# Patient Record
Sex: FEMALE | Race: WHITE | Employment: OTHER | URBAN - METROPOLITAN AREA
[De-identification: names, ages, dates, MRNs, and addresses within clinical notes are randomized per-mention and may not be internally consistent; named-entity substitution may affect disease eponyms.]

---

## 2017-01-05 RX ORDER — LORAZEPAM 0.5 MG/1
0.5 TABLET ORAL EVERY 8 HOURS PRN
Qty: 90 TABLET | Refills: 1 | Status: SHIPPED | OUTPATIENT
Start: 2017-01-05 | End: 2017-03-16 | Stop reason: SDUPTHER

## 2017-01-12 ENCOUNTER — OFFICE VISIT (OUTPATIENT)
Dept: FAMILY MEDICINE CLINIC | Age: 62
End: 2017-01-12

## 2017-01-12 VITALS
DIASTOLIC BLOOD PRESSURE: 100 MMHG | WEIGHT: 194.8 LBS | TEMPERATURE: 98.4 F | HEART RATE: 68 BPM | RESPIRATION RATE: 20 BRPM | BODY MASS INDEX: 32.46 KG/M2 | SYSTOLIC BLOOD PRESSURE: 140 MMHG

## 2017-01-12 DIAGNOSIS — G89.29 OTHER CHRONIC PAIN: ICD-10-CM

## 2017-01-12 DIAGNOSIS — R10.11 RUQ ABDOMINAL PAIN: Primary | ICD-10-CM

## 2017-01-12 PROCEDURE — 99214 OFFICE O/P EST MOD 30 MIN: CPT | Performed by: FAMILY MEDICINE

## 2017-01-12 RX ORDER — MORPHINE SULFATE 15 MG/1
15 TABLET ORAL EVERY 6 HOURS PRN
Qty: 30 TABLET | Refills: 0 | Status: SHIPPED | OUTPATIENT
Start: 2017-01-12 | End: 2017-02-16

## 2017-01-23 ENCOUNTER — CARE COORDINATION (OUTPATIENT)
Dept: CARE COORDINATION | Age: 62
End: 2017-01-23

## 2017-01-25 ENCOUNTER — TELEPHONE (OUTPATIENT)
Dept: FAMILY MEDICINE CLINIC | Age: 62
End: 2017-01-25

## 2017-02-16 ENCOUNTER — OFFICE VISIT (OUTPATIENT)
Dept: FAMILY MEDICINE CLINIC | Age: 62
End: 2017-02-16

## 2017-02-16 VITALS
DIASTOLIC BLOOD PRESSURE: 80 MMHG | SYSTOLIC BLOOD PRESSURE: 130 MMHG | WEIGHT: 188.8 LBS | RESPIRATION RATE: 16 BRPM | BODY MASS INDEX: 32.41 KG/M2 | TEMPERATURE: 98.1 F | HEART RATE: 84 BPM

## 2017-02-16 DIAGNOSIS — G89.29 OTHER CHRONIC PAIN: ICD-10-CM

## 2017-02-16 DIAGNOSIS — R07.2 PRECORDIAL PAIN: ICD-10-CM

## 2017-02-16 DIAGNOSIS — I25.10 CORONARY ARTERY DISEASE INVOLVING NATIVE CORONARY ARTERY OF NATIVE HEART WITHOUT ANGINA PECTORIS: ICD-10-CM

## 2017-02-16 DIAGNOSIS — K83.4 SPHINCTER OF ODDI DYSFUNCTION: ICD-10-CM

## 2017-02-16 DIAGNOSIS — K58.0 IRRITABLE BOWEL SYNDROME WITH DIARRHEA: ICD-10-CM

## 2017-02-16 DIAGNOSIS — F33.0 MILD EPISODE OF RECURRENT MAJOR DEPRESSIVE DISORDER (HCC): ICD-10-CM

## 2017-02-16 DIAGNOSIS — R10.84 GENERALIZED ABDOMINAL PAIN: Primary | ICD-10-CM

## 2017-02-16 PROCEDURE — 99214 OFFICE O/P EST MOD 30 MIN: CPT | Performed by: FAMILY MEDICINE

## 2017-02-16 RX ORDER — CYCLOBENZAPRINE HCL 10 MG
10 TABLET ORAL 3 TIMES DAILY PRN
Qty: 60 TABLET | Refills: 0 | Status: SHIPPED | OUTPATIENT
Start: 2017-02-16 | End: 2017-03-08 | Stop reason: ALTCHOICE

## 2017-02-16 RX ORDER — MULTIVITAMIN WITH IRON
250 TABLET ORAL DAILY
COMMUNITY
End: 2017-12-14

## 2017-03-08 ENCOUNTER — OFFICE VISIT (OUTPATIENT)
Dept: ENDOCRINOLOGY | Age: 62
End: 2017-03-08

## 2017-03-08 VITALS
HEIGHT: 64 IN | OXYGEN SATURATION: 97 % | WEIGHT: 191.8 LBS | SYSTOLIC BLOOD PRESSURE: 138 MMHG | BODY MASS INDEX: 32.74 KG/M2 | HEART RATE: 103 BPM | DIASTOLIC BLOOD PRESSURE: 90 MMHG

## 2017-03-08 DIAGNOSIS — R63.5 WEIGHT GAIN, ABNORMAL: ICD-10-CM

## 2017-03-08 DIAGNOSIS — N95.1 MENOPAUSAL STATE: Primary | ICD-10-CM

## 2017-03-08 DIAGNOSIS — E78.2 MIXED HYPERLIPIDEMIA: ICD-10-CM

## 2017-03-08 DIAGNOSIS — R53.83 OTHER FATIGUE: ICD-10-CM

## 2017-03-08 DIAGNOSIS — Z79.890 POSTMENOPAUSAL HRT (HORMONE REPLACEMENT THERAPY): ICD-10-CM

## 2017-03-08 DIAGNOSIS — E55.9 VITAMIN D DEFICIENCY: ICD-10-CM

## 2017-03-08 PROCEDURE — 99204 OFFICE O/P NEW MOD 45 MIN: CPT | Performed by: NURSE PRACTITIONER

## 2017-03-08 RX ORDER — NITROGLYCERIN 0.3 MG/1
0.3 TABLET SUBLINGUAL EVERY 5 MIN PRN
Status: ON HOLD | COMMUNITY
End: 2017-11-22 | Stop reason: HOSPADM

## 2017-03-08 RX ORDER — ESTRADIOL 0.1 MG/G
CREAM VAGINAL
Qty: 1 TUBE | Refills: 3 | Status: SHIPPED | OUTPATIENT
Start: 2017-03-08 | End: 2017-06-26 | Stop reason: SDUPTHER

## 2017-03-08 RX ORDER — ESTRADIOL 0.1 MG/G
1 CREAM VAGINAL DAILY
Qty: 1 TUBE | Refills: 3 | Status: SHIPPED | OUTPATIENT
Start: 2017-03-08 | End: 2017-03-08 | Stop reason: SDUPTHER

## 2017-03-08 RX ORDER — CYCLOBENZAPRINE HCL 10 MG
10 TABLET ORAL 3 TIMES DAILY PRN
COMMUNITY
End: 2017-05-08 | Stop reason: ALTCHOICE

## 2017-03-16 ENCOUNTER — OFFICE VISIT (OUTPATIENT)
Dept: FAMILY MEDICINE CLINIC | Age: 62
End: 2017-03-16

## 2017-03-16 VITALS
SYSTOLIC BLOOD PRESSURE: 148 MMHG | WEIGHT: 190.6 LBS | OXYGEN SATURATION: 98 % | HEART RATE: 100 BPM | BODY MASS INDEX: 32.72 KG/M2 | TEMPERATURE: 96.5 F | DIASTOLIC BLOOD PRESSURE: 92 MMHG

## 2017-03-16 DIAGNOSIS — F06.30 MOOD DISORDER DUE TO A GENERAL MEDICAL CONDITION: ICD-10-CM

## 2017-03-16 DIAGNOSIS — I10 ESSENTIAL HYPERTENSION: ICD-10-CM

## 2017-03-16 DIAGNOSIS — F33.0 MILD EPISODE OF RECURRENT MAJOR DEPRESSIVE DISORDER (HCC): ICD-10-CM

## 2017-03-16 DIAGNOSIS — I25.10 CORONARY ARTERY DISEASE INVOLVING NATIVE CORONARY ARTERY OF NATIVE HEART WITHOUT ANGINA PECTORIS: ICD-10-CM

## 2017-03-16 DIAGNOSIS — G89.29 OTHER CHRONIC PAIN: Primary | ICD-10-CM

## 2017-03-16 PROCEDURE — 99214 OFFICE O/P EST MOD 30 MIN: CPT | Performed by: FAMILY MEDICINE

## 2017-03-16 RX ORDER — HYDROCODONE BITARTRATE AND ACETAMINOPHEN 5; 325 MG/1; MG/1
1 TABLET ORAL 2 TIMES DAILY
Qty: 60 TABLET | Refills: 0 | Status: SHIPPED | OUTPATIENT
Start: 2017-03-16 | End: 2017-04-11 | Stop reason: SDUPTHER

## 2017-03-16 RX ORDER — LORAZEPAM 0.5 MG/1
0.5 TABLET ORAL 2 TIMES DAILY
Qty: 60 TABLET | Refills: 0 | Status: SHIPPED | OUTPATIENT
Start: 2017-03-16 | End: 2017-04-11 | Stop reason: SDUPTHER

## 2017-03-16 RX ORDER — OXYCODONE HYDROCHLORIDE AND ACETAMINOPHEN 5; 325 MG/1; MG/1
1 TABLET ORAL 2 TIMES DAILY
Status: CANCELLED | OUTPATIENT
Start: 2017-03-16

## 2017-03-16 RX ORDER — LISINOPRIL 10 MG/1
10 TABLET ORAL DAILY
Qty: 30 TABLET | Refills: 3 | Status: SHIPPED | OUTPATIENT
Start: 2017-03-16 | End: 2017-04-11

## 2017-03-28 RX ORDER — ICOSAPENT ETHYL 1000 MG/1
2 CAPSULE ORAL 2 TIMES DAILY
Qty: 120 CAPSULE | Refills: 3 | Status: SHIPPED | OUTPATIENT
Start: 2017-03-28 | End: 2018-06-13

## 2017-03-30 ENCOUNTER — TELEPHONE (OUTPATIENT)
Dept: FAMILY MEDICINE CLINIC | Age: 62
End: 2017-03-30

## 2017-04-11 ENCOUNTER — OFFICE VISIT (OUTPATIENT)
Dept: FAMILY MEDICINE CLINIC | Age: 62
End: 2017-04-11

## 2017-04-11 VITALS
SYSTOLIC BLOOD PRESSURE: 146 MMHG | HEART RATE: 74 BPM | OXYGEN SATURATION: 97 % | DIASTOLIC BLOOD PRESSURE: 90 MMHG | RESPIRATION RATE: 12 BRPM

## 2017-04-11 DIAGNOSIS — H92.01 OTALGIA, RIGHT: ICD-10-CM

## 2017-04-11 DIAGNOSIS — F33.0 MILD EPISODE OF RECURRENT MAJOR DEPRESSIVE DISORDER (HCC): ICD-10-CM

## 2017-04-11 DIAGNOSIS — I25.10 CORONARY ARTERY DISEASE INVOLVING NATIVE CORONARY ARTERY OF NATIVE HEART WITHOUT ANGINA PECTORIS: ICD-10-CM

## 2017-04-11 DIAGNOSIS — E78.00 PURE HYPERCHOLESTEROLEMIA: ICD-10-CM

## 2017-04-11 DIAGNOSIS — F41.9 ANXIETY: ICD-10-CM

## 2017-04-11 DIAGNOSIS — I10 ESSENTIAL HYPERTENSION: ICD-10-CM

## 2017-04-11 DIAGNOSIS — G89.29 OTHER CHRONIC PAIN: Primary | ICD-10-CM

## 2017-04-11 PROCEDURE — 99214 OFFICE O/P EST MOD 30 MIN: CPT | Performed by: FAMILY MEDICINE

## 2017-04-11 RX ORDER — HYDROCODONE BITARTRATE AND ACETAMINOPHEN 5; 325 MG/1; MG/1
1 TABLET ORAL 2 TIMES DAILY
Qty: 60 TABLET | Refills: 0 | Status: SHIPPED | OUTPATIENT
Start: 2017-04-11 | End: 2017-05-08

## 2017-04-11 RX ORDER — LORAZEPAM 0.5 MG/1
0.5 TABLET ORAL 2 TIMES DAILY
Qty: 60 TABLET | Refills: 0 | Status: SHIPPED | OUTPATIENT
Start: 2017-04-11 | End: 2017-05-09 | Stop reason: SDUPTHER

## 2017-04-11 RX ORDER — HYDROCHLOROTHIAZIDE 25 MG/1
25 TABLET ORAL DAILY
Qty: 30 TABLET | Refills: 5 | Status: SHIPPED | OUTPATIENT
Start: 2017-04-11 | End: 2017-06-26

## 2017-05-08 ENCOUNTER — OFFICE VISIT (OUTPATIENT)
Dept: ENT CLINIC | Age: 62
End: 2017-05-08

## 2017-05-08 VITALS
DIASTOLIC BLOOD PRESSURE: 104 MMHG | SYSTOLIC BLOOD PRESSURE: 153 MMHG | BODY MASS INDEX: 28.34 KG/M2 | HEIGHT: 64 IN | WEIGHT: 166 LBS

## 2017-05-08 DIAGNOSIS — H61.031 PERICHONDRITIS AND CHONDRITIS OF PINNA, RIGHT: Primary | ICD-10-CM

## 2017-05-08 DIAGNOSIS — H61.001 PERICHONDRITIS AND CHONDRITIS OF PINNA, RIGHT: Primary | ICD-10-CM

## 2017-05-08 PROCEDURE — 99243 OFF/OP CNSLTJ NEW/EST LOW 30: CPT | Performed by: OTOLARYNGOLOGY

## 2017-05-08 RX ORDER — HYDROCODONE BITARTRATE AND ACETAMINOPHEN 5; 325 MG/1; MG/1
1 TABLET ORAL NIGHTLY
COMMUNITY
End: 2017-06-26

## 2017-05-09 ENCOUNTER — OFFICE VISIT (OUTPATIENT)
Dept: FAMILY MEDICINE CLINIC | Age: 62
End: 2017-05-09

## 2017-05-09 VITALS
WEIGHT: 189.4 LBS | SYSTOLIC BLOOD PRESSURE: 155 MMHG | DIASTOLIC BLOOD PRESSURE: 97 MMHG | OXYGEN SATURATION: 96 % | BODY MASS INDEX: 32.51 KG/M2 | HEART RATE: 86 BPM | RESPIRATION RATE: 12 BRPM | TEMPERATURE: 96.5 F

## 2017-05-09 DIAGNOSIS — K58.0 IRRITABLE BOWEL SYNDROME WITH DIARRHEA: ICD-10-CM

## 2017-05-09 DIAGNOSIS — I10 ESSENTIAL HYPERTENSION: Primary | ICD-10-CM

## 2017-05-09 DIAGNOSIS — R10.11 RUQ ABDOMINAL PAIN: ICD-10-CM

## 2017-05-09 DIAGNOSIS — G89.29 OTHER CHRONIC PAIN: ICD-10-CM

## 2017-05-09 DIAGNOSIS — I25.10 CORONARY ARTERY DISEASE INVOLVING NATIVE CORONARY ARTERY OF NATIVE HEART WITHOUT ANGINA PECTORIS: ICD-10-CM

## 2017-05-09 DIAGNOSIS — F33.0 MILD EPISODE OF RECURRENT MAJOR DEPRESSIVE DISORDER (HCC): ICD-10-CM

## 2017-05-09 PROCEDURE — 99214 OFFICE O/P EST MOD 30 MIN: CPT | Performed by: FAMILY MEDICINE

## 2017-05-09 RX ORDER — LORAZEPAM 0.5 MG/1
0.25 TABLET ORAL 2 TIMES DAILY
Qty: 30 TABLET | Refills: 0 | Status: SHIPPED | OUTPATIENT
Start: 2017-05-09 | End: 2017-06-26

## 2017-05-09 ASSESSMENT — PATIENT HEALTH QUESTIONNAIRE - PHQ9
2. FEELING DOWN, DEPRESSED OR HOPELESS: 0
1. LITTLE INTEREST OR PLEASURE IN DOING THINGS: 0
SUM OF ALL RESPONSES TO PHQ9 QUESTIONS 1 & 2: 0
SUM OF ALL RESPONSES TO PHQ QUESTIONS 1-9: 0

## 2017-06-26 ENCOUNTER — OFFICE VISIT (OUTPATIENT)
Dept: FAMILY MEDICINE CLINIC | Age: 62
End: 2017-06-26

## 2017-06-26 VITALS
BODY MASS INDEX: 33.23 KG/M2 | OXYGEN SATURATION: 97 % | RESPIRATION RATE: 14 BRPM | TEMPERATURE: 97.3 F | WEIGHT: 193.6 LBS | SYSTOLIC BLOOD PRESSURE: 160 MMHG | DIASTOLIC BLOOD PRESSURE: 92 MMHG | HEART RATE: 86 BPM

## 2017-06-26 DIAGNOSIS — I10 ESSENTIAL HYPERTENSION: Primary | ICD-10-CM

## 2017-06-26 DIAGNOSIS — I25.10 CORONARY ARTERY DISEASE INVOLVING NATIVE CORONARY ARTERY OF NATIVE HEART WITHOUT ANGINA PECTORIS: ICD-10-CM

## 2017-06-26 DIAGNOSIS — R10.9 CHRONIC ABDOMINAL PAIN: ICD-10-CM

## 2017-06-26 DIAGNOSIS — G89.29 CHRONIC ABDOMINAL PAIN: ICD-10-CM

## 2017-06-26 DIAGNOSIS — K58.0 IRRITABLE BOWEL SYNDROME WITH DIARRHEA: ICD-10-CM

## 2017-06-26 PROCEDURE — 99214 OFFICE O/P EST MOD 30 MIN: CPT | Performed by: FAMILY MEDICINE

## 2017-06-26 RX ORDER — ESTRADIOL 0.1 MG/G
CREAM VAGINAL
Qty: 1 TUBE | Refills: 5 | Status: SHIPPED | OUTPATIENT
Start: 2017-06-26 | End: 2017-09-28 | Stop reason: SDUPTHER

## 2017-09-28 ENCOUNTER — OFFICE VISIT (OUTPATIENT)
Dept: FAMILY MEDICINE CLINIC | Age: 62
End: 2017-09-28

## 2017-09-28 VITALS
HEART RATE: 72 BPM | DIASTOLIC BLOOD PRESSURE: 92 MMHG | HEIGHT: 64 IN | WEIGHT: 195.8 LBS | SYSTOLIC BLOOD PRESSURE: 162 MMHG | BODY MASS INDEX: 33.43 KG/M2

## 2017-09-28 DIAGNOSIS — K58.0 IRRITABLE BOWEL SYNDROME WITH DIARRHEA: ICD-10-CM

## 2017-09-28 DIAGNOSIS — G89.29 OTHER CHRONIC PAIN: ICD-10-CM

## 2017-09-28 DIAGNOSIS — M54.9 UPPER BACK PAIN: ICD-10-CM

## 2017-09-28 DIAGNOSIS — I10 ESSENTIAL HYPERTENSION: Primary | ICD-10-CM

## 2017-09-28 DIAGNOSIS — M25.561 ACUTE PAIN OF RIGHT KNEE: ICD-10-CM

## 2017-09-28 DIAGNOSIS — I25.10 CORONARY ARTERY DISEASE INVOLVING NATIVE CORONARY ARTERY OF NATIVE HEART WITHOUT ANGINA PECTORIS: ICD-10-CM

## 2017-09-28 PROCEDURE — 99214 OFFICE O/P EST MOD 30 MIN: CPT | Performed by: FAMILY MEDICINE

## 2017-09-28 RX ORDER — LOSARTAN POTASSIUM 50 MG/1
50 TABLET ORAL DAILY
Qty: 30 TABLET | Refills: 5 | Status: SHIPPED | OUTPATIENT
Start: 2017-09-28 | End: 2017-12-14

## 2017-09-28 RX ORDER — OMEPRAZOLE 10 MG/1
10 CAPSULE, DELAYED RELEASE ORAL PRN
COMMUNITY
End: 2020-02-21

## 2017-09-28 RX ORDER — LIDOCAINE HYDROCHLORIDE 30 MG/G
CREAM TOPICAL
Qty: 85 G | Refills: 0 | Status: SHIPPED | OUTPATIENT
Start: 2017-09-28 | End: 2017-10-13 | Stop reason: SDUPTHER

## 2017-09-28 RX ORDER — DICYCLOMINE HCL 20 MG
20 TABLET ORAL 3 TIMES DAILY PRN
Qty: 120 TABLET | Refills: 5 | Status: SHIPPED | OUTPATIENT
Start: 2017-09-28 | End: 2017-12-14

## 2017-09-28 RX ORDER — ESTRADIOL 0.1 MG/G
CREAM VAGINAL
Qty: 1 TUBE | Refills: 5 | Status: SHIPPED | OUTPATIENT
Start: 2017-09-28 | End: 2017-12-14

## 2017-09-28 RX ORDER — LOPERAMIDE HYDROCHLORIDE 2 MG/1
2 CAPSULE ORAL PRN
COMMUNITY
End: 2017-12-14

## 2017-09-28 NOTE — MR AVS SNAPSHOT
After Visit Summary             Amadeo Barahona   2017 2:50 PM   Office Visit    Description:  Female : 1955   Provider:  Benedicto Bean MD   Department:  [address] [place] [address] Se and Future Appointments         Below is a list of your follow-up and future appointments. This may not be a complete list as you may have made appointments directly with providers that we are not aware of or your providers may have made some for you. Please call your providers to confirm appointments. It is important to keep your appointments. Please bring your current insurance card, photo ID, co-pay, and all medication bottles to your appointment. If self-pay, payment is expected at the time of service. Your To-Do List     Future Appointments Provider Department Dept Phone    2017 2:30 PM Benedicto Bean MD [facility] [address] [phone]    Please arrive 15 minutes prior to appointment, bring photo ID and insurance card. Information from Your Visit        Department     Name Address Phone Fax    [address] [phone]      You Were Seen for:         Comments    Essential hypertension   [030677]         Vital Signs     Blood Pressure Pulse Height Weight Body Mass Index Smoking Status    162/92 (Site: Right Arm, Position: Sitting, Cuff Size: Large Adult) 72 5' 4\" (1.626 m) 195 lb 12.8 oz (88.8 kg) 33.61 kg/m2 Former Smoker      Additional Information about your Body Mass Index (BMI)           Your BMI as listed above is considered obese (30 or more). BMI is an estimate of body fat, calculated from your height and weight. The higher your BMI, the greater your risk of heart disease, high blood pressure, type 2 diabetes, stroke, gallstones, arthritis, sleep apnea, and certain cancers. BMI is not perfect.   It may overestimate body fat in athletes and people who are more muscular. Even a small weight loss (between 5 and 10 percent of your current weight) by decreasing your calorie intake and becoming more physically active will help lower your risk of developing or worsening diseases associated with obesity. Learn more at: [employer]             Today's Medication Changes          These changes are accurate as of: 9/28/17  3:29 PM.  If you have any questions, ask your nurse or doctor. START taking these medications           diclofenac sodium 1 % Gel   Commonly known as:  VOLTAREN   Instructions:  Apply 4 g topically 4 times daily   Quantity:  5 Tube   Refills:  5   Started by:  Kesha Bryson MD       losartan 50 MG tablet   Commonly known as:  COZAAR   Instructions:   Take 1 tablet by mouth daily   Quantity:  30 tablet   Refills:  5   Started by:  Kesha Bryson MD         CHANGE how you take these medications           lidocaine 3 % Crea   Commonly known as:  LIDAMANTLE   Instructions:  Apply to affected area BID prn   Quantity:  85 g   Refills:  0   What changed:    - how to take this  - additional instructions   Changed by:  Kesha Bryson MD            Where to Get Your Medications      These medications were sent to [address] [place], [place] [address] [facility] [phone] - F [phone]  [address] [place] [address] , [address] [id number]     Phone:  [phone]     diclofenac sodium 1 % Gel    dicyclomine 20 MG tablet    estradiol 0.1 MG/GM vaginal cream    lidocaine 3 % Crea    losartan 50 MG tablet               Your Current Medications Are              loperamide (IMODIUM) 2 MG capsule Take 2 mg by mouth as needed for Diarrhea    omeprazole (PRILOSEC) 10 MG delayed release capsule Take 10 mg by mouth as needed    Digestive Enzymes (DIGESTIVE ENZYME PO) Take by mouth OTC 3-4 times weekly    UNABLE TO FIND Raw Iron: 2-3 times weekly
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
## 2017-10-03 RX ORDER — ACYCLOVIR 50 MG/G
OINTMENT TOPICAL
Qty: 30 G | Refills: 0 | Status: SHIPPED | OUTPATIENT
Start: 2017-10-03 | End: 2017-11-07 | Stop reason: SDUPTHER

## 2017-10-06 ENCOUNTER — TELEPHONE (OUTPATIENT)
Dept: FAMILY MEDICINE CLINIC | Age: 62
End: 2017-10-06

## 2017-10-06 DIAGNOSIS — L29.0 PERIANAL IRRITATION: ICD-10-CM

## 2017-10-06 NOTE — TELEPHONE ENCOUNTER
Ulisses Richardson called stating a prescription for lidocaine hydrocortisone cream was supposed to be sent to her pharmacy at her last visit. She states the pharmacy told her that the last script that was sent was just for lidocaine cream.  She is requesting that the correct prescription be sent to [facility]. Please advise. Thanks.       [facility] [address] [place] [address] [phone]

## 2017-10-06 NOTE — TELEPHONE ENCOUNTER
PA submitted in completion via CoverMyMeds. Awaiting Payor response via office fax. Thank you!     Odalis ADAME Key: [id number]

## 2017-10-06 NOTE — TELEPHONE ENCOUNTER
A  scanned  request for a  Prior Authorization for medication Lidocaine 3% Cream 85 GM    is attached to this encounter. Please initiate  this request with the patients insurance company.  Thank  You

## 2017-10-13 RX ORDER — LIDOCAINE HYDROCHLORIDE 30 MG/G
CREAM TOPICAL
Qty: 85 G | Refills: 2 | Status: SHIPPED | OUTPATIENT
Start: 2017-10-13 | End: 2017-12-14

## 2017-10-20 RX ORDER — LIDOCAINE 50 MG/G
OINTMENT TOPICAL
Qty: 85 G | Refills: 2 | Status: CANCELLED | OUTPATIENT
Start: 2017-10-20

## 2017-10-20 NOTE — TELEPHONE ENCOUNTER
Pharmacy called to get the lidocaine 5%, patient agreed to the change. Please e-scribe to Connor in chart.

## 2017-11-09 RX ORDER — ACYCLOVIR 50 MG/G
OINTMENT TOPICAL
Qty: 30 G | Refills: 1 | Status: SHIPPED | OUTPATIENT
Start: 2017-11-09 | End: 2020-08-31

## 2017-11-19 PROBLEM — I21.3 STEMI (ST ELEVATION MYOCARDIAL INFARCTION) (HCC): Status: ACTIVE | Noted: 2017-11-19

## 2017-11-19 PROBLEM — I21.09 ACUTE MI ANTERIOR LATERAL SUBSEQUENT EPISODE CARE (HCC): Status: ACTIVE | Noted: 2017-11-19

## 2017-11-29 ENCOUNTER — OFFICE VISIT (OUTPATIENT)
Dept: CARDIOLOGY CLINIC | Age: 62
End: 2017-11-29

## 2017-11-29 VITALS
HEART RATE: 73 BPM | BODY MASS INDEX: 34.09 KG/M2 | DIASTOLIC BLOOD PRESSURE: 80 MMHG | WEIGHT: 198.6 LBS | SYSTOLIC BLOOD PRESSURE: 126 MMHG

## 2017-11-29 DIAGNOSIS — I20.8 STABLE ANGINA (HCC): Primary | ICD-10-CM

## 2017-11-29 DIAGNOSIS — I10 ESSENTIAL HYPERTENSION: ICD-10-CM

## 2017-11-29 DIAGNOSIS — I25.118 CORONARY ARTERY DISEASE OF NATIVE ARTERY OF NATIVE HEART WITH STABLE ANGINA PECTORIS (HCC): ICD-10-CM

## 2017-11-29 DIAGNOSIS — E78.00 HYPERCHOLESTEROLEMIA: ICD-10-CM

## 2017-11-29 PROCEDURE — 99214 OFFICE O/P EST MOD 30 MIN: CPT | Performed by: NURSE PRACTITIONER

## 2017-11-29 PROCEDURE — 93000 ELECTROCARDIOGRAM COMPLETE: CPT | Performed by: NURSE PRACTITIONER

## 2017-11-29 RX ORDER — ISOSORBIDE MONONITRATE 60 MG/1
60 TABLET, EXTENDED RELEASE ORAL DAILY
Qty: 90 TABLET | Refills: 3 | Status: SHIPPED | OUTPATIENT
Start: 2017-11-29 | End: 2020-02-21

## 2017-12-02 NOTE — PROGRESS NOTES
rashes or non-healing ulcers. Physical Exam:  /80   Pulse 73   Wt 198 lb 9.6 oz (90.1 kg)   BMI 34.09 kg/m²    General:  Alert and oriented. No acute distress. Appears comfortable. HEENT:  Normocephalic. No trauma. EOMI. Neck:  Supple, no JVD  Cardiovascular:  RRR  Pulses: 2+ radial   Lungs:  Clear to auscultation. No rales, wheezes, or rhonchi. Abd:  Soft, non-tender, non-distended. No peritoneal signs. Ext:  No clubbing, cyanosis, or edema. Bilateral groins soft, no hematoma bilatrally   Neuro:  CN's 2-12 grossly in tact. Gait normal.  Motor and sensory exams grossly normal.  Skin:  No rashes or skin breakdown.     CBC:   Lab Results   Component Value Date    WBC 8.8 11/21/2017    HGB 13.1 11/21/2017    HCT 38.0 11/21/2017    MCV 83.6 11/21/2017     11/21/2017     BMP:  Lab Results   Component Value Date    CREATININE 0.6 11/21/2017    BUN 10 11/21/2017     (L) 11/21/2017    K 3.5 11/21/2017    CL 99 11/21/2017    CO2 25 11/21/2017     Mag:   Lab Results   Component Value Date    MG 2.10 03/28/2017     LIVER PROFILE:   Lab Results   Component Value Date    ALT 12 11/20/2017     (H) 11/20/2017     (H) 01/17/2017    ALKPHOS 95 11/20/2017    BILITOT 0.4 11/20/2017     PT/INR: No results found for: INR, PROTIME  BNP:    Lab Results   Component Value Date    BNP 94.0 11/19/2017     LIPIDS:  No components found for: CHLPL  Lab Results   Component Value Date    TRIG 162 (H) 11/20/2017    TRIG 203 (H) 11/19/2017    TRIG 292 (H) 03/28/2017     Lab Results   Component Value Date    HDL 53 11/20/2017    HDL 52 11/19/2017    HDL 49 03/28/2017     Lab Results   Component Value Date    LDLCALC 139 (H) 11/20/2017    LDLCALC 138 (H) 11/19/2017    LDLCALC 89 03/28/2017     Lab Results   Component Value Date    LABVLDL 32 11/20/2017    LABVLDL 41 11/19/2017    LABVLDL 58 03/28/2017     TSH:  Lab Results   Component Value Date    TSH 3.89 03/28/2017       IMAGING:     Coronary angiogram  Patent stent diagonal branch 40% mid LAD stenosis, 10% distal  left main coronary artery stenosis, 10 to 20% mid right posterior  descending coronary stenosis, right coronary artery dominance. No  significant changes from the conclusion of the procedure yesterday. There  is an element of coronary vasospasm possibly with also myocardial bridging  in the LAD. We are going to try to add verapamil 40 mg p.o. t.i.d. and  assess response. The patient needs a nitroglycerin patch as well. We will  have to follow her blood pressure and heart rate on these medications. Angio-Seal was successful in the left femoral arteriotomy. Hemostasis was  achieved in the left femoral arteriotomy.     Medications:   Current Outpatient Prescriptions   Medication Sig Dispense Refill    isosorbide mononitrate (IMDUR) 60 MG extended release tablet Take 1 tablet by mouth daily 90 tablet 3    verapamil (CALAN SR) 120 MG extended release tablet Take 1 tablet by mouth nightly 30 tablet 3    prasugrel (EFFIENT) 10 MG TABS Take 1 tablet by mouth daily 30 tablet 2    atorvastatin (LIPITOR) 40 MG tablet Take 1 tablet by mouth nightly 30 tablet 3    metoprolol tartrate (LOPRESSOR) 25 MG tablet Take 1 tablet by mouth 2 times daily 60 tablet 3    aspirin EC 81 MG EC tablet Take 1 tablet by mouth daily 30 tablet 3    acyclovir (ZOVIRAX) 5 % ointment APPLY TO THE AFFECTED AREA FIVE TIMES DAILY 30 g 1    omeprazole (PRILOSEC) 10 MG delayed release capsule Take 10 mg by mouth as needed      estradiol (ESTRACE) 0.1 MG/GM vaginal cream Place 1 gram in Vagina Mon-Wed-Fri 1 Tube 5    dicyclomine (BENTYL) 20 MG tablet Take 1 tablet by mouth 3 times daily as needed (abd spasm) 120 tablet 5    Icosapent Ethyl (VASCEPA) 1 G CAPS capsule Take 2 capsules by mouth 2 times daily 120 capsule 3    Colostrum 500 MG CAPS Take by mouth daily      lidocaine (LIDAMANTLE) 3 % CREA Apply to affected area BID prn 85 g 2    Lidocaine-Hydrocortisone Ace 3-0.5 % CREA Apply to affected area QD as directed 1 Tube 5    loperamide (IMODIUM) 2 MG capsule Take 2 mg by mouth as needed for Diarrhea      Digestive Enzymes (DIGESTIVE ENZYME PO) Take by mouth OTC 3-4 times weekly      diclofenac sodium (VOLTAREN) 1 % GEL Apply 4 g topically 4 times daily 5 Tube 5    losartan (COZAAR) 50 MG tablet Take 1 tablet by mouth daily 30 tablet 5    magnesium (MAGNESIUM-OXIDE) 250 MG TABS tablet Take 250 mg by mouth daily       No current facility-administered medications for this visit. Assessment:  1. Stable angina ([facility] [address]    2. Coronary artery disease of native artery of native heart with stable angina pectoris ([facility] [address]    3. Essential hypertension    4. Hypercholesterolemia          Plan:  -Increase Imdur to 60 mg po daily  -Reviewed ECGs with Dr. Radha Padron and plan of care  -Follow up with Dr. Jairon Snider in 2 weeks.

## 2017-12-12 ENCOUNTER — OFFICE VISIT (OUTPATIENT)
Dept: CARDIOLOGY CLINIC | Age: 62
End: 2017-12-12

## 2017-12-12 VITALS
SYSTOLIC BLOOD PRESSURE: 110 MMHG | WEIGHT: 198 LBS | BODY MASS INDEX: 33.99 KG/M2 | DIASTOLIC BLOOD PRESSURE: 70 MMHG | HEART RATE: 60 BPM

## 2017-12-12 DIAGNOSIS — I25.10 CORONARY ARTERY DISEASE INVOLVING NATIVE CORONARY ARTERY, ANGINA PRESENCE UNSPECIFIED, UNSPECIFIED WHETHER NATIVE OR TRANSPLANTED HEART: ICD-10-CM

## 2017-12-12 DIAGNOSIS — R07.9 CHEST PAIN, UNSPECIFIED TYPE: Primary | ICD-10-CM

## 2017-12-12 PROCEDURE — 93000 ELECTROCARDIOGRAM COMPLETE: CPT | Performed by: INTERNAL MEDICINE

## 2017-12-12 PROCEDURE — 99214 OFFICE O/P EST MOD 30 MIN: CPT | Performed by: INTERNAL MEDICINE

## 2017-12-12 RX ORDER — RANOLAZINE 500 MG/1
500 TABLET, EXTENDED RELEASE ORAL 2 TIMES DAILY
Qty: 60 TABLET | Refills: 3 | Status: SHIPPED | OUTPATIENT
Start: 2017-12-12 | End: 2018-01-17 | Stop reason: SDUPTHER

## 2017-12-12 ASSESSMENT — ENCOUNTER SYMPTOMS
GASTROINTESTINAL NEGATIVE: 1
CHEST TIGHTNESS: 1
ALLERGIC/IMMUNOLOGIC NEGATIVE: 1
SHORTNESS OF BREATH: 1
EYES NEGATIVE: 1

## 2017-12-12 NOTE — PROGRESS NOTES
Subjective:      Patient ID: Haley Ivey is a 63 y.o. female. CC:  Follow up stenting and chest pain    HPI:  Patient c/o constant chest pain with rest and changes in position with pleuritic component. She had stenting of her diagonal branch and then had a cath the following day for severe chest pain and appeared to have spasm and LAD bridging. Review of Systems   Constitutional: Negative. HENT: Negative. Eyes: Negative. Respiratory: Positive for chest tightness and shortness of breath. Cardiovascular: Positive for chest pain. Gastrointestinal: Negative. Endocrine: Negative. Genitourinary: Negative. Musculoskeletal: Negative. Skin: Negative. Allergic/Immunologic: Negative. Neurological: Negative. Hematological: Negative. Psychiatric/Behavioral: Negative. Objective:   Physical Exam   Constitutional: She is oriented to person, place, and time. She appears well-developed and well-nourished. HENT:   Head: Normocephalic and atraumatic. Eyes: EOM are normal. Pupils are equal, round, and reactive to light. Neck: Normal range of motion. Neck supple. Cardiovascular: Normal rate and regular rhythm. Exam reveals no friction rub. No murmur heard. Pulmonary/Chest: Effort normal and breath sounds normal.   Abdominal: Soft. Bowel sounds are normal.   Musculoskeletal: Normal range of motion. She exhibits no edema or deformity. Neurological: She is alert and oriented to person, place, and time. Skin: Skin is warm and dry. Psychiatric: She has a normal mood and affect.  Her behavior is normal. Thought content normal.       Assessment:      Patient Active Problem List   Diagnosis    Pure hypercholesterolemia    Allergic rhinitis    Routine general medical examination at a health care facility    HSV infection    CAD (coronary artery disease)    Chest pain    Chronic pain    Depression    OAB (overactive bladder)    Hypercholesterolemia    Migraine

## 2017-12-13 ENCOUNTER — TELEPHONE (OUTPATIENT)
Dept: CARDIOLOGY CLINIC | Age: 62
End: 2017-12-13

## 2017-12-13 DIAGNOSIS — I20.8 STABLE ANGINA PECTORIS (HCC): Primary | ICD-10-CM

## 2017-12-14 ENCOUNTER — HOSPITAL ENCOUNTER (OUTPATIENT)
Dept: NON INVASIVE DIAGNOSTICS | Age: 62
Discharge: HOME | End: 2017-12-14
Attending: INTERNAL MEDICINE | Admitting: INTERNAL MEDICINE

## 2017-12-14 ENCOUNTER — OFFICE VISIT (OUTPATIENT)
Dept: FAMILY MEDICINE CLINIC | Age: 62
End: 2017-12-14

## 2017-12-14 VITALS
SYSTOLIC BLOOD PRESSURE: 132 MMHG | OXYGEN SATURATION: 97 % | HEART RATE: 77 BPM | BODY MASS INDEX: 34.16 KG/M2 | DIASTOLIC BLOOD PRESSURE: 84 MMHG | WEIGHT: 199 LBS

## 2017-12-14 DIAGNOSIS — I21.09 ACUTE MI ANTERIOR LATERAL SUBSEQUENT EPISODE CARE (HCC): Primary | ICD-10-CM

## 2017-12-14 DIAGNOSIS — I20.8 OTHER FORMS OF ANGINA PECTORIS (HCC): ICD-10-CM

## 2017-12-14 DIAGNOSIS — E78.00 PURE HYPERCHOLESTEROLEMIA: ICD-10-CM

## 2017-12-14 DIAGNOSIS — I10 ESSENTIAL HYPERTENSION: ICD-10-CM

## 2017-12-14 DIAGNOSIS — F33.0 MILD EPISODE OF RECURRENT MAJOR DEPRESSIVE DISORDER (HCC): ICD-10-CM

## 2017-12-14 DIAGNOSIS — I25.10 CORONARY ARTERY DISEASE INVOLVING NATIVE CORONARY ARTERY, ANGINA PRESENCE UNSPECIFIED, UNSPECIFIED WHETHER NATIVE OR TRANSPLANTED HEART: ICD-10-CM

## 2017-12-14 DIAGNOSIS — F43.9 STRESS: ICD-10-CM

## 2017-12-14 LAB
LV EF: 58 %
LVEF MODALITY: NORMAL

## 2017-12-14 PROCEDURE — 99214 OFFICE O/P EST MOD 30 MIN: CPT | Performed by: FAMILY MEDICINE

## 2017-12-14 RX ORDER — LORAZEPAM 0.5 MG/1
0.5 TABLET ORAL EVERY 8 HOURS PRN
Qty: 30 TABLET | Refills: 2 | Status: SHIPPED | OUTPATIENT
Start: 2017-12-14 | End: 2018-06-13

## 2017-12-14 RX ORDER — LORAZEPAM 0.5 MG/1
0.5 TABLET ORAL EVERY 6 HOURS PRN
COMMUNITY
End: 2017-12-14 | Stop reason: SDUPTHER

## 2017-12-14 NOTE — PROGRESS NOTES
by mouth every 8 hours as needed for Anxiety . 30 tablet 2    ranolazine (RANEXA) 500 MG extended release tablet Take 1 tablet by mouth 2 times daily 60 tablet 3    isosorbide mononitrate (IMDUR) 60 MG extended release tablet Take 1 tablet by mouth daily 90 tablet 3    prasugrel (EFFIENT) 10 MG TABS Take 1 tablet by mouth daily 30 tablet 2    atorvastatin (LIPITOR) 40 MG tablet Take 1 tablet by mouth nightly 30 tablet 3    metoprolol tartrate (LOPRESSOR) 25 MG tablet Take 1 tablet by mouth 2 times daily 60 tablet 3    aspirin EC 81 MG EC tablet Take 1 tablet by mouth daily 30 tablet 3    acyclovir (ZOVIRAX) 5 % ointment APPLY TO THE AFFECTED AREA FIVE TIMES DAILY 30 g 1    omeprazole (PRILOSEC) 10 MG delayed release capsule Take 10 mg by mouth as needed      Icosapent Ethyl (VASCEPA) 1 G CAPS capsule Take 2 capsules by mouth 2 times daily 120 capsule 3     No current facility-administered medications for this visit. Lab Results   Component Value Date    CREATININE 0.6 11/21/2017    BUN 10 11/21/2017     (L) 11/21/2017    K 3.5 11/21/2017    CL 99 11/21/2017    CO2 25 11/21/2017         No components found for: CHLPL  Lab Results   Component Value Date    TRIG 162 (H) 11/20/2017     Lab Results   Component Value Date    HDL 53 11/20/2017     Lab Results   Component Value Date    LDLCALC 139 (H) 11/20/2017     Lab Results   Component Value Date    LABVLDL 32 11/20/2017       Lab Results   Component Value Date    ALT 12 11/20/2017     (H) 11/20/2017     (H) 01/17/2017    ALKPHOS 95 11/20/2017    BILITOT 0.4 11/20/2017             ASSESSMENT / PLAN:    1. Acute MI anterior lateral subsequent episode care [facility])  Reviewed hospitalization, intervention, and f/u angiogram  Cont max medical therapy, does feel better with addition ranexa  Continue present management. F/u with cardiology  Echo today, await results    2.  Coronary artery disease involving native coronary artery, angina presence unspecified, unspecified whether native or transplanted heart  As above, cont medical therapy and will f/u with dr. Charo Chaves    3. Mild episode of recurrent major depressive disorder (HCC)  Overall doingn well despite stressors  Cont ativan prn, use intermittent  refills given as below  Controlled Substances Monitoring:     Attestation: The Prescription Monitoring Report for this patient was reviewed today. Abebe Buchanan MD)  Documentation: Possible medication side effects, risk of tolerance and/or dependence, and alternative treatments discussed., No signs of potential drug abuse or diversion identified. Abebe Buchanan MD)     4. Pure hypercholesterolemia  Had been off statin therapy d/t intol, back on d/t recent acute MI  Agreeable to try and continue lipitor 40mg  Aware benefit of risk reduction in CAD    5. Stress  As above  Controlled Substances Monitoring:     Attestation: The Prescription Monitoring Report for this patient was reviewed today. Abebe Buchanan MD)  Documentation: Possible medication side effects, risk of tolerance and/or dependence, and alternative treatments discussed., No signs of potential drug abuse or diversion identified. Abebe Buchanan MD)   - LORazepam (ATIVAN) 0.5 MG tablet; Take 1 tablet by mouth every 8 hours as needed for Anxiety . Dispense: 30 tablet; Refill: 2    6. Essential hypertension  blood pressure stable @ goal           Follow-up appointment:   4-6wks/prn    Discussed use, benefit, and side effects of all prescribed medications. Barriers to medication compliance addressed. All patient questions answered. Pt voiced understanding. When applicable, patient's outside records were reviewed through [facility]. The patient has signed appropriate paperworks/consents. Dragon dictation software was used for parts of this progress note. All attempts were made to correct any errors and ensure accuracy.

## 2018-01-17 ENCOUNTER — OFFICE VISIT (OUTPATIENT)
Dept: CARDIOLOGY CLINIC | Age: 63
End: 2018-01-17

## 2018-01-17 VITALS
HEART RATE: 66 BPM | DIASTOLIC BLOOD PRESSURE: 64 MMHG | BODY MASS INDEX: 34.84 KG/M2 | SYSTOLIC BLOOD PRESSURE: 100 MMHG | WEIGHT: 203 LBS

## 2018-01-17 DIAGNOSIS — I10 ESSENTIAL HYPERTENSION: ICD-10-CM

## 2018-01-17 DIAGNOSIS — I21.09 ACUTE MI ANTERIOR LATERAL SUBSEQUENT EPISODE CARE (HCC): Primary | ICD-10-CM

## 2018-01-17 DIAGNOSIS — I25.10 CORONARY ARTERY DISEASE INVOLVING NATIVE CORONARY ARTERY OF NATIVE HEART WITHOUT ANGINA PECTORIS: Primary | ICD-10-CM

## 2018-01-17 DIAGNOSIS — I20.8 STABLE ANGINA (HCC): ICD-10-CM

## 2018-01-17 PROCEDURE — 99213 OFFICE O/P EST LOW 20 MIN: CPT | Performed by: INTERNAL MEDICINE

## 2018-01-17 RX ORDER — ATORVASTATIN CALCIUM 40 MG/1
40 TABLET, FILM COATED ORAL NIGHTLY
Qty: 30 TABLET | Refills: 5 | Status: SHIPPED | OUTPATIENT
Start: 2018-01-17 | End: 2018-06-13

## 2018-01-17 RX ORDER — PRASUGREL 10 MG/1
10 TABLET, FILM COATED ORAL DAILY
Qty: 30 TABLET | Refills: 5 | Status: SHIPPED | OUTPATIENT
Start: 2018-01-17 | End: 2018-07-02 | Stop reason: SDUPTHER

## 2018-01-17 RX ORDER — RANOLAZINE 1000 MG/1
1000 TABLET, EXTENDED RELEASE ORAL 2 TIMES DAILY
Qty: 60 TABLET | Refills: 5 | Status: SHIPPED | OUTPATIENT
Start: 2018-01-17 | End: 2018-05-11

## 2018-01-17 ASSESSMENT — ENCOUNTER SYMPTOMS
EYES NEGATIVE: 1
CHEST TIGHTNESS: 1
GASTROINTESTINAL NEGATIVE: 1
SHORTNESS OF BREATH: 1
ALLERGIC/IMMUNOLOGIC NEGATIVE: 1

## 2018-02-23 ENCOUNTER — OFFICE VISIT (OUTPATIENT)
Dept: FAMILY MEDICINE CLINIC | Age: 63
End: 2018-02-23

## 2018-02-23 VITALS
OXYGEN SATURATION: 97 % | HEART RATE: 73 BPM | WEIGHT: 209.8 LBS | BODY MASS INDEX: 36.01 KG/M2 | TEMPERATURE: 97.7 F | DIASTOLIC BLOOD PRESSURE: 83 MMHG | RESPIRATION RATE: 14 BRPM | SYSTOLIC BLOOD PRESSURE: 117 MMHG

## 2018-02-23 DIAGNOSIS — Z12.39 SCREENING FOR BREAST CANCER: ICD-10-CM

## 2018-02-23 DIAGNOSIS — G89.29 OTHER CHRONIC PAIN: ICD-10-CM

## 2018-02-23 DIAGNOSIS — E78.00 HYPERCHOLESTEROLEMIA: ICD-10-CM

## 2018-02-23 DIAGNOSIS — E78.00 PURE HYPERCHOLESTEROLEMIA: ICD-10-CM

## 2018-02-23 DIAGNOSIS — I10 ESSENTIAL HYPERTENSION: ICD-10-CM

## 2018-02-23 DIAGNOSIS — I25.10 CORONARY ARTERY DISEASE INVOLVING NATIVE CORONARY ARTERY, ANGINA PRESENCE UNSPECIFIED, UNSPECIFIED WHETHER NATIVE OR TRANSPLANTED HEART: Primary | ICD-10-CM

## 2018-02-23 PROCEDURE — 99214 OFFICE O/P EST MOD 30 MIN: CPT | Performed by: FAMILY MEDICINE

## 2018-02-23 NOTE — PROGRESS NOTES
mouth daily 30 tablet 5    atorvastatin (LIPITOR) 40 MG tablet Take 1 tablet by mouth nightly 30 tablet 5    metoprolol tartrate (LOPRESSOR) 25 MG tablet Take 1 tablet by mouth 2 times daily 60 tablet 5    ranolazine (RANEXA) 1000 MG extended release tablet Take 1 tablet by mouth 2 times daily (Patient taking differently: Take 500 mg by mouth 2 times daily ) 60 tablet 5    LORazepam (ATIVAN) 0.5 MG tablet Take 1 tablet by mouth every 8 hours as needed for Anxiety . 30 tablet 2    isosorbide mononitrate (IMDUR) 60 MG extended release tablet Take 1 tablet by mouth daily 90 tablet 3    aspirin EC 81 MG EC tablet Take 1 tablet by mouth daily 30 tablet 3    acyclovir (ZOVIRAX) 5 % ointment APPLY TO THE AFFECTED AREA FIVE TIMES DAILY 30 g 1    omeprazole (PRILOSEC) 10 MG delayed release capsule Take 10 mg by mouth as needed      Icosapent Ethyl (VASCEPA) 1 G CAPS capsule Take 2 capsules by mouth 2 times daily 120 capsule 3     No current facility-administered medications for this visit. ASSESSMENT / PLAN:    1. Coronary artery disease involving native coronary artery, angina presence unspecified, unspecified whether native or transplanted heart  Stable w/o sx  Struggling mildly with tolerability of statin, but ok to monitor  Cont max medical therapy and f/u with cardiology    2. Pure hypercholesterolemia  Stable with statin therapy  Check bloodwork as below  Management pending results. - Lipid Panel; Future  - Comprehensive Metabolic Panel; Future  - TSH without Reflex; Future    3. Other chronic pain  Off pain meds, doing well    4. Hypercholesterolemia  As above    5. Screening for breast cancer  Due mammo, pt deferring at this time  Aware risk of not having done    6. Essential hypertension  blood pressure stable @ goal  - Lipid Panel; Future  - Comprehensive Metabolic Panel; Future  - CBC; Future    7.  Anxiety  Doing well to taper off meds  Will decrease dose of ativan to 1/2 tab qhs x 7d, then

## 2018-02-23 NOTE — PATIENT INSTRUCTIONS
Cut lorazepam to 1/2 tablet at night for 7-10d, then go to 1/2 tablet every other day for 3-4 pills then STOP!!!

## 2018-05-07 ENCOUNTER — TELEPHONE (OUTPATIENT)
Dept: CARDIOLOGY CLINIC | Age: 63
End: 2018-05-07

## 2018-05-08 DIAGNOSIS — E78.2 MIXED HYPERLIPIDEMIA: Primary | ICD-10-CM

## 2018-05-11 ENCOUNTER — TELEPHONE (OUTPATIENT)
Dept: CARDIOLOGY CLINIC | Age: 63
End: 2018-05-11

## 2018-05-11 RX ORDER — RANOLAZINE 500 MG/1
TABLET, FILM COATED, EXTENDED RELEASE ORAL
Qty: 180 TABLET | Refills: 3 | OUTPATIENT
Start: 2018-05-11

## 2018-05-11 RX ORDER — RANOLAZINE 500 MG/1
500 TABLET, EXTENDED RELEASE ORAL 2 TIMES DAILY
Qty: 60 TABLET | Refills: 3 | Status: SHIPPED | OUTPATIENT
Start: 2018-05-11 | End: 2020-02-21

## 2018-05-15 DIAGNOSIS — E78.00 PURE HYPERCHOLESTEROLEMIA: ICD-10-CM

## 2018-05-15 DIAGNOSIS — I10 ESSENTIAL HYPERTENSION: ICD-10-CM

## 2018-05-15 LAB
A/G RATIO: 2.2 (ref 1.1–2.2)
ALBUMIN SERPL-MCNC: 4.6 G/DL (ref 3.4–5)
ALP BLD-CCNC: 90 U/L (ref 40–129)
ALT SERPL-CCNC: 9 U/L (ref 10–40)
ANION GAP SERPL CALCULATED.3IONS-SCNC: 15 MMOL/L (ref 3–16)
AST SERPL-CCNC: 15 U/L (ref 15–37)
BILIRUB SERPL-MCNC: 0.5 MG/DL (ref 0–1)
BUN BLDV-MCNC: 7 MG/DL (ref 7–20)
CALCIUM SERPL-MCNC: 9.3 MG/DL (ref 8.3–10.6)
CHLORIDE BLD-SCNC: 103 MMOL/L (ref 99–110)
CHOLESTEROL, TOTAL: 226 MG/DL (ref 0–199)
CO2: 25 MMOL/L (ref 21–32)
CREAT SERPL-MCNC: 0.9 MG/DL (ref 0.6–1.2)
GFR AFRICAN AMERICAN: >60
GFR NON-AFRICAN AMERICAN: >60
GLOBULIN: 2.1 G/DL
GLUCOSE BLD-MCNC: 106 MG/DL (ref 70–99)
HCT VFR BLD CALC: 40.7 % (ref 36–48)
HDLC SERPL-MCNC: 48 MG/DL (ref 40–60)
HEMOGLOBIN: 13.8 G/DL (ref 12–16)
LDL CHOLESTEROL CALCULATED: 130 MG/DL
MCH RBC QN AUTO: 30 PG (ref 26–34)
MCHC RBC AUTO-ENTMCNC: 33.9 G/DL (ref 31–36)
MCV RBC AUTO: 88.5 FL (ref 80–100)
PDW BLD-RTO: 13.4 % (ref 12.4–15.4)
PLATELET # BLD: 250 K/UL (ref 135–450)
PMV BLD AUTO: 8.6 FL (ref 5–10.5)
POTASSIUM SERPL-SCNC: 4.3 MMOL/L (ref 3.5–5.1)
RBC # BLD: 4.6 M/UL (ref 4–5.2)
SODIUM BLD-SCNC: 143 MMOL/L (ref 136–145)
TOTAL PROTEIN: 6.7 G/DL (ref 6.4–8.2)
TRIGL SERPL-MCNC: 238 MG/DL (ref 0–150)
TSH SERPL DL<=0.05 MIU/L-ACNC: 3.17 UIU/ML (ref 0.27–4.2)
VLDLC SERPL CALC-MCNC: 48 MG/DL
WBC # BLD: 5.7 K/UL (ref 4–11)

## 2018-05-16 DIAGNOSIS — R73.9 HYPERGLYCEMIA: ICD-10-CM

## 2018-05-16 DIAGNOSIS — R73.9 HYPERGLYCEMIA: Primary | ICD-10-CM

## 2018-05-16 LAB
ESTIMATED AVERAGE GLUCOSE: 116.9 MG/DL
HBA1C MFR BLD: 5.7 %

## 2018-06-11 ENCOUNTER — OFFICE VISIT (OUTPATIENT)
Dept: CARDIOLOGY CLINIC | Age: 63
End: 2018-06-11

## 2018-06-11 VITALS
BODY MASS INDEX: 35.87 KG/M2 | SYSTOLIC BLOOD PRESSURE: 132 MMHG | HEART RATE: 80 BPM | DIASTOLIC BLOOD PRESSURE: 82 MMHG | WEIGHT: 209 LBS

## 2018-06-11 DIAGNOSIS — I25.10 CORONARY ARTERY DISEASE INVOLVING NATIVE CORONARY ARTERY OF NATIVE HEART WITHOUT ANGINA PECTORIS: Primary | ICD-10-CM

## 2018-06-11 DIAGNOSIS — R06.02 SHORTNESS OF BREATH: ICD-10-CM

## 2018-06-11 PROCEDURE — 99214 OFFICE O/P EST MOD 30 MIN: CPT | Performed by: INTERNAL MEDICINE

## 2018-06-11 ASSESSMENT — ENCOUNTER SYMPTOMS
SHORTNESS OF BREATH: 1
GASTROINTESTINAL NEGATIVE: 1
CHEST TIGHTNESS: 1
EYES NEGATIVE: 1
ALLERGIC/IMMUNOLOGIC NEGATIVE: 1

## 2018-06-13 ENCOUNTER — OFFICE VISIT (OUTPATIENT)
Dept: FAMILY MEDICINE CLINIC | Age: 63
End: 2018-06-13

## 2018-06-13 DIAGNOSIS — M25.50 ARTHRALGIA OF MULTIPLE JOINTS: ICD-10-CM

## 2018-06-13 DIAGNOSIS — Z12.39 SCREENING FOR BREAST CANCER: ICD-10-CM

## 2018-06-13 DIAGNOSIS — K58.0 IRRITABLE BOWEL SYNDROME WITH DIARRHEA: ICD-10-CM

## 2018-06-13 DIAGNOSIS — E78.00 HYPERCHOLESTEROLEMIA: ICD-10-CM

## 2018-06-13 DIAGNOSIS — R53.81 MALAISE: ICD-10-CM

## 2018-06-13 DIAGNOSIS — R63.5 WEIGHT GAIN: ICD-10-CM

## 2018-06-13 DIAGNOSIS — F33.0 MILD EPISODE OF RECURRENT MAJOR DEPRESSIVE DISORDER (HCC): ICD-10-CM

## 2018-06-13 DIAGNOSIS — R10.11 RUQ ABDOMINAL PAIN: ICD-10-CM

## 2018-06-13 DIAGNOSIS — G89.29 OTHER CHRONIC PAIN: ICD-10-CM

## 2018-06-13 DIAGNOSIS — I25.10 CORONARY ARTERY DISEASE INVOLVING NATIVE CORONARY ARTERY, ANGINA PRESENCE UNSPECIFIED, UNSPECIFIED WHETHER NATIVE OR TRANSPLANTED HEART: Primary | ICD-10-CM

## 2018-06-13 LAB
A/G RATIO: 1.9 (ref 1.1–2.2)
ALBUMIN SERPL-MCNC: 4.3 G/DL (ref 3.4–5)
ALP BLD-CCNC: 94 U/L (ref 40–129)
ALT SERPL-CCNC: 9 U/L (ref 10–40)
ANION GAP SERPL CALCULATED.3IONS-SCNC: 17 MMOL/L (ref 3–16)
AST SERPL-CCNC: 15 U/L (ref 15–37)
BILIRUB SERPL-MCNC: 0.4 MG/DL (ref 0–1)
BUN BLDV-MCNC: 9 MG/DL (ref 7–20)
C-REACTIVE PROTEIN: 2.5 MG/L (ref 0–5.1)
CALCIUM SERPL-MCNC: 9.4 MG/DL (ref 8.3–10.6)
CHLORIDE BLD-SCNC: 98 MMOL/L (ref 99–110)
CO2: 23 MMOL/L (ref 21–32)
CREAT SERPL-MCNC: 0.8 MG/DL (ref 0.6–1.2)
GFR AFRICAN AMERICAN: >60
GFR NON-AFRICAN AMERICAN: >60
GLOBULIN: 2.3 G/DL
GLUCOSE BLD-MCNC: 102 MG/DL (ref 70–99)
POTASSIUM SERPL-SCNC: 4.8 MMOL/L (ref 3.5–5.1)
PRO-BNP: 78 PG/ML (ref 0–124)
SEDIMENTATION RATE, ERYTHROCYTE: 11 MM/HR (ref 0–30)
SODIUM BLD-SCNC: 138 MMOL/L (ref 136–145)
T4 FREE: 1.1 NG/DL (ref 0.9–1.8)
TOTAL CK: 46 U/L (ref 26–192)
TOTAL PROTEIN: 6.6 G/DL (ref 6.4–8.2)
TSH SERPL DL<=0.05 MIU/L-ACNC: 2.09 UIU/ML (ref 0.27–4.2)

## 2018-06-13 PROCEDURE — 99214 OFFICE O/P EST MOD 30 MIN: CPT | Performed by: FAMILY MEDICINE

## 2018-06-13 RX ORDER — ESTRADIOL 0.1 MG/G
0.5 CREAM VAGINAL
COMMUNITY
End: 2018-06-13 | Stop reason: SDUPTHER

## 2018-06-13 RX ORDER — FUROSEMIDE 20 MG/1
20-40 TABLET ORAL DAILY
Qty: 60 TABLET | Refills: 3 | Status: SHIPPED | OUTPATIENT
Start: 2018-06-13 | End: 2020-02-21

## 2018-06-13 RX ORDER — ESTRADIOL 0.1 MG/G
0.5 CREAM VAGINAL
Qty: 1 TUBE | Refills: 3 | Status: SHIPPED | OUTPATIENT
Start: 2018-06-13 | End: 2019-01-10 | Stop reason: SDUPTHER

## 2018-06-13 RX ORDER — DICYCLOMINE HCL 20 MG
20 TABLET ORAL 3 TIMES DAILY PRN
Qty: 120 TABLET | Refills: 5 | Status: SHIPPED | OUTPATIENT
Start: 2018-06-13 | End: 2020-02-21

## 2018-06-14 LAB
ANA INTERPRETATION: NORMAL
ANTI-NUCLEAR ANTIBODY (ANA): NEGATIVE

## 2018-06-17 VITALS
DIASTOLIC BLOOD PRESSURE: 80 MMHG | OXYGEN SATURATION: 98 % | HEIGHT: 64 IN | TEMPERATURE: 96.4 F | HEART RATE: 66 BPM | SYSTOLIC BLOOD PRESSURE: 132 MMHG | RESPIRATION RATE: 14 BRPM | WEIGHT: 217 LBS | BODY MASS INDEX: 37.05 KG/M2

## 2018-07-02 DIAGNOSIS — I21.09 ACUTE MI ANTERIOR LATERAL SUBSEQUENT EPISODE CARE (HCC): ICD-10-CM

## 2018-07-02 DIAGNOSIS — I10 ESSENTIAL HYPERTENSION: ICD-10-CM

## 2018-07-02 NOTE — TELEPHONE ENCOUNTER
Requested Prescriptions     Pending Prescriptions Disp Refills    metoprolol tartrate (LOPRESSOR) 25 MG tablet [Pharmacy Med Name: METOPROLOL TARTRATE 25MG TABLETS] 180 tablet 3     Sig: TAKE 1 TABLET BY MOUTH TWICE DAILY    prasugrel (EFFIENT) 10 MG TABS [Pharmacy Med Name: PRASUGREL 10MG TABLETS] 90 tablet 3     Sig: TAKE 1 TABLET BY MOUTH DAILY         Last ov:6/11/18    Next ov:1/14/19    Last fill:1/17/18

## 2018-07-05 RX ORDER — PRASUGREL 10 MG/1
10 TABLET, FILM COATED ORAL DAILY
Qty: 90 TABLET | Refills: 3 | Status: SHIPPED | OUTPATIENT
Start: 2018-07-05 | End: 2020-02-21

## 2018-07-26 ENCOUNTER — OFFICE VISIT (OUTPATIENT)
Dept: FAMILY MEDICINE CLINIC | Age: 63
End: 2018-07-26

## 2018-07-26 VITALS
WEIGHT: 216.4 LBS | BODY MASS INDEX: 37.14 KG/M2 | TEMPERATURE: 98.2 F | DIASTOLIC BLOOD PRESSURE: 90 MMHG | HEART RATE: 81 BPM | SYSTOLIC BLOOD PRESSURE: 138 MMHG | RESPIRATION RATE: 12 BRPM | OXYGEN SATURATION: 99 %

## 2018-07-26 DIAGNOSIS — I21.09 ST ELEVATION MYOCARDIAL INFARCTION (STEMI) INVOLVING OTHER CORONARY ARTERY OF ANTERIOR WALL (HCC): ICD-10-CM

## 2018-07-26 DIAGNOSIS — I10 ESSENTIAL HYPERTENSION: ICD-10-CM

## 2018-07-26 DIAGNOSIS — E78.00 PURE HYPERCHOLESTEROLEMIA: ICD-10-CM

## 2018-07-26 DIAGNOSIS — I25.10 CORONARY ARTERY DISEASE INVOLVING NATIVE CORONARY ARTERY, ANGINA PRESENCE UNSPECIFIED, UNSPECIFIED WHETHER NATIVE OR TRANSPLANTED HEART: Primary | ICD-10-CM

## 2018-07-26 DIAGNOSIS — R60.9 EDEMA, UNSPECIFIED TYPE: ICD-10-CM

## 2018-07-26 PROCEDURE — 99214 OFFICE O/P EST MOD 30 MIN: CPT | Performed by: FAMILY MEDICINE

## 2018-07-26 RX ORDER — ISOSORBIDE MONONITRATE 30 MG/1
30 TABLET, EXTENDED RELEASE ORAL DAILY
Qty: 30 TABLET | Refills: 1 | Status: SHIPPED | OUTPATIENT
Start: 2018-07-26 | End: 2020-02-21

## 2018-07-26 NOTE — PROGRESS NOTES
Patient is here for swelling to hands and feet and rash to legs bilateral.  Swelling to hands and feet bilateral .  She had MI in 11/17 and had a stent placed in 11/17. She had stents placed in fall of 2017 ,but no heart attack then. Family h/o early CAD. She iwas on Metoprolol 25 mg bid, Effient 10 mg qd, Ranexa 500 mg bid, Isosorbide ER 30 mg qd. which was started after MI in 11/17. She stopped all medications 7/10/18. The only medication she restarted was Renaxa bid since 7/17/18. Her swelling had improved off the Renaxa ,but recurred with taking it bid. She has decreased to qd the past couple of days. She is taking Lasix 20 mg bid and Aspirin 81 mg qd. She started with chest pain after stopping the meds. Her swelling got a bit better off the medications she stopped, but restarted with the restart of the Ranexa. She has tried Lipitor and Crestor and got myalgias. She tried Coenzyme CoQ10 likely 100 mg ,but had loose stools. She has bowel movements 4-5 /day . No black stools or rectal bleeding. Last colonoscopy 10/16 with Dr. Avani Bradford.  She was told she had UColitis as a teenager,but no flares and no indication on the most recent colonoscopy. Review of Systems    ROS: All other systems were reviewed and are negative . Patient's allergies and medications were reviewed. Patient's past medical, surgical, social , and family history were reviewed. Wt Readings from Last 3 Encounters:   07/26/18 216 lb 6.4 oz (98.2 kg)   06/13/18 217 lb (98.4 kg)   06/11/18 209 lb (94.8 kg)     BP Readings from Last 3 Encounters:   07/26/18 (!) 149/94   06/17/18 132/80   06/11/18 132/82       OBJECTIVE:  BP (!) 138/90   Pulse 81   Temp 98.2 °F (36.8 °C)   Resp 12   Wt 216 lb 6.4 oz (98.2 kg)   SpO2 99%   Breastfeeding? No   BMI 37.14 kg/m²     Physical Exam    General: NAD, cooperative, alert and oriented X 3. Mood / affect is good. good insight. well hydrated.   Neck : no lymphadenopathy, supple, FROM  CV: Regular rate and rhythm , no murmurs/ rub/ gallop. No edema. Lungs : CTA bilaterally, breathing comfortably  Abdomen: positive bowel sounds, soft , non tender, non distended. No hepatosplenomegaly. No CVA tenderness. Skin: no rashes. Non tender. ASSESSMENT/  PLAN:  1. Edema, unspecified type  - appears related to Renaxa given improved off the medication for 1 week and recurred with restarting.   - continue Renaxa 500 mg qd,but advised the patient to contact Cardiologist's office to make aware of side effects . 2. Coronary artery disease involving native coronary artery, angina presence unspecified, unspecified whether native or transplanted heart  - restart Isosorbide at lower dose ( was on 60 mg ,but decrease to 30 mg ). - Isosorbide mononitrate (IMDUR) 30 MG extended release tablet; Take 1 tablet by mouth daily  Dispense: 30 tablet; Refill: 1  - restart Metoprolol 1/2 of 25 mg bid. Advised to taper beta blocker in the future and not to stop abruptly. 3. Pure hypercholesterolemia  - prior side effects with statins,but has not tried Livalo. Consider trial when other medications are stable. Prior diarrhea with Coenzyme CoQ10 ,but may reconsider in the future at lower dose if needed. Also discussed Zollie Langton as an option given known CAD. 4. ST elevation myocardial infarction (STEMI) involving other coronary artery of anterior wall (HCC)  - restart Metoprolol 1/2 of 25 mg bid. Advised to taper beta blocker in the future and not to stop abruptly. - isosorbide mononitrate (IMDUR) 30 MG extended release tablet; Take 1 tablet by mouth daily  Dispense: 30 tablet; Refill: 1  - continue Aspirin 81 mg qd and restart Effient qd. - encouraged to follow up with Cardiologist as well. 5. Essential hypertension  - elevated off Metoprolol and Isosorbide, but monitor with restarting both at lower doses. Follow up 4  weeks/ prn.

## 2019-01-12 RX ORDER — ESTRADIOL 0.1 MG/G
CREAM VAGINAL
Qty: 42.5 G | Refills: 0 | Status: SHIPPED | OUTPATIENT
Start: 2019-01-12 | End: 2019-02-13 | Stop reason: SDUPTHER

## 2019-02-13 RX ORDER — ESTRADIOL 0.1 MG/G
CREAM VAGINAL
Qty: 42.5 G | Refills: 0 | Status: SHIPPED | OUTPATIENT
Start: 2019-02-13 | End: 2019-04-16 | Stop reason: SDUPTHER

## 2019-04-16 RX ORDER — ESTRADIOL 0.1 MG/G
CREAM VAGINAL
Qty: 42.5 G | Refills: 1 | Status: SHIPPED | OUTPATIENT
Start: 2019-04-16 | End: 2019-07-05 | Stop reason: SDUPTHER

## 2019-07-08 RX ORDER — ESTRADIOL 0.1 MG/G
CREAM VAGINAL
Qty: 42.5 G | Refills: 1 | Status: SHIPPED | OUTPATIENT
Start: 2019-07-08 | End: 2019-11-11 | Stop reason: SDUPTHER

## 2019-11-11 RX ORDER — ESTRADIOL 0.1 MG/G
CREAM VAGINAL
Qty: 42.5 G | Refills: 0 | Status: SHIPPED | OUTPATIENT
Start: 2019-11-11 | End: 2020-01-29 | Stop reason: SDUPTHER

## 2020-01-29 RX ORDER — ESTRADIOL 0.1 MG/G
CREAM VAGINAL
Qty: 42.5 G | Refills: 0 | Status: SHIPPED | OUTPATIENT
Start: 2020-01-29 | End: 2020-06-05

## 2020-02-21 ENCOUNTER — OFFICE VISIT (OUTPATIENT)
Dept: ENT CLINIC | Age: 65
End: 2020-02-21
Payer: COMMERCIAL

## 2020-02-21 VITALS
BODY MASS INDEX: 33.39 KG/M2 | TEMPERATURE: 97.7 F | DIASTOLIC BLOOD PRESSURE: 103 MMHG | HEIGHT: 64 IN | WEIGHT: 195.6 LBS | SYSTOLIC BLOOD PRESSURE: 160 MMHG | HEART RATE: 78 BPM

## 2020-02-21 PROCEDURE — 31575 DIAGNOSTIC LARYNGOSCOPY: CPT | Performed by: OTOLARYNGOLOGY

## 2020-02-21 PROCEDURE — 99214 OFFICE O/P EST MOD 30 MIN: CPT | Performed by: OTOLARYNGOLOGY

## 2020-02-21 NOTE — PROGRESS NOTES
FOLLOW UP VISIT:      INTERIM HISTORY: Feeling of pain and fullness in her throat of 2 days duration. Relates onset to eating a piece of fish and feeling a bone in it. She feels pain at the level of the uvula in the midline. PAST MEDICAL HISTORY:   Social History     Tobacco Use   Smoking Status Former Smoker    Packs/day: 0.50    Years: 20.00    Pack years: 10.00    Types: Cigarettes    Last attempt to quit: 2007    Years since quittin.0   Smokeless Tobacco Never Used                                                    Social History     Substance and Sexual Activity   Alcohol Use No                                                    Current Outpatient Medications:     estradiol (ESTRACE) 0.1 MG/GM vaginal cream, PLACE 0.5 GRAM VAGINALLY 3 TIMES A WEEK, Disp: 42.5 g, Rfl: 0    acyclovir (ZOVIRAX) 5 % ointment, APPLY TO THE AFFECTED AREA FIVE TIMES DAILY, Disp: 30 g, Rfl: 1                                                 Past Medical History:   Diagnosis Date    Allergic rhinitis, cause unspecified 2010    Anxiety     Arthritis     CAD (coronary artery disease)     angioplasty with [facility] Dr. Timo Paulson, here Dr. Ceasar Miller at [facility] Chronic kidney disease     frequency, urgency    Chronic pain     precordial, opiate dependent    Depression 2013    Erosive esophagitis 2016    Dr. Noemi Puente; PPI started; LA Class A, Sphincter of Oddi manometry and sphincterotomy if symptoms don't improve.      Essential hypertension 2017    HSV infection     Hypercholesterolemia 2014    Irritable bowel syndrome with diarrhea 2016    Migraine headache 2014    Nonerosive nonspecific gastritis 2016    Dr. Noemi Puente; body and antrum    OAB (overactive bladder) 3/14/2014                                                    Past Surgical History:   Procedure Laterality Date    ABDOMINAL ADHESION SURGERY      APPENDECTOMY      masses. LYMPH NODES: No cervical lymphadenopathy. SALIVARY GLANDS: Parotid and submandibular glands normal.  THYROID: No goiter or thyroid nodules palpable. In order to assess the tongue base, fiberoptic laryngoscopy is performed. FIBEROPTIC LARYNGOSCOPY:  Nares topically anaesthetized with lidocaine spray. Fiberoptic scope passed per naris into nasopharynx and hypopharyrnx and larynx visualized. Normal tongue base                                                          Normal epiglottis                                                          Normal vocal cords                                                          Normal pyriform sinuses  IMPRESSION: No evidence of foreign body or inflammation in the upper aerodigestive tract either by inspection or palpation    PLAN: Use nonsteroidal anti-inflammatory medications. Call the office in 3 days to let me know whether the symptoms have resolved or are still present. FOLLOW-UP: By phone, 3 days.

## 2020-02-26 ENCOUNTER — TELEPHONE (OUTPATIENT)
Dept: ENT CLINIC | Age: 65
End: 2020-02-26

## 2020-02-26 NOTE — TELEPHONE ENCOUNTER
Pt calling to say no improvement with globus sensation. (pt knows she swallowed something last Weds manohar.)  Getting more difficult to speak and there's a dull ache.     Wants to know next step

## 2020-02-27 ENCOUNTER — APPOINTMENT (OUTPATIENT)
Dept: CT IMAGING | Age: 65
End: 2020-02-27
Payer: COMMERCIAL

## 2020-02-27 ENCOUNTER — HOSPITAL ENCOUNTER (EMERGENCY)
Age: 65
Discharge: HOME | End: 2020-02-27
Attending: EMERGENCY MEDICINE
Payer: COMMERCIAL

## 2020-02-27 VITALS
HEART RATE: 98 BPM | WEIGHT: 195 LBS | TEMPERATURE: 98 F | SYSTOLIC BLOOD PRESSURE: 187 MMHG | OXYGEN SATURATION: 98 % | DIASTOLIC BLOOD PRESSURE: 76 MMHG | RESPIRATION RATE: 18 BRPM | HEIGHT: 64 IN | BODY MASS INDEX: 33.29 KG/M2

## 2020-02-27 PROCEDURE — 6370000000 HC RX 637 (ALT 250 FOR IP): Performed by: EMERGENCY MEDICINE

## 2020-02-27 PROCEDURE — 99283 EMERGENCY DEPT VISIT LOW MDM: CPT

## 2020-02-27 PROCEDURE — 70490 CT SOFT TISSUE NECK W/O DYE: CPT

## 2020-02-27 RX ADMIN — LIDOCAINE HYDROCHLORIDE: 20 SOLUTION ORAL; TOPICAL at 17:15

## 2020-02-27 ASSESSMENT — PAIN DESCRIPTION - DESCRIPTORS: DESCRIPTORS: TIGHTNESS;CONSTANT

## 2020-02-27 ASSESSMENT — PAIN SCALES - GENERAL: PAINLEVEL_OUTOF10: 6

## 2020-02-27 ASSESSMENT — PAIN DESCRIPTION - FREQUENCY: FREQUENCY: CONTINUOUS

## 2020-02-27 ASSESSMENT — PAIN DESCRIPTION - PAIN TYPE: TYPE: ACUTE PAIN

## 2020-02-27 ASSESSMENT — PAIN DESCRIPTION - LOCATION: LOCATION: THROAT

## 2020-02-27 NOTE — ED PROVIDER NOTES
edema. No gross deformities. Neurological: Alert and appropriately interactive. Face symmetric, speech without dysarthria or obvious aphasia. Moving all extremities spontaneously. Skin: Warm, dry. No rash. No diaphoresis or erythema. Psychiatric: Calm and cooperative with appropriate mood and affect. Diagnostic Results       RADIOLOGY:  CT SOFT TISSUE NECK WO CONTRAST   Final Result      No acute abnormalities of the soft tissues of the neck. Bilateral thyroid nodules indeterminate. Thyroid ultrasound recommended. 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 RECENT VITALS:  BP: (!) 187/76,Temp: 98 °F (36.7 °C), Pulse: 98, Resp: 18, SpO2: 98 %     Procedures   Procedures    MEDICAL DECISION MAKING     MDM: Yvan Hutchinson is a 60 y.o. female with history of CAD, IBS, multiple other comorbidities presenting for foreign body sensation in the throat. On arrival, patient is in no distress, vital signs are reassuring, she is managing her airway and secretions without difficulty. Oropharynx without any acute findings. She points to the level of the sternal notch. She has already had nasopharyngoscopy, which will not be repeated today. She has a history of IV contrast allergy and noncontrast CT of the neck into the upper chest was obtained. There is no evidence of foreign body, free mediastinal air, or other concerning acute findings. Findings were relayed to the patient. She was unhappy with these results, as she is adamant there is a still a foreign body in her esophagus. However, there are no emergent indications for endoscopy at this time. She was given a GI cocktail and a referral to gastroenterology was provided. She was encouraged to return if any changes worsening. Patient discharged in stable condition with written and verbal instructions for which to return to the ED.       Clinical Impression 1. Foreign body sensation in throat        Disposition     DISPOSITION Decision To Discharge 02/27/2020 05:22:13 PM        Eligha Kanner, MD  5:29 PM                     Past Medical, Surgical, Family, and Social History     She has a past medical history of Allergic rhinitis, cause unspecified, Anxiety, Arthritis, CAD (coronary artery disease), Chronic kidney disease, Chronic pain, Depression, Erosive esophagitis, Essential hypertension, HSV infection, Hypercholesterolemia, Irritable bowel syndrome with diarrhea, Migraine headache, Nonerosive nonspecific gastritis, and OAB (overactive bladder). She has a past surgical history that includes Appendectomy; shannan and bso (cervix removed); Cholecystectomy; Tubal ligation; Tonsillectomy; other surgical history; Coronary angioplasty with stent (10/2012); Percutaneous Transluminal Coronary Angio (10/2012); Cardiac catheterization (12/7/2013); Hysterectomy; polypectomy; Cystoscopy (4/2014); Upper gastrointestinal endoscopy; Wrist surgery (Left, 5/21/2015); Abdominal adhesion surgery; Colonoscopy; Endoscopy, colon, diagnostic; Cosmetic surgery; fracture surgery; ERCP (01/25/2017); sinus surgery; and Hysterectomy, total abdominal.  Her family history includes Heart Disease in her brother, father, maternal uncle, mother, and another family member; High Blood Pressure in her sister and sister; Hypertension in her father and mother; Other in an other family member; Stroke in her sister and another family member. She reports that she quit smoking about 13 years ago. Her smoking use included cigarettes. She has a 10.00 pack-year smoking history. She has never used smokeless tobacco. She reports that she does not drink alcohol or use drugs.     Medications     Discharge Medication List as of 2/27/2020  5:21 PM      CONTINUE these medications which have NOT CHANGED    Details   estradiol (ESTRACE) 0.1 MG/GM vaginal cream PLACE 0.5 GRAM VAGINALLY 3 TIMES A WEEK, Disp-42.5 g,

## 2020-02-27 NOTE — TELEPHONE ENCOUNTER
Patient called this AM and she feels like something has moved in her neck. She was having some extreme pain and didn't think she could wait until  came in the office. Pt instructed to go to the ER if she was having that much pain.   eder

## 2020-02-28 NOTE — TELEPHONE ENCOUNTER
Called pt to see how she was doing. She did go to the ER and they didn't find anything in her throat. The ER recommeneded that she see a GI doctor and she is going to wait and see how she does and she will call back to schedule if something changes in her symptoms.   eder

## 2020-03-09 ENCOUNTER — OFFICE VISIT (OUTPATIENT)
Dept: ENT CLINIC | Age: 65
End: 2020-03-09
Payer: COMMERCIAL

## 2020-03-09 VITALS
WEIGHT: 198 LBS | DIASTOLIC BLOOD PRESSURE: 115 MMHG | SYSTOLIC BLOOD PRESSURE: 186 MMHG | BODY MASS INDEX: 33.8 KG/M2 | TEMPERATURE: 97.1 F | HEART RATE: 84 BPM | HEIGHT: 64 IN

## 2020-03-09 PROCEDURE — 31575 DIAGNOSTIC LARYNGOSCOPY: CPT | Performed by: OTOLARYNGOLOGY

## 2020-03-09 PROCEDURE — 99212 OFFICE O/P EST SF 10 MIN: CPT | Performed by: OTOLARYNGOLOGY

## 2020-03-09 RX ORDER — OMEPRAZOLE 40 MG/1
40 CAPSULE, DELAYED RELEASE ORAL DAILY
Qty: 30 CAPSULE | Refills: 3 | Status: SHIPPED | OUTPATIENT
Start: 2020-03-09 | End: 2020-08-31

## 2020-03-09 NOTE — PROGRESS NOTES
FOLLOW UP VISIT:      INTERIM HISTORY: Seen 2-1/2 weeks ago with a feeling of pain and fullness in her throat in the midline at the level of the uvula. Said to eating fish and had concerned about a foreign body fishbone being lodged in her throat. Exam at that time was negative and I recommended the use of anti-inflammatories for symptoms. She now has a sensation of globus and pain at the level of the larynx. Has some voice change. Worse recumbent. PAST MEDICAL HISTORY:   Social History     Tobacco Use   Smoking Status Former Smoker    Packs/day: 0.50    Years: 20.00    Pack years: 10.00    Types: Cigarettes    Last attempt to quit: 2007    Years since quittin.1   Smokeless Tobacco Never Used                                                    Social History     Substance and Sexual Activity   Alcohol Use No                                                    Current Outpatient Medications:     estradiol (ESTRACE) 0.1 MG/GM vaginal cream, PLACE 0.5 GRAM VAGINALLY 3 TIMES A WEEK, Disp: 42.5 g, Rfl: 0    acyclovir (ZOVIRAX) 5 % ointment, APPLY TO THE AFFECTED AREA FIVE TIMES DAILY, Disp: 30 g, Rfl: 1                                                 Past Medical History:   Diagnosis Date    Allergic rhinitis, cause unspecified 2010    Anxiety     Arthritis     CAD (coronary artery disease)     angioplasty with stent at [facility] Dr. Brian Klein, here Dr. Luma Martinez at [facility] Chronic kidney disease     frequency, urgency    Chronic pain     precordial, opiate dependent    Depression 2013    Erosive esophagitis 2016    Dr. Florecita Seaman; PPI started; LA Class A, Sphincter of Oddi manometry and sphincterotomy if symptoms don't improve.      Essential hypertension 2017    HSV infection     Hypercholesterolemia 2014    Irritable bowel syndrome with diarrhea 2016    Migraine headache 2014    Nonerosive nonspecific gastritis 12/28/2016    Dr. Voss Ou; body and antrum    OAB (overactive bladder) 3/14/2014                                                    Past Surgical History:   Procedure Laterality Date    ABDOMINAL ADHESION SURGERY      APPENDECTOMY      CARDIAC CATHETERIZATION  12/7/2013    recheck arteries unchanged    CHOLECYSTECTOMY      COLONOSCOPY      CORONARY ANGIOPLASTY WITH STENT PLACEMENT  10/2012    right- TOTAL OF 3 STENTS    COSMETIC SURGERY      rhinoplasty    CYSTOSCOPY  4/2014    ENDOSCOPY, COLON, DIAGNOSTIC      ERCP  01/25/2017    FRACTURE SURGERY      LUE, plate and screws    HYSTERECTOMY      HYSTERECTOMY, TOTAL ABDOMINAL      OTHER SURGICAL HISTORY      Incision and drainage of Lingual Introral Lesion    POLYPECTOMY      Dr. Florencia Woodall    PTCA  10/2012    right    SINUS SURGERY      JOSE AND BSO      TONSILLECTOMY      TUBAL LIGATION      UPPER GASTROINTESTINAL ENDOSCOPY      WRIST SURGERY Left 5/21/2015    OPEN REDUCTION INTERNAL FIXATION LEFT WRIST       FAMILY HISTORY: Family history reviewed and except as pertinent to the interim history is not contributory. REVIEW OF SYSTEMS:  All pertinent positive and negative findings included in HPI. Otherwise, all other systems are reviewed and are negative    PHYSICAL EXAMINATION:   GENERAL: wdwn- no acute distress  RESPIRATORY: No stridor. COMMUNICATION :  Normal voice  MENTAL STATUS: Alert and oriented x3  HEAD AND FACE: No skin lesions of the face. EXTERNAL EARS AND NOSE: The external ears and nasal pyramid are normal.  FACIAL MUSCLES: All branches of the facial nerve intact. FACE PALPATION: Zygomatic arches and orbital rims are intact. No tenderness over the sinuses. EXTRAOCULAR MUSCLES: Intact with full range of motion. LIPS, TEETH, GINGIVA:  Normal mucosa  PHARYNX:  Normal  NECK:  No masses. LYMPH NODES: No cervical lymphadenopathy.   SALIVARY GLANDS: Parotid and submandibular glands normal.  THYROID: No goiter or thyroid nodules palpable. As the patient has symptoms suggestive of disease in the larynx or hypopharynx, fiberoptic laryngoscopy is performed. FIBEROPTIC LARYNGOSCOPY:  Nares topically anaesthetized with lidocaine spray. Fiberoptic scope passed per naris into nasopharynx and hypopharyrnx and larynx visualized. Normal tongue base                                                          Normal epiglottis                                                          Normal vocal cords                                                          Normal pyriform sinuses  IMPRESSION: No evidence of foreign body in the upper aerodigestive tract. Suspect globus and pain at level of larynx may represent extra esophageal reflux disease. PLAN: Omeprazole 40 mg prescribed to be taken prior to evening meal.    FOLLOW-UP: Phone in 1 week.

## 2020-06-05 RX ORDER — ESTRADIOL 0.1 MG/G
CREAM VAGINAL
Qty: 42.5 G | Refills: 0 | Status: SHIPPED | OUTPATIENT
Start: 2020-06-05

## 2020-06-26 ENCOUNTER — TELEPHONE (OUTPATIENT)
Dept: FAMILY MEDICINE CLINIC | Age: 65
End: 2020-06-26

## 2020-08-26 ENCOUNTER — TELEPHONE (OUTPATIENT)
Dept: FAMILY MEDICINE CLINIC | Age: 65
End: 2020-08-26

## 2020-08-26 NOTE — TELEPHONE ENCOUNTER
Please advise    Pt hasnt been seen since 7/26/18 with Dr. Luis Victoria. Is it ok to schedule VV for 8/27 or would you like an  In office visit? QUESTIONS  Type of Appointment? Established Patient  Reason for appointment request? Available appointments did not meet patient needAdditional Information for Provider?  Patient has insurance that is changing over at the end of the monthshe would like to see she can see the doctor for a check up as soon as possible due to her deductible   please call on cell [phone]

## 2020-08-31 ENCOUNTER — OFFICE VISIT (OUTPATIENT)
Dept: FAMILY MEDICINE CLINIC | Age: 65
End: 2020-08-31
Payer: COMMERCIAL

## 2020-08-31 VITALS
OXYGEN SATURATION: 99 % | DIASTOLIC BLOOD PRESSURE: 82 MMHG | WEIGHT: 203.4 LBS | RESPIRATION RATE: 18 BRPM | HEIGHT: 64 IN | SYSTOLIC BLOOD PRESSURE: 136 MMHG | TEMPERATURE: 97.2 F | BODY MASS INDEX: 34.72 KG/M2 | HEART RATE: 81 BPM

## 2020-08-31 PROCEDURE — 99214 OFFICE O/P EST MOD 30 MIN: CPT | Performed by: FAMILY MEDICINE

## 2020-08-31 RX ORDER — LIDOCAINE HYDROCHLORIDE AND HYDROCORTISONE ACETATE 30; 5 MG/G; MG/G
CREAM RECTAL 2 TIMES DAILY PRN
Qty: 30 G | Refills: 5 | Status: SHIPPED | OUTPATIENT
Start: 2020-08-31 | End: 2021-02-17 | Stop reason: ALTCHOICE

## 2020-08-31 RX ORDER — NITROGLYCERIN 0.4 MG/1
0.4 TABLET SUBLINGUAL EVERY 5 MIN PRN
Qty: 25 TABLET | Refills: 3 | Status: SHIPPED | OUTPATIENT
Start: 2020-08-31 | End: 2022-10-14 | Stop reason: SDUPTHER

## 2020-08-31 NOTE — PROGRESS NOTES
Here for f/u. Pt states that they are doing well to stay healthy, getting exercise regularly with her  and also getting out when she can. They are being careful, especially with her husbands recent CABG. Pt does feel well, does not have any new issues. Pt is managing stress    Pt does get intermittent angina. Pt uses very rarely, does occur with exertion when it was very hot. Got a lot of palpitations. Pt controls with prn NTG. Pt not taking anything on a regular basis, not on any BP medications, and nothing for cholesterol  Pt has not seen cardiology recently. Pt has not had any recent stress testing. Pt states her  was let go from work d/t recent surgery and is on SSI. Income is down significantly and has to transition to new insurance and now has high deductible. Pt does get sx on a daily basis, and does not necessarily occur with angina. When she walks seems to do fine w/o sx. Here for f/u of cholesterol. Pt as been working on diet/exercise and is consistent with therapy. No side effects from current therapy. No chest pain, shortness of breath. No numbness/tingling in extremities  Pt off lipitor and vascepa due to side effects. Except as noted above in the history of present illness, the review of systems is  negative for headache, vision changes, chest pain, shortness of breath, abdominal pain, urinary sx, bowel changes. Past medical, surgical, and social history reviewed and updated  Medications and allergies reviewed and updated       O: /82 (Site: Right Upper Arm, Position: Sitting, Cuff Size: Medium Adult)   Pulse 81   Temp 97.2 °F (36.2 °C) (Temporal)   Resp 18   Ht 5' 4\" (1.626 m)   Wt 203 lb 6.4 oz (92.3 kg)   SpO2 99%   BMI 34.91 kg/m²   GEN: No acute distress, cooperative, well nourished, alert. HEENT: PEERLA, EOMI , normocephalic/atraumatic, nares and oropharynx clear. Mucous membranes normal, Tympanic membranes clear bilaterally.     Neck: soft, supple, no thyromegaly, mass, no Lymphadenopathy  CV: Regular rate and rhythm, no murmur, rubs, gallops. No edema. Resp: Clear to auscultation bilaterally good air entry bilaterally  No crackles, wheeze. Breathing comfortably. Psych: mood seems stable, No suicidal thoughts or ideation        Current Outpatient Medications   Medication Sig Dispense Refill    nitroGLYCERIN (NITROSTAT) 0.4 MG SL tablet Place 1 tablet under the tongue every 5 minutes as needed for Chest pain up to max of 3 total doses. If no relief after 1 dose, call 911. 25 tablet 3    lidocaine-Hydrocort 3-0.5 % cream Place around the anus 2 times daily as needed (irritation/inflammation) 30 g 5    estradiol (ESTRACE) 0.1 MG/GM vaginal cream PLACE 0.5 GRAM VAGINALLY 3 TIMES A WEEK 42.5 g 0     No current facility-administered medications for this visit. ASSESSMENT / PLAN:    1. Coronary artery disease involving native coronary artery of native heart without angina pectoris  Continues with chronic intemrittent angina. States she gets on daily basis. Has not seen cardiology or had any recent imaging/testing, stress testing. Encouraged pt to have f/u set up  Deferring at this time, aware risk of not setting up  Off BBlocker, statin d/t tolerability issues    2. Essential hypertension  blood pressure stable off meds  Encouraged restart BBlocker d/t CAD, pt deferring at this time  Deferring bloodwork     3. Irritable bowel syndrome with diarrhea  stable    4. Hypercholesterolemia  States had intol to lipitor and vascepa  Not interested in new meds or bloodwork at this time  Aware benefit of statin therapy in pts with hx of CAD  Deferring bloodwork     5. Screening for breast cancer  Deferring  Aware risk of not having done    6. Angina pectoris ([address]  As above  Cont prn NTG  encoruaged stress testing/f/u     7. ST elevation myocardial infarction (STEMI) involving other coronary artery of anterior wall (HCC)  As above    8.  Needs flu shot  Deferring at this time    9. Need shingles vaccine  Deferring at this time           Follow-up appointment:   Call or return to clinic prn if these symptoms worsen or fail to improve as anticipated. Discussed use, benefit, and side effects of all prescribed medications. Barriers to medication compliance addressed. All patient questions answered. Pt voiced understanding. When applicable, [facility] The patient has signed appropriate paperworks/consents.

## 2020-11-13 ENCOUNTER — TELEPHONE (OUTPATIENT)
Dept: CARDIOLOGY CLINIC | Age: 65
End: 2020-11-13

## 2020-11-13 NOTE — TELEPHONE ENCOUNTER
Spoke with the patient and she states that her palpitations have increased lately . She has had to take Digoxin for them and states that it has helped. She has had some recurrent flutters. She states that this has been going on since mid-summer and she wants to get it resolved. Patient [facility] and she kept getting pushed back and she doesn't want that to be the case .

## 2020-11-13 NOTE — TELEPHONE ENCOUNTER
She has an appt with DAWSON at [facility] 12/14/20. She was supposed to be wearing a heart monitor ordered by her other heart doctor but she didn't get it then decided to come see DAWSON. Would DAWSON want to order a monitor for her to be wearing before she sees him ?

## 2020-11-17 ENCOUNTER — NURSE ONLY (OUTPATIENT)
Dept: CARDIOLOGY CLINIC | Age: 65
End: 2020-11-17
Payer: MEDICARE

## 2020-11-17 PROCEDURE — 0296T PR EXT ECG > 48HR TO 21 DAY RCRD W/CONECT INTL RCRD: CPT | Performed by: INTERNAL MEDICINE

## 2020-11-23 ENCOUNTER — TELEPHONE (OUTPATIENT)
Dept: CARDIOLOGY CLINIC | Age: 65
End: 2020-11-23

## 2020-11-23 NOTE — TELEPHONE ENCOUNTER
Attempted to call patient no answer. Please have her return the monitor to the office so we can get the readings. Ok to return now.

## 2020-11-24 ENCOUNTER — TELEPHONE (OUTPATIENT)
Dept: CARDIOLOGY CLINIC | Age: 65
End: 2020-11-24

## 2020-11-24 NOTE — TELEPHONE ENCOUNTER
Pt states she dropped off her monitor this morning and said they may have the results back today. Asking for a return call to advise. Please see 11/23/20 previous message.

## 2020-11-24 NOTE — TELEPHONE ENCOUNTER
Spoke with patient informed her that her monitor has been downloaded and that we are waiting for a report to be generated. Once we receive the results we will give her a call.

## 2020-12-01 ENCOUNTER — TELEPHONE (OUTPATIENT)
Dept: CARDIOLOGY CLINIC | Age: 65
End: 2020-12-01

## 2020-12-01 PROCEDURE — 0298T PR EXT ECG > 48HR TO 21 DAY REVIEW AND INTERPRETATN: CPT | Performed by: INTERNAL MEDICINE

## 2020-12-01 NOTE — TELEPHONE ENCOUNTER
Spoke to the pt-stated she had a CAM monitor placed 11/17/20. It was to be for fourteen days, but it was taken off early, and dropped off 11/23/20. Checked with [employer], and the monitor has been completed. Dr. Ulices Coronel, have you reviewed the monitor?     Will be a new pt for LES 12/14/20 @ [facility]

## 2020-12-02 ENCOUNTER — TELEPHONE (OUTPATIENT)
Dept: CARDIOLOGY CLINIC | Age: 65
End: 2020-12-02

## 2020-12-02 NOTE — TELEPHONE ENCOUNTER
Per RMM patient needs to be seen by EP for afib/flutter.  Please cancel appointment with LES and offer hold spot on 12/15 with RMM

## 2020-12-02 NOTE — TELEPHONE ENCOUNTER
No sooner appointments available.  Please place her on the wait list for cancellations 09-May-2018 20:45

## 2020-12-15 ENCOUNTER — OFFICE VISIT (OUTPATIENT)
Dept: CARDIOLOGY CLINIC | Age: 65
End: 2020-12-15
Payer: MEDICARE

## 2020-12-15 VITALS
OXYGEN SATURATION: 98 % | HEIGHT: 64 IN | WEIGHT: 207.2 LBS | HEART RATE: 54 BPM | BODY MASS INDEX: 35.37 KG/M2 | SYSTOLIC BLOOD PRESSURE: 167 MMHG | DIASTOLIC BLOOD PRESSURE: 119 MMHG

## 2020-12-15 PROCEDURE — 99204 OFFICE O/P NEW MOD 45 MIN: CPT | Performed by: INTERNAL MEDICINE

## 2020-12-15 PROCEDURE — 93000 ELECTROCARDIOGRAM COMPLETE: CPT | Performed by: INTERNAL MEDICINE

## 2020-12-15 RX ORDER — DIGOXIN 125 MCG
125 TABLET ORAL PRN
COMMUNITY
End: 2021-01-27 | Stop reason: SDUPTHER

## 2020-12-15 RX ORDER — OMEPRAZOLE 40 MG/1
40 CAPSULE, DELAYED RELEASE ORAL DAILY
COMMUNITY
Start: 2020-03-09 | End: 2020-12-29 | Stop reason: SDUPTHER

## 2020-12-15 RX ORDER — CIDER VINEGAR 300 MG
TABLET ORAL
COMMUNITY
End: 2021-02-17 | Stop reason: ALTCHOICE

## 2020-12-15 RX ORDER — DILTIAZEM HYDROCHLORIDE 120 MG/1
120 CAPSULE, COATED, EXTENDED RELEASE ORAL DAILY
Qty: 30 CAPSULE | Refills: 5 | Status: SHIPPED | OUTPATIENT
Start: 2020-12-15 | End: 2020-12-21

## 2020-12-15 NOTE — LETTER
[facility]  EP Procedure Sheet    12/15/20  Lady Sharp  1955  EP Procedures     Pacemaker implant (single/dual)  EP Study    ICD implant (single/dual) xxxx Atrial flutter ablation (JESSICA Y/N)    Biv implant ICD  Tilt Table    Biv implant PPM xxx Atrial fibrillation ablation (JESSICA Yes)    Generator Change (PPM/ICD/BiV)  SVT ablation    Lead revision (RV/LA/RA) (<1 month)  VT ablation      Lead extraction +/- upgrade (BiV/PPM/ICD)  VT Ischemic/ non-ischemic    Loop implant/ removal  VT RVOT    Cardioversion  VT Left sided    JESSICA  AVN ablation     Equipment   Medtronic   JUAN Mapping System    [place] xxx Carto Mapping System    [employer]  CryoAblation    Biotronik  Laser Lead Extraction     EP Procedures Scheduling Request  # hours Requested   Scheduled  Date:   Specific Day  Completed    Anesthesia Yes F/u Date:   CT surgery backup  COVID     Overnight stay Yes     Location [facility]       Pre-Procedure Labs / Imaging   PT/INR  Type & cross    CBC  Units PRBC    BMP/Mg  Units FFP    Venogram  Cardiac CTA for Pulmonary vein mapping     RN INITIALS: RA    Patient Instructions  Do not eat or drink after midnight the night prior to procedure  Dx:Afib/flutter  Hold Xarelto for 1 day/s prior

## 2020-12-15 NOTE — PROGRESS NOTES
[facility]   Electrophysiology Consultation   Date: 12/15/2020  Reason for Consultation:  Palpitation    Consult Requesting Physician: Danny Jaramillo       CC: Afib/flutter  HPI: Bren Sanz is a 63 y.o. female who has been referred today for further evaluation of atrial fibrillation/flutter noted on her Holter monitoring. Patient has a complex past medical history including coronary artery disease, STEMI with PCI (PCI/ALEC RCA 10/2012, PCI to Ramus 10/2012, ) , and hypertension. She presented to [place] [id number] on 11/19/2017 with complaints of chest pain that radiates to her jaw and arms. She was seen by cardiology and diagnosed with a STEMI and a LHC was completed with a ALEC in the diagonal branch. Developed chest pain again 11/20/2017 and a repeat LHC was completed with no further intervention necessary. She has seen multiple cardiologist and is also done to [facility] clinic. She states that she has intolerance to many medication and is not taking aspirin or Plavix. She presented to her PCP's office ,Mame Walden, on 8/31/2020. She had continued complaints of intermittent angina. She also complained of palpitation and Holter monitoring was done which showed atrial fibrillation with rapid ventricular rate. She has intermittent palpitation and angina. This summer she noticed that she was having worsening episodes of fluttering. She states that she was a very active person who walks at least 2 miles a day but has recently has not been able to because of shortness of breath and fluttering. Assessment and plan:     Atrial fibrillation and flutter with RVR:    New  Diagnosis. ECG today shows sinus rhythm     She is symptomatic with these episodes. She complains of palpitation and chest pain. I had a detailed discussions about treatment options which include antiarrhythmic therapy versus ablation.         Antiarrhythmic therapy is limited to amiodarone due to history of MI and monitoring encourage with a BP goal <130/80   Follow up with Dr. Eris Daley. Obesity  Body mass index is 35.57 kg/m². - Excessive weight is complicating assessment and treatment. It is placing patient at risk for multiple co-morbidities as well as early death and contributing to the patient's presentation. - discussed weight management with diet and exercise      - Will consider OSEI evaluation. Reports no symptoms. Diagnostic studies:   ECG 12/15/20   Sinus rhythm    1 week Holter monitor 11/17/2020  Afib/flutter, symptomatic  Avg HR 86, min 59, max 144 (in SR)  Avg HR in afib 161    Echo 12/14/2017   Summary   Left ventricle size is normal. There is mild concentric left ventricular   hypertrophy. Left ventricular function is normal with ejection fraction   estimated at 55-60 %. No regional wall motion abnormalities are noted. Diastolic filling parameters suggests grade II diastolic dysfunction. Mild   mitral and tricuspid regurgitation is present. RVSP estimated at 36 mmHg. CARDIAC CATH OUTSIDE 12-7-12  FINDINGS  LVP 91/11  EST EF 65%  WALL MOTION normal  Valve function no MR or aortic stenosis  dominance right  left main normal    left anterior descending course to the undersurface of the apex. it gives rise to a large diagonal branch. there is 30% focal mid LAD stenosis just beyond the diagonal branch. 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 There is 20% proximal stenosis of the diagonal branch. left circumflex supplies a small ramus medius, and 2 large obtuse marginal branches. There is 40% tubular distal LCX stenosis proximal to the 2nd obtuse marginal branch. There is 30% stenosis of the small ramus intermedius. right supplies a small 1 mm marginal branch, large posterior descending branch a large posterior left ventricular branch and 2 small posterior descending branch a large posterior left ventricular branches. there is no stenosis in the mid RCA stent.  There is 70% ostial and Take by mouth   Yes Historical Provider, MD   UNABLE TO FIND Organic wheat grass   Yes Historical Provider, MD   UNABLE TO FIND Magnesium potassium aspartate   Yes Historical Provider, MD   UNABLE TO FIND Digest Extra   Yes Historical Provider, MD   nitroGLYCERIN (NITROSTAT) 0.4 MG SL tablet Place 1 tablet under the tongue every 5 minutes as needed for Chest pain up to max of 3 total doses. If no relief after 1 dose, call 911. 8/31/20  Yes Jacquelin Arroyo MD   lidocaine-Hydrocort 3-0.5 % cream Place around the anus 2 times daily as needed (irritation/inflammation) 8/31/20  Yes Jacquelin Arroyo MD   estradiol (ESTRACE) 0.1 MG/GM vaginal cream PLACE 0.5 GRAM VAGINALLY 3 TIMES A WEEK 6/5/20  Yes Jacquelin Arroyo MD       Past Medical History:   Diagnosis Date    Allergic rhinitis, cause unspecified 12/4/2010    Anxiety     Arthritis     CAD (coronary artery disease)     angioplasty with stent at [facility] Dr. Cheryl Painter, here Dr. Johnny Hughes at [facility] Chronic kidney disease     frequency, urgency    Chronic pain     precordial, opiate dependent    Depression 1/22/2013    Erosive esophagitis 12/28/2016    Dr. Sydney Liu; PPI started; LA Class A, Sphincter of Oddi manometry and sphincterotomy if symptoms don't improve.      Essential hypertension 4/11/2017    HSV infection     Hypercholesterolemia 6/6/2014    Irritable bowel syndrome with diarrhea 9/27/2016    Migraine headache 06/06/2014    Nonerosive nonspecific gastritis 12/28/2016    Dr. Sydney Liu; body and antrum    OAB (overactive bladder) 3/14/2014        Past Surgical History:   Procedure Laterality Date    ABDOMINAL ADHESION SURGERY      APPENDECTOMY      CARDIAC CATHETERIZATION  12/7/2013    recheck arteries unchanged    CHOLECYSTECTOMY      COLONOSCOPY      CORONARY ANGIOPLASTY WITH STENT PLACEMENT  10/2012    right- TOTAL OF 3 STENTS    COSMETIC SURGERY      rhinoplasty    CYSTOSCOPY  4/2014    ENDOSCOPY, COLON, DIAGNOSTIC      ERCP  01/25/2017    FRACTURE SURGERY      LUE, plate and screws    HYSTERECTOMY      HYSTERECTOMY, TOTAL ABDOMINAL      OTHER SURGICAL HISTORY      Incision and drainage of Lingual Introral Lesion    POLYPECTOMY      Dr. Kareen Benson PTCA  10/2012    right    SINUS SURGERY      JOSE AND BSO      TONSILLECTOMY      TUBAL LIGATION      UPPER GASTROINTESTINAL ENDOSCOPY      WRIST SURGERY Left 5/21/2015    OPEN REDUCTION INTERNAL FIXATION LEFT WRIST       Allergies   Allergen Reactions    Hydralazine Anaphylaxis    Shellfish-Derived Products Anaphylaxis and Swelling    Asa [Aspirin] Nausea Only    Crestor [Rosuvastatin]      Myalgia      Fenofibrate      Myalgia      Hctz [Hydrochlorothiazide]     Lipitor [Atorvastatin Calcium]     Lipitor [Atorvastatin]      Myalgia      Lisinopril     Mometasone Furoate Swelling    Nasonex [Mometasone] Swelling    Nsaids      GI PAIN    Sulfa Antibiotics Swelling    Contrast [Iodides] Swelling and Rash    Flagyl [Metronidazole] Nausea And Vomiting       Social History:  Reviewed. reports that she quit smoking about 13 years ago. Her smoking use included cigarettes. She has a 10.00 pack-year smoking history. She has never used smokeless tobacco. She reports that she does not drink alcohol or use drugs. Family History:  Reviewed. Reviewed. No family history of SCD. Relevant and available labs, and cardiovascular diagnostics reviewed. Reviewed. I independently reviewed all cardiac tracing. All questions and concerns were addressed to the patient/family. Alternatives to my treatment were discussed. I have discussed the above stated plan and the patient verbalized understanding and agreed with the plan. Scribe attestation:  This note was scribed in the presence of HERNANDO [facility], MD by Sylvia Escamilla RN    Physician Attestation: I,  HERNANDO [facility], confirm that the scribe's documentation has been prepared under my direction and personally reviewed by me in its entirety. I also confirm that the note above accurately reflects all work, treatment, procedures, and medical decision making performed by me. NOTE: This report was transcribed using voice recognition software. Every effort was made to ensure accuracy, however, inadvertent computerized transcription errors may be present.      Iyv Kendall MD, MPH  [facility]   Office: [phone]

## 2020-12-15 NOTE — PATIENT INSTRUCTIONS
ATRIAL FIB ABLATION INFORMATION    Our  will be contacting you from [phone] to schedule your procedure. The morning of your ablation you will need to check in at the registration desk in the main lobby. PRE-PROCEDURE INSTRUCTIONS -   -You will be called with a time to arrive for you ablation.  -Do not eat or drink anything after midnight the night before your ablation.  -You may take all of your other medications with a sip of water.  -You will be staying overnight in CVU after you ablation.  -You will need to have a responsible adult to drive you home the next morning. -CVU will provide you with discharge instructions.  -You will need to hold your Xarelto for 1 day before     You will be scheduled for a 6-8 week follow up with cardiology. This will be done prior to your discharge instructions. If you have any questions regarding the procedure itself or medications, please call [phone] and ask to speak to an EP nurse.

## 2020-12-17 ENCOUNTER — TELEPHONE (OUTPATIENT)
Dept: CARDIOLOGY CLINIC | Age: 65
End: 2020-12-17

## 2020-12-18 ENCOUNTER — OFFICE VISIT (OUTPATIENT)
Dept: FAMILY MEDICINE CLINIC | Age: 65
End: 2020-12-18
Payer: MEDICARE

## 2020-12-18 VITALS
WEIGHT: 207.8 LBS | RESPIRATION RATE: 21 BRPM | HEART RATE: 82 BPM | TEMPERATURE: 97.9 F | BODY MASS INDEX: 35.67 KG/M2 | OXYGEN SATURATION: 99 % | DIASTOLIC BLOOD PRESSURE: 96 MMHG | SYSTOLIC BLOOD PRESSURE: 156 MMHG

## 2020-12-18 PROBLEM — F33.0 MILD EPISODE OF RECURRENT MAJOR DEPRESSIVE DISORDER (HCC): Status: ACTIVE | Noted: 2020-12-18

## 2020-12-18 PROCEDURE — 99214 OFFICE O/P EST MOD 30 MIN: CPT | Performed by: FAMILY MEDICINE

## 2020-12-18 SDOH — ECONOMIC STABILITY: INCOME INSECURITY: HOW HARD IS IT FOR YOU TO PAY FOR THE VERY BASICS LIKE FOOD, HOUSING, MEDICAL CARE, AND HEATING?: NOT HARD AT ALL

## 2020-12-18 SDOH — ECONOMIC STABILITY: FOOD INSECURITY: WITHIN THE PAST 12 MONTHS, THE FOOD YOU BOUGHT JUST DIDN'T LAST AND YOU DIDN'T HAVE MONEY TO GET MORE.: NEVER TRUE

## 2020-12-18 SDOH — ECONOMIC STABILITY: FOOD INSECURITY: WITHIN THE PAST 12 MONTHS, YOU WORRIED THAT YOUR FOOD WOULD RUN OUT BEFORE YOU GOT MONEY TO BUY MORE.: NEVER TRUE

## 2020-12-18 SDOH — ECONOMIC STABILITY: TRANSPORTATION INSECURITY
IN THE PAST 12 MONTHS, HAS LACK OF TRANSPORTATION KEPT YOU FROM MEETINGS, WORK, OR FROM GETTING THINGS NEEDED FOR DAILY LIVING?: NO

## 2020-12-18 SDOH — ECONOMIC STABILITY: TRANSPORTATION INSECURITY
IN THE PAST 12 MONTHS, HAS THE LACK OF TRANSPORTATION KEPT YOU FROM MEDICAL APPOINTMENTS OR FROM GETTING MEDICATIONS?: NO

## 2020-12-18 NOTE — PROGRESS NOTES
Here for f/u of CAD, and issues of atrial fibrillation. Pt states that he is working with dr. Angel Thomas for issues of Afib, and was referred to get established with dr. Cheryl Rowley. Pt wore a monitor for 10 days or so, and the results of the test showed 2.6% of atrial fibrillation. Pt does had rare times on monitor where the HR got very high. Pt was started on xarelto and diltiazem and is going to get set up for ablation. Pt is waiting for  to set up ablation and she does feel poorly. Pt here for follow up of blood pressure. Pt states doing great with adherence to therapy and feels well. No issues of chest pain, shortness of breath. No vision changes, headache, swelling in legs. Pt is on the diltiazem. Pt is monitoring her blood pressure intermittently but not recently. Pt feels that so far she is tolerating the diltiazem, has been on for about 3 days      Except as noted above in the history of present illness, the review of systems is  negative for headache, vision changes, chest pain, shortness of breath, abdominal pain, urinary sx, bowel changes. Past medical, surgical, and social history reviewed and updated  Medications and allergies reviewed and updated       O: BP (!) 156/96   Pulse 82   Temp 97.9 °F (36.6 °C) (Temporal)   Resp 21   Wt 207 lb 12.8 oz (94.3 kg)   SpO2 99%   BMI 35.67 kg/m²   GEN: No acute distress, cooperative, well nourished, alert. HEENT: PEERLA, EOMI , normocephalic/atraumatic, nares and oropharynx clear. Mucous membranes normal, Tympanic membranes clear bilaterally. Neck: soft, supple, no thyromegaly, mass, no Lymphadenopathy  CV: Regular rate and rhythm, no murmur, rubs, gallops. No edema. Resp: Clear to auscultation bilaterally good air entry bilaterally  No crackles, wheeze. Breathing comfortably.    Ext: no clubbing, cyanosis, edema        Current Outpatient Medications   Medication Sig Dispense Refill Monitor as still high but has been on meds for only 3-4d  Check bloodwork for  - Hemoglobin A1C; Future    6. PAF (paroxysmal atrial fibrillation) ([facility] [address]  Cont xarelto and monitor with supportive therapy, check bloodwork as below  Working with electrophysiology for consideration of ablation  - CBC; Future  - Comprehensive Metabolic Panel; Future  - TSH without Reflex; Future  - T4, Free; Future  - Hemoglobin A1C; Future  - Lipid Panel; Future           Follow-up appointment:   Pending bloodwork results/prn    Discussed use, benefit, and side effects of all prescribed medications. Barriers to medication compliance addressed. All patient questions answered. Pt voiced understanding. When applicable, patient's outside records were reviewed through [facility]. The patient has signed appropriate paperworks/consents.

## 2020-12-21 ENCOUNTER — OFFICE VISIT (OUTPATIENT)
Dept: CARDIOLOGY CLINIC | Age: 65
End: 2020-12-21
Payer: MEDICARE

## 2020-12-21 VITALS
DIASTOLIC BLOOD PRESSURE: 68 MMHG | BODY MASS INDEX: 34.85 KG/M2 | SYSTOLIC BLOOD PRESSURE: 158 MMHG | OXYGEN SATURATION: 97 % | HEIGHT: 64 IN | WEIGHT: 204.1 LBS | HEART RATE: 90 BPM

## 2020-12-21 PROCEDURE — 99215 OFFICE O/P EST HI 40 MIN: CPT | Performed by: INTERNAL MEDICINE

## 2020-12-21 RX ORDER — DILTIAZEM HYDROCHLORIDE 120 MG/1
120 CAPSULE, COATED, EXTENDED RELEASE ORAL 2 TIMES DAILY
Qty: 60 CAPSULE | Refills: 6 | Status: SHIPPED | OUTPATIENT
Start: 2020-12-21 | End: 2021-01-27 | Stop reason: ALTCHOICE

## 2020-12-21 ASSESSMENT — ENCOUNTER SYMPTOMS
ABDOMINAL DISTENTION: 1
CHEST TIGHTNESS: 1
COUGH: 0
BLOOD IN STOOL: 0
NAUSEA: 1
ABDOMINAL PAIN: 0
SHORTNESS OF BREATH: 1
PHOTOPHOBIA: 0

## 2020-12-21 NOTE — PROGRESS NOTES
[address]  12/21/20  Referring: Dr. Darron Lai CONSULT/CHIEF COMPLAINT/HPI     Reason for visit/ Chief complaint  New Patient  Palpitations/chest pain   HPI Fina Hale is a 63 y.o. is seen today to discuss palpitations, chest pain and atrial fibrillation. She states she feels like her heart \"is jumping out of my chest\". She states she is waiting for a  to call her to schedule an ablation for atrial fibrillation. She has had angina for 8+ years and has followed with multiple cardiologists. She describes angina as chest heaviness with pain to right shoulder and upper back. Symptoms last 15-20 minutes. She states chest heaviness is exacerbated by exertion. She has associated dizziness. Emelina Kimbrough states symptoms resolve with deep breathing. NTG resolves pain, but cause a headache. She states symptoms occur several times per day. Emelina Kimbrough has multiple medication allergies. States she has seen an allergist in the past and took allergy shots for years. She takes care of her house and cooks and cleans. For exercise, she walks. Patient is compliant with medications and is tolerating them well without side effects. She does not smoke. HISTORY/ALLERGIES/ROS     MedHx:  has a past medical history of Allergic rhinitis, cause unspecified, Anxiety, Arthritis, CAD (coronary artery disease), Chronic kidney disease, Chronic pain, Depression, Erosive esophagitis, Essential hypertension, HSV infection, Hypercholesterolemia, Irritable bowel syndrome with diarrhea, Migraine headache, Nonerosive nonspecific gastritis, and OAB (overactive bladder). SurgHx:  has a past surgical history that includes Appendectomy; shannan and bso (cervix removed); Cholecystectomy; Tubal ligation; Tonsillectomy; other surgical history; Coronary angioplasty with stent (10/2012); Percutaneous Transluminal Coronary Angio (10/2012); Cardiac catheterization (12/7/2013); Hysterectomy; polypectomy; Cystoscopy (4/2014); Upper gastrointestinal endoscopy; Wrist surgery (Left, 5/21/2015); Abdominal adhesion surgery; Colonoscopy; Endoscopy, colon, diagnostic; Cosmetic surgery; fracture surgery; ERCP (01/25/2017); sinus surgery; and Hysterectomy, total abdominal.   SocHx:  reports that she quit smoking about 13 years ago. Her smoking use included cigarettes. She has a 10.00 pack-year smoking history. She has never used smokeless tobacco. She reports that she does not drink alcohol or use drugs. FamHx: Family history of heart disease  Allergies: Hydralazine, Shellfish-derived products, Asa [aspirin], Crestor [rosuvastatin], Fenofibrate, Hctz [hydrochlorothiazide], Lipitor [atorvastatin calcium], Lipitor [atorvastatin], Lisinopril, Mometasone furoate, Nasonex [mometasone], Nsaids, Sulfa antibiotics, Contrast [iodides], and Flagyl [metronidazole]   ROS:   Review of Systems   Constitutional: Positive for fatigue. Negative for activity change, diaphoresis and fever. HENT: Positive for congestion. Negative for ear discharge. Eyes: Negative for photophobia and visual disturbance. Respiratory: Positive for chest tightness and shortness of breath. Negative for cough. Cardiovascular: Positive for palpitations. Negative for chest pain. Gastrointestinal: Positive for abdominal distention and nausea. Negative for abdominal pain and blood in stool. Endocrine: Negative for cold intolerance and polydipsia. Genitourinary: Negative for difficulty urinating and flank pain. Musculoskeletal: Positive for arthralgias and myalgias. Skin: Negative for rash and wound. Allergic/Immunologic: Negative for environmental allergies and immunocompromised state. Neurological: Positive for dizziness and headaches. Hematological: Negative for adenopathy. Does not bruise/bleed easily. Psychiatric/Behavioral: Negative for confusion. The patient is nervous/anxious. The patient is not hyperactive. MEDICATIONS      Prior to Admission medications    Medication Sig Start Date End Date Taking? Authorizing Provider   digoxin (LANOXIN) 125 MCG tablet Take 125 mcg by mouth as needed   Yes Historical Provider, MD   omeprazole (PRILOSEC) 40 MG delayed release capsule Take 40 mg by mouth daily 3/9/20  Yes Historical Provider, MD   ACYCLOVIR PO Take by mouth as needed   Yes Historical Provider, MD   L-Lysine 1000 MG TABS Take by mouth   Yes Historical Provider, MD   Magnesium Sulfate, Laxative, (EPSOM SALTS) GRAN Take 1 g by mouth once   Yes Historical Provider, MD   Colostrum 500 MG CAPS Take by mouth   Yes Historical Provider, MD   cyanocobalamin 1000 MCG tablet Take 1,000 mcg by mouth daily   Yes Historical Provider, MD   vitamin D 25 MCG (1000 UT) CAPS Take by mouth   Yes Historical Provider, MD   UNABLE TO FIND Organic wheat grass   Yes Historical Provider, MD   UNABLE TO FIND Magnesium potassium aspartate   Yes Historical Provider, MD   UNABLE TO FIND Digest Extra   Yes Historical Provider, MD   dilTIAZem (DILTIAZEM CD) 120 MG extended release capsule Take 1 capsule by mouth daily 12/15/20  Yes Isabelle Holbrook MD   rivaroxaban (XARELTO) 20 MG TABS tablet Take 1 tablet by mouth daily (with breakfast) 12/15/20  Yes Isabelle Holbrook MD   nitroGLYCERIN (NITROSTAT) 0.4 MG SL tablet Place 1 tablet under the tongue every 5 minutes as needed for Chest pain up to max of 3 total doses.  If no relief after 1 dose, call 911. 8/31/20  Yes Ro Garcia MD   lidocaine-Hydrocort 3-0.5 % cream Place around the anus 2 times daily as needed (irritation/inflammation) 8/31/20  Yes Ro Garcia MD estradiol (ESTRACE) 0.1 MG/GM vaginal cream PLACE 0.5 GRAM VAGINALLY 3 TIMES A WEEK 6/5/20  Yes Kay Juarez MD       PHYSICAL EXAM        Vitals:    12/21/20 0907   BP: (!) 160/90   Pulse: 90   SpO2: 97%    Weight: 204 lb 1.6 oz (92.6 kg)     Gen Alert, cooperative, no distress Heart  Regular rate and rhythm, no murmur   Head Normocephalic, atraumatic, no abnormalities Abd  Soft, NT, +BS, no mass, no OM   Eyes PERRLA, conj/corn clear Ext  Ext nl, AT, no C/C, no edema   Nose Nares normal, no drain age, Non-tender Pulse 2+ and symmetric   Throat Lips, mucosa, tongue normal Skin Color/text/turg nl, no rash/lesions   Neck S/S, TM, NT, no bruit Psych Nl mood and affect   Lung  CTA-B, unlabored, no DTP     Ch wall NT, no deform       LABS and Imaging     Relevant and available CV data reviewed  Echo 12/14/17: There is mild concentric left ventricular hypertrophy. Left ventricular function is normal EF 55-60 %. No regional wall motion abnormalities are noted. Diastolic filling parameters suggests grade II diastolic dysfunction. Mild  mitral and tricuspid regurgitation is present. RVSP estimated at 36 mmHg. Cath: 11/19/17: Patent stent diagonal branch 40% mid LAD stenosis, 10% distal left main coronary artery stenosis, 10 to 20% mid right posterior descending coronary stenosis, right coronary artery dominance. No significant changes from the conclusion of the procedure yesterday. There is an element of coronary vasospasm possibly with also myocardial bridging  in the LAD  Holter: 2.6% atrial fibrillation  EKG personally reviewed: sinus rhythm  Stress:none  moderateRisk  moderate Complexity/Medical Decision Making  Outside records Reviewed  Labs Reviewed  Prior ekg reviewed, prior cath and echo reports reviewed   Medications reviewed  Old Notes reviewed  ASSESSMENT AND PLAN     1.   Paroxsymal symptomtic atrial fibrillation and flutter  - now in sinus  - [id number] of 3  - stable  Plan: - continue rate control strategy  - anticoagulation with xarelto  - follow up with EP to discuss ablation    2. Chest pain  - history of CAD with mid lad 40% stenosis/myocardial bridge  - differential: worsening obstructive cad, microvascular disease, coronary vasospasm, gerd, panic disorder, afib  - worsening  Plan:  - recommend nuclear stress test to evaluate for ischemia  - increase diltiazem cd 120 mg bid, which will provide anti-anginal/vasospasm relief as well as rate control for atrial fibrillation. Intend to use long acting twice daily to avoid off periods  - patient not on aspirin or statin due to allergies     3. Multiple drug allergies  - worsening  Plan:  - recommend allergy testing  - complicating care and treatment options    4. Mental health  - suspect untreated anxiety  Plan:  - recommend follow up with PCP for evaluation     Patient counseled on lifestyle modification, diet, and exercise. Follow Up: 3 months    Dr. Felicita Guerrero attestation: This note was scribed in the presence of Dr Javier Hutchinson MD by Ortiz Rivera RN. Physician Attestation  The scribe for and in the presence of dada Perkins DO). The scribe tessa henley may have prepopulated components of this note with my historical  intellectual property under my direct supervision. Any additions to this intellectual property were performed in my presence and at my direction. Furthermore, the content and accuracy of this note have been reviewed by dada Perkins DO).   12/21/2020 10:35 PM

## 2020-12-22 DIAGNOSIS — I10 ESSENTIAL HYPERTENSION: ICD-10-CM

## 2020-12-22 DIAGNOSIS — E78.00 HYPERCHOLESTEROLEMIA: ICD-10-CM

## 2020-12-22 DIAGNOSIS — I48.0 PAF (PAROXYSMAL ATRIAL FIBRILLATION) (HCC): ICD-10-CM

## 2020-12-23 ENCOUNTER — HOSPITAL ENCOUNTER (OUTPATIENT)
Dept: NON INVASIVE DIAGNOSTICS | Age: 65
Discharge: HOME | End: 2020-12-23
Payer: MEDICARE

## 2020-12-23 LAB
A/G RATIO: 1.8 (ref 1.1–2.2)
ALBUMIN SERPL-MCNC: 4.6 G/DL (ref 3.4–5)
ALP BLD-CCNC: 113 U/L (ref 40–129)
ALT SERPL-CCNC: 10 U/L (ref 10–40)
ANION GAP SERPL CALCULATED.3IONS-SCNC: 15 MMOL/L (ref 3–16)
AST SERPL-CCNC: 19 U/L (ref 15–37)
BILIRUB SERPL-MCNC: 0.6 MG/DL (ref 0–1)
BUN BLDV-MCNC: 15 MG/DL (ref 7–20)
CALCIUM SERPL-MCNC: 9.8 MG/DL (ref 8.3–10.6)
CHLORIDE BLD-SCNC: 99 MMOL/L (ref 99–110)
CHOLESTEROL, TOTAL: 234 MG/DL (ref 0–199)
CO2: 27 MMOL/L (ref 21–32)
CREAT SERPL-MCNC: 0.9 MG/DL (ref 0.6–1.2)
ESTIMATED AVERAGE GLUCOSE: 116.9 MG/DL
GFR AFRICAN AMERICAN: >60
GFR NON-AFRICAN AMERICAN: >60
GLOBULIN: 2.6 G/DL
GLUCOSE BLD-MCNC: 94 MG/DL (ref 70–99)
HBA1C MFR BLD: 5.7 %
HCT VFR BLD CALC: 43.7 % (ref 36–48)
HDLC SERPL-MCNC: 48 MG/DL (ref 40–60)
HEMOGLOBIN: 14.7 G/DL (ref 12–16)
LDL CHOLESTEROL CALCULATED: 138 MG/DL
LV EF: 70 %
LVEF MODALITY: NORMAL
MCH RBC QN AUTO: 28.4 PG (ref 26–34)
MCHC RBC AUTO-ENTMCNC: 33.7 G/DL (ref 31–36)
MCV RBC AUTO: 84.5 FL (ref 80–100)
PDW BLD-RTO: 14.5 % (ref 12.4–15.4)
PLATELET # BLD: 286 K/UL (ref 135–450)
PMV BLD AUTO: 8.4 FL (ref 5–10.5)
POTASSIUM SERPL-SCNC: 4.4 MMOL/L (ref 3.5–5.1)
RBC # BLD: 5.18 M/UL (ref 4–5.2)
SODIUM BLD-SCNC: 141 MMOL/L (ref 136–145)
T4 FREE: 1 NG/DL (ref 0.9–1.8)
TOTAL PROTEIN: 7.2 G/DL (ref 6.4–8.2)
TRIGL SERPL-MCNC: 239 MG/DL (ref 0–150)
TSH SERPL DL<=0.05 MIU/L-ACNC: 2.81 UIU/ML (ref 0.27–4.2)
VLDLC SERPL CALC-MCNC: 48 MG/DL
WBC # BLD: 7.8 K/UL (ref 4–11)

## 2020-12-23 PROCEDURE — 93017 CV STRESS TEST TRACING ONLY: CPT | Performed by: INTERNAL MEDICINE

## 2020-12-23 PROCEDURE — 78452 HT MUSCLE IMAGE SPECT MULT: CPT

## 2020-12-23 PROCEDURE — 3430000000 HC RX DIAGNOSTIC RADIOPHARMACEUTICAL: Performed by: INTERNAL MEDICINE

## 2020-12-23 PROCEDURE — A9502 TC99M TETROFOSMIN: HCPCS | Performed by: INTERNAL MEDICINE

## 2020-12-23 RX ADMIN — TETROFOSMIN 10 MILLICURIE: 1.38 INJECTION, POWDER, LYOPHILIZED, FOR SOLUTION INTRAVENOUS at 13:17

## 2020-12-23 RX ADMIN — TETROFOSMIN 30 MILLICURIE: 1.38 INJECTION, POWDER, LYOPHILIZED, FOR SOLUTION INTRAVENOUS at 13:17

## 2020-12-29 RX ORDER — OMEPRAZOLE 40 MG/1
40 CAPSULE, DELAYED RELEASE ORAL DAILY
Qty: 90 CAPSULE | Refills: 1 | Status: SHIPPED | OUTPATIENT
Start: 2020-12-29 | End: 2021-01-27 | Stop reason: SDUPTHER

## 2020-12-30 ENCOUNTER — TELEPHONE (OUTPATIENT)
Dept: CARDIOLOGY CLINIC | Age: 65
End: 2020-12-30

## 2020-12-30 NOTE — TELEPHONE ENCOUNTER
Patient is awaiting for a call to schedule the ablation. I told her she will not be called until next week.  Verbalized understanding

## 2021-01-15 ENCOUNTER — TELEPHONE (OUTPATIENT)
Dept: CARDIOLOGY CLINIC | Age: 66
End: 2021-01-15

## 2021-01-15 NOTE — TELEPHONE ENCOUNTER
Pt calling she can not afford the co pay for rivaroxaban (XARELTO) 20 MG TABS tablet she is asking for any alternative? pls call to advice thank you

## 2021-01-18 NOTE — TELEPHONE ENCOUNTER
Spoke to the pt-advised to  three sample bottles of Xarelto 20 mg, Lot [id number], Exp. 12/'22, at the [place] office. pt tried the assistance program, and did not qualify.

## 2021-01-20 NOTE — PROGRESS NOTES
[address]   Cardiac Follow Up     Referring Provider:  Valentin Gilbert MD     Chief Complaint   Patient presents with    Atrial Fibrillation        History of Present Illness:  Aman Lopez is a 63 y.o. female with a history of coronary artery disease, STEMI with PCI (PCI/ALEC RCA 10/2012, PCI to Ramus 10/2012, ) , hypertension and AFIB.      She presented to [facility] on 11/19/2017 with complaints of chest pain that radiates to her jaw and arms. She was seen by cardiology and diagnosed with a STEMI and a LHC was completed with a ALEC in the diagonal branch. Developed chest pain again 11/20/2017 and a repeat LHC was completed with no further intervention necessary. She states that she has intolerance to many medications.      Today she states that she has been experiencing severe episodes of Afib, so severe last night she considered going to the hospital.  She feels she is not able to do much due to these episodes. They are occurring daily- describes as her heart is pounding out of her chest.She is only taking digoxin as needed. She has taken a dose once daily for the last 3 days. States she cannot tolerate this, is awaking her from sleep. Past Medical History:   has a past medical history of Allergic rhinitis, cause unspecified, Anxiety, Arthritis, CAD (coronary artery disease), Chronic kidney disease, Chronic pain, Depression, Erosive esophagitis, Essential hypertension, HSV infection, Hypercholesterolemia, Irritable bowel syndrome with diarrhea, Migraine headache, Nonerosive nonspecific gastritis, and OAB (overactive bladder).     Surgical History: has a past surgical history that includes Appendectomy; shannan and bso (cervix removed); Cholecystectomy; Tubal ligation; Tonsillectomy; other surgical history; Coronary angioplasty with stent (10/2012); Percutaneous Transluminal Coronary Angio (10/2012); Cardiac catheterization (12/7/2013); Hysterectomy; polypectomy; Cystoscopy (4/2014); Upper gastrointestinal endoscopy; Wrist surgery (Left, 5/21/2015); Abdominal adhesion surgery; Colonoscopy; Endoscopy, colon, diagnostic; Cosmetic surgery; fracture surgery; ERCP (01/25/2017); sinus surgery; and Hysterectomy, total abdominal.     Social History:   reports that she quit smoking about 13 years ago. Her smoking use included cigarettes. She has a 10.00 pack-year smoking history. She has never used smokeless tobacco. She reports that she does not drink alcohol or use drugs. Family History:  family history includes Heart Disease in her brother, father, maternal uncle, mother, and another family member; High Blood Pressure in her sister and sister; Hypertension in her father and mother; Other in an other family member; Stroke in her sister and another family member. Home Medications:  Prior to Admission medications    Medication Sig Start Date End Date Taking?  Authorizing Provider   omeprazole (PRILOSEC) 40 MG delayed release capsule Take 1 capsule by mouth daily 1/27/21  Yes Mathew Staff, MD   dilTIAZem (DILTIAZEM CD) 120 MG extended release capsule Take 1 capsule by mouth 2 times daily 12/21/20  Yes Sylvester Holden DO   digoxin (LANOXIN) 125 MCG tablet Take 125 mcg by mouth as needed   Yes Historical Provider, MD   ACYCLOVIR PO Take by mouth as needed   Yes Historical Provider, MD   L-Lysine 1000 MG TABS Take by mouth   Yes Historical Provider, MD   Colostrum 500 MG CAPS Take by mouth   Yes Historical Provider, MD   cyanocobalamin 1000 MCG tablet Take 1,000 mcg by mouth daily   Yes Historical Provider, MD vitamin D 25 MCG (1000 UT) CAPS Take by mouth   Yes Historical MD Miriam   rivaroxaban (XARELTO) 20 MG TABS tablet Take 1 tablet by mouth daily (with breakfast) 12/15/20  Yes Tobias Araujo MD   nitroGLYCERIN (NITROSTAT) 0.4 MG SL tablet Place 1 tablet under the tongue every 5 minutes as needed for Chest pain up to max of 3 total doses. If no relief after 1 dose, call 911. 8/31/20  Yes Kleber Cedeño MD   lidocaine-Hydrocort 3-0.5 % cream Place around the anus 2 times daily as needed (irritation/inflammation) 8/31/20  Yes Kleber Cedeño MD   estradiol (ESTRACE) 0.1 MG/GM vaginal cream PLACE 0.5 GRAM VAGINALLY 3 TIMES A WEEK 6/5/20  Yes Kleber Cedeño MD        Allergies:  Hydralazine, Shellfish-derived products, Asa [aspirin], Crestor [rosuvastatin], Fenofibrate, Hctz [hydrochlorothiazide], Lipitor [atorvastatin calcium], Lipitor [atorvastatin], Lisinopril, Mometasone furoate, Nasonex [mometasone], Nsaids, Sulfa antibiotics, Contrast [iodides], and Flagyl [metronidazole]     Review of Systems:   · Constitutional: there has been no unanticipated weight loss. There's been no change in energy level, sleep pattern, or activity level.   +fatigue   · Eyes: No visual changes or diplopia. No scleral icterus. · ENT: No Headaches, hearing loss or vertigo. No mouth sores or sore throat. · Cardiovascular: Reviewed in HPI  · Respiratory: No cough or wheezing, no sputum production. No hematemesis. +sob  · Gastrointestinal: No abdominal pain, appetite loss, blood in stools. No change in bowel or bladder habits. · Genitourinary: No dysuria, trouble voiding, or hematuria. · Musculoskeletal:  No gait disturbance, weakness or joint complaints. +cp   · Integumentary: No rash or pruritis. · Neurological: No headache, diplopia, change in muscle strength, numbness or tingling. No change in gait, balance, coordination, mood, affect, memory, mentation, behavior. · Psychiatric: No anxiety, no depression. · Endocrine: No malaise, fatigue or temperature intolerance. No excessive thirst, fluid intake, or urination. No tremor. · Hematologic/Lymphatic: No abnormal bruising or bleeding, blood clots or swollen lymph nodes. · Allergic/Immunologic: No nasal congestion or hives. Physical Examination:    Vitals:    01/27/21 1125   BP: (!) 140/80   Pulse: 91   Temp: 97.3 °F (36.3 °C)   SpO2: 98%        Wt Readings from Last 1 Encounters:   01/27/21 210 lb (95.3 kg)       Constitutional and General Appearance:   Respiratory:  · Normal excursion and expansion without use of accessory muscles  · Resp Auscultation: Normal breath sounds without dullness  Cardiovascular:  · The apical impulses not displaced  · Heart tones are crisp and normal  · Cervical veins are not engorged  · The carotid upstroke is normal in amplitude and contour without delay or bruit  · Peripheral pulses are symmetrical and full  · There is no clubbing, cyanosis of the extremities. · No edema  · Femoral Arteries: 2+ and equal  · Pedal Pulses: 2+ and equal   Abdomen:  · No masses or tenderness  · Liver/Spleen: No Abnormalities Noted  Neurological/Psychiatric:  · Alert and oriented in all spheres  · Moves all extremities well  · Exhibits normal gait balance and coordination  · No abnormalities of mood, affect, memory, mentation, or behavior are noted    GXT Myoview 12/23/20   Summary    -There is a small-moderate sized, moderate intensity fixed anterior defect    suggestive of scar.    -There is no ischemia.    -LV function is normal with EF=70%    -Low-intermediate risk.          Assessment:      Atrial fibrillation and flutter with RVR  Ablation scheduled for 2/25/21   Start digoxin (LANOXIN) 250 MCG tablet daily   Start dilTIAZem (CardiZEM CD) 120 MG extended release capsule 2x daily     CAD  History of multiple stents to RCA, STEMI 2017  [facility] was completed with a ALEC in the diagonal branch at [facility] 2017  12/12/20 stress > stable     Hypertension Stable- Blood pressure (!) 140/80, pulse 91, temperature 97.3 °F (36.3 °C), height 5' 4\" (1.626 m), weight 210 lb (95.3 kg), SpO2 98 %,     Plan:  Darya Yuan has a stable cardiac status. Cardiac test and lab results personally reviewed by me during this office visit and discussed. Start digoxin (LANOXIN) 250 MCG tablet daily   Start dilTIAZem (CardiZEM CD) 120 MG extended release capsule 2x daily   Continue risk factor modifications. Call for any change in symptoms, call to report any changes in shortness of breath or development of chest pain with activity. Return for regular follow up in 1 week. I appreciate the opportunity of cooperating in the care of this individual.    Maci Addison. Alexandre Mejia M.D., [facility]    Patient's problem list, medications, allergies, past medical, surgical, social and family histories were reviewed and updated as appropriate. Scribe's attestation: This note was scribed in the presence of Dr. Alexandre Mejia MD, by Piper Mccann RN. The scribe's documentation has been prepared under my direction and personally reviewed by me in its entirety. I confirm that the note above accurately reflects all work, treatment, procedures, and medical decision making performed by me.

## 2021-01-27 ENCOUNTER — OFFICE VISIT (OUTPATIENT)
Dept: CARDIOLOGY CLINIC | Age: 66
End: 2021-01-27
Payer: MEDICARE

## 2021-01-27 VITALS
SYSTOLIC BLOOD PRESSURE: 140 MMHG | BODY MASS INDEX: 35.85 KG/M2 | TEMPERATURE: 97.3 F | DIASTOLIC BLOOD PRESSURE: 80 MMHG | WEIGHT: 210 LBS | OXYGEN SATURATION: 98 % | HEART RATE: 91 BPM | HEIGHT: 64 IN

## 2021-01-27 DIAGNOSIS — I10 ESSENTIAL HYPERTENSION: ICD-10-CM

## 2021-01-27 DIAGNOSIS — I48.0 PAROXYSMAL ATRIAL FIBRILLATION (HCC): ICD-10-CM

## 2021-01-27 DIAGNOSIS — I25.10 CORONARY ARTERY DISEASE INVOLVING NATIVE CORONARY ARTERY OF NATIVE HEART WITHOUT ANGINA PECTORIS: Primary | ICD-10-CM

## 2021-01-27 PROCEDURE — 99214 OFFICE O/P EST MOD 30 MIN: CPT | Performed by: INTERNAL MEDICINE

## 2021-01-27 RX ORDER — OMEPRAZOLE 40 MG/1
40 CAPSULE, DELAYED RELEASE ORAL DAILY
Qty: 90 CAPSULE | Refills: 1 | Status: SHIPPED | OUTPATIENT
Start: 2021-01-27 | End: 2021-02-17 | Stop reason: ALTCHOICE

## 2021-01-27 RX ORDER — DILTIAZEM HYDROCHLORIDE 120 MG/1
120 CAPSULE, COATED, EXTENDED RELEASE ORAL 2 TIMES DAILY
Qty: 180 CAPSULE | Refills: 3 | Status: SHIPPED | OUTPATIENT
Start: 2021-01-27 | End: 2021-09-09

## 2021-01-27 RX ORDER — DIGOXIN 250 MCG
250 TABLET ORAL DAILY
Qty: 90 TABLET | Refills: 3 | Status: ON HOLD | OUTPATIENT
Start: 2021-01-27 | End: 2021-02-05 | Stop reason: HOSPADM

## 2021-01-27 NOTE — PATIENT INSTRUCTIONS
Start digoxin (LANOXIN) 250 MCG tablet daily     Start dilTIAZem (CardiZEM CD) 120 MG extended release capsule 2x daily

## 2021-02-02 ENCOUNTER — TELEPHONE (OUTPATIENT)
Dept: CARDIOLOGY CLINIC | Age: 66
End: 2021-02-02

## 2021-02-02 NOTE — TELEPHONE ENCOUNTER
Pt calling she is having a reaction to the digoxin, she has swelling all over, not feeling well, typical reaction she always has to new meds she has stopped taking them and has an appt 02/04.  Pls call to advise Thank you

## 2021-02-02 NOTE — PROGRESS NOTES
[address]   Cardiac Follow Up     Referring Provider:  Umer Gil MD     Chief Complaint   Patient presents with    Follow-up    Coronary Artery Disease    Hypertension    Hyperlipidemia        History of Present Illness:  Lemuel aPyan is a 63 y.o. female with a history of coronary artery disease, STEMI with PCI (PCI/ALEC RCA 10/2012, PCI to Ramus 10/2012, ) , hypertension and AFIB.      She presented to [facility] on 11/19/2017 with complaints of chest pain that radiates to her jaw and arms. She was seen by cardiology and diagnosed with a STEMI and a LHC was completed with a ALEC in the diagonal branch. Developed chest pain again 11/20/2017 and a repeat LHC was completed with no further intervention necessary. She states that she has intolerance to many medications.      Today she reports she is not well. She has felt progressively worse since her last appointment. She was unable to tolerate digoxin. She reports today she feels crushing chest pain, \"elephant on the chest\" , she describes pressure between her shoulder blades. Reports continued severe episodes of Afib. They are occurring daily- describes as her heart is pounding out of her chest.. States she cannot tolerate this, is awaking her from sleep. Past Medical History:   has a past medical history of Allergic rhinitis, cause unspecified, Anxiety, Arthritis, CAD (coronary artery disease), Chronic kidney disease, Chronic pain, Depression, Erosive esophagitis, Essential hypertension, HSV infection, Hypercholesterolemia, Irritable bowel syndrome with diarrhea, Migraine headache, Nonerosive nonspecific gastritis, and OAB (overactive bladder). Surgical History:   has a past surgical history that includes Appendectomy; shannan and bso (cervix removed); Cholecystectomy; Tubal ligation; Tonsillectomy; other surgical history; Coronary angioplasty with stent (10/2012); Percutaneous Transluminal Coronary Angio (10/2012);  Cardiac catheterization (12/7/2013); Hysterectomy; polypectomy; Cystoscopy (4/2014); Upper gastrointestinal endoscopy; Wrist surgery (Left, 5/21/2015); Abdominal adhesion surgery; Colonoscopy; Endoscopy, colon, diagnostic; Cosmetic surgery; fracture surgery; ERCP (01/25/2017); sinus surgery; and Hysterectomy, total abdominal.     Social History:   reports that she quit smoking about 14 years ago. Her smoking use included cigarettes. She has a 10.00 pack-year smoking history. She has never used smokeless tobacco. She reports that she does not drink alcohol or use drugs. Family History:  family history includes Heart Disease in her brother, father, maternal uncle, mother, and another family member; High Blood Pressure in her sister and sister; Hypertension in her father and mother; Other in an other family member; Stroke in her sister and another family member. Home Medications:  Prior to Admission medications    Medication Sig Start Date End Date Taking?  Authorizing Provider   Mag Aspart-Potassium Aspart (POTASSIUM & MAGNESIUM ASPARTAT PO) Take by mouth   Yes Historical Provider, MD   Zinc-Vitamin C  MG LOZG Take by mouth   Yes Historical Provider, MD   omeprazole (PRILOSEC) 40 MG delayed release capsule Take 1 capsule by mouth daily 1/27/21  Yes Buddy Barahona MD   dilTIAZem (CARDIZEM CD) 120 MG extended release capsule Take 1 capsule by mouth 2 times daily 1/27/21  Yes Dainlo Zavala MD   ACYCLOVIR PO Take by mouth as needed   Yes Historical Provider, MD   L-Lysine 1000 MG TABS Take by mouth   Yes Historical Provider, MD   Colostrum 500 MG CAPS Take by mouth   Yes Historical Provider, MD   cyanocobalamin 1000 MCG tablet Take 1,000 mcg by mouth daily   Yes Historical Provider, MD   vitamin D 25 MCG (1000 UT) CAPS Take by mouth   Yes Historical Provider, MD   rivaroxaban (XARELTO) 20 MG TABS tablet Take 1 tablet by mouth daily (with breakfast) 12/15/20  Yes Kate Null MD   nitroGLYCERIN (NITROSTAT) 0.4 MG SL tablet Place 1 tablet under the tongue every 5 minutes as needed for Chest pain up to max of 3 total doses. If no relief after 1 dose, call 911. 8/31/20  Yes Mitch Villa MD   lidocaine-Hydrocort 3-0.5 % cream Place around the anus 2 times daily as needed (irritation/inflammation) 8/31/20  Yes Mitch Villa MD   estradiol (ESTRACE) 0.1 MG/GM vaginal cream PLACE 0.5 GRAM VAGINALLY 3 TIMES A WEEK 6/5/20  Yes Mitch Villa MD   digoxin (LANOXIN) 250 MCG tablet Take 1 tablet by mouth daily  Patient not taking: Reported on 2/4/2021 1/27/21   Dilan Caal MD        Allergies:  Hydralazine, Shellfish-derived products, Asa [aspirin], Crestor [rosuvastatin], Fenofibrate, Hctz [hydrochlorothiazide], Lipitor [atorvastatin calcium], Lipitor [atorvastatin], Lisinopril, Mometasone furoate, Nasonex [mometasone], Nsaids, Sulfa antibiotics, Contrast [iodides], and Flagyl [metronidazole]     Review of Systems:   · Constitutional: there has been no unanticipated weight loss. There's been no change in energy level, sleep pattern, or activity level.   +fatigue   · Eyes: No visual changes or diplopia. No scleral icterus. · ENT: No Headaches, hearing loss or vertigo. No mouth sores or sore throat. · Cardiovascular: Reviewed in HPI  · Respiratory: No cough or wheezing, no sputum production. No hematemesis. +sob  · Gastrointestinal: No abdominal pain, appetite loss, blood in stools. No change in bowel or bladder habits. · Genitourinary: No dysuria, trouble voiding, or hematuria. · Musculoskeletal:  No gait disturbance, weakness or joint complaints. +cp   · Integumentary: No rash or pruritis. · Neurological: No headache, diplopia, change in muscle strength, numbness or tingling. No change in gait, balance, coordination, mood, affect, memory, mentation, behavior. · Psychiatric: No anxiety, no depression. · Endocrine: No malaise, fatigue or temperature intolerance. No excessive thirst, fluid intake, or urination.  No defect    Hypertension   Stable- Blood pressure (!) 182/84, pulse 92, resp. rate 22, height 5' 4\" (1.626 m), weight 211 lb (95.7 kg), SpO2 97 %, not currently breastfeeding. Plan: Cardiac Cath films from 2017 reviewed   Risks, benefits, goals, and alternative of cardiac catheterization discussed with patient. All questions answered and informed consent obtained. Further recommendations pending coronary angiography and clinical course. [facility] today due to ongoing chest pressure     I appreciate the opportunity of cooperating in the care of this individual.    Clint Boyce. Joel Brandt M.D., [facility]    Patient's problem list, medications, allergies, past medical, surgical, social and family histories were reviewed and updated as appropriate. Scribe's attestation: This note was scribed in the presence of Dr. Joel Brandt MD, by Karen Craig RN. The scribe's documentation has been prepared under my direction and personally reviewed by me in its entirety. I confirm that the note above accurately reflects all work, treatment, procedures, and medical decision making performed by me.

## 2021-02-04 ENCOUNTER — HOSPITAL ENCOUNTER (OUTPATIENT)
Dept: CARDIAC CATH/INVASIVE PROCEDURES | Age: 66
Discharge: HOME | End: 2021-02-05
Attending: INTERNAL MEDICINE | Admitting: INTERNAL MEDICINE
Payer: MEDICARE

## 2021-02-04 ENCOUNTER — OFFICE VISIT (OUTPATIENT)
Dept: CARDIOLOGY CLINIC | Age: 66
End: 2021-02-04
Payer: MEDICARE

## 2021-02-04 VITALS
OXYGEN SATURATION: 97 % | HEIGHT: 64 IN | HEART RATE: 92 BPM | BODY MASS INDEX: 36.02 KG/M2 | SYSTOLIC BLOOD PRESSURE: 182 MMHG | WEIGHT: 211 LBS | DIASTOLIC BLOOD PRESSURE: 84 MMHG | RESPIRATION RATE: 22 BRPM

## 2021-02-04 DIAGNOSIS — I25.10 CORONARY ARTERY DISEASE INVOLVING NATIVE CORONARY ARTERY OF NATIVE HEART WITHOUT ANGINA PECTORIS: ICD-10-CM

## 2021-02-04 DIAGNOSIS — R07.9 CHEST PAIN, UNSPECIFIED TYPE: ICD-10-CM

## 2021-02-04 DIAGNOSIS — R06.02 SOB (SHORTNESS OF BREATH): ICD-10-CM

## 2021-02-04 DIAGNOSIS — I48.0 PAROXYSMAL ATRIAL FIBRILLATION (HCC): Primary | ICD-10-CM

## 2021-02-04 DIAGNOSIS — I25.9 CHEST PAIN DUE TO MYOCARDIAL ISCHEMIA, UNSPECIFIED ISCHEMIC CHEST PAIN TYPE: ICD-10-CM

## 2021-02-04 DIAGNOSIS — I25.10 CORONARY ARTERY DISEASE INVOLVING NATIVE CORONARY ARTERY OF NATIVE HEART WITHOUT ANGINA PECTORIS: Primary | ICD-10-CM

## 2021-02-04 LAB
ANION GAP SERPL CALCULATED.3IONS-SCNC: 12 MMOL/L (ref 3–16)
BASOPHILS ABSOLUTE: 0.1 K/UL (ref 0–0.2)
BASOPHILS RELATIVE PERCENT: 0.8 %
BUN BLDV-MCNC: 8 MG/DL (ref 7–20)
CALCIUM SERPL-MCNC: 9.6 MG/DL (ref 8.3–10.6)
CHLORIDE BLD-SCNC: 99 MMOL/L (ref 99–110)
CO2: 25 MMOL/L (ref 21–32)
CREAT SERPL-MCNC: 0.8 MG/DL (ref 0.6–1.2)
EOSINOPHILS ABSOLUTE: 0.2 K/UL (ref 0–0.6)
EOSINOPHILS RELATIVE PERCENT: 2 %
GFR AFRICAN AMERICAN: >60
GFR NON-AFRICAN AMERICAN: >60
GLUCOSE BLD-MCNC: 102 MG/DL (ref 70–99)
GLUCOSE BLD-MCNC: 219 MG/DL (ref 70–99)
HCT VFR BLD CALC: 42.9 % (ref 36–48)
HEMOGLOBIN: 14.8 G/DL (ref 12–16)
LEFT VENTRICULAR EJECTION FRACTION MODE: NORMAL
LV EF: 60 %
LYMPHOCYTES ABSOLUTE: 2.5 K/UL (ref 1–5.1)
LYMPHOCYTES RELATIVE PERCENT: 28.2 %
MCH RBC QN AUTO: 28.6 PG (ref 26–34)
MCHC RBC AUTO-ENTMCNC: 34.4 G/DL (ref 31–36)
MCV RBC AUTO: 83 FL (ref 80–100)
MONOCYTES ABSOLUTE: 0.9 K/UL (ref 0–1.3)
MONOCYTES RELATIVE PERCENT: 9.6 %
NEUTROPHILS ABSOLUTE: 5.3 K/UL (ref 1.7–7.7)
NEUTROPHILS RELATIVE PERCENT: 59.4 %
PDW BLD-RTO: 14 % (ref 12.4–15.4)
PERFORMED ON: ABNORMAL
PLATELET # BLD: 274 K/UL (ref 135–450)
PMV BLD AUTO: 7.6 FL (ref 5–10.5)
POC ACT LR: 250 SEC
POC ACT LR: 323 SEC
POC ACT LR: 371 SEC
POTASSIUM SERPL-SCNC: 4.1 MMOL/L (ref 3.5–5.1)
RBC # BLD: 5.17 M/UL (ref 4–5.2)
SODIUM BLD-SCNC: 136 MMOL/L (ref 136–145)
WBC # BLD: 8.9 K/UL (ref 4–11)

## 2021-02-04 PROCEDURE — 6360000002 HC RX W HCPCS

## 2021-02-04 PROCEDURE — 6370000000 HC RX 637 (ALT 250 FOR IP): Performed by: INTERNAL MEDICINE

## 2021-02-04 PROCEDURE — 99153 MOD SED SAME PHYS/QHP EA: CPT

## 2021-02-04 PROCEDURE — 2580000003 HC RX 258: Performed by: INTERNAL MEDICINE

## 2021-02-04 PROCEDURE — 6360000004 HC RX CONTRAST MEDICATION: Performed by: INTERNAL MEDICINE

## 2021-02-04 PROCEDURE — C1894 INTRO/SHEATH, NON-LASER: HCPCS

## 2021-02-04 PROCEDURE — 85347 COAGULATION TIME ACTIVATED: CPT

## 2021-02-04 PROCEDURE — 93458 L HRT ARTERY/VENTRICLE ANGIO: CPT | Performed by: INTERNAL MEDICINE

## 2021-02-04 PROCEDURE — 6360000002 HC RX W HCPCS: Performed by: INTERNAL MEDICINE

## 2021-02-04 PROCEDURE — 99214 OFFICE O/P EST MOD 30 MIN: CPT | Performed by: INTERNAL MEDICINE

## 2021-02-04 PROCEDURE — C1887 CATHETER, GUIDING: HCPCS

## 2021-02-04 PROCEDURE — 36415 COLL VENOUS BLD VENIPUNCTURE: CPT

## 2021-02-04 PROCEDURE — 99152 MOD SED SAME PHYS/QHP 5/>YRS: CPT

## 2021-02-04 PROCEDURE — 2500000003 HC RX 250 WO HCPCS

## 2021-02-04 PROCEDURE — C1874 STENT, COATED/COV W/DEL SYS: HCPCS

## 2021-02-04 PROCEDURE — 92928 PRQ TCAT PLMT NTRAC ST 1 LES: CPT

## 2021-02-04 PROCEDURE — 93458 L HRT ARTERY/VENTRICLE ANGIO: CPT

## 2021-02-04 PROCEDURE — 92929 HC PRQ CARD STENT W/ANGIO ADDL: CPT

## 2021-02-04 PROCEDURE — 2580000003 HC RX 258

## 2021-02-04 PROCEDURE — 92929 PR PRQ TRLUML CORONARY STENT W/ANGIO ADDL ART/BRNCH: CPT | Performed by: INTERNAL MEDICINE

## 2021-02-04 PROCEDURE — 80048 BASIC METABOLIC PNL TOTAL CA: CPT

## 2021-02-04 PROCEDURE — 2720000010 HC SURG SUPPLY STERILE

## 2021-02-04 PROCEDURE — 6370000000 HC RX 637 (ALT 250 FOR IP)

## 2021-02-04 PROCEDURE — 93571 IV DOP VEL&/PRESS C FLO 1ST: CPT | Performed by: INTERNAL MEDICINE

## 2021-02-04 PROCEDURE — 85025 COMPLETE CBC W/AUTO DIFF WBC: CPT

## 2021-02-04 PROCEDURE — C1769 GUIDE WIRE: HCPCS

## 2021-02-04 PROCEDURE — 92928 PRQ TCAT PLMT NTRAC ST 1 LES: CPT | Performed by: INTERNAL MEDICINE

## 2021-02-04 PROCEDURE — 2709999900 HC NON-CHARGEABLE SUPPLY

## 2021-02-04 PROCEDURE — 93571 IV DOP VEL&/PRESS C FLO 1ST: CPT

## 2021-02-04 PROCEDURE — 93005 ELECTROCARDIOGRAM TRACING: CPT | Performed by: INTERNAL MEDICINE

## 2021-02-04 PROCEDURE — C1760 CLOSURE DEV, VASC: HCPCS

## 2021-02-04 RX ORDER — ACETAMINOPHEN 325 MG/1
650 TABLET ORAL EVERY 4 HOURS PRN
Status: DISCONTINUED | OUTPATIENT
Start: 2021-02-04 | End: 2021-02-05 | Stop reason: HOSPADM

## 2021-02-04 RX ORDER — KETOROLAC TROMETHAMINE 30 MG/ML
30 INJECTION, SOLUTION INTRAMUSCULAR; INTRAVENOUS ONCE
Status: DISCONTINUED | OUTPATIENT
Start: 2021-02-04 | End: 2021-02-04 | Stop reason: SDUPTHER

## 2021-02-04 RX ORDER — ONDANSETRON 2 MG/ML
4 INJECTION INTRAMUSCULAR; INTRAVENOUS EVERY 6 HOURS PRN
Status: DISCONTINUED | OUTPATIENT
Start: 2021-02-04 | End: 2021-02-05 | Stop reason: HOSPADM

## 2021-02-04 RX ORDER — OXYCODONE HYDROCHLORIDE AND ACETAMINOPHEN 5; 325 MG/1; MG/1
1 TABLET ORAL EVERY 4 HOURS PRN
Status: DISCONTINUED | OUTPATIENT
Start: 2021-02-04 | End: 2021-02-05 | Stop reason: HOSPADM

## 2021-02-04 RX ORDER — DIGOXIN 250 MCG
250 TABLET ORAL DAILY
Status: DISCONTINUED | OUTPATIENT
Start: 2021-02-04 | End: 2021-02-04

## 2021-02-04 RX ORDER — DILTIAZEM HYDROCHLORIDE 120 MG/1
120 CAPSULE, COATED, EXTENDED RELEASE ORAL 2 TIMES DAILY
Status: DISCONTINUED | OUTPATIENT
Start: 2021-02-04 | End: 2021-02-05 | Stop reason: HOSPADM

## 2021-02-04 RX ORDER — METOPROLOL SUCCINATE 50 MG/1
50 TABLET, EXTENDED RELEASE ORAL DAILY
Status: DISCONTINUED | OUTPATIENT
Start: 2021-02-04 | End: 2021-02-04

## 2021-02-04 RX ORDER — PANTOPRAZOLE SODIUM 40 MG/1
40 TABLET, DELAYED RELEASE ORAL
Status: DISCONTINUED | OUTPATIENT
Start: 2021-02-05 | End: 2021-02-05 | Stop reason: HOSPADM

## 2021-02-04 RX ORDER — CLOPIDOGREL BISULFATE 75 MG/1
75 TABLET ORAL DAILY
Status: DISCONTINUED | OUTPATIENT
Start: 2021-02-05 | End: 2021-02-05 | Stop reason: HOSPADM

## 2021-02-04 RX ORDER — LANOLIN ALCOHOL/MO/W.PET/CERES
1000 CREAM (GRAM) TOPICAL DAILY
Status: DISCONTINUED | OUTPATIENT
Start: 2021-02-04 | End: 2021-02-05 | Stop reason: HOSPADM

## 2021-02-04 RX ORDER — SODIUM CHLORIDE 9 MG/ML
INJECTION, SOLUTION INTRAVENOUS CONTINUOUS
Status: ACTIVE | OUTPATIENT
Start: 2021-02-04 | End: 2021-02-05

## 2021-02-04 RX ORDER — MORPHINE SULFATE 2 MG/ML
2 INJECTION, SOLUTION INTRAMUSCULAR; INTRAVENOUS
Status: DISCONTINUED | OUTPATIENT
Start: 2021-02-04 | End: 2021-02-05 | Stop reason: HOSPADM

## 2021-02-04 RX ORDER — DIGOXIN 125 MCG
125 TABLET ORAL DAILY
Status: DISCONTINUED | OUTPATIENT
Start: 2021-02-05 | End: 2021-02-05 | Stop reason: HOSPADM

## 2021-02-04 RX ORDER — OXYCODONE HYDROCHLORIDE AND ACETAMINOPHEN 5; 325 MG/1; MG/1
2 TABLET ORAL EVERY 4 HOURS PRN
Status: DISCONTINUED | OUTPATIENT
Start: 2021-02-04 | End: 2021-02-05 | Stop reason: HOSPADM

## 2021-02-04 RX ORDER — SODIUM CHLORIDE 0.9 % (FLUSH) 0.9 %
10 SYRINGE (ML) INJECTION EVERY 12 HOURS SCHEDULED
Status: DISCONTINUED | OUTPATIENT
Start: 2021-02-04 | End: 2021-02-05 | Stop reason: HOSPADM

## 2021-02-04 RX ORDER — CARVEDILOL 3.12 MG/1
3.12 TABLET ORAL 2 TIMES DAILY WITH MEALS
Status: DISCONTINUED | OUTPATIENT
Start: 2021-02-04 | End: 2021-02-04

## 2021-02-04 RX ORDER — KETOROLAC TROMETHAMINE 30 MG/ML
30 INJECTION, SOLUTION INTRAMUSCULAR; INTRAVENOUS ONCE
Status: COMPLETED | OUTPATIENT
Start: 2021-02-04 | End: 2021-02-04

## 2021-02-04 RX ORDER — SODIUM CHLORIDE 0.9 % (FLUSH) 0.9 %
10 SYRINGE (ML) INJECTION PRN
Status: DISCONTINUED | OUTPATIENT
Start: 2021-02-04 | End: 2021-02-05 | Stop reason: HOSPADM

## 2021-02-04 RX ADMIN — Medication 10 ML: at 20:54

## 2021-02-04 RX ADMIN — MORPHINE SULFATE 2 MG: 2 INJECTION, SOLUTION INTRAMUSCULAR; INTRAVENOUS at 23:37

## 2021-02-04 RX ADMIN — OXYCODONE AND ACETAMINOPHEN 2 TABLET: 5; 325 TABLET ORAL at 21:00

## 2021-02-04 RX ADMIN — SODIUM CHLORIDE: 9 INJECTION, SOLUTION INTRAVENOUS at 15:44

## 2021-02-04 RX ADMIN — KETOROLAC TROMETHAMINE 30 MG: 30 INJECTION, SOLUTION INTRAMUSCULAR at 18:05

## 2021-02-04 RX ADMIN — DILTIAZEM HYDROCHLORIDE 120 MG: 120 CAPSULE, COATED, EXTENDED RELEASE ORAL at 20:55

## 2021-02-04 RX ADMIN — MORPHINE SULFATE 2 MG: 2 INJECTION, SOLUTION INTRAMUSCULAR; INTRAVENOUS at 16:02

## 2021-02-04 RX ADMIN — IOPAMIDOL 205 ML: 755 INJECTION, SOLUTION INTRAVENOUS at 15:09

## 2021-02-04 ASSESSMENT — PAIN DESCRIPTION - PAIN TYPE
TYPE: ACUTE PAIN
TYPE: ACUTE PAIN
TYPE: SURGICAL PAIN
TYPE: ACUTE PAIN
TYPE: SURGICAL PAIN

## 2021-02-04 ASSESSMENT — PAIN SCALES - GENERAL
PAINLEVEL_OUTOF10: 3
PAINLEVEL_OUTOF10: 8
PAINLEVEL_OUTOF10: 9
PAINLEVEL_OUTOF10: 3
PAINLEVEL_OUTOF10: 8

## 2021-02-04 ASSESSMENT — PAIN DESCRIPTION - DESCRIPTORS
DESCRIPTORS: DULL
DESCRIPTORS: THROBBING

## 2021-02-04 ASSESSMENT — PAIN DESCRIPTION - ORIENTATION
ORIENTATION: MID;UPPER
ORIENTATION: UPPER;MID
ORIENTATION: RIGHT

## 2021-02-04 ASSESSMENT — PAIN DESCRIPTION - LOCATION: LOCATION: CHEST

## 2021-02-04 NOTE — PROGRESS NOTES
Patient:  Rito Pham   :   1955    A pre-sedation re-evaluation was performed immediately prior to beginning of  the procedure. Procedure: cor/LV  Medications: Procedural sedation with minimal conscious sedation  Complications: None  Estimated Blood Loss: none  Specimens: Were not obtained        Tere Medication and Procedural Reconciliation:  I agree that the documented medications and procedures performed are true. The medications were given under my order. The procedures were performed under my direct supervision.     Cath with 30% distal left main,50-60 % LAD after 1st diag,80% distal LAD,80% diag after stent  RCA stent widely patent  EF 60    Will ask Dr Viktor Camejo to [facility] both left main and prox LAD,likely stent diag and distal LAD

## 2021-02-04 NOTE — OP NOTE
Patient:  Kelsie Bains   :   1955    Procedural Summary  ~Consent:   Obtained written and verbal consent      Risks/benefits explained in detail  ~Procedure:    Left Heart Catheterization  ~Medications:    Procedural sedation with minimal conscious sedation  ~Complications:   None  ~Blood Loss:    <10cc  ~Specimens:    None obtained  ~Pre-sedation re-evaluation: Performed immediately prior to procedure. Medication and Procedural Reconciliation:  An independent trained observer pushed medications at my direction. We monitored the patient's level of consciousness and vital signs/physiologic status throughout the procedure duration (see start and stop times below). Sedation: 9mg Versed, 250 mcg Fentanyl  Sedation start: 1331  Sedation stop: 1455    Cardiac Cath LVG FFR PCI:  Anatomy:   FFR mid LAD 0.83    Intervention  ~Successful PCI to distal LAD with 2.5x15 and 2.5x18 ALEC to 22atm. PCI to D1 with 2.25x8 ALEC to 16atm. Excellent Result. Contrast: 205  Flouro Time: 11.1  Access: R CFA. Perc stick safe zone. Angioseal closure    Impression  ~Coronary Angiography w/ severe Single vessel CAD  ~Successful complex angioplasty and stenting of LAD and D1        Recommendation  ~Aggressive medical treatment and risk factor modification  ~1. Post cath IVF. Bedrest.  2. Recommend beta blocker, high potency statin, plavix and xarelto  3. Referral to cardiac rehab placed  4. Patient has been advised on the importance of regular exercise of at least 20-30 minutes daily. 5. Patient counseled about and offered assistance for smoking cessation   6. Hold xarelto tonight and give today. Monitor overnight.             Tiffanie Skinner MD 2021 3:05 PM

## 2021-02-04 NOTE — CONSULTS
Outpatient Cardiac Rehab explained to pt.  Given brochure  Pt is interested in cardiac rehab program here at [place]

## 2021-02-04 NOTE — H&P
[facility]   Cardiac Follow Up     Referring Provider:  Dilma Marroquin MD     [facility]-chest pressure     History of Present Illness:  Luci Webb is a 63 y.o. female with a history of coronary artery disease, STEMI with PCI (PCI/ALEC RCA 10/2012, PCI to Ramus 10/2012, ) , hypertension and AFIB.      She presented to [facility] on 11/19/2017 with complaints of chest pain that radiates to her jaw and arms. She was seen by cardiology and diagnosed with a STEMI and a LHC was completed with a ALEC in the diagonal branch. Developed chest pain again 11/20/2017 and a repeat LHC was completed with no further intervention necessary. She states that she has intolerance to many medications.      Today she reports she is not well. She has felt progressively worse since her last appointment. She was unable to tolerate digoxin. She reports today she feels crushing chest pain, \"elephant on the chest\" , she describes pressure between her shoulder blades. Reports continued severe episodes of Afib. They are occurring daily- describes as her heart is pounding out of her chest.. States she cannot tolerate this, is awaking her from sleep. Past Medical History:   has a past medical history of Allergic rhinitis, cause unspecified, Anxiety, Arthritis, CAD (coronary artery disease), Chronic kidney disease, Chronic pain, Depression, Erosive esophagitis, Essential hypertension, HSV infection, Hypercholesterolemia, Irritable bowel syndrome with diarrhea, Migraine headache, Nonerosive nonspecific gastritis, and OAB (overactive bladder). Surgical History:   has a past surgical history that includes Appendectomy; shannan and bso (cervix removed); Cholecystectomy; Tubal ligation; Tonsillectomy; other surgical history; Coronary angioplasty with stent (10/2012); Percutaneous Transluminal Coronary Angio (10/2012); Cardiac catheterization (12/7/2013); Hysterectomy; polypectomy; Cystoscopy (4/2014); Upper gastrointestinal endoscopy;  Wrist surgery (Left, 5/21/2015); Abdominal adhesion surgery; Colonoscopy; Endoscopy, colon, diagnostic; Cosmetic surgery; fracture surgery; ERCP (01/25/2017); sinus surgery; and Hysterectomy, total abdominal.     Social History:   reports that she quit smoking about 14 years ago. Her smoking use included cigarettes. She has a 10.00 pack-year smoking history. She has never used smokeless tobacco. She reports that she does not drink alcohol or use drugs. Family History:  family history includes Heart Disease in her brother, father, maternal uncle, mother, and another family member; High Blood Pressure in her sister and sister; Hypertension in her father and mother; Other in an other family member; Stroke in her sister and another family member. Home Medications:  Prior to Admission medications    Medication Sig Start Date End Date Taking?  Authorizing Provider   Mag Aspart-Potassium Aspart (POTASSIUM & MAGNESIUM ASPARTAT PO) Take by mouth    Historical Provider, MD   Zinc-Vitamin C  MG LOZG Take by mouth    Historical Provider, MD   omeprazole (PRILOSEC) 40 MG delayed release capsule Take 1 capsule by mouth daily 1/27/21   Roberta Carbajal MD   digoxin (LANOXIN) 250 MCG tablet Take 1 tablet by mouth daily  Patient not taking: Reported on 2/4/2021 1/27/21   Anant Diaz MD   dilTIAZem (CARDIZEM CD) 120 MG extended release capsule Take 1 capsule by mouth 2 times daily 1/27/21   Anant Diaz MD   ACYCLOVIR PO Take by mouth as needed    Historical Provider, MD   L-Lysine 1000 MG TABS Take by mouth    Historical Provider, MD   Colostrum 500 MG CAPS Take by mouth    Historical Provider, MD   cyanocobalamin 1000 MCG tablet Take 1,000 mcg by mouth daily    Historical Provider, MD   vitamin D 25 MCG (1000 UT) CAPS Take by mouth    Historical Provider, MD   rivaroxaban (XARELTO) 20 MG TABS tablet Take 1 tablet by mouth daily (with breakfast) 12/15/20   Trisha Ulloa MD   nitroGLYCERIN (NITROSTAT) 0.4 MG SL tablet Place 1 tablet under the tongue every 5 minutes as needed for Chest pain up to max of 3 total doses. If no relief after 1 dose, call 911. 8/31/20   Chris Guevara MD   lidocaine-Hydrocort 3-0.5 % cream Place around the anus 2 times daily as needed (irritation/inflammation) 8/31/20   Chris Guevara MD   estradiol (ESTRACE) 0.1 MG/GM vaginal cream PLACE 0.5 GRAM VAGINALLY 3 TIMES A WEEK 6/5/20   Chris Guevara MD        Allergies:  Hydralazine, Shellfish-derived products, Asa [aspirin], Crestor [rosuvastatin], Fenofibrate, Hctz [hydrochlorothiazide], Lipitor [atorvastatin calcium], Lipitor [atorvastatin], Lisinopril, Mometasone furoate, Nasonex [mometasone], Nsaids, Sulfa antibiotics, Contrast [iodides], and Flagyl [metronidazole]     Review of Systems:   · Constitutional: there has been no unanticipated weight loss. There's been no change in energy level, sleep pattern, or activity level.   +fatigue   · Eyes: No visual changes or diplopia. No scleral icterus. · ENT: No Headaches, hearing loss or vertigo. No mouth sores or sore throat. · Cardiovascular: Reviewed in HPI  · Respiratory: No cough or wheezing, no sputum production. No hematemesis. +sob  · Gastrointestinal: No abdominal pain, appetite loss, blood in stools. No change in bowel or bladder habits. · Genitourinary: No dysuria, trouble voiding, or hematuria. · Musculoskeletal:  No gait disturbance, weakness or joint complaints. +cp   · Integumentary: No rash or pruritis. · Neurological: No headache, diplopia, change in muscle strength, numbness or tingling. No change in gait, balance, coordination, mood, affect, memory, mentation, behavior. · Psychiatric: No anxiety, no depression. · Endocrine: No malaise, fatigue or temperature intolerance. No excessive thirst, fluid intake, or urination. No tremor. · Hematologic/Lymphatic: No abnormal bruising or bleeding, blood clots or swollen lymph nodes.   · Allergic/Immunologic: No nasal congestion or hives.    Physical Examination:    There were no vitals filed for this visit. Wt Readings from Last 1 Encounters:   02/04/21 211 lb (95.7 kg)       Constitutional and General Appearance:Appears uncomfortable  Skin:good turgor,intact without lesions  HEENT: EOMI ,normal  Neck:no JVD    Respiratory:  · Normal excursion and expansion without use of accessory muscles  · Resp Auscultation: Normal breath sounds without dullness  Cardiovascular:  · The apical impulses not displaced  · Heart tones are crisp and normal  · Cervical veins are not engorged  · The carotid upstroke is normal in amplitude and contour without delay or bruit  · Peripheral pulses are symmetrical and full  · There is no clubbing, cyanosis of the extremities. · No edema  · Femoral Arteries: 2+ and equal  · Pedal Pulses: 2+ and equal   Abdomen:  · No masses or tenderness  · Liver/Spleen: No Abnormalities Noted  Neurological/Psychiatric:  · Alert and oriented in all spheres  · Moves all extremities well  · Exhibits normal gait balance and coordination  · No abnormalities of mood, affect, memory, mentation, or behavior are noted    GXT Myoview 12/23/20   Summary    -There is a small-moderate sized, moderate intensity fixed anterior defect    suggestive of scar.    -There is no ischemia.    -LV function is normal with EF=70%    -Low-intermediate risk. Assessment:      Atrial fibrillation and flutter with RVR    Ablation scheduled for 2/25/21     On 1/27/21> Started  digoxin (LANOXIN) 250 MCG tablet daily , dilTIAZem (CardiZEM CD) 120 MG extended release capsule 2x daily     CAD  Plan for [facility]- today    History of multiple stents to RCA, STEMI 2017  C was completed with a ALEC in the diagonal branch at [facility] 2017  12/12/20 stress > stable but fixed anterior defect    Hypertension   Stable- not currently breastfeeding.         Plan: Cardiac Cath films from 2017 reviewed   Risks, benefits, goals, and alternative of cardiac catheterization discussed with patient. All questions answered and informed consent obtained. Further recommendations pending coronary angiography and clinical course. [facility] today due to ongoing chest pressure     I appreciate the opportunity of cooperating in the care of this individual.    Geo Koch. Adair Currie M.D., [facility]    Patient's problem list, medications, allergies, past medical, surgical, social and family histories were reviewed and updated as appropriate.

## 2021-02-04 NOTE — PRE SEDATION
Brief Pre-Op Note/Sedation Assessment      Hayden Cruz  1955  Cath/NONE      [id number]  1:45 PM    Planned Procedure: Cardiac Catheterization Procedure    Post Procedure Plan: Return to same level of care    Consent: I have discussed with the patient and/or the patient representative the indication, alternatives, and the possible risks and/or complications of the planned procedure and the anesthesia methods. The patient and/or patient representative appear to understand and agree to proceed. Chief Complaint: Chest Pain/Pressure      Indications for Cath Procedure:  ACS <= 24 hrs  Anginal Classification within 2 weeks:  CCS IV - Inability to perform any activity without angina or angina at rest, i.e., severe limitation  NYHA Heart Failure Class within 2 weeks: No symptoms  Is Cath Lab Visit Valve-related?: No  Surgical Risk: Low  Functional Type: < 4 METS    Anti- Anginal Meds within 2 weeks:   Yes: Ca Channel Blockers and Aspirin    Stress or Imaging Studies Performed (within 6 months):  Standard Exercise Stress Result:  Positive:  anterior Risk/Extent of Ischemia:  Intermediate     Vital Signs: There were no vitals taken for this visit. Allergies:   Allergies   Allergen Reactions    Hydralazine Anaphylaxis    Shellfish-Derived Products Anaphylaxis and Swelling    Asa [Aspirin] Nausea Only    Crestor [Rosuvastatin]      Myalgia      Fenofibrate      Myalgia      Hctz [Hydrochlorothiazide]     Lipitor [Atorvastatin Calcium]     Lipitor [Atorvastatin]      Myalgia      Lisinopril     Mometasone Furoate Swelling    Nasonex [Mometasone] Swelling    Nsaids      GI PAIN    Sulfa Antibiotics Swelling    Contrast [Iodides] Swelling and Rash    Flagyl [Metronidazole] Nausea And Vomiting       Past Medical History:  Past Medical History:   Diagnosis Date    Allergic rhinitis, cause unspecified 12/4/2010    Anxiety     Arthritis     CAD (coronary artery disease)     angioplasty with stent Classification:  Class 2 - A normal healthy patient with mild systemic disease      Shant Scale: Activity:  2 - Able to move 4 extremities voluntarily on command  Respiration:  2 - Able to breathe deeply and cough freely  Circulation:  2 - BP+/- 20mmHg of normal  Consciousness:  2 - Fully awake  Oxygen Saturation (color):  2 - Able to maintain oxygen saturation >92% on room air    Sedation/Anesthesia Plan:  Guard the patient's safety and welfare. Minimize physical discomfort and pain. Minimize negative psychological responses to treatment by providing sedation and analgesia and maximize the potential amnesia. Patient to meet pre-procedure discharge plan.     Medication Planned:  midazolam intravenously and fentanyl intravenously    Patient is an appropriate candidate for plan of sedation: yes      Electronically signed by Conner Lynn MD on 2/4/2021 at 1:45 PM

## 2021-02-05 VITALS
DIASTOLIC BLOOD PRESSURE: 83 MMHG | HEART RATE: 86 BPM | RESPIRATION RATE: 16 BRPM | TEMPERATURE: 98 F | SYSTOLIC BLOOD PRESSURE: 143 MMHG | OXYGEN SATURATION: 92 % | BODY MASS INDEX: 36.12 KG/M2 | WEIGHT: 210.4 LBS

## 2021-02-05 DIAGNOSIS — R07.9 CHEST PAIN, UNSPECIFIED TYPE: Primary | ICD-10-CM

## 2021-02-05 LAB
ANION GAP SERPL CALCULATED.3IONS-SCNC: 12 MMOL/L (ref 3–16)
BUN BLDV-MCNC: 9 MG/DL (ref 7–20)
CALCIUM SERPL-MCNC: 9.3 MG/DL (ref 8.3–10.6)
CHLORIDE BLD-SCNC: 103 MMOL/L (ref 99–110)
CO2: 23 MMOL/L (ref 21–32)
CREAT SERPL-MCNC: 0.8 MG/DL (ref 0.6–1.2)
EKG ATRIAL RATE: 78 BPM
EKG ATRIAL RATE: 92 BPM
EKG DIAGNOSIS: NORMAL
EKG DIAGNOSIS: NORMAL
EKG P AXIS: 21 DEGREES
EKG P AXIS: 26 DEGREES
EKG P-R INTERVAL: 150 MS
EKG P-R INTERVAL: 162 MS
EKG Q-T INTERVAL: 358 MS
EKG Q-T INTERVAL: 416 MS
EKG QRS DURATION: 78 MS
EKG QRS DURATION: 88 MS
EKG QTC CALCULATION (BAZETT): 442 MS
EKG QTC CALCULATION (BAZETT): 474 MS
EKG R AXIS: -16 DEGREES
EKG R AXIS: 1 DEGREES
EKG T AXIS: 33 DEGREES
EKG T AXIS: 55 DEGREES
EKG VENTRICULAR RATE: 78 BPM
EKG VENTRICULAR RATE: 92 BPM
GFR AFRICAN AMERICAN: >60
GFR NON-AFRICAN AMERICAN: >60
GLUCOSE BLD-MCNC: 166 MG/DL (ref 70–99)
POTASSIUM SERPL-SCNC: 4.4 MMOL/L (ref 3.5–5.1)
SODIUM BLD-SCNC: 138 MMOL/L (ref 136–145)

## 2021-02-05 PROCEDURE — 94760 N-INVAS EAR/PLS OXIMETRY 1: CPT

## 2021-02-05 PROCEDURE — 6370000000 HC RX 637 (ALT 250 FOR IP): Performed by: INTERNAL MEDICINE

## 2021-02-05 PROCEDURE — 80048 BASIC METABOLIC PNL TOTAL CA: CPT

## 2021-02-05 PROCEDURE — 36415 COLL VENOUS BLD VENIPUNCTURE: CPT

## 2021-02-05 PROCEDURE — 2580000003 HC RX 258: Performed by: INTERNAL MEDICINE

## 2021-02-05 PROCEDURE — 99217 PR OBSERVATION CARE DISCHARGE MANAGEMENT: CPT | Performed by: INTERNAL MEDICINE

## 2021-02-05 PROCEDURE — 93010 ELECTROCARDIOGRAM REPORT: CPT | Performed by: INTERNAL MEDICINE

## 2021-02-05 RX ORDER — DIGOXIN 125 MCG
125 TABLET ORAL DAILY
Qty: 30 TABLET | Refills: 3 | Status: SHIPPED | OUTPATIENT
Start: 2021-02-06 | End: 2021-04-29

## 2021-02-05 RX ORDER — CLOPIDOGREL BISULFATE 75 MG/1
75 TABLET ORAL DAILY
Qty: 30 TABLET | Refills: 3 | Status: SHIPPED | OUTPATIENT
Start: 2021-02-06 | End: 2021-06-09 | Stop reason: SDUPTHER

## 2021-02-05 RX ADMIN — OXYCODONE AND ACETAMINOPHEN 1 TABLET: 5; 325 TABLET ORAL at 05:44

## 2021-02-05 RX ADMIN — PANTOPRAZOLE SODIUM 40 MG: 40 TABLET, DELAYED RELEASE ORAL at 05:44

## 2021-02-05 RX ADMIN — DIGOXIN 125 MCG: 125 TABLET ORAL at 10:05

## 2021-02-05 RX ADMIN — CLOPIDOGREL BISULFATE 75 MG: 75 TABLET ORAL at 10:05

## 2021-02-05 RX ADMIN — OXYCODONE AND ACETAMINOPHEN 2 TABLET: 5; 325 TABLET ORAL at 15:12

## 2021-02-05 RX ADMIN — OXYCODONE AND ACETAMINOPHEN 2 TABLET: 5; 325 TABLET ORAL at 10:16

## 2021-02-05 RX ADMIN — CYANOCOBALAMIN TAB 1000 MCG 1000 MCG: 1000 TAB at 10:04

## 2021-02-05 RX ADMIN — DILTIAZEM HYDROCHLORIDE 120 MG: 120 CAPSULE, COATED, EXTENDED RELEASE ORAL at 10:04

## 2021-02-05 RX ADMIN — Medication 10 ML: at 10:05

## 2021-02-05 ASSESSMENT — PAIN DESCRIPTION - ORIENTATION: ORIENTATION: RIGHT

## 2021-02-05 NOTE — CARE COORDINATION
Patient admitted as OPIB with an anticipated short hospitalization length of stay. Chart reviewed and it appears that patient has minimal needs for discharge at this time. Discussed with patients nurse and requested that case management be notified if discharge needs are identified. S/p  Cardiac cath and PCI placement    Case management will continue to follow progress and update discharge plan as needed.

## 2021-02-05 NOTE — PROGRESS NOTES
Pt discharged via wheelchair with her belongings to the main entrance where her  met us with his car.   Assist with transfers without incident

## 2021-02-05 NOTE — DISCHARGE INSTR - COC
Continuity of Care Form    Patient Name: Brittany Rodriguez   :  1955  MRN:  [id number]    Admit date:  2021  Discharge date:  ***    Code Status Order: Full Code   Advance Directives:     Admitting Physician:  Clem Shaw MD  PCP: Camelia Bey MD    Discharging Nurse: [facility]/Room#: 8RB-2994/4466-54  Discharging Unit Phone Number: ***    Emergency Contact:   Extended Emergency Contact Information  Primary Emergency Contact: Mera EASTMAN  Address: [address] [place] [address] Phone: [phone]  Work Phone: [phone]  Mobile Phone: [phone]  Relation: Spouse    Past Surgical History:  Past Surgical History:   Procedure Laterality Date    ABDOMINAL ADHESION SURGERY      APPENDECTOMY      CARDIAC CATHETERIZATION  2013    recheck arteries unchanged    CHOLECYSTECTOMY      COLONOSCOPY      CORONARY ANGIOPLASTY WITH STENT PLACEMENT  10/2012    right- TOTAL OF 3 STENTS    CORONARY ANGIOPLASTY WITH STENT PLACEMENT  2021    COSMETIC SURGERY      rhinoplasty    CYSTOSCOPY  2014    ENDOSCOPY, COLON, DIAGNOSTIC      ERCP  2017    FRACTURE SURGERY      LUE, plate and screws    HYSTERECTOMY      HYSTERECTOMY, TOTAL ABDOMINAL      OTHER SURGICAL HISTORY      Incision and drainage of Lingual Introral Lesion    POLYPECTOMY      Dr. Aquiles Haddad PTCA  10/2012    right    SINUS SURGERY      JOSE AND BSO      TONSILLECTOMY      TUBAL LIGATION      UPPER GASTROINTESTINAL ENDOSCOPY      WRIST SURGERY Left 2015    OPEN REDUCTION INTERNAL FIXATION LEFT WRIST       Immunization History:   Immunization History   Administered Date(s) Administered    Tdap (Boostrix, Adacel) 2016       Active Problems:  Patient Active Problem List   Diagnosis Code    Pure hypercholesterolemia E78.00    Allergic rhinitis J30.9    HSV infection B00.9    CAD (coronary artery disease) I25.10    Chest pain R07.9    Chronic pain G89.29    Depression F32.9    OAB (overactive bladder) N32.81    Hypercholesterolemia E78.00    Migraine headache G43.909    Left wrist fracture S62.102A    Stress F43.9    Postmenopausal atrophic vaginitis N95.2    Irritable bowel syndrome with diarrhea K58.0    Mood disorder due to a general medical condition F06.30    RUQ abdominal pain R10.11    Essential hypertension I10    STEMI (ST elevation myocardial infarction) ([facility]) I21.3    Acute MI anterior lateral subsequent episode care ([facility] [address] I21.09    Unstable angina ([facility]) I20.0    Mild episode of recurrent major depressive disorder ([facility]) F33.0       Isolation/Infection:   Isolation          No Isolation        Patient Infection Status     None to display          Nurse Assessment:  Last Vital Signs: BP (!) 143/83   Pulse 86   Temp 98 °F (36.7 °C) (Oral)   Resp 16   Wt 210 lb 6.4 oz (95.4 kg)   SpO2 92%   BMI 36.12 kg/m²     Last documented pain score (0-10 scale): Pain Level: 0  Last Weight:   Wt Readings from Last 1 Encounters:   02/05/21 210 lb 6.4 oz (95.4 kg)     Mental Status:  {IP PT MENTAL STATUS:20030}    IV Access:  { ESSENCE IV ACCESS:662579460}    Nursing Mobility/ADLs:  Walking   {CHP DME AJXU:706582447}  Transfer  {CHP DME CPHH:282263118}  Bathing  {CHP DME ANFP:373784909}  Dressing  {CHP DME ERFH:742460455}  Toileting  {CHP DME KMCY:386229552}  Feeding  {CHP DME NUZR:519824862}  Med Admin  {CHP DME EAIQ:538552305}  Med Delivery   { ESSENCE MED Delivery:667501524}    Wound Care Documentation and Therapy:        Elimination:  Continence:   · Bowel: {YES / EO:58386}  · Bladder: {YES / IY:48982}  Urinary Catheter: {Urinary Catheter:385224703}   Colostomy/Ileostomy/Ileal Conduit: {YES / JL:06055}       Date of Last BM: ***    Intake/Output Summary (Last 24 hours) at 2/5/2021 1230  Last data filed at 2/5/2021 0058  Gross per 24 hour   Intake 1578 ml   Output --   Net 1578 ml     I/O last 3 completed shifts:   In: [facility]:16936} for {GREATER/LESS:549489947} 30 days.      Update Admission H&P: {CHP DME Changes in [facility]}    PHYSICIAN SIGNATURE:  {Esignature:667653099}

## 2021-02-05 NOTE — PROGRESS NOTES
Patient called and stated that she felt her heart was fluttering. On the monitor it looked like she had five seconds of a-fib, five seconds of SVT, and 70 seconds of a-fib, then went back into ST/SR. Informed cardiologist on call, no new orders.

## 2021-02-06 NOTE — DISCHARGE SUMMARY
[facility]                     [facility], [address]                               DISCHARGE SUMMARY    PATIENT NAME: Cem Patel                    :        1955  MED REC NO:   [id number]                          ROOM:       [id number]  ACCOUNT NO:   [de-identified]                           ADMIT DATE: 2021  PROVIDER:     Concepcion Gutierrez MD                  DISCHARGE DATE:  2021    REASON FOR ADMISSION:  Acute coronary syndrome. CONDITION ON DISCHARGE:  Improved. PROGNOSIS:  Good. HOSPITAL COURSE:  The patient was admitted from office visit with  persistent chest pressure to undergo cardiac catheterization. Recent  nuclear stress test showed fixed anterior wall defect, so perhaps this  represented severe anterior wall ischemia. She underwent cardiac  catheterization which showed normal left ventricular ejection fraction  of 60%, patent right coronary artery stents placed back in , patent  diagonal stent placed in 2017, but distal edge stenosis of 80%, 30%  distal left main, 40% mid left anterior descending and 75-80% distal  left anterior descending. The patient underwent a flow wire testing of  the left main and mid left anterior descending stenosis, which was  physiologically nonsignificant. She then underwent stenting by Dr. Jayy Fu with two sequential ALEC stents in the left anterior descending  2.5 x 15 and 2.5 x 18 with excellent angiographic result. It was felt  that the patient did have some proximal spasm just prior to the first  place that may have been contributing to progression of her coronary  disease. She also had a 2.25 x 8 drug-eluting stent placed beyond the  stent that had previously been placed in the first diagonal branch, all  with excellent angiographic results. The patient was observed  overnight.   She did have a one and half minute episode of atrial  fibrillation; however, it was much better tolerated by

## 2021-02-08 ENCOUNTER — NURSE ONLY (OUTPATIENT)
Dept: CARDIOLOGY CLINIC | Age: 66
End: 2021-02-08

## 2021-02-08 DIAGNOSIS — I48.91 ATRIAL FIBRILLATION, UNSPECIFIED TYPE (HCC): Primary | ICD-10-CM

## 2021-02-10 ENCOUNTER — TELEPHONE (OUTPATIENT)
Dept: CARDIOLOGY CLINIC | Age: 66
End: 2021-02-10

## 2021-02-10 NOTE — TELEPHONE ENCOUNTER
Event recorder showed rapid a. Fib. Lasted 2 hours last night    She has ablation scheduled for 2/25.  She will increase lanoxin to 0.25 mg daily

## 2021-02-12 ENCOUNTER — TELEPHONE (OUTPATIENT)
Dept: CARDIOLOGY CLINIC | Age: 66
End: 2021-02-12

## 2021-02-12 NOTE — TELEPHONE ENCOUNTER
Pt calling asking for some samples of Xarelto? Also pt says she can not take the higher does of digoxin.   pls call to advise thank you

## 2021-02-12 NOTE — TELEPHONE ENCOUNTER
Pt had stents put in last week. She states last time they gave her paperwork to keep in her wallet with the stent information, but this time they did not give her any information to keep about the stents. Please advise patient. Thank you.

## 2021-02-12 NOTE — TELEPHONE ENCOUNTER
Spoke with her . She will kep lanoxin at 0.125 mg daily.  Change cardizem cd to 120 mg in am and cardizem  mg in evening  Advised her that there are xarelto samples

## 2021-02-17 ENCOUNTER — APPOINTMENT (OUTPATIENT)
Dept: GENERAL RADIOLOGY | Age: 66
DRG: 287 | End: 2021-02-17
Payer: MEDICARE

## 2021-02-17 ENCOUNTER — HOSPITAL ENCOUNTER (INPATIENT)
Age: 66
LOS: 1 days | Discharge: HOME | DRG: 287 | End: 2021-02-19
Attending: EMERGENCY MEDICINE | Admitting: INTERNAL MEDICINE
Payer: MEDICARE

## 2021-02-17 ENCOUNTER — TELEPHONE (OUTPATIENT)
Dept: CARDIOLOGY CLINIC | Age: 66
End: 2021-02-17

## 2021-02-17 DIAGNOSIS — R07.89 CHEST DISCOMFORT: Primary | ICD-10-CM

## 2021-02-17 LAB
A/G RATIO: 1.5 (ref 1.1–2.2)
ALBUMIN SERPL-MCNC: 4.7 G/DL (ref 3.4–5)
ALP BLD-CCNC: 129 U/L (ref 40–129)
ALT SERPL-CCNC: 16 U/L (ref 10–40)
ANION GAP SERPL CALCULATED.3IONS-SCNC: 13 MMOL/L (ref 3–16)
AST SERPL-CCNC: 25 U/L (ref 15–37)
BASOPHILS ABSOLUTE: 0.1 K/UL (ref 0–0.2)
BASOPHILS RELATIVE PERCENT: 0.9 %
BILIRUB SERPL-MCNC: 0.3 MG/DL (ref 0–1)
BUN BLDV-MCNC: 10 MG/DL (ref 7–20)
CALCIUM SERPL-MCNC: 9.8 MG/DL (ref 8.3–10.6)
CHLORIDE BLD-SCNC: 99 MMOL/L (ref 99–110)
CO2: 25 MMOL/L (ref 21–32)
CREAT SERPL-MCNC: 0.8 MG/DL (ref 0.6–1.2)
DIGOXIN LEVEL: 0.9 NG/ML (ref 0.8–2)
EKG ATRIAL RATE: 83 BPM
EKG DIAGNOSIS: NORMAL
EKG P AXIS: 7 DEGREES
EKG P-R INTERVAL: 152 MS
EKG Q-T INTERVAL: 366 MS
EKG QRS DURATION: 72 MS
EKG QTC CALCULATION (BAZETT): 430 MS
EKG R AXIS: -18 DEGREES
EKG T AXIS: 46 DEGREES
EKG VENTRICULAR RATE: 83 BPM
EOSINOPHILS ABSOLUTE: 0.1 K/UL (ref 0–0.6)
EOSINOPHILS RELATIVE PERCENT: 1.5 %
GFR AFRICAN AMERICAN: >60
GFR NON-AFRICAN AMERICAN: >60
GLOBULIN: 3.1 G/DL
GLUCOSE BLD-MCNC: 131 MG/DL (ref 70–99)
HCT VFR BLD CALC: 42.3 % (ref 36–48)
HEMOGLOBIN: 14 G/DL (ref 12–16)
LYMPHOCYTES ABSOLUTE: 1.9 K/UL (ref 1–5.1)
LYMPHOCYTES RELATIVE PERCENT: 23.8 %
MCH RBC QN AUTO: 28 PG (ref 26–34)
MCHC RBC AUTO-ENTMCNC: 33.2 G/DL (ref 31–36)
MCV RBC AUTO: 84.4 FL (ref 80–100)
MONOCYTES ABSOLUTE: 0.7 K/UL (ref 0–1.3)
MONOCYTES RELATIVE PERCENT: 8 %
NEUTROPHILS ABSOLUTE: 5.4 K/UL (ref 1.7–7.7)
NEUTROPHILS RELATIVE PERCENT: 65.8 %
PDW BLD-RTO: 14.1 % (ref 12.4–15.4)
PLATELET # BLD: 286 K/UL (ref 135–450)
PMV BLD AUTO: 7.6 FL (ref 5–10.5)
POTASSIUM REFLEX MAGNESIUM: 3.9 MMOL/L (ref 3.5–5.1)
PRO-BNP: 159 PG/ML (ref 0–124)
RBC # BLD: 5.01 M/UL (ref 4–5.2)
SODIUM BLD-SCNC: 137 MMOL/L (ref 136–145)
TOTAL PROTEIN: 7.8 G/DL (ref 6.4–8.2)
TROPONIN: <0.01 NG/ML
WBC # BLD: 8.2 K/UL (ref 4–11)

## 2021-02-17 PROCEDURE — 93005 ELECTROCARDIOGRAM TRACING: CPT | Performed by: EMERGENCY MEDICINE

## 2021-02-17 PROCEDURE — 99284 EMERGENCY DEPT VISIT MOD MDM: CPT

## 2021-02-17 PROCEDURE — 80053 COMPREHEN METABOLIC PANEL: CPT

## 2021-02-17 PROCEDURE — 80162 ASSAY OF DIGOXIN TOTAL: CPT

## 2021-02-17 PROCEDURE — G0378 HOSPITAL OBSERVATION PER HR: HCPCS | Performed by: NURSE PRACTITIONER

## 2021-02-17 PROCEDURE — G0378 HOSPITAL OBSERVATION PER HR: HCPCS

## 2021-02-17 PROCEDURE — 93005 ELECTROCARDIOGRAM TRACING: CPT | Performed by: INTERNAL MEDICINE

## 2021-02-17 PROCEDURE — 2500000003 HC RX 250 WO HCPCS: Performed by: INTERNAL MEDICINE

## 2021-02-17 PROCEDURE — 6360000002 HC RX W HCPCS: Performed by: INTERNAL MEDICINE

## 2021-02-17 PROCEDURE — 2580000003 HC RX 258: Performed by: INTERNAL MEDICINE

## 2021-02-17 PROCEDURE — 93010 ELECTROCARDIOGRAM REPORT: CPT | Performed by: INTERNAL MEDICINE

## 2021-02-17 PROCEDURE — 84484 ASSAY OF TROPONIN QUANT: CPT

## 2021-02-17 PROCEDURE — 85025 COMPLETE CBC W/AUTO DIFF WBC: CPT

## 2021-02-17 PROCEDURE — 6370000000 HC RX 637 (ALT 250 FOR IP): Performed by: INTERNAL MEDICINE

## 2021-02-17 PROCEDURE — 36415 COLL VENOUS BLD VENIPUNCTURE: CPT

## 2021-02-17 PROCEDURE — 71045 X-RAY EXAM CHEST 1 VIEW: CPT

## 2021-02-17 PROCEDURE — 96376 TX/PRO/DX INJ SAME DRUG ADON: CPT

## 2021-02-17 PROCEDURE — 83880 ASSAY OF NATRIURETIC PEPTIDE: CPT

## 2021-02-17 PROCEDURE — 96374 THER/PROPH/DIAG INJ IV PUSH: CPT

## 2021-02-17 PROCEDURE — 6360000002 HC RX W HCPCS: Performed by: PHYSICIAN ASSISTANT

## 2021-02-17 PROCEDURE — 96375 TX/PRO/DX INJ NEW DRUG ADDON: CPT

## 2021-02-17 PROCEDURE — 6370000000 HC RX 637 (ALT 250 FOR IP): Performed by: PHYSICIAN ASSISTANT

## 2021-02-17 RX ORDER — ASPIRIN 81 MG/1
324 TABLET, CHEWABLE ORAL ONCE
Status: COMPLETED | OUTPATIENT
Start: 2021-02-17 | End: 2021-02-17

## 2021-02-17 RX ORDER — DILTIAZEM HYDROCHLORIDE 120 MG/1
120 CAPSULE, COATED, EXTENDED RELEASE ORAL 2 TIMES DAILY
Status: DISCONTINUED | OUTPATIENT
Start: 2021-02-17 | End: 2021-02-19 | Stop reason: HOSPADM

## 2021-02-17 RX ORDER — MORPHINE SULFATE 2 MG/ML
2 INJECTION, SOLUTION INTRAMUSCULAR; INTRAVENOUS EVERY 4 HOURS PRN
Status: DISCONTINUED | OUTPATIENT
Start: 2021-02-17 | End: 2021-02-18

## 2021-02-17 RX ORDER — PANTOPRAZOLE SODIUM 40 MG/1
40 TABLET, DELAYED RELEASE ORAL
Status: DISCONTINUED | OUTPATIENT
Start: 2021-02-18 | End: 2021-02-19 | Stop reason: HOSPADM

## 2021-02-17 RX ORDER — MORPHINE SULFATE 4 MG/ML
4 INJECTION, SOLUTION INTRAMUSCULAR; INTRAVENOUS ONCE
Status: COMPLETED | OUTPATIENT
Start: 2021-02-17 | End: 2021-02-17

## 2021-02-17 RX ORDER — SODIUM CHLORIDE 0.9 % (FLUSH) 0.9 %
10 SYRINGE (ML) INJECTION PRN
Status: DISCONTINUED | OUTPATIENT
Start: 2021-02-17 | End: 2021-02-19 | Stop reason: HOSPADM

## 2021-02-17 RX ORDER — CLOPIDOGREL BISULFATE 75 MG/1
75 TABLET ORAL DAILY
Status: DISCONTINUED | OUTPATIENT
Start: 2021-02-18 | End: 2021-02-19 | Stop reason: HOSPADM

## 2021-02-17 RX ORDER — ONDANSETRON 2 MG/ML
4 INJECTION INTRAMUSCULAR; INTRAVENOUS ONCE
Status: COMPLETED | OUTPATIENT
Start: 2021-02-17 | End: 2021-02-17

## 2021-02-17 RX ORDER — SODIUM CHLORIDE 0.9 % (FLUSH) 0.9 %
10 SYRINGE (ML) INJECTION EVERY 12 HOURS SCHEDULED
Status: DISCONTINUED | OUTPATIENT
Start: 2021-02-17 | End: 2021-02-19 | Stop reason: HOSPADM

## 2021-02-17 RX ORDER — LANOLIN ALCOHOL/MO/W.PET/CERES
1000 CREAM (GRAM) TOPICAL DAILY
Status: DISCONTINUED | OUTPATIENT
Start: 2021-02-18 | End: 2021-02-19 | Stop reason: HOSPADM

## 2021-02-17 RX ORDER — PROMETHAZINE HYDROCHLORIDE 25 MG/1
12.5 TABLET ORAL EVERY 6 HOURS PRN
Status: DISCONTINUED | OUTPATIENT
Start: 2021-02-17 | End: 2021-02-19 | Stop reason: HOSPADM

## 2021-02-17 RX ORDER — LABETALOL HYDROCHLORIDE 5 MG/ML
10 INJECTION, SOLUTION INTRAVENOUS EVERY 4 HOURS PRN
Status: DISCONTINUED | OUTPATIENT
Start: 2021-02-17 | End: 2021-02-19 | Stop reason: HOSPADM

## 2021-02-17 RX ORDER — ACETAMINOPHEN 325 MG/1
650 TABLET ORAL EVERY 6 HOURS PRN
Status: DISCONTINUED | OUTPATIENT
Start: 2021-02-17 | End: 2021-02-19 | Stop reason: DRUGHIGH

## 2021-02-17 RX ORDER — NITROGLYCERIN 0.4 MG/1
0.4 TABLET SUBLINGUAL EVERY 5 MIN PRN
Status: DISCONTINUED | OUTPATIENT
Start: 2021-02-17 | End: 2021-02-19 | Stop reason: HOSPADM

## 2021-02-17 RX ORDER — ASPIRIN 81 MG/1
81 TABLET, CHEWABLE ORAL DAILY
Status: DISCONTINUED | OUTPATIENT
Start: 2021-02-18 | End: 2021-02-19 | Stop reason: HOSPADM

## 2021-02-17 RX ORDER — ONDANSETRON 2 MG/ML
4 INJECTION INTRAMUSCULAR; INTRAVENOUS EVERY 6 HOURS PRN
Status: DISCONTINUED | OUTPATIENT
Start: 2021-02-17 | End: 2021-02-19 | Stop reason: HOSPADM

## 2021-02-17 RX ORDER — ACETAMINOPHEN 650 MG/1
650 SUPPOSITORY RECTAL EVERY 6 HOURS PRN
Status: DISCONTINUED | OUTPATIENT
Start: 2021-02-17 | End: 2021-02-19 | Stop reason: HOSPADM

## 2021-02-17 RX ORDER — POLYETHYLENE GLYCOL 3350 17 G/17G
17 POWDER, FOR SOLUTION ORAL DAILY PRN
Status: DISCONTINUED | OUTPATIENT
Start: 2021-02-17 | End: 2021-02-19 | Stop reason: HOSPADM

## 2021-02-17 RX ADMIN — MORPHINE SULFATE 2 MG: 2 INJECTION, SOLUTION INTRAMUSCULAR; INTRAVENOUS at 21:57

## 2021-02-17 RX ADMIN — MORPHINE SULFATE 4 MG: 4 INJECTION, SOLUTION INTRAMUSCULAR; INTRAVENOUS at 13:51

## 2021-02-17 RX ADMIN — RIVAROXABAN 20 MG: 20 TABLET, FILM COATED ORAL at 20:30

## 2021-02-17 RX ADMIN — NITROGLYCERIN 0.5 INCH: 20 OINTMENT TOPICAL at 11:48

## 2021-02-17 RX ADMIN — LABETALOL HYDROCHLORIDE 10 MG: 5 INJECTION INTRAVENOUS at 15:29

## 2021-02-17 RX ADMIN — Medication 10 ML: at 20:10

## 2021-02-17 RX ADMIN — ONDANSETRON 4 MG: 2 INJECTION INTRAMUSCULAR; INTRAVENOUS at 13:51

## 2021-02-17 RX ADMIN — MORPHINE SULFATE 2 MG: 2 INJECTION, SOLUTION INTRAMUSCULAR; INTRAVENOUS at 17:17

## 2021-02-17 RX ADMIN — ASPIRIN 324 MG: 81 TABLET, CHEWABLE ORAL at 11:48

## 2021-02-17 RX ADMIN — DILTIAZEM HYDROCHLORIDE 120 MG: 120 CAPSULE, COATED, EXTENDED RELEASE ORAL at 20:10

## 2021-02-17 ASSESSMENT — PAIN DESCRIPTION - PAIN TYPE
TYPE: CHRONIC PAIN
TYPE: ACUTE PAIN
TYPE: ACUTE PAIN

## 2021-02-17 ASSESSMENT — ENCOUNTER SYMPTOMS
CONSTIPATION: 0
COLOR CHANGE: 0
CHEST TIGHTNESS: 0
DIARRHEA: 0
ABDOMINAL PAIN: 0
BACK PAIN: 0
VOMITING: 0
COUGH: 0
SHORTNESS OF BREATH: 1
NAUSEA: 1

## 2021-02-17 ASSESSMENT — PAIN SCALES - GENERAL
PAINLEVEL_OUTOF10: 6
PAINLEVEL_OUTOF10: 6
PAINLEVEL_OUTOF10: 9
PAINLEVEL_OUTOF10: 2
PAINLEVEL_OUTOF10: 4
PAINLEVEL_OUTOF10: 6
PAINLEVEL_OUTOF10: 6

## 2021-02-17 ASSESSMENT — PAIN DESCRIPTION - LOCATION
LOCATION: CHEST
LOCATION: CHEST
LOCATION: CHEST;BACK;SHOULDER
LOCATION: CHEST

## 2021-02-17 ASSESSMENT — PAIN DESCRIPTION - ONSET: ONSET: UNABLE TO TELL

## 2021-02-17 ASSESSMENT — PAIN DESCRIPTION - PROGRESSION: CLINICAL_PROGRESSION: RAPIDLY IMPROVING

## 2021-02-17 ASSESSMENT — PAIN DESCRIPTION - ORIENTATION: ORIENTATION: RIGHT;POSTERIOR

## 2021-02-17 ASSESSMENT — PAIN - FUNCTIONAL ASSESSMENT: PAIN_FUNCTIONAL_ASSESSMENT: PREVENTS OR INTERFERES SOME ACTIVE ACTIVITIES AND ADLS

## 2021-02-17 ASSESSMENT — PAIN DESCRIPTION - DESCRIPTORS: DESCRIPTORS: PATIENT UNABLE TO DESCRIBE;SHARP

## 2021-02-17 NOTE — ED PROVIDER NOTES
Xarelto and has been taking as prescribed. Became concerned and came to the ED for further evaluation treatment at the request of cardiology service. Nursing Notes were all reviewed and agreed with or any disagreements were addressed in the HPI. REVIEW OF SYSTEMS    (2-9 systems for level 4, 10 or more for level 5)     Review of Systems   Constitutional: Negative for activity change, appetite change, chills, diaphoresis, fatigue and fever. Respiratory: Positive for shortness of breath. Negative for cough and chest tightness. Cardiovascular: Positive for chest pain. Negative for palpitations and leg swelling. Gastrointestinal: Positive for nausea. Negative for abdominal pain, constipation, diarrhea and vomiting. Genitourinary: Negative for decreased urine volume, difficulty urinating, dysuria, flank pain, frequency, hematuria and urgency. Musculoskeletal: Negative for arthralgias, back pain, myalgias, neck pain and neck stiffness. Skin: Negative for color change, pallor, rash and wound. Neurological: Negative for dizziness, light-headedness and headaches. Positives and Pertinent negatives as per HPI. Except as noted above in the ROS, all other systems were reviewed and negative. PAST MEDICAL HISTORY     Past Medical History:   Diagnosis Date    Allergic rhinitis, cause unspecified 12/4/2010    Anxiety     Arthritis     CAD (coronary artery disease)     angioplasty with stent at [facility] Dr. True Sanz, here Dr. Jeanne Goodson at [facility] Chronic kidney disease     frequency, urgency    Chronic pain     precordial, opiate dependent    Depression 1/22/2013    Erosive esophagitis 12/28/2016    Dr. Killian Dias; PPI started; LA Class A, Sphincter of Oddi manometry and sphincterotomy if symptoms don't improve.      Essential hypertension 4/11/2017    HSV infection     Hypercholesterolemia 6/6/2014    Irritable bowel syndrome with diarrhea 9/27/2016    Migraine headache 06/06/2014    Nonerosive nonspecific gastritis 12/28/2016    Dr. Yanique Christianson; body and antrum    OAB (overactive bladder) 3/14/2014         SURGICAL HISTORY     Past Surgical History:   Procedure Laterality Date    ABDOMINAL ADHESION SURGERY      APPENDECTOMY      CARDIAC CATHETERIZATION  12/07/2013    recheck arteries unchanged    CHOLECYSTECTOMY      COLONOSCOPY      CORONARY ANGIOPLASTY WITH STENT PLACEMENT  10/2012    right- TOTAL OF 3 STENTS    CORONARY ANGIOPLASTY WITH STENT PLACEMENT  02/04/2021    COSMETIC SURGERY      rhinoplasty    CYSTOSCOPY  04/2014    ENDOSCOPY, COLON, DIAGNOSTIC      ERCP  01/25/2017    FRACTURE SURGERY      LUE, plate and screws    HYSTERECTOMY      HYSTERECTOMY, TOTAL ABDOMINAL      OTHER SURGICAL HISTORY      Incision and drainage of Lingual Introral Lesion    POLYPECTOMY      Dr. Isabell Dalton    PTCA  10/2012    right    SINUS SURGERY      JOSE AND BSO      TONSILLECTOMY      TUBAL LIGATION      UPPER GASTROINTESTINAL ENDOSCOPY      WRIST SURGERY Left 05/21/2015    OPEN REDUCTION INTERNAL FIXATION LEFT WRIST         [address]       Current Discharge Medication List      CONTINUE these medications which have NOT CHANGED    Details   Lactase (DAIRY DIGEST EXTRA PO) Take 1 tablet by mouth 3 times daily (with meals)      clopidogrel (PLAVIX) 75 MG tablet Take 1 tablet by mouth daily  Qty: 30 tablet, Refills: 3      digoxin (LANOXIN) 125 MCG tablet Take 1 tablet by mouth daily  Qty: 30 tablet, Refills: 3      Mag Aspart-Potassium Aspart (POTASSIUM & MAGNESIUM ASPARTAT PO) Take 1 tablet by mouth daily       Zinc-Vitamin C  MG LOZG Take 1 lozenge by mouth daily       dilTIAZem (CARDIZEM CD) 120 MG extended release capsule Take 1 capsule by mouth 2 times daily  Qty: 180 capsule, Refills: 3      L-Lysine 1000 MG TABS Take 1 tablet by mouth every morning (before breakfast)       cyanocobalamin 1000 MCG tablet Take 1,000 mcg by mouth daily vitamin D 25 MCG (1000 UT) CAPS Take 1,000 Units by mouth daily       rivaroxaban (XARELTO) 20 MG TABS tablet Take 1 tablet by mouth daily (with breakfast)  Qty: 90 tablet, Refills: 1      estradiol (ESTRACE) 0.1 MG/GM vaginal cream PLACE 0.5 GRAM VAGINALLY 3 TIMES A WEEK  Qty: 42.5 g, Refills: 0      nitroGLYCERIN (NITROSTAT) 0.4 MG SL tablet Place 1 tablet under the tongue every 5 minutes as needed for Chest pain up to max of 3 total doses. If no relief after 1 dose, call 911.   Qty: 25 tablet, Refills: 3               ALLERGIES     Hydralazine, Shellfish-derived products, Asa [aspirin], Crestor [rosuvastatin], Fenofibrate, Hctz [hydrochlorothiazide], Lipitor [atorvastatin calcium], Lipitor [atorvastatin], Lisinopril, Mometasone furoate, Nasonex [mometasone], Nsaids, Sulfa antibiotics, Contrast [iodides], and Flagyl [metronidazole]    FAMILYHISTORY       Family History   Problem Relation Age of Onset    Hypertension Mother     Heart Disease Mother     Hypertension Father     Heart Disease Father     Heart Disease Maternal Uncle     High Blood Pressure Sister     Stroke Other     Heart Disease Other     Stroke Sister     High Blood Pressure Sister     Other Other         hypercoag state    Heart Disease Brother           SOCIAL HISTORY       Social History     Tobacco Use    Smoking status: Former Smoker     Packs/day: 0.50     Years: 20.00     Pack years: 10.00     Types: Cigarettes     Quit date: 2007     Years since quittin.0    Smokeless tobacco: Never Used   Substance Use Topics    Alcohol use: No    Drug use: No       SCREENINGS    Wayne Coma Scale  Eye Opening: Spontaneous  Best Verbal Response: Oriented  Best Motor Response: Obeys commands  Ingrid Coma Scale Score: 15        PHYSICAL EXAM    (up to 7 for level 4, 8 or more for level 5)     ED Triage Vitals [21 1127]   BP Temp Temp Source Pulse Resp SpO2 Height Weight   (!) 179/105 97.9 °F (36.6 °C) Oral 87 18 97 % -- 208 lb 12.8 oz (94.7 kg)       Physical Exam  Constitutional:       General: She is not in acute distress. Appearance: Normal appearance. She is well-developed. She is not ill-appearing, toxic-appearing or diaphoretic. HENT:      Head: Normocephalic and atraumatic. Right Ear: External ear normal.      Left Ear: External ear normal.   Eyes:      General:         Right eye: No discharge. Left eye: No discharge. Neck:      Musculoskeletal: Normal range of motion and neck supple. Cardiovascular:      Rate and Rhythm: Normal rate and regular rhythm. Pulses: Normal pulses. Heart sounds: Normal heart sounds. No murmur. No friction rub. No gallop. Comments: 2+ radial pulses bilat. No JVD. No calf TTP. No pedal edema  Pulmonary:      Effort: Pulmonary effort is normal. No respiratory distress. Breath sounds: Normal breath sounds. No stridor. No wheezing, rhonchi or rales. Chest:      Chest wall: No tenderness. Abdominal:      General: Abdomen is flat. There is no distension. Palpations: Abdomen is soft. There is no mass. Tenderness: There is no abdominal tenderness. There is no right CVA tenderness, left CVA tenderness, guarding or rebound. Hernia: No hernia is present. Musculoskeletal: Normal range of motion. Skin:     General: Skin is warm and dry. Coloration: Skin is not pale. Findings: No erythema. Neurological:      Mental Status: She is alert and oriented to person, place, and time.    Psychiatric:         Behavior: Behavior normal.         DIAGNOSTIC RESULTS   LABS:    Labs Reviewed   COMPREHENSIVE METABOLIC PANEL W/ REFLEX TO MG FOR LOW K - Abnormal; Notable for the following components:       Result Value    Glucose 131 (*)     All other components within normal limits    Narrative:     Performed at:  [facility]  [address],  [place], [address]   Phone [phone]   [address] - Abnormal; Notable for the following components:    Pro- (*)     All other components within normal limits    Narrative:     Performed at:  [facility]  [address],  [place], [address]   Phone [phone]   TROPONIN    Narrative:     Performed at:  [facility]  [address],  [place], [address]   Phone [phone]   CBC WITH AUTO DIFFERENTIAL    Narrative:     Performed at:  [facility] [address],  [place], [address]   Phone [phone]   DIGOXIN LEVEL    Narrative:     Performed at:  [facility] [address],  [place], [address]   Phone [phone]   TROPONIN    Narrative:     Performed at:  [facility]  [address],  [place], [address]   Phone [phone]   TROPONIN   LIPID PANEL       All other labs were within normal range or not returned as of this dictation. EKG: All EKG's are interpreted by the Emergency Department Physician in the absence of a cardiologist.  Please see their note for interpretation of EKG. RADIOLOGY:   Non-plain film images such as CT, Ultrasound and MRI are read by the radiologist. Plain radiographic images are visualized and preliminarily interpreted by the ED Provider with the below findings:        Interpretation per the Radiologist below, if available at the time of this note:    XR CHEST PORTABLE   Final Result   No acute cardiopulmonary process. No results found.         PROCEDURES   Unless otherwise noted below, none     Procedures    CRITICAL CARE TIME   N/A    CONSULTS:  IP CONSULT TO CARDIOLOGY      EMERGENCY DEPARTMENT COURSE and DIFFERENTIAL DIAGNOSIS/MDM:   Vitals:    Vitals:    02/17/21 1545 02/17/21 1600 02/17/21 1615 02/17/21 1700   BP: 131/70 138/85 121/76    Pulse: 100 89 85    Resp: 9 12 16    Temp:   97.7 °F (36.5 °C)    TempSrc:   Oral    SpO2: 94% 94% 98%    Weight:   209 lb 11.2 oz (95.1 kg)    Height:    5' 4\" (1.626 m)       Patient was given the following medications:  Medications   clopidogrel (PLAVIX) tablet 75 mg (has no administration in time range)   vitamin B-12 (CYANOCOBALAMIN) tablet 1,000 mcg (has no administration in time range)   dilTIAZem (CARDIZEM CD) extended release capsule 120 mg (has no administration in time range)   rivaroxaban (XARELTO) tablet 20 mg (has no administration in time range)   pantoprazole (PROTONIX) tablet 40 mg (has no administration in time range)   sodium chloride flush 0.9 % injection 10 mL (has no administration in time range)   sodium chloride flush 0.9 % injection 10 mL (has no administration in time range)   promethazine (PHENERGAN) tablet 12.5 mg (has no administration in time range)     Or   ondansetron (ZOFRAN) injection 4 mg (has no administration in time range)   acetaminophen (TYLENOL) tablet 650 mg (has no administration in time range)     Or   acetaminophen (TYLENOL) suppository 650 mg (has no administration in time range)   polyethylene glycol (GLYCOLAX) packet 17 g (has no administration in time range)   aspirin chewable tablet 81 mg (has no administration in time range)   nitroGLYCERIN (NITROSTAT) SL tablet 0.4 mg (has no administration in time range)   morphine (PF) injection 2 mg (2 mg Intravenous Given 2/17/21 1717)   labetalol (NORMODYNE;TRANDATE) injection 10 mg (10 mg Intravenous Given 2/17/21 1529)   aspirin chewable tablet 324 mg (324 mg Oral Given 2/17/21 1148)   nitroglycerin (NITRO-BID) 2 % ointment 0.5 inch (0.5 inches Topical Given 2/17/21 1148)   morphine injection 4 mg (4 mg Intravenous Given 2/17/21 1351)   ondansetron (ZOFRAN) injection 4 mg (4 mg Intravenous Given 2/17/21 1351)           Patient is a 62-year-old female who presents to the ED with complaint of chest discomfort. Had episode A. fib last night that some spontaneously converted.   No A. fib upon arrival.  Has had chest discomfort that feels similar when she had previous heart attack. Recent cardiac catheterization with stents placed to the LAD and D1. States similar symptoms currently. Patient was given Nitropaste here in the ED. Patient given enteric-coated aspirin. Given morphine and Zofran for symptom control. Troponin normal.  EKG interpreted by attending. CBC showed normal white count, hemoglobin and platelets. CMP unremarkable. Digoxin 0.9. . Chest x-ray unremarkable. Is already on Xarelto. Low suspicion for PE. Patient suffering from chest discomfort and given patient's recent cardiac catheterization and heart score believe would benefit from mission for further evaluation and treatment. Case discussed with hospitalist who graciously agreed accept patient for admission for further evaluation treatment. Low suspicion for PE, dissection, AAA, pneumonia, pneumothorax, respiratory distress, GERD, anxiety, CHF, COPD, asthma, surgical abdomen or other emergent etiology at this time. FINAL IMPRESSION      1. Chest discomfort          DISPOSITION/PLAN   DISPOSITION Admitted 02/17/2021 02:38:50 PM      PATIENT REFERREDTO:  No follow-up provider specified.     DISCHARGE MEDICATIONS:  Current Discharge Medication List          DISCONTINUED MEDICATIONS:  Current Discharge Medication List      STOP taking these medications       omeprazole (PRILOSEC) 40 MG delayed release capsule Comments:   Reason for Stopping:                      (Please note that portions of this note were completed with a voice recognition program.  Efforts were made to edit the dictations but occasionally words are mis-transcribed.)    WENDI Velarde (electronically signed)          WENDI Strickland  02/17/21 1921

## 2021-02-17 NOTE — TELEPHONE ENCOUNTER
I called Ms. Izabel Adan to adjust her appointment time with NP for 2/23. When I reached her she reported she is not doing well at all. She had an episode of A-fib last night 12:30am - 4am.  She said she received a call from [employer] and they were going to fax the report to the office. She reports she is still having active chest pain and shoulder pain 7/10. She had 2 stents placed by Dr. Anne Johnston 2/4/21 and is scheudled for ablation with Dr. Josselyn Lau on 2/28. I recommended she go to the ER to be re-evaluated but she does not want to go to the ER. Will have the [facility] report pulled and have clinical staff call her back right away.

## 2021-02-17 NOTE — H&P
Hospital Medicine History and Physical    2/17/2021    Date of Admission: 2/17/2021    Date of Service: Pt seen/examined on 2/17/2021 and admitted to observation. Assessment/plan:  1. Chest pain, atypical.  Continue antiplatelet therapy. Patient has intolerance to statin therapy. Obtain serial troponin, monitor on telemetry. Consult cardiology to assist with evaluation. 2. History of paroxysmal atrial fibrillation on chronic anticoagulation with Xarelto. Currently in normal sinus rhythm. 3. History of erosive gastritis on PPI. Patient reports she has been using PPI only intermittently (because she is concerned about side effect from medicines). 4. Other comorbidities:  history of erosive esophagitis on PPI, essential hypertension, hyperlipidemia, anxiety and depression, IBS, obesity with BMI of 35 kg/m². Activities: Up with assist  Prophylaxis: On Xarelto  Code status: Full code    ==========================================================  Chief complaint:  Chief Complaint   Patient presents with    Chest Pain     Pt c/o cp pain since last night that radiates down right arm and bilateral jaw. HX MI and afib       History of Presenting Illness: This is a pleasant 63 y.o. female with history of paroxysmal atrial fibrillation on chronic anticoagulation with Xarelto, history of erosive esophagitis on PPI, essential hypertension, hyperlipidemia, anxiety and depression, IBS, obesity with BMI of 35 kg/m², who recently had cardiac catheterization with PCI to LAD on February 4, 2021, who presents to the emergency room with complaints of substernal to right-sided cest pain with radiation to right arm, bilateral jaw. Symptoms started shortly after episode of irregular heartbeat that started around midnight, up until about 4 AM.  Symptoms have been intermittent, lasting a few minutes. According to patient, this is similar to her prior chest pain which she was diagnosed with MI.   She had unremarkable EKG and troponin in the emergency room. She is being admitted for further evaluation. Past Medical History:      Diagnosis Date    Allergic rhinitis, cause unspecified 12/4/2010    Anxiety     Arthritis     CAD (coronary artery disease)     angioplasty with stent at [facility] Dr. Fleurette Siemens, here Dr. Karlene De Los Santos at [facility] Chronic kidney disease     frequency, urgency    Chronic pain     precordial, opiate dependent    Depression 1/22/2013    Erosive esophagitis 12/28/2016    Dr. Rick Valencia; PPI started; LA Class A, Sphincter of Oddi manometry and sphincterotomy if symptoms don't improve.      Essential hypertension 4/11/2017    HSV infection     Hypercholesterolemia 6/6/2014    Irritable bowel syndrome with diarrhea 9/27/2016    Migraine headache 06/06/2014    Nonerosive nonspecific gastritis 12/28/2016    Dr. Rick Valencia; body and antrum    OAB (overactive bladder) 3/14/2014       Past Surgical History:      Procedure Laterality Date    ABDOMINAL ADHESION SURGERY      APPENDECTOMY      CARDIAC CATHETERIZATION  12/07/2013    recheck arteries unchanged    CHOLECYSTECTOMY      COLONOSCOPY      CORONARY ANGIOPLASTY WITH STENT PLACEMENT  10/2012    right- TOTAL OF 3 STENTS    CORONARY ANGIOPLASTY WITH STENT PLACEMENT  02/04/2021    COSMETIC SURGERY      rhinoplasty    CYSTOSCOPY  04/2014    ENDOSCOPY, COLON, DIAGNOSTIC      ERCP  01/25/2017    FRACTURE SURGERY      LUE, plate and screws    HYSTERECTOMY      HYSTERECTOMY, TOTAL ABDOMINAL      OTHER SURGICAL HISTORY      Incision and drainage of Lingual Introral Lesion    POLYPECTOMY      Dr. Weeks Dilalex PTCA  10/2012    right    SINUS SURGERY      JOSE AND BSO      TONSILLECTOMY      TUBAL LIGATION      UPPER GASTROINTESTINAL ENDOSCOPY      WRIST SURGERY Left 05/21/2015    OPEN REDUCTION INTERNAL FIXATION LEFT WRIST       Medications (prior to admission):  Prior to Admission medications    Medication Sig Start Date End Date Taking? Authorizing Provider   clopidogrel (PLAVIX) 75 MG tablet Take 1 tablet by mouth daily 2/6/21  Yes Santiago Pisano MD   digoxin [facility]) 125 MCG tablet Take 1 tablet by mouth daily 2/6/21  Yes Santiago Pisano MD   omeprazole (PRILOSEC) 40 MG delayed release capsule Take 1 capsule by mouth daily 1/27/21  Yes Brenden Kirk MD   dilTIAZem (CARDIZEM CD) 120 MG extended release capsule Take 1 capsule by mouth 2 times daily 1/27/21  Yes Santiago Pisano MD   rivaroxaban (XARELTO) 20 MG TABS tablet Take 1 tablet by mouth daily (with breakfast) 12/15/20  Yes Mandeep Jacobs MD   estradiol (ESTRACE) 0.1 MG/GM vaginal cream PLACE 0.5 GRAM VAGINALLY 3 TIMES A WEEK 6/5/20  Yes Elia Seip, MD   Mag Aspart-Potassium Aspart (POTASSIUM & MAGNESIUM ASPARTAT PO) Take by mouth    Historical Provider, MD   Zinc-Vitamin C  MG LOZG Take by mouth    Historical Provider, MD   L-Lysine 1000 MG TABS Take by mouth    Historical Provider, MD   cyanocobalamin 1000 MCG tablet Take 1,000 mcg by mouth daily    Historical Provider, MD   vitamin D 25 MCG (1000 UT) CAPS Take by mouth    Historical Provider, MD   nitroGLYCERIN (NITROSTAT) 0.4 MG SL tablet Place 1 tablet under the tongue every 5 minutes as needed for Chest pain up to max of 3 total doses. If no relief after 1 dose, call 911. 8/31/20   Elia Seip, MD       Allergy(ies):  Hydralazine, Shellfish-derived products, Asa [aspirin], Crestor [rosuvastatin], Fenofibrate, Hctz [hydrochlorothiazide], Lipitor [atorvastatin calcium], Lipitor [atorvastatin], Lisinopril, Mometasone furoate, Nasonex [mometasone], Nsaids, Sulfa antibiotics, Contrast [iodides], and Flagyl [metronidazole]    Social History:  TOBACCO:  reports that she quit smoking about 14 years ago. Her smoking use included cigarettes. She has a 10.00 pack-year smoking history. She has never used smokeless tobacco.  ETOH:  reports no history of alcohol use.     Family History:      Problem Relation Age of Onset  Hypertension Mother     Heart Disease Mother     Hypertension Father     Heart Disease Father     Heart Disease Maternal Uncle     High Blood Pressure Sister     Stroke Other     Heart Disease Other     Stroke Sister     High Blood Pressure Sister     Other Other         hypercoag state    Heart Disease Brother        Review of Systems:  Pertinent positives are listed in HPI. At least 10-point ROS reviewed and were negative. Vitals and physical examination:  BP (!) 162/83   Pulse 90   Temp 97.9 °F (36.6 °C) (Oral)   Resp 23   Wt 208 lb 12.8 oz (94.7 kg)   SpO2 96%   BMI 35.84 kg/m²   Gen/overall appearance: Not in acute distress. Alert. Oriented x3. Head: Normocephalic, atraumatic  Eyes: EOMI, good acuity  ENT: Oral mucosa moist  Neck: No JVD, thyromegaly  CVS: Nml S1S2, no MRG, RRR  Pulm: Clear bilaterally. No crackles/wheezes  Gastrointestinal: Soft, NT/ND, +BS  Musculoskeletal: No edema. Warm  Neuro: No focal deficit. Moves extremity spontaneously. Psychiatry: Appropriate affect. Not agitated. Skin: Warm, dry with normal turgor. No rash  Capillary refill: Brisk,< 3 seconds   Peripheral Pulses: +2 palpable, equal bilaterally       Labs/imaging/EKG:  CBC:   Recent Labs     02/17/21  1154   WBC 8.2   HGB 14.0        BMP:    Recent Labs     02/17/21  1154      K 3.9   CL 99   CO2 25   BUN 10   CREATININE 0.8   GLUCOSE 131*     Hepatic:   Recent Labs     02/17/21  1154   AST 25   ALT 16   BILITOT 0.3   ALKPHOS 129       Xr Chest Portable    Result Date: 2/17/2021  EXAMINATION: ONE XRAY VIEW OF THE CHEST 2/17/2021 11:48 am COMPARISON: November 19, 2017 HISTORY: ORDERING SYSTEM PROVIDED HISTORY: cp TECHNOLOGIST PROVIDED HISTORY: Reason for exam:->cp Reason for Exam: Chest Pain (Pt c/o cp pain since last night that radiates down right arm and bilateral jaw. HX MI and afib) Acuity: Acute Type of Exam: Initial FINDINGS: The cardiomediastinal silhouette is normal in size.  The lungs are clear. No pleural effusion or pneumothorax is present. No acute cardiopulmonary process. EKG: Normal sinus rhythm, rate 83 beats per minutes. No acute ST/T changes. I reviewed EKG. Discussed with ER provider.       Thank you Joce Sharma MD for the opportunity to be involved in this patient's care.    -----------------------------  Jairon Reyes MD  [facility]

## 2021-02-17 NOTE — ED PROVIDER NOTES
As physician-in-triage, I performed a medical screening history and physical exam on Isra Steward. I also cared for and evaluated the patient in conjunction with the ED Advanced Practice Provider. All diagnostic, treatment, and disposition decisions were made by myself in conjunction with the advanced practice provider. For all further details of the patient's emergency department visit, please see the advanced practice provider's documentation. Patient presents to the ER for evaluation of acute precordial chest pain, recent cardiac stent placement compliant with antiplatelet agents with exception of salicylate, afebrile no focal neurologic deficits positive palpitations she will had episodes of atrial fibrillation prior she currently in sinus rhythm, her EKG demonstrates no acute ST segment abnormalities initial cardiac enzymes are negative she status post stent placement . Patient will be admitted for cardiovascular rule out dysrhythmia monitoring. She is hemodynamically stable with no current atrial fibrillation with no ST segment abnormalities and no acute troponin leak.       Impression: Acute precordial chest pain, proximal atrial fibrillation     Hope Gonzales MD  99/00/47 1045

## 2021-02-17 NOTE — TELEPHONE ENCOUNTER
No answer- went straight to . Advised in VM she should proceed to ER. Called  cell phone and was able to tell Nilay Brush it was advised to go to ER due to her concerning symptoms. She was agreeable. Called placed to ER and let them know she was coming.      Did look on [employer] website- Nilay Brush was in atrial fibrillation last night, but by strips did convert to SR.

## 2021-02-17 NOTE — ED NOTES
Nursing report given to MARSHALL Coles on receiving unit. RN accepts patient and has no further questions.      Kimberley Christensen RN  02/17/21 [id number]

## 2021-02-18 PROBLEM — R01.1 CARDIAC MURMUR: Status: ACTIVE | Noted: 2021-02-18

## 2021-02-18 PROBLEM — I48.0 PAF (PAROXYSMAL ATRIAL FIBRILLATION) (HCC): Status: ACTIVE | Noted: 2021-02-18

## 2021-02-18 PROBLEM — R09.89 BRUIT OF RIGHT CAROTID ARTERY: Status: ACTIVE | Noted: 2021-02-18

## 2021-02-18 LAB
CHOLESTEROL, TOTAL: 179 MG/DL (ref 0–199)
EKG ATRIAL RATE: 68 BPM
EKG DIAGNOSIS: NORMAL
EKG P AXIS: -17 DEGREES
EKG P-R INTERVAL: 132 MS
EKG Q-T INTERVAL: 380 MS
EKG QRS DURATION: 74 MS
EKG QTC CALCULATION (BAZETT): 404 MS
EKG R AXIS: -16 DEGREES
EKG T AXIS: 48 DEGREES
EKG VENTRICULAR RATE: 68 BPM
HDLC SERPL-MCNC: 37 MG/DL (ref 40–60)
LDL CHOLESTEROL CALCULATED: 90 MG/DL
TRIGL SERPL-MCNC: 262 MG/DL (ref 0–150)
VLDLC SERPL CALC-MCNC: 52 MG/DL

## 2021-02-18 PROCEDURE — 96375 TX/PRO/DX INJ NEW DRUG ADDON: CPT

## 2021-02-18 PROCEDURE — 6360000002 HC RX W HCPCS: Performed by: NURSE PRACTITIONER

## 2021-02-18 PROCEDURE — 93010 ELECTROCARDIOGRAM REPORT: CPT | Performed by: INTERNAL MEDICINE

## 2021-02-18 PROCEDURE — G0378 HOSPITAL OBSERVATION PER HR: HCPCS

## 2021-02-18 PROCEDURE — 80061 LIPID PANEL: CPT

## 2021-02-18 PROCEDURE — 93005 ELECTROCARDIOGRAM TRACING: CPT | Performed by: INTERNAL MEDICINE

## 2021-02-18 PROCEDURE — 99223 1ST HOSP IP/OBS HIGH 75: CPT | Performed by: INTERNAL MEDICINE

## 2021-02-18 PROCEDURE — 6360000002 HC RX W HCPCS: Performed by: INTERNAL MEDICINE

## 2021-02-18 PROCEDURE — 96376 TX/PRO/DX INJ SAME DRUG ADON: CPT

## 2021-02-18 PROCEDURE — 6370000000 HC RX 637 (ALT 250 FOR IP): Performed by: INTERNAL MEDICINE

## 2021-02-18 PROCEDURE — 6370000000 HC RX 637 (ALT 250 FOR IP): Performed by: NURSE PRACTITIONER

## 2021-02-18 PROCEDURE — 83036 HEMOGLOBIN GLYCOSYLATED A1C: CPT

## 2021-02-18 PROCEDURE — 2580000003 HC RX 258: Performed by: INTERNAL MEDICINE

## 2021-02-18 PROCEDURE — 36415 COLL VENOUS BLD VENIPUNCTURE: CPT

## 2021-02-18 RX ORDER — METHYLPREDNISOLONE SODIUM SUCCINATE 125 MG/2ML
80 INJECTION, POWDER, LYOPHILIZED, FOR SOLUTION INTRAMUSCULAR; INTRAVENOUS EVERY 6 HOURS
Status: COMPLETED | OUTPATIENT
Start: 2021-02-18 | End: 2021-02-19

## 2021-02-18 RX ORDER — VITAMIN B COMPLEX
1000 TABLET ORAL DAILY
Status: DISCONTINUED | OUTPATIENT
Start: 2021-02-18 | End: 2021-02-19 | Stop reason: HOSPADM

## 2021-02-18 RX ORDER — MORPHINE SULFATE 2 MG/ML
2 INJECTION, SOLUTION INTRAMUSCULAR; INTRAVENOUS
Status: DISCONTINUED | OUTPATIENT
Start: 2021-02-18 | End: 2021-02-19 | Stop reason: HOSPADM

## 2021-02-18 RX ORDER — DEXTROSE AND SODIUM CHLORIDE 5; .45 G/100ML; G/100ML
INJECTION, SOLUTION INTRAVENOUS CONTINUOUS
Status: DISCONTINUED | OUTPATIENT
Start: 2021-02-18 | End: 2021-02-18

## 2021-02-18 RX ORDER — DIPHENHYDRAMINE HCL 25 MG
25 TABLET ORAL DAILY
Status: COMPLETED | OUTPATIENT
Start: 2021-02-18 | End: 2021-02-19

## 2021-02-18 RX ORDER — KETOROLAC TROMETHAMINE 15 MG/ML
15 INJECTION, SOLUTION INTRAMUSCULAR; INTRAVENOUS EVERY 6 HOURS
Status: DISCONTINUED | OUTPATIENT
Start: 2021-02-18 | End: 2021-02-19 | Stop reason: HOSPADM

## 2021-02-18 RX ORDER — DIGOXIN 125 MCG
125 TABLET ORAL DAILY
Status: DISCONTINUED | OUTPATIENT
Start: 2021-02-18 | End: 2021-02-19 | Stop reason: HOSPADM

## 2021-02-18 RX ADMIN — Medication 10 ML: at 07:52

## 2021-02-18 RX ADMIN — DILTIAZEM HYDROCHLORIDE 120 MG: 120 CAPSULE, COATED, EXTENDED RELEASE ORAL at 20:50

## 2021-02-18 RX ADMIN — ASPIRIN 81 MG: 81 TABLET, CHEWABLE ORAL at 07:49

## 2021-02-18 RX ADMIN — MORPHINE SULFATE 2 MG: 2 INJECTION, SOLUTION INTRAMUSCULAR; INTRAVENOUS at 15:51

## 2021-02-18 RX ADMIN — NITROGLYCERIN 0.4 MG: 0.4 TABLET SUBLINGUAL at 11:33

## 2021-02-18 RX ADMIN — Medication 10 ML: at 04:55

## 2021-02-18 RX ADMIN — METHYLPREDNISOLONE SODIUM SUCCINATE 80 MG: 125 INJECTION, POWDER, FOR SOLUTION INTRAMUSCULAR; INTRAVENOUS at 20:49

## 2021-02-18 RX ADMIN — DILTIAZEM HYDROCHLORIDE 120 MG: 120 CAPSULE, COATED, EXTENDED RELEASE ORAL at 07:49

## 2021-02-18 RX ADMIN — PANTOPRAZOLE SODIUM 40 MG: 40 TABLET, DELAYED RELEASE ORAL at 07:49

## 2021-02-18 RX ADMIN — CLOPIDOGREL BISULFATE 75 MG: 75 TABLET ORAL at 07:49

## 2021-02-18 RX ADMIN — KETOROLAC TROMETHAMINE 15 MG: 15 INJECTION, SOLUTION INTRAMUSCULAR; INTRAVENOUS at 20:49

## 2021-02-18 RX ADMIN — MORPHINE SULFATE 2 MG: 2 INJECTION, SOLUTION INTRAMUSCULAR; INTRAVENOUS at 19:15

## 2021-02-18 RX ADMIN — MORPHINE SULFATE 2 MG: 2 INJECTION, SOLUTION INTRAMUSCULAR; INTRAVENOUS at 09:26

## 2021-02-18 RX ADMIN — Medication 10 ML: at 20:50

## 2021-02-18 RX ADMIN — DIGOXIN 125 MCG: 125 TABLET ORAL at 15:51

## 2021-02-18 RX ADMIN — DIPHENHYDRAMINE HYDROCHLORIDE 25 MG: 25 TABLET ORAL at 20:50

## 2021-02-18 RX ADMIN — MORPHINE SULFATE 2 MG: 2 INJECTION, SOLUTION INTRAMUSCULAR; INTRAVENOUS at 13:51

## 2021-02-18 RX ADMIN — MORPHINE SULFATE 2 MG: 2 INJECTION, SOLUTION INTRAMUSCULAR; INTRAVENOUS at 21:04

## 2021-02-18 RX ADMIN — Medication 1000 UNITS: at 15:50

## 2021-02-18 RX ADMIN — CYANOCOBALAMIN TAB 1000 MCG 1000 MCG: 1000 TAB at 15:51

## 2021-02-18 RX ADMIN — MORPHINE SULFATE 2 MG: 2 INJECTION, SOLUTION INTRAMUSCULAR; INTRAVENOUS at 04:55

## 2021-02-18 ASSESSMENT — PAIN SCALES - GENERAL
PAINLEVEL_OUTOF10: 3
PAINLEVEL_OUTOF10: 7
PAINLEVEL_OUTOF10: 5
PAINLEVEL_OUTOF10: 5
PAINLEVEL_OUTOF10: 7
PAINLEVEL_OUTOF10: 7
PAINLEVEL_OUTOF10: 5
PAINLEVEL_OUTOF10: 0
PAINLEVEL_OUTOF10: 7
PAINLEVEL_OUTOF10: 3

## 2021-02-18 ASSESSMENT — PAIN DESCRIPTION - LOCATION
LOCATION: CHEST
LOCATION: CHEST

## 2021-02-18 ASSESSMENT — PAIN DESCRIPTION - PAIN TYPE
TYPE: ACUTE PAIN
TYPE: ACUTE PAIN

## 2021-02-18 ASSESSMENT — PAIN DESCRIPTION - PROGRESSION
CLINICAL_PROGRESSION: RAPIDLY IMPROVING
CLINICAL_PROGRESSION: RAPIDLY IMPROVING

## 2021-02-18 ASSESSMENT — PAIN DESCRIPTION - ORIENTATION
ORIENTATION: RIGHT;POSTERIOR
ORIENTATION: RIGHT;POSTERIOR

## 2021-02-18 ASSESSMENT — PAIN - FUNCTIONAL ASSESSMENT
PAIN_FUNCTIONAL_ASSESSMENT: ACTIVITIES ARE NOT PREVENTED
PAIN_FUNCTIONAL_ASSESSMENT: ACTIVITIES ARE NOT PREVENTED

## 2021-02-18 ASSESSMENT — PAIN DESCRIPTION - ONSET: ONSET: UNABLE TO TELL

## 2021-02-18 NOTE — CONSULTS
[address] [facility]  [phone]        Reason for Consultation/Chief Complaint: \" Chest pain. \"    History of Present Illness:  Santa Cabot is a 63 y.o. patient who presented to the hospital with complaints of chest pain. She was recently hospitalized and underwent PCI with ALEC placement. She is scheduled to undergo cardiac ablation for atrial fibrillation next Friday. She states that she developed palpitations and felt as though she went into atrial fibrillation Tuesday into Wednesday. She noted mid-sternal chest pain with radiation to right arm and jaw. Sharp in nature. She does state that was similar to what she experienced prior to her previous heart attack. Past Medical History:   has a past medical history of Allergic rhinitis, cause unspecified, Anxiety, Arthritis, CAD (coronary artery disease), Chronic kidney disease, Chronic pain, Depression, Erosive esophagitis, Essential hypertension, HSV infection, Hypercholesterolemia, Irritable bowel syndrome with diarrhea, Migraine headache, Nonerosive nonspecific gastritis, and OAB (overactive bladder). Surgical History:   has a past surgical history that includes Appendectomy; shannan and bso (cervix removed); Cholecystectomy; Tubal ligation; Tonsillectomy; other surgical history; Coronary angioplasty with stent (10/2012); Percutaneous Transluminal Coronary Angio (10/2012); Cardiac catheterization (12/07/2013); Hysterectomy; polypectomy; Cystoscopy (04/2014); Upper gastrointestinal endoscopy; Wrist surgery (Left, 05/21/2015); Abdominal adhesion surgery; Colonoscopy; Endoscopy, colon, diagnostic; Cosmetic surgery; fracture surgery; ERCP (01/25/2017); sinus surgery; Hysterectomy, total abdominal; and Coronary angioplasty with stent (02/04/2021). Social History:   reports that she quit smoking about 14 years ago. Her smoking use included cigarettes. She has a 10.00 pack-year smoking history.  She has never used smokeless tobacco. She Allergies:  Hydralazine, Shellfish-derived products, Asa [aspirin], Crestor [rosuvastatin], Fenofibrate, Hctz [hydrochlorothiazide], Lipitor [atorvastatin calcium], Lipitor [atorvastatin], Lisinopril, Mometasone furoate, Nasonex [mometasone], Nsaids, Sulfa antibiotics, Contrast [iodides], and Flagyl [metronidazole]     Review of Systems:   A complete review of systems has been reviewed and updated today and is negative except as noted in the history of present illness. Physical Examination:    Vitals:    02/18/21 1550   BP: (!) 161/77   Pulse:    Resp:    Temp:    SpO2:     Weight: 209 lb 11.2 oz (95.1 kg)         General Appearance:  Alert, cooperative, no distress, appears stated age   Head:  Normocephalic, without obvious abnormality, atraumatic   Eyes:  EOMI, conjunctiva/corneas clear       Nose: Nares normal   Throat: Lips normal   Neck: Supple, symmetrical, trachea midline, + right carotid bruit. No JVD       Lungs:   Clear to auscultation bilaterally, respirations unlabored   Chest Wall:  No tenderness or deformity   Heart:  Regular rate and rhythm, S1, S2 normal, 2/6 systolic murmur at RUSB.   No rub or gallop   Abdomen:   Soft, non-tender, bowel sounds active all four quadrants,  no masses, no organomegaly           Extremities: Extremities normal, atraumatic, no cyanosis or edema   Pulses: 2+ and symmetric   Skin: Skin color, texture, turgor normal, no rashes or lesions   Pysch: Normal mood and affect   Neurologic: Normal gross motor and sensory exam.         Labs  CBC:   Lab Results   Component Value Date    WBC 8.2 02/17/2021    RBC 5.01 02/17/2021    HGB 14.0 02/17/2021    HCT 42.3 02/17/2021    MCV 84.4 02/17/2021    RDW 14.1 02/17/2021     02/17/2021     CMP:    Lab Results   Component Value Date     02/17/2021    K 3.9 02/17/2021    CL 99 02/17/2021    CO2 25 02/17/2021    BUN 10 02/17/2021    CREATININE 0.8 02/17/2021    GFRAA >60 02/17/2021    GFRAA >60 12/19/2012    AGRATIO 1.5 02/17/2021    LABGLOM >60 02/17/2021    LABGLOM 67 05/14/2016    GLUCOSE 131 02/17/2021    GLUCOSE 96 11/14/2011    PROT 7.8 02/17/2021    PROT 6.2 02/21/2013    CALCIUM 9.8 02/17/2021    BILITOT 0.3 02/17/2021    ALKPHOS 129 02/17/2021    AST 25 02/17/2021    ALT 16 02/17/2021     LIPIDS: No components found for: TOTAL CHOLESTEROL,  HDL,  LDL,  TRIGLYCERIDES  PT/INR:  No results found for: PTINR  Lab Results   Component Value Date    CKTOTAL 46 06/13/2018    CKMB 3.1 01/18/2017    TROPONINI <0.01 02/17/2021       EKG:  I have reviewed EKG with the following interpretation:  Impression:    17-FEB-2021 13:01:22 [facility] ROUTINE RECORD  Normal sinus rhythm  Nonspecific ST abnormality  Abnormal ECG    Imaging/Procedures:   Cardiac cath 2/4/2021:  Cardiac Cath LVG FFR PCI:  Anatomy:   FFR mid LAD 0.83     Intervention  ~Successful PCI to distal LAD with 2.5x15 and 2.5x18 ALEC to 22atm. PCI to D1 with 2.25x8 ALEC to 16atm. Excellent Result.      Contrast: 205  Flouro Time: 11.1  Access: R CFA. Perc stick safe zone. Angioseal closure     Impression  ~Coronary Angiography w/ severe Single vessel CAD  ~Successful complex angioplasty and stenting of LAD and D1           Recommendation  ~Aggressive medical treatment and risk factor modification  ~1. Post cath IVF. Bedrest.  2. Recommend beta blocker, high potency statin, plavix and xarelto  3. Referral to cardiac rehab placed  4. Patient has been advised on the importance of regular exercise of at least 20-30 minutes daily. 5. Patient counseled about and offered assistance for smoking cessation   6. Hold xarelto tonight and give today. Monitor overnight. Myoview stress test 12/23/2020:  Summary    -There is a small-moderate sized, moderate intensity fixed anterior defect    suggestive of scar.    -There is no ischemia.    -LV function is normal with EF=70%    -Low-intermediate risk.          Assessment/Plan:  Principal Problem:    Unstable angina ([address]  Plan:

## 2021-02-19 VITALS
WEIGHT: 209.1 LBS | TEMPERATURE: 97.9 F | DIASTOLIC BLOOD PRESSURE: 91 MMHG | OXYGEN SATURATION: 90 % | HEIGHT: 64 IN | BODY MASS INDEX: 35.7 KG/M2 | SYSTOLIC BLOOD PRESSURE: 148 MMHG | RESPIRATION RATE: 16 BRPM | HEART RATE: 89 BPM

## 2021-02-19 LAB
EKG ATRIAL RATE: 87 BPM
EKG DIAGNOSIS: NORMAL
EKG P AXIS: -1 DEGREES
EKG P-R INTERVAL: 150 MS
EKG Q-T INTERVAL: 348 MS
EKG QRS DURATION: 78 MS
EKG QTC CALCULATION (BAZETT): 418 MS
EKG R AXIS: -5 DEGREES
EKG T AXIS: 37 DEGREES
EKG VENTRICULAR RATE: 87 BPM
ESTIMATED AVERAGE GLUCOSE: 122.6 MG/DL
HBA1C MFR BLD: 5.9 %
HCT VFR BLD CALC: 39.4 % (ref 36–48)
HEMOGLOBIN: 13 G/DL (ref 12–16)
INR BLD: 1.21 (ref 0.86–1.14)
LEFT VENTRICULAR EJECTION FRACTION HIGH VALUE: 60 %
LEFT VENTRICULAR EJECTION FRACTION MODE: NORMAL
MCH RBC QN AUTO: 28.1 PG (ref 26–34)
MCHC RBC AUTO-ENTMCNC: 33.1 G/DL (ref 31–36)
MCV RBC AUTO: 85.1 FL (ref 80–100)
PDW BLD-RTO: 13.8 % (ref 12.4–15.4)
PLATELET # BLD: 250 K/UL (ref 135–450)
PMV BLD AUTO: 7.5 FL (ref 5–10.5)
PROTHROMBIN TIME: 14.1 SEC (ref 10–13.2)
RBC # BLD: 4.63 M/UL (ref 4–5.2)
WBC # BLD: 9.7 K/UL (ref 4–11)

## 2021-02-19 PROCEDURE — 6360000004 HC RX CONTRAST MEDICATION: Performed by: INTERNAL MEDICINE

## 2021-02-19 PROCEDURE — 6360000002 HC RX W HCPCS: Performed by: NURSE PRACTITIONER

## 2021-02-19 PROCEDURE — C1769 GUIDE WIRE: HCPCS

## 2021-02-19 PROCEDURE — 6370000000 HC RX 637 (ALT 250 FOR IP): Performed by: NURSE PRACTITIONER

## 2021-02-19 PROCEDURE — 85610 PROTHROMBIN TIME: CPT

## 2021-02-19 PROCEDURE — 1200000000 HC SEMI PRIVATE

## 2021-02-19 PROCEDURE — 99152 MOD SED SAME PHYS/QHP 5/>YRS: CPT

## 2021-02-19 PROCEDURE — 2500000003 HC RX 250 WO HCPCS

## 2021-02-19 PROCEDURE — C1894 INTRO/SHEATH, NON-LASER: HCPCS

## 2021-02-19 PROCEDURE — 94760 N-INVAS EAR/PLS OXIMETRY 1: CPT

## 2021-02-19 PROCEDURE — 93880 EXTRACRANIAL BILAT STUDY: CPT

## 2021-02-19 PROCEDURE — 93458 L HRT ARTERY/VENTRICLE ANGIO: CPT | Performed by: INTERNAL MEDICINE

## 2021-02-19 PROCEDURE — 2709999900 HC NON-CHARGEABLE SUPPLY

## 2021-02-19 PROCEDURE — 93010 ELECTROCARDIOGRAM REPORT: CPT | Performed by: INTERNAL MEDICINE

## 2021-02-19 PROCEDURE — 96376 TX/PRO/DX INJ SAME DRUG ADON: CPT

## 2021-02-19 PROCEDURE — 6360000002 HC RX W HCPCS: Performed by: INTERNAL MEDICINE

## 2021-02-19 PROCEDURE — 85027 COMPLETE CBC AUTOMATED: CPT

## 2021-02-19 PROCEDURE — C1760 CLOSURE DEV, VASC: HCPCS

## 2021-02-19 PROCEDURE — 99153 MOD SED SAME PHYS/QHP EA: CPT

## 2021-02-19 PROCEDURE — 6370000000 HC RX 637 (ALT 250 FOR IP): Performed by: INTERNAL MEDICINE

## 2021-02-19 PROCEDURE — 36415 COLL VENOUS BLD VENIPUNCTURE: CPT

## 2021-02-19 PROCEDURE — 93458 L HRT ARTERY/VENTRICLE ANGIO: CPT

## 2021-02-19 PROCEDURE — B2111ZZ FLUOROSCOPY OF MULTIPLE CORONARY ARTERIES USING LOW OSMOLAR CONTRAST: ICD-10-PCS | Performed by: INTERNAL MEDICINE

## 2021-02-19 PROCEDURE — 6360000002 HC RX W HCPCS

## 2021-02-19 PROCEDURE — U0003 INFECTIOUS AGENT DETECTION BY NUCLEIC ACID (DNA OR RNA); SEVERE ACUTE RESPIRATORY SYNDROME CORONAVIRUS 2 (SARS-COV-2) (CORONAVIRUS DISEASE [COVID-19]), AMPLIFIED PROBE TECHNIQUE, MAKING USE OF HIGH THROUGHPUT TECHNOLOGIES AS DESCRIBED BY CMS-2020-01-R: HCPCS

## 2021-02-19 PROCEDURE — 2580000003 HC RX 258: Performed by: INTERNAL MEDICINE

## 2021-02-19 PROCEDURE — G0378 HOSPITAL OBSERVATION PER HR: HCPCS

## 2021-02-19 PROCEDURE — 4A023N7 MEASUREMENT OF CARDIAC SAMPLING AND PRESSURE, LEFT HEART, PERCUTANEOUS APPROACH: ICD-10-PCS | Performed by: INTERNAL MEDICINE

## 2021-02-19 PROCEDURE — 2580000003 HC RX 258

## 2021-02-19 PROCEDURE — B2151ZZ FLUOROSCOPY OF LEFT HEART USING LOW OSMOLAR CONTRAST: ICD-10-PCS | Performed by: INTERNAL MEDICINE

## 2021-02-19 RX ORDER — SODIUM CHLORIDE 9 MG/ML
INJECTION, SOLUTION INTRAVENOUS CONTINUOUS
Status: DISCONTINUED | OUTPATIENT
Start: 2021-02-19 | End: 2021-02-19 | Stop reason: HOSPADM

## 2021-02-19 RX ORDER — SODIUM CHLORIDE 0.9 % (FLUSH) 0.9 %
10 SYRINGE (ML) INJECTION PRN
Status: DISCONTINUED | OUTPATIENT
Start: 2021-02-19 | End: 2021-02-19 | Stop reason: HOSPADM

## 2021-02-19 RX ORDER — SODIUM CHLORIDE 0.9 % (FLUSH) 0.9 %
10 SYRINGE (ML) INJECTION EVERY 12 HOURS SCHEDULED
Status: DISCONTINUED | OUTPATIENT
Start: 2021-02-19 | End: 2021-02-19 | Stop reason: HOSPADM

## 2021-02-19 RX ORDER — ACETAMINOPHEN 325 MG/1
650 TABLET ORAL EVERY 4 HOURS PRN
Status: DISCONTINUED | OUTPATIENT
Start: 2021-02-19 | End: 2021-02-19 | Stop reason: HOSPADM

## 2021-02-19 RX ORDER — ASPIRIN 81 MG/1
81 TABLET, CHEWABLE ORAL DAILY
Qty: 30 TABLET | Refills: 3 | Status: SHIPPED | OUTPATIENT
Start: 2021-02-20 | End: 2021-03-05

## 2021-02-19 RX ADMIN — IOPAMIDOL 109 ML: 755 INJECTION, SOLUTION INTRAVENOUS at 08:49

## 2021-02-19 RX ADMIN — Medication 10 ML: at 09:35

## 2021-02-19 RX ADMIN — PANTOPRAZOLE SODIUM 40 MG: 40 TABLET, DELAYED RELEASE ORAL at 05:38

## 2021-02-19 RX ADMIN — MORPHINE SULFATE 2 MG: 2 INJECTION, SOLUTION INTRAMUSCULAR; INTRAVENOUS at 07:43

## 2021-02-19 RX ADMIN — DIPHENHYDRAMINE HYDROCHLORIDE 25 MG: 25 TABLET ORAL at 07:43

## 2021-02-19 RX ADMIN — Medication 10 ML: at 05:38

## 2021-02-19 RX ADMIN — MORPHINE SULFATE 2 MG: 2 INJECTION, SOLUTION INTRAMUSCULAR; INTRAVENOUS at 05:38

## 2021-02-19 RX ADMIN — MORPHINE SULFATE 2 MG: 2 INJECTION, SOLUTION INTRAMUSCULAR; INTRAVENOUS at 00:54

## 2021-02-19 RX ADMIN — DILTIAZEM HYDROCHLORIDE 120 MG: 120 CAPSULE, COATED, EXTENDED RELEASE ORAL at 07:43

## 2021-02-19 RX ADMIN — METHYLPREDNISOLONE SODIUM SUCCINATE 80 MG: 125 INJECTION, POWDER, FOR SOLUTION INTRAMUSCULAR; INTRAVENOUS at 07:43

## 2021-02-19 RX ADMIN — KETOROLAC TROMETHAMINE 15 MG: 15 INJECTION, SOLUTION INTRAMUSCULAR; INTRAVENOUS at 09:32

## 2021-02-19 RX ADMIN — METHYLPREDNISOLONE SODIUM SUCCINATE 80 MG: 125 INJECTION, POWDER, FOR SOLUTION INTRAMUSCULAR; INTRAVENOUS at 01:53

## 2021-02-19 RX ADMIN — CLOPIDOGREL BISULFATE 75 MG: 75 TABLET ORAL at 07:43

## 2021-02-19 RX ADMIN — DIGOXIN 125 MCG: 125 TABLET ORAL at 07:43

## 2021-02-19 RX ADMIN — KETOROLAC TROMETHAMINE 15 MG: 15 INJECTION, SOLUTION INTRAMUSCULAR; INTRAVENOUS at 01:53

## 2021-02-19 RX ADMIN — ASPIRIN 81 MG: 81 TABLET, CHEWABLE ORAL at 07:43

## 2021-02-19 ASSESSMENT — PAIN SCALES - GENERAL
PAINLEVEL_OUTOF10: 3
PAINLEVEL_OUTOF10: 6
PAINLEVEL_OUTOF10: 7
PAINLEVEL_OUTOF10: 8
PAINLEVEL_OUTOF10: 5
PAINLEVEL_OUTOF10: 4

## 2021-02-19 ASSESSMENT — PAIN DESCRIPTION - PAIN TYPE: TYPE: ACUTE PAIN

## 2021-02-19 ASSESSMENT — PAIN DESCRIPTION - PROGRESSION: CLINICAL_PROGRESSION: RAPIDLY IMPROVING

## 2021-02-19 ASSESSMENT — PAIN - FUNCTIONAL ASSESSMENT: PAIN_FUNCTIONAL_ASSESSMENT: ACTIVITIES ARE NOT PREVENTED

## 2021-02-19 ASSESSMENT — PAIN DESCRIPTION - DESCRIPTORS: DESCRIPTORS: ACHING;CRUSHING

## 2021-02-19 ASSESSMENT — PAIN DESCRIPTION - LOCATION: LOCATION: CHEST

## 2021-02-19 NOTE — OP NOTE
Cardiac Catheterization     Procedure: LHC, LVG  Complications: None  Medications: Procedural sedation with minimal conscious sedation (4 Versed/100 Fentanyl)  An independent trained observer pushed medications at my direction. We monitored the patient's level of consciousness and vital signs/physiologic status throughout the procedure duration (see start and stop times in log). Estimated Blood Loss: Less than 20 mL  Specimens: were not obtained  Access: 5 Fr Right CFA  Closure: Mynx   Start time: 0802  Stop time: 1156  Fluoro time: 2.6  Findings:     Left Heart Cath  Dominance: Right      LM: 30% distal   LAD: 50% mid LAD after first diagonal ; first diagonal and distal LAD stents widely patent   LCx: 20% mid  RCA: stent widely patent     LVEDP: 18 mmHg   LVEF: 60%    Impression/Recommendations:  Patent coronary stents with residual disease unchanged (mid LAD was FFR negative last week). OK to DC home today. Scheduled for AF/AFL ablation 2/25.       [facility]  Interventional Cardiology     o: [phone]  [facility] [address], [address] [facility]

## 2021-02-19 NOTE — PRE SEDATION
Brief Pre-Op Note/Sedation Assessment      Nikita Chase  1955  [id number]      [id number]  7:56 AM    Planned Procedure: Cardiac Catheterization Procedure    Post Procedure Plan: Return to same level of care    Consent: I have discussed with the patient and/or the patient representative the indication, alternatives, and the possible risks and/or complications of the planned procedure and the anesthesia methods. The patient and/or patient representative appear to understand and agree to proceed. Chief Complaint: Anginal Equivalent      Indications for Cath Procedure:  ACS <= 24 hrs  Anginal Classification within 2 weeks:  CCS IV - Inability to perform any activity without angina or angina at rest, i.e., severe limitation  NYHA Heart Failure Class within 2 weeks: No symptoms  Is Cath Lab Visit Valve-related?: No  Surgical Risk: Intermediate  Functional Type: >= 4 METS with symptoms    Anti- Anginal Meds within 2 weeks:   Yes: Ca Channel Blockers, Aspirin, Non-Statin (Any) and Statin (Any)    Stress or Imaging Studies Performed:  None     Vital Signs:  BP (!) 161/91   Pulse 89   Temp 97.9 °F (36.6 °C) (Temporal)   Resp 16   Ht 5' 4\" (1.626 m)   Wt 209 lb 1.6 oz (94.8 kg)   SpO2 93%   BMI 35.89 kg/m²     Allergies:   Allergies   Allergen Reactions    Hydralazine Anaphylaxis    Shellfish-Derived Products Anaphylaxis and Swelling    Asa [Aspirin] Nausea Only    Crestor [Rosuvastatin]      Myalgia      Fenofibrate      Myalgia      Hctz [Hydrochlorothiazide]     Lipitor [Atorvastatin Calcium]     Lipitor [Atorvastatin]      Myalgia      Lisinopril     Mometasone Furoate Swelling    Nasonex [Mometasone] Swelling    Nsaids      GI PAIN    Sulfa Antibiotics Swelling    Contrast [Iodides] Swelling and Rash    Flagyl [Metronidazole] Nausea And Vomiting       Past Medical History:  Past Medical History:   Diagnosis Date    Allergic rhinitis, cause unspecified 12/4/2010    Anxiety  Arthritis     CAD (coronary artery disease)     angioplasty with stent at [facility] Dr. Holden Willingham, here Dr. Yumiko Ortiz [facility],    [facility] Chronic kidney disease     frequency, urgency    Chronic pain     precordial, opiate dependent    Depression 1/22/2013    Erosive esophagitis 12/28/2016    Dr. Silvana Pereira; PPI started; LA Class A, Sphincter of Oddi manometry and sphincterotomy if symptoms don't improve.      Essential hypertension 4/11/2017    HSV infection     Hypercholesterolemia 6/6/2014    Irritable bowel syndrome with diarrhea 9/27/2016    Migraine headache 06/06/2014    Nonerosive nonspecific gastritis 12/28/2016    Dr. Silvana Pereira; body and antrum    OAB (overactive bladder) 3/14/2014         Surgical History:  Past Surgical History:   Procedure Laterality Date    ABDOMINAL ADHESION SURGERY      APPENDECTOMY      CARDIAC CATHETERIZATION  12/07/2013    recheck arteries unchanged    CHOLECYSTECTOMY      COLONOSCOPY      CORONARY ANGIOPLASTY WITH STENT PLACEMENT  10/2012    right- TOTAL OF 3 STENTS    CORONARY ANGIOPLASTY WITH STENT PLACEMENT  02/04/2021    COSMETIC SURGERY      rhinoplasty    CYSTOSCOPY  04/2014    ENDOSCOPY, COLON, DIAGNOSTIC      ERCP  01/25/2017    FRACTURE SURGERY      LUE, plate and screws    HYSTERECTOMY      HYSTERECTOMY, TOTAL ABDOMINAL      OTHER SURGICAL HISTORY      Incision and drainage of Lingual Introral Lesion    POLYPECTOMY      Dr. Nelli Keating    PTCA  10/2012    right    SINUS SURGERY      JOSE AND BSO      TONSILLECTOMY      TUBAL LIGATION      UPPER GASTROINTESTINAL ENDOSCOPY      WRIST SURGERY Left 05/21/2015    OPEN REDUCTION INTERNAL FIXATION LEFT WRIST         Medications:  Current Facility-Administered Medications   Medication Dose Route Frequency Provider Last Rate Last Admin    digoxin (LANOXIN) tablet 125 mcg  125 mcg Oral Daily JAYLEEN Quintanilla CNP   125 mcg at 02/19/21 0743    Vitamin D (CHOLECALCIFEROL) tablet 1,000 Units  1,000 Units Oral Daily Madonna L Dechristopher, APRN - CNP   1,000 Units at 02/18/21 1550    Zinc-Vitamin C  MG LOZG 1 lozenge (Patient Supplied)  1 lozenge Oral Daily Madonna L Dechristopher, APRN - CNP        morphine (PF) injection 2 mg  2 mg Intravenous Q2H PRN Madonna L Dechristopher, APRN - CNP   2 mg at 02/19/21 0743    ketorolac (TORADOL) injection 15 mg  15 mg Intravenous Q6H Brigitte Ya MD   15 mg at 02/19/21 0153    perflutren lipid microspheres (DEFINITY) injection 1.65 mg  1.5 mL Intravenous ONCE PRN Brigitte Ya MD        clopidogrel (PLAVIX) tablet 75 mg  75 mg Oral Daily Meryle Scales, MD   75 mg at 02/19/21 0743    vitamin B-12 (CYANOCOBALAMIN) tablet 1,000 mcg  1,000 mcg Oral Daily Meryle Scales, MD   1,000 mcg at 02/18/21 1551    dilTIAZem (CARDIZEM CD) extended release capsule 120 mg  120 mg Oral BID Meryle Scales, MD   120 mg at 02/19/21 0743    pantoprazole (PROTONIX) tablet 40 mg  40 mg Oral QAM AC Meryle Scales, MD   40 mg at 02/19/21 0538    sodium chloride flush 0.9 % injection 10 mL  10 mL Intravenous 2 times per day Meryle Scales, MD   10 mL at 02/18/21 2050    sodium chloride flush 0.9 % injection 10 mL  10 mL Intravenous PRN Meryle Scales, MD   10 mL at 02/19/21 0538    promethazine (PHENERGAN) tablet 12.5 mg  12.5 mg Oral Q6H PRN Meryle Scales, MD        Or    ondansetron (ZOFRAN) injection 4 mg  4 mg Intravenous Q6H PRN Meryle Scales, MD        acetaminophen (TYLENOL) tablet 650 mg  650 mg Oral Q6H PRN Meryle Scales, MD        Or    acetaminophen (TYLENOL) suppository 650 mg  650 mg Rectal Q6H PRN Meryle Scales, MD        polyethylene glycol (GLYCOLAX) packet 17 g  17 g Oral Daily PRN Meryle Scales, MD        aspirin chewable tablet 81 mg  81 mg Oral Daily Meryle Scales, MD   81 mg at 02/19/21 0743    nitroGLYCERIN (NITROSTAT) SL tablet 0.4 mg  0.4 mg Sublingual Q5 Min PRN Meryle Scales, MD 0.4 mg at 02/18/21 1133    labetalol (NORMODYNE;TRANDATE) injection 10 mg  10 mg Intravenous Q4H PRN Shay Sparks MD   10 mg at 02/17/21 1529           Pre-Sedation:    Pre-Sedation Documentation and Exam:  I have assessed the patient and agree with the H&P present on the chart. Prior History of Anesthesia Complications:   none    Modified Mallampati:  II (soft palate, uvula, fauces visible)    ASA Classification:  Class 3 - A patient with severe systemic disease that limits activity but is not incapacitating      Shant Scale: Activity:  2 - Able to move 4 extremities voluntarily on command  Respiration:  2 - Able to breathe deeply and cough freely  Circulation:  2 - BP+/- 20mmHg of normal  Consciousness:  2 - Fully awake  Oxygen Saturation (color):  2 - Able to maintain oxygen saturation >92% on room air    Sedation/Anesthesia Plan:  Guard the patient's safety and welfare. Minimize physical discomfort and pain. Minimize negative psychological responses to treatment by providing sedation and analgesia and maximize the potential amnesia. Patient to meet pre-procedure discharge plan. Medication Planned:  midazolam intravenously and fentanyl intravenously    Patient is an appropriate candidate for plan of sedation: yes    The risks, benefits, goals, and alternatives of the procedure were discussed in detail with the patient. Informed consent was obtained and further recommendations will be made following the procedure. Alvin Zee DO, [facility]  Interventional Cardiology     o: [phone]  [address] [facility], [address] [facility]      NOTE:  This report was transcribed using voice recognition software. Every effort was made to ensure accuracy; however, inadvertent computerized transcription errors may be present.

## 2021-02-19 NOTE — DISCHARGE SUMMARY
[facility]    Patient Demographics    Patient. Diogo Pedraza  Date of Birth. 1955  MRN. [id number]     Primary care provider. Lalo Kurtz MD  (Tel: [phone])    Admit date: 2/17/2021    Discharge date 2/19/2021  Note Date: 2/19/2021     Reason for Hospitalization. Chief Complaint   Patient presents with    Chest Pain     Pt c/o cp pain since last night that radiates down right arm and bilateral jaw. HX MI and afib         Significant Findings. Principal Problem:    Unstable angina (HCC)  Active Problems:    PAF (paroxysmal atrial fibrillation) (HCC)    Bruit of right carotid artery    Cardiac murmur    CAD (coronary artery disease)    Chest pain    Chest discomfort  Resolved Problems:    * No resolved hospital problems. *       Problems and results from this hospitalization that need follow up. Pt refuses ASA due to hx of ulcerative coliits  Pt also refuses statin   AIC 5.9 :  Discussed pre diabetes and need for dietary changes/ weight reduction   Follow up with EP next week for ablation    Significant test results and incidental findings. Invasive procedures and treatments. Left Heart Cath 2/19/21  Dominance: Right       LM: 30% distal   LAD: 50% mid LAD after first diagonal ; first diagonal and distal LAD stents widely patent   LCx: 20% mid  RCA: stent widely patent      LVEDP: 18 mmHg   LVEF: 60%     Impression/Recommendations:  Patent coronary stents with residual disease unchanged (mid LAD was FFR negative last week). OK to DC home today. Scheduled for AF/AFL ablation 2/25. Cerebral:Carotid, VL CAROTID DUPLEX BILATERAL.       Tech Comments   Right   The right internal carotid artery appears to have a <50% diameter reducing   stenosis based on velocity criteria. The right vertebral artery demonstrates normal antegrade flow.    The right subclavian artery is visualized and demonstrates turbulent flow. The right brachial pressure was not obtained due to IV placement . Left   The left internal carotid artery appears to have a <50% diameter reducing   stenosis based on velocity criteria. The left vertebral artery demonstrates normal antegrade flow. The left subclavian artery is visualized and demonstrates multiphasic flow. Velocities are measured in cm/s ; Diameters are measured in mm       [facility] Course. The patient was admitted through the ED with reports of non exertional chest pain. She had recently had 2 stents placed by Dr Kevin Bellamy and Dr Mary Martin. She was admitted and followed by Cardiology. She was treated for the following:    Chest Pain:  Troponin's and EKG were normal.  On the day of d/c  ( 2/19) she had a LHC ( results noted above) and did not require any additional interventions. CAD: with recent stent placement:  Pt is compliant with plavix use. She refuses ASA due to reported history of ulcerative collitis     Hx of AF:  Pt is AC with Xarelto. She is scheduled for an ablation next week . COVID testing was done prior to her d/c     HTN:  Overall bp was in range. She has follow up with cardiology       Consults. IP CONSULT TO CARDIOLOGY    Physical examination on discharge day. BP (!) 148/91   Pulse 89   Temp 97.9 °F (36.6 °C) (Temporal)   Resp 16   Ht 5' 4\" (1.626 m)   Wt 209 lb 1.6 oz (94.8 kg)   SpO2 95%   BMI 35.89 kg/m²   General appearance. Alert. Looks comfortable. HEENT. Sclera clear. Moist mucus membranes. Cardiovascular. Regular rate and rhythm, normal S1, S2. No murmur. Respiratory. Not using accessory muscles. Clear to auscultation bilaterally, no wheeze. Gastrointestinal. Abdomen soft, non-tender, not distended, normal bowel sounds  Neurology. Facial symmetry. No speech deficits. Moving all extremities equally. Extremities. No edema in lower extremities. Skin.  Warm, dry, normal turgor, right groin site unremarkable. Distal pulses are palpable     Condition at time of discharge stable     Medication instructions provided to patient at discharge. Medication List      START taking these medications    aspirin 81 MG chewable tablet  Take 1 tablet by mouth daily  Start taking on: February 20, 2021  Notes to patient: Thins blood to prevent clotting, ie heart attack or stroke  Side effects: may cause extra bruising or bleeding         CONTINUE taking these medications    clopidogrel 75 MG tablet  Commonly known as: PLAVIX  Take 1 tablet by mouth daily  Notes to patient: Use: is a blood thinner used to prevent stroke, heart attacks and other heart problems. Is used after stent placement to prevent stent closer. Side effects: Headaches, nausea, dizziness nose bleeds or other signs of increase bleeding and bruising     cyanocobalamin 1000 MCG tablet     DAIRY DIGEST EXTRA PO     digoxin 125 MCG tablet  Commonly known as: LANOXIN  Take 1 tablet by mouth daily  Notes to patient: Digoxin (Lanoxin)  Use: maintain normal hearth rhythm; decrease heart failure symptoms  Side effects: dizziness, upset stomach, diarrhea; vision changes     dilTIAZem 120 MG extended release capsule  Commonly known as: CARDIZEM CD  Take 1 capsule by mouth 2 times daily  Notes to patient: Use: lowers heart rate  Side effects: dizziness, headache, and constipation     estradiol 0.1 MG/GM vaginal cream  Commonly known as: ESTRACE  PLACE 0.5 GRAM VAGINALLY 3 TIMES A WEEK     L-Lysine 1000 MG Tabs     nitroGLYCERIN 0.4 MG SL tablet  Commonly known as: Nitrostat  Place 1 tablet under the tongue every 5 minutes as needed for Chest pain up to max of 3 total doses. If no relief after 1 dose, call 911.      POTASSIUM & MAGNESIUM ASPARTAT PO  Notes to patient: Use: increase potassium electrolytes  Side effects: high potassium     rivaroxaban 20 MG Tabs tablet  Commonly known as: Xarelto  Take 1 tablet by mouth daily (with breakfast)  Notes to patient:

## 2021-02-19 NOTE — PROGRESS NOTES
Data- discharge order received, pt verbalized agreement to discharge, disposition to previous residence, no needs for HHC/DME. Action- discharge instructions prepared and given to pt, pt verbalized understanding. Medication information packet given r/t NEW and/or CHANGED prescriptions emphasizing name/purpose/side effects, pt verbalized understanding. Discharge instruction summary: Diet- carb control, Activity- independent, Primary Care Physician as follows: Marleen Gaona -[phone] f/u appointment reviewed and complete, immunizations reviewed and complete, prescription medications filled . Inpatient surgical procedure precautions reviewed:Response- Pt belongings gathered, IV removed. Disposition is home (no HHC/DME needs), transported with RN, taken to lobby via w/c w/ Wheelchair, no complications.
Pt admitted to room 3322 from ED. VSS on room air. O x 4. Pt lives home and was experiencing CP . Admission Dx: CP. Pt is independent. Pt oriented to room and updated on POC. Pt understands and has no further questions. Call light and bedside table within reach. Safety socks on and  bed in lowest position with 2/4 siderails up. Afib on telemetry.
SL nitro given for CP.
covid swab collected.
equal.  ENT: Moist oral mucosa. Trachea midline, no adenopathy. Cardiovascular: Regular rhythm, normal S1, S2. No murmur. No edema in lower extremities  Respiratory: Not using accessory muscles. Good inspiratory effort. Clear to auscultation bilaterally, no wheeze or crackles. GI: Abdomen soft, no tenderness, not distended, normal bowel sounds  Musculoskeletal: No cyanosis in digits, neck supple  Neurology: CN 2-12 grossly intact. No speech or motor deficits  Psych: Normal affect. Alert and oriented in time, place and person  Skin: Warm, dry, normal turgor    Labs and Tests:  CBC:   Recent Labs     02/17/21  1154   WBC 8.2   HGB 14.0        BMP:    Recent Labs     02/17/21  1154      K 3.9   CL 99   CO2 25   BUN 10   CREATININE 0.8   GLUCOSE 131*     Hepatic:   Recent Labs     02/17/21  1154   AST 25   ALT 16   BILITOT 0.3   ALKPHOS 129       No acute cardiopulmonary process.              Problem List  Active Problems:    Chest pain  Resolved Problems:    * No resolved hospital problems. *       Assessment & Plan:   1. Chest pain with CAD:  Troponin's and EKG are both negative. Pain is now gone after IV morphine. I discussed plan of care with cardiology. No plans for Yari Nadira today. She is s/p ALEC and has been compliant with her DAPT  2. Hx of AF: currently in SR,  On CCB, digoxin and AC with Xarelto . Pt has planned ablation next week with EP . ( will need COVID swab prior to her d/c )   3. HTN: overall bp in range on current therapy of CCB  4. Triglycerides elevated at 262 with low Hdl at 37: Will add AIC   5.  Appreciate input from cardiology       Diet: DIET CARDIAC; No Caffeine  Code:Full Code  DVT PPX      JAYLEEN Herrera - CNP   2/18/2021 2:01 PM

## 2021-02-20 LAB — SARS-COV-2: NOT DETECTED

## 2021-02-22 ENCOUNTER — OFFICE VISIT (OUTPATIENT)
Dept: CARDIOLOGY CLINIC | Age: 66
End: 2021-02-22
Payer: MEDICARE

## 2021-02-22 ENCOUNTER — TELEPHONE (OUTPATIENT)
Dept: CARDIOLOGY CLINIC | Age: 66
End: 2021-02-22

## 2021-02-22 DIAGNOSIS — I25.118 CORONARY ARTERY DISEASE OF NATIVE ARTERY OF NATIVE HEART WITH STABLE ANGINA PECTORIS (HCC): Primary | ICD-10-CM

## 2021-02-22 DIAGNOSIS — E78.5 HYPERLIPIDEMIA, UNSPECIFIED HYPERLIPIDEMIA TYPE: ICD-10-CM

## 2021-02-22 DIAGNOSIS — I10 ESSENTIAL HYPERTENSION: ICD-10-CM

## 2021-02-22 DIAGNOSIS — I48.91 ATRIAL FIBRILLATION, UNSPECIFIED TYPE (HCC): ICD-10-CM

## 2021-02-22 PROCEDURE — 99214 OFFICE O/P EST MOD 30 MIN: CPT | Performed by: NURSE PRACTITIONER

## 2021-02-22 RX ORDER — ACETAMINOPHEN 500 MG
500 TABLET ORAL EVERY 6 HOURS PRN
COMMUNITY
End: 2021-03-29

## 2021-02-22 RX ORDER — RANOLAZINE 500 MG/1
500 TABLET, EXTENDED RELEASE ORAL 2 TIMES DAILY
Qty: 60 TABLET | Refills: 3 | Status: SHIPPED | OUTPATIENT
Start: 2021-02-22 | End: 2021-11-01

## 2021-02-22 RX ORDER — CYCLOBENZAPRINE HCL 5 MG
5 TABLET ORAL 2 TIMES DAILY PRN
Qty: 15 TABLET | Refills: 0 | Status: SHIPPED | OUTPATIENT
Start: 2021-02-22 | End: 2021-03-04

## 2021-02-22 NOTE — PATIENT INSTRUCTIONS
Start Ranexa 500mg twice a day to see if this helps chest discomfort     Try lidocaine patch over the counter for right chest and shoulder. Try Flexeril for right chest/ shoulder pain 1 tab twice a day as needed. Ask insurance if Eliquis would be cheaper for you.      Ablation as planned 2/25    Follow up with Dr. Ngoc Rocha in 1 month

## 2021-02-22 NOTE — TELEPHONE ENCOUNTER
Called patient to confirm appointment with Harleen Aden tomorrow and patient said she left a message with the answering service last night. Patient is having chest pain and afib that she has had for 3 weeks. She has had and afib on and off since last summer. She is scheduled for an ablation with Dr. Enid Sanders on Thursday. Please call patient and advise.   maricarmen

## 2021-02-22 NOTE — PROGRESS NOTES
[address]     Outpatient Follow Up Note    CHIEF COMPLAINT / HPI: Hospital Follow Up secondary to status post coronary artery stenting    Hospital record has been reviewed  Hospital Course progressed as follows per discharge summary:     HOSPITAL COURSE: 2/4/21   The patient was admitted from office visit with  persistent chest pressure to undergo cardiac catheterization. Recent nuclear stress test showed fixed anterior wall defect, so perhaps this represented severe anterior wall ischemia. She underwent cardiac catheterization which showed normal left ventricular ejection fraction of 60%, patent right coronary artery stents placed back in 2012, patent diagonal stent placed in 2017, but distal edge stenosis of 80%, 30% distal left main, 40% mid left anterior descending and 75-80% distal left anterior descending. The patient underwent a flow wire testing of  the left main and mid left anterior descending stenosis, which was physiologically nonsignificant. She then underwent stenting by Dr. Hari Moreira with two sequential ALEC stents in the left anterior descending 2.5 x 15 and 2.5 x 18 with excellent angiographic result. It was felt that the patient did have some proximal spasm just prior to the first  place that may have been contributing to progression of her coronary disease. She also had a 2.25 x 8 drug-eluting stent placed beyond the stent that had previously been placed in the first diagonal branch, all with excellent angiographic results. The patient was observed overnight. She did have a one and half minute episode of atrial fibrillation; however, it was much better tolerated by the patient. It is hopes that with the revascularization procedure done that she will tolerate atrial fibrillation or perhaps it will be controlled medically and that she will no longer need ablation therapy for AFib. [facility] Course.  2/17/21  The patient was admitted through the ED with reports of non exertional chest pain. She had recently had 2 stents placed by Dr Alexandre Mejia and Dr Caresse Prader. She was admitted and followed by Cardiology. She was treated for the following:     Chest Pain:  Troponin's and EKG were normal.  On the day of d/c  ( 2/19) she had a LHC ( results noted above) and did not require any additional interventions.     CAD: with recent stent placement:  Pt is compliant with plavix use. She refuses ASA due to reported history of ulcerative collitis      Hx of AF:  Pt is AC with Xarelto. She is scheduled for an ablation next week . COVID testing was done prior to her d/c      HTN:  Overall bp was in range. She has follow up with cardiology      Darya Yuan is 63 y.o. female who presents today for a routine follow up after a recent hospitalization related to the above mentioned issues. Subjective:   Dayra Yuan has a significant past medical history of HTN, CAD, STEMI with PCIs (PCI/ALEC RCA 10/2012, PCI to Ramus 10/2012), STEMI s/p PCI of Diagonal branch 11/2017, A fib     Since the time of discharge, the patient admits their symptoms have not changed. Pt reports chest pain described as stabbing in right chest that radiates to her back and down her right arm. Upon palpation to chest wall it is sore to touch. Pain is also exacerbated she moves her right arm and shoulder. Tylenol and heat does not help. Last night pt had 3 nitro tabs and 4 baby aspirin. This did not cause improvement. She also describes mid chest discomfort on exertion. Pt reports these symptoms are not new or different since hospitalization. Pt reports that she has been going in and out of a fib that makes her feel short of breath and causes palpations. Tolerating increased diltiazem dose. Has slight ankle and hand swelling. The patient denies orthopnea/PND. Her weight is stable since last OV. With regard to medication therapy the patient has been compliant with prescribed regimen.  They have tolerated therapy to date. Past Medical History:   Diagnosis Date    Allergic rhinitis, cause unspecified 2010    Anxiety     Arthritis     CAD (coronary artery disease)     angioplasty with stent at [facility] Dr. Nestor Wade, here Dr. Douglas Swan at [facility],    [facility] Chronic kidney disease     frequency, urgency    Chronic pain     precordial, opiate dependent    Depression 2013    Erosive esophagitis 2016    Dr. Mustapha Patterson; PPI started; LA Class A, Sphincter of Oddi manometry and sphincterotomy if symptoms don't improve.      Essential hypertension 2017    HSV infection     Hypercholesterolemia 2014    Irritable bowel syndrome with diarrhea 2016    Migraine headache 2014    Nonerosive nonspecific gastritis 2016    Dr. Mustapha Patterson; body and antrum    OAB (overactive bladder) 3/14/2014     Social History:    Social History     Tobacco Use   Smoking Status Former Smoker    Packs/day: 0.50    Years: 20.00    Pack years: 10.00    Types: Cigarettes    Quit date: 2007    Years since quittin.0   Smokeless Tobacco Never Used     Current Medications:  Current Outpatient Medications   Medication Sig Dispense Refill    aspirin 81 MG chewable tablet Take 1 tablet by mouth daily 30 tablet 3    Lactase (DAIRY DIGEST EXTRA PO) Take 1 tablet by mouth 3 times daily (with meals)      clopidogrel (PLAVIX) 75 MG tablet Take 1 tablet by mouth daily 30 tablet 3    digoxin (LANOXIN) 125 MCG tablet Take 1 tablet by mouth daily 30 tablet 3    Mag Aspart-Potassium Aspart (POTASSIUM & MAGNESIUM ASPARTAT PO) Take 1 tablet by mouth daily       Zinc-Vitamin C  MG LOZG Take 1 lozenge by mouth daily       dilTIAZem (CARDIZEM CD) 120 MG extended release capsule Take 1 capsule by mouth 2 times daily 180 capsule 3    L-Lysine 1000 MG TABS Take 1 tablet by mouth every morning (before breakfast)       cyanocobalamin 1000 MCG tablet Take 1,000 mcg by mouth daily      vitamin D 25 MCG (1000 UT) CAPS Take 1,000 Units by mouth daily       rivaroxaban (XARELTO) 20 MG TABS tablet Take 1 tablet by mouth daily (with breakfast) 90 tablet 1    nitroGLYCERIN (NITROSTAT) 0.4 MG SL tablet Place 1 tablet under the tongue every 5 minutes as needed for Chest pain up to max of 3 total doses. If no relief after 1 dose, call 911. 25 tablet 3    estradiol (ESTRACE) 0.1 MG/GM vaginal cream PLACE 0.5 GRAM VAGINALLY 3 TIMES A WEEK 42.5 g 0     No current facility-administered medications for this visit. REVIEW OF SYSTEMS:   CONSTITUTIONAL: No major weight gain or loss, night sweats, fever, fatigue, or weakness. HEENT: No new vision difficulties or ringing in the ears. RESPIRATORY: No new SOB, PND, orthopnea or cough. CARDIOVASCULAR: See HPI  GI: No N/V/D, constipation, or abdominal pain. No black/tarry stools  : No urinary urgency, incontinence, or hematuria. SKIN: No cyanosis or skin lesions. MUSCULOSKELETAL: No new muscle or joint pain. NEUROLOGICAL: No syncope or TIA-like symptoms. PSYCHIATRIC: No anxiety, pain, insomnia or depression    Objective:   PHYSICAL EXAM:        Vitals:    02/22/21 1130 02/22/21 1136 02/22/21 1150   BP: (!) 144/82 (!) 150/88 (!) 140/80   Site: Left Upper Arm Right Upper Arm    Position: Sitting Sitting    Cuff Size: Large Adult Large Adult    Pulse: 87 87    SpO2: 96% 96%    Weight: 210 lb (95.3 kg) 210 lb (95.3 kg)    Height: 5' 4\" (1.626 m) 5' 4\" (1.626 m)       VITALS:  BP (!) 140/80   Pulse 87   Ht 5' 4\" (1.626 m)   Wt 210 lb (95.3 kg)   SpO2 96%   BMI 36.05 kg/m²     CONSTITUTIONAL: Cooperative, no apparent distress, and appears well nourished / developed  NEUROLOGIC:  Awake and orientated to person, place, and time. PSYCH: Calm affect. SKIN: Warm and dry. Right groin site c/d/I. No hematoma or bruising. Good pulses, color, warmth, and sensation to that extremity. HEENT: Sclera non-icteric, normocephalic, neck supple.   RESPIRATORY:  No increased work of breathing and clear to auscultation, no crackles or wheezing. CARDIOVASCULAR:  Regular rate and rhythm without murmur. Normal S1 and S2. No gallops or rubs. Normal PMI. No elevation of JVP. Normal carotid pulses with no bruits. GI:  Normal bowel sounds. Non-distended. Non-tender to palpation  EXT: trace ankle edema. No calf tenderness. Pulses are present bilaterally. DATA:    Lab Results   Component Value Date    ALT 16 02/17/2021    AST 25 02/17/2021     (H) 01/17/2017    ALKPHOS 129 02/17/2021    BILITOT 0.3 02/17/2021     Lab Results   Component Value Date    CREATININE 0.8 02/17/2021    BUN 10 02/17/2021     02/17/2021    K 3.9 02/17/2021    CL 99 02/17/2021    CO2 25 02/17/2021     Lab Results   Component Value Date    TSH 2.81 12/22/2020     Lab Results   Component Value Date    WBC 9.7 02/19/2021    HGB 13.0 02/19/2021    HCT 39.4 02/19/2021    MCV 85.1 02/19/2021     02/19/2021     No components found for: CHLPL  Lab Results   Component Value Date    TRIG 262 (H) 02/18/2021    TRIG 239 (H) 12/22/2020    TRIG 238 (H) 05/15/2018     Lab Results   Component Value Date    HDL 37 (L) 02/18/2021    HDL 48 12/22/2020    HDL 48 05/15/2018     Lab Results   Component Value Date    LDLCALC 90 02/18/2021    LDLCALC 138 (H) 12/22/2020    LDLCALC 130 (H) 05/15/2018     Lab Results   Component Value Date    LABVLDL 52 02/18/2021    LABVLDL 48 12/22/2020    LABVLDL 48 05/15/2018     Radiology Review:  Pertinent images / reports were reviewed as a part of this visit and reveals the following:    Last Echo: 12/14/17   Summary   Left ventricle size is normal. There is mild concentric left ventricular   hypertrophy. Left ventricular function is normal with ejection fraction   estimated at 55-60 %. No regional wall motion abnormalities are noted. Diastolic filling parameters suggests grade II diastolic dysfunction. Mild   mitral and tricuspid regurgitation is present.  RVSP estimated at 36 mmHg. Last Stress Test: 12/23/2020  Summary    -There is a small-moderate sized, moderate intensity fixed anterior defect    suggestive of scar.    -There is no ischemia.    -LV function is normal with EF=70%    -Low-intermediate risk. Cardiac Cath LVG FFR PCI: 2/4/21  Anatomy:   Distal left main 30%   LAD after 1st diag 50-60%,  Distal LAD 80%  Diag after stent 80%   RCA stent widely patent  EF 60%  FFR mid LAD 0.83  Intervention  ~Successful PCI to distal LAD with 2.5x15 and 2.5x18 ALEC to 22atm. PCI to D1 with 2.25x8 ALEC to 16atm. Excellent Result. Contrast: 205  Flouro Time: 11.1  Access: R CFA. Perc stick safe zone. Angioseal closure   Impression  ~Coronary Angiography w/ severe Single vessel CAD  ~Successful complex angioplasty and stenting of LAD and D1  Recommendation  ~Aggressive medical treatment and risk factor modification  ~1. Post cath IVF. Bedrest.  2. Recommend beta blocker, high potency statin, plavix and xarelto  3. Referral to cardiac rehab placed  4. Patient has been advised on the importance of regular exercise of at least 20-30 minutes daily. 5. Patient counseled about and offered assistance for smoking cessation   6. Hold xarelto tonight and give today. Monitor overnight. Left Heart Cath 2/19/21  Dominance: Right       LM: 30% distal   LAD: 50% mid LAD after first diagonal ; first diagonal and distal LAD stents widely patent   LCx: 20% mid  RCA: stent widely patent      LVEDP: 18 mmHg   LVEF: 60%     Impression/Recommendations:  Patent coronary stents with residual disease unchanged (mid LAD was FFR negative last week). OK to DC home today. Scheduled for AF/AFL ablation 2/25    Assessment:     1. Coronary artery disease   ~ stable ; does have chest pain with mixed features, ?stable angina and definite musculoskeletal component    ~ hx of PCI of RCA with ALEC 10/2012. PCI of Ramus branch without stenting 10/2012.  ~ STEMI s/p [facility] 11/2017: PCI of diagonal branch with ALEC. Cholesterol diet. Thank you for allowing us to participate in the care of Mendonrubi Atkinsmojgan.     Antonio CLEMENS-SILVERIO  [facility]   Office: [phone]    Documentation of today's visit sent to PCP

## 2021-02-23 VITALS
WEIGHT: 210 LBS | SYSTOLIC BLOOD PRESSURE: 140 MMHG | HEIGHT: 64 IN | BODY MASS INDEX: 35.85 KG/M2 | DIASTOLIC BLOOD PRESSURE: 80 MMHG | HEART RATE: 87 BPM | OXYGEN SATURATION: 96 %

## 2021-02-25 ENCOUNTER — HOSPITAL ENCOUNTER (OUTPATIENT)
Dept: CARDIAC CATH/INVASIVE PROCEDURES | Age: 66
Discharge: HOME | End: 2021-02-26
Attending: INTERNAL MEDICINE | Admitting: INTERNAL MEDICINE
Payer: MEDICARE

## 2021-02-25 ENCOUNTER — ANESTHESIA (OUTPATIENT)
Dept: CARDIAC CATH/INVASIVE PROCEDURES | Age: 66
End: 2021-02-25
Payer: MEDICARE

## 2021-02-25 ENCOUNTER — ANESTHESIA EVENT (OUTPATIENT)
Dept: CARDIAC CATH/INVASIVE PROCEDURES | Age: 66
End: 2021-02-25
Payer: MEDICARE

## 2021-02-25 VITALS
DIASTOLIC BLOOD PRESSURE: 65 MMHG | RESPIRATION RATE: 21 BRPM | OXYGEN SATURATION: 98 % | TEMPERATURE: 96.6 F | SYSTOLIC BLOOD PRESSURE: 103 MMHG

## 2021-02-25 PROBLEM — I21.3 STEMI (ST ELEVATION MYOCARDIAL INFARCTION) (HCC): Status: RESOLVED | Noted: 2017-11-19 | Resolved: 2021-02-25

## 2021-02-25 LAB
A/G RATIO: 1.3 (ref 1.1–2.2)
ABO/RH: NORMAL
ALBUMIN SERPL-MCNC: 3.9 G/DL (ref 3.4–5)
ALP BLD-CCNC: 110 U/L (ref 40–129)
ALT SERPL-CCNC: 16 U/L (ref 10–40)
ANION GAP SERPL CALCULATED.3IONS-SCNC: 11 MMOL/L (ref 3–16)
ANTIBODY SCREEN: NORMAL
AST SERPL-CCNC: 15 U/L (ref 15–37)
BASOPHILS ABSOLUTE: 0.1 K/UL (ref 0–0.2)
BASOPHILS RELATIVE PERCENT: 0.6 %
BILIRUB SERPL-MCNC: 0.4 MG/DL (ref 0–1)
BUN BLDV-MCNC: 14 MG/DL (ref 7–20)
CALCIUM SERPL-MCNC: 9.4 MG/DL (ref 8.3–10.6)
CHLORIDE BLD-SCNC: 101 MMOL/L (ref 99–110)
CO2: 25 MMOL/L (ref 21–32)
CREAT SERPL-MCNC: 1.1 MG/DL (ref 0.6–1.2)
EKG ATRIAL RATE: 78 BPM
EKG DIAGNOSIS: NORMAL
EKG P AXIS: 14 DEGREES
EKG P-R INTERVAL: 154 MS
EKG Q-T INTERVAL: 366 MS
EKG QRS DURATION: 80 MS
EKG QTC CALCULATION (BAZETT): 417 MS
EKG R AXIS: -15 DEGREES
EKG T AXIS: 43 DEGREES
EKG VENTRICULAR RATE: 78 BPM
EOSINOPHILS ABSOLUTE: 0.2 K/UL (ref 0–0.6)
EOSINOPHILS RELATIVE PERCENT: 2.6 %
GFR AFRICAN AMERICAN: >60
GFR NON-AFRICAN AMERICAN: 50
GLOBULIN: 3.1 G/DL
GLUCOSE BLD-MCNC: 115 MG/DL (ref 70–99)
HCT VFR BLD CALC: 39.5 % (ref 36–48)
HEMOGLOBIN: 13.2 G/DL (ref 12–16)
INR BLD: 1.29 (ref 0.86–1.14)
LV EF: 58 %
LVEF MODALITY: NORMAL
LYMPHOCYTES ABSOLUTE: 2.1 K/UL (ref 1–5.1)
LYMPHOCYTES RELATIVE PERCENT: 23 %
MAGNESIUM: 2 MG/DL (ref 1.8–2.4)
MCH RBC QN AUTO: 28.6 PG (ref 26–34)
MCHC RBC AUTO-ENTMCNC: 33.3 G/DL (ref 31–36)
MCV RBC AUTO: 86 FL (ref 80–100)
MONOCYTES ABSOLUTE: 0.8 K/UL (ref 0–1.3)
MONOCYTES RELATIVE PERCENT: 8.8 %
NEUTROPHILS ABSOLUTE: 6 K/UL (ref 1.7–7.7)
NEUTROPHILS RELATIVE PERCENT: 65 %
PDW BLD-RTO: 14.2 % (ref 12.4–15.4)
PLATELET # BLD: 289 K/UL (ref 135–450)
PMV BLD AUTO: 7.4 FL (ref 5–10.5)
POC ACT LR: 311 SEC
POC ACT LR: 321 SEC
POC ACT LR: 368 SEC
POC ACT LR: 371 SEC
POC ACT LR: 374 SEC
POC ACT LR: >400 SEC
POTASSIUM SERPL-SCNC: 3.4 MMOL/L (ref 3.5–5.1)
PROTHROMBIN TIME: 15 SEC (ref 10–13.2)
RBC # BLD: 4.6 M/UL (ref 4–5.2)
SODIUM BLD-SCNC: 137 MMOL/L (ref 136–145)
TOTAL PROTEIN: 7 G/DL (ref 6.4–8.2)
WBC # BLD: 9.2 K/UL (ref 4–11)

## 2021-02-25 PROCEDURE — 2580000003 HC RX 258: Performed by: NURSE ANESTHETIST, CERTIFIED REGISTERED

## 2021-02-25 PROCEDURE — 6360000002 HC RX W HCPCS: Performed by: NURSE ANESTHETIST, CERTIFIED REGISTERED

## 2021-02-25 PROCEDURE — 93655 ICAR CATH ABLTJ DSCRT ARRHYT: CPT

## 2021-02-25 PROCEDURE — 93657 TX L/R ATRIAL FIB ADDL: CPT | Performed by: INTERNAL MEDICINE

## 2021-02-25 PROCEDURE — C1769 GUIDE WIRE: HCPCS

## 2021-02-25 PROCEDURE — 85610 PROTHROMBIN TIME: CPT

## 2021-02-25 PROCEDURE — 2580000003 HC RX 258

## 2021-02-25 PROCEDURE — 93622 COMP EP EVAL L VENTR PAC&REC: CPT | Performed by: INTERNAL MEDICINE

## 2021-02-25 PROCEDURE — 85025 COMPLETE CBC W/AUTO DIFF WBC: CPT

## 2021-02-25 PROCEDURE — 93656 COMPRE EP EVAL ABLTJ ATR FIB: CPT

## 2021-02-25 PROCEDURE — 6360000002 HC RX W HCPCS: Performed by: NURSE PRACTITIONER

## 2021-02-25 PROCEDURE — 86901 BLOOD TYPING SEROLOGIC RH(D): CPT

## 2021-02-25 PROCEDURE — 2720000010 HC SURG SUPPLY STERILE

## 2021-02-25 PROCEDURE — 93662 INTRACARDIAC ECG (ICE): CPT

## 2021-02-25 PROCEDURE — 2500000003 HC RX 250 WO HCPCS

## 2021-02-25 PROCEDURE — 86900 BLOOD TYPING SEROLOGIC ABO: CPT

## 2021-02-25 PROCEDURE — 6370000000 HC RX 637 (ALT 250 FOR IP): Performed by: INTERNAL MEDICINE

## 2021-02-25 PROCEDURE — C1894 INTRO/SHEATH, NON-LASER: HCPCS

## 2021-02-25 PROCEDURE — 6370000000 HC RX 637 (ALT 250 FOR IP): Performed by: NURSE PRACTITIONER

## 2021-02-25 PROCEDURE — 7100000001 HC PACU RECOVERY - ADDTL 15 MIN

## 2021-02-25 PROCEDURE — 36415 COLL VENOUS BLD VENIPUNCTURE: CPT

## 2021-02-25 PROCEDURE — 83735 ASSAY OF MAGNESIUM: CPT

## 2021-02-25 PROCEDURE — 6360000002 HC RX W HCPCS

## 2021-02-25 PROCEDURE — 93655 ICAR CATH ABLTJ DSCRT ARRHYT: CPT | Performed by: INTERNAL MEDICINE

## 2021-02-25 PROCEDURE — 93613 INTRACARDIAC EPHYS 3D MAPG: CPT | Performed by: INTERNAL MEDICINE

## 2021-02-25 PROCEDURE — 3700000000 HC ANESTHESIA ATTENDED CARE

## 2021-02-25 PROCEDURE — 93005 ELECTROCARDIOGRAM TRACING: CPT | Performed by: INTERNAL MEDICINE

## 2021-02-25 PROCEDURE — 93325 DOPPLER ECHO COLOR FLOW MAPG: CPT

## 2021-02-25 PROCEDURE — 93656 COMPRE EP EVAL ABLTJ ATR FIB: CPT | Performed by: INTERNAL MEDICINE

## 2021-02-25 PROCEDURE — 93613 INTRACARDIAC EPHYS 3D MAPG: CPT

## 2021-02-25 PROCEDURE — 93657 TX L/R ATRIAL FIB ADDL: CPT

## 2021-02-25 PROCEDURE — 3700000001 HC ADD 15 MINUTES (ANESTHESIA)

## 2021-02-25 PROCEDURE — C1760 CLOSURE DEV, VASC: HCPCS

## 2021-02-25 PROCEDURE — 93662 INTRACARDIAC ECG (ICE): CPT | Performed by: INTERNAL MEDICINE

## 2021-02-25 PROCEDURE — C1759 CATH, INTRA ECHOCARDIOGRAPHY: HCPCS

## 2021-02-25 PROCEDURE — 2580000003 HC RX 258: Performed by: INTERNAL MEDICINE

## 2021-02-25 PROCEDURE — C1732 CATH, EP, DIAG/ABL, 3D/VECT: HCPCS

## 2021-02-25 PROCEDURE — 93010 ELECTROCARDIOGRAM REPORT: CPT | Performed by: INTERNAL MEDICINE

## 2021-02-25 PROCEDURE — 93320 DOPPLER ECHO COMPLETE: CPT

## 2021-02-25 PROCEDURE — 85347 COAGULATION TIME ACTIVATED: CPT

## 2021-02-25 PROCEDURE — 93312 ECHO TRANSESOPHAGEAL: CPT

## 2021-02-25 PROCEDURE — 93623 PRGRMD STIMJ&PACG IV RX NFS: CPT | Performed by: INTERNAL MEDICINE

## 2021-02-25 PROCEDURE — 2500000003 HC RX 250 WO HCPCS: Performed by: NURSE ANESTHETIST, CERTIFIED REGISTERED

## 2021-02-25 PROCEDURE — 93623 PRGRMD STIMJ&PACG IV RX NFS: CPT

## 2021-02-25 PROCEDURE — 7100000000 HC PACU RECOVERY - FIRST 15 MIN

## 2021-02-25 PROCEDURE — 80053 COMPREHEN METABOLIC PANEL: CPT

## 2021-02-25 PROCEDURE — 86850 RBC ANTIBODY SCREEN: CPT

## 2021-02-25 RX ORDER — ONDANSETRON 2 MG/ML
INJECTION INTRAMUSCULAR; INTRAVENOUS PRN
Status: DISCONTINUED | OUTPATIENT
Start: 2021-02-25 | End: 2021-02-25 | Stop reason: SDUPTHER

## 2021-02-25 RX ORDER — CYCLOBENZAPRINE HCL 10 MG
5 TABLET ORAL 2 TIMES DAILY PRN
Status: DISCONTINUED | OUTPATIENT
Start: 2021-02-25 | End: 2021-02-26 | Stop reason: HOSPADM

## 2021-02-25 RX ORDER — FUROSEMIDE 10 MG/ML
INJECTION INTRAMUSCULAR; INTRAVENOUS PRN
Status: DISCONTINUED | OUTPATIENT
Start: 2021-02-25 | End: 2021-02-25 | Stop reason: SDUPTHER

## 2021-02-25 RX ORDER — LANOLIN ALCOHOL/MO/W.PET/CERES
1000 CREAM (GRAM) TOPICAL DAILY
Status: DISCONTINUED | OUTPATIENT
Start: 2021-02-25 | End: 2021-02-26 | Stop reason: HOSPADM

## 2021-02-25 RX ORDER — DEXAMETHASONE SODIUM PHOSPHATE 4 MG/ML
INJECTION, SOLUTION INTRA-ARTICULAR; INTRALESIONAL; INTRAMUSCULAR; INTRAVENOUS; SOFT TISSUE PRN
Status: DISCONTINUED | OUTPATIENT
Start: 2021-02-25 | End: 2021-02-25 | Stop reason: SDUPTHER

## 2021-02-25 RX ORDER — KETOROLAC TROMETHAMINE 30 MG/ML
15 INJECTION, SOLUTION INTRAMUSCULAR; INTRAVENOUS ONCE
Status: COMPLETED | OUTPATIENT
Start: 2021-02-25 | End: 2021-02-25

## 2021-02-25 RX ORDER — MIDAZOLAM HYDROCHLORIDE 1 MG/ML
INJECTION INTRAMUSCULAR; INTRAVENOUS PRN
Status: DISCONTINUED | OUTPATIENT
Start: 2021-02-25 | End: 2021-02-25 | Stop reason: SDUPTHER

## 2021-02-25 RX ORDER — DILTIAZEM HYDROCHLORIDE 120 MG/1
120 CAPSULE, COATED, EXTENDED RELEASE ORAL 2 TIMES DAILY
Status: DISCONTINUED | OUTPATIENT
Start: 2021-02-25 | End: 2021-02-26 | Stop reason: HOSPADM

## 2021-02-25 RX ORDER — ACETAMINOPHEN 500 MG
500 TABLET ORAL EVERY 6 HOURS PRN
Status: DISCONTINUED | OUTPATIENT
Start: 2021-02-25 | End: 2021-02-25

## 2021-02-25 RX ORDER — PROTAMINE SULFATE 10 MG/ML
INJECTION, SOLUTION INTRAVENOUS PRN
Status: DISCONTINUED | OUTPATIENT
Start: 2021-02-25 | End: 2021-02-25 | Stop reason: SDUPTHER

## 2021-02-25 RX ORDER — OXYCODONE HYDROCHLORIDE AND ACETAMINOPHEN 5; 325 MG/1; MG/1
1 TABLET ORAL EVERY 6 HOURS PRN
Status: DISCONTINUED | OUTPATIENT
Start: 2021-02-25 | End: 2021-02-26

## 2021-02-25 RX ORDER — LIDOCAINE HYDROCHLORIDE 20 MG/ML
INJECTION, SOLUTION EPIDURAL; INFILTRATION; INTRACAUDAL; PERINEURAL PRN
Status: DISCONTINUED | OUTPATIENT
Start: 2021-02-25 | End: 2021-02-25 | Stop reason: SDUPTHER

## 2021-02-25 RX ORDER — HEPARIN SODIUM 10000 [USP'U]/100ML
INJECTION, SOLUTION INTRAVENOUS CONTINUOUS PRN
Status: DISCONTINUED | OUTPATIENT
Start: 2021-02-25 | End: 2021-02-25 | Stop reason: SDUPTHER

## 2021-02-25 RX ORDER — PANTOPRAZOLE SODIUM 40 MG/1
40 TABLET, DELAYED RELEASE ORAL
Status: DISCONTINUED | OUTPATIENT
Start: 2021-02-26 | End: 2021-02-26 | Stop reason: HOSPADM

## 2021-02-25 RX ORDER — POTASSIUM &MAGNESIUM ASPARTATE 250-250 MG
250 CAPSULE ORAL DAILY
Status: DISCONTINUED | OUTPATIENT
Start: 2021-02-25 | End: 2021-02-25 | Stop reason: CLARIF

## 2021-02-25 RX ORDER — ROCURONIUM BROMIDE 10 MG/ML
INJECTION, SOLUTION INTRAVENOUS PRN
Status: DISCONTINUED | OUTPATIENT
Start: 2021-02-25 | End: 2021-02-25 | Stop reason: SDUPTHER

## 2021-02-25 RX ORDER — POTASSIUM CHLORIDE 20 MEQ/1
40 TABLET, EXTENDED RELEASE ORAL ONCE
Status: COMPLETED | OUTPATIENT
Start: 2021-02-25 | End: 2021-02-25

## 2021-02-25 RX ORDER — SODIUM CHLORIDE 9 MG/ML
INJECTION, SOLUTION INTRAVENOUS CONTINUOUS PRN
Status: DISCONTINUED | OUTPATIENT
Start: 2021-02-25 | End: 2021-02-25 | Stop reason: SDUPTHER

## 2021-02-25 RX ORDER — POTASSIUM CHLORIDE 7.45 MG/ML
INJECTION INTRAVENOUS PRN
Status: DISCONTINUED | OUTPATIENT
Start: 2021-02-25 | End: 2021-02-25

## 2021-02-25 RX ORDER — SUCCINYLCHOLINE CHLORIDE 20 MG/ML
INJECTION INTRAMUSCULAR; INTRAVENOUS PRN
Status: DISCONTINUED | OUTPATIENT
Start: 2021-02-25 | End: 2021-02-25 | Stop reason: SDUPTHER

## 2021-02-25 RX ORDER — SODIUM CHLORIDE 0.9 % (FLUSH) 0.9 %
10 SYRINGE (ML) INJECTION PRN
Status: DISCONTINUED | OUTPATIENT
Start: 2021-02-25 | End: 2021-02-26 | Stop reason: HOSPADM

## 2021-02-25 RX ORDER — SODIUM CHLORIDE 0.9 % (FLUSH) 0.9 %
10 SYRINGE (ML) INJECTION EVERY 12 HOURS SCHEDULED
Status: DISCONTINUED | OUTPATIENT
Start: 2021-02-25 | End: 2021-02-26 | Stop reason: HOSPADM

## 2021-02-25 RX ORDER — ACETAMINOPHEN 325 MG/1
650 TABLET ORAL EVERY 4 HOURS PRN
Status: DISCONTINUED | OUTPATIENT
Start: 2021-02-25 | End: 2021-02-26 | Stop reason: HOSPADM

## 2021-02-25 RX ORDER — PROPOFOL 10 MG/ML
INJECTION, EMULSION INTRAVENOUS CONTINUOUS PRN
Status: DISCONTINUED | OUTPATIENT
Start: 2021-02-25 | End: 2021-02-25 | Stop reason: SDUPTHER

## 2021-02-25 RX ORDER — NITROGLYCERIN 0.4 MG/1
0.4 TABLET SUBLINGUAL EVERY 5 MIN PRN
Status: DISCONTINUED | OUTPATIENT
Start: 2021-02-25 | End: 2021-02-26 | Stop reason: HOSPADM

## 2021-02-25 RX ORDER — POTASSIUM CHLORIDE 29.8 MG/ML
INJECTION INTRAVENOUS PRN
Status: DISCONTINUED | OUTPATIENT
Start: 2021-02-25 | End: 2021-02-25 | Stop reason: SDUPTHER

## 2021-02-25 RX ORDER — CLOPIDOGREL BISULFATE 75 MG/1
75 TABLET ORAL DAILY
Status: DISCONTINUED | OUTPATIENT
Start: 2021-02-25 | End: 2021-02-26 | Stop reason: HOSPADM

## 2021-02-25 RX ORDER — DIGOXIN 125 MCG
125 TABLET ORAL DAILY
Status: DISCONTINUED | OUTPATIENT
Start: 2021-02-25 | End: 2021-02-26 | Stop reason: HOSPADM

## 2021-02-25 RX ORDER — PROPOFOL 10 MG/ML
INJECTION, EMULSION INTRAVENOUS PRN
Status: DISCONTINUED | OUTPATIENT
Start: 2021-02-25 | End: 2021-02-25 | Stop reason: SDUPTHER

## 2021-02-25 RX ORDER — HEPARIN SODIUM 1000 [USP'U]/ML
INJECTION, SOLUTION INTRAVENOUS; SUBCUTANEOUS PRN
Status: DISCONTINUED | OUTPATIENT
Start: 2021-02-25 | End: 2021-02-25 | Stop reason: SDUPTHER

## 2021-02-25 RX ORDER — FENTANYL CITRATE 50 UG/ML
INJECTION, SOLUTION INTRAMUSCULAR; INTRAVENOUS PRN
Status: DISCONTINUED | OUTPATIENT
Start: 2021-02-25 | End: 2021-02-25 | Stop reason: SDUPTHER

## 2021-02-25 RX ORDER — RANOLAZINE 500 MG/1
500 TABLET, EXTENDED RELEASE ORAL 2 TIMES DAILY
Status: DISCONTINUED | OUTPATIENT
Start: 2021-02-25 | End: 2021-02-26 | Stop reason: HOSPADM

## 2021-02-25 RX ADMIN — SODIUM CHLORIDE: 9 INJECTION, SOLUTION INTRAVENOUS at 08:20

## 2021-02-25 RX ADMIN — SODIUM CHLORIDE: 9 INJECTION, SOLUTION INTRAVENOUS at 07:27

## 2021-02-25 RX ADMIN — PROTAMINE SULFATE 5 MG: 10 INJECTION, SOLUTION INTRAVENOUS at 10:54

## 2021-02-25 RX ADMIN — POTASSIUM CHLORIDE 40 MEQ: 1500 TABLET, EXTENDED RELEASE ORAL at 14:25

## 2021-02-25 RX ADMIN — DILTIAZEM HYDROCHLORIDE 120 MG: 120 CAPSULE, COATED, EXTENDED RELEASE ORAL at 20:45

## 2021-02-25 RX ADMIN — ONDANSETRON 4 MG: 2 INJECTION INTRAMUSCULAR; INTRAVENOUS at 10:59

## 2021-02-25 RX ADMIN — FENTANYL CITRATE 25 MCG: 50 INJECTION INTRAMUSCULAR; INTRAVENOUS at 09:10

## 2021-02-25 RX ADMIN — CLOPIDOGREL BISULFATE 75 MG: 75 TABLET ORAL at 13:23

## 2021-02-25 RX ADMIN — HEPARIN SODIUM 1000 UNITS/HR: 10000 INJECTION, SOLUTION INTRAVENOUS at 09:08

## 2021-02-25 RX ADMIN — RANOLAZINE 500 MG: 500 TABLET, FILM COATED, EXTENDED RELEASE ORAL at 20:45

## 2021-02-25 RX ADMIN — FENTANYL CITRATE 25 MCG: 50 INJECTION INTRAMUSCULAR; INTRAVENOUS at 10:17

## 2021-02-25 RX ADMIN — PROTAMINE SULFATE 25 MG: 10 INJECTION, SOLUTION INTRAVENOUS at 11:04

## 2021-02-25 RX ADMIN — FENTANYL CITRATE 25 MCG: 50 INJECTION INTRAMUSCULAR; INTRAVENOUS at 10:46

## 2021-02-25 RX ADMIN — ISOPROTERENOL HYDROCHLORIDE 2 MCG/MIN: 0.2 INJECTION, SOLUTION INTRAMUSCULAR; INTRAVENOUS at 09:23

## 2021-02-25 RX ADMIN — HEPARIN SODIUM 5000 UNITS: 1000 INJECTION INTRAVENOUS; SUBCUTANEOUS at 08:17

## 2021-02-25 RX ADMIN — FENTANYL CITRATE 50 MCG: 50 INJECTION INTRAMUSCULAR; INTRAVENOUS at 08:46

## 2021-02-25 RX ADMIN — SUGAMMADEX 100 MG: 100 INJECTION, SOLUTION INTRAVENOUS at 11:05

## 2021-02-25 RX ADMIN — DEXAMETHASONE SODIUM PHOSPHATE 8 MG: 4 INJECTION, SOLUTION INTRAMUSCULAR; INTRAVENOUS at 08:10

## 2021-02-25 RX ADMIN — PROPOFOL 150 MG: 10 INJECTION, EMULSION INTRAVENOUS at 07:41

## 2021-02-25 RX ADMIN — PHENYLEPHRINE HYDROCHLORIDE 30 MCG/MIN: 10 INJECTION INTRAVENOUS at 08:21

## 2021-02-25 RX ADMIN — HEPARIN SODIUM 2000 UNITS: 1000 INJECTION INTRAVENOUS; SUBCUTANEOUS at 08:42

## 2021-02-25 RX ADMIN — SUCCINYLCHOLINE CHLORIDE 100 MG: 20 INJECTION, SOLUTION INTRAMUSCULAR; INTRAVENOUS at 07:41

## 2021-02-25 RX ADMIN — SODIUM CHLORIDE: 9 INJECTION, SOLUTION INTRAVENOUS at 10:09

## 2021-02-25 RX ADMIN — LIDOCAINE HYDROCHLORIDE 50 MG: 20 INJECTION, SOLUTION EPIDURAL; INFILTRATION; INTRACAUDAL; PERINEURAL at 07:41

## 2021-02-25 RX ADMIN — FUROSEMIDE 20 MG: 10 INJECTION, SOLUTION INTRAMUSCULAR; INTRAVENOUS at 10:59

## 2021-02-25 RX ADMIN — PROPOFOL 120 MCG/KG/MIN: 10 INJECTION, EMULSION INTRAVENOUS at 07:43

## 2021-02-25 RX ADMIN — ACETAMINOPHEN 650 MG: 325 TABLET ORAL at 13:23

## 2021-02-25 RX ADMIN — PROPOFOL 50 MCG/KG/MIN: 10 INJECTION, EMULSION INTRAVENOUS at 08:28

## 2021-02-25 RX ADMIN — Medication 20 MEQ: at 07:46

## 2021-02-25 RX ADMIN — DIGOXIN 125 MCG: 125 TABLET ORAL at 13:22

## 2021-02-25 RX ADMIN — PROPOFOL 80 MCG/KG/MIN: 10 INJECTION, EMULSION INTRAVENOUS at 07:50

## 2021-02-25 RX ADMIN — OXYCODONE HYDROCHLORIDE AND ACETAMINOPHEN 1 TABLET: 5; 325 TABLET ORAL at 18:13

## 2021-02-25 RX ADMIN — FENTANYL CITRATE 25 MCG: 50 INJECTION INTRAMUSCULAR; INTRAVENOUS at 08:58

## 2021-02-25 RX ADMIN — ROCURONIUM BROMIDE 30 MG: 10 INJECTION, SOLUTION INTRAVENOUS at 10:18

## 2021-02-25 RX ADMIN — FENTANYL CITRATE 25 MCG: 50 INJECTION INTRAMUSCULAR; INTRAVENOUS at 10:38

## 2021-02-25 RX ADMIN — MIDAZOLAM 1 MG: 1 INJECTION INTRAMUSCULAR; INTRAVENOUS at 07:30

## 2021-02-25 RX ADMIN — PROPOFOL 50 MG: 10 INJECTION, EMULSION INTRAVENOUS at 07:48

## 2021-02-25 RX ADMIN — KETOROLAC TROMETHAMINE 15 MG: 30 INJECTION, SOLUTION INTRAMUSCULAR at 14:25

## 2021-02-25 RX ADMIN — Medication 10 ML: at 20:45

## 2021-02-25 RX ADMIN — RIVAROXABAN 20 MG: 20 TABLET, FILM COATED ORAL at 13:23

## 2021-02-25 RX ADMIN — FENTANYL CITRATE 25 MCG: 50 INJECTION INTRAMUSCULAR; INTRAVENOUS at 10:21

## 2021-02-25 RX ADMIN — HEPARIN SODIUM 5000 UNITS: 1000 INJECTION INTRAVENOUS; SUBCUTANEOUS at 08:27

## 2021-02-25 ASSESSMENT — PULMONARY FUNCTION TESTS
PIF_VALUE: 13
PIF_VALUE: 13
PIF_VALUE: 15
PIF_VALUE: 3
PIF_VALUE: 1
PIF_VALUE: 13
PIF_VALUE: 15
PIF_VALUE: 1
PIF_VALUE: 13
PIF_VALUE: 15
PIF_VALUE: 20
PIF_VALUE: 14
PIF_VALUE: 4
PIF_VALUE: 21
PIF_VALUE: 21
PIF_VALUE: 14
PIF_VALUE: 13
PIF_VALUE: 21
PIF_VALUE: 15
PIF_VALUE: 15
PIF_VALUE: 21
PIF_VALUE: 21
PIF_VALUE: 13
PIF_VALUE: 15
PIF_VALUE: 3
PIF_VALUE: 3
PIF_VALUE: 14
PIF_VALUE: 15
PIF_VALUE: 12
PIF_VALUE: 15
PIF_VALUE: 13
PIF_VALUE: 15
PIF_VALUE: 4
PIF_VALUE: 15
PIF_VALUE: 14
PIF_VALUE: 14
PIF_VALUE: 15
PIF_VALUE: 3
PIF_VALUE: 13
PIF_VALUE: 12
PIF_VALUE: 21
PIF_VALUE: 0
PIF_VALUE: 23
PIF_VALUE: 20
PIF_VALUE: 17
PIF_VALUE: 22
PIF_VALUE: 3
PIF_VALUE: 1
PIF_VALUE: 21
PIF_VALUE: 3
PIF_VALUE: 15
PIF_VALUE: 3
PIF_VALUE: 13
PIF_VALUE: 20
PIF_VALUE: 4
PIF_VALUE: 21
PIF_VALUE: 22
PIF_VALUE: 14
PIF_VALUE: 21
PIF_VALUE: 4
PIF_VALUE: 21
PIF_VALUE: 4
PIF_VALUE: 15
PIF_VALUE: 15
PIF_VALUE: 1
PIF_VALUE: 4
PIF_VALUE: 15
PIF_VALUE: 13
PIF_VALUE: 4
PIF_VALUE: 2
PIF_VALUE: 4
PIF_VALUE: 14
PIF_VALUE: 21
PIF_VALUE: 4
PIF_VALUE: 4
PIF_VALUE: 12
PIF_VALUE: 15
PIF_VALUE: 4
PIF_VALUE: 21
PIF_VALUE: 3
PIF_VALUE: 4
PIF_VALUE: 15
PIF_VALUE: 13
PIF_VALUE: 3
PIF_VALUE: 3
PIF_VALUE: 21
PIF_VALUE: 15
PIF_VALUE: 10
PIF_VALUE: 0
PIF_VALUE: 11
PIF_VALUE: 21
PIF_VALUE: 3
PIF_VALUE: 4
PIF_VALUE: 21
PIF_VALUE: 12
PIF_VALUE: 14
PIF_VALUE: 16
PIF_VALUE: 20
PIF_VALUE: 21
PIF_VALUE: 22
PIF_VALUE: 4
PIF_VALUE: 3
PIF_VALUE: 3
PIF_VALUE: 4
PIF_VALUE: 15
PIF_VALUE: 15
PIF_VALUE: 21
PIF_VALUE: 15
PIF_VALUE: 20
PIF_VALUE: 13
PIF_VALUE: 15
PIF_VALUE: 21
PIF_VALUE: 3
PIF_VALUE: 22
PIF_VALUE: 21
PIF_VALUE: 21
PIF_VALUE: 13
PIF_VALUE: 4
PIF_VALUE: 15
PIF_VALUE: 4
PIF_VALUE: 21
PIF_VALUE: 21
PIF_VALUE: 16
PIF_VALUE: 21

## 2021-02-25 ASSESSMENT — PAIN DESCRIPTION - PAIN TYPE
TYPE: ACUTE PAIN
TYPE: ACUTE PAIN
TYPE: ACUTE PAIN;SURGICAL PAIN
TYPE: ACUTE PAIN

## 2021-02-25 ASSESSMENT — PAIN SCALES - GENERAL
PAINLEVEL_OUTOF10: 5
PAINLEVEL_OUTOF10: 4
PAINLEVEL_OUTOF10: 8
PAINLEVEL_OUTOF10: 7
PAINLEVEL_OUTOF10: 6
PAINLEVEL_OUTOF10: 0
PAINLEVEL_OUTOF10: 5

## 2021-02-25 ASSESSMENT — PAIN DESCRIPTION - ORIENTATION
ORIENTATION: RIGHT
ORIENTATION: RIGHT;POSTERIOR
ORIENTATION: MID

## 2021-02-25 ASSESSMENT — PAIN DESCRIPTION - LOCATION
LOCATION: CHEST;GROIN
LOCATION: CHEST
LOCATION: CHEST;GROIN;LEG

## 2021-02-25 ASSESSMENT — PAIN DESCRIPTION - PROGRESSION
CLINICAL_PROGRESSION: GRADUALLY IMPROVING
CLINICAL_PROGRESSION: RAPIDLY IMPROVING

## 2021-02-25 ASSESSMENT — PAIN DESCRIPTION - ONSET: ONSET: ON-GOING

## 2021-02-25 ASSESSMENT — PAIN DESCRIPTION - FREQUENCY: FREQUENCY: CONTINUOUS

## 2021-02-25 NOTE — PROGRESS NOTES
Patient received to PACU in stable condition. VSS. No signs of distress. No sign of hematoma at groin site. Dressing clean dry and intact.

## 2021-02-25 NOTE — ANESTHESIA PRE PROCEDURE
nitroGLYCERIN (NITROSTAT) 0.4 MG SL tablet Place 1 tablet under the tongue every 5 minutes as needed for Chest pain up to max of 3 total doses. If no relief after 1 dose, call 911. 8/31/20   Loren Owusu MD   estradiol (ESTRACE) 0.1 MG/GM vaginal cream PLACE 0.5 GRAM VAGINALLY 3 TIMES A WEEK 6/5/20   Loren Owusu MD       Current medications:    Current Outpatient Medications   Medication Sig Dispense Refill    acetaminophen (TYLENOL) 500 MG tablet Take 500 mg by mouth every 6 hours as needed for Pain      ranolazine (RANEXA) 500 MG extended release tablet Take 1 tablet by mouth 2 times daily 60 tablet 3    cyclobenzaprine (FLEXERIL) 5 MG tablet Take 1 tablet by mouth 2 times daily as needed for Muscle spasms 15 tablet 0    aspirin 81 MG chewable tablet Take 1 tablet by mouth daily 30 tablet 3    Lactase (DAIRY DIGEST EXTRA PO) Take 1 tablet by mouth 3 times daily (with meals)      clopidogrel (PLAVIX) 75 MG tablet Take 1 tablet by mouth daily 30 tablet 3    digoxin (LANOXIN) 125 MCG tablet Take 1 tablet by mouth daily 30 tablet 3    Mag Aspart-Potassium Aspart (POTASSIUM & MAGNESIUM ASPARTAT PO) Take 1 tablet by mouth daily       Zinc-Vitamin C  MG LOZG Take 1 lozenge by mouth daily       dilTIAZem (CARDIZEM CD) 120 MG extended release capsule Take 1 capsule by mouth 2 times daily 180 capsule 3    L-Lysine 1000 MG TABS Take 1 tablet by mouth every morning (before breakfast)       cyanocobalamin 1000 MCG tablet Take 1,000 mcg by mouth daily      vitamin D 25 MCG (1000 UT) CAPS Take 1,000 Units by mouth daily       rivaroxaban (XARELTO) 20 MG TABS tablet Take 1 tablet by mouth daily (with breakfast) 90 tablet 1    nitroGLYCERIN (NITROSTAT) 0.4 MG SL tablet Place 1 tablet under the tongue every 5 minutes as needed for Chest pain up to max of 3 total doses.  If no relief after 1 dose, call 911. 25 tablet 3  estradiol (ESTRACE) 0.1 MG/GM vaginal cream PLACE 0.5 GRAM VAGINALLY 3 TIMES A WEEK 42.5 g 0     No current facility-administered medications for this encounter. Allergies:     Allergies   Allergen Reactions    Hydralazine Anaphylaxis    Shellfish-Derived Products Anaphylaxis and Swelling    Asa [Aspirin] Nausea Only    Crestor [Rosuvastatin]      Myalgia      Fenofibrate      Myalgia      Hctz [Hydrochlorothiazide]     Lipitor [Atorvastatin Calcium]     Lipitor [Atorvastatin]      Myalgia      Lisinopril     Mometasone Furoate Swelling    Nasonex [Mometasone] Swelling    Nsaids      GI PAIN    Sulfa Antibiotics Swelling    Contrast [Iodides] Swelling and Rash    Flagyl [Metronidazole] Nausea And Vomiting       Problem List:    Patient Active Problem List   Diagnosis Code    Pure hypercholesterolemia E78.00    Allergic rhinitis J30.9    HSV infection B00.9    CAD (coronary artery disease) I25.10    Chest pain R07.9    Chronic pain G89.29    Depression F32.9    OAB (overactive bladder) N32.81    Hypercholesterolemia E78.00    Migraine headache G43.909    Left wrist fracture S62.102A    Stress F43.9    Postmenopausal atrophic vaginitis N95.2    Irritable bowel syndrome with diarrhea K58.0    Mood disorder due to a general medical condition F06.30    RUQ abdominal pain R10.11    Essential hypertension I10    STEMI ([facility] elevation myocardial infarction) ([facility]) I21.3    Acute MI anterior lateral subsequent episode care ([facility] [address] I21.09    Unstable angina ([facility]) I20.0    Mild episode of recurrent major depressive disorder ([facility]) F33.0    PAF (paroxysmal atrial fibrillation) ([facility]) I48.0    Cardiac murmur R01.1    Bruit of right carotid artery R09.89    Chest discomfort R07.89    Bilateral carotid artery stenosis I65.23       Past Medical History:        Diagnosis Date    Allergic rhinitis, cause unspecified 12/04/2010    Anxiety     Arthritis  Bilateral carotid artery stenosis     < 50% bilaterally    CAD (coronary artery disease)     angioplasty with stent at [facility] Dr. Job Shea, here Dr[facility] Wendy Payan at [facility] Chronic kidney disease     frequency, urgency    Chronic pain     precordial, opiate dependent    Depression 2013    Erosive esophagitis 2016    Dr. Sally Aleman; PPI started; LA Class A, Sphincter of Oddi manometry and sphincterotomy if symptoms don't improve.      Essential hypertension 2017    HSV infection     Hypercholesterolemia 2014    Irritable bowel syndrome with diarrhea 2016    Migraine headache 2014    Nonerosive nonspecific gastritis 2016    Dr. Sally Aleman; body and antrum    OAB (overactive bladder) 2014       Past Surgical History:        Procedure Laterality Date    ABDOMINAL ADHESION SURGERY      APPENDECTOMY      CARDIAC CATHETERIZATION  2013    recheck arteries unchanged    CHOLECYSTECTOMY      COLONOSCOPY      CORONARY ANGIOPLASTY WITH STENT PLACEMENT  10/2012    right- TOTAL OF 3 STENTS    CORONARY ANGIOPLASTY WITH STENT PLACEMENT  2021    COSMETIC SURGERY      rhinoplasty    CYSTOSCOPY  2014    ENDOSCOPY, COLON, DIAGNOSTIC      ERCP  2017    FRACTURE SURGERY      LUE, plate and screws    HYSTERECTOMY      HYSTERECTOMY, TOTAL ABDOMINAL      OTHER SURGICAL HISTORY      Incision and drainage of Lingual Introral Lesion    POLYPECTOMY      Dr. Isabelle Pereira PTCA  10/2012    right    SINUS SURGERY      JOSE AND BSO      TONSILLECTOMY      TUBAL LIGATION      UPPER GASTROINTESTINAL ENDOSCOPY      WRIST SURGERY Left 2015    OPEN REDUCTION INTERNAL FIXATION LEFT WRIST       Social History:    Social History     Tobacco Use    Smoking status: Former Smoker     Packs/day: 0.50     Years: 20.00     Pack years: 10.00     Types: Cigarettes     Quit date: 2007     Years since quittin.0  Smokeless tobacco: Never Used   Substance Use Topics    Alcohol use: No                                Counseling given: Not Answered      Vital Signs (Current): There were no vitals filed for this visit. BP Readings from Last 3 Encounters:   02/22/21 (!) 140/80   02/19/21 (!) 148/91   02/05/21 (!) 143/83       NPO Status:                                                                                 BMI:   Wt Readings from Last 3 Encounters:   02/22/21 210 lb (95.3 kg)   02/19/21 209 lb 1.6 oz (94.8 kg)   02/05/21 210 lb 6.4 oz (95.4 kg)     There is no height or weight on file to calculate BMI.    CBC:   Lab Results   Component Value Date    WBC 9.7 02/19/2021    RBC 4.63 02/19/2021    HGB 13.0 02/19/2021    HCT 39.4 02/19/2021    MCV 85.1 02/19/2021    RDW 13.8 02/19/2021     02/19/2021       CMP:   Lab Results   Component Value Date     02/17/2021    K 3.9 02/17/2021    CL 99 02/17/2021    CO2 25 02/17/2021    BUN 10 02/17/2021    CREATININE 0.8 02/17/2021    GFRAA >60 02/17/2021    GFRAA >60 12/19/2012    AGRATIO 1.5 02/17/2021    LABGLOM >60 02/17/2021    LABGLOM 67 05/14/2016    GLUCOSE 131 02/17/2021    GLUCOSE 96 11/14/2011    PROT 7.8 02/17/2021    PROT 6.2 02/21/2013    CALCIUM 9.8 02/17/2021    BILITOT 0.3 02/17/2021    ALKPHOS 129 02/17/2021    AST 25 02/17/2021    ALT 16 02/17/2021       POC Tests: No results for input(s): POCGLU, POCNA, POCK, POCCL, POCBUN, POCHEMO, POCHCT in the last 72 hours.     Coags:   Lab Results   Component Value Date    PROTIME 14.1 02/19/2021    INR 1.21 02/19/2021       HCG (If Applicable): No results found for: PREGTESTUR, PREGSERUM, HCG, HCGQUANT     ABGs: No results found for: PHART, PO2ART, LCM7CED, LJX2GYK, BEART, V7QPSENL     Type & Screen (If Applicable):  No results found for: LABABO, LABRH    Drug/Infectious Status (If Applicable):  No results found for: HIV, HEPCAB    COVID-19 Screening (If Applicable): Lab Results   Component Value Date    COVID19 Not Detected 02/19/2021         Anesthesia Evaluation    Airway: Mallampati: II  TM distance: >3 FB   Neck ROM: full  Mouth opening: > = 3 FB Dental:          Pulmonary:                              Cardiovascular:    (+) hypertension:, angina:, past MI:, CAD:, dysrhythmias:,         Rhythm: regular  Rate: normal                    Neuro/Psych:   (+) headaches:, psychiatric history:            GI/Hepatic/Renal:             Endo/Other:                     Abdominal:           Vascular:                                    Conclusions      Summary   Left ventricle size is normal. There is mild concentric left ventricular   hypertrophy. Left ventricular function is normal with ejection fraction   estimated at 55-60 %. No regional wall motion abnormalities are noted. Diastolic filling parameters suggests grade II diastolic dysfunction. Mild   mitral and tricuspid regurgitation is present. RVSP estimated at 36 mmHg. Signature      ------------------------------------------------------------------   Electronically signed by Shabbir Arreola MD   (Interpreting physician) on 12/18/2017 at 04:44 PM   ------------------------------------------------------------------       Anesthesia Plan      general     ASA 3       Induction: intravenous. Anesthetic plan and risks discussed with patient. Plan discussed with CRNA.                   Mars Ryder MD   2/25/2021

## 2021-02-25 NOTE — PLAN OF CARE
Problem: Infection:  Goal: Will remain free from infection  Description: Will remain free from infection  Outcome: Ongoing  Note: Pt has bandage on her R groin for an ablation. Problem: Pain:  Goal: Patient's pain/discomfort is manageable  Description: Patient's pain/discomfort is manageable  Outcome: Ongoing  Note: Pt is c/o of pain 7/10 in pain scale. Pain meds administered. Pt pain is getting better.

## 2021-02-25 NOTE — H&P
[address]   Electrophysiology   Date: 2/25/2021    CC: Afib/flutter  HPI: Kae Hughes is a 63 y.o. female who has been referred today for further evaluation of atrial fibrillation/flutter noted on her Holter monitoring. Patient has a complex past medical history including coronary artery disease, STEMI with PCI (PCI/ALEC RCA 10/2012, PCI to Ramus 10/2012, ) , and hypertension. She presented to [facility] on 11/19/2017 with complaints of chest pain that radiates to her jaw and arms. She was seen by cardiology and diagnosed with a STEMI and a LHC was completed with a ALEC in the diagonal branch. Developed chest pain again 11/20/2017 and a repeat LHC was completed with no further intervention necessary. She has seen multiple cardiologist and is also done to [facility]. She states that she has intolerance to many medication and is not taking aspirin or Plavix. She presented to her PCP's office ,Dara Pemberton, on 8/31/2020. She had continued complaints of intermittent angina. She also complained of palpitation and Holter monitoring was done which showed atrial fibrillation with rapid ventricular rate. She has intermittent palpitation and angina. This summer she noticed that she was having worsening episodes of fluttering. She states that she was a very active person who walks at least 2 miles a day but has recently has not been able to because of shortness of breath and fluttering. Assessment and plan:     Atrial fibrillation and flutter with RVR:    New  Diagnosis. She is symptomatic with these episodes. She complains of palpitation and chest pain. I had a detailed discussions about treatment options which include antiarrhythmic therapy versus ablation. Antiarrhythmic therapy is limited to amiodarone due to history of MI and coronary artery disease. I explained risks and side effects of amiodarone therapy and she is not interested in antiarrhythmic therapy.   She states that she has had intolerance to many medications. Ablation of atrial fibrillation discussed with the patient. We went over the procedures including risks benefits. Complications of ablation procedure discussed. These include but limited to vascular injury, hematoma, esophageal injury, cardiac perforation, stroke, arrhythmia, etc.  She verbalized understanding and would like to proceed with it. We discussed the importance of compliance with anticoagulation particularly after ablation of atrial fibrillation. We also discussed possibility of repeat ablation for recurrence.      -CrCl of 107 ml/min based on (Cr of 0.8)   -[id number] Vasc score 3 (HTN, CAD, Female)    Start Xarelto 20 mg/day. - CAD   History of multiple stents to RCA, STEMI 2017   -LHC was completed with a ALEC in the diagonal branch at [facility] 2017   -She is not on ASA because she states she is intolerant d/t history of endometriosis. Had cardiac cath in 2/2021 and coronary stents were patent. HTN  Vitals:    02/25/21 0708   BP: (!) 157/96   Pulse: 78   Resp: 18   Temp: 98 °F (36.7 °C)   SpO2: 96%    Home BP monitoring encourage with a BP goal <130/80   Follow up with Dr. Jaylen Betancourt. Obesity  Body mass index is 35.87 kg/m². - Excessive weight is complicating assessment and treatment. It is placing patient at risk for multiple co-morbidities as well as early death and contributing to the patient's presentation. - discussed weight management with diet and exercise      - Will consider OSEI evaluation. Reports no symptoms. Diagnostic studies:   ECG 12/15/20   Sinus rhythm    1 week Holter monitor 11/17/2020  Afib/flutter, symptomatic  Avg HR 86, min 59, max 144 (in SR)  Avg HR in afib 161    Echo 12/14/2017   Summary   Left ventricle size is normal. There is mild concentric left ventricular   hypertrophy. Left ventricular function is normal with ejection fraction   estimated at 55-60 %.  No regional wall motion abnormalities are noted. Diastolic filling parameters suggests grade II diastolic dysfunction. Mild   mitral and tricuspid regurgitation is present. RVSP estimated at 36 mmHg. CARDIAC CATH OUTSIDE 12-7-12  FINDINGS  LVP 91/11  EST EF 65%  WALL MOTION normal  Valve function no MR or aortic stenosis  dominance right  left main normal    left anterior descending course to the undersurface of the apex. it gives rise to a large diagonal branch. there is 30% focal mid LAD stenosis just beyond the diagonal branch. 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 There is 20% proximal stenosis of the diagonal branch. left circumflex supplies a small ramus medius, and 2 large obtuse marginal branches. There is 40% tubular distal LCX stenosis proximal to the 2nd obtuse marginal branch. There is 30% stenosis of the small ramus intermedius. right supplies a small 1 mm marginal branch, large posterior descending branch a large posterior left ventricular branch and 2 small posterior descending branch a large posterior left ventricular branches. there is no stenosis in the mid RCA stent. There is 70% ostial and 30% proximal stenosis of the small marginal branch. I independently reviewed the cardiac diagnostic studies, ECG and relevant imaging studies. Physical Examination:  Vitals:    02/25/21 0708   BP: (!) 157/96   Pulse: 78   Resp: 18   Temp: 98 °F (36.7 °C)   SpO2: 96%      Wt Readings from Last 3 Encounters:   02/25/21 209 lb (94.8 kg)   02/22/21 210 lb (95.3 kg)   02/19/21 209 lb 1.6 oz (94.8 kg)       · Constitutional: Oriented. No distress. · Head: Normocephalic and atraumatic. · Mouth/Throat: Oropharynx is clear and moist.   · Eyes: Conjunctivae normal. EOM are normal.   · Neck: Neck supple. No JVD present. · Cardiovascular: Normal rate, regular rhythm, S1&S2. · Pulmonary/Chest: Bilateral respiratory sounds. No rhonchi. · Abdominal: Soft. No tenderness. · Musculoskeletal: No tenderness.  No edema · Lymphadenopathy: Has no cervical adenopathy. · Neurological: Alert and oriented. Follows command, No Gross deficit   · Skin: Skin is warm, No rash noted. · Psychiatric: Has a normal behavior     Review of System:  [x] Full ROS obtained and negative except as mentioned in HPI    Prior to Admission medications    Medication Sig Start Date End Date Taking?  Authorizing Provider   ranolazine (RANEXA) 500 MG extended release tablet Take 1 tablet by mouth 2 times daily 2/22/21  Yes JAYLEEN Ledezma CNP   aspirin 81 MG chewable tablet Take 1 tablet by mouth daily 2/20/21  Yes JAYLEEN Quintanilla CNP   Lactase (DAIRY DIGEST EXTRA PO) Take 1 tablet by mouth 3 times daily (with meals)   Yes Historical Provider, MD   clopidogrel (PLAVIX) 75 MG tablet Take 1 tablet by mouth daily 2/6/21  Yes Clem Shaw MD   digoxin (LANOXIN) 125 MCG tablet Take 1 tablet by mouth daily 2/6/21  Yes Clem Shaw MD   Mag Aspart-Potassium Aspart (POTASSIUM & MAGNESIUM ASPARTAT PO) Take 1 tablet by mouth daily    Yes Historical Provider, MD   Zinc-Vitamin C  MG LOZG Take 1 lozenge by mouth daily    Yes Historical Provider, MD   dilTIAZem (CARDIZEM CD) 120 MG extended release capsule Take 1 capsule by mouth 2 times daily 1/27/21  Yes Clem Shaw MD   L-Lysine 1000 MG TABS Take 1 tablet by mouth every morning (before breakfast)    Yes Historical Provider, MD   cyanocobalamin 1000 MCG tablet Take 1,000 mcg by mouth daily   Yes Historical Provider, MD   estradiol (ESTRACE) 0.1 MG/GM vaginal cream PLACE 0.5 GRAM VAGINALLY 3 TIMES A WEEK 6/5/20  Yes Camelia Bey MD   acetaminophen (TYLENOL) 500 MG tablet Take 500 mg by mouth every 6 hours as needed for Pain    Historical Provider, MD   cyclobenzaprine (FLEXERIL) 5 MG tablet Take 1 tablet by mouth 2 times daily as needed for Muscle spasms 2/22/21 3/4/21  JAYLEEN Ledezma CNP   vitamin D 25 MCG (1000 UT) CAPS Take 1,000 Units by mouth daily  PTCA  10/2012    right    SINUS SURGERY      JOSE AND BSO      TONSILLECTOMY      TUBAL LIGATION      UPPER GASTROINTESTINAL ENDOSCOPY      WRIST SURGERY Left 05/21/2015    OPEN REDUCTION INTERNAL FIXATION LEFT WRIST       Allergies   Allergen Reactions    Hydralazine Anaphylaxis    Shellfish-Derived Products Anaphylaxis and Swelling    Asa [Aspirin] Nausea Only    Crestor [Rosuvastatin]      Myalgia      Fenofibrate      Myalgia      Hctz [Hydrochlorothiazide]     Lipitor [Atorvastatin Calcium]     Lipitor [Atorvastatin]      Myalgia      Lisinopril     Mometasone Furoate Swelling    Nasonex [Mometasone] Swelling    Nsaids      GI PAIN    Sulfa Antibiotics Swelling    Contrast [Iodides] Swelling and Rash    Flagyl [Metronidazole] Nausea And Vomiting       Social History:  Reviewed. reports that she quit smoking about 14 years ago. Her smoking use included cigarettes. She has a 10.00 pack-year smoking history. She has never used smokeless tobacco. She reports that she does not drink alcohol or use drugs. Family History:  Reviewed. Reviewed. No family history of SCD. Relevant and available labs, and cardiovascular diagnostics reviewed. Reviewed. I independently reviewed all cardiac tracing. All questions and concerns were addressed to the patient/family. Alternatives to my treatment were discussed. I have discussed the above stated plan and the patient verbalized understanding and agreed with the plan. NOTE: This report was transcribed using voice recognition software. Every effort was made to ensure accuracy, however, inadvertent computerized transcription errors may be present. Maddy Guthrie MD, MPH  [place] [address]   Office: [phone]     H&P Update    I have reviewed the history and physical and examined the patient and updated with relevant changes.      Consent: I have discussed with the patient and/or the patient representative the indication, alternatives, and the possible risks and/or complications of the planned procedure and the anesthesia methods. The patient and/or patient representative appear to understand and agree to proceed. The risks, benefits and alternatives of the ablation procedure were discussed with the patient. The risks including, but not limited to, the risks of bleeding, infection, radiation exposure, injury to vascular, cardiac and surrounding structures (including pneumothorax), stroke, cardiac perforation, tamponade, need for emergent open heart surgery, need for pacemaker implantation, Injury to the phrenic nerve, injury to the esophagus, myocardial infarction and death were discussed in detail. Vitals:    02/25/21 0708   BP: (!) 157/96   Pulse: 78   Resp: 18   Temp: 98 °F (36.7 °C)   SpO2: 96%     Prior to Admission medications    Medication Sig Start Date End Date Taking?  Authorizing Provider   ranolazine (RANEXA) 500 MG extended release tablet Take 1 tablet by mouth 2 times daily 2/22/21  Yes JAYLEEN Norton - CNP   aspirin 81 MG chewable tablet Take 1 tablet by mouth daily 2/20/21  Yes Rena Watts APRN - CNP   Lactase (DAIRY DIGEST EXTRA PO) Take 1 tablet by mouth 3 times daily (with meals)   Yes Historical Provider, MD   clopidogrel (PLAVIX) 75 MG tablet Take 1 tablet by mouth daily 2/6/21  Yes Mayco Martinez MD   digoxin (LANOXIN) 125 MCG tablet Take 1 tablet by mouth daily 2/6/21  Yes Mayco Martinez MD   Mag Aspart-Potassium Aspart (POTASSIUM & MAGNESIUM ASPARTAT PO) Take 1 tablet by mouth daily    Yes Historical Provider, MD   Zinc-Vitamin C  MG LOZG Take 1 lozenge by mouth daily    Yes Historical Provider, MD   dilTIAZem (CARDIZEM CD) 120 MG extended release capsule Take 1 capsule by mouth 2 times daily 1/27/21  Yes Mayco Martinez MD   L-Lysine 1000 MG TABS Take 1 tablet by mouth every morning (before breakfast)    Yes Historical Provider, MD   cyanocobalamin 1000 MCG tablet Take 1,000

## 2021-02-25 NOTE — PROCEDURES
Femoral venous access was obtained under live ultrasound guidance. The femoral vein was identified with real time visualization of needle passage to the venous lumen. We gained access to right femoral vein. Two 8F and one long 8.5 sheath was using and were placed in the right femoral vein using modified Seldinger technique and ultrasound guidance. Then a CS cathter was placed inside the coronary sinus under fluoroscopy for recording and mapping of the left atrium. Using ICE we delineated the left pulmonary vein, left atrial appendage,  mitral valve, and right superior and right inferior pulmonary veins. Patient received a bolus of heparin prior transseptal puncture followed by continuous monitoring of the ACT and boluses of heparin during the procedure to keep the ACT more than 350. Also an esophageal temperature probe in addition to Esophastar catheter was advanced into the esophagus for mapping of the esophagus and careful monitoring of the esophageal temperature during ablation. Transseptal punctures through intact septum were done using ICE, pressure monitoring. Using Penta ray and Carto navigation system a three dimensional electro anatomical mapping of the left atrium, in addition to right and left sided pulmonary vein was created. Using Penta ray catheter voltage mapping of the left atrium was created. All pulmonary veins had PV fascicles, the triggers for the atrial fibrillation. Then wide area circumferential ablation for right and left sided pulmonary veins were performed. Linear RF lesions was placed around both superior and inferior pulmonary vein in right and left side well outside the pulmonary vein ostium till all four pulmonary veins were isolated. The power was limited to 35W, on the posterior wall and careful monitoring of esophageal temperature was done to prevent injury to esophagus.  On the right side before ablating the anterior wall high amp pacing was performed to check and locate phrenic capture and avoid injury during ablation. Patient had an unusual anatomy. She had of wide and thick kimani between the left inferior and left superior pulmonary vein. Despite circumflexion ablation of the left sided pulmonary vein she still had kimani. Patient is kimani exit and capture the atrium. Ablating of this thick kimani was challenging and difficult. Patient had bursts of focal atrial tachycardia with variable cycle length. The ridge between the left superior pulmonary vein and appendage was a region of this atrial tachycardia which was mapped. Ablation in this area terminated the tachycardia. Patient was then started on Isopril and aggressive burst pacing and programmed stimulation was performed. With burst pacing at 220 ms we induced atrial flutter. This atrial flutter had variable cycle length between 210 -230 msec. Activation pattern of this flutter was suggestive of a dual loop atrial flutter. Mapping of this atrial flutter was challenging because of changes in activation pattern. One activation pattern was proximal to distal coronary sinus, and mapping of this loop was suggestive of a right-sided CTI flutter. She also had another activation pattern with relatively simultaneous CS poles activation suggestive of a roof-dependent atrial flutter. Activation mapping of this flutter suggest posterior wall activation from inferior to superior anterior wall activation from anterior to inferior. Using three dimensional electroanatomical mapping of the cavotricuspid isthmus which was created by Carto navigation system, an irrigated ablation cathter was advanced into position along the ventricular septal aspect of the cavotricuspid isthmus under fluoroscopic guidance and 3-D mapping. Radiofrequency lesions were delivered in a linear fashion to Cavotricuspid isthmus.  Bidirectional block was later confirmed by noting split potentials along the ablation line (> 100 ms) in addition to trans-isthmus conduction time of ~ 150 ms. A roof line created by linear ablation on the roof connecting right superior to left superior vein for the roof dependent atrial flutter. Again patient received Isuprel IV was started to check for induction of any other arrhythmia. Burst pacing up to 220 msec and programmed stimulation with 500/260/200/200 ms did not induce any sustained arrhythmia. Pacing with high output over the ablation lines were performed and areas that had captures on the line was further ablated till no further capture noted. Exit block was checked with high amp pacing inside and over the ablation lines. Both entrance and exit block was confirmed and pacing maneuvers. After ablation EP study and programmed stimulation was performed to rule out any other arrhythmia. The ablation catheter were moved from the left atrium to the left ventricular apex and His bundle position. His bundle potentials was recorded and pacing and recordings were performed from right atrium, coronary sinus and LV apex with the following results:     AH interval was 78  msec  HV interval was 43 msec  Pacing from right atrium, 1:1 conduction over AV node with (AV wenckebach) was 330 msec  Pacing from atrium, AV karrie ERP was 500/300 msec   Atrial ERP was 500/250 msec  Pacing from LV apex, No 1:1 retrograde conduction over AV node noted     Procedure was performed with intracardiac ultrasound and 3D mapping system without using fluoroscopy. Procedure was complex and prolonged due to unusual anatomy, very wide and thick kimani between the LIPV and LSPV which made isolation and ablation difficult and also induction of atrial flutters with variable activation pattern which made mapping difficult and prolonged. At the end of the procedure, using ICE we confirmed the lack of any pericardial effusion. Protamine was given to reverse the heapin.      Sheaths were removed using and Perclose device was used for closure of the access sites. The patient tolerated the procedure well and there were no complications. Patient was extubated and transferred to the floor in stable condition. EBL less than 20 ml. Plan:   The patient will be observed, receives usual post ablation care and discharged if remains stable.      Ran Basilio MD, MPH  [facility]   Office: [phone]

## 2021-02-25 NOTE — DISCHARGE SUMMARY
Lungs clear to auscultation bilaterally, no wheezing, rhonchi, or crackles  Cardiovascular: Regular rate and rhythm. S1/S2 present without murmurs, rubs, or gallops. Abdomen: Soft, nontender, +bowel sounds  Extremities: No edema. Right groin ablation site soft, no hematoma/swelling/oozing noted. Significant Diagnostic Studies:   JESSICA: 2/25/21  Left ventricle size is normal. Mild left ventricular hypertrophy. Left ventricular function is normal with ejection fraction estimated at 55-60 %. No regional wall motion abnormalities are noted. There is no evidence of mass or thrombus in the left atrium or appendage. Mild MR and TR. Small patent foramen ovale with left-to-right shunt was visualized. Labs  CBC:   Lab Results   Component Value Date    WBC 14.6 02/26/2021    HGB 11.6 02/26/2021    HCT 35.8 02/26/2021    MCV 85.9 02/26/2021     02/26/2021     BMP:   Lab Results   Component Value Date     02/26/2021    K 4.5 02/26/2021    K 3.9 02/17/2021     02/26/2021    CO2 23 02/26/2021    PHOS 4.3 01/16/2017    BUN 13 02/26/2021    CREATININE 0.7 02/26/2021    GFRAA >60 02/26/2021    GFRAA >60 12/19/2012    MG 2.00 02/25/2021      Estimated Creatinine Clearance: 93 mL/min (based on SCr of 0.7 mg/dL).      Thyroid:   Lab Results   Component Value Date    TSH 2.81 12/22/2020     Lipids:  Lab Results   Component Value Date    CHOL 179 02/18/2021    HDL 37 02/18/2021    TRIG 262 02/18/2021     LFTS:   Lab Results   Component Value Date    ALT 16 02/25/2021    AST 15 02/25/2021     01/17/2017    ALKPHOS 110 02/25/2021    PROT 7.0 02/25/2021    PROT 6.2 02/21/2013    AGRATIO 1.3 02/25/2021    BILITOT 0.4 02/25/2021     Cardiac Enzymes:   Lab Results   Component Value Date    CKTOTAL 46 06/13/2018    CKMB 3.1 01/18/2017    TROPONINI <0.01 02/17/2021    TROPONINI <0.01 02/17/2021    TROPONINI <0.01 02/17/2021     Coags:   Lab Results   Component Value Date    PROTIME 15.0 02/25/2021    INR 1.29 02/25/2021       Disposition: home    Home Medications:   Aidee Stokes   Home Medication Instructions [facility]:[id number]    Printed on:02/26/21 8843   Medication Information                      acetaminophen (TYLENOL) 500 MG tablet  Take 500 mg by mouth every 6 hours as needed for Pain             aspirin 81 MG chewable tablet  Take 1 tablet by mouth daily             clopidogrel (PLAVIX) 75 MG tablet  Take 1 tablet by mouth daily             cyanocobalamin 1000 MCG tablet  Take 1,000 mcg by mouth daily             cyclobenzaprine (FLEXERIL) 5 MG tablet  Take 1 tablet by mouth 2 times daily as needed for Muscle spasms             digoxin (LANOXIN) 125 MCG tablet  Take 1 tablet by mouth daily             dilTIAZem (CARDIZEM CD) 120 MG extended release capsule  Take 1 capsule by mouth 2 times daily             estradiol (ESTRACE) 0.1 MG/GM vaginal cream  PLACE 0.5 GRAM VAGINALLY 3 TIMES A WEEK             L-Lysine 1000 MG TABS  Take 1 tablet by mouth every morning (before breakfast)              Lactase (DAIRY DIGEST EXTRA PO)  Take 1 tablet by mouth 3 times daily (with meals)             Mag Aspart-Potassium Aspart (POTASSIUM & MAGNESIUM ASPARTAT PO)  Take 1 tablet by mouth daily              nitroGLYCERIN (NITROSTAT) 0.4 MG SL tablet  Place 1 tablet under the tongue every 5 minutes as needed for Chest pain up to max of 3 total doses. If no relief after 1 dose, call 911.              ranolazine (RANEXA) 500 MG extended release tablet  Take 1 tablet by mouth 2 times daily             rivaroxaban (XARELTO) 20 MG TABS tablet  Take 1 tablet by mouth daily (with breakfast)             Zinc-Vitamin C  MG LOZG  Take 1 lozenge by mouth daily                Problem List:  Patient Active Problem List   Diagnosis    Pure hypercholesterolemia    Allergic rhinitis    HSV infection    CAD (coronary artery disease)    Chest pain    Chronic pain    Depression    OAB (overactive bladder)    Hypercholesterolemia    s/p ablation   - Improved today, intermittent episodes with deep breathing   - Hemodynamically stable   - Discussed using ice and OTC tylenol to relieve pain at home    4. HTN  - Slightly uncontrolled this AM (Has not received morning meds): Goal <130/80  - Continue current medications  - Encouraged to monitor and log BP readings at home, then bring log to next visit  - Discussed importance of low sodium diet, weight control and exercise    5. Obesity  - Body mass index is 36.46 kg/m². - Complicating assessment and treatment. Placing patient at risk for multiple co-morbidities as well as early death and contributing to the patient's presentation  - Discussed weight loss and patient was encouraged to reduce calorie intake and increase exercise    6. Hypokalemia   - Resolved with oral replacement    Overall the patient is stable for discharge from a CV standpoint    Diet & Exercise:   The patient is counseled to follow a low salt diet to assure blood pressure remains controlled for cardiovascular risk factor modification   The patient is counseled to avoid excess caffeine, and energy drinks as this may exacerbated ectopy and arrhythmia   The patient is counseled to lose weight to control cardiovascular risk factors   Exercise program discussed: To improve overall cardiovascular health, the patient is instructed to increase cardiovascular related activities with a goal of 150 min/week of moderate level activity or 10,000 steps per day.  Encouraged to perform as much activity as tolerated    EF of 08%  ACEi for systolic HF: N/A  ASA and Statin for CAD: Yes  Anticoagulation for AF and heart failure: Yes  Tobacco use was discussed with the patient and education provided: N/A  Documentation sent to PCP: Note sent to PCP office    Activity: See discharge instructions  Diet: cardiac diet  Wound Care: See discharge instructions    F/U:   1. Follow-up with cardiology in 4 weeks  -Call [facility], [place] at [phone] with any questions    Signed:  Rhoda Styles CNP  2/26/2021  9:45 AM    Total time spent on day of discharge including face-to-face visit, examination, documentation, counseling, preparation of discharge plans and follow-up, and discharge medicine reconciliation and prescriptions is >31 minutes

## 2021-02-25 NOTE — ANESTHESIA POSTPROCEDURE EVALUATION
Department of Anesthesiology  Postprocedure Note    Patient: Olya Márquez  MRN: [id number]  YOB: 1955  Date of evaluation: 2/25/2021  Time:  12:41 PM     Procedure Summary     Date: 02/25/21 Room / Location: [facility] Cath Lab; [facility] Echocardiography    Anesthesia Start: 0730 Anesthesia Stop: 0715    Procedure: ECHO JESSICA IN CARDIAC CATH Diagnosis:       Unspecified atrial fibrillation      PA[facility]oxys[facility]) ([facility]    Scheduled Providers:  Responsible Provider: Olamide Wilson MD    Anesthesia Type: general ASA Status: 3          Anesthesia Type: general    Shant Phase I: Shant Score: 9    Shant Phase II:      Last vitals: Reviewed and per EMR flowsheets.        Anesthesia Post Evaluation    Level of consciousness: awake  Complications: no

## 2021-02-26 VITALS
HEIGHT: 64 IN | DIASTOLIC BLOOD PRESSURE: 88 MMHG | BODY MASS INDEX: 36.26 KG/M2 | RESPIRATION RATE: 16 BRPM | HEART RATE: 76 BPM | OXYGEN SATURATION: 96 % | WEIGHT: 212.4 LBS | SYSTOLIC BLOOD PRESSURE: 156 MMHG | TEMPERATURE: 98.5 F

## 2021-02-26 LAB
ANION GAP SERPL CALCULATED.3IONS-SCNC: 11 MMOL/L (ref 3–16)
BUN BLDV-MCNC: 13 MG/DL (ref 7–20)
CALCIUM SERPL-MCNC: 8.4 MG/DL (ref 8.3–10.6)
CHLORIDE BLD-SCNC: 101 MMOL/L (ref 99–110)
CO2: 23 MMOL/L (ref 21–32)
CREAT SERPL-MCNC: 0.7 MG/DL (ref 0.6–1.2)
GFR AFRICAN AMERICAN: >60
GFR NON-AFRICAN AMERICAN: >60
GLUCOSE BLD-MCNC: 150 MG/DL (ref 70–99)
HCT VFR BLD CALC: 35.8 % (ref 36–48)
HEMOGLOBIN: 11.6 G/DL (ref 12–16)
MCH RBC QN AUTO: 28 PG (ref 26–34)
MCHC RBC AUTO-ENTMCNC: 32.6 G/DL (ref 31–36)
MCV RBC AUTO: 85.9 FL (ref 80–100)
PDW BLD-RTO: 13.8 % (ref 12.4–15.4)
PLATELET # BLD: 268 K/UL (ref 135–450)
PMV BLD AUTO: 7.7 FL (ref 5–10.5)
POTASSIUM SERPL-SCNC: 4.5 MMOL/L (ref 3.5–5.1)
RBC # BLD: 4.16 M/UL (ref 4–5.2)
SODIUM BLD-SCNC: 135 MMOL/L (ref 136–145)
WBC # BLD: 14.6 K/UL (ref 4–11)

## 2021-02-26 PROCEDURE — 6370000000 HC RX 637 (ALT 250 FOR IP): Performed by: INTERNAL MEDICINE

## 2021-02-26 PROCEDURE — 85027 COMPLETE CBC AUTOMATED: CPT

## 2021-02-26 PROCEDURE — 2580000003 HC RX 258: Performed by: INTERNAL MEDICINE

## 2021-02-26 PROCEDURE — 80048 BASIC METABOLIC PNL TOTAL CA: CPT

## 2021-02-26 PROCEDURE — 6370000000 HC RX 637 (ALT 250 FOR IP): Performed by: NURSE PRACTITIONER

## 2021-02-26 PROCEDURE — 99217 PR OBSERVATION CARE DISCHARGE MANAGEMENT: CPT | Performed by: NURSE PRACTITIONER

## 2021-02-26 PROCEDURE — 36415 COLL VENOUS BLD VENIPUNCTURE: CPT

## 2021-02-26 RX ORDER — PANTOPRAZOLE SODIUM 40 MG/1
40 TABLET, DELAYED RELEASE ORAL
Qty: 30 TABLET | Refills: 0 | Status: SHIPPED | OUTPATIENT
Start: 2021-02-27 | End: 2021-03-25 | Stop reason: SDUPTHER

## 2021-02-26 RX ORDER — OXYCODONE HYDROCHLORIDE AND ACETAMINOPHEN 5; 325 MG/1; MG/1
1 TABLET ORAL ONCE
Status: COMPLETED | OUTPATIENT
Start: 2021-02-26 | End: 2021-02-26

## 2021-02-26 RX ADMIN — RIVAROXABAN 20 MG: 20 TABLET, FILM COATED ORAL at 08:05

## 2021-02-26 RX ADMIN — ACETAMINOPHEN 650 MG: 325 TABLET ORAL at 03:11

## 2021-02-26 RX ADMIN — RANOLAZINE 500 MG: 500 TABLET, FILM COATED, EXTENDED RELEASE ORAL at 08:04

## 2021-02-26 RX ADMIN — DIGOXIN 125 MCG: 125 TABLET ORAL at 08:05

## 2021-02-26 RX ADMIN — Medication 10 ML: at 08:05

## 2021-02-26 RX ADMIN — OXYCODONE HYDROCHLORIDE AND ACETAMINOPHEN 1 TABLET: 5; 325 TABLET ORAL at 10:57

## 2021-02-26 RX ADMIN — DILTIAZEM HYDROCHLORIDE 120 MG: 120 CAPSULE, COATED, EXTENDED RELEASE ORAL at 08:04

## 2021-02-26 RX ADMIN — OXYCODONE HYDROCHLORIDE AND ACETAMINOPHEN 1 TABLET: 5; 325 TABLET ORAL at 00:01

## 2021-02-26 RX ADMIN — OXYCODONE HYDROCHLORIDE AND ACETAMINOPHEN 1 TABLET: 5; 325 TABLET ORAL at 06:21

## 2021-02-26 RX ADMIN — CYANOCOBALAMIN TAB 1000 MCG 1000 MCG: 1000 TAB at 08:04

## 2021-02-26 RX ADMIN — PANTOPRAZOLE SODIUM 40 MG: 40 TABLET, DELAYED RELEASE ORAL at 06:21

## 2021-02-26 RX ADMIN — CLOPIDOGREL BISULFATE 75 MG: 75 TABLET ORAL at 08:04

## 2021-02-26 ASSESSMENT — PAIN DESCRIPTION - DESCRIPTORS
DESCRIPTORS: BURNING
DESCRIPTORS: ACHING;BURNING

## 2021-02-26 ASSESSMENT — PAIN SCALES - GENERAL
PAINLEVEL_OUTOF10: 10
PAINLEVEL_OUTOF10: 0
PAINLEVEL_OUTOF10: 10
PAINLEVEL_OUTOF10: 4
PAINLEVEL_OUTOF10: 5
PAINLEVEL_OUTOF10: 6
PAINLEVEL_OUTOF10: 4
PAINLEVEL_OUTOF10: 5

## 2021-02-26 ASSESSMENT — PAIN DESCRIPTION - LOCATION
LOCATION: CHEST;GROIN;LEG
LOCATION: BACK;CHEST;GROIN
LOCATION: BACK;CHEST;GROIN

## 2021-02-26 ASSESSMENT — PAIN DESCRIPTION - ONSET
ONSET: PROGRESSIVE
ONSET: ON-GOING
ONSET: ON-GOING

## 2021-02-26 ASSESSMENT — PAIN DESCRIPTION - PROGRESSION
CLINICAL_PROGRESSION: GRADUALLY WORSENING
CLINICAL_PROGRESSION: GRADUALLY WORSENING

## 2021-02-26 ASSESSMENT — PAIN DESCRIPTION - FREQUENCY
FREQUENCY: CONTINUOUS

## 2021-02-26 ASSESSMENT — PAIN - FUNCTIONAL ASSESSMENT
PAIN_FUNCTIONAL_ASSESSMENT: PREVENTS OR INTERFERES SOME ACTIVE ACTIVITIES AND ADLS
PAIN_FUNCTIONAL_ASSESSMENT: ACTIVITIES ARE NOT PREVENTED

## 2021-02-26 ASSESSMENT — PAIN DESCRIPTION - ORIENTATION
ORIENTATION: MID

## 2021-02-26 ASSESSMENT — PAIN DESCRIPTION - PAIN TYPE: TYPE: ACUTE PAIN

## 2021-02-26 NOTE — PLAN OF CARE
Problem: Infection:  Goal: Will remain free from infection  Description: Will remain free from infection  2/25/2021 1510 by Ellie Montano RN  Outcome: Ongoing  Note: Pt has bandage on her R groin for an ablation. Problem: Pain:  Goal: Patient's pain/discomfort is manageable  Description: Patient's pain/discomfort is manageable  2/25/2021 2210 by Romain Lazo RN  Outcome: Ongoing  2/25/2021 1510 by Ellie Montano RN  Outcome: Ongoing  Note: Pt is c/o of pain 7/10 in pain scale. Pain meds administered. Pt pain is getting better.      Problem: Cardiac:  Goal: Ability to maintain vital signs within normal range will improve  Description: Ability to maintain vital signs within normal range will improve  Outcome: Ongoing  Goal: Cardiovascular alteration will improve  Description: Cardiovascular alteration will improve  Outcome: Ongoing     Problem: Physical Regulation:  Goal: Complications related to the disease process, condition or treatment will be avoided or minimized  Description: Complications related to the disease process, condition or treatment will be avoided or minimized  Outcome: Ongoing

## 2021-02-26 NOTE — PROGRESS NOTES
Data- discharge order received, pt verbalized agreement to discharge, disposition to previous residence, no needs for HHC/DME. Action- discharge instructions prepared and given to pt, pt verbalized understanding. Medication information packet given r/t NEW and/or CHANGED prescriptions emphasizing name/purpose/side effects, pt verbalized understanding. Discharge instruction summary: Diet- cardaic, Activity- independent, Primary Care Physician as follows: Echo Rausch -[phone] f/u appointment reviewed and complete, immunizations reviewed and complete, prescription medications filled . Inpatient surgical procedure precautions reviewed:      Response- Pt belongings gathered, IV removed. Disposition is home (no HHC/DME needs), transported with RN, taken to lobby via w/c w/ wheelchair, no complications.

## 2021-02-26 NOTE — DISCHARGE INSTR - COC
Continuity of Care Form    Patient Name: Naida Ch   :  1955  MRN:  [id number]    Admit date:  2021  Discharge date:  ***    Code Status Order: Full Code   Advance Directives:   Advance Care Flowsheet Documentation     Date/Time Healthcare Directive Type of Healthcare Directive Copy in [address] Agent's Name Healthcare Agent's Phone Number    21 1502  No, patient does not have an advance directive for healthcare treatment -- -- -- -- --          Admitting Physician:  Jose Le MD  PCP: Yael Branham MD    Discharging Nurse: [facility] Unit/Room#: 1GU-[id number]/0642-69  Discharging Unit Phone Number: ***    Emergency Contact:   Extended Emergency Contact Information  Primary Emergency Contact: Reji EASTMAN  Address: [address] [place] [address] Phone: [phone]  Work Phone: [phone]  Mobile Phone: [phone]  Relation: Spouse    Past Surgical History:  Past Surgical History:   Procedure Laterality Date    ABDOMINAL ADHESION SURGERY      APPENDECTOMY      CARDIAC CATHETERIZATION  2013    recheck arteries unchanged    CHOLECYSTECTOMY      COLONOSCOPY      CORONARY ANGIOPLASTY WITH STENT PLACEMENT  10/2012    right- TOTAL OF 3 STENTS    CORONARY ANGIOPLASTY WITH STENT PLACEMENT  2021    COSMETIC SURGERY      rhinoplasty    CYSTOSCOPY  2014    ENDOSCOPY, COLON, DIAGNOSTIC      ERCP  2017    FRACTURE SURGERY      LUE, plate and screws    HYSTERECTOMY      HYSTERECTOMY, TOTAL ABDOMINAL      OTHER SURGICAL HISTORY      Incision and drainage of Lingual Introral Lesion    POLYPECTOMY      Dr. Erlin Pederson    PTCA  10/2012    right    SINUS SURGERY      JOSE AND BSO      TONSILLECTOMY      TUBAL LIGATION      UPPER GASTROINTESTINAL ENDOSCOPY      WRIST SURGERY Left 2015    OPEN REDUCTION INTERNAL FIXATION LEFT WRIST       Immunization History: Immunization History   Administered Date(s) Administered    Tdap (Boostrix, Adacel) 02/19/2016       Active Problems:  Patient Active Problem List   Diagnosis Code    Pure hypercholesterolemia E78.00    Allergic rhinitis J30.9    HSV infection B00.9    CAD (coronary artery disease) I25.10    Chest pain R07.9    Chronic pain G89.29    Depression F32.9    OAB (overactive bladder) N32.81    Hypercholesterolemia E78.00    Migraine headache G43.909    Left wrist fracture S62.102A    Postmenopausal atrophic vaginitis N95.2    Irritable bowel syndrome with diarrhea K58.0    Mood disorder due to a general medical condition F06.30    RUQ abdominal pain R10.11    Essential hypertension I10    Acute MI anterior lateral subsequent episode care ([facility] I21.09    Unstable angina (HCC) I20.0    Mild episode of recurrent major depressive disorder (HCC) F33.0    PAF (paroxysmal atrial fibrillation) (HCC) I48.0    Cardiac murmur R01.1    Bruit of right carotid artery R09.89    Chest discomfort R07.89    Bilateral carotid artery stenosis I65.23    Typical atrial flutter (HCC) I48.3    Atypical atrial flutter (HCC) I48.4       Isolation/Infection:   Isolation          No Isolation        Patient Infection Status     Infection Onset Added Last Indicated Last Indicated By Review Planned Expiration Resolved Resolved By    None active    Resolved    COVID-19 Rule Out 02/19/21 02/19/21 02/19/21 COVID-19 (Ordered)   02/20/21 Rule-Out Test Resulted          Nurse Assessment:  Last Vital Signs: BP (!) 156/88   Pulse 76   Temp 98.5 °F (36.9 °C) (Oral)   Resp 16   Ht 5' 4\" (1.626 m)   Wt 212 lb 6.4 oz (96.3 kg)   SpO2 96%   BMI 36.46 kg/m²     Last documented pain score (0-10 scale): Pain Level: 5  Last Weight:   Wt Readings from Last 1 Encounters:   02/26/21 212 lb 6.4 oz (96.3 kg)     Mental Status:  {IP PT MENTAL STATUS:20030}    IV Access:  {[facility] IV ACCESS:947324011}    Nursing Mobility/ADLs:  Walking {CHP DME KQLW:793343499}  Transfer  {CHP DME DXTO:205958281}  Bathing  {CHP DME RPUP:130101780}  Dressing  {CHP DME GZYX:844582479}  Toileting  {CHP DME IHBT:375201204}  Feeding  {CHP DME JLYC:948146392}  Med Admin  {CHP DME PQHF:049724425}  Med Delivery   { [facility]SSENCE MED Delivery:917265129}    Wound Care Documentation and Therapy:        Elimination:  Continence:   · Bowel: {YES / KQ:63677}  · Bladder: {YES / MF:33100}  Urinary Catheter: {Urinary Catheter:900172397}   Colostomy/Ileostomy/Ileal Conduit: {YES / AW:63375}       Date of Last BM: ***    Intake/Output Summary (Last 24 hours) at 2021 1059  Last data filed at 2021 0953  Gross per 24 hour   Intake 1240 ml   Output 6650 ml   Net -5410 ml     I/O last 3 completed shifts:   In:  [I.V.:]  Out: 6050 [Urine:6000; Blood:50]    Safety Concerns:     508 Transfer To Safety Concerns:264192563}    Impairments/Disabilities:      508 Transfer To Impairments/Disabilities:308233064}    Nutrition Therapy:  Current Nutrition Therapy:   508 Transfer To Diet List:170954852}    Routes of Feeding: {P DME Other Feedings:922537846}  Liquids: {Slp liquid thickness:96659}  Daily Fluid Restriction: {CHP DME Yes amt example:224888480}  Last Modified Barium Swallow with Video (Video Swallowing Test): {Done Not Done YS:159068518}    Treatments at the Time of Hospital Discharge:   Respiratory Treatments: ***  Oxygen Therapy:  {Therapy; copd oxygen:25311}  Ventilator:    { CC Vent WPLM:177678890}    Rehab Therapies: {THERAPEUTIC INTERVENTION:8294261564}  Weight Bearing Status/Restrictions: [address] [employer] [address] Weight Bearin}  Other Medical Equipment (for information only, NOT a DME order):  {EQUIPMENT:774603401}  Other Treatments: ***    Patient's personal belongings (please select all that are sent with patient):  {CHP DME Belongings:457598741}    RN SIGNATURE:  {Esignature:029553730}    CASE MANAGEMENT/SOCIAL WORK SECTION    Inpatient Status Date: ***    Readmission Risk Assessment Score:  Readmission Risk              Risk of Unplanned Readmission:        0           Discharging to Facility/ Agency   · Name:   · Address:  · Phone:  · Fax:    Dialysis Facility (if applicable)   · Name:  · Address:  · Dialysis Schedule:  · Phone:  · Fax:    / signature: {Esignature:225324882}    PHYSICIAN SECTION    Prognosis: {Prognosis:0813921663}    Condition at Discharge: [address]ell Issa Patient Condition:040305466}    Rehab Potential (if transferring to Rehab): {Prognosis:8882307730}    Recommended Labs or Other Treatments After Discharge: ***    Physician Certification: I certify the above information and transfer of Brittany Rodriguez  is necessary for the continuing treatment of the diagnosis listed and that she requires {Admit to Appropriate Level of Care:81173} for {GREATER/LESS:552447900} 30 days.      Update Admission H&P: {CHP DME Changes in WCZLD:698626174}    PHYSICIAN SIGNATURE:  {Esignature:285664228}

## 2021-02-28 ENCOUNTER — NURSE TRIAGE (OUTPATIENT)
Dept: OTHER | Facility: CLINIC | Age: 66
End: 2021-02-28

## 2021-02-28 NOTE — TELEPHONE ENCOUNTER
Brief description of triage: Khushbu Fortune is patient's  and patient is present during call on speaker. States she has had recent angiogram, 2 stents and had an ablation for a-fib on 2/25. He states that she is treating a UTI and has abdominal pain that began this morning and states it is the same sx as when she had an ulcer in her colon. She has bloody stools and pain is 10/10. Triage indicates for patient to go to the ED now and pt's spouse states he will take her now. Care advice provided, patient verbalizes understanding; denies any other questions or concerns; instructed to call back for any new or worsening symptoms. This triage is a result of a call to [address] [place] [address]. Please do not respond to the triage nurse through this encounter. Any subsequent communication should be directly with the patient. Reason for Disposition   [1] SEVERE pain (e.g., excruciating) AND [2] present > 1 hour    Answer Assessment - Initial Assessment Questions  1. LOCATION: \"Where does it hurt? \"       The pain is in her lower abdomen just below her belly button a little to the right side. 2. RADIATION: \"Does the pain shoot anywhere else? \" (e.g., chest, back)      She states it feels like she has to have a BM and is now seeing blood in her BM's that began today. Denies any radiating pain. 3. ONSET: \"When did the pain begin? \" (e.g., minutes, hours or days ago)       This morning. 4. SUDDEN: \"Gradual or sudden onset?\"        5. PATTERN \"Does the pain come and go, or is it constant? \"     - If constant: \"Is it getting better, staying the same, or worsening? \"       (Note: Constant means the pain never goes away completely; most serious pain is constant and it progresses)      - If intermittent: \"How long does it last?\" \"Do you have pain now? \"      (Note: Intermittent means the pain goes away completely between bouts)     Constant pain since this morning and is severe.     6. SEVERITY: \"How bad is the pain? \"  (e.g., Scale 1-10; mild, moderate, or severe)    - MILD (1-3): doesn't interfere with normal activities, abdomen soft and not tender to touch     - MODERATE (4-7): interferes with normal activities or awakens from sleep, tender to touch     - SEVERE (8-10): excruciating pain, doubled over, unable to do any normal activities      10/10 currently, patient is crying while on the phone. 7. RECURRENT SYMPTOM: \"Have you ever had this type of abdominal pain before? \" If so, ask: \"When was the last time? \" and \"What happened that time? \"       Yes she has had an ulcerated colon in the past and states it feels the same. 8. CAUSE: \"What do you think is causing the abdominal pain? \"      Ulcerated colon from the medications she has been put on. Spouse states she had an heart ablation 4 days ago and believes the medications she has been put on, have caused an ulcer in her colon. 9. RELIEVING/AGGRAVATING FACTORS: \"What makes it better or worse? \" (e.g., movement, antacids, bowel movement)      Did not ask, she is sobbing during the call. 10. OTHER SYMPTOMS: \"Has there been any vomiting, diarrhea, constipation, or urine problems? \"        BM's are a \"jelly consistency\" with blood in it. Took some Bentyl approx 15 minutes ago but states it is not helping     11. PREGNANCY: \"Is there any chance you are pregnant? \" \"When was your last menstrual period? \"    Protocols used: ABDOMINAL PAIN - [facility] - KEVIN GARCIA

## 2021-03-04 NOTE — PROGRESS NOTES
Subjective:      Patient ID: Madeleine Casillas 63 y.o. female. is here for evaluation for UTI. HPI    Rachel Cota is s/p cardiac ablation on 2/25/21. Her ablation was uncomplicated. She still has a sore chest wall. She has redness of the incision from the ablation. Is not draining. Would like the wound checked. She had a edward cath in from 2/25/-2/26. She complains of dysuria x 4 to 5 days. Frequency, urgency, hematuria, back pain, nausea, vomiting, fever. On 2/28 her  spoke to the triage nurse; she was experiencing bloody stool. Gave a hx of being on tx for UTI. Was referred to the ER. I do not see an ER report on Epic>   Last Urine cx is 2016; + for ecoli. Her last UTI was one year ago, responded to [facility].        Lab Results   Component Value Date     02/26/2021    K 4.5 02/26/2021    K 3.9 02/17/2021     02/26/2021    CO2 23 02/26/2021    BUN 13 02/26/2021    CREATININE 0.7 02/26/2021    GLUCOSE 150 02/26/2021    GLUCOSE 96 11/14/2011    CALCIUM 8.4 02/26/2021      Lab Results   Component Value Date    WBC 14.6 (H) 02/26/2021    HGB 11.6 (L) 02/26/2021    HCT 35.8 (L) 02/26/2021    MCV 85.9 02/26/2021     02/26/2021     Lab Results   Component Value Date    COLORU lite yellow 11/28/2016    GLUCOSEU neg 11/28/2016    KETUA neg 11/28/2016    BILIRUBINUR neg 11/28/2016        No outpatient medications have been marked as taking for the 3/5/21 encounter (Appointment) with Megan Simmons MD.        Allergies   Allergen Reactions    Hydralazine Anaphylaxis    Shellfish-Derived Products Anaphylaxis and Swelling    Asa [Aspirin] Nausea Only    Crestor [Rosuvastatin]      Myalgia      Fenofibrate      Myalgia      Hctz [Hydrochlorothiazide]     Lipitor [Atorvastatin Calcium]     Lipitor [Atorvastatin]      Myalgia      Lisinopril     Mometasone Furoate Swelling    Nasonex [Mometasone] Swelling    Nsaids      GI PAIN    Sulfa Antibiotics Swelling    Contrast [Iodides] Swelling and Rash    Flagyl [Metronidazole] Nausea And Vomiting       Patient Active Problem List   Diagnosis    Pure hypercholesterolemia    Allergic rhinitis    HSV infection    CAD (coronary artery disease)    Chest pain    Chronic pain    Depression    OAB (overactive bladder)    Hypercholesterolemia    Migraine headache    Left wrist fracture    Postmenopausal atrophic vaginitis    Irritable bowel syndrome with diarrhea    Mood disorder due to a general medical condition    RUQ abdominal pain    Essential hypertension    Acute MI anterior lateral subsequent episode care ([facility]    Unstable angina (HCC)    Mild episode of recurrent major depressive disorder ([facility]    PAF (paroxysmal atrial fibrillation) (HCC)    Cardiac murmur    Bruit of right carotid artery    Chest discomfort    Bilateral carotid artery stenosis    Typical atrial flutter (HCC)    Atypical atrial flutter (HCC)       Past Medical History:   Diagnosis Date    Allergic rhinitis, cause unspecified 12/04/2010    Anxiety     Arthritis     Bilateral carotid artery stenosis     < 50% bilaterally    CAD (coronary artery disease)     angioplasty with stent at [facility] Dr. Cheryle Davis, here Dr. Jerry Mccloud at [facility] Chronic kidney disease     frequency, urgency    Chronic pain     precordial, opiate dependent    Depression 01/22/2013    Erosive esophagitis 12/28/2016    Dr. Ze Ayers; PPI started; LA Class A, Sphincter of Oddi manometry and sphincterotomy if symptoms don't improve.      Essential hypertension 04/11/2017    HSV infection     Hypercholesterolemia 06/06/2014    Irritable bowel syndrome with diarrhea 09/27/2016    Migraine headache 06/06/2014    Nonerosive nonspecific gastritis 12/28/2016    Dr. Ze Ayers; body and antrum    OAB (overactive bladder) 03/14/2014       Past Surgical History:   Procedure Laterality Date    ABDOMINAL ADHESION SURGERY      APPENDECTOMY      CARDIAC CATHETERIZATION  2013    recheck arteries unchanged    CHOLECYSTECTOMY      COLONOSCOPY      CORONARY ANGIOPLASTY WITH STENT PLACEMENT  10/2012    right- TOTAL OF 3 STENTS    CORONARY ANGIOPLASTY WITH STENT PLACEMENT  2021    COSMETIC SURGERY      rhinoplasty    CYSTOSCOPY  2014    ENDOSCOPY, COLON, DIAGNOSTIC      ERCP  2017    FRACTURE SURGERY      LUE, plate and screws    HYSTERECTOMY      HYSTERECTOMY, TOTAL ABDOMINAL      OTHER SURGICAL HISTORY      Incision and drainage of Lingual Introral Lesion    POLYPECTOMY      Dr. Rocio Alvarez PTCA  10/2012    right    SINUS SURGERY      JOSE AND BSO      TONSILLECTOMY      TUBAL LIGATION      UPPER GASTROINTESTINAL ENDOSCOPY      WRIST SURGERY Left 2015    OPEN REDUCTION INTERNAL FIXATION LEFT WRIST        Family History   Problem Relation Age of Onset    Hypertension Mother     Heart Disease Mother     Hypertension Father     Heart Disease Father     Heart Disease Maternal Uncle     High Blood Pressure Sister     Stroke Other     Heart Disease Other     Stroke Sister     High Blood Pressure Sister     Other Other         hypercoag state    Heart Disease Brother        Social History     Tobacco Use    Smoking status: Former Smoker     Packs/day: 0.50     Years: 20.00     Pack years: 10.00     Types: Cigarettes     Quit date: 2007     Years since quittin.0    Smokeless tobacco: Never Used   Substance Use Topics    Alcohol use: No    Drug use: No            Review of Systems  Review of Systems    Objective:   Physical Exam  Vitals:    21 1549   BP: (!) 146/79   Pulse: 81   Resp: 16   Temp: 97.2 °F (36.2 °C)   TempSrc: Tympanic   SpO2: 98%   Weight: 211 lb (95.7 kg)       Physical Exam    NAD  Skin warm and dry. Chest is clear, no wheezing or rales. Normal symmetric air entry throughout both lung fields.  Heart regular with normal rate, no murmer or gallop The abdomen is soft with mild suprabubic tenderness; no  guarding, mass, rebound or organomegaly. Bowel sounds are normal. No CVA tenderness or inguinal adenopathy noted. 3 mm dehiscence right groin; No erythema or induration. Assessment:       Diagnosis Orders   1. Urinary complication  POCT Urinalysis no Micro   2. Acute cystitis without hematuria  nitrofurantoin, macrocrystal-monohydrate, (MACROBID) 100 MG capsule  Prevention of UTIs discussed: drink plenty of fluids, avoid kory, avoid holding urine, double void, empty bladder before or after intercourse, eat yogurt on a daily basis. 3. Dehiscence of operative wound, initial encounter  Triple antx ointment, monitor for infection          Plan:      Side effects of current medications reviewed and questions answered. Call or return to clinic prn if these symptoms worsen or fail to improve as anticipated.

## 2021-03-05 ENCOUNTER — OFFICE VISIT (OUTPATIENT)
Dept: FAMILY MEDICINE CLINIC | Age: 66
End: 2021-03-05
Payer: MEDICARE

## 2021-03-05 VITALS
OXYGEN SATURATION: 98 % | HEART RATE: 81 BPM | TEMPERATURE: 97.2 F | BODY MASS INDEX: 36.22 KG/M2 | DIASTOLIC BLOOD PRESSURE: 79 MMHG | RESPIRATION RATE: 16 BRPM | SYSTOLIC BLOOD PRESSURE: 146 MMHG | WEIGHT: 211 LBS

## 2021-03-05 DIAGNOSIS — N30.00 ACUTE CYSTITIS WITHOUT HEMATURIA: ICD-10-CM

## 2021-03-05 DIAGNOSIS — T81.31XA DEHISCENCE OF OPERATIVE WOUND, INITIAL ENCOUNTER: ICD-10-CM

## 2021-03-05 LAB
BILIRUBIN, POC: NEGATIVE
BLOOD URINE, POC: ABNORMAL
CLARITY, POC: ABNORMAL
COLOR, POC: CLEAR
GLUCOSE URINE, POC: NEGATIVE
KETONES, POC: NEGATIVE
LEUKOCYTE EST, POC: ABNORMAL
NITRITE, POC: NEGATIVE
PH, POC: 6
PROTEIN, POC: NEGATIVE
SPECIFIC GRAVITY, POC: 1
UROBILINOGEN, POC: 0.2

## 2021-03-05 PROCEDURE — 99213 OFFICE O/P EST LOW 20 MIN: CPT | Performed by: FAMILY MEDICINE

## 2021-03-05 PROCEDURE — 81002 URINALYSIS NONAUTO W/O SCOPE: CPT | Performed by: FAMILY MEDICINE

## 2021-03-05 RX ORDER — NITROFURANTOIN 25; 75 MG/1; MG/1
100 CAPSULE ORAL 2 TIMES DAILY
Qty: 28 CAPSULE | Refills: 0 | Status: SHIPPED | OUTPATIENT
Start: 2021-03-05 | End: 2021-03-10 | Stop reason: ALTCHOICE

## 2021-03-07 LAB — URINE CULTURE, ROUTINE: NORMAL

## 2021-03-08 ENCOUNTER — TELEPHONE (OUTPATIENT)
Dept: CARDIOLOGY CLINIC | Age: 66
End: 2021-03-08

## 2021-03-08 NOTE — TELEPHONE ENCOUNTER
Pt calling she is still having pain from the ablation and Tylenol is not helping can she take something stronger? pls call to advise thank you

## 2021-03-08 NOTE — TELEPHONE ENCOUNTER
Spoke to the pt-she is having pain down her leg, with occasional chest discomfort. Stated she had three procedures in one month, which could be the reason.

## 2021-03-08 NOTE — TELEPHONE ENCOUNTER
Patient made an appt for wed at [facility]. Asking what she can take for the pain ? She got no sleep over the weekend due to the pain . She would like to know if she can take advil .

## 2021-03-08 NOTE — TELEPHONE ENCOUNTER
Notified patient of Danna Stewart RN message/instructions per Dr. Inessa Priest below. Patient verbalized understanding and has no questions or concerns at this time.

## 2021-03-10 ENCOUNTER — OFFICE VISIT (OUTPATIENT)
Dept: CARDIOLOGY CLINIC | Age: 66
End: 2021-03-10
Payer: MEDICARE

## 2021-03-10 VITALS
WEIGHT: 208.2 LBS | OXYGEN SATURATION: 96 % | HEART RATE: 98 BPM | SYSTOLIC BLOOD PRESSURE: 122 MMHG | DIASTOLIC BLOOD PRESSURE: 72 MMHG | TEMPERATURE: 97 F | BODY MASS INDEX: 35.55 KG/M2 | HEIGHT: 64 IN

## 2021-03-10 DIAGNOSIS — I48.0 PAROXYSMAL ATRIAL FIBRILLATION (HCC): ICD-10-CM

## 2021-03-10 DIAGNOSIS — I73.9 CLAUDICATION (HCC): ICD-10-CM

## 2021-03-10 DIAGNOSIS — I25.118 CORONARY ARTERY DISEASE OF NATIVE ARTERY OF NATIVE HEART WITH STABLE ANGINA PECTORIS (HCC): ICD-10-CM

## 2021-03-10 DIAGNOSIS — M79.604 PAIN OF RIGHT LOWER EXTREMITY: Primary | ICD-10-CM

## 2021-03-10 PROCEDURE — 99214 OFFICE O/P EST MOD 30 MIN: CPT | Performed by: INTERNAL MEDICINE

## 2021-03-10 RX ORDER — HYDROCODONE BITARTRATE AND ACETAMINOPHEN 5; 325 MG/1; MG/1
1 TABLET ORAL EVERY 6 HOURS PRN
Qty: 30 TABLET | Refills: 0 | Status: SHIPPED | OUTPATIENT
Start: 2021-03-10 | End: 2021-03-13

## 2021-03-10 RX ORDER — HYDROCODONE BITARTRATE AND ACETAMINOPHEN 5; 325 MG/1; MG/1
1 TABLET ORAL EVERY 6 HOURS PRN
Qty: 30 TABLET | Refills: 0 | Status: SHIPPED | OUTPATIENT
Start: 2021-03-10 | End: 2021-03-10 | Stop reason: SDUPTHER

## 2021-03-10 NOTE — PROGRESS NOTES
[facility]   Cardiac Follow Up     Referring Provider:  Linda Curran MD     Chief Complaint   Patient presents with    Follow-up    Other     rt leg pain         History of Present Illness:  Kae Hughes is a 63 y.o. female with a history of coronary artery disease, STEMI with PCI (PCI/ALEC RCA 10/2012, PCI to Ramus 10/2012, ) , hypertension and AFIB. Afib ablation 2/25/21      She presented to [facility] on 11/19/2017 with complaints of chest pain that radiates to her jaw and arms. She was seen by cardiology and diagnosed with a STEMI and a LHC was completed with a ALEC in the diagonal branch. Developed chest pain again 11/20/2017 and a repeat LHC was completed with no further intervention necessary. She states that she has intolerance to many medications.      Today she reports she is not well. She has had continued pain in her right leg since her ablation in Feb. She has had no further episodes of afib. She has been having pleuretic chest pain since the ablation, but it has improved. Past Medical History:   has a past medical history of Allergic rhinitis, cause unspecified, Anxiety, Arthritis, Bilateral carotid artery stenosis, CAD (coronary artery disease), Chronic kidney disease, Chronic pain, Depression, Erosive esophagitis, Essential hypertension, HSV infection, Hypercholesterolemia, Irritable bowel syndrome with diarrhea, Migraine headache, Nonerosive nonspecific gastritis, and OAB (overactive bladder). Surgical History:   has a past surgical history that includes Appendectomy; shannan and bso (cervix removed); Cholecystectomy; Tubal ligation; Tonsillectomy; other surgical history; Coronary angioplasty with stent (10/2012); Percutaneous Transluminal Coronary Angio (10/2012); Cardiac catheterization (12/07/2013); Hysterectomy; polypectomy; Cystoscopy (04/2014); Upper gastrointestinal endoscopy; Wrist surgery (Left, 05/21/2015); Abdominal adhesion surgery;  Colonoscopy; Endoscopy, colon, diagnostic; Cosmetic surgery; fracture surgery; ERCP (01/25/2017); sinus surgery; Hysterectomy, total abdominal; and Coronary angioplasty with stent (02/04/2021). Social History:   reports that she quit smoking about 14 years ago. Her smoking use included cigarettes. She has a 10.00 pack-year smoking history. She has never used smokeless tobacco. She reports that she does not drink alcohol or use drugs. Family History:  family history includes Heart Disease in her brother, father, maternal uncle, mother, and another family member; High Blood Pressure in her sister and sister; Hypertension in her father and mother; Other in an other family member; Stroke in her sister and another family member. Home Medications:  Prior to Admission medications    Medication Sig Start Date End Date Taking? Authorizing Provider   Ibuprofen (MOTRIN PO) Take by mouth   Yes Historical Provider, MD   HYDROcodone-acetaminophen (NORCO) 5-325 MG per tablet Take 1 tablet by mouth every 6 hours as needed for Pain for up to 3 days. Intended supply: 3 days.  Take lowest dose possible to manage pain 3/10/21 3/13/21 Yes Carole Meyer MD   pantoprazole (PROTONIX) 40 MG tablet Take 1 tablet by mouth every morning (before breakfast) 2/27/21  Yes JAYLEEN Pena - CNP   ranolazine (RANEXA) 500 MG extended release tablet Take 1 tablet by mouth 2 times daily 2/22/21  Yes JAYLEEN Skinner - CNP   clopidogrel (PLAVIX) 75 MG tablet Take 1 tablet by mouth daily 2/6/21  Yes Carole Meyer MD   Mag Aspart-Potassium Aspart (POTASSIUM & MAGNESIUM ASPARTAT PO) Take 1 tablet by mouth daily    Yes Historical Provider, MD   Zinc-Vitamin C  MG LOZG Take 1 lozenge by mouth daily    Yes Historical Provider, MD   dilTIAZem (CARDIZEM CD) 120 MG extended release capsule Take 1 capsule by mouth 2 times daily 1/27/21  Yes Carole Meyer MD   L-Lysine 1000 MG TABS Take 1 tablet by mouth every morning (before breakfast)    Yes Historical Provider, MD   cyanocobalamin 1000 MCG tablet Take 1,000 mcg by mouth daily   Yes Historical Provider, MD   rivaroxaban (XARELTO) 20 MG TABS tablet Take 1 tablet by mouth daily (with breakfast) 12/15/20  Yes Chioma Rocha MD   nitroGLYCERIN (NITROSTAT) 0.4 MG SL tablet Place 1 tablet under the tongue every 5 minutes as needed for Chest pain up to max of 3 total doses. If no relief after 1 dose, call 911. 8/31/20  Yes Nisreen Salamanca MD   estradiol (ESTRACE) 0.1 MG/GM vaginal cream PLACE 0.5 GRAM VAGINALLY 3 TIMES A WEEK 6/5/20  Yes Nisreen Salamanca MD   acetaminophen (TYLENOL) 500 MG tablet Take 500 mg by mouth every 6 hours as needed for Pain    Historical Provider, MD   digoxin (LANOXIN) 125 MCG tablet Take 1 tablet by mouth daily  Patient not taking: Reported on 3/10/2021 2/6/21   Moustapha Herrera MD        Allergies:  Hydralazine, Shellfish-derived products, Asa [aspirin], Crestor [rosuvastatin], Fenofibrate, Hctz [hydrochlorothiazide], Lipitor [atorvastatin calcium], Lipitor [atorvastatin], Lisinopril, Mometasone furoate, Nasonex [mometasone], Nsaids, Sulfa antibiotics, Contrast [iodides], and Flagyl [metronidazole]     Review of Systems:   · Constitutional: there has been no unanticipated weight loss. There's been no change in energy level, sleep pattern, or activity level.   +fatigue   · Eyes: No visual changes or diplopia. No scleral icterus. · ENT: No Headaches, hearing loss or vertigo. No mouth sores or sore throat. · Cardiovascular: Reviewed in HPI  · Respiratory: No cough or wheezing, no sputum production. No hematemesis. +sob  · Gastrointestinal: No abdominal pain, appetite loss, blood in stools. No change in bowel or bladder habits. · Genitourinary: No dysuria, trouble voiding, or hematuria. · Musculoskeletal:  No gait disturbance, weakness or joint complaints. +cp   · Integumentary: No rash or pruritis. · Neurological: No headache, diplopia, change in muscle strength, numbness or tingling.  No change in 2017  LHC was completed with a ALEC in the diagonal branch at [facility] 2017 12/12/20 stress > stable but fixed anterior defect    Hypertension   Stable- Blood pressure 122/72, pulse 98, temperature 97 °F (36.1 °C), height 5' 4\" (1.626 m), weight 208 lb 3.2 oz (94.4 kg), SpO2 96 %, not currently breastfeeding. RT leg Pain   Ultrasound doppler arterial leg right    Plan:   Cristhian Velasquez has a stable cardiac status. Cardiac test and lab results personally reviewed by me during this office visit and discussed. Ultrasound doppler arterial leg right  HYDROcodone-acetaminophen (NORCO) 5-325 MG per tablet Q6h as needed for pain   Continue risk factor modifications. Call for any change in symptoms, call to report any changes in shortness of breath or development of chest pain with activity. Return for regular follow up as previously scheduled. I appreciate the opportunity of cooperating in the care of this individual.    Stephanie Vegas. Gertrude Munoz M.D., [facility] [place]    Patient's problem list, medications, allergies, past medical, surgical, social and family histories were reviewed and updated as appropriate. Scribe's attestation: This note was scribed in the presence of Dr. Gertrude Munoz MD, by Radha Irving RN. The scribe's documentation has been prepared under my direction and personally reviewed by me in its entirety. I confirm that the note above accurately reflects all work, treatment, procedures, and medical decision making performed by me.

## 2021-03-12 ENCOUNTER — HOSPITAL ENCOUNTER (OUTPATIENT)
Dept: VASCULAR LAB | Age: 66
Discharge: HOME | End: 2021-03-12
Payer: MEDICARE

## 2021-03-12 DIAGNOSIS — I73.9 CLAUDICATION (HCC): ICD-10-CM

## 2021-03-12 DIAGNOSIS — M79.604 PAIN OF RIGHT LOWER EXTREMITY: ICD-10-CM

## 2021-03-12 PROCEDURE — 93926 LOWER EXTREMITY STUDY: CPT

## 2021-03-15 ENCOUNTER — TELEPHONE (OUTPATIENT)
Dept: CARDIOLOGY CLINIC | Age: 66
End: 2021-03-15

## 2021-03-15 NOTE — TELEPHONE ENCOUNTER
I spoke to Ms. Nikolay Lopez with results (see note on the report from Dr. Gertrude Munoz) which were normal. She continues to apply ice to groin site, and it remains painful, kept her up last night. I advised her to call if symptoms worsened this week.

## 2021-03-15 NOTE — TELEPHONE ENCOUNTER
Had u/s of groin on Friday at [facility].  Patient would like to be called today with test results

## 2021-03-16 ENCOUNTER — TELEPHONE (OUTPATIENT)
Dept: CARDIOLOGY CLINIC | Age: 66
End: 2021-03-16

## 2021-03-25 ENCOUNTER — TELEPHONE (OUTPATIENT)
Dept: CARDIOLOGY CLINIC | Age: 66
End: 2021-03-25

## 2021-03-25 NOTE — TELEPHONE ENCOUNTER
Medication Refill    Medication needing refilled: pantoprazole     Dosage of the medication:    How are you taking this medication (QD, BID, TID, QID, PRN):    30 or 90 day supply called in:    When will you run out of your medication: 2 days     Which Pharm[facility] are we sending the medication to?: [facility]   HAS APPT ON Monday WITH LES

## 2021-03-25 NOTE — TELEPHONE ENCOUNTER
Received refill request for Pantoprazole 40 mg from [address] [place] [address].     Last OV: 3/10/21 LES    Last Labs: 2/26/21    Last Refill: 2/27/21 #30 with no refills    Next Appt: 3/29/21 LES

## 2021-03-26 RX ORDER — PANTOPRAZOLE SODIUM 40 MG/1
40 TABLET, DELAYED RELEASE ORAL
Qty: 30 TABLET | Refills: 5 | Status: SHIPPED | OUTPATIENT
Start: 2021-03-26 | End: 2021-11-01

## 2021-03-29 ENCOUNTER — OFFICE VISIT (OUTPATIENT)
Dept: CARDIOLOGY CLINIC | Age: 66
End: 2021-03-29
Payer: MEDICARE

## 2021-03-29 VITALS
OXYGEN SATURATION: 95 % | SYSTOLIC BLOOD PRESSURE: 130 MMHG | HEIGHT: 64 IN | DIASTOLIC BLOOD PRESSURE: 80 MMHG | WEIGHT: 207.1 LBS | HEART RATE: 88 BPM | BODY MASS INDEX: 35.36 KG/M2 | RESPIRATION RATE: 19 BRPM

## 2021-03-29 DIAGNOSIS — I25.10 CORONARY ARTERY DISEASE INVOLVING NATIVE CORONARY ARTERY OF NATIVE HEART WITHOUT ANGINA PECTORIS: ICD-10-CM

## 2021-03-29 DIAGNOSIS — I48.91 ATRIAL FIBRILLATION, UNSPECIFIED TYPE (HCC): Primary | ICD-10-CM

## 2021-03-29 DIAGNOSIS — I10 ESSENTIAL HYPERTENSION: ICD-10-CM

## 2021-03-29 PROCEDURE — 99214 OFFICE O/P EST MOD 30 MIN: CPT | Performed by: INTERNAL MEDICINE

## 2021-03-29 NOTE — PROGRESS NOTES
(10/2012); Percutaneous Transluminal Coronary Angio (10/2012); Cardiac catheterization (12/07/2013); Hysterectomy; polypectomy; Cystoscopy (04/2014); Upper gastrointestinal endoscopy; Wrist surgery (Left, 05/21/2015); Abdominal adhesion surgery; Colonoscopy; Endoscopy, colon, diagnostic; Cosmetic surgery; fracture surgery; ERCP (01/25/2017); sinus surgery; Hysterectomy, total abdominal; and Coronary angioplasty with stent (02/04/2021). Social History:   reports that she quit smoking about 14 years ago. Her smoking use included cigarettes. She has a 10.00 pack-year smoking history. She has never used smokeless tobacco. She reports that she does not drink alcohol or use drugs. Family History:  family history includes Heart Disease in her brother, father, maternal uncle, mother, and another family member; High Blood Pressure in her sister and sister; Hypertension in her father and mother; Other in an other family member; Stroke in her sister and another family member. Home Medications:  Prior to Admission medications    Medication Sig Start Date End Date Taking?  Authorizing Provider   pantoprazole (PROTONIX) 40 MG tablet Take 1 tablet by mouth every morning (before breakfast) 3/26/21  Yes Candace Harrison MD   Ibuprofen (MOTRIN PO) Take by mouth   Yes Historical Provider, MD   ranolazine (RANEXA) 500 MG extended release tablet Take 1 tablet by mouth 2 times daily 2/22/21  Yes JAYLEEN Corey - CNP   clopidogrel (PLAVIX) 75 MG tablet Take 1 tablet by mouth daily 2/6/21  Yes Candace Harrison MD   digoxin (LANOXIN) 125 MCG tablet Take 1 tablet by mouth daily 2/6/21  Yes Candace Harrison MD   Mag Aspart-Potassium Aspart (POTASSIUM & MAGNESIUM ASPARTAT PO) Take 1 tablet by mouth daily    Yes Historical Provider, MD   Zinc-Vitamin C  MG LOZG Take 1 lozenge by mouth daily    Yes Historical Provider, MD   dilTIAZem (CARDIZEM CD) 120 MG extended release capsule Take 1 capsule by mouth 2 times daily 1/27/21 Yes Carole Meyer MD   L-Lysine 1000 MG TABS Take 1 tablet by mouth every morning (before breakfast)    Yes Historical Provider, MD   cyanocobalamin 1000 MCG tablet Take 1,000 mcg by mouth daily   Yes Historical Provider, MD   rivaroxaban (XARELTO) 20 MG TABS tablet Take 1 tablet by mouth daily (with breakfast) 12/15/20  Yes Suzan Griffin MD   nitroGLYCERIN (NITROSTAT) 0.4 MG SL tablet Place 1 tablet under the tongue every 5 minutes as needed for Chest pain up to max of 3 total doses. If no relief after 1 dose, call 911. 8/31/20  Yes Rupert Causey MD   estradiol (ESTRACE) 0.1 MG/GM vaginal cream PLACE 0.5 GRAM VAGINALLY 3 TIMES A WEEK 6/5/20  Yes Rupert Causey MD        Allergies:  Hydralazine, Shellfish-derived products, Asa [aspirin], Crestor [rosuvastatin], Fenofibrate, Hctz [hydrochlorothiazide], Lipitor [atorvastatin calcium], Lipitor [atorvastatin], Lisinopril, Mometasone furoate, Nasonex [mometasone], Nsaids, Sulfa antibiotics, Contrast [iodides], and Flagyl [metronidazole]     Review of Systems:   · Constitutional: there has been no unanticipated weight loss. There's been no change in energy level, sleep pattern, or activity level.   +fatigue   · Eyes: No visual changes or diplopia. No scleral icterus. · ENT: No Headaches, hearing loss or vertigo. No mouth sores or sore throat. · Cardiovascular: Reviewed in HPI  · Respiratory: No cough or wheezing, no sputum production. No hematemesis. +sob  · Gastrointestinal: No abdominal pain, appetite loss, blood in stools. No change in bowel or bladder habits. · Genitourinary: No dysuria, trouble voiding, or hematuria. · Musculoskeletal:  No gait disturbance, weakness or joint complaints. +cp   · Integumentary: No rash or pruritis. · Neurological: No headache, diplopia, change in muscle strength, numbness or tingling. No change in gait, balance, coordination, mood, affect, memory, mentation, behavior. · Psychiatric: No anxiety, no depression.   · Endocrine: No malaise, fatigue or temperature intolerance. No excessive thirst, fluid intake, or urination. No tremor. · Hematologic/Lymphatic: No abnormal bruising or bleeding, blood clots or swollen lymph nodes. · Allergic/Immunologic: No nasal congestion or hives. Physical Examination:    Vitals:    03/29/21 1544   BP: 130/80   Pulse: 88   Resp: 19   SpO2: 95%        Wt Readings from Last 1 Encounters:   03/29/21 207 lb 1.6 oz (93.9 kg)       Constitutional and General Appearance:Appears uncomfortable  Skin:good turgor,intact without lesions  HEENT: EOMI ,normal  Neck:no JVD    Respiratory:  · Normal excursion and expansion without use of accessory muscles  · Resp Auscultation: Normal breath sounds without dullness  Cardiovascular:  · The apical impulses not displaced  · Heart tones are crisp and normal  · Cervical veins are not engorged  · The carotid upstroke is normal in amplitude and contour without delay or bruit  · Peripheral pulses are symmetrical and full  · There is no clubbing, cyanosis of the extremities. · No edema  · Femoral Arteries: 2+ and equal  · Pedal Pulses: 2+ and equal   Abdomen:  · No masses or tenderness  · Liver/Spleen: No Abnormalities Noted  Neurological/Psychiatric:  · Alert and oriented in all spheres  · Moves all extremities well  · Exhibits normal gait balance and coordination  · No abnormalities of mood, affect, memory, mentation, or behavior are noted    GXT Myoview 12/23/20   Summary    -There is a small-moderate sized, moderate intensity fixed anterior defect    suggestive of scar.    -There is no ischemia.    -LV function is normal with EF=70%    -Low-intermediate risk.          Assessment:      Atrial fibrillation and flutter with RVR    Ablation on  2/25/21   No further episodes since ablation- some \"skipping\"       CAD  History of multiple stents to RCA, STEMI 2017  [facility] was completed with a ALEC in the diagonal branch at [facility] 2017  12/12/20 stress > stable but fixed anterior defect    Hypertension   Stable- Blood pressure 130/80, pulse 88, resp. rate 19, height 5' 4\" (1.626 m), weight 207 lb 1.6 oz (93.9 kg), SpO2 95 %     RT leg Pain Use Biofreeze- lidocaine for leg pain  Continues with pain at cath site- no hematoma, no infection -Use Biofreeze- lidocaine for leg pain. Seems neuropathic,vascular ultrasound reviewed    Plan:   Serene George has a stable cardiac status. Cardiac test and lab results personally reviewed by me during this office visit and discussed. Use Biofreeze- lidocaine for leg pain. Can decrease Xarelto to 15mg daily- has been experiencing nosebleeds- samples given   Continue risk factor modifications. Call for any change in symptoms, call to report any changes in shortness of breath or development of chest pain with activity. Return for regular follow up in 6 months with me , 1 month with EP. I appreciate the opportunity of cooperating in the care of this individual.    Fred Vela. Marcus Vergara M.D., [facility] [place]    Patient's problem list, medications, allergies, past medical, surgical, social and family histories were reviewed and updated as appropriate. Scribe's attestation: This note was scribed in the presence of Dr. Marcus Vergara MD, by Kulwant Pardo RN. The scribe's documentation has been prepared under my direction and personally reviewed by me in its entirety. I confirm that the note above accurately reflects all work, treatment, procedures, and medical decision making performed by me.

## 2021-04-02 PROBLEM — R01.1 CARDIAC MURMUR: Status: RESOLVED | Noted: 2021-02-18 | Resolved: 2021-04-02

## 2021-04-02 NOTE — PROGRESS NOTES
[facility]   Electrophysiology  JAYLEEN Pierson-CNP  Attending EP: Dr. Fabian Meza    Date: 4/29/2021  I had the privilege of visiting Nikita Chase in the office. Chief Complaint:   Chief Complaint   Patient presents with    Atrial Fibrillation    1 Month Follow-Up    Chest Pain     unstable- same unchanged     History of Present Illness: History obtained from patient and medical record. Nikita Chase is 63 y.o. female with a past medical history of CAD, HTN, PAF, anxiety, and obesity. S/p RFCA with PVI, ablation of kimani between LIPV/LSPV, focal atrial tachycardia, left sided macro reentrant atrial flutter, and CTI atrial flutter (2/25/21, Dr. Fabian Meza). -Interval history: Today, Nikita Chase is being seen for routine follow up. She is doing well since her ablation. She has not had any atrial fibrillation since that time. She continues to have right leg pain that she states she has been present since her ablation and has gradually improved. Her blood pressure is high today, 152/84. She states it always runs high when she is nervous and stressed like she is for today's visit. Denies having chest pain, palpitations, shortness of breath, orthopnea/PND, cough, or dizziness at the time of this visit. With regard to medication therapy the patient has been compliant with prescribed regimen. They have tolerated therapy to date. Allergies:   Allergies   Allergen Reactions    Hydralazine Anaphylaxis    Shellfish-Derived Products Anaphylaxis and Swelling    Asa [Aspirin] Nausea Only    Crestor [Rosuvastatin]      Myalgia      Fenofibrate      Myalgia      Hctz [Hydrochlorothiazide]     Lipitor [Atorvastatin Calcium]     Lipitor [Atorvastatin]      Myalgia      Lisinopril     Mometasone Furoate Swelling    Nasonex [Mometasone] Swelling    Nsaids      GI PAIN    Sulfa Antibiotics Swelling    Contrast [Iodides] Swelling and Rash    Flagyl [Metronidazole] Nausea And Vomiting manometry and sphincterotomy if symptoms don't improve.  Essential hypertension 04/11/2017    HSV infection     Hypercholesterolemia 06/06/2014    Irritable bowel syndrome with diarrhea 09/27/2016    Migraine headache 06/06/2014    Nonerosive nonspecific gastritis 12/28/2016    Dr. Godfrey Back; body and antrum    OAB (overactive bladder) 03/14/2014     Past Surgical History:    has a past surgical history that includes Appendectomy; shannan and bso (cervix removed); Cholecystectomy; Tubal ligation; Tonsillectomy; other surgical history; Coronary angioplasty with stent (10/2012); Percutaneous Transluminal Coronary Angio (10/2012); Cardiac catheterization (12/07/2013); Hysterectomy; polypectomy; Cystoscopy (04/2014); Upper gastrointestinal endoscopy; Wrist surgery (Left, 05/21/2015); Abdominal adhesion surgery; Colonoscopy; Endoscopy, colon, diagnostic; Cosmetic surgery; fracture surgery; ERCP (01/25/2017); sinus surgery; Hysterectomy, total abdominal; and Coronary angioplasty with stent (02/04/2021). Social History:  Reviewed. reports that she quit smoking about 14 years ago. Her smoking use included cigarettes. She has a 10.00 pack-year smoking history. She has never used smokeless tobacco. She reports that she does not drink alcohol or use drugs. Family History:  Reviewed. family history includes Heart Disease in her brother, father, maternal uncle, mother, and another family member; High Blood Pressure in her sister and sister; Hypertension in her father and mother; Other in an other family member; Stroke in her sister and another family member.      Review of System:  · Constitutional: Negative for fever, night sweats, chills, weight changes, or weakness  · Skin: Negative for rash, dry skin, pruritus, bruising, bleeding, blood clots, or changes in skin pigment  · HEENT: Negative for vision changes, ringing in the ears, sore throat, dysphagia, or swollen lymph nodes  · Respiratory: Reviewed in HPI  · Cardiovascular: Reviewed in HPI  · Gastrointestinal: Negative for abdominal pain, N/V/D, constipation, or black/tarry stools  · Genito-Urinary: Negative for dysuria, incontinence, urgency, or hematuria  · Musculoskeletal: Negative for joint swelling, muscle pain, or injuries  · Neurological/Psych: Negative for confusion, seizures, dizziness, headaches, balance issues or TIA-like symptoms. No anxiety, depression, or insomnia    Physical Examination:  Vitals:    04/29/21 1434   BP: (!) 152/84   Pulse:    SpO2:       Wt Readings from Last 3 Encounters:   04/29/21 209 lb 11.2 oz (95.1 kg)   03/29/21 207 lb 1.6 oz (93.9 kg)   03/10/21 208 lb 3.2 oz (94.4 kg)     Constitutional: Cooperative and in no apparent distress, and appears well nourished  Skin: Warm and pink; no pallor, cyanosis, bruising, or clubbing  HEENT: Symmetric and normocephalic. PERRL, EOM intact. Conjunctiva pink with clear sclera. Mucus membranes pink and moist. Teeth intact. Thyroid smooth without nodules or goiter  Respiratory: Respirations symmetric and unlabored. Lungs clear to auscultation bilaterally, no wheezing, rhonchi, or crackles  Cardiovascular:  Regular rate and rhythm. S1/S2 present without murmurs, rubs, or gallops. Peripheral pulses 2+, capillary refill < 3 seconds. No elevation of JVP. No peripheral edema  Gastrointestinal: Abdomen soft and round. Bowel sounds normoactive in all quadrants without tenderness or masses. Musculoskeletal: Bilateral upper and lower extremity strength 5/5 with full ROM. Neurological/Psych: Awake and orientated to person, place and time. Calm affect, appropriate mood.      Pertinent labs, diagnostic, device, and imaging results reviewed as a part of this visit    LABS    CBC:   Lab Results   Component Value Date    WBC 14.6 (H) 02/26/2021    HGB 11.6 (L) 02/26/2021    HCT 35.8 (L) 02/26/2021    MCV 85.9 02/26/2021     02/26/2021     BMP:   Lab Results   Component Value Date    CREATININE 0.7 02/26/2021 BUN 13 2021     (L) 2021    K 4.5 2021     2021    CO2 23 2021     Estimated Creatinine Clearance: 92 mL/min (based on SCr of 0.7 mg/dL). Thyroid:   Lab Results   Component Value Date    TSH 2.81 2020     Lipid Panel:   Lab Results   Component Value Date    CHOL 179 2021    HDL 37 2021    TRIG 262 2021     LFTs:  Lab Results   Component Value Date    ALT 16 2021    AST 15 2021     (H) 2017    ALKPHOS 110 2021    BILITOT 0.4 2021     Coags:   Lab Results   Component Value Date    PROTIME 15.0 (H) 2021    INR 1.29 (H) 2021       EC2021  - NSR, rate 73, QTc 408    JESSICA: 21   Left ventricle size is normal. Mild left ventricular hypertrophy. Left   ventricular function is normal with ejection fraction estimated at 55-60 %. No regional wall motion abnormalities are noted. There is no evidence of mass or thrombus in the left atrium or appendage. Mild MR and TR. Small patent foramen ovale with left-to-right shunt was visualized. Echo:     Left ventricle size is normal. There is mild concentric left ventricular   hypertrophy. Left ventricular function is normal with ejection fraction   estimated at 55-60 %. No regional wall motion abnormalities are noted. Diastolic filling parameters suggests grade II diastolic dysfunction. Mild   mitral and tricuspid regurgitation is present. RVSP estimated at 36 mmHg. Cath:   Dominance: Right    LM: 30% distal   LAD: 50% mid LAD after first diagonal ; first diagonal and distal LAD stents widely patent   LCx: 20% mid  RCA: stent widely patent   LVEDP: 18 mmHg   LVEF: 60%     Impression/Recommendations:  Patent coronary stents with residual disease unchanged (mid LAD was FFR negative last week). OK to DC home today. Scheduled for AF/AFL ablation . Assessment:    1.  Paroxysmal Atrial Fibrillation   - S/p RFCA with PVI, ablation of kimani between LIPV/LSPV, focal atrial tachycardia, left sided macro reentrant atrial flutter, and CTI atrial flutter (2/25/21, Dr. Jordan Durán)      - Currently in NSR   - Continue cardizem 120 mg BID   - [id number] score: 3 (HTN, CAD, Gender); [id number] Vasc score and anticoagulation discussed. High risk for stroke and thromboembolism. Anticoagulation is recommended. Risk of bleeding was discussed.    ~ On Xarelto 15 mg QD. Will stop and switch to eliquis 5 mg BID (May consider stopping soon and making PRN as needed for breakthrough episodes of AF as she is very symptomatic with her AF)      - Afib risk factors including age, HTN, obesity, inactivity and OSEI were discussed with patient. Risk factor modification recommended               ~ TSH 2.81 (12/20)                 - Treatment options including cardioversion, rate control strategy, antiarrhythmics, anticoagulation and possible ablation were discussed with patient. Risks, benefits and alternative of each treatment options were explained. All questions answered      - May consider for TASHIA closure with Watchman device if nose bleeding continues with anti-coagulation long term    2. CAD   - Hx of multiple stents   - Stable   - No complaints of cardiac angina   - Continue Plavix, Ranexa               ~ No ASA/ACEi/statin due to allergy              - Followed by Dr. Courtney Edwards     3. HTN   - Slightly uncontrolled: Goal <130/80   - Continue current medications   - Encouraged to monitor and log BP readings at home, then bring log to next visit   - Discussed importance of low sodium diet, weight control and exercise     4. Groin Pain   - Likely neuropathic    ~ Vascular US unremarkable (3/21)   - Gradually improving   - Encouraged to gradually resume activity    5. Obesity   - Body mass index is 35.99 kg/m². - Complicating assessment and treatment.  Placing patient at risk for multiple co-morbidities as well as early death and contributing to the patient's presentation  - Discussed weight loss and patient was encouraged to reduce calorie intake and increase exercise    Plan:  1. Stop Xarelto and start eliquis 5 mg twice per day tomorrow  2. If no improvement in nose bleeds with eliquis, let us know  3. Call office if any recurrent afib  4. Monitor blood pressure at home. Call if consistently >140  5. Exercise as tolerated    F/U: Follow-up with Dr. Patric Venegas as scheduled and EP as needed  -Call [facility] at [phone] with any questions    Diet & Exercise:   The patient is counseled to follow a low salt diet to assure blood pressure remains controlled for cardiovascular risk factor modification   The patient is counseled to avoid excess caffeine, and energy drinks as this may exacerbated ectopy and arrhythmia   The patient is counseled to lose weight to control cardiovascular risk factors   Exercise program discussed: To improve overall cardiovascular health, the patient is instructed to increase cardiovascular related activities with a goal of 150 min/week of moderate level activity or 10,000 steps per day. Encouraged to perform as much activity as tolerated    I have addressed the patient's cardiac risk factors and adjusted pharmacologic treatment as needed. In addition, I have reinforced the need for patient directed risk factor modification. I independently reviewed the ECG    All questions and concerns were addressed with the patient. Alternatives to treatment were discussed. Thank you for allowing to us to participate in the care of Levi Stewart.     Time  30-39 minutes spent preparing to see patient including reviewing patient history/prior tests/prior consults, performing a medical exam, counseling and educating patient/family/caregiver, ordering medications/tests/procedures, referring and communicating with PCPs and other pertinent consultants, documenting information in the EMR, independently interpreting results and communicating to family and coordination of patient care.     JAYLEEN Sena-CNP  [facility]   Office: [phone]

## 2021-04-29 ENCOUNTER — OFFICE VISIT (OUTPATIENT)
Dept: CARDIOLOGY CLINIC | Age: 66
End: 2021-04-29
Payer: MEDICARE

## 2021-04-29 VITALS
SYSTOLIC BLOOD PRESSURE: 152 MMHG | WEIGHT: 209.7 LBS | OXYGEN SATURATION: 98 % | DIASTOLIC BLOOD PRESSURE: 84 MMHG | HEART RATE: 83 BPM | BODY MASS INDEX: 35.8 KG/M2 | HEIGHT: 64 IN

## 2021-04-29 DIAGNOSIS — I10 ESSENTIAL HYPERTENSION: ICD-10-CM

## 2021-04-29 DIAGNOSIS — E66.01 CLASS 2 SEVERE OBESITY DUE TO EXCESS CALORIES WITH SERIOUS COMORBIDITY AND BODY MASS INDEX (BMI) OF 35.0 TO 35.9 IN ADULT (HCC): ICD-10-CM

## 2021-04-29 DIAGNOSIS — I25.118 CORONARY ARTERY DISEASE OF NATIVE ARTERY OF NATIVE HEART WITH STABLE ANGINA PECTORIS (HCC): ICD-10-CM

## 2021-04-29 DIAGNOSIS — I48.0 PAF (PAROXYSMAL ATRIAL FIBRILLATION) (HCC): Primary | ICD-10-CM

## 2021-04-29 PROBLEM — E66.812 CLASS 2 SEVERE OBESITY DUE TO EXCESS CALORIES WITH SERIOUS COMORBIDITY AND BODY MASS INDEX (BMI) OF 35.0 TO 35.9 IN ADULT: Status: ACTIVE | Noted: 2021-04-29

## 2021-04-29 PROBLEM — I21.09 ACUTE MI ANTERIOR LATERAL SUBSEQUENT EPISODE CARE (HCC): Status: RESOLVED | Noted: 2017-11-19 | Resolved: 2021-04-29

## 2021-04-29 PROCEDURE — 93000 ELECTROCARDIOGRAM COMPLETE: CPT | Performed by: NURSE PRACTITIONER

## 2021-04-29 PROCEDURE — 99214 OFFICE O/P EST MOD 30 MIN: CPT | Performed by: NURSE PRACTITIONER

## 2021-05-07 ENCOUNTER — TELEPHONE (OUTPATIENT)
Dept: CARDIOLOGY CLINIC | Age: 66
End: 2021-05-07

## 2021-05-07 NOTE — TELEPHONE ENCOUNTER
Pt states having severe bleeding out of rectum yesterday and still having nose bleeds. states she went to [place] and where she placed her arm on the arm rest she has a big dark bruise. Pt states she did not take eliquis last night and has only had nose bleeds today. Please call to advise.

## 2021-05-10 ENCOUNTER — TELEPHONE (OUTPATIENT)
Dept: CARDIOLOGY CLINIC | Age: 66
End: 2021-05-10

## 2021-05-10 NOTE — TELEPHONE ENCOUNTER
Pt returning call to [facility] and the pt said that she cant take baby aspirin due to having and stomach ulcer. She was bleeding from her rectum. She did stop Eliquis and was still taking Plavix. Please call the pt and advise her on what to do.

## 2021-05-10 NOTE — TELEPHONE ENCOUNTER
Spoke with the patient and advised her of the message below per LES. She voiced understanding.  Call complete

## 2021-05-10 NOTE — TELEPHONE ENCOUNTER
She should stop eliquis and switch to plavix 75 mg daily  With aspirin 81 mg daily.  Left a message with her to call back to discuss

## 2021-06-09 RX ORDER — CLOPIDOGREL BISULFATE 75 MG/1
75 TABLET ORAL DAILY
Qty: 30 TABLET | Refills: 3 | Status: SHIPPED | OUTPATIENT
Start: 2021-06-09 | End: 2021-11-01 | Stop reason: SINTOL

## 2021-06-09 NOTE — TELEPHONE ENCOUNTER
Medication Refill    Medication needing refilled:   clopidogrel (PLAVIX) 75 MG- PT ONLY HAS COUPLE DOSES LEFT PLEASE SEND TODAY      Dosage of the medication:    How are you taking this medication (QD, BID, TID, QID, PRN): once daily    30 or 90 day supply called in: 90 day supply    When will you run out of your medication:    Which Pharmacy are we sending the medication to?: [address] [place] [address]   Phone:  [phone]  Fax:  [phone]

## 2021-08-26 DIAGNOSIS — E78.00 HYPERCHOLESTEROLEMIA: ICD-10-CM

## 2021-08-26 DIAGNOSIS — Z11.59 SCREENING FOR VIRAL DISEASE: ICD-10-CM

## 2021-08-26 DIAGNOSIS — I10 ESSENTIAL HYPERTENSION: ICD-10-CM

## 2021-08-26 DIAGNOSIS — I25.118 CORONARY ARTERY DISEASE OF NATIVE ARTERY OF NATIVE HEART WITH STABLE ANGINA PECTORIS (HCC): Primary | ICD-10-CM

## 2021-09-09 RX ORDER — DILTIAZEM HYDROCHLORIDE 120 MG/1
CAPSULE, COATED, EXTENDED RELEASE ORAL
Qty: 180 CAPSULE | Refills: 3 | Status: SHIPPED | OUTPATIENT
Start: 2021-09-09

## 2021-09-09 NOTE — TELEPHONE ENCOUNTER
Received refill request for diltiazem from [facility] pharmacy.     Last ov:3/29/2021 LES    Last labs:cmp 2/25/2021    Last MVM:3/56/1220    Last Refill:1/27/2021 #180 with 3 refills    Next appointment:11/1/2021 LES

## 2021-10-12 NOTE — PROGRESS NOTES
[facility]   Cardiac Follow Up     Referring Provider:  Radha Olvera MD     Chief Complaint   Patient presents with    6 Month Follow-Up    Coronary Artery Disease    Atrial Fibrillation        History of Present Illness:  Tyler Warren is a 61 y.o. female with a history of coronary artery disease, STEMI with PCI (PCI/ALEC RCA 10/2012, PCI to Ramus 10/2012, ) , hypertension and AFIB. Afib ablation 2/25/21      She presented to [facility] on [facility]/19/2017 with complaints of chest pain that radiates to her jaw and arms. She was seen by cardiology and diagnosed with a STEMI and a LHC was completed with a ALEC in the diagonal branch. Developed chest pain again 11/20/2017 and a repeat LHC was completed with no further intervention necessary. She states that she has intolerance to many medications.      Today she states she has been having some episodes of afib, she reports she has been taking digoxin as needed. Recommended to take day. She has been having pleuretic chest pain since the ablation, but it has improved. She reports she had Covid in July, recovered at home. Past Medical History:   has a past medical history of Allergic rhinitis, cause unspecified, Anxiety, Arthritis, Bilateral carotid artery stenosis, CAD (coronary artery disease), Chronic kidney disease, Chronic pain, Depression, Erosive esophagitis, Essential hypertension, HSV infection, Hypercholesterolemia, Irritable bowel syndrome with diarrhea, Migraine headache, Nonerosive nonspecific gastritis, and OAB (overactive bladder). Surgical History:   has a past surgical history that includes Appendectomy; shannan and bso (cervix removed); Cholecystectomy; Tubal ligation; Tonsillectomy; other surgical history; Coronary angioplasty with stent (10/2012); Percutaneous Transluminal Coronary Angio (10/2012); Cardiac catheterization (12/07/2013); Hysterectomy; polypectomy; Cystoscopy (04/2014); Upper gastrointestinal endoscopy;  Wrist surgery (Left, 05/21/2015); Abdominal adhesion surgery; Colonoscopy; Endoscopy, colon, diagnostic; Cosmetic surgery; fracture surgery; ERCP (01/25/2017); sinus surgery; Hysterectomy, total abdominal; and Coronary angioplasty with stent (02/04/2021). Social History:   reports that she quit smoking about 14 years ago. Her smoking use included cigarettes. She has a 10.00 pack-year smoking history. She has never used smokeless tobacco. She reports that she does not drink alcohol and does not use drugs. Family History:  family history includes Heart Disease in her brother, father, maternal uncle, mother, and another family member; High Blood Pressure in her sister and sister; Hypertension in her father and mother; Other in an other family member; Stroke in her sister and another family member. Home Medications:  Prior to Admission medications    Medication Sig Start Date End Date Taking?  Authorizing Provider   digoxin (LANOXIN) 125 MCG tablet Take 125 mcg by mouth daily   Yes Historical Provider, MD   POTASSIUM GLUCONATE PO Take by mouth   Yes Historical Provider, MD   Cholecalciferol (VITAMIN D3) 125 MCG (5000 UT) TABS Take by mouth   Yes Historical Provider, MD   cetirizine (ZYRTEC) 5 MG tablet Take 5 mg by mouth daily   Yes Historical Provider, MD   dilTIAZem (CARDIZEM CD) 120 MG extended release capsule TAKE 1 CAPSULE BY MOUTH TWICE DAILY 9/9/21  Yes Nivia Campbell MD   Mag Aspart-Potassium Aspart (POTASSIUM & MAGNESIUM ASPARTAT PO) Take 1 tablet by mouth daily    Yes Historical Provider, MD   Zinc-Vitamin C  MG LOZG Take 1 lozenge by mouth daily    Yes Historical Provider, MD   L-Lysine 1000 MG TABS Take 1 tablet by mouth every morning (before breakfast)    Yes Historical Provider, MD   cyanocobalamin 1000 MCG tablet Take 1,000 mcg by mouth daily   Yes Historical Provider, MD   nitroGLYCERIN (NITROSTAT) 0.4 MG SL tablet Place 1 tablet under the tongue every 5 minutes as needed for Chest pain up to max of 3 total doses. If no relief after 1 dose, call 911. 8/31/20  Yes Katerin Montez MD   estradiol (ESTRACE) 0.1 MG/GM vaginal cream PLACE 0.5 GRAM VAGINALLY 3 TIMES A WEEK 6/5/20  Yes Katerin Montez MD        Allergies:  Hydralazine, Shellfish-derived products, Asa [aspirin], Crestor [rosuvastatin], Fenofibrate, Hctz [hydrochlorothiazide], Lipitor [atorvastatin calcium], Lipitor [atorvastatin], Lisinopril, Mometasone furoate, Nasonex [mometasone], Nsaids, Plavix [clopidogrel], Sulfa antibiotics, Contrast [iodides], and Flagyl [metronidazole]     Review of Systems:   · Constitutional: there has been no unanticipated weight loss. There's been no change in energy level, sleep pattern, or activity level.   +fatigue   · Eyes: No visual changes or diplopia. No scleral icterus. · ENT: No Headaches, hearing loss or vertigo. No mouth sores or sore throat. · Cardiovascular: Reviewed in HPI  · Respiratory: No cough or wheezing, no sputum production. No hematemesis. +sob  · Gastrointestinal: No abdominal pain, appetite loss, blood in stools. No change in bowel or bladder habits. · Genitourinary: No dysuria, trouble voiding, or hematuria. · Musculoskeletal:  No gait disturbance, weakness or joint complaints. +cp   · Integumentary: No rash or pruritis. · Neurological: No headache, diplopia, change in muscle strength, numbness or tingling. No change in gait, balance, coordination, mood, affect, memory, mentation, behavior. · Psychiatric: No anxiety, no depression. · Endocrine: No malaise, fatigue or temperature intolerance. No excessive thirst, fluid intake, or urination. No tremor. · Hematologic/Lymphatic: No abnormal bruising or bleeding, blood clots or swollen lymph nodes. · Allergic/Immunologic: No nasal congestion or hives.     Physical Examination:    Vitals:    11/01/21 1352   BP: (!) 156/88   Pulse: 84   SpO2: 97%        Wt Readings from Last 1 Encounters:   11/01/21 209 lb (94.8 kg)       Constitutional and General Appearance:Appears uncomfortable  Skin:good turgor,intact without lesions  HEENT: EOMI ,normal  Neck:no JVD    Respiratory:  · Normal excursion and expansion without use of accessory muscles  · Resp Auscultation: Normal breath sounds without dullness  Cardiovascular:  · The apical impulses not displaced  · Heart tones are crisp and normal  · Cervical veins are not engorged  · The carotid upstroke is normal in amplitude and contour without delay or bruit  · Peripheral pulses are symmetrical and full  · There is no clubbing, cyanosis of the extremities. · No edema  · Femoral Arteries: 2+ and equal  · Pedal Pulses: 2+ and equal   Abdomen:  · No masses or tenderness  · Liver/Spleen: No Abnormalities Noted  Neurological/Psychiatric:  · Alert and oriented in all spheres  · Moves all extremities well  · Exhibits normal gait balance and coordination  · No abnormalities of mood, affect, memory, mentation, or behavior are noted    GXT Myoview 12/23/20   Summary    -There is a small-moderate sized, moderate intensity fixed anterior defect    suggestive of scar.    -There is no ischemia.    -LV function is normal with EF=70%    -Low-intermediate risk. Assessment:      Atrial fibrillation and flutter with RVR  Ablation on  2/25/21   She takes digoxin as needed- feel some episodes at night     CAD  History of multiple stents to RCA, STEMI 2017  [facility] was completed with a ALEC in the diagonal branch at [facility] 2017  12/12/20 stress > stable but fixed anterior defect    Hypertension   Stable- Blood pressure (!) 156/88, pulse 84, height 5' 4\" (1.626 m), weight 209 lb (94.8 kg), SpO2 97 %      Plan:   Madison Joel has a stable cardiac status. Cardiac test and lab results personally reviewed by me during this office visit and discussed. Take Digoxin daily- Can take 5 days a week, can take extra tab 2x weekly if needed for afib. Continue risk factor modifications.    Call for any change in symptoms, call to report any changes in shortness of breath or development of chest pain with activity. Return for regular follow up in 6 months. I appreciate the opportunity of cooperating in the care of this individual.    Madhav Blackwood. Rozina Riggins M.D., [facility]    Patient's problem list, medications, allergies, past medical, surgical, social and family histories were reviewed and updated as appropriate. Scribe's attestation: This note was scribed in the presence of Dr. Rozina Riggins MD, by Chan Gracia RN. The scribe's documentation has been prepared under my direction and personally reviewed by me in its entirety. I confirm that the note above accurately reflects all work, treatment, procedures, and medical decision making performed by me.

## 2021-11-01 ENCOUNTER — OFFICE VISIT (OUTPATIENT)
Dept: CARDIOLOGY CLINIC | Age: 66
End: 2021-11-01
Payer: MEDICARE

## 2021-11-01 VITALS
SYSTOLIC BLOOD PRESSURE: 156 MMHG | BODY MASS INDEX: 35.68 KG/M2 | HEART RATE: 84 BPM | DIASTOLIC BLOOD PRESSURE: 88 MMHG | OXYGEN SATURATION: 97 % | WEIGHT: 209 LBS | HEIGHT: 64 IN

## 2021-11-01 DIAGNOSIS — I25.10 CORONARY ARTERY DISEASE INVOLVING NATIVE CORONARY ARTERY OF NATIVE HEART WITHOUT ANGINA PECTORIS: ICD-10-CM

## 2021-11-01 DIAGNOSIS — I10 ESSENTIAL HYPERTENSION: ICD-10-CM

## 2021-11-01 DIAGNOSIS — E78.00 HYPERCHOLESTEROLEMIA: ICD-10-CM

## 2021-11-01 DIAGNOSIS — I48.0 PAF (PAROXYSMAL ATRIAL FIBRILLATION) (HCC): Primary | ICD-10-CM

## 2021-11-01 PROCEDURE — 93000 ELECTROCARDIOGRAM COMPLETE: CPT | Performed by: INTERNAL MEDICINE

## 2021-11-01 PROCEDURE — 99214 OFFICE O/P EST MOD 30 MIN: CPT | Performed by: INTERNAL MEDICINE

## 2021-11-01 RX ORDER — DIGOXIN 125 MCG
125 TABLET ORAL DAILY
Qty: 90 TABLET | Refills: 3 | Status: SHIPPED | OUTPATIENT
Start: 2021-11-01 | End: 2022-06-29

## 2021-11-01 RX ORDER — CETIRIZINE HYDROCHLORIDE 5 MG/1
5 TABLET ORAL DAILY
COMMUNITY

## 2021-11-01 RX ORDER — DIGOXIN 125 MCG
125 TABLET ORAL DAILY
COMMUNITY
End: 2021-11-01 | Stop reason: SDUPTHER

## 2021-11-01 NOTE — PATIENT INSTRUCTIONS
Take Digoxin daily- Can take 5 days a week, can take extra tab 2x weekly if needed for afib.     Fasting labs soon

## 2021-11-02 ENCOUNTER — APPOINTMENT (OUTPATIENT)
Dept: CT IMAGING | Age: 66
End: 2021-11-02
Payer: MEDICARE

## 2021-11-02 ENCOUNTER — APPOINTMENT (OUTPATIENT)
Dept: GENERAL RADIOLOGY | Age: 66
End: 2021-11-02
Payer: MEDICARE

## 2021-11-02 ENCOUNTER — HOSPITAL ENCOUNTER (EMERGENCY)
Age: 66
Discharge: HOME | End: 2021-11-02
Attending: EMERGENCY MEDICINE
Payer: MEDICARE

## 2021-11-02 VITALS
HEIGHT: 64 IN | OXYGEN SATURATION: 93 % | HEART RATE: 77 BPM | TEMPERATURE: 97.7 F | WEIGHT: 210 LBS | RESPIRATION RATE: 17 BRPM | DIASTOLIC BLOOD PRESSURE: 95 MMHG | SYSTOLIC BLOOD PRESSURE: 178 MMHG | BODY MASS INDEX: 35.85 KG/M2

## 2021-11-02 DIAGNOSIS — R43.1 ABNORMAL SMELL: Primary | ICD-10-CM

## 2021-11-02 LAB
A/G RATIO: 1.5 (ref 1.1–2.2)
ALBUMIN SERPL-MCNC: 4.4 G/DL (ref 3.4–5)
ALP BLD-CCNC: 131 U/L (ref 40–129)
ALT SERPL-CCNC: 15 U/L (ref 10–40)
ANION GAP SERPL CALCULATED.3IONS-SCNC: 13 MMOL/L (ref 3–16)
AST SERPL-CCNC: 25 U/L (ref 15–37)
BASOPHILS ABSOLUTE: 0 K/UL (ref 0–0.2)
BASOPHILS RELATIVE PERCENT: 0.7 %
BILIRUB SERPL-MCNC: 0.3 MG/DL (ref 0–1)
BUN BLDV-MCNC: 10 MG/DL (ref 7–20)
CALCIUM SERPL-MCNC: 9.3 MG/DL (ref 8.3–10.6)
CARBOXYHEMOGLOBIN: 1.9 % (ref 0–1.5)
CHLORIDE BLD-SCNC: 101 MMOL/L (ref 99–110)
CO2: 24 MMOL/L (ref 21–32)
CREAT SERPL-MCNC: 0.8 MG/DL (ref 0.6–1.2)
EKG ATRIAL RATE: 78 BPM
EKG DIAGNOSIS: NORMAL
EKG P-R INTERVAL: 128 MS
EKG Q-T INTERVAL: 392 MS
EKG QRS DURATION: 84 MS
EKG QTC CALCULATION (BAZETT): 446 MS
EKG R AXIS: -17 DEGREES
EKG T AXIS: 24 DEGREES
EKG VENTRICULAR RATE: 78 BPM
EOSINOPHILS ABSOLUTE: 0.2 K/UL (ref 0–0.6)
EOSINOPHILS RELATIVE PERCENT: 3.6 %
GFR AFRICAN AMERICAN: >60
GFR NON-AFRICAN AMERICAN: >60
GLUCOSE BLD-MCNC: 134 MG/DL (ref 70–99)
HCT VFR BLD CALC: 41.3 % (ref 36–48)
HEMOGLOBIN: 14.1 G/DL (ref 12–16)
LIPASE: 48 U/L (ref 13–60)
LYMPHOCYTES ABSOLUTE: 1.7 K/UL (ref 1–5.1)
LYMPHOCYTES RELATIVE PERCENT: 28.6 %
MCH RBC QN AUTO: 27.9 PG (ref 26–34)
MCHC RBC AUTO-ENTMCNC: 34.2 G/DL (ref 31–36)
MCV RBC AUTO: 81.6 FL (ref 80–100)
MONOCYTES ABSOLUTE: 0.4 K/UL (ref 0–1.3)
MONOCYTES RELATIVE PERCENT: 6.7 %
NEUTROPHILS ABSOLUTE: 3.6 K/UL (ref 1.7–7.7)
NEUTROPHILS RELATIVE PERCENT: 60.4 %
PDW BLD-RTO: 14.3 % (ref 12.4–15.4)
PLATELET # BLD: 285 K/UL (ref 135–450)
PMV BLD AUTO: 7.6 FL (ref 5–10.5)
POTASSIUM REFLEX MAGNESIUM: 3.6 MMOL/L (ref 3.5–5.1)
RBC # BLD: 5.06 M/UL (ref 4–5.2)
SODIUM BLD-SCNC: 138 MMOL/L (ref 136–145)
TOTAL PROTEIN: 7.4 G/DL (ref 6.4–8.2)
TROPONIN: <0.01 NG/ML
WBC # BLD: 6 K/UL (ref 4–11)

## 2021-11-02 PROCEDURE — 96375 TX/PRO/DX INJ NEW DRUG ADDON: CPT

## 2021-11-02 PROCEDURE — 80053 COMPREHEN METABOLIC PANEL: CPT

## 2021-11-02 PROCEDURE — 96374 THER/PROPH/DIAG INJ IV PUSH: CPT

## 2021-11-02 PROCEDURE — 36415 COLL VENOUS BLD VENIPUNCTURE: CPT

## 2021-11-02 PROCEDURE — 99284 EMERGENCY DEPT VISIT MOD MDM: CPT

## 2021-11-02 PROCEDURE — 6370000000 HC RX 637 (ALT 250 FOR IP): Performed by: PHYSICIAN ASSISTANT

## 2021-11-02 PROCEDURE — 71045 X-RAY EXAM CHEST 1 VIEW: CPT

## 2021-11-02 PROCEDURE — 93010 ELECTROCARDIOGRAM REPORT: CPT | Performed by: INTERNAL MEDICINE

## 2021-11-02 PROCEDURE — 84484 ASSAY OF TROPONIN QUANT: CPT

## 2021-11-02 PROCEDURE — 6360000002 HC RX W HCPCS: Performed by: PHYSICIAN ASSISTANT

## 2021-11-02 PROCEDURE — 93005 ELECTROCARDIOGRAM TRACING: CPT | Performed by: EMERGENCY MEDICINE

## 2021-11-02 PROCEDURE — 83690 ASSAY OF LIPASE: CPT

## 2021-11-02 PROCEDURE — 82375 ASSAY CARBOXYHB QUANT: CPT

## 2021-11-02 PROCEDURE — 85025 COMPLETE CBC W/AUTO DIFF WBC: CPT

## 2021-11-02 PROCEDURE — 70450 CT HEAD/BRAIN W/O DYE: CPT

## 2021-11-02 RX ORDER — DIPHENHYDRAMINE HYDROCHLORIDE 50 MG/ML
25 INJECTION INTRAMUSCULAR; INTRAVENOUS ONCE
Status: COMPLETED | OUTPATIENT
Start: 2021-11-02 | End: 2021-11-02

## 2021-11-02 RX ORDER — DILTIAZEM HYDROCHLORIDE 60 MG/1
120 CAPSULE, EXTENDED RELEASE ORAL ONCE
Status: COMPLETED | OUTPATIENT
Start: 2021-11-02 | End: 2021-11-02

## 2021-11-02 RX ORDER — METOCLOPRAMIDE HYDROCHLORIDE 5 MG/ML
10 INJECTION INTRAMUSCULAR; INTRAVENOUS ONCE
Status: COMPLETED | OUTPATIENT
Start: 2021-11-02 | End: 2021-11-02

## 2021-11-02 RX ADMIN — DIPHENHYDRAMINE HYDROCHLORIDE 25 MG: 50 INJECTION, SOLUTION INTRAMUSCULAR; INTRAVENOUS at 10:40

## 2021-11-02 RX ADMIN — METOCLOPRAMIDE 10 MG: 5 INJECTION, SOLUTION INTRAMUSCULAR; INTRAVENOUS at 10:39

## 2021-11-02 RX ADMIN — DILTIAZEM HYDROCHLORIDE 120 MG: 60 CAPSULE, EXTENDED RELEASE ORAL at 11:33

## 2021-11-02 ASSESSMENT — PAIN SCALES - GENERAL
PAINLEVEL_OUTOF10: 7
PAINLEVEL_OUTOF10: 5

## 2021-11-02 NOTE — ED PROVIDER NOTES
I independently performed a history and physical on Karena Munoz. All diagnostic, treatment, and disposition decisions were made by myself in conjunction with the advanced practice provider. I have participated in the medical decision making and directed the treatment plan and disposition of the patient. For further details of Bakari Hicks's emergency department encounter, please see the advanced practice provider's documentation. CHIEF COMPLAINT  Chief Complaint   Patient presents with    Headache     for a couple months due to a chemical smell in house    Chest Pain     had cardiac ablation and stents in [facility]    Nausea     Briefly, Karena Munoz is a 61 y.o. female  who presents to the ED complaining of a chemical smell in her apartment, \"like a cat litter box or something, but not like bleach or gas. \"  She has had plumbers out and new herlinda/bathrooms etc but now it smells \"like an animal farm or something. \"  She says other people don't really smell it much if at all, but it causes her to be nauseated. No fevers. She gets headaches sometimes. She has chronic angina which she says is similar to her chronic pains. No different than usual today. She says as the weather got cold and the windows aren't open anymore which concentrates the smell for her. She says it is coating he sinuses and throat. She has a history of migraines. She reports she only smells it at home and not elsewhere which makes her feel like she needs to move. The EMS folks apparently checked the air quality and said it was fine. FOCUSED PHYSICAL EXAMINATION  BP (!) 178/95   Pulse 77   Temp 97.7 °F (36.5 °C)   Resp 17   Ht 5' 4\" (1.626 m)   Wt 210 lb (95.3 kg)   SpO2 93%   BMI 36.05 kg/m²    Focused physical examination notable for no acute distress, well-appearing, well-nourished, normal speech and mentation without obvious facial droop, no obvious rash.   No obvious cranial nerve III-XII deficits on my initial exam. RRR CTAB, abd soft NTND, no focal motor/sensory deficits. The 12 lead EKG was interpreted by me as follows:  Rate: normal with a rate of 78  Rhythm: sinus with sinus arrhythmia  Axis: normal  Intervals: normal NC, narrow QRS, normal QTc  ST segments: no ST elevations or depressions  T waves: no abnormal inversions  Non-specific T wave changes: not present  Prior EKG comparison: EKG dated yesterday is not significantly different    MDM:  ED course was notable for abnormal smell for about 6 weeks. She says that this is only in her home however apparently the air quality has been checked and normal and no source has been identified, also nobody else has smelled it. The patient has had somewhat of a chronic headache and a history of migraines with auras in the past.  Neuroimaging today is unremarkable but may need an outpatient MRI if symptoms persist per outpatient PCP discretion. There is no appear to be an apparent toxic fume evidence, CO is not significantly elevated, and given the 's cohabitation with lack of similar symptoms, this would argue against a toxic chemical in the home. There is no obvious metabolic derangement identified in today's work-up. She will be discharged home. She does express frustration at the lack of diagnostic certainty as to her condition especially from a medical and/or environmental aspect. She does understand the need for further potential work-up either in her home or with her primary care physician from a medical diagnostic standpoint to further evaluate her condition. She seemed to understand this and was was somewhat reassured by this as well. CLINICAL IMPRESSION  1. Abnormal smell        DISPOSITION  Yadira Click was discharged to home in stable condition. I have discussed the findings of today's workup with the patient and addressed the patient's questions and concerns.   Important warning signs as well as new or worsening symptoms which would necessitate immediate return to the ED were discussed. The plan is to discharge from the ED at this time, and the patient is in stable condition. The patient acknowledged understanding is agreeable with this plan. Follow-up with:  Satinder Pérez MD  [address] [address]  [phone]    Schedule an appointment as soon as possible for a visit in 2 days  For symptom re-evaluation    [facility] Emergency Department  [address] [facility]  [address]  Go to   If symptoms worsen      This chart was created using Dragon dictation software. Efforts were made by me to ensure accuracy, however some errors may be present due to limitations of this technology.              Madeline Kidd MD  11/02/21 1420

## 2021-11-02 NOTE — ED PROVIDER NOTES
[facility]        Pt Name: Jacklyn Santillan  MRN: [id number]  [place] 1955  Date of evaluation: 11/2/2021  Provider: Javier Mcdonald PA-C  PCP: Emani Kingsley MD  Note Started: 12:29 PM EDT        I have seen and evaluated this patient with my supervising physician Jerod Rosas MD.    [address] [place] [address]       Chief Complaint   Patient presents with    Headache     for a couple months due to a chemical smell in house    Chest Pain     had cardiac ablation and stents in Feburary    Nausea       HISTORY OF PRESENT ILLNESS   (Location, Timing/Onset, Context/Setting, Quality, Duration, Modifying Factors, Severity, Associated Signs and Symptoms)  Note limiting factors. Chief Complaint: Headache, nausea, vomiting, upper abdominal pain    Jacklyn Santillan is a 61 y.o. female who presents to the emergency department with a chief complaint of a headache that she has been having intermittently over the past 1 to 2 months today she states it was slightly worse and associated with one episode of nausea and vomiting some mild upper abdominal discomfort. Also had some chest pain earlier today but she states she gets this on a daily basis and has history of chronic precordial pain, angina per patient, coronary artery disease with multiple stents. She states the chest pain she had today something she gets every day and she states this is gone now. Denies dysuria, hematuria, diarrhea, bloody stool, cough, fevers, visual change, speech difficulty, difficulty moving her extremities, numbness. She states that the headache began about 1 or 2 months ago when she began smelling a small chemical smell in her apartment. She states that she is already called the fire department to check this out and is also been into multiple neighbors homes to see where she can find this coming from. Denies any other symptoms.     Nursing Notes were all reviewed and agreed with or any disagreements were addressed in the HPI. REVIEW OF SYSTEMS    (2-9 systems for level 4, 10 or more for level 5)     Review of Systems    Positives and Pertinent negatives as per HPI. Except as noted above in the ROS, all other systems were reviewed and negative. PAST MEDICAL HISTORY     Past Medical History:   Diagnosis Date    Allergic rhinitis, cause unspecified 12/04/2010    Anxiety     Arthritis     Bilateral carotid artery stenosis     < 50% bilaterally    CAD (coronary artery disease)     angioplasty with stent at [facility] Dr. Amish Peter, here Dr. Soo Walker at [facility],    [facility] Chronic kidney disease     frequency, urgency    Chronic pain     precordial, opiate dependent    Depression 01/22/2013    Erosive esophagitis 12/28/2016    Dr. Azra Hinson; PPI started; LA Class A, Sphincter of Oddi manometry and sphincterotomy if symptoms don't improve.      Essential hypertension 04/11/2017    HSV infection     Hypercholesterolemia 06/06/2014    Irritable bowel syndrome with diarrhea 09/27/2016    Migraine headache 06/06/2014    Nonerosive nonspecific gastritis 12/28/2016    Dr. Azra Hinson; body and antrum    OAB (overactive bladder) 03/14/2014         SURGICAL HISTORY     Past Surgical History:   Procedure Laterality Date    ABDOMINAL ADHESION SURGERY      APPENDECTOMY      CARDIAC CATHETERIZATION  12/07/2013    recheck arteries unchanged    CHOLECYSTECTOMY      COLONOSCOPY      CORONARY ANGIOPLASTY WITH STENT PLACEMENT  10/2012    right- TOTAL OF 3 STENTS    CORONARY ANGIOPLASTY WITH STENT PLACEMENT  02/04/2021    COSMETIC SURGERY      rhinoplasty    CYSTOSCOPY  04/2014    ENDOSCOPY, COLON, DIAGNOSTIC      ERCP  01/25/2017    FRACTURE SURGERY      LUE, plate and screws    HYSTERECTOMY      HYSTERECTOMY, TOTAL ABDOMINAL      OTHER SURGICAL HISTORY      Incision and drainage of Lingual Introral Lesion    POLYPECTOMY      Dr. Rad Rendon PTCA  10/2012    right    SINUS SURGERY      JOSE AND BSO      TONSILLECTOMY      TUBAL LIGATION      UPPER GASTROINTESTINAL ENDOSCOPY      WRIST SURGERY Left 05/21/2015    OPEN REDUCTION INTERNAL FIXATION LEFT WRIST         [address]       Discharge Medication List as of 11/2/2021  1:20 PM      CONTINUE these medications which have NOT CHANGED    Details   POTASSIUM GLUCONATE PO Take by mouthHistorical Med      Cholecalciferol (VITAMIN D3) 125 MCG (5000 UT) TABS Take by mouthHistorical Med      cetirizine (ZYRTEC) 5 MG tablet Take 5 mg by mouth dailyHistorical Med      digoxin (LANOXIN) 125 MCG tablet Take 1 tablet by mouth daily Can take 5 days a week, can take extra tab 2x weekly if needed for afib., Disp-90 tablet, R-3Normal      dilTIAZem (CARDIZEM CD) 120 MG extended release capsule TAKE 1 CAPSULE BY MOUTH TWICE DAILY, Disp-180 capsule, R-3Normal      Mag Aspart-Potassium Aspart (POTASSIUM & MAGNESIUM ASPARTAT PO) Take 1 tablet by mouth daily Historical Med      Zinc-Vitamin C  MG LOZG Take 1 lozenge by mouth daily Historical Med      L-Lysine 1000 MG TABS Take 1 tablet by mouth every morning (before breakfast) Historical Med      cyanocobalamin 1000 MCG tablet Take 1,000 mcg by mouth dailyHistorical Med      nitroGLYCERIN (NITROSTAT) 0.4 MG SL tablet Place 1 tablet under the tongue every 5 minutes as needed for Chest pain up to max of 3 total doses.  If no relief after 1 dose, call 911., Disp-25 tablet, R-3Normal      estradiol (ESTRACE) 0.1 MG/GM vaginal cream PLACE 0.5 GRAM VAGINALLY 3 TIMES A WEEK, Disp-42.5 g, R-0Normal               ALLERGIES     Hydralazine, Shellfish-derived products, Asa [aspirin], Crestor [rosuvastatin], Fenofibrate, Hctz [hydrochlorothiazide], Lipitor [atorvastatin calcium], Lipitor [atorvastatin], Lisinopril, Mometasone furoate, Nasonex [mometasone], Nsaids, Plavix [clopidogrel], Sulfa antibiotics, Contrast [iodides], and Flagyl [metronidazole]    FAMILYHISTORY Family History   Problem Relation Age of Onset    Hypertension Mother     Heart Disease Mother     Hypertension Father     Heart Disease Father     Heart Disease Maternal Uncle     High Blood Pressure Sister     Stroke Other     Heart Disease Other     Stroke Sister     High Blood Pressure Sister     Other Other         hypercoag state    Heart Disease Brother           SOCIAL HISTORY       Social History     Tobacco Use    Smoking status: Former Smoker     Packs/day: 0.50     Years: 20.00     Pack years: 10.00     Types: Cigarettes     Quit date: 2007     Years since quittin.7    Smokeless tobacco: Never Used   Vaping Use    Vaping Use: Never used   Substance Use Topics    Alcohol use: No    Drug use: No       SCREENINGS    Ingrid Coma Scale  Eye Opening: Spontaneous  Best Verbal Response: Oriented  Best Motor Response: Obeys commands  Ingrid Coma Scale Score: 15        PHYSICAL EXAM    (up to 7 for level 4, 8 or more for level 5)     ED Triage Vitals [21 0924]   BP Temp Temp src Pulse Resp SpO2 Height Weight   (!) 193/116 97.7 °F (36.5 °C) -- 88 16 96 % 5' 4\" (1.626 m) 210 lb (95.3 kg)       Physical Exam  Vitals and nursing note reviewed. Constitutional:       Appearance: She is well-developed. She is not diaphoretic. HENT:      Head: Atraumatic. Nose: Nose normal.      Mouth/Throat:      Pharynx: No oropharyngeal exudate or posterior oropharyngeal erythema. Eyes:      General:         Right eye: No discharge. Left eye: No discharge. Extraocular Movements: Extraocular movements intact. Pupils: Pupils are equal, round, and reactive to light. Cardiovascular:      Rate and Rhythm: Normal rate and regular rhythm. Heart sounds: No murmur heard. No friction rub. No gallop. Pulmonary:      Effort: Pulmonary effort is normal. No respiratory distress. Breath sounds: No stridor. No wheezing, rhonchi or rales.    Abdominal:      General: Bowel sounds are normal. There is no distension. Palpations: Abdomen is soft. There is no mass. Tenderness: There is no abdominal tenderness. There is no guarding or rebound. Hernia: No hernia is present. Musculoskeletal:         General: No swelling. Normal range of motion. Cervical back: Normal range of motion. Skin:     General: Skin is warm and dry. Findings: No erythema or rash. Neurological:      Mental Status: She is alert and oriented to person, place, and time. Cranial Nerves: No cranial nerve deficit. Psychiatric:         Behavior: Behavior normal.         DIAGNOSTIC RESULTS   LABS:    Labs Reviewed   COMPREHENSIVE METABOLIC PANEL W/ REFLEX TO MG FOR LOW K - Abnormal; Notable for the following components:       Result Value    Glucose 134 (*)     Alkaline Phosphatase 131 (*)     All other components within normal limits    Narrative:     Performed at:  [facility] [address], [facility], [address]   Phone [phone] - Abnormal; Notable for the following components:    Carboxyhemoglobin 1.9 (*)     All other components within normal limits    Narrative:     Performed at:  [facility] [address], [facility], [facility] [address]   Phone [phone]   CBC WITH AUTO DIFFERENTIAL    Narrative:     Performed at:  [facility] [address], [facility], [facility] [address]   Phone [phone]   TROPONIN    Narrative:     Performed at:  [facility] [address], [facility], [address]   Phone [phone]   LIPASE    Narrative:     Performed at:  [facility] [address], [facility], [facility] [address]   Phone [phone]       When ordered only abnormal lab results are displayed. All other labs were within normal range or not returned as of this dictation. EKG:  When ordered, EKG's are interpreted by the Emergency Department Physician in the absence of a cardiologist.  Please see their note for interpretation of EKG. RADIOLOGY:   Non-plain film images such as CT, Ultrasound and MRI are read by the radiologist. Plain radiographic images are visualized and preliminarily interpreted by the ED Provider with the below findings:        Interpretation per the Radiologist below, if available at the time of this note:    CT Head WO Contrast   Final Result   No acute intracranial abnormality. XR CHEST PORTABLE   Final Result   No acute cardiopulmonary disease. CT Head WO Contrast    Result Date: 11/2/2021  EXAMINATION: CT OF THE HEAD WITHOUT CONTRAST  11/2/2021 10:57 am TECHNIQUE: CT of the head was performed without the administration of intravenous contrast. Dose modulation, iterative reconstruction, and/or weight based adjustment of the mA/kV was utilized to reduce the radiation dose to as low as reasonably achievable. COMPARISON: None. HISTORY: ORDERING SYSTEM PROVIDED HISTORY: headache TECHNOLOGIST PROVIDED HISTORY: Reason for exam:->headache Has a \"code stroke\" or \"stroke alert\" been called? ->No Decision Support Exception - unselect if not a suspected or confirmed emergency medical condition->Emergency Medical Condition (MA) Reason for Exam: headache Acuity: Unknown Type of Exam: Unknown FINDINGS: BRAIN/VENTRICLES: There is no acute intracranial hemorrhage, mass effect or midline shift. No abnormal extra-axial fluid collection. The gray-white differentiation is maintained without evidence of an acute infarct. There is no evidence of hydrocephalus. ORBITS: The visualized portion of the orbits demonstrate no acute abnormality. SINUSES: The visualized paranasal sinuses and mastoid air cells demonstrate no acute abnormality. SOFT TISSUES/SKULL:  No acute abnormality of the visualized skull or soft tissues. No acute intracranial abnormality.      XR CHEST PORTABLE    Result Date: 11/2/2021  EXAMINATION: ONE XRAY VIEW OF THE CHEST 11/2/2021 10:50 am COMPARISON: 02/17/2021 HISTORY: ORDERING SYSTEM PROVIDED HISTORY: chest pain TECHNOLOGIST PROVIDED HISTORY: Reason for exam:->chest pain Reason for Exam: Headache (for a couple months due to a chemical smell in house). Chest Pain (had cardiac ablation and stents in [facility]) Acuity: Acute Type of Exam: Initial FINDINGS: Single view of the chest was obtained. No confluent focal pulmonary opacities to suggest acute airspace disease. No evidence of pleural effusion or pneumothorax. Cardiac and mediastinal silhouettes are within normal limits. Coronary artery calcification or stent. No acute cardiopulmonary disease. PROCEDURES   Unless otherwise noted below, none     Procedures    CRITICAL CARE TIME   N/A    CONSULTS:  None      EMERGENCY DEPARTMENT COURSE and DIFFERENTIAL DIAGNOSIS/MDM:   Vitals:    Vitals:    11/02/21 1200 11/02/21 1215 11/02/21 1230 11/02/21 1245   BP: (!) 174/97 (!) 175/97 (!) 174/92 (!) 178/95   Pulse: 80 77 75 77   Resp: 16 15 18 17   Temp:       SpO2: 93% 95% 91% 93%   Weight:       Height:           Patient was given the following medications:  Medications   metoclopramide (REGLAN) injection 10 mg (10 mg IntraVENous Given 11/2/21 1039)   diphenhydrAMINE (BENADRYL) injection 25 mg (25 mg IntraVENous Given 11/2/21 1040)   dilTIAZem (CARDIZEM 12 HR) extended release capsule 120 mg (120 mg Oral Given 11/2/21 1133)           Patient presented with headache that has been ongoing for the past 1 to 2 months. Had some nausea and vomiting this morning. No vomiting since she has been here. She only vomited one time this morning. Abdominal exam is benign. States she did have some chest pain this morning but has history of angina she states she gets this every day. Has past medical history that is already been diagnosed with some chronic precordial pain. Troponin is normal and EKG is unremarkable.   Work-up here is unremarkable. Low suspicion for CVA, TIA, subarachnoid hemorrhage, meningitis, acute abdomen, aortic dissection, esophageal rupture, acute coronary syndrome or other emergent etiology. She states that she is already talked to a  the fire department and multiple other people about the smell in her house that no one else can smell. Do not believe any further work-up or testing is warranted this time. States she used to have headaches like this 20 years ago when she was seen by multiple neurologist.    FINAL IMPRESSION      1.  Abnormal smell          DISPOSITION/PLAN   DISPOSITION Decision To Discharge 11/02/2021 01:08:04 PM      PATIENT REFERRED TO:  Enid Rodriguez MD  [address] [address]  [phone]    Schedule an appointment as soon as possible for a visit in 2 days  For symptom re-evaluation    [facility] Emergency Department  [address]  Go to   If symptoms worsen      DISCHARGE MEDICATIONS:  Discharge Medication List as of 11/2/2021  1:20 PM          DISCONTINUED MEDICATIONS:  Discharge Medication List as of 11/2/2021  1:20 PM                 (Please note that portions of this note were completed with a voice recognition program.  Efforts were made to edit the dictations but occasionally words are mis-transcribed.)    Rosette Moore PA-C (electronically signed)           Rosette Moore PA-C  11/02/21 [id number]

## 2021-11-22 ENCOUNTER — TELEPHONE (OUTPATIENT)
Dept: CARDIOLOGY CLINIC | Age: 66
End: 2021-11-22

## 2021-11-22 NOTE — TELEPHONE ENCOUNTER
I spoke with pt. She states she is sure that she is in afib. Is not on anticoagulation since her ablation. She has Clopidegrel  And Xarelto 15 , 20 & Eliiqiis  5mg on hand that have been discontinued due to stomach upset ans post ablation.      /129

## 2021-11-22 NOTE — TELEPHONE ENCOUNTER
Pt state she went into afib about 1 - 1 1/2 ago and is feeling light headed, sob,  dizzy and heart feels like it is going to beat out of her chest. States she feels weird.  Please call to advise

## 2021-11-22 NOTE — TELEPHONE ENCOUNTER
She will start xarelto 20 mg daily    Take lanoxin 0.25 mg reny for next 3 days. Take extra cardizem  mg now.  I spoke with her

## 2021-12-17 ENCOUNTER — TELEPHONE (OUTPATIENT)
Dept: CARDIOLOGY CLINIC | Age: 66
End: 2021-12-17

## 2021-12-17 NOTE — TELEPHONE ENCOUNTER
Spoke with her. She will increase lanoxin to 0.25 mg daily but she feels more comfortable taking 0.125 mg bid.  Also give her an appointment to see me

## 2021-12-17 NOTE — TELEPHONE ENCOUNTER
Pt is calling state she is back in Afib. She states [facility] is aware of this and has increased and decreased medications.  pls call to advise thank you

## 2021-12-20 ENCOUNTER — HOSPITAL ENCOUNTER (OUTPATIENT)
Age: 66
Discharge: HOME | End: 2021-12-20
Payer: MEDICARE

## 2021-12-20 DIAGNOSIS — Z11.59 SCREENING FOR VIRAL DISEASE: ICD-10-CM

## 2021-12-20 DIAGNOSIS — I25.118 CORONARY ARTERY DISEASE OF NATIVE ARTERY OF NATIVE HEART WITH STABLE ANGINA PECTORIS (HCC): ICD-10-CM

## 2021-12-20 DIAGNOSIS — I48.0 PAF (PAROXYSMAL ATRIAL FIBRILLATION) (HCC): ICD-10-CM

## 2021-12-20 DIAGNOSIS — E78.00 HYPERCHOLESTEROLEMIA: ICD-10-CM

## 2021-12-20 DIAGNOSIS — I10 ESSENTIAL HYPERTENSION: ICD-10-CM

## 2021-12-20 DIAGNOSIS — I25.10 CORONARY ARTERY DISEASE INVOLVING NATIVE CORONARY ARTERY OF NATIVE HEART WITHOUT ANGINA PECTORIS: ICD-10-CM

## 2021-12-20 LAB
ALBUMIN SERPL-MCNC: 4.2 G/DL (ref 3.4–5)
ALP BLD-CCNC: 131 U/L (ref 40–129)
ALT SERPL-CCNC: 20 U/L (ref 10–40)
AST SERPL-CCNC: 30 U/L (ref 15–37)
BILIRUB SERPL-MCNC: 0.5 MG/DL (ref 0–1)
BILIRUBIN DIRECT: <0.2 MG/DL (ref 0–0.3)
BILIRUBIN, INDIRECT: ABNORMAL MG/DL (ref 0–1)
CHOLESTEROL, TOTAL: 205 MG/DL (ref 0–199)
ESTIMATED AVERAGE GLUCOSE: 128.4 MG/DL
HBA1C MFR BLD: 6.1 %
HDLC SERPL-MCNC: 46 MG/DL (ref 40–60)
LDL CHOLESTEROL CALCULATED: 114 MG/DL
SARS-COV-2 ANTIBODY, TOTAL: POSITIVE
TOTAL PROTEIN: 7.1 G/DL (ref 6.4–8.2)
TRIGL SERPL-MCNC: 225 MG/DL (ref 0–150)
TSH SERPL DL<=0.05 MIU/L-ACNC: 4.67 UIU/ML (ref 0.27–4.2)
VLDLC SERPL CALC-MCNC: 45 MG/DL

## 2021-12-20 PROCEDURE — 86769 SARS-COV-2 COVID-19 ANTIBODY: CPT

## 2021-12-20 PROCEDURE — 80061 LIPID PANEL: CPT

## 2021-12-20 PROCEDURE — 84443 ASSAY THYROID STIM HORMONE: CPT

## 2021-12-20 PROCEDURE — 83036 HEMOGLOBIN GLYCOSYLATED A1C: CPT

## 2021-12-20 PROCEDURE — 36415 COLL VENOUS BLD VENIPUNCTURE: CPT

## 2021-12-20 PROCEDURE — 80076 HEPATIC FUNCTION PANEL: CPT

## 2021-12-22 ENCOUNTER — TELEPHONE (OUTPATIENT)
Dept: CARDIOLOGY CLINIC | Age: 66
End: 2021-12-22

## 2021-12-22 ENCOUNTER — OFFICE VISIT (OUTPATIENT)
Dept: CARDIOLOGY CLINIC | Age: 66
End: 2021-12-22
Payer: MEDICARE

## 2021-12-22 VITALS
HEART RATE: 72 BPM | WEIGHT: 208.2 LBS | BODY MASS INDEX: 35.55 KG/M2 | OXYGEN SATURATION: 98 % | HEIGHT: 64 IN | DIASTOLIC BLOOD PRESSURE: 80 MMHG | SYSTOLIC BLOOD PRESSURE: 138 MMHG

## 2021-12-22 DIAGNOSIS — I48.91 ATRIAL FIBRILLATION, UNSPECIFIED TYPE (HCC): ICD-10-CM

## 2021-12-22 DIAGNOSIS — R07.9 CHEST PAIN, UNSPECIFIED TYPE: Primary | ICD-10-CM

## 2021-12-22 DIAGNOSIS — I25.10 CORONARY ARTERY DISEASE INVOLVING NATIVE CORONARY ARTERY OF NATIVE HEART WITHOUT ANGINA PECTORIS: ICD-10-CM

## 2021-12-22 DIAGNOSIS — I10 ESSENTIAL HYPERTENSION: ICD-10-CM

## 2021-12-22 PROCEDURE — 99214 OFFICE O/P EST MOD 30 MIN: CPT | Performed by: INTERNAL MEDICINE

## 2021-12-22 PROCEDURE — 93000 ELECTROCARDIOGRAM COMPLETE: CPT | Performed by: INTERNAL MEDICINE

## 2021-12-22 NOTE — TELEPHONE ENCOUNTER
Per DAWSON pt needs appt asap to discuss repeat ablation and watchman. Has seen Dr Mariela Cruz in the past. Please call to schedule.

## 2021-12-22 NOTE — PROGRESS NOTES
[facility]   Cardiac Follow Up     Referring Provider:  Radha Olvrea MD     Chief Complaint   Patient presents with    Coronary Artery Disease     Chest pain     Hypertension        History of Present Illness:  Tyler Warren is a 61 y.o. female with a history of coronary artery disease, STEMI with PCI (PCI/ALEC RCA 10/2012, PCI to Ramus 10/2012, ) , hypertension and AFIB. Afib ablation 2/25/21      She presented to [facility] on 11/19/2017 with complaints of chest pain that radiates to her jaw and arms. She was seen by cardiology and diagnosed with a STEMI and a LHC was completed with a ALEC in the diagonal branch. Developed chest pain again 11/20/2017 and a repeat LHC was completed with no further intervention necessary. She states that she has intolerance to many medications.      Today, she is taking Digoxin 2x daily, she is able to feel her heart going out of rhythm when it is close to time for her next dose. She states it takes around 45 mins for normal rhythm to return. Discussed she may need repeat ablation , she is also interested in Isabell. Past Medical History:   has a past medical history of Allergic rhinitis, cause unspecified, Anxiety, Arthritis, Bilateral carotid artery stenosis, CAD (coronary artery disease), Chronic kidney disease, Chronic pain, Depression, Erosive esophagitis, Essential hypertension, HSV infection, Hypercholesterolemia, Irritable bowel syndrome with diarrhea, Migraine headache, Nonerosive nonspecific gastritis, and OAB (overactive bladder). Surgical History:   has a past surgical history that includes Appendectomy; shannan and bso (cervix removed); Cholecystectomy; Tubal ligation; Tonsillectomy; other surgical history; Coronary angioplasty with stent (10/2012); Percutaneous Transluminal Coronary Angio (10/2012); Cardiac catheterization (12/07/2013); Hysterectomy; polypectomy; Cystoscopy (04/2014); Upper gastrointestinal endoscopy;  Wrist surgery (Left, 05/21/2015); Abdominal adhesion surgery; Colonoscopy; Endoscopy, colon, diagnostic; Cosmetic surgery; fracture surgery; ERCP (01/25/2017); sinus surgery; Hysterectomy, total abdominal; and Coronary angioplasty with stent (02/04/2021). Social History:   reports that she quit smoking about 14 years ago. Her smoking use included cigarettes. She has a 10.00 pack-year smoking history. She has never used smokeless tobacco. She reports that she does not drink alcohol and does not use drugs. Family History:  family history includes Heart Disease in her brother, father, maternal uncle, mother, and another family member; High Blood Pressure in her sister and sister; Hypertension in her father and mother; Other in an other family member; Stroke in her sister and another family member. Home Medications:  Prior to Admission medications    Medication Sig Start Date End Date Taking?  Authorizing Provider   POTASSIUM GLUCONATE PO Take by mouth   Yes Historical Provider, MD   Cholecalciferol (VITAMIN D3) 125 MCG (5000 UT) TABS Take by mouth   Yes Historical Provider, MD   cetirizine (ZYRTEC) 5 MG tablet Take 5 mg by mouth daily   Yes Historical Provider, MD   digoxin (LANOXIN) 125 MCG tablet Take 1 tablet by mouth daily Can take 5 days a week, can take extra tab 2x weekly if needed for afib. 11/1/21  Yes Carol Longoria MD   dilTIAZem (CARDIZEM CD) 120 MG extended release capsule TAKE 1 CAPSULE BY MOUTH TWICE DAILY 9/9/21  Yes Carol Longoria MD   Mag Aspart-Potassium Aspart (POTASSIUM & MAGNESIUM ASPARTAT PO) Take 1 tablet by mouth daily    Yes Historical Provider, MD   Zinc-Vitamin C  MG LOZG Take 1 lozenge by mouth daily    Yes Historical Provider, MD   L-Lysine 1000 MG TABS Take 1 tablet by mouth every morning (before breakfast)    Yes Historical Provider, MD   cyanocobalamin 1000 MCG tablet Take 1,000 mcg by mouth daily   Yes Historical Provider, MD   nitroGLYCERIN (NITROSTAT) 0.4 MG SL tablet Place 1 tablet under the tongue every 5 minutes as needed for Chest pain up to max of 3 total doses. If no relief after 1 dose, call 911. 8/31/20  Yes Abigail Webster MD   estradiol (ESTRACE) 0.1 MG/GM vaginal cream PLACE 0.5 GRAM VAGINALLY 3 TIMES A WEEK 6/5/20  Yes Abigail Webster MD        Allergies:  Hydralazine, Shellfish-derived products, Xarelto [rivaroxaban], Asa [aspirin], Crestor [rosuvastatin], Fenofibrate, Hctz [hydrochlorothiazide], Lipitor [atorvastatin calcium], Lipitor [atorvastatin], Lisinopril, Mometasone furoate, Nasonex [mometasone], Nsaids, Plavix [clopidogrel], Sulfa antibiotics, Contrast [iodides], and Flagyl [metronidazole]     Review of Systems:   · Constitutional: there has been no unanticipated weight loss. There's been no change in energy level, sleep pattern, or activity level.   +fatigue   · Eyes: No visual changes or diplopia. No scleral icterus. · ENT: No Headaches, hearing loss or vertigo. No mouth sores or sore throat. · Cardiovascular: Reviewed in HPI  · Respiratory: No cough or wheezing, no sputum production. No hematemesis. +sob  · Gastrointestinal: No abdominal pain, appetite loss, blood in stools. No change in bowel or bladder habits. · Genitourinary: No dysuria, trouble voiding, or hematuria. · Musculoskeletal:  No gait disturbance, weakness or joint complaints. +cp   · Integumentary: No rash or pruritis. · Neurological: No headache, diplopia, change in muscle strength, numbness or tingling. No change in gait, balance, coordination, mood, affect, memory, mentation, behavior. · Psychiatric: No anxiety, no depression. · Endocrine: No malaise, fatigue or temperature intolerance. No excessive thirst, fluid intake, or urination. No tremor. · Hematologic/Lymphatic: No abnormal bruising or bleeding, blood clots or swollen lymph nodes. · Allergic/Immunologic: No nasal congestion or hives.     Physical Examination:    Vitals:    12/22/21 0855   BP: 138/80   Pulse: 72   SpO2: 98%        Wt Readings from Last 1 Encounters:   12/22/21 208 lb 3.2 oz (94.4 kg)       Constitutional and General Appearance:Appears uncomfortable  Skin:good turgor,intact without lesions  HEENT: EOMI ,normal  Neck:no JVD    Respiratory:  · Normal excursion and expansion without use of accessory muscles  · Resp Auscultation: Normal breath sounds without dullness  Cardiovascular:  · The apical impulses not displaced  · Heart tones are crisp and normal  · Cervical veins are not engorged  · The carotid upstroke is normal in amplitude and contour without delay or bruit  · Peripheral pulses are symmetrical and full  · There is no clubbing, cyanosis of the extremities. · No edema  · Femoral Arteries: 2+ and equal  · Pedal Pulses: 2+ and equal   Abdomen:  · No masses or tenderness  · Liver/Spleen: No Abnormalities Noted  Neurological/Psychiatric:  · Alert and oriented in all spheres  · Moves all extremities well  · Exhibits normal gait balance and coordination  · No abnormalities of mood, affect, memory, mentation, or behavior are noted    GXT Myoview 12/23/20   Summary    -There is a small-moderate sized, moderate intensity fixed anterior defect    suggestive of scar.    -There is no ischemia.    -LV function is normal with EF=70%    -Low-intermediate risk. Assessment:      Atrial fibrillation and flutter with RVR  Ablation on  2/25/21   She takes digoxin 2x daily     CAD  History of multiple stents to RCA, STEMI 2017  [facility] was completed with a ALEC in the diagonal branch at [facility] 2017 12/12/20 stress > stable but fixed anterior defect    LAD stent x 2 in distal LAD 2/4/21    Hypertension   Stable- Blood pressure 138/80, pulse 72, height 5' 4\" (1.626 m), weight 208 lb 3.2 oz (94.4 kg), SpO2 98 %      Hyperlipidemia   She is highly statin intolerant- severe myalgia    Will try Repatha     Plan:   Cardiac test and lab results personally reviewed by me during this office visit and discussed.     Needs appt with EP asap to discuss repeat ablation and watchman   Start Repatha   Continue risk factor modifications. Call for any change in symptoms, call to report any changes in shortness of breath or development of chest pain with activity. Return for regular follow up in 6 months. I appreciate the opportunity of cooperating in the care of this individual.    Selina Brandon. Hal Edmond M.D., [facility]    Patient's problem list, medications, allergies, past medical, surgical, social and family histories were reviewed and updated as appropriate. Scribe's attestation: This note was scribed in the presence of Dr. Hal Edmond MD, by Silas Garcia RN. The scribe's documentation has been prepared under my direction and personally reviewed by me in its entirety. I confirm that the note above accurately reflects all work, treatment, procedures, and medical decision making performed by me.

## 2021-12-23 ENCOUNTER — TELEPHONE (OUTPATIENT)
Dept: CARDIOLOGY CLINIC | Age: 66
End: 2021-12-23

## 2021-12-23 NOTE — TELEPHONE ENCOUNTER
Received fax from [address] [place] [address] stating that patient has been approved for Naida Drummond. Approved from 11/23/21 to 12/22/24.    Case ID: [id number]

## 2021-12-23 NOTE — TELEPHONE ENCOUNTER
Received fax from [employer] requesting a PA to be completed on patient's [address]. Completed PA form through [employer]. Will await decision.

## 2022-01-13 PROBLEM — E66.9 OBESITY: Status: ACTIVE | Noted: 2021-04-29

## 2022-01-13 NOTE — PROGRESS NOTES
[facility]   Electrophysiology   Date: 1/18/2022    CC: Palpitation   HPI: Verenice Williamson is a 61 y.o. female complex past medical history including CAD, STEMI with PCI (PCI/ALEC RCA 10/2012, PCI to Ramus 10/2012, ) , and hypertension. She presented to [place] [address] on 11/19/2017 with complaints of chest pain that radiates to her jaw and arms. She was seen by cardiology and diagnosed with a STEMI and a LHC was completed with a ALEC in the diagonal branch. Developed chest pain again 11/20/2017 and a repeat LHC was completed with no further intervention necessary. She has seen multiple cardiologist and is also done to [facility]. She states that she has intolerance to many medication and is not taking aspirin or Plavix. She presented to her PCP's office, Maryam Howell, on 8/31/2020. She had continued complaints of intermittent angina. She also complained of palpitation and Holter monitoring was done which showed atrial fibrillation with rapid ventricular rate. S/p Atrial fibrillation ablation, ablation of kimani between the LIPV and LSPV,, ablation of focal atrial tachycardia originating from the ridge between the LSPV and the appendage. Ablation of left sided macro re-entrant atrial flutter, roof dependent mechanism. Additional ablation of CT isthmus atrial flutter 02/25/2021. She saw Christobal Gain 12/22/2021 and states she feels her heart going out of rhythm when it  is time for her next dose of digoxin. Takes about 45 minutes to go back in rhythm after dose . Repeat ablation and watchman were discussed and she was referred back to EP. Lu Gould presents today regarding her atrial fibrillation. She is having frequent  Fast irregular heart beats. Happens several times a week. These started up again in August 2021  Digoxin helps but she states she is not tolerating it well. She states  cardizem  is affecting her urinary tract  She has stopped sugar and artificial sweetener.  She is not currently on any AC. She is a vegetarian and eats a large amount of greens and feels that warfarin will not work for her. She states she is still having pain in her groin from her last ablation. She has had ultrasound and told it was fine. Report shows no evidence of  Femoral  pseudoaneurysm  And no arterial disease of the right leg. She urinates frequently when she is in afib. Assessment and plan:      Atrial fibrillation and flutter with RVR:    ECG 12/22/2021 shows sinus rhythm    S/p complex ablation on 02/25/2021       Patient felt fine for a few months after ablation however reports recurrence of palpitation. - she feels she has been out rhythm when it gets close to her second dose of digoxin    She is symptomatic with these episodes. She complains of palpitation and chest pain. Increasing since 08/2020 -      She is allergic to multiple medication. Could not tolerate metoprolol.   - Continue digoxin 125 mcg, cardizem , 120 mg   - not on metoprolol due to allergy     - [id number] Vasc score 3 (HTN, CAD, Female)     ~ allergy to xarelto     ~ eliquis stopped due to rectal bleeding. She is not interested in taking her warfarin since she is vegetarian.       -Antiarrhythmic therapy is limited due to history of MI and coronary artery disease. No documentation of her recent palpitation rhythm. Will do repeat Holter monitoring to assess rhythm during these palpitation episodes. We discussed treatment options which could be antiarrhythmic therapy with sotalol or Tikosyn if she can tolerate it. That requires hospitalization. We also discussed repeat ablation. She will think about both options and will let me know after Holter monitoring. She also states that she is allergic to Xarelto had rectal bleeding with Eliquis and not interested in warfarin since she is vegetarian. I discussed left atrial closure with watchman device.  Alternatives including surgical ligation of TASHIA with Diane Fenton clips also discussed. The procedure has been discussed in detail with patient and family members along with the risk and benefits. Success rate and complications associated with such a procedure have been discussed. Continued treatment with anti-coagulation agents will also be a reasonable strategy and can be pursued. All the questions were answered. ~ we discussed need to take [facility] and antiplatelets after the watchman procedure. ~ we also discussed surgical options for closing the appendage and epicardial ablation. She will discuss this further with family member think about it and will let me know. CAD   History of multiple stents to RCA, STEMI 2017   -[facility] with  ALEC to D1 [facility] 2017   -She is not on ASA because she states she is intolerant d/t history of endometriosis. HTN  Vitals:    01/18/22 1608   BP: (!) 144/84   Pulse: 73   SpO2: 96%       -Not well controlled today   She is not taking her medication and states that her primary care knows about her high blood pressure.   -Continue Cardizem    -Home BP monitoring encourage with a BP goal <130/80    Follow up with Dr. Santiago Thornton. Obesity  Body mass index is 35.29 kg/m². - Excessive weight is complicating assessment and treatment. It is placing patient at risk for multiple co-morbidities as well as early death and contributing to the patient's presentation. - discussed weight management with diet and exercise      - Will consider OSEI evaluation. Reports no symptoms. Plan  2 week monitor to identify rhythm     Refer for Watchman if she is interested. Consider surgical option if she feels she cannot tolerate blood thinner. Consider medication or ablation to control afib. We will wait for holter to determine cause of palpitations. Diagnostic studies:   ECG today sinus  12/22/2021      JESSICA: 2/25/21   Left ventricle size is normal. Mild left ventricular hypertrophy.  Left   ventricular function is normal with ejection fraction estimated at 55-60 %.   No regional wall motion abnormalities are noted.  Dario Jacktz is no evidence of mass or thrombus in the left atrium or appendage.   Mild MR and TR.   Small patent foramen ovale with left-to-right shunt was visualized.      1 week Holter monitor 11/17/2020  Afib/flutter, symptomatic  Avg HR 86, min 59, max 144 (in SR)  Avg HR in afib 161    Echo 12/14/2017   Summary   Left ventricle size is normal. There is mild concentric left ventricular   hypertrophy. Left ventricular function is normal with ejection fraction   estimated at 55-60 %. No regional wall motion abnormalities are noted. Diastolic filling parameters suggests grade II diastolic dysfunction. Mild   mitral and tricuspid regurgitation is present. RVSP estimated at 36 mmHg. CARDIAC CATH OUTSIDE 12-7-12  FINDINGS  LVP 91/11  EST EF 65%  WALL MOTION normal  Valve function no MR or aortic stenosis  dominance right  left main normal    left anterior descending course to the undersurface of the apex. it gives rise to a large diagonal branch. there is 30% focal mid LAD stenosis just beyond the diagonal branch. 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 There is 20% proximal stenosis of the diagonal branch. left circumflex supplies a small ramus medius, and 2 large obtuse marginal branches. There is 40% tubular distal LCX stenosis proximal to the 2nd obtuse marginal branch. There is 30% stenosis of the small ramus intermedius. right supplies a small 1 mm marginal branch, large posterior descending branch a large posterior left ventricular branch and 2 small posterior descending branch a large posterior left ventricular branches. there is no stenosis in the mid RCA stent. There is 70% ostial and 30% proximal stenosis of the small marginal branch. I independently reviewed the cardiac diagnostic studies, ECG and relevant imaging studies.      Physical Examination:  Vitals:    01/18/22 1608   BP: (!) 144/84   Pulse: 73   SpO2: 96%      Wt Readings from Last 3 Encounters:   01/18/22 205 lb 9.6 oz (93.3 kg)   12/22/21 208 lb 3.2 oz (94.4 kg)   11/02/21 210 lb (95.3 kg)       · Constitutional: Oriented. No distress. · Head: Normocephalic and atraumatic. · Mouth/Throat: Oropharynx is clear and moist.   · Eyes: Conjunctivae normal. EOM are normal.   · Neck: Neck supple. No JVD present. · Cardiovascular: Normal rate, regular rhythm, S1&S2. · Pulmonary/Chest: Bilateral respiratory sounds. No rhonchi. · Abdominal: Soft. No tenderness. · Musculoskeletal: No tenderness. No edema    · Lymphadenopathy: Has no cervical adenopathy. · Neurological: Alert and oriented. Follows command, No Gross deficit   · Skin: Skin is warm, No rash noted. · Psychiatric: Has a normal behavior     Review of System:  [x] Full ROS obtained and negative except as mentioned in HPI    Prior to Admission medications    Medication Sig Start Date End Date Taking?  Authorizing Provider   POTASSIUM GLUCONATE PO Take by mouth   Yes Historical Provider, MD   Cholecalciferol (VITAMIN D3) 125 MCG (5000 UT) TABS Take by mouth daily    Yes Historical Provider, MD   cetirizine (ZYRTEC) 5 MG tablet Take 5 mg by mouth daily   Yes Historical Provider, MD   digoxin (LANOXIN) 125 MCG tablet Take 1 tablet by mouth daily Can take 5 days a week, can take extra tab 2x weekly if needed for afib. 11/1/21  Yes Ariana Locke MD   dilTIAZem (CARDIZEM CD) 120 MG extended release capsule TAKE 1 CAPSULE BY MOUTH TWICE DAILY 9/9/21  Yes Ariana Locke MD   Mag Aspart-Potassium Aspart (POTASSIUM & MAGNESIUM ASPARTAT PO) Take 1 tablet by mouth daily    Yes Historical Provider, MD   Zinc-Vitamin C  MG LOZG Take 1 lozenge by mouth daily    Yes Historical Provider, MD   L-Lysine 1000 MG TABS Take 1 tablet by mouth every morning (before breakfast)    Yes Historical Provider, MD   cyanocobalamin 1000 MCG tablet Take 1,000 mcg by mouth daily   Yes Historical Provider, MD   nitroGLYCERIN (NITROSTAT) 0.4 MG SL tablet Place 1 tablet under the tongue every 5 minutes as needed for Chest pain up to max of 3 total doses. If no relief after 1 dose, call 911. 8/31/20  Yes Red Colon MD   estradiol (ESTRACE) 0.1 MG/GM vaginal cream PLACE 0.5 GRAM VAGINALLY 3 TIMES A WEEK 6/5/20  Yes Red Colon MD   Evolocumab 140 MG/ML SOSY Inject 1 mL into the skin every 14 days  Patient not taking: Reported on 1/14/2022 12/22/21   Radha Jerome MD       Past Medical History:   Diagnosis Date    Allergic rhinitis, cause unspecified 12/04/2010    Anxiety     Arthritis     Bilateral carotid artery stenosis     < 50% bilaterally    CAD (coronary artery disease)     angioplasty with stent at [facility] Dr. Sushila Chavarria, here Dr. Deandre Kimball [facility] Chronic kidney disease     frequency, urgency    Chronic pain     precordial, opiate dependent    Depression 01/22/2013    Erosive esophagitis 12/28/2016    Dr. Jalil Martinez; PPI started; LA Class A, Sphincter of Oddi manometry and sphincterotomy if symptoms don't improve.      Essential hypertension 04/11/2017    HSV infection     Hypercholesterolemia 06/06/2014    Irritable bowel syndrome with diarrhea 09/27/2016    Migraine headache 06/06/2014    Nonerosive nonspecific gastritis 12/28/2016    Dr. Jalil Martinez; body and antrum    OAB (overactive bladder) 03/14/2014        Past Surgical History:   Procedure Laterality Date    ABDOMINAL ADHESION SURGERY      APPENDECTOMY      CARDIAC CATHETERIZATION  12/07/2013    recheck arteries unchanged    CHOLECYSTECTOMY      COLONOSCOPY      CORONARY ANGIOPLASTY WITH STENT PLACEMENT  10/2012    right- TOTAL OF 3 STENTS    CORONARY ANGIOPLASTY WITH STENT PLACEMENT  02/04/2021    COSMETIC SURGERY      rhinoplasty    CYSTOSCOPY  04/2014    ENDOSCOPY, COLON, DIAGNOSTIC      ERCP  01/25/2017    FRACTURE SURGERY      LUE, plate and screws    HYSTERECTOMY      HYSTERECTOMY, TOTAL ABDOMINAL      OTHER SURGICAL HISTORY      Incision and drainage of Lingual Introral Lesion    POLYPECTOMY      Dr. Marcus Pisano PTCA  10/2012    right    SINUS SURGERY      JOSE AND BSO      TONSILLECTOMY      TUBAL LIGATION      UPPER GASTROINTESTINAL ENDOSCOPY      WRIST SURGERY Left 05/21/2015    OPEN REDUCTION INTERNAL FIXATION LEFT WRIST       Allergies   Allergen Reactions    Hydralazine Anaphylaxis    Shellfish-Derived Products Anaphylaxis and Swelling    Xarelto [Rivaroxaban] Swelling    Asa [Aspirin] Nausea Only    Crestor [Rosuvastatin]      Myalgia      Fenofibrate      Myalgia      Hctz [Hydrochlorothiazide]     Lipitor [Atorvastatin Calcium]     Lipitor [Atorvastatin]      Myalgia      Lisinopril     Mometasone Furoate Swelling    Nasonex [Mometasone] Swelling    Nsaids      GI PAIN    Plavix [Clopidogrel] Hives, Itching and Swelling    Sulfa Antibiotics Swelling    Contrast [Iodides] Swelling and Rash    Flagyl [Metronidazole] Nausea And Vomiting       Social History:  Reviewed. reports that she quit smoking about 14 years ago. Her smoking use included cigarettes. She has a 10.00 pack-year smoking history. She has never used smokeless tobacco. She reports that she does not drink alcohol and does not use drugs. Family History:  Reviewed. Reviewed. No family history of SCD. Relevant and available labs, and cardiovascular diagnostics reviewed. Reviewed. I independently reviewed all cardiac tracing. All questions and concerns were addressed to the patient/family. Alternatives to my treatment were discussed. I have discussed the above stated plan and the patient verbalized understanding and agreed with the plan. NOTE: This report was transcribed using voice recognition software. Every effort was made to ensure accuracy, however, inadvertent computerized transcription errors may be present.      Nikita Garcia MD, MPH  [facility]   Office: [phone] attestation: This note was scribed in the presence of Vaishnavi Long MD by Riley Reynoso RN  Physician Attestation: I, Dr. Vaishnavi Long, confirm that the scribe's documentation has been prepared under my direction and personally reviewed by me in its entirety. I also confirm that the note above accurately reflects all work, treatment, procedures, and medical decision making performed by me.

## 2022-01-14 ENCOUNTER — TELEPHONE (OUTPATIENT)
Dept: FAMILY MEDICINE CLINIC | Age: 67
End: 2022-01-14

## 2022-01-14 ENCOUNTER — VIRTUAL VISIT (OUTPATIENT)
Dept: FAMILY MEDICINE CLINIC | Age: 67
End: 2022-01-14
Payer: MEDICARE

## 2022-01-14 DIAGNOSIS — S20.219A CONTUSION OF CHEST WALL, UNSPECIFIED LATERALITY, INITIAL ENCOUNTER: Primary | ICD-10-CM

## 2022-01-14 PROCEDURE — 99442 PR PHYS/QHP TELEPHONE EVALUATION 11-20 MIN: CPT | Performed by: FAMILY MEDICINE

## 2022-01-14 RX ORDER — ACETAMINOPHEN AND CODEINE PHOSPHATE 300; 30 MG/1; MG/1
1-2 TABLET ORAL EVERY 8 HOURS PRN
Qty: 15 TABLET | Refills: 0 | Status: SHIPPED | OUTPATIENT
Start: 2022-01-14 | End: 2022-01-18 | Stop reason: ALTCHOICE

## 2022-01-14 RX ORDER — DICLOFENAC SODIUM 75 MG/1
75 TABLET, DELAYED RELEASE ORAL 2 TIMES DAILY
Qty: 60 TABLET | Refills: 0 | Status: SHIPPED | OUTPATIENT
Start: 2022-01-14 | End: 2022-01-18 | Stop reason: ALTCHOICE

## 2022-01-14 SDOH — ECONOMIC STABILITY: FOOD INSECURITY: WITHIN THE PAST 12 MONTHS, THE FOOD YOU BOUGHT JUST DIDN'T LAST AND YOU DIDN'T HAVE MONEY TO GET MORE.: NEVER TRUE

## 2022-01-14 SDOH — ECONOMIC STABILITY: FOOD INSECURITY: WITHIN THE PAST 12 MONTHS, YOU WORRIED THAT YOUR FOOD WOULD RUN OUT BEFORE YOU GOT MONEY TO BUY MORE.: NEVER TRUE

## 2022-01-14 ASSESSMENT — PATIENT HEALTH QUESTIONNAIRE - PHQ9
10. IF YOU CHECKED OFF ANY PROBLEMS, HOW DIFFICULT HAVE THESE PROBLEMS MADE IT FOR YOU TO DO YOUR WORK, TAKE CARE OF THINGS AT HOME, OR GET ALONG WITH OTHER PEOPLE: 0
SUM OF ALL RESPONSES TO PHQ QUESTIONS 1-9: 0
SUM OF ALL RESPONSES TO PHQ9 QUESTIONS 1 & 2: 0
8. MOVING OR SPEAKING SO SLOWLY THAT OTHER PEOPLE COULD HAVE NOTICED. OR THE OPPOSITE, BEING SO FIGETY OR RESTLESS THAT YOU HAVE BEEN MOVING AROUND A LOT MORE THAN USUAL: 0
4. FEELING TIRED OR HAVING LITTLE ENERGY: 0
SUM OF ALL RESPONSES TO PHQ QUESTIONS 1-9: 0
5. POOR APPETITE OR OVEREATING: 0
7. TROUBLE CONCENTRATING ON THINGS, SUCH AS READING THE NEWSPAPER OR WATCHING TELEVISION: 0
2. FEELING DOWN, DEPRESSED OR HOPELESS: 0
SUM OF ALL RESPONSES TO PHQ QUESTIONS 1-9: 0
6. FEELING BAD ABOUT YOURSELF - OR THAT YOU ARE A FAILURE OR HAVE LET YOURSELF OR YOUR FAMILY DOWN: 0
SUM OF ALL RESPONSES TO PHQ QUESTIONS 1-9: 0
1. LITTLE INTEREST OR PLEASURE IN DOING THINGS: 0
9. THOUGHTS THAT YOU WOULD BE BETTER OFF DEAD, OR OF HURTING YOURSELF: 0
3. TROUBLE FALLING OR STAYING ASLEEP: 0

## 2022-01-14 ASSESSMENT — SOCIAL DETERMINANTS OF HEALTH (SDOH): HOW HARD IS IT FOR YOU TO PAY FOR THE VERY BASICS LIKE FOOD, HOUSING, MEDICAL CARE, AND HEATING?: NOT HARD AT ALL
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
## 2022-01-18 ENCOUNTER — OFFICE VISIT (OUTPATIENT)
Dept: CARDIOLOGY CLINIC | Age: 67
End: 2022-01-18
Payer: MEDICARE

## 2022-01-18 VITALS
HEIGHT: 64 IN | SYSTOLIC BLOOD PRESSURE: 144 MMHG | DIASTOLIC BLOOD PRESSURE: 84 MMHG | BODY MASS INDEX: 35.1 KG/M2 | OXYGEN SATURATION: 96 % | WEIGHT: 205.6 LBS | HEART RATE: 73 BPM

## 2022-01-18 DIAGNOSIS — I25.118 CORONARY ARTERY DISEASE OF NATIVE ARTERY OF NATIVE HEART WITH STABLE ANGINA PECTORIS (HCC): ICD-10-CM

## 2022-01-18 DIAGNOSIS — I48.0 PAF (PAROXYSMAL ATRIAL FIBRILLATION) (HCC): ICD-10-CM

## 2022-01-18 DIAGNOSIS — I48.3 TYPICAL ATRIAL FLUTTER (HCC): Primary | ICD-10-CM

## 2022-01-18 DIAGNOSIS — I10 ESSENTIAL HYPERTENSION: ICD-10-CM

## 2022-01-18 PROCEDURE — 99215 OFFICE O/P EST HI 40 MIN: CPT | Performed by: INTERNAL MEDICINE

## 2022-01-18 PROCEDURE — 93246 EXT ECG>7D<15D RECORDING: CPT | Performed by: INTERNAL MEDICINE

## 2022-01-18 NOTE — PATIENT INSTRUCTIONS
If you are interested in surgical procedure consider high volume center like [facility] or [place]. Let us know if you would like to proceed with Watchman call us. If you decide to proceed with surgical option we can refer you to high volume center.

## 2022-01-19 ENCOUNTER — TELEPHONE (OUTPATIENT)
Dept: CARDIOLOGY CLINIC | Age: 67
End: 2022-01-19

## 2022-01-19 NOTE — TELEPHONE ENCOUNTER
Left message for patient to contact me regarding the Watchman device to see if she is interested in pursuing the device

## 2022-01-19 NOTE — TELEPHONE ENCOUNTER
Patient called back and wants to wait and discuss with Rick Mcclendon regarding [facility]

## 2022-01-27 ENCOUNTER — OFFICE VISIT (OUTPATIENT)
Dept: CARDIOLOGY CLINIC | Age: 67
End: 2022-01-27
Payer: MEDICARE

## 2022-01-27 VITALS
BODY MASS INDEX: 35.07 KG/M2 | OXYGEN SATURATION: 96 % | HEART RATE: 75 BPM | DIASTOLIC BLOOD PRESSURE: 88 MMHG | WEIGHT: 205.4 LBS | SYSTOLIC BLOOD PRESSURE: 144 MMHG | HEIGHT: 64 IN

## 2022-01-27 DIAGNOSIS — E66.01 CLASS 2 SEVERE OBESITY WITH SERIOUS COMORBIDITY AND BODY MASS INDEX (BMI) OF 35.0 TO 35.9 IN ADULT, UNSPECIFIED OBESITY TYPE (HCC): ICD-10-CM

## 2022-01-27 DIAGNOSIS — I25.118 CORONARY ARTERY DISEASE OF NATIVE ARTERY OF NATIVE HEART WITH STABLE ANGINA PECTORIS (HCC): ICD-10-CM

## 2022-01-27 DIAGNOSIS — I10 ESSENTIAL HYPERTENSION: ICD-10-CM

## 2022-01-27 DIAGNOSIS — I48.0 PAF (PAROXYSMAL ATRIAL FIBRILLATION) (HCC): Primary | ICD-10-CM

## 2022-01-27 PROCEDURE — 93000 ELECTROCARDIOGRAM COMPLETE: CPT | Performed by: INTERNAL MEDICINE

## 2022-01-27 PROCEDURE — 99215 OFFICE O/P EST HI 40 MIN: CPT | Performed by: INTERNAL MEDICINE

## 2022-01-27 NOTE — LETTER
[address]  EP Procedure Sheet    1/27/22  Madison Johnson    Please place on cancellation list ....  ASAP.   1955  EP Procedures  [] Pacemaker implant (single/dual) [] EP Study   [] ICD implant (single/dual) [] Atrial flutter ablation (JESSICA Y/N)   [] Biv implant ICD [] Tilt Table   [] Biv implant PPM [x] Atrial fibrillation ablation (JESSICA Yes)   [] Generator Change (PPM/ICD/BiV) [] SVT ablation   [] Lead revision (RV/LA/RA) (<1 month) [] VT ablation     [] Lead extraction +/- upgrade (BiV/PPM/ICD) [] VT Ischemic/ non-ischemic   [] Loop implant/ removal [] VT RVOT   [] Cardioversion [] VT Left sided   [] JESSICA [] AVN ablation   Equipment  [] Medtronic  [] JUAN Mapping System   [] [place] [x] [address]   [] [employer] [] CryoAblation   [] Biotronik [] Laser Lead Extraction   EP Procedures Scheduling Request  # hours Requested   Scheduled  Date:   Specific Day ASAP - place on cancellation list.  Completed    Anesthesia Yes  F/u Date:   CT surgery backup  COVID     Overnight stay      Location/Doctor MFF []RMM [x]MXA   []MKW [] Other     Pre-Procedure Labs / Imaging  [] PT/INR [] Type & cross   [] CBC [] Units PRBC   [] BMP/Mg [] Units FFP   [] Venogram [] Cardiac CTA for Pulmonary vein mapping     RN INITIALS: DW     Patient Instructions  Do not eat or drink after midnight the night prior to procedure  Dx: Atrial fib/ Flutter  ICD-10 code:I 48.0  Medication Instructions: Hold []Xarelto []Eliquis []Coumadin []pradaxa for day/s prior  NOT on AC - many allergies

## 2022-01-27 NOTE — PATIENT INSTRUCTIONS
We will call you with your monitor results. Scheduling will call in  A week or so to schedule your procedure.

## 2022-01-27 NOTE — PROGRESS NOTES
[facility]   Electrophysiology   Date: 1/27/2022    CC: Palpitation   HPI: Antoni Mcallister is a 61 y.o. female complex past medical history including CAD, STEMI with PCI (PCI/ALEC RCA 10/2012, PCI to Ramus 10/2012, ) , and hypertension. She presented to [place] [address] on 11/19/2017 with complaints of chest pain that radiates to her jaw and arms. She was seen by cardiology and diagnosed with a STEMI and a LHC was completed with a ALEC in the diagonal branch. Developed chest pain again 11/20/2017 and a repeat LHC was completed with no further intervention necessary. She has seen multiple cardiologist and is also gone to [facility]. She states that she has intolerance to many medication and is not taking aspirin or Plavix. She presented to her PCP's office, Darylene Ehrich, on 8/31/2020. She had continued complaints of intermittent angina. She also complained of palpitation and Holter monitoring was done which showed atrial fibrillation with rapid ventricular rate. S/p Atrial fibrillation ablation, ablation of kimani between the LIPV and LSPV, ablation of focal atrial tachycardia originating from the ridge between the LSPV and the appendage. Ablation of left sided macro re-entrant atrial flutter, roof dependent mechanism. Additional ablation of CT isthmus atrial flutter 02/25/2021. She saw Jaye Guillory 12/22/2021 and states she feels her heart going out of rhythm when it  is time for her next dose of digoxin. Takes about 45 minutes to go back in rhythm after dose . Repeat ablation and watchman were discussed and she was referred back to EP. J Carlos Metzger presents today for a second opinion regarding Watchman. She feels like her afib is constant and it is wearing on her. She is currently wearing monitor. She does not monitor her pulse when she has these episodes. She has fluttering every day but  Has not had a big episodes since 01/18/2022.      Assessment and plan:      Atrial fibrillation and flutter with RVR:    ECG 12/22/2021 shows sinus rhythm    S/p complex ablation on 02/25/2021       Patient felt fine for a few months after ablation however reports recurrence of palpitation. - she feels she has been out rhythm when it gets close to her second dose of digoxin    She is symptomatic with these episodes. She complains of palpitation and chest pain. Increasing since 08/2020      - She is allergic to multiple medication. Could not tolerate metoprolol.   - Continue digoxin 125 mcg, cardizem , 120 mg   - not on metoprolol due to allergy     - [id number] Vasc score 3 (HTN, CAD, Female)     ~ allergy to xarelto     ~ eliquis stopped due to rectal bleeding. She is not interested in taking her warfarin since she is vegetarian.       -Antiarrhythmic therapy is limited due to history of MI and coronary artery disease. No documentation of her recent palpitation rhythm. She is currently wearing a two week monitor     We discussed treatment options which could be antiarrhythmic therapy with sotalol or Tikosyn if she can tolerate it. That requires hospitalization. We also discussed repeat ablation. She will think about both options and will let me know after Holter monitoring. She also states that she is allergic to Xarelto had rectal bleeding with Eliquis and not interested in warfarin since she is vegetarian. She has had ulcerated bowel with ASA    I discussed left atrial closure with watchman device. Alternatives including surgical ligation of TASHIA with Atri clips also discussed. The procedure has been discussed in detail with patient and family members along with the risk and benefits. Success rate and complications associated with such a procedure have been discussed. Continued treatment with anti-coagulation agents will also be a reasonable strategy and can be pursued. All the questions were answered. ~ we discussed need to take [facility] and antiplatelets after the watchman procedure.      We discussed the options of antiarrhythmic drugs and ablation. From her description it sounds that she might be having episodes of atrial flutter or macro reentrant tachycardia. We will wait for the results of the monitor. We discussed redo ablation. She prefers to do that since she has had many reactions to medications. Regarding anticoagulation,  she has had significant side effects with the DOAC's. She cannot take warfarin due to issues that she has with her diet. We discussed watchman. She would be able to modify her diet for short period of time after watchman to be anticoagulated. She cannot take aspirin due to significant GI bleeding in the past.          CAD   History of multiple stents to RCA, STEMI 2017   -[facility] with  ALEC to D1 [facility] 2017   -She is not on ASA because she states she is intolerant d/t history of endometriosis. HTN  Vitals:    01/27/22 1412   BP: (!) 144/88   Pulse: 75   SpO2: 96%       -Not well controlled today   She is not taking her medication and states that her primary care knows about her high blood pressure.   -Continue Cardizem    -Home BP monitoring encourage with a BP goal <130/80    Follow up with Dr. Jean Portillo. Obesity  Body mass index is 35.26 kg/m². - Excessive weight is complicating assessment and treatment. It is placing patient at risk for multiple co-morbidities as well as early death and contributing to the patient's presentation. - discussed weight management with diet and exercise      - Will consider OSEI evaluation. Reports no symptoms. Plan  She would like to have her procedures before early April . Scheduling letter sent       Diagnostic studies:   ECG today  sinus Rhythm   JESSICA: 2/25/21   Left ventricle size is normal. Mild left ventricular hypertrophy.  Left   ventricular function is normal with ejection fraction estimated at 55-60 %.   No regional wall motion abnormalities are noted.  Mary Kay Lula is no evidence of mass or thrombus in the left atrium or appendage.   Mild MR and TR.   Small patent foramen ovale with left-to-right shunt was visualized.      1 week Holter monitor 11/17/2020  Afib/flutter, symptomatic  Avg HR 86, min 59, max 144 (in SR)  Avg HR in afib 161    Echo 12/14/2017   Summary   Left ventricle size is normal. There is mild concentric left ventricular   hypertrophy. Left ventricular function is normal with ejection fraction   estimated at 55-60 %. No regional wall motion abnormalities are noted. Diastolic filling parameters suggests grade II diastolic dysfunction. Mild   mitral and tricuspid regurgitation is present. RVSP estimated at 36 mmHg. CARDIAC CATH OUTSIDE 12-7-12  FINDINGS  LVP 91/11  EST EF 65%  WALL MOTION normal  Valve function no MR or aortic stenosis  dominance right  left main normal    left anterior descending course to the undersurface of the apex. it gives rise to a large diagonal branch. there is 30% focal mid LAD stenosis just beyond the diagonal branch. 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 There is 20% proximal stenosis of the diagonal branch. left circumflex supplies a small ramus medius, and 2 large obtuse marginal branches. There is 40% tubular distal LCX stenosis proximal to the 2nd obtuse marginal branch. There is 30% stenosis of the small ramus intermedius. right supplies a small 1 mm marginal branch, large posterior descending branch a large posterior left ventricular branch and 2 small posterior descending branch a large posterior left ventricular branches. there is no stenosis in the mid RCA stent. There is 70% ostial and 30% proximal stenosis of the small marginal branch. I independently reviewed the cardiac diagnostic studies, ECG and relevant imaging studies.      Physical Examination:  Vitals:    01/27/22 1412   BP: (!) 144/88   Pulse: 75   SpO2: 96%      Wt Readings from Last 3 Encounters:   01/27/22 205 lb 6.4 oz (93.2 kg)   01/18/22 205 lb 9.6 oz (93.3 kg)   12/22/21 208 lb 3.2 oz (94.4 kg)       · Constitutional: Oriented. No distress. · Head: Normocephalic and atraumatic. · Mouth/Throat: Oropharynx is clear and moist.   · Eyes: Conjunctivae normal. EOM are normal.   · Neck: Neck supple. No JVD present. · Cardiovascular: Normal rate, regular rhythm, S1&S2. · Pulmonary/Chest: Bilateral respiratory sounds. No rhonchi. · Abdominal: Soft. No tenderness. · Musculoskeletal: No tenderness. No edema    · Lymphadenopathy: Has no cervical adenopathy. · Neurological: Alert and oriented. Follows command, No Gross deficit   · Skin: Skin is warm, No rash noted. · Psychiatric: Has a normal behavior     Review of System:  [x] Full ROS obtained and negative except as mentioned in HPI    Prior to Admission medications    Medication Sig Start Date End Date Taking?  Authorizing Provider   POTASSIUM GLUCONATE PO Take by mouth   Yes Historical Provider, MD   Cholecalciferol (VITAMIN D3) 125 MCG (5000 UT) TABS Take by mouth daily    Yes Historical Provider, MD   cetirizine (ZYRTEC) 5 MG tablet Take 5 mg by mouth daily   Yes Historical Provider, MD   digoxin (LANOXIN) 125 MCG tablet Take 1 tablet by mouth daily Can take 5 days a week, can take extra tab 2x weekly if needed for afib. 11/1/21  Yes Padilla Begum MD   dilTIAZem (CARDIZEM CD) 120 MG extended release capsule TAKE 1 CAPSULE BY MOUTH TWICE DAILY 9/9/21  Yes Padilla Begum MD   Mag Aspart-Potassium Aspart (POTASSIUM & MAGNESIUM ASPARTAT PO) Take 1 tablet by mouth daily    Yes Historical Provider, MD   Zinc-Vitamin C  MG LOZG Take 1 lozenge by mouth daily    Yes Historical Provider, MD   L-Lysine 1000 MG TABS Take 1 tablet by mouth every morning (before breakfast)    Yes Historical Provider, MD   cyanocobalamin 1000 MCG tablet Take 1,000 mcg by mouth daily   Yes Historical Provider, MD   nitroGLYCERIN (NITROSTAT) 0.4 MG SL tablet Place 1 tablet under the tongue every 5 minutes as needed for Chest pain up to max of 3 total doses. If no relief after 1 dose, call 911. 8/31/20  Yes Kaiser Navarro MD   estradiol (ESTRACE) 0.1 MG/GM vaginal cream PLACE 0.5 GRAM VAGINALLY 3 TIMES A WEEK 6/5/20  Yes Kaiser Navarro MD   Evolocumab 140 MG/ML SOSY Inject 1 mL into the skin every 14 days  Patient not taking: Reported on 1/14/2022 12/22/21   Óscar Catalan MD       Past Medical History:   Diagnosis Date    Allergic rhinitis, cause unspecified 12/04/2010    Anxiety     Arthritis     Bilateral carotid artery stenosis     < 50% bilaterally    CAD (coronary artery disease)     angioplasty with stent at [facility] Dr. Rubens Meyers, here Dr. Jared Covington at [facility] Chronic kidney disease     frequency, urgency    Chronic pain     precordial, opiate dependent    Depression 01/22/2013    Erosive esophagitis 12/28/2016    Dr. Diaz Memory; PPI started; [place] Class A, Sphincter of Oddi manometry and sphincterotomy if symptoms don't improve.      Essential hypertension 04/11/2017    HSV infection     Hypercholesterolemia 06/06/2014    Irritable bowel syndrome with diarrhea 09/27/2016    Migraine headache 06/06/2014    Nonerosive nonspecific gastritis 12/28/2016    Dr. Diaz Memory; body and antrum    OAB (overactive bladder) 03/14/2014        Past Surgical History:   Procedure Laterality Date    ABDOMINAL ADHESION SURGERY      APPENDECTOMY      CARDIAC CATHETERIZATION  12/07/2013    recheck arteries unchanged    CHOLECYSTECTOMY      COLONOSCOPY      CORONARY ANGIOPLASTY WITH STENT PLACEMENT  10/2012    right- TOTAL OF 3 STENTS    CORONARY ANGIOPLASTY WITH STENT PLACEMENT  02/04/2021    COSMETIC SURGERY      rhinoplasty    CYSTOSCOPY  04/2014    ENDOSCOPY, COLON, DIAGNOSTIC      ERCP  01/25/2017    FRACTURE SURGERY      LUE, plate and screws    HYSTERECTOMY      HYSTERECTOMY, TOTAL ABDOMINAL      OTHER SURGICAL HISTORY      Incision and drainage of Lingual Introral Lesion    POLYPECTOMY      Dr. Kurtis Paula    PTCA 10/2012    right    SINUS SURGERY      JOSE AND BSO      TONSILLECTOMY      TUBAL LIGATION      UPPER GASTROINTESTINAL ENDOSCOPY      WRIST SURGERY Left 05/21/2015    OPEN REDUCTION INTERNAL FIXATION LEFT WRIST       Allergies   Allergen Reactions    Hydralazine Anaphylaxis    Shellfish-Derived Products Anaphylaxis and Swelling    Xarelto [Rivaroxaban] Swelling    Asa [Aspirin] Nausea Only    Crestor [Rosuvastatin]      Myalgia      Fenofibrate      Myalgia      Hctz [Hydrochlorothiazide]     Lipitor [Atorvastatin Calcium]     Lipitor [Atorvastatin]      Myalgia      Lisinopril     Mometasone Furoate Swelling    Nasonex [Mometasone] Swelling    Nsaids      GI PAIN    Plavix [Clopidogrel] Hives, Itching and Swelling    Sulfa Antibiotics Swelling    Contrast [Iodides] Swelling and Rash    Flagyl [Metronidazole] Nausea And Vomiting       Social History:  Reviewed. reports that she quit smoking about 14 years ago. Her smoking use included cigarettes. She has a 10.00 pack-year smoking history. She has never used smokeless tobacco. She reports that she does not drink alcohol and does not use drugs. Family History:  Reviewed. Reviewed. No family history of SCD. Relevant and available labs, and cardiovascular diagnostics reviewed. Reviewed. I independently reviewed all cardiac tracing. All questions and concerns were addressed to the patient/family. Alternatives to my treatment were discussed. I have discussed the above stated plan and the patient verbalized understanding and agreed with the plan. Scribe attestation: This note was scribed in the presence of Shanice Petyt MD by Jim Cedeño RN    I, Dr. Shanice Petty personally performed the services described in this documentation as scribed by RN in my presence, and it is both accurate and complete.          NOTE: This report was transcribed using voice recognition software.  Every effort was made to ensure accuracy, however, inadvertent computerized transcription errors may be present.      Josh Gonzales MD, [address] [place] [address]   Office: [phone]

## 2022-02-04 ENCOUNTER — TELEPHONE (OUTPATIENT)
Dept: CARDIOLOGY CLINIC | Age: 67
End: 2022-02-04

## 2022-02-04 NOTE — TELEPHONE ENCOUNTER
Spoke with patient. Patient is scheduled with Dr. Marce Chairez for JESSICA/Afib Ablation with Carto and anesthesia on 4/12/22 at 10:30am F, arrival time of 9:30am to the Cath Lab. Please have patient arrive to the main entrance of [facility] and check in with the Cath Lab. Please call patient regarding medication instructions - if needed. Remind patient to be NPO after midnight (8 hours prior). Do not apply lotions/creams on skin the day of procedure. (Patient is on cancellation list for sooner appt - chose April due to a wedding end of March)    COVID testing - RAPID or   1. [facility] (C/ Daurte Lopez) (Outreach Car Covid Collection) :  ? Front of the building - designated parking spots with a phone number to call for service. ADDRESS:  [address] [place] [address],[address] Floor [address] phone [phone]; Fax [phone]  --  M-F [id number]. · No appointment needed. [facility] Pre-Procedure Covid collection   Greater than 3 days of their scheduled procedure [facility] will collect. Within 3 days of a scheduled procedure, do not direct patient to the Laboratory Outreach Drawsite.        Patient will need to present to the facility the procedure will be performed or to their Primary Care Doctor, [address], Urgent Care, [facility], [facility], etc.

## 2022-02-07 PROCEDURE — 93248 EXT ECG>7D<15D REV&INTERPJ: CPT | Performed by: INTERNAL MEDICINE

## 2022-02-17 ENCOUNTER — TELEPHONE (OUTPATIENT)
Dept: CARDIOLOGY CLINIC | Age: 67
End: 2022-02-17

## 2022-02-17 NOTE — TELEPHONE ENCOUNTER
Pt called stating she turned in her monitor couple weeks ago and wants to know if the results are back and id someone can call her.     Pls advise thank you     Jay Meza  [phone]

## 2022-02-17 NOTE — TELEPHONE ENCOUNTER
I spoke with pt and relayed message per NPSR. She verbalized understanding.  She stated that she is scheduled in April and she stated that her HR is in the 170\"s now

## 2022-02-17 NOTE — TELEPHONE ENCOUNTER
She has episodes of atrial fibrillation/tachycardia, up to 1.5 hours in length. Discussed with Dr. Tino Choe.  Recommend ablation given her medication side effect issues (letter has been sent to schedule for EP study and ablation already)    Christiano Olsen, JAYLEEN-CNP

## 2022-03-07 ENCOUNTER — TELEPHONE (OUTPATIENT)
Dept: CARDIOLOGY CLINIC | Age: 67
End: 2022-03-07

## 2022-03-07 NOTE — TELEPHONE ENCOUNTER
She must have covid test prior to procedure.  If she cannot complete it prior to ablation due to being out of town, then please reschedule ablation for another date

## 2022-03-07 NOTE — TELEPHONE ENCOUNTER
Pt has ablation scheduled 4/12/22 and is going to be out of town from 4/4-4/10 and not able to get covid testing prior.  Please advise

## 2022-03-08 NOTE — TELEPHONE ENCOUNTER
Please call the patient and let her know she will need to complete covid test prior to her ablation. Does not have to be completed at [facility], can be done anywhere as long as results are received prior to ablation.  Please call and let the patient know TY

## 2022-03-08 NOTE — TELEPHONE ENCOUNTER
Called patient with instructions per Taty Anne CNP She states that she just got instructions per Lashawn Nichoel CNP that were    She must have covid test prior to procedure. If she cannot complete it prior to ablation due to being out of town, then please reschedule ablation for another date    Wants clarification. She states that she had covid  on 7/22. She is concerned she is going to a wedding 7/7/22 in [place] [address]  and her ablation is 4/12/22    Please clarify and also where can she get her covid test she is very confused

## 2022-03-08 NOTE — TELEPHONE ENCOUNTER
She can get a PCR or rapid testing. It cannot be a home test.  She needs to bring documentation from a medical provider that she had a negative Covid testing within 6 days of her scheduled ablation.     Christopher Narayan, APRN-CNP

## 2022-03-08 NOTE — TELEPHONE ENCOUNTER
Please advise. ..... Puma Tee Called pt about the message below and she stated that she has a home test and wanted to know if she was able to use that, patient also verbalized understanding.

## 2022-03-24 ENCOUNTER — TELEPHONE (OUTPATIENT)
Dept: CARDIOLOGY CLINIC | Age: 67
End: 2022-03-24

## 2022-03-24 NOTE — TELEPHONE ENCOUNTER
Patient to have an ablation on 4/12/22. After will schedule fort shared decision for a Watchman implant.

## 2022-04-12 ENCOUNTER — ANESTHESIA (OUTPATIENT)
Dept: CARDIAC CATH/INVASIVE PROCEDURES | Age: 67
End: 2022-04-12
Payer: MEDICARE

## 2022-04-12 ENCOUNTER — HOSPITAL ENCOUNTER (OUTPATIENT)
Dept: CARDIAC CATH/INVASIVE PROCEDURES | Age: 67
Discharge: HOME | End: 2022-04-12
Attending: INTERNAL MEDICINE | Admitting: INTERNAL MEDICINE
Payer: MEDICARE

## 2022-04-12 ENCOUNTER — ANESTHESIA EVENT (OUTPATIENT)
Dept: CARDIAC CATH/INVASIVE PROCEDURES | Age: 67
End: 2022-04-12
Payer: MEDICARE

## 2022-04-12 VITALS
OXYGEN SATURATION: 96 % | BODY MASS INDEX: 35 KG/M2 | SYSTOLIC BLOOD PRESSURE: 158 MMHG | RESPIRATION RATE: 12 BRPM | HEIGHT: 64 IN | HEART RATE: 81 BPM | DIASTOLIC BLOOD PRESSURE: 89 MMHG | TEMPERATURE: 97 F | WEIGHT: 205 LBS

## 2022-04-12 VITALS
DIASTOLIC BLOOD PRESSURE: 108 MMHG | SYSTOLIC BLOOD PRESSURE: 178 MMHG | TEMPERATURE: 96.6 F | OXYGEN SATURATION: 90 % | RESPIRATION RATE: 9 BRPM

## 2022-04-12 LAB
ABO/RH: NORMAL
ANION GAP SERPL CALCULATED.3IONS-SCNC: 14 MMOL/L (ref 3–16)
ANTIBODY SCREEN: NORMAL
BUN BLDV-MCNC: 11 MG/DL (ref 7–20)
CALCIUM SERPL-MCNC: 9.7 MG/DL (ref 8.3–10.6)
CHLORIDE BLD-SCNC: 103 MMOL/L (ref 99–110)
CO2: 23 MMOL/L (ref 21–32)
CREAT SERPL-MCNC: 0.7 MG/DL (ref 0.6–1.2)
EKG ATRIAL RATE: 83 BPM
EKG DIAGNOSIS: NORMAL
EKG P AXIS: -12 DEGREES
EKG P-R INTERVAL: 142 MS
EKG Q-T INTERVAL: 370 MS
EKG QRS DURATION: 78 MS
EKG QTC CALCULATION (BAZETT): 434 MS
EKG R AXIS: -4 DEGREES
EKG T AXIS: 38 DEGREES
EKG VENTRICULAR RATE: 83 BPM
GFR AFRICAN AMERICAN: >60
GFR NON-AFRICAN AMERICAN: >60
GLUCOSE BLD-MCNC: 113 MG/DL (ref 70–99)
HCT VFR BLD CALC: 40.5 % (ref 36–48)
HEMOGLOBIN: 13.6 G/DL (ref 12–16)
MCH RBC QN AUTO: 28.1 PG (ref 26–34)
MCHC RBC AUTO-ENTMCNC: 33.5 G/DL (ref 31–36)
MCV RBC AUTO: 83.8 FL (ref 80–100)
PDW BLD-RTO: 13.8 % (ref 12.4–15.4)
PLATELET # BLD: 278 K/UL (ref 135–450)
PMV BLD AUTO: 8.2 FL (ref 5–10.5)
POTASSIUM SERPL-SCNC: 3.9 MMOL/L (ref 3.5–5.1)
RBC # BLD: 4.83 M/UL (ref 4–5.2)
SARS-COV-2: NOT DETECTED
SODIUM BLD-SCNC: 140 MMOL/L (ref 136–145)
WBC # BLD: 6.4 K/UL (ref 4–11)

## 2022-04-12 PROCEDURE — 93623 PRGRMD STIMJ&PACG IV RX NFS: CPT

## 2022-04-12 PROCEDURE — 2500000003 HC RX 250 WO HCPCS: Performed by: NURSE ANESTHETIST, CERTIFIED REGISTERED

## 2022-04-12 PROCEDURE — 2580000003 HC RX 258

## 2022-04-12 PROCEDURE — C1759 CATH, INTRA ECHOCARDIOGRAPHY: HCPCS

## 2022-04-12 PROCEDURE — 85027 COMPLETE CBC AUTOMATED: CPT

## 2022-04-12 PROCEDURE — 2580000003 HC RX 258: Performed by: NURSE ANESTHETIST, CERTIFIED REGISTERED

## 2022-04-12 PROCEDURE — 93623 PRGRMD STIMJ&PACG IV RX NFS: CPT | Performed by: INTERNAL MEDICINE

## 2022-04-12 PROCEDURE — C1769 GUIDE WIRE: HCPCS

## 2022-04-12 PROCEDURE — 93320 DOPPLER ECHO COMPLETE: CPT

## 2022-04-12 PROCEDURE — 80048 BASIC METABOLIC PNL TOTAL CA: CPT

## 2022-04-12 PROCEDURE — C1894 INTRO/SHEATH, NON-LASER: HCPCS

## 2022-04-12 PROCEDURE — C1760 CLOSURE DEV, VASC: HCPCS

## 2022-04-12 PROCEDURE — 86900 BLOOD TYPING SEROLOGIC ABO: CPT

## 2022-04-12 PROCEDURE — 2500000003 HC RX 250 WO HCPCS

## 2022-04-12 PROCEDURE — 93655 ICAR CATH ABLTJ DSCRT ARRHYT: CPT | Performed by: INTERNAL MEDICINE

## 2022-04-12 PROCEDURE — 93005 ELECTROCARDIOGRAM TRACING: CPT | Performed by: INTERNAL MEDICINE

## 2022-04-12 PROCEDURE — C1732 CATH, EP, DIAG/ABL, 3D/VECT: HCPCS

## 2022-04-12 PROCEDURE — 6360000002 HC RX W HCPCS: Performed by: NURSE ANESTHETIST, CERTIFIED REGISTERED

## 2022-04-12 PROCEDURE — 93656 COMPRE EP EVAL ABLTJ ATR FIB: CPT

## 2022-04-12 PROCEDURE — 86901 BLOOD TYPING SEROLOGIC RH(D): CPT

## 2022-04-12 PROCEDURE — 93622 COMP EP EVAL L VENTR PAC&REC: CPT | Performed by: INTERNAL MEDICINE

## 2022-04-12 PROCEDURE — 85347 COAGULATION TIME ACTIVATED: CPT

## 2022-04-12 PROCEDURE — 36415 COLL VENOUS BLD VENIPUNCTURE: CPT

## 2022-04-12 PROCEDURE — 3700000000 HC ANESTHESIA ATTENDED CARE

## 2022-04-12 PROCEDURE — 93325 DOPPLER ECHO COLOR FLOW MAPG: CPT

## 2022-04-12 PROCEDURE — 7100000000 HC PACU RECOVERY - FIRST 15 MIN

## 2022-04-12 PROCEDURE — 6360000002 HC RX W HCPCS: Performed by: ANESTHESIOLOGY

## 2022-04-12 PROCEDURE — 93622 COMP EP EVAL L VENTR PAC&REC: CPT

## 2022-04-12 PROCEDURE — 6360000002 HC RX W HCPCS

## 2022-04-12 PROCEDURE — C1730 CATH, EP, 19 OR FEW ELECT: HCPCS

## 2022-04-12 PROCEDURE — 93656 COMPRE EP EVAL ABLTJ ATR FIB: CPT | Performed by: INTERNAL MEDICINE

## 2022-04-12 PROCEDURE — 3700000001 HC ADD 15 MINUTES (ANESTHESIA)

## 2022-04-12 PROCEDURE — 93010 ELECTROCARDIOGRAM REPORT: CPT | Performed by: INTERNAL MEDICINE

## 2022-04-12 PROCEDURE — 7100000001 HC PACU RECOVERY - ADDTL 15 MIN

## 2022-04-12 PROCEDURE — 93312 ECHO TRANSESOPHAGEAL: CPT

## 2022-04-12 PROCEDURE — 93655 ICAR CATH ABLTJ DSCRT ARRHYT: CPT

## 2022-04-12 PROCEDURE — 86850 RBC ANTIBODY SCREEN: CPT

## 2022-04-12 RX ORDER — SODIUM CHLORIDE 0.9 % (FLUSH) 0.9 %
5-40 SYRINGE (ML) INJECTION EVERY 12 HOURS SCHEDULED
Status: CANCELLED | OUTPATIENT
Start: 2022-04-12

## 2022-04-12 RX ORDER — DEXAMETHASONE SODIUM PHOSPHATE 4 MG/ML
INJECTION, SOLUTION INTRA-ARTICULAR; INTRALESIONAL; INTRAMUSCULAR; INTRAVENOUS; SOFT TISSUE PRN
Status: DISCONTINUED | OUTPATIENT
Start: 2022-04-12 | End: 2022-04-12 | Stop reason: SDUPTHER

## 2022-04-12 RX ORDER — PROPOFOL 10 MG/ML
INJECTION, EMULSION INTRAVENOUS PRN
Status: DISCONTINUED | OUTPATIENT
Start: 2022-04-12 | End: 2022-04-12 | Stop reason: SDUPTHER

## 2022-04-12 RX ORDER — HEPARIN SODIUM 1000 [USP'U]/ML
INJECTION, SOLUTION INTRAVENOUS; SUBCUTANEOUS PRN
Status: DISCONTINUED | OUTPATIENT
Start: 2022-04-12 | End: 2022-04-12 | Stop reason: SDUPTHER

## 2022-04-12 RX ORDER — HYDROMORPHONE HCL 110MG/55ML
0.5 PATIENT CONTROLLED ANALGESIA SYRINGE INTRAVENOUS EVERY 5 MIN PRN
Status: DISCONTINUED | OUTPATIENT
Start: 2022-04-12 | End: 2022-04-12 | Stop reason: HOSPADM

## 2022-04-12 RX ORDER — SODIUM CHLORIDE 0.9 % (FLUSH) 0.9 %
5-40 SYRINGE (ML) INJECTION PRN
Status: DISCONTINUED | OUTPATIENT
Start: 2022-04-12 | End: 2022-04-12 | Stop reason: HOSPADM

## 2022-04-12 RX ORDER — SODIUM CHLORIDE 9 MG/ML
INJECTION, SOLUTION INTRAVENOUS CONTINUOUS PRN
Status: DISCONTINUED | OUTPATIENT
Start: 2022-04-12 | End: 2022-04-12 | Stop reason: SDUPTHER

## 2022-04-12 RX ORDER — SODIUM CHLORIDE 0.9 % (FLUSH) 0.9 %
5-40 SYRINGE (ML) INJECTION PRN
Status: CANCELLED | OUTPATIENT
Start: 2022-04-12

## 2022-04-12 RX ORDER — ONDANSETRON 2 MG/ML
INJECTION INTRAMUSCULAR; INTRAVENOUS PRN
Status: DISCONTINUED | OUTPATIENT
Start: 2022-04-12 | End: 2022-04-12 | Stop reason: SDUPTHER

## 2022-04-12 RX ORDER — ONDANSETRON 2 MG/ML
4 INJECTION INTRAMUSCULAR; INTRAVENOUS
Status: DISCONTINUED | OUTPATIENT
Start: 2022-04-12 | End: 2022-04-12 | Stop reason: HOSPADM

## 2022-04-12 RX ORDER — SUCCINYLCHOLINE/SOD CL,ISO/PF 200MG/10ML
SYRINGE (ML) INTRAVENOUS PRN
Status: DISCONTINUED | OUTPATIENT
Start: 2022-04-12 | End: 2022-04-12 | Stop reason: SDUPTHER

## 2022-04-12 RX ORDER — SODIUM CHLORIDE 9 MG/ML
25 INJECTION, SOLUTION INTRAVENOUS PRN
Status: CANCELLED | OUTPATIENT
Start: 2022-04-12

## 2022-04-12 RX ORDER — MEPERIDINE HYDROCHLORIDE 25 MG/ML
12.5 INJECTION INTRAMUSCULAR; INTRAVENOUS; SUBCUTANEOUS EVERY 5 MIN PRN
Status: DISCONTINUED | OUTPATIENT
Start: 2022-04-12 | End: 2022-04-12 | Stop reason: HOSPADM

## 2022-04-12 RX ORDER — FENTANYL CITRATE 50 UG/ML
INJECTION, SOLUTION INTRAMUSCULAR; INTRAVENOUS PRN
Status: DISCONTINUED | OUTPATIENT
Start: 2022-04-12 | End: 2022-04-12 | Stop reason: SDUPTHER

## 2022-04-12 RX ORDER — MIDAZOLAM HYDROCHLORIDE 1 MG/ML
INJECTION INTRAMUSCULAR; INTRAVENOUS PRN
Status: DISCONTINUED | OUTPATIENT
Start: 2022-04-12 | End: 2022-04-12 | Stop reason: SDUPTHER

## 2022-04-12 RX ORDER — ACETAMINOPHEN 325 MG/1
650 TABLET ORAL EVERY 4 HOURS PRN
Status: CANCELLED | OUTPATIENT
Start: 2022-04-12

## 2022-04-12 RX ORDER — PANTOPRAZOLE SODIUM 40 MG/1
40 TABLET, DELAYED RELEASE ORAL
Qty: 30 TABLET | Refills: 0 | Status: SHIPPED | OUTPATIENT
Start: 2022-04-12 | End: 2022-06-29

## 2022-04-12 RX ADMIN — HEPARIN SODIUM 13000 UNITS: 1000 INJECTION INTRAVENOUS; SUBCUTANEOUS at 11:57

## 2022-04-12 RX ADMIN — HEPARIN SODIUM 5000 UNITS: 1000 INJECTION INTRAVENOUS; SUBCUTANEOUS at 12:36

## 2022-04-12 RX ADMIN — PROPOFOL 50 MG: 10 INJECTION, EMULSION INTRAVENOUS at 11:34

## 2022-04-12 RX ADMIN — HEPARIN SODIUM 2000 UNITS: 1000 INJECTION INTRAVENOUS; SUBCUTANEOUS at 12:57

## 2022-04-12 RX ADMIN — FENTANYL CITRATE 50 MCG: 50 INJECTION, SOLUTION INTRAMUSCULAR; INTRAVENOUS at 11:16

## 2022-04-12 RX ADMIN — ONDANSETRON 4 MG: 2 INJECTION INTRAMUSCULAR; INTRAVENOUS at 11:16

## 2022-04-12 RX ADMIN — SODIUM CHLORIDE: 9 INJECTION, SOLUTION INTRAVENOUS at 11:28

## 2022-04-12 RX ADMIN — ISOPROTERENOL HYDROCHLORIDE 5 MCG/MIN: 0.2 INJECTION, SOLUTION INTRAMUSCULAR; INTRAVENOUS at 12:29

## 2022-04-12 RX ADMIN — HYDROMORPHONE HYDROCHLORIDE 0.5 MG: 2 INJECTION, SOLUTION INTRAMUSCULAR; INTRAVENOUS; SUBCUTANEOUS at 14:01

## 2022-04-12 RX ADMIN — Medication 140 MG: at 11:16

## 2022-04-12 RX ADMIN — PROPOFOL 150 MG: 10 INJECTION, EMULSION INTRAVENOUS at 11:16

## 2022-04-12 RX ADMIN — PHENYLEPHRINE HYDROCHLORIDE 20 MCG/MIN: 10 INJECTION INTRAVENOUS at 11:54

## 2022-04-12 RX ADMIN — DEXAMETHASONE SODIUM PHOSPHATE 4 MG: 4 INJECTION, SOLUTION INTRAMUSCULAR; INTRAVENOUS at 11:25

## 2022-04-12 RX ADMIN — MIDAZOLAM 2 MG: 1 INJECTION INTRAMUSCULAR; INTRAVENOUS at 11:06

## 2022-04-12 RX ADMIN — SODIUM CHLORIDE: 9 INJECTION, SOLUTION INTRAVENOUS at 11:06

## 2022-04-12 ASSESSMENT — PULMONARY FUNCTION TESTS
PIF_VALUE: 19
PIF_VALUE: 3
PIF_VALUE: 19
PIF_VALUE: 2
PIF_VALUE: 1
PIF_VALUE: 19
PIF_VALUE: 1
PIF_VALUE: 20
PIF_VALUE: 9
PIF_VALUE: 20
PIF_VALUE: 19
PIF_VALUE: 19
PIF_VALUE: 20
PIF_VALUE: 19
PIF_VALUE: 20
PIF_VALUE: 19
PIF_VALUE: 18
PIF_VALUE: 1
PIF_VALUE: 27
PIF_VALUE: 20
PIF_VALUE: 19
PIF_VALUE: 20
PIF_VALUE: 18
PIF_VALUE: 0
PIF_VALUE: 19
PIF_VALUE: 18
PIF_VALUE: 18
PIF_VALUE: 20
PIF_VALUE: 19
PIF_VALUE: 20
PIF_VALUE: 19
PIF_VALUE: 19
PIF_VALUE: 18
PIF_VALUE: 17
PIF_VALUE: 19
PIF_VALUE: 19
PIF_VALUE: 15
PIF_VALUE: 19
PIF_VALUE: 23
PIF_VALUE: 19
PIF_VALUE: 19
PIF_VALUE: 18
PIF_VALUE: 24
PIF_VALUE: 19
PIF_VALUE: 19
PIF_VALUE: 18
PIF_VALUE: 19
PIF_VALUE: 18
PIF_VALUE: 1
PIF_VALUE: 18
PIF_VALUE: 19
PIF_VALUE: 3
PIF_VALUE: 19
PIF_VALUE: 19
PIF_VALUE: 18
PIF_VALUE: 16
PIF_VALUE: 19
PIF_VALUE: 19
PIF_VALUE: 18
PIF_VALUE: 17
PIF_VALUE: 19
PIF_VALUE: 19
PIF_VALUE: 13
PIF_VALUE: 15
PIF_VALUE: 19
PIF_VALUE: 4
PIF_VALUE: 0
PIF_VALUE: 19
PIF_VALUE: 1
PIF_VALUE: 19
PIF_VALUE: 18
PIF_VALUE: 19
PIF_VALUE: 19
PIF_VALUE: 1
PIF_VALUE: 2
PIF_VALUE: 18
PIF_VALUE: 17
PIF_VALUE: 17
PIF_VALUE: 18
PIF_VALUE: 19
PIF_VALUE: 18
PIF_VALUE: 19
PIF_VALUE: 18
PIF_VALUE: 18
PIF_VALUE: 19
PIF_VALUE: 1
PIF_VALUE: 19
PIF_VALUE: 18
PIF_VALUE: 19
PIF_VALUE: 30
PIF_VALUE: 17
PIF_VALUE: 19
PIF_VALUE: 17
PIF_VALUE: 19
PIF_VALUE: 17
PIF_VALUE: 4
PIF_VALUE: 19
PIF_VALUE: 17
PIF_VALUE: 19

## 2022-04-12 ASSESSMENT — LIFESTYLE VARIABLES: SMOKING_STATUS: 0

## 2022-04-12 ASSESSMENT — PAIN SCALES - GENERAL: PAINLEVEL_OUTOF10: 8

## 2022-04-12 NOTE — PROGRESS NOTES
Patient alert, VSS and O2 sat maintained on 2L via NC. Right groin dressing CDI, pain tolerable. Patient meets all phase 1 discharge criteria, seen by anesthesia. Will transfer back to cath lab in stable condition.

## 2022-04-12 NOTE — H&P
[facility]   Electrophysiology   Date: 1/27/2022    CC: Palpitation   HPI: Alla Tao is a 61 y.o. female complex past medical history including CAD, STEMI with PCI (PCI/ALEC RCA 10/2012, PCI to Ramus 10/2012, ) , and hypertension. She presented to [facility] on 11/19/2017 with complaints of chest pain that radiates to her jaw and arms. She was seen by cardiology and diagnosed with a STEMI and a LHC was completed with a ALEC in the diagonal branch. Developed chest pain again 11/20/2017 and a repeat LHC was completed with no further intervention necessary. She has seen multiple cardiologist and is also gone to [facility] clinic. She states that she has intolerance to many medication and is not taking aspirin or Plavix. She presented to her PCP's office, Lex García, on 8/31/2020. She had continued complaints of intermittent angina. She also complained of palpitation and Holter monitoring was done which showed atrial fibrillation with rapid ventricular rate. S/p Atrial fibrillation ablation, ablation of kimani between the LIPV and LSPV, ablation of focal atrial tachycardia originating from the ridge between the LSPV and the appendage. Ablation of left sided macro re-entrant atrial flutter, roof dependent mechanism. Additional ablation of CT isthmus atrial flutter 02/25/2021. She saw Rudolph Peck 12/22/2021 and states she feels her heart going out of rhythm when it  is time for her next dose of digoxin. Takes about 45 minutes to go back in rhythm after dose . Repeat ablation and watchman were discussed and she was referred back to EP. [place] presents today for a second opinion regarding Watchman. She feels like her afib is constant and it is wearing on her. She is currently wearing monitor. She does not monitor her pulse when she has these episodes. She has fluttering every day but  Has not had a big episodes since 01/18/2022.      Assessment and plan:      Atrial fibrillation and flutter with RVR:    ECG 12/22/2021 shows sinus rhythm    S/p complex ablation on 02/25/2021       Patient felt fine for a few months after ablation however reports recurrence of palpitation. - she feels she has been out rhythm when it gets close to her second dose of digoxin    She is symptomatic with these episodes. She complains of palpitation and chest pain. Increasing since 08/2020      - She is allergic to multiple medication. Could not tolerate metoprolol.   - Continue digoxin 125 mcg, cardizem , 120 mg   - not on metoprolol due to allergy     - [id number] Vasc score 3 (HTN, CAD, Female)     ~ allergy to xarelto     ~ eliquis stopped due to rectal bleeding. She is not interested in taking her warfarin since she is vegetarian.       -Antiarrhythmic therapy is limited due to history of MI and coronary artery disease. No documentation of her recent palpitation rhythm. She is currently wearing a two week monitor     We discussed treatment options which could be antiarrhythmic therapy with sotalol or Tikosyn if she can tolerate it. That requires hospitalization. We also discussed repeat ablation. She will think about both options and will let me know after Holter monitoring. She also states that she is allergic to Xarelto had rectal bleeding with Eliquis and not interested in warfarin since she is vegetarian. She has had ulcerated bowel with ASA    The risks, benefits and alternatives of the ablation procedure were discussed with the patient. The risks including, but not limited to, the risks of bleeding, infection, radiation exposure, injury to vascular, cardiac and surrounding structures (including pneumothorax), stroke, cardiac perforation, tamponade, need for emergent open heart surgery, need for pacemaker implantation, Injury to the phrenic nerve, injury to the esophagus, myocardial infarction and death were discussed in detail. The patient opted to proceed with the ablation. CAD   History of multiple stents to RCA, STEMI 2017   -[facility] with  ALEC to D1 [facility] 2017   -She is not on ASA because she states she is intolerant d/t history of endometriosis. HTN  Vitals:    01/27/22 1412   BP: (!) 144/88   Pulse: 75   SpO2: 96%       -Not well controlled today   She is not taking her medication and states that her primary care knows about her high blood pressure.   -Continue Cardizem    -Home BP monitoring encourage with a BP goal <130/80    Follow up with Dr. Adam Dennison. Obesity  Body mass index is 35.26 kg/m². - Excessive weight is complicating assessment and treatment. It is placing patient at risk for multiple co-morbidities as well as early death and contributing to the patient's presentation. - discussed weight management with diet and exercise      - Will consider OSEI evaluation. Reports no symptoms. Plan  Atrial fibrillation/flutter ablation      Diagnostic studies:   ECG today  sinus Rhythm   JESSICA: 2/25/21   Left ventricle size is normal. Mild left ventricular hypertrophy. Left   ventricular function is normal with ejection fraction estimated at 55-60 %.   No regional wall motion abnormalities are noted.  Fifi Cheeks is no evidence of mass or thrombus in the left atrium or appendage.   Mild MR and TR.   Small patent foramen ovale with left-to-right shunt was visualized.      1 week Holter monitor 11/17/2020  Afib/flutter, symptomatic  Avg HR 86, min 59, max 144 (in SR)  Avg HR in afib 161    Echo 12/14/2017   Summary   Left ventricle size is normal. There is mild concentric left ventricular   hypertrophy. Left ventricular function is normal with ejection fraction   estimated at 55-60 %. No regional wall motion abnormalities are noted. Diastolic filling parameters suggests grade II diastolic dysfunction. Mild   mitral and tricuspid regurgitation is present. RVSP estimated at 36 mmHg.      CARDIAC CATH OUTSIDE 12-7-12  FINDINGS  LVP 91/11  EST EF 65%  WALL MOTION normal  Valve function no MR or aortic stenosis  dominance right  left main normal    left anterior descending course to the undersurface of the apex. it gives rise to a large diagonal branch. there is 30% focal mid LAD stenosis just beyond the diagonal branch. 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 There is 20% proximal stenosis of the diagonal branch. left circumflex supplies a small ramus medius, and 2 large obtuse marginal branches. There is 40% tubular distal LCX stenosis proximal to the 2nd obtuse marginal branch. There is 30% stenosis of the small ramus intermedius. right supplies a small 1 mm marginal branch, large posterior descending branch a large posterior left ventricular branch and 2 small posterior descending branch a large posterior left ventricular branches. there is no stenosis in the mid RCA stent. There is 70% ostial and 30% proximal stenosis of the small marginal branch. I independently reviewed the cardiac diagnostic studies, ECG and relevant imaging studies. Physical Examination:  Vitals:    01/27/22 1412   BP: (!) 144/88   Pulse: 75   SpO2: 96%      Wt Readings from Last 3 Encounters:   01/27/22 205 lb 6.4 oz (93.2 kg)   01/18/22 205 lb 9.6 oz (93.3 kg)   12/22/21 208 lb 3.2 oz (94.4 kg)       · Constitutional: Oriented. No distress. · Head: Normocephalic and atraumatic. · Mouth/Throat: Oropharynx is clear and moist.   · Eyes: Conjunctivae normal. EOM are normal.   · Neck: Neck supple. No JVD present. · Cardiovascular: Normal rate, regular rhythm, S1&S2. · Pulmonary/Chest: Bilateral respiratory sounds. No rhonchi. · Abdominal: Soft. No tenderness. · Musculoskeletal: No tenderness. No edema    · Lymphadenopathy: Has no cervical adenopathy. · Neurological: Alert and oriented. Follows command, No Gross deficit   · Skin: Skin is warm, No rash noted.    · Psychiatric: Has a normal behavior     Review of System:  [x] Full ROS obtained and negative except as mentioned in HPI    Prior to Admission medications    Medication Sig Start Date End Date Taking? Authorizing Provider   POTASSIUM GLUCONATE PO Take by mouth   Yes Historical Provider, MD   Cholecalciferol (VITAMIN D3) 125 MCG (5000 UT) TABS Take by mouth daily    Yes Historical Provider, MD   cetirizine (ZYRTEC) 5 MG tablet Take 5 mg by mouth daily   Yes Historical Provider, MD   digoxin (LANOXIN) 125 MCG tablet Take 1 tablet by mouth daily Can take 5 days a week, can take extra tab 2x weekly if needed for afib. 11/1/21  Yes Jose Bess MD   dilTIAZem (CARDIZEM CD) 120 MG extended release capsule TAKE 1 CAPSULE BY MOUTH TWICE DAILY 9/9/21  Yes Jose Bess MD   Mag Aspart-Potassium Aspart (POTASSIUM & MAGNESIUM ASPARTAT PO) Take 1 tablet by mouth daily    Yes Historical Provider, MD   Zinc-Vitamin C  MG LOZG Take 1 lozenge by mouth daily    Yes Historical Provider, MD   L-Lysine 1000 MG TABS Take 1 tablet by mouth every morning (before breakfast)    Yes Historical Provider, MD   cyanocobalamin 1000 MCG tablet Take 1,000 mcg by mouth daily   Yes Historical Provider, MD   nitroGLYCERIN (NITROSTAT) 0.4 MG SL tablet Place 1 tablet under the tongue every 5 minutes as needed for Chest pain up to max of 3 total doses.  If no relief after 1 dose, call 911. 8/31/20  Yes Clearence Route, MD   estradiol (ESTRACE) 0.1 MG/GM vaginal cream PLACE 0.5 GRAM VAGINALLY 3 TIMES A WEEK 6/5/20  Yes Clearence Route, MD   Evolocumab 140 MG/ML SOSY Inject 1 mL into the skin every 14 days  Patient not taking: Reported on 1/14/2022 12/22/21   Jose Bess MD       Past Medical History:   Diagnosis Date    Allergic rhinitis, cause unspecified 12/04/2010    Anxiety     Arthritis     Bilateral carotid artery stenosis     < 50% bilaterally    CAD (coronary artery disease)     angioplasty with stent at [place] Dr. Philip Bennett, here Dr. Bernell Nissen at [facility] Chronic kidney disease     frequency, urgency    Chronic pain     precordial, opiate dependent    Depression 01/22/2013    Erosive esophagitis 12/28/2016    Dr. Parisa Schreiber; PPI started; LA Class A, Sphincter of Oddi manometry and sphincterotomy if symptoms don't improve.      Essential hypertension 04/11/2017    HSV infection     Hypercholesterolemia 06/06/2014    Irritable bowel syndrome with diarrhea 09/27/2016    Migraine headache 06/06/2014    Nonerosive nonspecific gastritis 12/28/2016    Dr. Parisa Schreiber; body and antrum    OAB (overactive bladder) 03/14/2014        Past Surgical History:   Procedure Laterality Date    ABDOMINAL ADHESION SURGERY      APPENDECTOMY      CARDIAC CATHETERIZATION  12/07/2013    recheck arteries unchanged    CHOLECYSTECTOMY      COLONOSCOPY      CORONARY ANGIOPLASTY WITH STENT PLACEMENT  10/2012    right- TOTAL OF 3 STENTS    CORONARY ANGIOPLASTY WITH STENT PLACEMENT  02/04/2021    COSMETIC SURGERY      rhinoplasty    CYSTOSCOPY  04/2014    ENDOSCOPY, COLON, DIAGNOSTIC      ERCP  01/25/2017    FRACTURE SURGERY      LUE, plate and screws    HYSTERECTOMY      HYSTERECTOMY, TOTAL ABDOMINAL      OTHER SURGICAL HISTORY      Incision and drainage of Lingual Introral Lesion    POLYPECTOMY      Dr. Marla Roca PTCA  10/2012    right    SINUS SURGERY      JOSE AND BSO      TONSILLECTOMY      TUBAL LIGATION      UPPER GASTROINTESTINAL ENDOSCOPY      WRIST SURGERY Left 05/21/2015    OPEN REDUCTION INTERNAL FIXATION LEFT WRIST       Allergies   Allergen Reactions    Hydralazine Anaphylaxis    Shellfish-Derived Products Anaphylaxis and Swelling    Xarelto [Rivaroxaban] Swelling    Asa [Aspirin] Nausea Only    Crestor [Rosuvastatin]      Myalgia      Fenofibrate      Myalgia      Hctz [Hydrochlorothiazide]     Lipitor [Atorvastatin Calcium]     Lipitor [Atorvastatin]      Myalgia      Lisinopril     Mometasone Furoate Swelling    Nasonex [Mometasone] Swelling    Nsaids      GI PAIN    Plavix [Clopidogrel] Hives, Itching and Swelling    Sulfa Antibiotics Swelling    Contrast [Iodides] Swelling and Rash    Flagyl [Metronidazole] Nausea And Vomiting       Social History:  Reviewed. reports that she quit smoking about 14 years ago. Her smoking use included cigarettes. She has a 10.00 pack-year smoking history. She has never used smokeless tobacco. She reports that she does not drink alcohol and does not use drugs. Family History:  Reviewed. Reviewed. No family history of SCD. Relevant and available labs, and cardiovascular diagnostics reviewed. Reviewed.

## 2022-04-12 NOTE — PROGRESS NOTES
Patient transferred from [place] to PACU, VSS and O2 sat maintained on 10L via simple mask. right groin dressing CDI. Denies pain. Bloom patent. Continue to monitor.

## 2022-04-12 NOTE — PROCEDURES
[address]     Electrophysiology Procedure Note       Date of Procedure: 4/12/2022  Patient's Name: Cristian Perales  YOB: 1955   Medical Record Number: [id number]  Referring Physician: Vega Sandra MD, Dr Ra Rivera  Procedure Performed by: Steh Logan MD    Procedure performed:     · Electrophysiology study with left atrial recording and mapping   · 3-D electroanatomical mapping of the left atrium and  right atium. · Electrophysiological study(EPS) and induction after IV drug infusion  · Transseptal puncture through an intact septum . · Intracardiac echocardiography. · Radiofrequency ablation of atrial fibrillation and pulmonary veins isolation    · Ablation of typical atrial flutter as a separate mechanism  ·    · Deployment of closure device on all   access sites. ·          Indications for procedure:   Symptomatic atrial fibrillation, failed antiarrhythmic therapy and cardioversion in the past   Cristian Perales is a 61 y.o. female   Due to failure of antiarrhythmic therapy in the past, patient has been scheduled to have ablation for symptomatic recurrent atrial fibrillation. Details of Procedure: The risks, benefits and alternatives of the ablation procedure were discussed with the patient. The risks including, but not limited to, the risks of bleeding, infection, radiation exposure, injury to vascular, cardiac and surrounding structures (including pneumothorax), stroke, cardiac perforation, tamponade, need for emergent open heart surgery, need for pacemaker implantation, esophageal injury and fistula, myocardial infarction and death were discussed in detail. The patient opted to proceed with the ablation. Written informed consent was signed and placed in the chart. Patient was brought to the EP lab in a fasting non-sedate state.  Patient underwent general anesthesia by anesthesia team. We initially performed a transesophageal echo that showed no left atrial appendage clot/thrombus. Procedure was done without use of fluoroscopy. Both groins were prepped in a sterile fashion. We gained access in Right femoral vein. A 9-French sheath for ICE and 6F sheath for CS catheter and an Sr0 sheath for ablation and mapping catheters were  placed in the right femoral vein using modified seldinger technique. Ultrasound was used for femoral venous access. We gained access   modified Seldinger technique and ultrasound guidance. The femoral vein was identified with real time visualization of needle passage to the venous lumen. Using 3-D mapping system Carto the CS cathter was placed inside the coronary sinus  for recording and mapping of the left atrium. Using ICE we delineated the left pulmonary vein, left atrial appendage,  mitral valve, and right superior and right inferior pulmonary veins. Patient received a bolus of heparin prior transseptal puncture followed by continuous monitoring of the ACT and boluses of heparin during the procedure to keep the ACT more than 350. Also an esophageal temperature probe in addition to Esophastar catheter was advanced into the esophagus for mapping of the esophagus and careful monitoring of the esophageal temperature during ablation. The ICE was used to monitor location of temperature probe and move it close to ablation area. We stopped ablation when  temperature  increased 1 degree. A transseptal puncture through intact septum was done using ICE and  pressure monitoring . We placed a small curve deflectable Sheath inside the left atrium. Then a Pentarray catheter with deflectable curve and an irrigated ablation catheter was advanced into the left atrium sequentially and alternatively as needed. Using Penta ray  cathter and  Carto navigation system a three dimensional electro anatomical mapping of the left atrium, in addition to right and left sided pulmonary vein was created.     Using Penta ray induction of any arrhythmia. No arrhythmia was induced. Burst pacing and programmed stimulation with up to three premature atrial stimuli did not induce any sustained arrhythmia. Following confirmation of pulmonary veins isolation by circular catheter, isuprel IV was started and increased to 10 mcg to check for pulmonary veins reconnection and induction of any arrhythmia. No arrhythmia was induced     Both entrance and exit block was confirmed using Pentaarray catheter and pacing maneuvers after 30 minutes waiting time. After ablation we removed the catheters and sheaths from left atrium and performed EP study and programmed stimulation using our CS catheter and ablation cathter to assess for other arrhythmias. The deca polar/ablation catheter were moved from CS to right atrium, RV apex, and His bundle position. His bundle potentials was recorded and pacing was performed from right atrium, coronary sinus LV and RV apex with the following results:     Pacing from LV apex, 1:1 retrograde conduction over AV node (VA wenckebach) was absent at 800 msec         AH interval was 76 msec  HV interval was 43 msec  On Isuprel 10 mcg. Pacing from atrium, using CS catheter which was moved, showed 1:1 conduction over AV node with (AV wenckebach) was 240 msec  Pacing from atrium, AV karrie ERP was 500/200 msec   Pacing from RV apex, using CS cathter which was moved to the RV apex showed no 1:1 conduction over AV node (VA wenckebach) at 800 mec  Pacing from RV apex, showed VERP of 600/300 msec. At the end of the procedure, using ICE we confirmed the lack of any pericardial effusion. Since patient was on anticoagulation with heparin with high ACT, we used Perclose    device for closure of access sites on the right and left femoral vein. The patient tolerated the procedure well and there were no complications. Patient was extubated and transferred to the floor in stable condition. Blood loss <10 cc  No complication  Conclusion:     - Pulmonary vein isolations using wide area circumferential radiofrequency ablation   ·  - Additional ablation of   CTI dependent flutter,       Plan:   The patient will be observed for a few hours and discharged home if   stable  . Patient will receive usual post ablation care.      America Sanabria MD,   [place] [address]   Office: [phone]

## 2022-04-12 NOTE — ANESTHESIA PRE PROCEDURE
Department of Anesthesiology  Preprocedure Note       Name:  Vivienne Martin   Age:  61 y.o.  :  1955                                          MRN:  [id number]         Date:  2022      Surgeon: * Surgery not found *    Procedure:     Medications prior to admission:   Prior to Admission medications    Medication Sig Start Date End Date Taking? Authorizing Provider   Evolocumab 140 MG/ML SOSY Inject 1 mL into the skin every 14 days  Patient not taking: Reported on 21   Yvette Duran MD   POTASSIUM GLUCONATE PO Take by mouth    Historical Provider, MD   Cholecalciferol (VITAMIN D3) 125 MCG (5000 UT) TABS Take by mouth daily     Historical Provider, MD   cetirizine (ZYRTEC) 5 MG tablet Take 5 mg by mouth daily    Historical Provider, MD   digoxin (LANOXIN) 125 MCG tablet Take 1 tablet by mouth daily Can take 5 days a week, can take extra tab 2x weekly if needed for afib. 21   Yvette Duran MD   dilTIAZem (CARDIZEM CD) 120 MG extended release capsule TAKE 1 CAPSULE BY MOUTH TWICE DAILY 21   Yvette Duran MD   Mag Aspart-Potassium Aspart (POTASSIUM & MAGNESIUM ASPARTAT PO) Take 1 tablet by mouth daily     Historical Provider, MD   Zinc-Vitamin C  MG LOZG Take 1 lozenge by mouth daily     Historical Provider, MD   L-Lysine 1000 MG TABS Take 1 tablet by mouth every morning (before breakfast)     Historical Provider, MD   cyanocobalamin 1000 MCG tablet Take 1,000 mcg by mouth daily    Historical Provider, MD   nitroGLYCERIN (NITROSTAT) 0.4 MG SL tablet Place 1 tablet under the tongue every 5 minutes as needed for Chest pain up to max of 3 total doses.  If no relief after 1 dose, call 911. 20   Karyn Carter MD   estradiol (ESTRACE) 0.1 MG/GM vaginal cream PLACE 0.5 GRAM VAGINALLY 3 TIMES A WEEK 20   Karyn Carter MD       Current medications:    Current Facility-Administered Medications   Medication Dose Route Frequency Provider Last Rate Last Admin    sodium chloride flush 0.9 % injection 5-40 mL  5-40 mL IntraVENous PRN Daina Zimmer MD           Allergies: Allergies   Allergen Reactions    Hydralazine Anaphylaxis    Shellfish-Derived Products Anaphylaxis and Swelling    Xarelto [Rivaroxaban] Swelling    Asa [Aspirin] Nausea Only    Crestor [Rosuvastatin]      Myalgia      Fenofibrate      Myalgia      Hctz [Hydrochlorothiazide]     Lipitor [Atorvastatin Calcium]     Lipitor [Atorvastatin]      Myalgia      Lisinopril     Mometasone Furoate Swelling    Nasonex [Mometasone] Swelling    Nsaids      GI PAIN    Plavix [Clopidogrel] Hives, Itching and Swelling    Sulfa Antibiotics Swelling    Contrast [Iodides] Swelling and Rash    Flagyl [Metronidazole] Nausea And Vomiting       Problem List:    Patient Active Problem List   Diagnosis Code    Pure hypercholesterolemia E78.00    Allergic rhinitis J30.9    HSV infection B00.9    CAD (coronary artery disease) I25.10    Chronic pain G89.29    Depression F32. A    OAB (overactive bladder) N32.81    Hypercholesterolemia E78.00    Migraine headache G43.909    Left wrist fracture S62.102A    Postmenopausal atrophic vaginitis N95.2    Irritable bowel syndrome with diarrhea K58.0    Mood disorder due to a general medical condition F06.30    Essential hypertension I10    Mild episode of recurrent major depressive disorder (HCC) F33.0    PAF (paroxysmal atrial fibrillation) (HCC) I48.0    Bruit of right carotid artery R09.89    Bilateral carotid artery stenosis I65.23    Typical atrial flutter (HCC) I48.3    Atypical atrial flutter (HCC) I48.4    Obesity E66.9       Past Medical History:        Diagnosis Date    Allergic rhinitis, cause unspecified 12/04/2010    Anxiety     Arthritis     Bilateral carotid artery stenosis     < 50% bilaterally    CAD (coronary artery disease)     angi[facility] with [facility] Dr. Yessi Pradhan, here Dr. Veronica Bird at [facility], [place] Chronic kidney disease     frequency, urgency    Chronic pain     precordial, opiate dependent    Depression 01/22/2013    Erosive esophagitis 12/28/2016    Dr. Stephanie Figueroa; PPI started; LA Class A, Sphincter of Oddi manometry and sphincterotomy if symptoms don't improve.  Essential hypertension 04/11/2017    HSV infection     Hypercholesterolemia 06/06/2014    Irritable bowel syndrome with diarrhea 09/27/2016    Migraine headache 06/06/2014    Nonerosive nonspecific gastritis 12/28/2016    Dr. Stephanie Figueroa; body and antrum    OAB (overactive bladder) 03/14/2014       Past Surgical History:        Procedure Laterality Date    ABDOMINAL ADHESION SURGERY      APPENDECTOMY      CARDIAC CATHETERIZATION  12/07/2013    recheck arteries unchanged    CHOLECYSTECTOMY      COLONOSCOPY      CORONARY ANGIOPLASTY WITH STENT PLACEMENT  10/2012    right- TOTAL OF 3 STENTS    CORONARY ANGIOPLASTY WITH STENT PLACEMENT  02/04/2021    COSMETIC SURGERY      rhinoplasty    CYSTOSCOPY  04/2014    ENDOSCOPY, COLON, DIAGNOSTIC      ERCP  01/25/2017    FRACTURE SURGERY      LUE, plate and screws    HYSTERECTOMY      HYSTERECTOMY, TOTAL ABDOMINAL      OTHER SURGICAL HISTORY      Incision and drainage of Lingual Introral Lesion    POLYPECTOMY      Dr. Virgilio Segovia PTCA  10/2012    right    SINUS SURGERY      JOSE AND BSO      TONSILLECTOMY      TUBAL LIGATION      UPPER GASTROINTESTINAL ENDOSCOPY      WRIST SURGERY Left 05/21/2015    OPEN REDUCTION INTERNAL FIXATION LEFT WRIST       Social History:    Social History     Tobacco Use    Smoking status: Former Smoker     Packs/day: 0.50     Years: 20.00     Pack years: 10.00     Types: Cigarettes     Quit date: 2/4/2007     Years since quitting: 15.1    Smokeless tobacco: Never Used   Substance Use Topics    Alcohol use: No                                Counseling given: Not Answered      Vital Signs (Current): There were no vitals filed for this visit. BP Readings from Last 3 Encounters:   01/27/22 (!) 144/88   01/18/22 (!) 144/84   12/22/21 138/80       NPO Status:                                                                                 BMI:   Wt Readings from Last 3 Encounters:   01/27/22 205 lb 6.4 oz (93.2 kg)   01/18/22 205 lb 9.6 oz (93.3 kg)   12/22/21 208 lb 3.2 oz (94.4 kg)     There is no height or weight on file to calculate BMI.    CBC:   Lab Results   Component Value Date    WBC 6.0 11/02/2021    RBC 5.06 11/02/2021    HGB 14.1 11/02/2021    HCT 41.3 11/02/2021    MCV 81.6 11/02/2021    RDW 14.3 11/02/2021     11/02/2021       CMP:   Lab Results   Component Value Date     11/02/2021    K 3.6 11/02/2021     11/02/2021    CO2 24 11/02/2021    BUN 10 11/02/2021    CREATININE 0.8 11/02/2021    GFRAA >60 11/02/2021    GFRAA >60 12/19/2012    AGRATIO 1.5 11/02/2021    LABGLOM >60 11/02/2021    LABGLOM 67 05/14/2016    GLUCOSE 134 11/02/2021    GLUCOSE 96 11/14/2011    PROT 7.1 12/20/2021    PROT 6.2 02/21/2013    CALCIUM 9.3 11/02/2021    BILITOT 0.5 12/20/2021    ALKPHOS 131 12/20/2021    AST 30 12/20/2021    ALT 20 12/20/2021       POC Tests: No results for input(s): POCGLU, POCNA, POCK, POCCL, POCBUN, POCHEMO, POCHCT in the last 72 hours.     Coags:   Lab Results   Component Value Date    PROTIME 15.0 02/25/2021    INR 1.29 02/25/2021       HCG (If Applicable): No results found for: PREGTESTUR, PREGSERUM, HCG, HCGQUANT     ABGs: No results found for: PHART, PO2ART, VBO9NLV, UZW2FVW, BEART, U0LJAVQE     Type & Screen (If Applicable):  No results found for: LABABO, LABRH    Drug/Infectious Status (If Applicable):  No results found for: HIV, HEPCAB    COVID-19 Screening (If Applicable):   Lab Results   Component Value Date    COVID19 POSITIVE 12/20/2021    COVID19 Not Detected 02/19/2021           Anesthesia Evaluation  Patient summary reviewed and Nursing notes reviewed no history of anesthetic complications: Airway: Mallampati: III  TM distance: >3 FB   Neck ROM: full  Mouth opening: > = 3 FB Dental:    (+) lower dentures and upper dentures      Pulmonary:normal exam  breath sounds clear to auscultation      (-) COPD, asthma, sleep apnea and not a current smoker (former)                           Cardiovascular:    (+) hypertension:, angina (stable, no ntg use):, past MI (stemi hx):, CAD (chronic stable angina, follows cards):, CABG/stent (multiple PCIs 2017, LAD stent x 2 in distal LAD 2/4/21):, dysrhythmias (PAF, on dig and cardizem, s/p ablation hx):, hyperlipidemia    (-)  CHF (echo 2/21 EF 55-60 no rwma, Mild MR and TR, small PFO)    ECG reviewed  Rhythm: irregular  Rate: normal  Echocardiogram reviewed  Stress test reviewed                Neuro/Psych:   (+) headaches: migraine headaches, depression/anxiety    (-) seizures, TIA and CVA           GI/Hepatic/Renal:   (+) GERD: well controlled, PUD,      (-) liver disease and no renal disease       Endo/Other:    (+) : arthritis: OA., .    (-) diabetes mellitus, hypothyroidism, hyperthyroidism               Abdominal:   (+) obese,           Vascular:   + PVD, aortic or cerebral (<50% stenosis of the bilateral internal carotid arteries), . Other Findings:           Anesthesia Plan      general     ASA 4       Induction: intravenous. MIPS: Postoperative opioids intended and Prophylactic antiemetics administered. Anesthetic plan and risks discussed with patient. Plan discussed with CRNA.                   Skip Baker MD   4/12/2022

## 2022-04-12 NOTE — ANESTHESIA POSTPROCEDURE EVALUATION
Department of Anesthesiology  Postprocedure Note    Patient: Dianna Landa  MRN: [id number]  YOB: 1955  Date of evaluation: 4/12/2022  Time:  1:58 PM     Procedure Summary     Date: 04/12/22 Room / Location: [facility] Cath Lab; [facility] Echocardiography    Anesthesia Start: 1108 Anesthesia Stop: 1340    Procedure: ECHO/ABLATION WITH ANESTHESIA Diagnosis: Paroxysmal atrial fibrillation    Scheduled Providers:  Responsible Provider: Benoit Najera MD    Anesthesia Type: general ASA Status: 4          Anesthesia Type: general    Shant Phase I: Shant Score: 8    Shant Phase II:      Last vitals: Reviewed and per EMR flowsheets.        Anesthesia Post Evaluation    Patient location during evaluation: PACU  Patient participation: complete - patient participated  Level of consciousness: awake and alert  Airway patency: patent  Nausea & Vomiting: no vomiting and no nausea  Complications: no  Cardiovascular status: hemodynamically stable  Respiratory status: acceptable  Hydration status: stable

## 2022-04-13 ENCOUNTER — TELEPHONE (OUTPATIENT)
Dept: CARDIOLOGY CLINIC | Age: 67
End: 2022-04-13

## 2022-04-13 DIAGNOSIS — R07.89 OTHER CHEST PAIN: Primary | ICD-10-CM

## 2022-04-13 RX ORDER — OXYCODONE HYDROCHLORIDE AND ACETAMINOPHEN 5; 325 MG/1; MG/1
1 TABLET ORAL EVERY 8 HOURS PRN
Qty: 9 TABLET | Refills: 0 | Status: SHIPPED | OUTPATIENT
Start: 2022-04-13 | End: 2022-04-16

## 2022-04-13 RX ORDER — OXYCODONE HYDROCHLORIDE AND ACETAMINOPHEN 5; 325 MG/1; MG/1
1 TABLET ORAL EVERY 8 HOURS PRN
Qty: 9 TABLET | Refills: 0 | Status: SHIPPED | OUTPATIENT
Start: 2022-04-13 | End: 2022-04-13

## 2022-04-13 NOTE — TELEPHONE ENCOUNTER
Patients  will come to get the script for percocet. Advised he bring his ID. She is not interested in the Echo. Discussion of going to ED if symptoms worsen or does not improve.  She VU

## 2022-04-13 NOTE — TELEPHONE ENCOUNTER
Called pt about the message below and she got very hateful and stated that she was not going to the ED and she was not going to come and have an Echo done. Patient stated that she has had an ablation before and its just from where they went into her groin area. Patient stated that she just needs some pain medication to help take the edge off. Patient stated that she needs to speak to a nurse because I am the 4th person that she has spoke to today and she is not getting anywhere. Patient was transferred over to [facility].

## 2022-04-13 NOTE — TELEPHONE ENCOUNTER
Spoke to the pt she stated she cant come in today to do a stat ECHO,  she is into much pain and she been up all night she stated last time she had the procedure they kept her over night and it was a much easier, pt stated she just needs something to get her through the next couple days.     Pls advise thank you

## 2022-04-13 NOTE — TELEPHONE ENCOUNTER
Please help her schedule a stat limited per . When she comes in she can  a script for pain medication from the office.  Please ask if she tolerates percocet ok

## 2022-04-13 NOTE — TELEPHONE ENCOUNTER
She has to come in and  a paper script for the pain medication. Hopefully can do the Echo at the same time. Please let her know. If she is in that much pain then ED may be her best option.  That amount of pain is not typical

## 2022-04-13 NOTE — TELEPHONE ENCOUNTER
Reviewed PDMP. In setting of acute post procedure pain will give short term 3 day rx without refills.      Neli Obrien, APRN-CNP

## 2022-04-13 NOTE — TELEPHONE ENCOUNTER
Called pt about the message below and she stated that she is having pain in her chest and n the groin,. I gave her the message below and she stated that this is not her first time and she don't understand why she is having pain and it needs to be fixed. Per Rufino Goodrich RN she will talk to MA and she will call the patient back. Patient verbalized understanding and is waiting for the call back.

## 2022-04-18 LAB
POC ACT LR: 327 SEC
POC ACT LR: 355 SEC
POC ACT LR: 367 SEC

## 2022-04-25 ENCOUNTER — OFFICE VISIT (OUTPATIENT)
Dept: FAMILY MEDICINE CLINIC | Age: 67
End: 2022-04-25
Payer: MEDICARE

## 2022-04-25 VITALS
TEMPERATURE: 96.6 F | OXYGEN SATURATION: 97 % | SYSTOLIC BLOOD PRESSURE: 144 MMHG | WEIGHT: 202.8 LBS | DIASTOLIC BLOOD PRESSURE: 86 MMHG | BODY MASS INDEX: 34.81 KG/M2 | HEART RATE: 84 BPM | RESPIRATION RATE: 12 BRPM

## 2022-04-25 DIAGNOSIS — I48.4 ATYPICAL ATRIAL FLUTTER (HCC): ICD-10-CM

## 2022-04-25 DIAGNOSIS — Z12.31 ENCOUNTER FOR SCREENING MAMMOGRAM FOR MALIGNANT NEOPLASM OF BREAST: ICD-10-CM

## 2022-04-25 DIAGNOSIS — I10 ESSENTIAL HYPERTENSION: ICD-10-CM

## 2022-04-25 DIAGNOSIS — I25.10 CORONARY ARTERY DISEASE INVOLVING NATIVE CORONARY ARTERY OF NATIVE HEART WITHOUT ANGINA PECTORIS: ICD-10-CM

## 2022-04-25 DIAGNOSIS — Z12.11 SCREEN FOR COLON CANCER: ICD-10-CM

## 2022-04-25 DIAGNOSIS — I48.0 PAF (PAROXYSMAL ATRIAL FIBRILLATION) (HCC): ICD-10-CM

## 2022-04-25 DIAGNOSIS — F33.0 MILD EPISODE OF RECURRENT MAJOR DEPRESSIVE DISORDER (HCC): ICD-10-CM

## 2022-04-25 DIAGNOSIS — E78.00 HYPERCHOLESTEROLEMIA: Primary | ICD-10-CM

## 2022-04-25 PROCEDURE — 99214 OFFICE O/P EST MOD 30 MIN: CPT | Performed by: FAMILY MEDICINE

## 2022-04-25 ASSESSMENT — PATIENT HEALTH QUESTIONNAIRE - PHQ9
4. FEELING TIRED OR HAVING LITTLE ENERGY: 0
5. POOR APPETITE OR OVEREATING: 0
SUM OF ALL RESPONSES TO PHQ QUESTIONS 1-9: 0
SUM OF ALL RESPONSES TO PHQ QUESTIONS 1-9: 0
6. FEELING BAD ABOUT YOURSELF - OR THAT YOU ARE A FAILURE OR HAVE LET YOURSELF OR YOUR FAMILY DOWN: 0
9. THOUGHTS THAT YOU WOULD BE BETTER OFF DEAD, OR OF HURTING YOURSELF: 0
1. LITTLE INTEREST OR PLEASURE IN DOING THINGS: 0
SUM OF ALL RESPONSES TO PHQ QUESTIONS 1-9: 0
7. TROUBLE CONCENTRATING ON THINGS, SUCH AS READING THE NEWSPAPER OR WATCHING TELEVISION: 0
SUM OF ALL RESPONSES TO PHQ9 QUESTIONS 1 & 2: 0
SUM OF ALL RESPONSES TO PHQ QUESTIONS 1-9: 0
2. FEELING DOWN, DEPRESSED OR HOPELESS: 0
8. MOVING OR SPEAKING SO SLOWLY THAT OTHER PEOPLE COULD HAVE NOTICED. OR THE OPPOSITE, BEING SO FIGETY OR RESTLESS THAT YOU HAVE BEEN MOVING AROUND A LOT MORE THAN USUAL: 0
3. TROUBLE FALLING OR STAYING ASLEEP: 0
10. IF YOU CHECKED OFF ANY PROBLEMS, HOW DIFFICULT HAVE THESE PROBLEMS MADE IT FOR YOU TO DO YOUR WORK, TAKE CARE OF THINGS AT HOME, OR GET ALONG WITH OTHER PEOPLE: 0

## 2022-04-25 NOTE — Clinical Note
Good afternoon  Saw [facility] today. She is doing ok after recent ablation for Afib/flutter with Dr. Amelia Ferrer. Her blood pressure remains high. She is super sensitive to meds and is allergic to most BP meds. She is on diltiazem but only taking QD due to some swelling. Wanted to adjust but she wanted me to discuss with you first.  Any thoughts or alternatives to help get her blood pressure controlled?     Thanks  janene

## 2022-04-25 NOTE — PROGRESS NOTES
Here for f/u and recheck of CAD. Pt working with dr. Marilynn Miller and just had second ablation done at [facility]. Pt states that she was told that things went well, and the cardiologist mentioned some sort of 'thickening of heart heart wall'. The ablation was being done for symptomatology, with palpations and lightheaded/dizziness. They were occurring at least 1 x a week and was random. Pt on digoxin, and cardizem but couldn't tolerate metoprolol. Pt is not on eliquis BID for 30d after procedure. Pt did have transesophageal echo. Pt states she doesn't feel the 'greatest' wit some chest pressure and groin cramping. At this time, follow up is set in a few weeks. Pt has had chronic anginal symptoms, states that her current pain symptoms are a little bit 'different'. Pt srtates sx are random. No GERD sx, no n/v.  Pt does continues on protonix for 30d as well as eliquis    Pt here for follow up of blood pressure. Pt states doing great with adherence to therapy and feels well. No issues of chest pain, shortness of breath. No vision changes, headache, swelling in legs. Pt does not track her blood pressure outside of the office      Except as noted above in the history of present illness, the review of systems is  negative for headache, vision changes, chest pain, shortness of breath, abdominal pain, urinary sx, bowel changes. Past medical, surgical, and social history reviewed and updated  Medications and allergies reviewed and updated        O: BP (!) 144/86   Pulse 84   Temp 96.6 °F (35.9 °C) (Temporal)   Resp 12   Wt 202 lb 12.8 oz (92 kg)   SpO2 97%   BMI 34.81 kg/m²   GEN: No acute distress, cooperative, well nourished, alert. HEENT: PEERLA, EOMI , normocephalic/atraumatic, nares and oropharynx clear. Mucous membranes normal, Tympanic membranes clear bilaterally. Neck: soft, supple, no thyromegaly, mass, no Lymphadenopathy  CV: Regular rate and rhythm, no murmur, rubs, gallops. No edema.   Resp: Clear to auscultation bilaterally good air entry bilaterally  No crackles, wheeze. Breathing comfortably. Psych: mood stable, No suicidal thoughts or ideation         ASSESSMENT / PLAN:    1. Hypercholesterolemia  Stable w/ repatha    2. Mild episode of recurrent major depressive disorder (HCC)  Stable w/o flare  Mood doing well  Updated depression screen    3. PAF (paroxysmal atrial fibrillation) (HCC)  In NSR s/p ablation  Cont eliquis 5mg BID and f/u with cardiology    4. Coronary artery disease involving native coronary artery of native heart without angina pectoris  Stable @ goal    5. Essential hypertension  Remains elevated  Not taking CCB twice a day d/t concerns of swelling  Allergic to BBlocker, HCTZ, ACE therapy  Will d/w cardiology    6. Encounter for screening mammogram for malignant neoplasm of breast  Due mammo, aware needs to set up    7. Screen for colon cancer  Overdue  On hold d/t afib/flutter tx and use of NOAC    8. Atypical atrial flutter (HCC)  In NSR s/p ablation           Follow-up appointment:   prn    Discussed use, benefit, and side effects of all prescribed medications. Barriers to medication compliance addressed. All patient questions answered. Pt voiced understanding. When applicable, [facility] The patient has signed appropriate paperworks/consents.

## 2022-05-10 NOTE — TELEPHONE ENCOUNTER
Left message for patient to contact me regarding the Watchman procedure and the need for a shared decision that needs to be done before we can proceed.

## 2022-05-11 ENCOUNTER — TELEPHONE (OUTPATIENT)
Dept: FAMILY MEDICINE CLINIC | Age: 67
End: 2022-05-11

## 2022-05-11 NOTE — TELEPHONE ENCOUNTER
----- Message from Zoltan Fitch sent at 5/11/2022 12:03 PM EDT -----  Subject: Message to Provider    QUESTIONS  Information for Provider? Patient states that she received a call from the   office stating that she would loose her insurance if she did not schedule   a physical. No notes are seen for a call from the office and patient   states that she called her insurance and was told that she did not have to   schedule the physical. Please call back to clarify.   ---------------------------------------------------------------------------  --------------  CALL BACK INFO  What is the best way for the office to contact you? OK to leave message on   voicemail  Preferred Call Back Phone Number? [phone]  ---------------------------------------------------------------------------  --------------  SCRIPT ANSWERS  Relationship to Patient?  Self

## 2022-05-11 NOTE — TELEPHONE ENCOUNTER
Received refill request for Eliquis      Last OV: 01/27/2022 w/ MXA   04/12/2022 JESSICA Ablation     Last Refill: 04/12/2022 #60 w/ 0 refills     Next Appointment: 07/22/2022 w/ FLACO

## 2022-05-11 NOTE — TELEPHONE ENCOUNTER
Med Refill  Pt would like 90 supply if possible     apixaban (ELIQUIS) 5 MG TABS tablet   5 mg  60 tablet  Take 1 tablet by mouth 2 times daily        [place] [address] [address] - P [phone] [facility] [phone]   [address] [place] [address] [place] [address]   Phone:  [phone]  Fax:  [phone]

## 2022-05-11 NOTE — TELEPHONE ENCOUNTER
Called and spoke to the chanda. 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 She is not interested in Scheduling that right now. She will call back to Schedule.

## 2022-05-17 ENCOUNTER — TELEPHONE (OUTPATIENT)
Dept: CARDIOLOGY CLINIC | Age: 67
End: 2022-05-17

## 2022-05-18 NOTE — TELEPHONE ENCOUNTER
Medication: Eliquis 5 Mg   Lot:[id number]   Exp:04.24  Bottles/Boxes Given: 2     Samples have been placed at the  for . Called patient to let her know her samples are ready for .  Left message on machine

## 2022-05-25 ENCOUNTER — TELEPHONE (OUTPATIENT)
Dept: CARDIOLOGY CLINIC | Age: 67
End: 2022-05-25

## 2022-05-25 NOTE — TELEPHONE ENCOUNTER
Spoke with patient to discuss next steps for Watchman procedure. Patient has decided not to pursue the Watchman until September. Patient states \"needs a break from [employer]"  Patient will call me when she is ready to restart the Watchman process.

## 2022-06-21 NOTE — PROGRESS NOTES
[address]   Cardiac Follow Up     Referring Provider:  Jacquelin Arroyo MD     Chief Complaint   Patient presents with    6 Month Follow-Up    Coronary Artery Disease    Hypertension    Hyperlipidemia        History of Present Illness:  Yoel Perdomo is a 61 y.o. female with a history of coronary artery disease, STEMI with PCI (PCI/ALEC RCA 10/2012, PCI to Ramus 10/2012, ) , hypertension and AFIB. Afib ablation 2/25/21   PCI of distal LAD 2/21  Cath 2021 with patent stents  Repeat ablation done     She presented to [facility] on 11/19/2017 with complaints of chest pain that radiates to her jaw and arms. She was seen by cardiology and diagnosed with a STEMI and a LHC was completed with a ALEC in the diagonal branch. Developed chest pain again 11/20/2017 and a repeat LHC was completed with no further intervention necessary. She states that she has intolerance to many medications.      Today,she states she is back to doing all her normal activities. She denies any chest pain, palpitations, shortness of breath or lightheadedness/dizziness. Past Medical History:   has a past medical history of Allergic rhinitis, cause unspecified, Anxiety, Arthritis, Bilateral carotid artery stenosis, CAD (coronary artery disease), Chronic kidney disease, Chronic pain, Depression, Erosive esophagitis, Essential hypertension, HSV infection, Hypercholesterolemia, Irritable bowel syndrome with diarrhea, Migraine headache, Nonerosive nonspecific gastritis, and OAB (overactive bladder). Surgical History:   has a past surgical history that includes Appendectomy; Total abdominal hysterectomy w/ bilateral salpingoophorectomy; Cholecystectomy; Tubal ligation; Tonsillectomy; other surgical history; Coronary angioplasty with stent (10/2012); Percutaneous Transluminal Coronary Angio (10/2012); Cardiac catheterization (12/07/2013); Hysterectomy; polypectomy; Cystoscopy (04/2014); Upper gastrointestinal endoscopy;  Wrist surgery (Left, 05/21/2015); Abdominal adhesion surgery; Colonoscopy; Endoscopy, colon, diagnostic; Cosmetic surgery; fracture surgery; ERCP (01/25/2017); sinus surgery; Hysterectomy, total abdominal; and Coronary angioplasty with stent (02/04/2021). Social History:   reports that she quit smoking about 15 years ago. Her smoking use included cigarettes. She has a 10.00 pack-year smoking history. She has never used smokeless tobacco. She reports that she does not drink alcohol and does not use drugs. Family History:  family history includes Heart Disease in her brother, father, maternal uncle, mother, and another family member; High Blood Pressure in her sister and sister; Hypertension in her father and mother; Other in an other family member; Stroke in her sister and another family member. Home Medications:  Prior to Admission medications    Medication Sig Start Date End Date Taking?  Authorizing Provider   POTASSIUM GLUCONATE PO Take by mouth   Yes Historical Provider, MD   Cholecalciferol (VITAMIN D3) 125 MCG (5000 UT) TABS Take by mouth daily    Yes Historical Provider, MD   cetirizine (ZYRTEC) 5 MG tablet Take 5 mg by mouth daily   Yes Historical Provider, MD   dilTIAZem (CARDIZEM CD) 120 MG extended release capsule TAKE 1 CAPSULE BY MOUTH TWICE DAILY 9/9/21  Yes Jose Alberto Roberson MD   Mag Aspart-Potassium Aspart (POTASSIUM & MAGNESIUM ASPARTAT PO) Take 1 tablet by mouth daily    Yes Historical Provider, MD   Zinc-Vitamin C  MG LOZG Take 1 lozenge by mouth daily    Yes Historical Provider, MD   L-Lysine 1000 MG TABS Take 1 tablet by mouth every morning (before breakfast)    Yes Historical Provider, MD   cyanocobalamin 1000 MCG tablet Take 1,000 mcg by mouth daily   Yes Historical Provider, MD   estradiol (ESTRACE) 0.1 MG/GM vaginal cream PLACE 0.5 GRAM VAGINALLY 3 TIMES A WEEK 6/5/20  Yes Jacquelin Arroyo MD   nitroGLYCERIN (NITROSTAT) 0.4 MG SL tablet Place 1 tablet under the tongue every 5 minutes as needed for Chest pain up to max of 3 total doses. If no relief after 1 dose, call 911. Patient not taking: Reported on 6/29/2022 8/31/20   Jacquelin Arroyo MD        Allergies:  Hydralazine, Shellfish-derived products, Xarelto [rivaroxaban], Asa [aspirin], Crestor [rosuvastatin], Fenofibrate, Hctz [hydrochlorothiazide], Lipitor [atorvastatin calcium], Lipitor [atorvastatin], Lisinopril, Metoprolol, Mometasone furoate, Nasonex [mometasone], Nsaids, Plavix [clopidogrel], Sulfa antibiotics, Contrast [iodides], and Flagyl [metronidazole]     Review of Systems:   · Constitutional: there has been no unanticipated weight loss. There's been no change in energy level, sleep pattern, or activity level.   +fatigue   · Eyes: No visual changes or diplopia. No scleral icterus. · ENT: No Headaches, hearing loss or vertigo. No mouth sores or sore throat. · Cardiovascular: Reviewed in HPI  · Respiratory: No cough or wheezing, no sputum production. No hematemesis. +sob  · Gastrointestinal: No abdominal pain, appetite loss, blood in stools. No change in bowel or bladder habits. · Genitourinary: No dysuria, trouble voiding, or hematuria. · Musculoskeletal:  No gait disturbance, weakness or joint complaints. +cp   · Integumentary: No rash or pruritis. · Neurological: No headache, diplopia, change in muscle strength, numbness or tingling. No change in gait, balance, coordination, mood, affect, memory, mentation, behavior. · Psychiatric: No anxiety, no depression. · Endocrine: No malaise, fatigue or temperature intolerance. No excessive thirst, fluid intake, or urination. No tremor. · Hematologic/Lymphatic: No abnormal bruising or bleeding, blood clots or swollen lymph nodes. · Allergic/Immunologic: No nasal congestion or hives.     Physical Examination:    Vitals:    06/29/22 1111   BP: 138/80   Pulse: 97   SpO2: 97%        Wt Readings from Last 1 Encounters:   06/29/22 199 lb 6.4 oz (90.4 kg)       Constitutional and General shortness of breath or development of chest pain with activity. Return for regular follow up in 3 months. I appreciate the opportunity of cooperating in the care of this individual.    Kelton Rene M.D., [facility] [place]    Patient's problem list, medications, allergies, past medical, surgical, social and family histories were reviewed and updated as appropriate. Scribe's attestation: This note was scribed in the presence of Dr. Rodríguez Rene MD, by Sabrina Sol RN. The scribe's documentation has been prepared under my direction and personally reviewed by me in its entirety. I confirm that the note above accurately reflects all work, treatment, procedures, and medical decision making performed by me.

## 2022-06-29 ENCOUNTER — OFFICE VISIT (OUTPATIENT)
Dept: CARDIOLOGY CLINIC | Age: 67
End: 2022-06-29
Payer: MEDICARE

## 2022-06-29 VITALS
HEART RATE: 97 BPM | DIASTOLIC BLOOD PRESSURE: 80 MMHG | SYSTOLIC BLOOD PRESSURE: 138 MMHG | BODY MASS INDEX: 34.04 KG/M2 | OXYGEN SATURATION: 97 % | WEIGHT: 199.4 LBS | HEIGHT: 64 IN

## 2022-06-29 DIAGNOSIS — I25.118 CORONARY ARTERY DISEASE OF NATIVE ARTERY OF NATIVE HEART WITH STABLE ANGINA PECTORIS (HCC): ICD-10-CM

## 2022-06-29 DIAGNOSIS — E78.00 HYPERCHOLESTEROLEMIA: Primary | ICD-10-CM

## 2022-06-29 DIAGNOSIS — I48.0 PAF (PAROXYSMAL ATRIAL FIBRILLATION) (HCC): ICD-10-CM

## 2022-06-29 DIAGNOSIS — I10 ESSENTIAL HYPERTENSION: ICD-10-CM

## 2022-06-29 DIAGNOSIS — E78.00 HYPERCHOLESTEROLEMIA: ICD-10-CM

## 2022-06-29 LAB
CHOLESTEROL, TOTAL: 244 MG/DL (ref 0–199)
HDLC SERPL-MCNC: 47 MG/DL (ref 40–60)
LDL CHOLESTEROL CALCULATED: 142 MG/DL
TRIGL SERPL-MCNC: 277 MG/DL (ref 0–150)
VLDLC SERPL CALC-MCNC: 55 MG/DL

## 2022-06-29 PROCEDURE — 99214 OFFICE O/P EST MOD 30 MIN: CPT | Performed by: INTERNAL MEDICINE

## 2022-06-29 NOTE — PROGRESS NOTES
[facility]   Electrophysiology  Veenanieves Obrien, APRN-CNP  Attending EP: Dr. Kendall Hickey    Date: 7/22/2022  I had the privilege of visiting Navi Lewis in the office. Chief Complaint:   Chief Complaint   Patient presents with    Follow-up     Pt reports no cardiac concerns, pt reports she's been feeling great since ablation. Atrial Fibrillation     History of Present Illness: History obtained from patient and medical record. Navi Lewis is 74 y.o. female with a past medical history of CAD, HTN, PAF, anxiety, and obesity. S/p RFCA with PVI, ablation of kimani between LIPV/LSPV, focal atrial tachycardia, left sided macro reentrant atrial flutter, and CTI atrial flutter (2/25/21, Dr. Kendall Hickey). She had recurrence and elected for repeat cardioversion. S/p RFCA with PVI, CTI dependent flutter ablation (4/12/22)     -Interval history: Today, Navi Lewis is being seen for routine follow up. She is doing well and is very happy with the results of her ablation. She remains in sinus rhythm on EKG. She denies any recurrence of afib/flutter since her ablation. Pt has stopped her anti-coagulation as she states it causes her to bleed and bruise too easily. We discussed using PRN anti-coagulation if she were to have recurrent episodes due to the risk of thromboembolism/stroke. She is very active at home and is back to being able to work in her garden. She has never been checked for sleep apnea and denies any symptoms of OSEI. Denies having chest pain, palpitations, shortness of breath, orthopnea/PND, cough, or dizziness at the time of this visit. With regard to medication therapy the patient has been compliant with prescribed regimen. They have tolerated therapy to date. Allergies:   Allergies   Allergen Reactions    Hydralazine Anaphylaxis    Shellfish-Derived Products Anaphylaxis and Swelling    Xarelto [Rivaroxaban] Swelling    Asa [Aspirin] Nausea Only    Crestor [Rosuvastatin]      Myalgia Fenofibrate      Myalgia      Hctz [Hydrochlorothiazide]     Lipitor [Atorvastatin Calcium]     Lipitor [Atorvastatin]      Myalgia      Lisinopril     Metoprolol     Mometasone Furoate Swelling    Nasonex [Mometasone] Swelling    Nsaids      GI PAIN    Plavix [Clopidogrel] Hives, Itching and Swelling    Sulfa Antibiotics Swelling    Contrast [Iodides] Swelling and Rash    Flagyl [Metronidazole] Nausea And Vomiting     Home Medications:  Prior to Visit Medications    Medication Sig Taking? Authorizing Provider   moxifloxacin (VIGAMOX) 0.5 % ophthalmic solution Place 1 drop into both eyes in the morning and 1 drop at noon and 1 drop before bedtime. Do all this for 7 days. Yes Britney So MD   POTASSIUM GLUCONATE PO Take by mouth Yes Historical Provider, MD   Cholecalciferol (VITAMIN D3) 125 MCG (5000 UT) TABS Take by mouth daily  Yes Historical Provider, MD   cetirizine (ZYRTEC) 5 MG tablet Take 5 mg by mouth daily Yes Historical Provider, MD   dilTIAZem (CARDIZEM CD) 120 MG extended release capsule TAKE 1 CAPSULE BY MOUTH TWICE DAILY Yes MD Laine Galvez Aspart-Potassium Aspart (POTASSIUM & MAGNESIUM ASPARTAT PO) Take 1 tablet by mouth daily  Yes Historical Provider, MD   Zinc-Vitamin C  MG LOZG Take 1 lozenge by mouth daily  Yes Historical Provider, MD   L-Lysine 1000 MG TABS Take 1 tablet by mouth every morning (before breakfast)  Yes Historical Provider, MD   cyanocobalamin 1000 MCG tablet Take 1,000 mcg by mouth daily Yes Historical Provider, MD   estradiol (ESTRACE) 0.1 MG/GM vaginal cream PLACE 0.5 GRAM VAGINALLY 3 TIMES A WEEK Yes Gerson Weaver MD   nitroGLYCERIN (NITROSTAT) 0.4 MG SL tablet Place 1 tablet under the tongue every 5 minutes as needed for Chest pain up to max of 3 total doses. If no relief after 1 dose, call 911.   Patient not taking: No sig reported  Gerson Weaver MD      Past Medical History:  Past Medical History:   Diagnosis Date    Allergic rhinitis, cause unspecified 12/04/2010    Anxiety     Arthritis     Bilateral carotid artery stenosis     < 50% bilaterally    CAD (coronary artery disease)     angioplasty with stent at [facility] Dr. Jeane Villagomez, here Dr. Blackwell Goes at [place],     Chronic kidney disease     frequency, urgency    Chronic pain     precordial, opiate dependent    Depression 01/22/2013    Erosive esophagitis 12/28/2016    Dr. Christi Petitt; PPI started; LA Class A, Sphincter of Oddi manometry and sphincterotomy if symptoms don't improve. Essential hypertension 04/11/2017    HSV infection     Hypercholesterolemia 06/06/2014    Irritable bowel syndrome with diarrhea 09/27/2016    Migraine headache 06/06/2014    Nonerosive nonspecific gastritis 12/28/2016    Dr. Christi Pettit; body and antrum    OAB (overactive bladder) 03/14/2014     Past Surgical History:    has a past surgical history that includes Appendectomy; Total abdominal hysterectomy w/ bilateral salpingoophorectomy; Cholecystectomy; Tubal ligation; Tonsillectomy; other surgical history; Coronary angioplasty with stent (10/2012); Percutaneous Transluminal Coronary Angio (10/2012); Cardiac catheterization (12/07/2013); Hysterectomy; polypectomy; Cystoscopy (04/2014); Upper gastrointestinal endoscopy; Wrist surgery (Left, 05/21/2015); Abdominal adhesion surgery; Colonoscopy; Endoscopy, colon, diagnostic; Cosmetic surgery; fracture surgery; ERCP (01/25/2017); sinus surgery; Hysterectomy, total abdominal; and Coronary angioplasty with stent (02/04/2021). Social History:  Reviewed. reports that she quit smoking about 15 years ago. Her smoking use included cigarettes. She has a 10.00 pack-year smoking history. She has never used smokeless tobacco. She reports that she does not drink alcohol and does not use drugs. Family History:  Reviewed.  family history includes Heart Disease in her brother, father, maternal uncle, mother, and another family member; High Blood Pressure in her sister and sister; Hypertension in her father and mother; Other in an other family member; Stroke in her sister and another family member. Review of System:  Constitutional: Negative for fever, night sweats, chills, weight changes, or weakness  Skin: Negative for rash, dry skin, pruritus, bruising, bleeding, blood clots, or changes in skin pigment  HEENT: Negative for vision changes, ringing in the ears, sore throat, dysphagia, or swollen lymph nodes  Respiratory: Reviewed in HPI  Cardiovascular: Reviewed in HPI  Gastrointestinal: Negative for abdominal pain, N/V/D, constipation, or black/tarry stools  Genito-Urinary: Negative for dysuria, incontinence, urgency, or hematuria  Musculoskeletal: Negative for joint swelling, muscle pain, or injuries  Neurological/Psych: Negative for confusion, seizures, dizziness, headaches, balance issues or TIA-like symptoms. No anxiety, depression, or insomnia    Physical Examination:  Vitals:    07/22/22 1323   BP: 137/88   Pulse: 86   SpO2: 96%      Wt Readings from Last 3 Encounters:   07/22/22 194 lb (88 kg)   07/19/22 198 lb 9.6 oz (90.1 kg)   06/29/22 199 lb 6.4 oz (90.4 kg)     Constitutional: Cooperative and in no apparent distress, and appears well nourished  Skin: Warm and pink; no pallor, cyanosis, bruising, or clubbing  HEENT: Symmetric and normocephalic. PERRL, EOM intact. Conjunctiva pink with clear sclera. Mucus membranes pink and moist. Teeth intact. Thyroid smooth without nodules or goiter  Respiratory: Respirations symmetric and unlabored. Lungs clear to auscultation bilaterally, no wheezing, rhonchi, or crackles  Cardiovascular:  Regular rate and rhythm. S1/S2 present without murmurs, rubs, or gallops. Peripheral pulses 2+, capillary refill < 3 seconds. No elevation of JVP. No peripheral edema  Gastrointestinal: Abdomen soft and round. Bowel sounds normoactive in all quadrants without tenderness or masses.   Musculoskeletal: Bilateral upper and lower extremity strength 5/5 with full ROM. Neurological/Psych: Awake and orientated to person, place and time. Calm affect, appropriate mood. Pertinent labs, diagnostic, device, and imaging results reviewed as a part of this visit    LABS    CBC:   Lab Results   Component Value Date    WBC 6.4 2022    HGB 13.6 2022    HCT 40.5 2022    MCV 83.8 2022     2022     BMP:   Lab Results   Component Value Date    CREATININE 0.7 2022    BUN 11 2022     2022    K 3.9 2022     2022    CO2 23 2022     Estimated Creatinine Clearance: 86 mL/min (based on SCr of 0.7 mg/dL). Thyroid:   Lab Results   Component Value Date    TSH 4.67 (H) 2021     Lipid Panel:   Lab Results   Component Value Date/Time    CHOL 244 2022 11:59 AM    HDL 47 2022 11:59 AM    TRIG 277 2022 11:59 AM     LFTs:  Lab Results   Component Value Date    ALT 20 2021    AST 30 2021     (H) 2017    ALKPHOS 131 (H) 2021    BILITOT 0.5 2021     Coags:   Lab Results   Component Value Date    PROTIME 15.0 (H) 2021    INR 1.29 (H) 2021       EC2022  - NSR. Rate 84, QRS 88, QTc 394    JESSICA:    No thrombus noted. Overall left ventricular function is normal.   Mitral valve is structurally normal. Mild mitral regurgitation is present. There is no evidence of mass or thrombus in the left atrium or appendage. Tricuspid valve is structurally normal.   Mild tricuspid regurgitation. PASP 35 mmHg    Echo:     Left ventricle size is normal. There is mild concentric left ventricular   hypertrophy. Left ventricular function is normal with ejection fraction   estimated at 55-60 %. No regional wall motion abnormalities are noted. Diastolic filling parameters suggests grade II diastolic dysfunction. Mild   mitral and tricuspid regurgitation is present. RVSP estimated at 36 mmHg.     Cath:   Dominance: Right    LM: 30% distal   LAD: 50% mid LAD after first diagonal ; first diagonal and distal LAD stents widely patent   LCx: 20% mid  RCA: stent widely patent   LVEDP: 18 mmHg   LVEF: 60%     Impression/Recommendations:  Patent coronary stents with residual disease unchanged (mid LAD was FFR negative last week). OK to DC home today. Scheduled for AF/AFL ablation 2/25. Assessment:    1. Paroxysmal Atrial Fibrillation/Flutter   - S/p RFCA with PVI, ablation of kimani between LIPV/LSPV, focal atrial tachycardia, left sided macro reentrant atrial flutter, and CTI atrial flutter (2/25/21, Dr. Alyson Anne); S/p RFCA with PVI, CTI dependent flutter ablation (4/12/22)      - Currently in NSR   - Continue cardizem 120 mg BID   - [id number] score: 4 (HTN, CAD, Age, Gender); [id number] Vasc score and anticoagulation discussed. High risk for stroke and thromboembolism. Anticoagulation is recommended. Risk of bleeding was discussed. ~ Pt has Eliquis at home. Instructed to take PRN eliquis 5 mg BID for breakthrough episodes of AF due to risk of thromboembolism/stroke. She is aware of the risk of silent AF      - Afib risk factors including age, HTN, obesity, inactivity and OSEI were discussed with patient. Risk factor modification recommended               ~ TSH 2.81 (12/20)    ~ Never tested for sleep apnea. Recommend sleep eval, but pt declined                 - Instructed to call if recurrent episodes of atrial fibrillation >3 hours at home. Will have her take an extra dose of cardizem to see if she converts back to sinus rhythm. It may be prudent to consider TASHIA closure with [facility] to lower her risk of thromboembolism/stroke from aib in future, although this requires long term use of ASA to which she has an allergy/intolerance to    2. CAD   - Hx of multiple stents   - Stable   - No complaints of cardiac angina   - No ASA/ACEi/statin due to allergy.  Consider PCSK9 and BB use use, but pt declined for now              - Followed by Dr. Blessing Moreau 3. HTN   - Slightly uncontrolled here. Pt reports better readings at home  ~ Goal <130/80   - Continue current medications   - Encouraged to monitor and log BP readings at home, then bring log to next visit   - Discussed importance of low sodium diet, weight control and exercise     4. Mitral Regurgitation   - Mild   - Routine echoes for surveillance    5. Obesity   - Body mass index is 33.3 kg/m². - Complicating assessment and treatment. Placing patient at risk for multiple co-morbidities as well as early death and contributing to the patient's presentation  - Discussed weight loss and patient was encouraged to reduce calorie intake and increase exercise    Plan:  1. No medication changes  2. Call office if any prolonged episodes of atrial fibrillation  3. Continue to work on exercise/weight loss    F/U: Follow-up with Dr. Emmy Cortes as scheduled and EP as needed per pt request  -Call [facility] at [phone] with any questions    Diet & Exercise: The patient is counseled to follow a low salt diet to assure blood pressure remains controlled for cardiovascular risk factor modification  The patient is counseled to avoid excess caffeine, and energy drinks as this may exacerbated ectopy and arrhythmia  The patient is counseled to lose weight to control cardiovascular risk factors  Exercise program discussed: To improve overall cardiovascular health, the patient is instructed to increase cardiovascular related activities with a goal of 150 min/week of moderate level activity or 10,000 steps per day. Encouraged to perform as much activity as tolerated    I have addressed the patient's cardiac risk factors and adjusted pharmacologic treatment as needed. In addition, I have reinforced the need for patient directed risk factor modification. I independently reviewed the ECG    All questions and concerns were addressed with the patient. Alternatives to treatment were discussed.      Thank you for allowing to us

## 2022-06-30 ENCOUNTER — TELEPHONE (OUTPATIENT)
Dept: CARDIOLOGY CLINIC | Age: 67
End: 2022-06-30

## 2022-06-30 NOTE — TELEPHONE ENCOUNTER
----- Message from Tulio Reardon MD sent at 6/30/2022  8:01 AM EDT -----  Let her know that we need to pursue getting her on repatha,praluent or leqvio.  She is statin intolerant and lipid panel remains very poor due to her familial hyperlipidemia

## 2022-06-30 NOTE — TELEPHONE ENCOUNTER
I prefer repatha or praluent. We have tried before and it was cost prohibitive with those drugs.  Shahida Dross may be better

## 2022-06-30 NOTE — TELEPHONE ENCOUNTER
Relayed message below patient verbalized understanding. Patient is interested in trying on of these medications.

## 2022-07-06 NOTE — TELEPHONE ENCOUNTER
Paperwork for [facility] Case was filled out yesterday 7/5/22 by Darline Sutton, faxed to [facility].

## 2022-07-07 ENCOUNTER — TELEPHONE (OUTPATIENT)
Dept: FAMILY MEDICINE CLINIC | Age: 67
End: 2022-07-07

## 2022-07-07 NOTE — TELEPHONE ENCOUNTER
[facility] called with the patient on the line. Patient was seen on 04/25/2022 and the Claim for the patient is Processing against the \"Group\" not the \"Provider\". This means the patient is getting a Claim for Specialty. This is not accurate. Claim should be processed as \"Provider\". Gave okay for this Process, which brought patient down to a normal regular exam/follow up. Patient was grateful for adjusted claim.     Spoke with Rafael Mondragon of [employer]  Ref # [id number]

## 2022-07-13 NOTE — TELEPHONE ENCOUNTER
[facility]      Will fax request to [facility]; Ila Ferreira, Pharmacy Director, said he will try to get approval.

## 2022-07-18 DIAGNOSIS — I25.119 ATHEROSCLEROSIS OF NATIVE CORONARY ARTERY WITH ANGINA PECTORIS, UNSPECIFIED WHETHER NATIVE OR TRANSPLANTED HEART (HCC): ICD-10-CM

## 2022-07-18 DIAGNOSIS — E78.41 ELEVATED LIPOPROTEIN A LEVEL: ICD-10-CM

## 2022-07-18 PROBLEM — I25.10 CORONARY ATHEROSCLEROSIS OF NATIVE CORONARY ARTERY: Status: ACTIVE | Noted: 2022-07-18

## 2022-07-19 ENCOUNTER — NURSE TRIAGE (OUTPATIENT)
Dept: OTHER | Facility: CLINIC | Age: 67
End: 2022-07-19

## 2022-07-19 ENCOUNTER — OFFICE VISIT (OUTPATIENT)
Dept: FAMILY MEDICINE CLINIC | Age: 67
End: 2022-07-19
Payer: MEDICARE

## 2022-07-19 VITALS
HEART RATE: 87 BPM | RESPIRATION RATE: 14 BRPM | WEIGHT: 198.6 LBS | TEMPERATURE: 97.5 F | SYSTOLIC BLOOD PRESSURE: 140 MMHG | DIASTOLIC BLOOD PRESSURE: 80 MMHG | OXYGEN SATURATION: 97 % | BODY MASS INDEX: 34.09 KG/M2

## 2022-07-19 DIAGNOSIS — H10.33 ACUTE BACTERIAL CONJUNCTIVITIS OF BOTH EYES: Primary | ICD-10-CM

## 2022-07-19 PROCEDURE — 99213 OFFICE O/P EST LOW 20 MIN: CPT | Performed by: FAMILY MEDICINE

## 2022-07-19 PROCEDURE — 1123F ACP DISCUSS/DSCN MKR DOCD: CPT | Performed by: FAMILY MEDICINE

## 2022-07-19 RX ORDER — MOXIFLOXACIN 5 MG/ML
1 SOLUTION/ DROPS OPHTHALMIC 3 TIMES DAILY
Qty: 3 ML | Refills: 0 | Status: SHIPPED | OUTPATIENT
Start: 2022-07-19 | End: 2022-07-26

## 2022-07-19 NOTE — TELEPHONE ENCOUNTER
Subjective: Caller states \"I woke up the last couple of days and I feel like I have something in my eye and it isn't getting any better. \"     Current Symptoms: feeling of something in left eye     Onset: a few days ago;     Pain Severity: 8/10; N/A; constant    Temperature: None     What has been tried: eye drops, saline solution rinse     Recommended disposition: Go to ED/UCC Now (Or to Office with PCP Approval)    Care advice provided, patient verbalizes understanding; denies any other questions or concerns; instructed to call back for any new or worsening symptoms. Patient/caller agrees to follow-up with PCP writer also gave caller 2 clinic locations within her network. This triage is a result of a call to [address] [place] [address]. Please do not respond to the triage nurse through this encounter. Any subsequent communication should be directly with the patient.   Reason for Disposition   Eye pain that persists > 1 hour after irrigation (regardless of duration of flushing)   Sharp FB (even if FB was removed) and any pain present now   Eye has been washed out > 30 minutes ago and still feels like FB is still present   Blurred vision that persists >1 hour after irrigation (regardless of duration of flushing)    Protocols used: Eye - Foreign Body-ADULT-[place]

## 2022-07-19 NOTE — PROGRESS NOTES
Subjective:      Patient ID: Bernardino Lux 74 y.o. female. is here for evaluation for irritated eye. HPI    Few days ago felt had something in her left eye. Red and irritated. Some discharge. Spread to right eye yesterday. Feels like has sandpaper in her eyes. No visual disturbance. Rx: saline and lubricating drops. Not helping.      Outpatient Medications Marked as Taking for the 7/19/22 encounter (Office Visit) with Klaudia Ramey MD   Medication Sig Dispense Refill    POTASSIUM GLUCONATE PO Take by mouth      Cholecalciferol (VITAMIN D3) 125 MCG (5000 UT) TABS Take by mouth daily       cetirizine (ZYRTEC) 5 MG tablet Take 5 mg by mouth daily      dilTIAZem (CARDIZEM CD) 120 MG extended release capsule TAKE 1 CAPSULE BY MOUTH TWICE DAILY 180 capsule 3    Mag Aspart-Potassium Aspart (POTASSIUM & MAGNESIUM ASPARTAT PO) Take 1 tablet by mouth daily       Zinc-Vitamin C  MG LOZG Take 1 lozenge by mouth daily       L-Lysine 1000 MG TABS Take 1 tablet by mouth every morning (before breakfast)       cyanocobalamin 1000 MCG tablet Take 1,000 mcg by mouth daily      estradiol (ESTRACE) 0.1 MG/GM vaginal cream PLACE 0.5 GRAM VAGINALLY 3 TIMES A WEEK 42.5 g 0        Allergies   Allergen Reactions    Hydralazine Anaphylaxis    Shellfish-Derived Products Anaphylaxis and Swelling    Xarelto [Rivaroxaban] Swelling    Asa [Aspirin] Nausea Only    Crestor [Rosuvastatin]      Myalgia      Fenofibrate      Myalgia      Hctz [Hydrochlorothiazide]     Lipitor [Atorvastatin Calcium]     Lipitor [Atorvastatin]      Myalgia      Lisinopril     Metoprolol     Mometasone Furoate Swelling    Nasonex [Mometasone] Swelling    Nsaids      GI PAIN    Plavix [Clopidogrel] Hives, Itching and Swelling    Sulfa Antibiotics Swelling    Contrast [Iodides] Swelling and Rash    Flagyl [Metronidazole] Nausea And Vomiting       Patient Active Problem List   Diagnosis    Pure hypercholesterolemia    Allergic rhinitis    HSV CYSTOSCOPY  04/2014    ENDOSCOPY, COLON, DIAGNOSTIC      ERCP  01/25/2017    FRACTURE SURGERY      LUE, plate and screws    HYSTERECTOMY (CERVIX STATUS UNKNOWN)      HYSTERECTOMY, TOTAL ABDOMINAL (CERVIX REMOVED)      OTHER SURGICAL HISTORY      Incision and drainage of Lingual Introral Lesion    POLYPECTOMY      Dr. Leah Hamilton    PTCA  10/2012    right    SINUS SURGERY      JOSE AND BSO (CERVIX REMOVED)      TONSILLECTOMY      TUBAL LIGATION      UPPER GASTROINTESTINAL ENDOSCOPY      WRIST SURGERY Left 05/21/2015    OPEN REDUCTION INTERNAL FIXATION LEFT WRIST        Family History   Problem Relation Age of Onset    Hypertension Mother     Heart Disease Mother     Hypertension Father     Heart Disease Father     Heart Disease Maternal Uncle     High Blood Pressure Sister     Stroke Other     Heart Disease Other     Stroke Sister     High Blood Pressure Sister     Other Other         hypercoag state    Heart Disease Brother        Social History     Tobacco Use    Smoking status: Former     Packs/day: 0.50     Years: 20.00     Pack years: 10.00     Types: Cigarettes     Quit date: 2/4/2007     Years since quitting: 15.4    Smokeless tobacco: Never   Vaping Use    Vaping Use: Never used   Substance Use Topics    Alcohol use: No    Drug use: No            Review of Systems  Review of Systems    Objective:   Physical Exam  Vitals:    07/19/22 1532   BP: (!) 140/80   Pulse: 87   Resp: 14   Temp: 97.5 °F (36.4 °C)   TempSrc: Temporal   SpO2: 97%   Weight: 198 lb 9.6 oz (90.1 kg)       Physical Exam  NAD  Bilateral conjuncitva injected with scant mucopurulent discharge. PERRLA  EOMI. No photophobia, ciliary flush or induration of the lid. Skin is warm and dry. Well hydrated, no rash. Assessment:       Diagnosis Orders   1. Acute bacterial conjunctivitis of both eyes  moxifloxacin (VIGAMOX) 0.5 % ophthalmic solution             Plan:      Side effects of current medications reviewed and questions answered.    Call or return

## 2022-07-22 ENCOUNTER — OFFICE VISIT (OUTPATIENT)
Dept: CARDIOLOGY CLINIC | Age: 67
End: 2022-07-22
Payer: MEDICARE

## 2022-07-22 VITALS
WEIGHT: 194 LBS | DIASTOLIC BLOOD PRESSURE: 82 MMHG | BODY MASS INDEX: 33.12 KG/M2 | SYSTOLIC BLOOD PRESSURE: 137 MMHG | HEART RATE: 86 BPM | HEIGHT: 64 IN | OXYGEN SATURATION: 96 %

## 2022-07-22 DIAGNOSIS — I10 ESSENTIAL HYPERTENSION: ICD-10-CM

## 2022-07-22 DIAGNOSIS — I48.4 ATYPICAL ATRIAL FLUTTER (HCC): ICD-10-CM

## 2022-07-22 DIAGNOSIS — E66.01 CLASS 2 SEVERE OBESITY WITH SERIOUS COMORBIDITY AND BODY MASS INDEX (BMI) OF 35.0 TO 35.9 IN ADULT, UNSPECIFIED OBESITY TYPE (HCC): ICD-10-CM

## 2022-07-22 DIAGNOSIS — I48.0 PAF (PAROXYSMAL ATRIAL FIBRILLATION) (HCC): Primary | ICD-10-CM

## 2022-07-22 DIAGNOSIS — I48.3 TYPICAL ATRIAL FLUTTER (HCC): ICD-10-CM

## 2022-07-22 DIAGNOSIS — I25.118 CORONARY ARTERY DISEASE OF NATIVE ARTERY OF NATIVE HEART WITH STABLE ANGINA PECTORIS (HCC): ICD-10-CM

## 2022-07-22 DIAGNOSIS — E78.00 HYPERCHOLESTEROLEMIA: ICD-10-CM

## 2022-07-22 PROCEDURE — 99214 OFFICE O/P EST MOD 30 MIN: CPT | Performed by: NURSE PRACTITIONER

## 2022-07-22 PROCEDURE — 93000 ELECTROCARDIOGRAM COMPLETE: CPT | Performed by: NURSE PRACTITIONER

## 2022-07-22 PROCEDURE — 1123F ACP DISCUSS/DSCN MKR DOCD: CPT | Performed by: NURSE PRACTITIONER

## 2022-07-26 ENCOUNTER — TELEPHONE (OUTPATIENT)
Dept: CARDIOLOGY CLINIC | Age: 67
End: 2022-07-26

## 2022-07-26 NOTE — TELEPHONE ENCOUNTER
LMOM for Fabiana , that I did not get her fax. Please resend . I gave her my direct line with any questions.

## 2022-07-26 NOTE — TELEPHONE ENCOUNTER
Patient wants to wait to schedule her shared decision appointment till sometime in  September. Informed patient I would call to schedule then.

## 2022-07-26 NOTE — TELEPHONE ENCOUNTER
I spoke with Fabiana at [address] [place] [address]. She was calling to let DAWSON know that the Shahida Dross was denied. She wanted to know will we be appealing. She will fax the form to see what is needed. I will forward fax to LES at [place].     Fabiana phone [phone]

## 2022-07-28 ENCOUNTER — ANTI-COAG VISIT (OUTPATIENT)
Dept: CARDIOLOGY CLINIC | Age: 67
End: 2022-07-28

## 2022-08-02 ENCOUNTER — TELEPHONE (OUTPATIENT)
Dept: CARDIOLOGY CLINIC | Age: 67
End: 2022-08-02

## 2022-08-02 NOTE — TELEPHONE ENCOUNTER
Fabiana called in wanting to check to see if Debbie had received the denial, and f so was she able to get the appeal started.     Miri Talavera can be reached at [phone]

## 2022-08-08 NOTE — TELEPHONE ENCOUNTER
I spoke with Fabiana. She stated that [facility] did not get the appeal. Faxed to the  [phone].

## 2022-08-09 ENCOUNTER — TELEPHONE (OUTPATIENT)
Dept: CARDIOLOGY CLINIC | Age: 67
End: 2022-08-09

## 2022-08-09 NOTE — TELEPHONE ENCOUNTER
Jose Klein with [facility] stating they did received the paper work for the appeal on the [facility], however they would like additional information faxed to [address] [phone]      It the intolerance due to the Myalgia? Did the symptoms go away once the Statins were discontinued?      Pls advise thank you

## 2022-08-10 NOTE — TELEPHONE ENCOUNTER
Funmilayo Pappas from [address]  called and stated that the [facility] Billing was approved from 8-10-22 til 2-6-23 ref # [id number].

## 2022-08-11 RX ORDER — INCLISIRAN 284 MG/1.5ML
INJECTION, SOLUTION SUBCUTANEOUS
Qty: 1.5 ML | Refills: 0 | Status: ON HOLD
Start: 2022-08-11 | End: 2022-09-13

## 2022-08-12 ENCOUNTER — CLINICAL DOCUMENTATION (OUTPATIENT)
Dept: ONCOLOGY | Age: 67
End: 2022-08-12

## 2022-08-17 ENCOUNTER — TELEPHONE (OUTPATIENT)
Dept: CARDIOLOGY CLINIC | Age: 67
End: 2022-08-17

## 2022-08-17 NOTE — TELEPHONE ENCOUNTER
We received notification from [employer] that Ms. Hang Boone has been approved for [employer]. Elizabeth Avila [facility] pharmacy director is aware and will get her scheduled thru the infusion center; he will also look into patient assistance for her co-pay if cost prohibitive.

## 2022-08-23 ENCOUNTER — CLINICAL DOCUMENTATION (OUTPATIENT)
Dept: ONCOLOGY | Age: 67
End: 2022-08-23

## 2022-08-25 ENCOUNTER — CLINICAL DOCUMENTATION (OUTPATIENT)
Dept: ONCOLOGY | Age: 67
End: 2022-08-25

## 2022-08-25 NOTE — PROGRESS NOTES
Patient not interested in getting Leqvio injections at this time per message left at Infusion center.

## 2022-08-31 NOTE — TELEPHONE ENCOUNTER
I called and left message with patient to discuss Watchman consult that patient request I call back in September to see if she is interested in the [employer] or not.

## 2022-09-08 NOTE — TELEPHONE ENCOUNTER
I called and left message with patient to call me to see if she is interested in the Isabell or not.

## 2022-09-08 NOTE — TELEPHONE ENCOUNTER
Spoke with patient today regarding pursuing the Watchman device. Patient is not interested at this time and wants to be taken off the list.  Patient said since the last ablation she had she has not had any issues. I encouraged patient to call if she has any new issues and we could re-visit the Watchman device at that time.

## 2022-09-13 ENCOUNTER — TELEPHONE (OUTPATIENT)
Dept: CARDIOLOGY CLINIC | Age: 67
End: 2022-09-13

## 2022-09-13 ENCOUNTER — HOSPITAL ENCOUNTER (INPATIENT)
Age: 67
LOS: 5 days | Discharge: HOME | DRG: 247 | End: 2022-09-18
Attending: EMERGENCY MEDICINE | Admitting: INTERNAL MEDICINE
Payer: MEDICARE

## 2022-09-13 ENCOUNTER — APPOINTMENT (OUTPATIENT)
Dept: GENERAL RADIOLOGY | Age: 67
DRG: 247 | End: 2022-09-13
Payer: MEDICARE

## 2022-09-13 DIAGNOSIS — R10.31 GROIN PAIN, RIGHT: ICD-10-CM

## 2022-09-13 DIAGNOSIS — I21.4 NSTEMI (NON-ST ELEVATED MYOCARDIAL INFARCTION) (HCC): Primary | ICD-10-CM

## 2022-09-13 PROBLEM — T78.40XA ALLERGIC REACTION: Status: ACTIVE | Noted: 2022-09-13

## 2022-09-13 LAB
A/G RATIO: 1.5 (ref 1.1–2.2)
ALBUMIN SERPL-MCNC: 4.7 G/DL (ref 3.4–5)
ALP BLD-CCNC: 172 U/L (ref 40–129)
ALT SERPL-CCNC: 12 U/L (ref 10–40)
ANION GAP SERPL CALCULATED.3IONS-SCNC: 14 MMOL/L (ref 3–16)
APTT: 30.4 SEC (ref 23–34.3)
AST SERPL-CCNC: 24 U/L (ref 15–37)
BASOPHILS ABSOLUTE: 0.1 K/UL (ref 0–0.2)
BASOPHILS RELATIVE PERCENT: 0.7 %
BILIRUB SERPL-MCNC: 0.5 MG/DL (ref 0–1)
BUN BLDV-MCNC: 9 MG/DL (ref 7–20)
CALCIUM SERPL-MCNC: 10 MG/DL (ref 8.3–10.6)
CHLORIDE BLD-SCNC: 102 MMOL/L (ref 99–110)
CO2: 23 MMOL/L (ref 21–32)
CREAT SERPL-MCNC: 0.8 MG/DL (ref 0.6–1.2)
D DIMER: 0.43 UG/ML FEU (ref 0–0.6)
EKG ATRIAL RATE: 88 BPM
EKG DIAGNOSIS: NORMAL
EKG P AXIS: -5 DEGREES
EKG P-R INTERVAL: 142 MS
EKG Q-T INTERVAL: 356 MS
EKG QRS DURATION: 78 MS
EKG QTC CALCULATION (BAZETT): 430 MS
EKG R AXIS: -3 DEGREES
EKG T AXIS: 35 DEGREES
EKG VENTRICULAR RATE: 88 BPM
EOSINOPHILS ABSOLUTE: 0.2 K/UL (ref 0–0.6)
EOSINOPHILS RELATIVE PERCENT: 1.7 %
GFR AFRICAN AMERICAN: >60
GFR NON-AFRICAN AMERICAN: >60
GLUCOSE BLD-MCNC: 115 MG/DL (ref 70–99)
HCT VFR BLD CALC: 46 % (ref 36–48)
HEMOGLOBIN: 15.2 G/DL (ref 12–16)
LEFT VENTRICULAR EJECTION FRACTION MODE: NORMAL
LV EF: 60 %
LYMPHOCYTES ABSOLUTE: 2.2 K/UL (ref 1–5.1)
LYMPHOCYTES RELATIVE PERCENT: 24.1 %
MCH RBC QN AUTO: 28.3 PG (ref 26–34)
MCHC RBC AUTO-ENTMCNC: 33.1 G/DL (ref 31–36)
MCV RBC AUTO: 85.4 FL (ref 80–100)
MONOCYTES ABSOLUTE: 0.6 K/UL (ref 0–1.3)
MONOCYTES RELATIVE PERCENT: 6.5 %
NEUTROPHILS ABSOLUTE: 6.1 K/UL (ref 1.7–7.7)
NEUTROPHILS RELATIVE PERCENT: 67 %
PDW BLD-RTO: 13.7 % (ref 12.4–15.4)
PLATELET # BLD: 305 K/UL (ref 135–450)
PMV BLD AUTO: 8.1 FL (ref 5–10.5)
POC ACT LR: 155 SEC
POC ACT LR: 245 SEC
POC ACT LR: 246 SEC
POTASSIUM REFLEX MAGNESIUM: 3.8 MMOL/L (ref 3.5–5.1)
PRO-BNP: 65 PG/ML (ref 0–124)
RBC # BLD: 5.38 M/UL (ref 4–5.2)
SODIUM BLD-SCNC: 139 MMOL/L (ref 136–145)
TOTAL PROTEIN: 7.9 G/DL (ref 6.4–8.2)
TROPONIN: 0.02 NG/ML
WBC # BLD: 9.2 K/UL (ref 4–11)

## 2022-09-13 PROCEDURE — C1760 CLOSURE DEV, VASC: HCPCS

## 2022-09-13 PROCEDURE — 6360000002 HC RX W HCPCS

## 2022-09-13 PROCEDURE — 99152 MOD SED SAME PHYS/QHP 5/>YRS: CPT

## 2022-09-13 PROCEDURE — 2500000003 HC RX 250 WO HCPCS

## 2022-09-13 PROCEDURE — 93458 L HRT ARTERY/VENTRICLE ANGIO: CPT | Performed by: INTERNAL MEDICINE

## 2022-09-13 PROCEDURE — 2500000003 HC RX 250 WO HCPCS: Performed by: INTERNAL MEDICINE

## 2022-09-13 PROCEDURE — 6360000002 HC RX W HCPCS: Performed by: EMERGENCY MEDICINE

## 2022-09-13 PROCEDURE — 99285 EMERGENCY DEPT VISIT HI MDM: CPT

## 2022-09-13 PROCEDURE — 2500000003 HC RX 250 WO HCPCS: Performed by: EMERGENCY MEDICINE

## 2022-09-13 PROCEDURE — 83880 ASSAY OF NATRIURETIC PEPTIDE: CPT

## 2022-09-13 PROCEDURE — C1874 STENT, COATED/COV W/DEL SYS: HCPCS

## 2022-09-13 PROCEDURE — B2111ZZ FLUOROSCOPY OF MULTIPLE CORONARY ARTERIES USING LOW OSMOLAR CONTRAST: ICD-10-PCS | Performed by: INTERNAL MEDICINE

## 2022-09-13 PROCEDURE — 93010 ELECTROCARDIOGRAM REPORT: CPT | Performed by: INTERNAL MEDICINE

## 2022-09-13 PROCEDURE — 2709999900 HC NON-CHARGEABLE SUPPLY

## 2022-09-13 PROCEDURE — 2580000003 HC RX 258

## 2022-09-13 PROCEDURE — C1769 GUIDE WIRE: HCPCS

## 2022-09-13 PROCEDURE — 93458 L HRT ARTERY/VENTRICLE ANGIO: CPT

## 2022-09-13 PROCEDURE — 96365 THER/PROPH/DIAG IV INF INIT: CPT

## 2022-09-13 PROCEDURE — 99291 CRITICAL CARE FIRST HOUR: CPT | Performed by: INTERNAL MEDICINE

## 2022-09-13 PROCEDURE — 71045 X-RAY EXAM CHEST 1 VIEW: CPT

## 2022-09-13 PROCEDURE — 2000000000 HC ICU R&B

## 2022-09-13 PROCEDURE — 96368 THER/DIAG CONCURRENT INF: CPT

## 2022-09-13 PROCEDURE — 84484 ASSAY OF TROPONIN QUANT: CPT

## 2022-09-13 PROCEDURE — 2580000003 HC RX 258: Performed by: INTERNAL MEDICINE

## 2022-09-13 PROCEDURE — C9600 PERC DRUG-EL COR STENT SING: HCPCS

## 2022-09-13 PROCEDURE — 4A023N7 MEASUREMENT OF CARDIAC SAMPLING AND PRESSURE, LEFT HEART, PERCUTANEOUS APPROACH: ICD-10-PCS | Performed by: INTERNAL MEDICINE

## 2022-09-13 PROCEDURE — 85025 COMPLETE CBC W/AUTO DIFF WBC: CPT

## 2022-09-13 PROCEDURE — 6370000000 HC RX 637 (ALT 250 FOR IP): Performed by: EMERGENCY MEDICINE

## 2022-09-13 PROCEDURE — 93005 ELECTROCARDIOGRAM TRACING: CPT | Performed by: INTERNAL MEDICINE

## 2022-09-13 PROCEDURE — C1725 CATH, TRANSLUMIN NON-LASER: HCPCS

## 2022-09-13 PROCEDURE — 85379 FIBRIN DEGRADATION QUANT: CPT

## 2022-09-13 PROCEDURE — 6370000000 HC RX 637 (ALT 250 FOR IP): Performed by: INTERNAL MEDICINE

## 2022-09-13 PROCEDURE — 6360000002 HC RX W HCPCS: Performed by: INTERNAL MEDICINE

## 2022-09-13 PROCEDURE — 93005 ELECTROCARDIOGRAM TRACING: CPT | Performed by: EMERGENCY MEDICINE

## 2022-09-13 PROCEDURE — 96376 TX/PRO/DX INJ SAME DRUG ADON: CPT

## 2022-09-13 PROCEDURE — 99152 MOD SED SAME PHYS/QHP 5/>YRS: CPT | Performed by: INTERNAL MEDICINE

## 2022-09-13 PROCEDURE — 94760 N-INVAS EAR/PLS OXIMETRY 1: CPT

## 2022-09-13 PROCEDURE — A4216 STERILE WATER/SALINE, 10 ML: HCPCS | Performed by: INTERNAL MEDICINE

## 2022-09-13 PROCEDURE — 85730 THROMBOPLASTIN TIME PARTIAL: CPT

## 2022-09-13 PROCEDURE — 99153 MOD SED SAME PHYS/QHP EA: CPT

## 2022-09-13 PROCEDURE — C1887 CATHETER, GUIDING: HCPCS

## 2022-09-13 PROCEDURE — 2100000000 HC CCU R&B

## 2022-09-13 PROCEDURE — B2151ZZ FLUOROSCOPY OF LEFT HEART USING LOW OSMOLAR CONTRAST: ICD-10-PCS | Performed by: INTERNAL MEDICINE

## 2022-09-13 PROCEDURE — C1894 INTRO/SHEATH, NON-LASER: HCPCS

## 2022-09-13 PROCEDURE — 92941 PRQ TRLML REVSC TOT OCCL AMI: CPT | Performed by: INTERNAL MEDICINE

## 2022-09-13 PROCEDURE — 027136Z DILATION OF CORONARY ARTERY, TWO ARTERIES WITH THREE DRUG-ELUTING INTRALUMINAL DEVICES, PERCUTANEOUS APPROACH: ICD-10-PCS | Performed by: INTERNAL MEDICINE

## 2022-09-13 PROCEDURE — 85347 COAGULATION TIME ACTIVATED: CPT

## 2022-09-13 PROCEDURE — 96374 THER/PROPH/DIAG INJ IV PUSH: CPT

## 2022-09-13 PROCEDURE — 80053 COMPREHEN METABOLIC PANEL: CPT

## 2022-09-13 RX ORDER — NITROGLYCERIN 20 MG/100ML
5-200 INJECTION INTRAVENOUS CONTINUOUS
Status: DISCONTINUED | OUTPATIENT
Start: 2022-09-13 | End: 2022-09-13

## 2022-09-13 RX ORDER — NITROGLYCERIN 20 MG/100ML
10 INJECTION INTRAVENOUS CONTINUOUS
Status: DISCONTINUED | OUTPATIENT
Start: 2022-09-13 | End: 2022-09-13 | Stop reason: SDUPTHER

## 2022-09-13 RX ORDER — EPTIFIBATIDE 0.75 MG/ML
2 INJECTION, SOLUTION INTRAVENOUS CONTINUOUS
Status: ACTIVE | OUTPATIENT
Start: 2022-09-14 | End: 2022-09-14

## 2022-09-13 RX ORDER — CETIRIZINE HYDROCHLORIDE 10 MG/1
5 TABLET ORAL DAILY
Status: DISCONTINUED | OUTPATIENT
Start: 2022-09-14 | End: 2022-09-18 | Stop reason: HOSPADM

## 2022-09-13 RX ORDER — NITROGLYCERIN 20 MG/100ML
5-200 INJECTION INTRAVENOUS CONTINUOUS
Status: DISCONTINUED | OUTPATIENT
Start: 2022-09-13 | End: 2022-09-13 | Stop reason: SDUPTHER

## 2022-09-13 RX ORDER — ASPIRIN 81 MG/1
243 TABLET, CHEWABLE ORAL ONCE
Status: COMPLETED | OUTPATIENT
Start: 2022-09-13 | End: 2022-09-13

## 2022-09-13 RX ORDER — METHYLPREDNISOLONE SODIUM SUCCINATE 40 MG/ML
40 INJECTION, POWDER, LYOPHILIZED, FOR SOLUTION INTRAMUSCULAR; INTRAVENOUS EVERY 6 HOURS
Status: DISCONTINUED | OUTPATIENT
Start: 2022-09-13 | End: 2022-09-13

## 2022-09-13 RX ORDER — ACETAMINOPHEN 650 MG/1
650 SUPPOSITORY RECTAL EVERY 6 HOURS PRN
Status: DISCONTINUED | OUTPATIENT
Start: 2022-09-13 | End: 2022-09-18 | Stop reason: HOSPADM

## 2022-09-13 RX ORDER — DIPHENHYDRAMINE HYDROCHLORIDE 50 MG/ML
50 INJECTION INTRAMUSCULAR; INTRAVENOUS EVERY 6 HOURS
Status: DISCONTINUED | OUTPATIENT
Start: 2022-09-13 | End: 2022-09-13

## 2022-09-13 RX ORDER — EZETIMIBE 10 MG/1
10 TABLET ORAL NIGHTLY
Status: DISCONTINUED | OUTPATIENT
Start: 2022-09-14 | End: 2022-09-13 | Stop reason: RX

## 2022-09-13 RX ORDER — SODIUM CHLORIDE 9 MG/ML
INJECTION, SOLUTION INTRAVENOUS CONTINUOUS
Status: DISCONTINUED | OUTPATIENT
Start: 2022-09-13 | End: 2022-09-17

## 2022-09-13 RX ORDER — ASPIRIN 81 MG/1
81 TABLET, CHEWABLE ORAL DAILY
Status: DISCONTINUED | OUTPATIENT
Start: 2022-09-13 | End: 2022-09-18 | Stop reason: HOSPADM

## 2022-09-13 RX ORDER — SODIUM CHLORIDE 0.9 % (FLUSH) 0.9 %
5-40 SYRINGE (ML) INJECTION PRN
Status: DISCONTINUED | OUTPATIENT
Start: 2022-09-13 | End: 2022-09-18 | Stop reason: HOSPADM

## 2022-09-13 RX ORDER — DIPHENHYDRAMINE HYDROCHLORIDE 50 MG/ML
50 INJECTION INTRAMUSCULAR; INTRAVENOUS ONCE
Status: COMPLETED | OUTPATIENT
Start: 2022-09-13 | End: 2022-09-13

## 2022-09-13 RX ORDER — ACETAMINOPHEN 325 MG/1
650 TABLET ORAL EVERY 4 HOURS PRN
Status: DISCONTINUED | OUTPATIENT
Start: 2022-09-13 | End: 2022-09-18 | Stop reason: HOSPADM

## 2022-09-13 RX ORDER — MORPHINE SULFATE 2 MG/ML
2 INJECTION, SOLUTION INTRAMUSCULAR; INTRAVENOUS
Status: DISCONTINUED | OUTPATIENT
Start: 2022-09-13 | End: 2022-09-14

## 2022-09-13 RX ORDER — SODIUM CHLORIDE 0.9 % (FLUSH) 0.9 %
5-40 SYRINGE (ML) INJECTION EVERY 12 HOURS SCHEDULED
Status: DISCONTINUED | OUTPATIENT
Start: 2022-09-14 | End: 2022-09-18 | Stop reason: HOSPADM

## 2022-09-13 RX ORDER — HEPARIN SODIUM 10000 [USP'U]/100ML
INJECTION, SOLUTION INTRAVENOUS
Status: COMPLETED
Start: 2022-09-13 | End: 2022-09-13

## 2022-09-13 RX ORDER — ONDANSETRON 4 MG/1
4 TABLET, ORALLY DISINTEGRATING ORAL EVERY 8 HOURS PRN
Status: DISCONTINUED | OUTPATIENT
Start: 2022-09-13 | End: 2022-09-18 | Stop reason: HOSPADM

## 2022-09-13 RX ORDER — ACETAMINOPHEN 325 MG/1
650 TABLET ORAL EVERY 6 HOURS PRN
Status: DISCONTINUED | OUTPATIENT
Start: 2022-09-13 | End: 2022-09-14 | Stop reason: DRUGHIGH

## 2022-09-13 RX ORDER — HEPARIN SODIUM 1000 [USP'U]/ML
INJECTION, SOLUTION INTRAVENOUS; SUBCUTANEOUS
Status: COMPLETED
Start: 2022-09-13 | End: 2022-09-13

## 2022-09-13 RX ORDER — HEPARIN SODIUM 1000 [USP'U]/ML
30 INJECTION, SOLUTION INTRAVENOUS; SUBCUTANEOUS PRN
Status: DISCONTINUED | OUTPATIENT
Start: 2022-09-13 | End: 2022-09-13

## 2022-09-13 RX ORDER — SODIUM CHLORIDE 0.9 % (FLUSH) 0.9 %
5-40 SYRINGE (ML) INJECTION EVERY 12 HOURS SCHEDULED
Status: DISCONTINUED | OUTPATIENT
Start: 2022-09-13 | End: 2022-09-18 | Stop reason: HOSPADM

## 2022-09-13 RX ORDER — METHYLPREDNISOLONE SODIUM SUCCINATE 125 MG/2ML
125 INJECTION, POWDER, LYOPHILIZED, FOR SOLUTION INTRAMUSCULAR; INTRAVENOUS ONCE
Status: COMPLETED | OUTPATIENT
Start: 2022-09-13 | End: 2022-09-13

## 2022-09-13 RX ORDER — LABETALOL HYDROCHLORIDE 5 MG/ML
10 INJECTION, SOLUTION INTRAVENOUS ONCE
Status: DISCONTINUED | OUTPATIENT
Start: 2022-09-13 | End: 2022-09-13

## 2022-09-13 RX ORDER — NITROGLYCERIN 0.4 MG/1
0.4 TABLET SUBLINGUAL EVERY 5 MIN PRN
Status: DISCONTINUED | OUTPATIENT
Start: 2022-09-13 | End: 2022-09-18 | Stop reason: HOSPADM

## 2022-09-13 RX ORDER — SODIUM CHLORIDE 9 MG/ML
INJECTION, SOLUTION INTRAVENOUS PRN
Status: DISCONTINUED | OUTPATIENT
Start: 2022-09-13 | End: 2022-09-18 | Stop reason: HOSPADM

## 2022-09-13 RX ORDER — HEPARIN SODIUM 10000 [USP'U]/100ML
5-30 INJECTION, SOLUTION INTRAVENOUS CONTINUOUS
Status: DISCONTINUED | OUTPATIENT
Start: 2022-09-13 | End: 2022-09-13

## 2022-09-13 RX ORDER — LABETALOL HYDROCHLORIDE 5 MG/ML
10 INJECTION, SOLUTION INTRAVENOUS EVERY 30 MIN PRN
Status: DISCONTINUED | OUTPATIENT
Start: 2022-09-13 | End: 2022-09-18 | Stop reason: HOSPADM

## 2022-09-13 RX ORDER — SODIUM CHLORIDE 9 MG/ML
INJECTION, SOLUTION INTRAVENOUS CONTINUOUS
Status: ACTIVE | OUTPATIENT
Start: 2022-09-14 | End: 2022-09-14

## 2022-09-13 RX ORDER — NITROGLYCERIN 20 MG/100ML
INJECTION INTRAVENOUS
Status: DISCONTINUED
Start: 2022-09-13 | End: 2022-09-13

## 2022-09-13 RX ORDER — ONDANSETRON 2 MG/ML
4 INJECTION INTRAMUSCULAR; INTRAVENOUS EVERY 6 HOURS PRN
Status: DISCONTINUED | OUTPATIENT
Start: 2022-09-13 | End: 2022-09-18 | Stop reason: HOSPADM

## 2022-09-13 RX ORDER — VALSARTAN 80 MG/1
80 TABLET ORAL DAILY
Status: DISCONTINUED | OUTPATIENT
Start: 2022-09-13 | End: 2022-09-18 | Stop reason: HOSPADM

## 2022-09-13 RX ORDER — HEPARIN SODIUM 1000 [USP'U]/ML
60 INJECTION, SOLUTION INTRAVENOUS; SUBCUTANEOUS ONCE
Status: COMPLETED | OUTPATIENT
Start: 2022-09-13 | End: 2022-09-13

## 2022-09-13 RX ORDER — POLYETHYLENE GLYCOL 3350 17 G/17G
17 POWDER, FOR SOLUTION ORAL DAILY PRN
Status: DISCONTINUED | OUTPATIENT
Start: 2022-09-13 | End: 2022-09-18 | Stop reason: HOSPADM

## 2022-09-13 RX ORDER — HEPARIN SODIUM 1000 [USP'U]/ML
60 INJECTION, SOLUTION INTRAVENOUS; SUBCUTANEOUS PRN
Status: DISCONTINUED | OUTPATIENT
Start: 2022-09-13 | End: 2022-09-13

## 2022-09-13 RX ORDER — DILTIAZEM HYDROCHLORIDE 120 MG/1
120 CAPSULE, COATED, EXTENDED RELEASE ORAL 2 TIMES DAILY
Status: DISCONTINUED | OUTPATIENT
Start: 2022-09-13 | End: 2022-09-18 | Stop reason: HOSPADM

## 2022-09-13 RX ORDER — MORPHINE SULFATE 2 MG/ML
2 INJECTION, SOLUTION INTRAMUSCULAR; INTRAVENOUS ONCE
Status: COMPLETED | OUTPATIENT
Start: 2022-09-13 | End: 2022-09-13

## 2022-09-13 RX ORDER — METOPROLOL TARTRATE 5 MG/5ML
5 INJECTION INTRAVENOUS ONCE
Status: COMPLETED | OUTPATIENT
Start: 2022-09-13 | End: 2022-09-13

## 2022-09-13 RX ADMIN — HEPARIN SODIUM 12 UNITS/KG/HR: 10000 INJECTION, SOLUTION INTRAVENOUS at 12:43

## 2022-09-13 RX ADMIN — NITROGLYCERIN 10 MCG/MIN: 20 INJECTION INTRAVENOUS at 12:45

## 2022-09-13 RX ADMIN — DIPHENHYDRAMINE HYDROCHLORIDE 50 MG: 50 INJECTION, SOLUTION INTRAMUSCULAR; INTRAVENOUS at 21:15

## 2022-09-13 RX ADMIN — VALSARTAN 80 MG: 80 TABLET, FILM COATED ORAL at 21:15

## 2022-09-13 RX ADMIN — NITROGLYCERIN 0.4 MG: 0.4 TABLET, ORALLY DISINTEGRATING SUBLINGUAL at 12:13

## 2022-09-13 RX ADMIN — MORPHINE SULFATE 2 MG: 2 INJECTION, SOLUTION INTRAMUSCULAR; INTRAVENOUS at 11:27

## 2022-09-13 RX ADMIN — DIPHENHYDRAMINE HYDROCHLORIDE 50 MG: 50 INJECTION INTRAMUSCULAR; INTRAVENOUS at 15:26

## 2022-09-13 RX ADMIN — ASPIRIN 81 MG 243 MG: 81 TABLET ORAL at 11:04

## 2022-09-13 RX ADMIN — MORPHINE SULFATE 2 MG: 2 INJECTION, SOLUTION INTRAMUSCULAR; INTRAVENOUS at 22:55

## 2022-09-13 RX ADMIN — SODIUM CHLORIDE: 9 INJECTION, SOLUTION INTRAVENOUS at 19:33

## 2022-09-13 RX ADMIN — MORPHINE SULFATE 2 MG: 2 INJECTION, SOLUTION INTRAMUSCULAR; INTRAVENOUS at 19:51

## 2022-09-13 RX ADMIN — FAMOTIDINE 20 MG: 10 INJECTION, SOLUTION INTRAVENOUS at 16:05

## 2022-09-13 RX ADMIN — DILTIAZEM HYDROCHLORIDE 120 MG: 120 CAPSULE, COATED, EXTENDED RELEASE ORAL at 21:15

## 2022-09-13 RX ADMIN — ASPIRIN 81 MG 81 MG: 81 TABLET ORAL at 20:51

## 2022-09-13 RX ADMIN — MORPHINE SULFATE 2 MG: 2 INJECTION, SOLUTION INTRAMUSCULAR; INTRAVENOUS at 12:16

## 2022-09-13 RX ADMIN — HEPARIN SODIUM 5410 UNITS: 1000 INJECTION, SOLUTION INTRAVENOUS; SUBCUTANEOUS at 12:40

## 2022-09-13 RX ADMIN — METOPROLOL TARTRATE 5 MG: 5 INJECTION, SOLUTION INTRAVENOUS at 13:16

## 2022-09-13 RX ADMIN — METHYLPREDNISOLONE SODIUM SUCCINATE 40 MG: 40 INJECTION, POWDER, FOR SOLUTION INTRAMUSCULAR; INTRAVENOUS at 21:15

## 2022-09-13 RX ADMIN — Medication 12 UNITS/KG/HR: at 12:43

## 2022-09-13 RX ADMIN — SODIUM CHLORIDE, PRESERVATIVE FREE 10 ML: 5 INJECTION INTRAVENOUS at 22:55

## 2022-09-13 RX ADMIN — HEPARIN SODIUM 5410 UNITS: 1000 INJECTION INTRAVENOUS; SUBCUTANEOUS at 12:40

## 2022-09-13 RX ADMIN — METHYLPREDNISOLONE SODIUM SUCCINATE 125 MG: 125 INJECTION, POWDER, FOR SOLUTION INTRAMUSCULAR; INTRAVENOUS at 13:56

## 2022-09-13 RX ADMIN — Medication 10 ML: at 21:15

## 2022-09-13 ASSESSMENT — PAIN SCALES - GENERAL
PAINLEVEL_OUTOF10: 8
PAINLEVEL_OUTOF10: 8
PAINLEVEL_OUTOF10: 0
PAINLEVEL_OUTOF10: 7
PAINLEVEL_OUTOF10: 7
PAINLEVEL_OUTOF10: 6
PAINLEVEL_OUTOF10: 8

## 2022-09-13 ASSESSMENT — PAIN DESCRIPTION - LOCATION
LOCATION: CHEST

## 2022-09-13 ASSESSMENT — PAIN DESCRIPTION - DESCRIPTORS: DESCRIPTORS: DULL

## 2022-09-13 ASSESSMENT — PAIN DESCRIPTION - ORIENTATION: ORIENTATION: MID

## 2022-09-13 NOTE — ED NOTES
Per Dr. Monte [facility], keep both heparin and nitro paused at this time.      [facility], RN  09/13/22 3075

## 2022-09-13 NOTE — BRIEF OP NOTE
Cardiac Cath 9/13/2022:  Access: Right CFA  Hemostasis: Angio-Seal closure device    Moderate Sedation:  Start time: 1628  Stop time: 1823  8 mg versed   300 mcg fentanyl   An independent trained observer pushed medications at my direction. We monitored the patient's level of consciousness and vital signs/physiologic status throughout the procedure duration (see start and start times above). Bleeding risk: Low  LVEDP: 18 mmHg  AO: 141/92 mmHg  Estimated blood loss: Less than 20 mL  Contrast: 200 mL  Fluoroscopy time: 18.9 min. Anatomy:   LM-30% distal  LAD-widely patent mid stent  D1-widely patent proximal stent, 90% distal to the stent which is very small caliber  Cx-80% mid diffuse, 100% distal  OM1-normal  RCA-70% proximal, widely patent mid stent, 50% just distal to the stent  RPDA-patent with luminal regularities  LVEF-60%  PCI: Circumflex 100% to 0% distal with a 2.25 mm x 38 mm Xience Constance ALEC and then 80% to 0% mid with overlapping stents, including overlapping the distal stent, with distal to proximal 3.0 mm x 38 mm Xience Constance ALEC and 3.25 mm x 18 mm Xience [address] ALEC. Impression:  1. Occlusion of the distal circumflex along with severe stenosis of the mid circumflex ultimately revascularized with ALEC x3.  2.  Small vessel disease. 3.  Normal LV systolic function. 4.  Elevated LVEDP. Plan:  1. DAPT with aspirin and Brilinta. 2.  Trial of Zetia as it is intolerant of statins. Charolet Max has been ordered but not started yet. 3.  Intolerant of beta-blockers. Consider trial of bisoprolol if patient will agree. 4.  Add valsartan for better blood pressure control started 80 mg daily. Titrate as hemodynamics allow. 5.  Consider return for FFR of proximal RCA versus nuclear stress testing. Her primary cardiologist Dr. Emmy Cortes to decide.

## 2022-09-13 NOTE — ED NOTES
Pt called out with swelling to bilateral eyes, some redness and edema noted at this time, Heparin and nitro paused at this time. Dr. Robert Stahl notified awaiting call back. Pt denies any SOB or respiratory symptoms at this time.      Kerrie Flores RN  09/13/22 1377

## 2022-09-13 NOTE — ED PROVIDER NOTES
Hypercholesterolemia 06/06/2014    Irritable bowel syndrome with diarrhea 09/27/2016    Migraine headache 06/06/2014    Nonerosive nonspecific gastritis 12/28/2016    Dr. Zahida Carr; body and antrum    OAB (overactive bladder) 03/14/2014     Past surgical history:   Past Surgical History:   Procedure Laterality Date    ABDOMINAL ADHESION SURGERY      APPENDECTOMY      CARDIAC CATHETERIZATION  12/07/2013    recheck arteries unchanged    CHOLECYSTECTOMY      COLONOSCOPY      CORONARY ANGIOPLASTY WITH STENT PLACEMENT  10/2012    right- TOTAL OF 3 STENTS    CORONARY ANGIOPLASTY WITH STENT PLACEMENT  02/04/2021    COSMETIC SURGERY      rhinoplasty    CYSTOSCOPY  04/2014    ENDOSCOPY, COLON, DIAGNOSTIC      ERCP  01/25/2017    FRACTURE SURGERY      LUE, plate and screws    HYSTERECTOMY (CERVIX STATUS UNKNOWN)      HYSTERECTOMY, TOTAL ABDOMINAL (CERVIX REMOVED)      OTHER SURGICAL HISTORY      Incision and drainage of Lingual Introral Lesion    POLYPECTOMY      Dr. Hoda Light PTCA  10/2012    right    SINUS SURGERY      JOSE AND BSO (CERVIX REMOVED)      TONSILLECTOMY      TUBAL LIGATION      UPPER GASTROINTESTINAL ENDOSCOPY      WRIST SURGERY Left 05/21/2015    OPEN REDUCTION INTERNAL FIXATION LEFT WRIST       Home medications:   Previous Medications    CETIRIZINE (ZYRTEC) 5 MG TABLET    Take 5 mg by mouth daily    CHOLECALCIFEROL (VITAMIN D3) 125 MCG (5000 UT) TABS    Take by mouth daily     CYANOCOBALAMIN 1000 MCG TABLET    Take 1,000 mcg by mouth daily    DILTIAZEM (CARDIZEM CD) 120 MG EXTENDED RELEASE CAPSULE    TAKE 1 CAPSULE BY MOUTH TWICE DAILY    ESTRADIOL (ESTRACE) 0.1 MG/GM VAGINAL CREAM    PLACE 0.5 GRAM VAGINALLY 3 TIMES A WEEK    INCLISIRAN SODIUM (LEQVIO) 284 MG/1.5ML SOSY    284 mg sub q initial dose, then repeat in 3 mos, then every 6 mos    L-LYSINE 1000 MG TABS    Take 1 tablet by mouth every morning (before breakfast)     MAG ASPART-POTASSIUM ASPART (POTASSIUM & MAGNESIUM ASPARTAT PO) Take 1 tablet by mouth daily     NITROGLYCERIN (NITROSTAT) 0.4 MG SL TABLET    Place 1 tablet under the tongue every 5 minutes as needed for Chest pain up to max of 3 total doses. If no relief after 1 dose, call 911. POTASSIUM GLUCONATE PO    Take by mouth    ZINC-VITAMIN C  MG LOZG    Take 1 lozenge by mouth daily        Social history:  reports that she quit smoking about 15 years ago. Her smoking use included cigarettes. She has a 10.00 pack-year smoking history. She has never used smokeless tobacco. She reports that she does not drink alcohol and does not use drugs. Family history:    Family History   Problem Relation Age of Onset    Hypertension Mother     Heart Disease Mother     Hypertension Father     Heart Disease Father     Heart Disease Maternal Uncle     High Blood Pressure Sister     Stroke Other     Heart Disease Other     Stroke Sister     High Blood Pressure Sister     Other Other         hypercoag state    Heart Disease Brother          Exam  ED Triage Vitals [09/13/22 1042]   BP Temp Temp src Heart Rate Resp SpO2 Height Weight   (!) 197/133 97.5 °F (36.4 °C) -- 87 18 100 % -- 198 lb 9.6 oz (90.1 kg)     Nursing note and vitals reviewed. Constitutional: In no acute distress  HENT:      Head: Normocephalic      Ears: External ears normal.      Nose: Nose normal.     Mouth: Membrane mucosa moist   Eyes: No discharge. Neck: Supple. Trachea midline. Cardiovascular: Regular rate. Warm extremities  Pulmonary/Chest: Effort normal. No respiratory distress. Clear to auscultation bilaterally  Abdominal: Soft. No distension. Nontender  : Deferred  Rectal: Deferred   Musculoskeletal: Moves all extremities. No gross deformity. Neurological: Alert and oriented. Face symmetric. Speech is clear. Skin: Warm and dry. Psychiatric: Normal mood and affect. Behavior is normal.    Procedures      EKG  The Ekg interpreted by me in the absence of a cardiologist shows.   Normal sinus rhythm. No specific ST changes appreciated. HR 88  No significant change from prior EKG dated 84      Radiology  XR CHEST PORTABLE   Final Result   No acute cardiopulmonary process.              Labs  Results for orders placed or performed during the hospital encounter of 09/13/22   CBC with Auto Differential   Result Value Ref Range    WBC 9.2 4.0 - 11.0 K/uL    RBC 5.38 (H) 4.00 - 5.20 M/uL    Hemoglobin 15.2 12.0 - 16.0 g/dL    Hematocrit 46.0 36.0 - 48.0 %    MCV 85.4 80.0 - 100.0 fL    MCH 28.3 26.0 - 34.0 pg    MCHC 33.1 31.0 - 36.0 g/dL    RDW 13.7 12.4 - 15.4 %    Platelets 961 940 - 504 K/uL    MPV 8.1 5.0 - 10.5 fL    Neutrophils % 67.0 %    Lymphocytes % 24.1 %    Monocytes % 6.5 %    Eosinophils % 1.7 %    Basophils % 0.7 %    Neutrophils Absolute 6.1 1.7 - 7.7 K/uL    Lymphocytes Absolute 2.2 1.0 - 5.1 K/uL    Monocytes Absolute 0.6 0.0 - 1.3 K/uL    Eosinophils Absolute 0.2 0.0 - 0.6 K/uL    Basophils Absolute 0.1 0.0 - 0.2 K/uL   CMP w/ Reflex to MG   Result Value Ref Range    Sodium 139 136 - 145 mmol/L    Potassium reflex Magnesium 3.8 3.5 - 5.1 mmol/L    Chloride 102 99 - 110 mmol/L    CO2 23 21 - 32 mmol/L    Anion Gap 14 3 - 16    Glucose 115 (H) 70 - 99 mg/dL    BUN 9 7 - 20 mg/dL    Creatinine 0.8 0.6 - 1.2 mg/dL    GFR Non-African American >60 >60    GFR African American >60 >60    Calcium 10.0 8.3 - 10.6 mg/dL    Total Protein 7.9 6.4 - 8.2 g/dL    Albumin 4.7 3.4 - 5.0 g/dL    Albumin/Globulin Ratio 1.5 1.1 - 2.2    Total Bilirubin 0.5 0.0 - 1.0 mg/dL    Alkaline Phosphatase 172 (H) 40 - 129 U/L    ALT 12 10 - 40 U/L    AST 24 15 - 37 U/L   Troponin   Result Value Ref Range    Troponin 0.02 (H) <0.01 ng/mL   D-Dimer, Quantitative   Result Value Ref Range    D-Dimer, Quant 0.43 0.00 - 0.60 ug/mL FEU   Brain Natriuretic Peptide   Result Value Ref Range    Pro-BNP 65 0 - 124 pg/mL   EKG 12 Lead   Result Value Ref Range    Ventricular Rate 88 BPM    Atrial Rate 88 BPM    P-R Interval 142 ms QRS Duration 78 ms    Q-T Interval 356 ms    QTc Calculation (Bazett) 430 ms    P Axis -5 degrees    R Axis -3 degrees    T Axis 35 degrees    Diagnosis       Normal sinus rhythmLow voltage QRSSeptal infarct , age undetermined       Screenings   Harlan Coma Scale  Eye Opening: Spontaneous  Best Verbal Response: Oriented  Best Motor Response: Obeys commands  Harlan Coma Scale Score: 15       MDM and ED Course  This is a 74 y.o. female who presents to the ED for chest discomfort radiating to her jaw and both arms. Not tearing or towards the back to indicate dissection. ACS is in differential.  EKG shows no acute ischemic changes. Given significant cardiac history will anticipate admission for cardiac work-up. Pulmonary embolism in differential.  No unilateral DVT symptoms. No tachycardia, tachypnea, or hypoxia. Obtaining D-dimer to help risk stratify for this.  -----    D-dimer not elevated. Unlikely pulmonary embolism. Troponin elevated. Ordering heparin. May be secondary to type I NSTEMI vs hypertensive emergency. Discussed with cardiology Dr. Kingsley Wahl. Hypertensive emergency also in differential.  Giving some labetalol. Apparently has metoprolol adverse reaction however upon discussion with the patient the adverse reaction is weight gain. She declines nitroglycerin at this time due to history of severe headache and no improvement in pain with it. The total critical care time spent while evaluating and treating this patient was at least 32 minutes. This excludes time spent doing separately billable procedures. This includes time at the bedside, data interpretation, medication management, obtaining critical history from collateral sources if the patient is unable to provide it directly, and physician consultation. Specifics of interventions taken and potentially life-threatening diagnostic considerations are listed above in the medical decision making.          [unfilled]    Is this patient to be included in the SEP-1 Core Measure due to severe sepsis or septic shock? No   Exclusion criteria - the patient is NOT to be included for SEP-1 Core Measure due to: Infection is not suspected        Final Impression  1. NSTEMI (non-ST elevated myocardial infarction) (HCC)        Blood pressure (!) 177/109, pulse 81, temperature 97.5 °F (36.4 °C), resp. rate (!) 8, weight 198 lb 9.6 oz (90.1 kg), SpO2 98 %, not currently breastfeeding. Disposition:  DISPOSITION Decision To Admit 09/13/2022 12:05:50 PM      Patient Referrals:  No follow-up provider specified. Discharge Medications:  New Prescriptions    No medications on file       Discontinued Medications:  Discontinued Medications    No medications on file       This chart was generated using the [address] [place] [address] [employer] system. I created this record but it may contain dictation errors given the limitations of this technology.         Brian Quinones MD  09/13/22 4310

## 2022-09-13 NOTE — CONSULTS
[facility]   CONSULTATION  [phone]      Chief Complaint   Patient presents with    Tingling     Pt reports tingling in bilateral arms, reports chest pain and jaw tightness, cardiac HX (Sunna pt)         Reason for consult:  chest pain, abnormal EKG    ASSESSMENT AND PLAN:  NSTEMI  CAD in native artery s/p several prior stents  PAF and typical atrial flutter s/p ablation - in sinus  Hypercholesterolemia  Severe hypertension at admission    Plan  Start nitroglycerin drip at 10, titrate for relief of chest pain as long as SBP remains > 110  Heparin drip  Aspirin  Not currently on a PY12 like plavix, do not start plavix etc in case she is found to have surgical coronary anatomy  Metoprolol 5 mg IV boluses prn to keep HR < 80 with holding parameters  Trend troponin  I discussed with cath lab - will plan to cath this pm as soon as possible  If pain still not relieved with nitro drip please call us    Will closely follow    Critical Care Procedure Note     Total critical care time: 35 minutes    Due to a high probability of clinically significant, life threatening deterioration, the patient required my highest level of preparedness to intervene emergently and I personally spent this critical care time directly and personally managing the patient. This critical care time included obtaining a history; examining the patient; pulse oximetry; ordering and review of studies; arranging urgent treatment with development of a management plan; evaluation of patient's response to treatment; frequent reassessment; and, discussions with other providers. This critical care time was performed to assess and manage the high probability of imminent, life-threatening deterioration that could result in multi-organ failure. It was exclusive of separately billable procedures and treating other patients and teaching time.         Please see MDM section and the rest of the note for further information on patient assessment and treatment. History of Present Illness:  Patient followed closely by Dr. Jose Fowler known to have CAD since 2012, s/p numerous stents, s/p STEMI due to D1 occlusion in 2017, most recent cath  2/4/2021 at [facility] multivessel PCI to Distal LAD x 2 (2.5 x 15 and 2.5 x 18) and D1 x 1 (2.25 x 8),    Presents to our ED this am after waking up with severe chest pressure like \"an elephant sitting on me\" with radiation down her left arm. This is exactly how she presented with her STEMI. Initial EKG without ST elevation although new TWI in V1V2. She was markedly hypotensive to 763 systolic upon presentation, which improved to about 140-150 after morphine and nitro. She hasn't taken nitro at home because it gives her an ubearable headace. She is not currently on a statin due severe myalgias and was prescribed Leqvio, but there was a $4000 deductible that she couldn't afford. She had been placed on DAPT at [facility] but that was D.C'd by Dr. Jose Fowler recently as it was more than a year since her last stents. She also has EP history including ablations for fib and flutter. Past Medical History:   has a past medical history of Allergic rhinitis, cause unspecified, Anxiety, Arthritis, Bilateral carotid artery stenosis, CAD (coronary artery disease), Chronic kidney disease, Chronic pain, Depression, Erosive esophagitis, Essential hypertension, HSV infection, Hypercholesterolemia, Irritable bowel syndrome with diarrhea, Migraine headache, Nonerosive nonspecific gastritis, and OAB (overactive bladder). Surgical History:   has a past surgical history that includes Appendectomy; Total abdominal hysterectomy w/ bilateral salpingoophorectomy; Cholecystectomy; Tubal ligation; Tonsillectomy; other surgical history; Coronary angioplasty with stent (10/2012); Percutaneous Transluminal Coronary Angio (10/2012); Cardiac catheterization (12/07/2013); Hysterectomy; polypectomy; Cystoscopy (04/2014); Upper gastrointestinal endoscopy;  Wrist surgery (Left, 05/21/2015); Abdominal adhesion surgery; Colonoscopy; Endoscopy, colon, diagnostic; Cosmetic surgery; fracture surgery; ERCP (01/25/2017); sinus surgery; Hysterectomy, total abdominal; and Coronary angioplasty with stent (02/04/2021). Social History:   reports that she quit smoking about 15 years ago. Her smoking use included cigarettes. She has a 10.00 pack-year smoking history. She has never used smokeless tobacco. She reports that she does not drink alcohol and does not use drugs. Family History:  Dad had an MI at age 37, numerous other relatives with early onset CAD    Home Medications:  Were reviewed and are listed in nursing record. and/or listed below  Prior to Admission medications    Medication Sig Start Date End Date Taking? Authorizing Provider   Inclisiran Sodium (LEQVIO) 284 MG/1.5ML SOSY 284 mg sub q initial dose, then repeat in 3 mos, then every 6 mos 8/11/22   Mercy James MD   POTASSIUM GLUCONATE PO Take by mouth    Historical Provider, MD   Cholecalciferol (VITAMIN D3) 125 MCG (5000 UT) TABS Take by mouth daily     Historical Provider, MD   cetirizine (ZYRTEC) 5 MG tablet Take 5 mg by mouth daily    Historical Provider, MD   dilTIAZem (CARDIZEM CD) 120 MG extended release capsule TAKE 1 CAPSULE BY MOUTH TWICE DAILY 9/9/21   Mercy James MD   Mag Aspart-Potassium Aspart (POTASSIUM & MAGNESIUM ASPARTAT PO) Take 1 tablet by mouth daily     Historical Provider, MD   Zinc-Vitamin C  MG LOZG Take 1 lozenge by mouth daily     Historical Provider, MD   L-Lysine 1000 MG TABS Take 1 tablet by mouth every morning (before breakfast)     Historical Provider, MD   cyanocobalamin 1000 MCG tablet Take 1,000 mcg by mouth daily    Historical Provider, MD   nitroGLYCERIN (NITROSTAT) 0.4 MG SL tablet Place 1 tablet under the tongue every 5 minutes as needed for Chest pain up to max of 3 total doses. If no relief after 1 dose, call 911.   Patient not taking: No sig reported 8/31/20 Bard Chelsea MD   estradiol (ESTRACE) 0.1 MG/GM vaginal cream PLACE 0.5 GRAM VAGINALLY 3 TIMES A WEEK 6/5/20   Bard Chelsea MD        Current Medications:  Current Facility-Administered Medications   Medication Dose Route Frequency Provider Last Rate Last Admin    nitroGLYCERIN (NITROSTAT) SL tablet 0.4 mg  0.4 mg SubLINGual Q5 Min PRN Ryann Sanchez MD   0.4 mg at 09/13/22 1213    heparin (porcine) injection 5,410 Units  60 Units/kg IntraVENous PRN Ryann Sanchez MD        heparin (porcine) injection 2,700 Units  30 Units/kg IntraVENous PRN Ryann Sanchez MD        heparin 25,000 units in dextrose 5% 250 mL (premix) infusion  5-30 Units/kg/hr IntraVENous Continuous Ryann Sanchez MD 10.8 mL/hr at 09/13/22 1243 12 Units/kg/hr at 09/13/22 1243    labetalol (NORMODYNE;TRANDATE) injection 10 mg  10 mg IntraVENous Once Ryann Sanchez MD        nitroGLYCERIN 50 mg in dextrose 5% 250 mL infusion  10 mcg/min IntraVENous Continuous Liz Matias MD        And    nitroGLYCERIN 50 mg in dextrose 5% 250 mL infusion  5-200 mcg/min IntraVENous Continuous Liz Matias MD 3 mL/hr at 09/13/22 1306 10 mcg/min at 09/13/22 1306     Current Outpatient Medications   Medication Sig Dispense Refill    Inclisiran Sodium (LEQVIO) 284 MG/1.5ML SOSY 284 mg sub q initial dose, then repeat in 3 mos, then every 6 mos 1.5 mL 0    POTASSIUM GLUCONATE PO Take by mouth      Cholecalciferol (VITAMIN D3) 125 MCG (5000 UT) TABS Take by mouth daily       cetirizine (ZYRTEC) 5 MG tablet Take 5 mg by mouth daily      dilTIAZem (CARDIZEM CD) 120 MG extended release capsule TAKE 1 CAPSULE BY MOUTH TWICE DAILY 180 capsule 3    Mag Aspart-Potassium Aspart (POTASSIUM & MAGNESIUM ASPARTAT PO) Take 1 tablet by mouth daily       Zinc-Vitamin C  MG LOZG Take 1 lozenge by mouth daily       L-Lysine 1000 MG TABS Take 1 tablet by mouth every morning (before breakfast)       cyanocobalamin 1000 MCG tablet Take 1,000 mcg by mouth daily nitroGLYCERIN (NITROSTAT) 0.4 MG SL tablet Place 1 tablet under the tongue every 5 minutes as needed for Chest pain up to max of 3 total doses. If no relief after 1 dose, call 911. (Patient not taking: No sig reported) 25 tablet 3    estradiol (ESTRACE) 0.1 MG/GM vaginal cream PLACE 0.5 GRAM VAGINALLY 3 TIMES A WEEK 42.5 g 0        Allergies:  Hydralazine, Shellfish-derived products, Xarelto [rivaroxaban], Asa [aspirin], Crestor [rosuvastatin], Fenofibrate, Hctz [hydrochlorothiazide], Lipitor [atorvastatin calcium], Lipitor [atorvastatin], Lisinopril, Metoprolol, Mometasone furoate, Nasonex [mometasone], Nsaids, Plavix [clopidogrel], Sulfa antibiotics, Contrast [iodides], and Flagyl [metronidazole]     Review of Systems:     All systems reviewed and negative except as stated above. Physical Examination:    Vitals:    09/13/22 1300   BP: (!) 170/94   Pulse: 73   Resp: 13   Temp:    SpO2: 92%    Weight: 198 lb 9.6 oz (90.1 kg)       Body mass index is 34.09 kg/m².     General Appearance:  Alert, cooperative, mild distress, appears uncomfortable, appears stated age   Head:  Normocephalic, without obvious abnormality, atraumatic   Eyes:  PERRL, conjunctiva/corneas clear   Nose: Nares normal, no drainage or sinus tenderness   Throat: Lips, mucosa, and tongue normal   Neck: Supple, symmetrical, trachea midline, no adenopathy, thyroid: not enlarged, symmetric, no tenderness/mass/nodules, no carotid bruit or JVD   Lungs:   Clear to auscultation bilaterally, respirations unlabored   Chest Wall:  No tenderness or deformity   Heart:  Regular rate and rhythm, S1, S2 normal, no murmur, rub or gallop   Abdomen:   Soft, non-tender, bowel sounds active all four quadrants,  no masses, no organomegaly   Extremities: Extremities normal, atraumatic, no cyanosis or edema   Pulses: 2+ and symmetric   Skin: Skin color, texture, turgor normal, no rashes or lesions   Psych: Normal mood and affect   Neurologic: CN II-XII grossly intact Labs  Lab Results   Component Value Date/Time    PROBNP 65 09/13/2022 11:05 AM    PROBNP 159 02/17/2021 11:54 AM    PROBNP 78 06/13/2018 01:04 PM         Lab Results   Component Value Date/Time    CHOL 244 06/29/2022 11:59 AM    TRIG 277 06/29/2022 11:59 AM    HDL 47 06/29/2022 11:59 AM    LDLCALC 142 06/29/2022 11:59 AM    LABVLDL 55 06/29/2022 11:59 AM         Troponin   Date/Time Value Ref Range Status   09/13/2022 11:05 AM 0.02 (H) <0.01 ng/mL Final     Comment:     Methodology by Troponin T   11/02/2021 09:40 AM <0.01 <0.01 ng/mL Final     Comment:     Methodology by Troponin T   02/17/2021 08:59 PM <0.01 <0.01 ng/mL Final     Comment:     Methodology by Troponin T           CBC:   Lab Results   Component Value Date/Time    WBC 9.2 09/13/2022 11:05 AM    RBC 5.38 09/13/2022 11:05 AM    HGB 15.2 09/13/2022 11:05 AM    HCT 46.0 09/13/2022 11:05 AM    MCV 85.4 09/13/2022 11:05 AM    RDW 13.7 09/13/2022 11:05 AM     09/13/2022 11:05 AM     CMP:    Lab Results   Component Value Date/Time     09/13/2022 11:05 AM    K 3.8 09/13/2022 11:05 AM     09/13/2022 11:05 AM    CO2 23 09/13/2022 11:05 AM    BUN 9 09/13/2022 11:05 AM    CREATININE 0.8 09/13/2022 11:05 AM    GFRAA >60 09/13/2022 11:05 AM    GFRAA >60 12/19/2012 09:20 PM    AGRATIO 1.5 09/13/2022 11:05 AM    LABGLOM >60 09/13/2022 11:05 AM    LABGLOM 67 05/14/2016 08:52 AM    GLUCOSE 115 09/13/2022 11:05 AM    GLUCOSE 96 11/14/2011 08:44 AM    PROT 7.9 09/13/2022 11:05 AM    PROT 6.2 02/21/2013 11:49 AM    CALCIUM 10.0 09/13/2022 11:05 AM    BILITOT 0.5 09/13/2022 11:05 AM    ALKPHOS 172 09/13/2022 11:05 AM    AST 24 09/13/2022 11:05 AM    ALT 12 09/13/2022 11:05 AM     PT/INR:  No results found for: PTINR  Lab Results   Component Value Date    CKTOTAL 46 06/13/2018    CKMB 3.1 01/18/2017    TROPONINI 0.02 (H) 09/13/2022       EKG:  I have reviewed EKG with the following interpretation:  Impression:  NSR, TWI v1/v2, ~1 cm ST elevation in lead avL only    Old notes reviewed  Telemetry reviewd  Ekg personally reviewed  Chest xray personally reviewed  Echo, stress, cath, and/or other cardiac testing reviewed in detail   Medications and labs reviewed    High complexity/medical decision making due to extensive data review, extensive history review, independent review of data    High risk due to acute illness, evaluation of drug-drug interactions, medication management and diagnostic interventions    I will address the patient's cardiac risk factors and adjusted pharmacologic treatment as needed. In addition, I have reinforced the need for patient directed risk factor modification. Tobacco use was discussed with the patient and educated on the negative effects. I have asked the patient to not utilize these agents. Thank you for allowing to us to participate in the care or Particradha Portillo. Further evaluation will be based upon the patient's clinical course and testing results. All questions and concerns were addressed to the patient/family. Alternatives to my treatment were discussed. The note was completed using EMR. Every effort was made to ensure accuracy; however, inadvertent computerized transcription errors may be present.       Teressa Sacks, MD, MD 9/13/2022 1:13 PM

## 2022-09-13 NOTE — PROGRESS NOTES
Admitted from cath lab s/p pci of OMM2. Right groin unremarkable. NSR . Hx of  afib, flutter, ablation, htn, hld.

## 2022-09-13 NOTE — ED NOTES
Dr. Betsey Hahn notified of patients swelling at this time, see new orders and MAR. Per Dr. Leggett Crew page cardiology to ask about restarting heparin and nitro at this time. Cath lab called to speak to cards awaiting call back.       [address] [place] [address], MARSHALL  09/13/22 Anjum Ramírez, RN  09/13/22 0210

## 2022-09-13 NOTE — PRE SEDATION
Brief Pre-Op Note/Sedation Assessment      Tim Hernandez  1955  [id number]  4:26 PM    Planned Procedure: Cardiac Catheterization Procedure  Post Procedure Plan: Return to same level of care  Consent: I have discussed with the patient and/or the patient representative the indication, alternatives, and the possible risks and/or complications of the planned procedure and the anesthesia methods. The patient and/or patient representative appear to understand and agree to proceed. Chief Complaint:   NSTEMI      Indications for Cath Procedure:  Presentation:  Worsening Angina and Stable Known CAD  2. Anginal Classification within 2 weeks:  CCS III - Symptoms with everyday living activities, i.e., moderate limitation  3. Angina Symptoms Assessment:  Typical Chest Pain  4. Heart Failure Class within last 2 weeks:  No symptoms  5. Cardiovascular Instability:  Yes:  Persistent ischemic symptoms (CP, ST Elevation)    Prior Ischemic Workup/Eval:  Pre-Procedural Medications: Yes: Aspirin and Beta Blockers  2. Stress Test Completed? No    Does Patient need surgery? Cath Valve Surgery:  No    Pre-Procedure Medical History:  Vital Signs:  BP (!) 160/98   Pulse 76   Temp 97.5 °F (36.4 °C)   Resp 14   Wt 198 lb 9.6 oz (90.1 kg)   SpO2 96%   BMI 34.09 kg/m²     Allergies:     Allergies   Allergen Reactions    Hydralazine Anaphylaxis    Shellfish-Derived Products Anaphylaxis and Swelling    Xarelto [Rivaroxaban] Swelling    Asa [Aspirin] Nausea Only    Crestor [Rosuvastatin]      Myalgia      Fenofibrate      Myalgia      Hctz [Hydrochlorothiazide]     Lipitor [Atorvastatin Calcium]     Lipitor [Atorvastatin]      Myalgia      Lisinopril     Metoprolol      Weight gain    Mometasone Furoate Swelling    Nasonex [Mometasone] Swelling    Nsaids      GI PAIN    Plavix [Clopidogrel] Hives, Itching and Swelling    Sulfa Antibiotics Swelling    Contrast [Iodides] Swelling and Rash    Flagyl [Metronidazole] Nausea And Vomiting     Medications:    Current Facility-Administered Medications   Medication Dose Route Frequency Provider Last Rate Last Admin    nitroGLYCERIN (NITROSTAT) SL tablet 0.4 mg  0.4 mg SubLINGual Q5 Min PRN Ila Jim MD   0.4 mg at 09/13/22 1213    heparin (porcine) injection 5,410 Units  60 Units/kg IntraVENous PRN Ila Jim MD        heparin (porcine) injection 2,700 Units  30 Units/kg IntraVENous PRN Ila Jim MD        heparin 25,000 units in dextrose 5% 250 mL (premix) infusion  5-30 Units/kg/hr IntraVENous Continuous Ila Jim MD 10.8 mL/hr at 09/13/22 1243 12 Units/kg/hr at 09/13/22 1243    nitroGLYCERIN 50 mg in dextrose 5% 250 mL infusion  5-200 mcg/min IntraVENous Continuous Shima Campo MD 9 mL/hr at 09/13/22 1443 30 mcg/min at 09/13/22 1443    methylPREDNISolone sodium (SOLU-MEDROL) injection 40 mg  40 mg IntraVENous Q6H Chelsy Petty MD        diphenhydrAMINE (BENADRYL) injection 50 mg  50 mg IntraVENous Adria Luis MD        famotidine (PEPCID) 20 mg in sodium chloride (PF) 10 mL injection  20 mg IntraVENous BID Shima Campo MD   20 mg at 09/13/22 1605     Current Outpatient Medications   Medication Sig Dispense Refill    Inclisiran Sodium (LEQVIO) 284 MG/1.5ML SOSY 284 mg sub q initial dose, then repeat in 3 mos, then every 6 mos 1.5 mL 0    POTASSIUM GLUCONATE PO Take by mouth      Cholecalciferol (VITAMIN D3) 125 MCG (5000 UT) TABS Take by mouth daily       cetirizine (ZYRTEC) 5 MG tablet Take 5 mg by mouth daily      dilTIAZem (CARDIZEM CD) 120 MG extended release capsule TAKE 1 CAPSULE BY MOUTH TWICE DAILY 180 capsule 3    Mag Aspart-Potassium Aspart (POTASSIUM & MAGNESIUM ASPARTAT PO) Take 1 tablet by mouth daily       Zinc-Vitamin C  MG LOZG Take 1 lozenge by mouth daily       L-Lysine 1000 MG TABS Take 1 tablet by mouth every morning (before breakfast)       cyanocobalamin 1000 MCG tablet Take 1,000 mcg by mouth daily      nitroGLYCERIN (NITROSTAT) 0.4 MG SL tablet Place 1 tablet under the tongue every 5 minutes as needed for Chest pain up to max of 3 total doses. If no relief after 1 dose, call 911. (Patient not taking: No sig reported) 25 tablet 3    estradiol (ESTRACE) 0.1 MG/GM vaginal cream PLACE 0.5 GRAM VAGINALLY 3 TIMES A WEEK 42.5 g 0       Past Medical History:    Past Medical History:   Diagnosis Date    Allergic rhinitis, cause unspecified 12/04/2010    Anxiety     Arthritis     Bilateral carotid artery stenosis     < 50% bilaterally    CAD (coronary artery disease)     angioplasty with stent [facility] Dr. Summer Michelle, here Dr. Paulette Caraballo at [place],     Chronic kidney disease     frequency, urgency    Chronic pain     precordial, opiate dependent    Depression 01/22/2013    Erosive esophagitis 12/28/2016    Dr. Vielka Aiken; PPI started; LA Class A, Sphincter of Oddi manometry and sphincterotomy if symptoms don't improve.      Essential hypertension 04/11/2017    HSV infection     Hypercholesterolemia 06/06/2014    Irritable bowel syndrome with diarrhea 09/27/2016    Migraine headache 06/06/2014    Nonerosive nonspecific gastritis 12/28/2016    Dr. Vielka Aiken; body and antrum    OAB (overactive bladder) 03/14/2014       Surgical History:    Past Surgical History:   Procedure Laterality Date    ABDOMINAL ADHESION SURGERY      APPENDECTOMY      CARDIAC CATHETERIZATION  12/07/2013    recheck arteries unchanged    CHOLECYSTECTOMY      COLONOSCOPY      CORONARY ANGIOPLASTY WITH STENT PLACEMENT  10/2012    right- TOTAL OF 3 STENTS    CORONARY ANGIOPLASTY WITH STENT PLACEMENT  02/04/2021    COSMETIC SURGERY      rhinoplasty    CYSTOSCOPY  04/2014    ENDOSCOPY, COLON, DIAGNOSTIC      ERCP  01/25/2017    FRACTURE SURGERY      LUE, plate and screws    HYSTERECTOMY (CERVIX STATUS UNKNOWN)      HYSTERECTOMY, TOTAL ABDOMINAL (CERVIX REMOVED)      OTHER SURGICAL HISTORY      Incision and drainage of Lingual Introral Lesion    POLYPECTOMY       Safdi    PTCA  10/2012    right    SINUS SURGERY      JOSE AND BSO (CERVIX REMOVED)      TONSILLECTOMY      TUBAL LIGATION      UPPER GASTROINTESTINAL ENDOSCOPY      WRIST SURGERY Left 05/21/2015    OPEN REDUCTION INTERNAL FIXATION LEFT WRIST             Pre-Sedation:  Pre-Sedation Documentation and Exam:  I have assessed the patient and reviewed the H&P on the chart. Prior History of Anesthesia Complications:   none    Modified Mallampati:  III (soft palate, base of uvula visible)    ASA Classification:  Class 3 - A patient with severe systemic disease that limits activity but is not incapacitating    Shant Scale: Activity:  2 - Able to move 4 extremities voluntarily on command  Respiration:  2 - Able to breathe deeply and cough freely  Circulation:  2 - BP+/- 20mmHg of normal  Consciousness:  2 - Fully awake  Oxygen Saturation (color):  2 - Able to maintain oxygen saturation >92% on room air    Sedation/Anesthesia Plan:  Guard the patient's safety and welfare. Minimize physical discomfort and pain. Minimize negative psychological responses to treatment by providing sedation and analgesia and maximize the potential amnesia. Patient to meet pre-procedure discharge plan. Medication Planned:  midazolam intravenously and fentanyl intravenously    Patient is an appropriate candidate for plan of sedation:   yes      Of note: Patient has a shellfish allergy which is severe. She has been given IV Solu-Medrol and IV Benadryl. Given persistent chest discomfort with concern for evolving acute coronary syndrome, will proceed with cardiac catheterization after she has had approximately 2.5 to 3 hours of time interval from the initial Solu-Medrol and Benadryl and then will be followed with additional Solu-Medrol and Benadryl. Benefit >> Risk. Patient is agreeable.     Electronically signed by Myah Covarrubias MD on 9/13/2022 at 4:26 PM

## 2022-09-13 NOTE — ED NOTES
Cath lab to bedside at this time, pt report given, cath lab RN took patient to cath lab at this time.       [address] [facility], RN  09/13/22 2281

## 2022-09-13 NOTE — ED NOTES
Cath lab RN and Dr. Keith Seats to bedside at this time.       Kerrie Mejía, MARSHALL  09/13/22 2502

## 2022-09-13 NOTE — TELEPHONE ENCOUNTER
FYI    Pt's  just called to let DAWSON know that he has pt in the ER here at [facility] as him and wife think she may be having a heart attack.  states the ER hasnt done anything for wife as of yet. They are requesting that if LES has time can he come up and see pt.

## 2022-09-13 NOTE — H&P
HOSPITALISTS HISTORY AND PHYSICAL    9/13/2022 3:56 PM    Patient Information:  Dandy Vanessa is a 74 y.o. female [id number]  PCP:  Peña Huddleston MD (Tel: [phone] )    Chief complaint:    Chief Complaint   Patient presents with    Tingling     Pt reports tingling in bilateral arms, reports chest pain and jaw tightness, cardiac HX (Sunna pt)         History of Present Illness:  Mariia Mccartney is a 74 y.o. female with history of CAD s/p stents, pAfib/atrial flutter s/p ablation not on anticoagulation, HTN, hyperlipidemia, many listed allergies came to ER with acute onset of chest pain radiating to both arms and jaw. Describes heavy pressure on chest associated with SOB. SBP in 220s in ED. Associated with diaphoresis. No cough, hemoptysis or pleurisy. No fevers, chills, NS or dysphagia. No abdominal pain, melena, hematochezia or abdominal pain. No diarrhea. No new BL LE edema. No orthopnea, palpitations or syncope. Otherwise complete ROS is negative unless listed above. REVIEW OF SYSTEMS:   Pertinent positives as noted in HPI. All other systems were reviewed and are negative. Past Medical History:   has a past medical history of Allergic rhinitis, cause unspecified, Anxiety, Arthritis, Bilateral carotid artery stenosis, CAD (coronary artery disease), Chronic kidney disease, Chronic pain, Depression, Erosive esophagitis, Essential hypertension, HSV infection, Hypercholesterolemia, Irritable bowel syndrome with diarrhea, Migraine headache, Nonerosive nonspecific gastritis, and OAB (overactive bladder). Past Surgical History:   has a past surgical history that includes Appendectomy; Total abdominal hysterectomy w/ bilateral salpingoophorectomy; Cholecystectomy; Tubal ligation; Tonsillectomy; other surgical history; Coronary angioplasty with stent (10/2012);  Percutaneous Transluminal Coronary Angio (10/2012); Cardiac catheterization (12/07/2013); Hysterectomy; polypectomy; Cystoscopy (04/2014); Upper gastrointestinal endoscopy; Wrist surgery (Left, 05/21/2015); Abdominal adhesion surgery; Colonoscopy; Endoscopy, colon, diagnostic; Cosmetic surgery; fracture surgery; ERCP (01/25/2017); sinus surgery; Hysterectomy, total abdominal; and Coronary angioplasty with stent (02/04/2021). Medications:  No current facility-administered medications on file prior to encounter. Current Outpatient Medications on File Prior to Encounter   Medication Sig Dispense Refill    Inclisiran Sodium (LEQVIO) 284 MG/1.5ML SOSY 284 mg sub q initial dose, then repeat in 3 mos, then every 6 mos 1.5 mL 0    POTASSIUM GLUCONATE PO Take by mouth      Cholecalciferol (VITAMIN D3) 125 MCG (5000 UT) TABS Take by mouth daily       cetirizine (ZYRTEC) 5 MG tablet Take 5 mg by mouth daily      dilTIAZem (CARDIZEM CD) 120 MG extended release capsule TAKE 1 CAPSULE BY MOUTH TWICE DAILY 180 capsule 3    Mag Aspart-Potassium Aspart (POTASSIUM & MAGNESIUM ASPARTAT PO) Take 1 tablet by mouth daily       Zinc-Vitamin C  MG LOZG Take 1 lozenge by mouth daily       L-Lysine 1000 MG TABS Take 1 tablet by mouth every morning (before breakfast)       cyanocobalamin 1000 MCG tablet Take 1,000 mcg by mouth daily      nitroGLYCERIN (NITROSTAT) 0.4 MG SL tablet Place 1 tablet under the tongue every 5 minutes as needed for Chest pain up to max of 3 total doses. If no relief after 1 dose, call 911. (Patient not taking: No sig reported) 25 tablet 3    estradiol (ESTRACE) 0.1 MG/GM vaginal cream PLACE 0.5 GRAM VAGINALLY 3 TIMES A WEEK 42.5 g 0       Allergies:   Allergies   Allergen Reactions    Hydralazine Anaphylaxis    Shellfish-Derived Products Anaphylaxis and Swelling    Xarelto [Rivaroxaban] Swelling    Asa [Aspirin] Nausea Only    Crestor [Rosuvastatin]      Myalgia      Fenofibrate      Myalgia      Hctz [Hydrochlorothiazide]     Lipitor [Atorvastatin Calcium]     Lipitor [Atorvastatin]      Myalgia      Lisinopril     Metoprolol      Weight gain    Mometasone Furoate Swelling    Nasonex [Mometasone] Swelling    Nsaids      GI PAIN    Plavix [Clopidogrel] Hives, Itching and Swelling    Sulfa Antibiotics Swelling    Contrast [Iodides] Swelling and Rash    Flagyl [Metronidazole] Nausea And Vomiting        Social History:  Patient Lives with    reports that she quit smoking about 15 years ago. Her smoking use included cigarettes. She has a 10.00 pack-year smoking history. She has never used smokeless tobacco. She reports that she does not drink alcohol and does not use drugs. Family History:  family history includes Heart Disease in her brother, father, maternal uncle, mother, and another family member; High Blood Pressure in her sister and sister; Hypertension in her father and mother; Other in an other family member; Stroke in her sister and another family member. ,     Physical Exam:  BP (!) 160/98   Pulse 68   Temp 97.5 °F (36.4 °C)   Resp (!) 9   Wt 198 lb 9.6 oz (90.1 kg)   SpO2 97%   BMI 34.09 kg/m²     General appearance:  Appears comfortable. Well nourished  Eyes: Sclera clear, pupils equal  ENT: Moist mucus membranes, no thrush. Trachea midline. Cardiovascular: Regular rhythm, normal S1, S2. No murmur, gallop, rub. No edema in lower extremities  Respiratory: Clear to auscultation bilaterally, no wheeze, good inspiratory effort  Gastrointestinal: Abdomen soft, obese, non-tender, not distended, normal bowel sounds  Musculoskeletal: No cyanosis in digits, neck supple  Neurology: Cranial nerves grossly intact. Alert and oriented in time, place and person. No speech or motor deficits  Psychiatry: Anxious affect.  Not agitated  Skin: Warm, dry, normal turgor, no rash  Brisk capillary refill, peripheral pulses palpable   Labs:  CBC:   Lab Results   Component Value Date/Time    WBC 9.2 09/13/2022 11:05 AM    RBC 5.38 09/13/2022 11:05 AM    HGB 15.2 09/13/2022 11:05 AM    HCT 46.0 09/13/2022 11:05 AM    MCV 85.4 09/13/2022 11:05 AM    MCH 28.3 09/13/2022 11:05 AM    MCHC 33.1 09/13/2022 11:05 AM    RDW 13.7 09/13/2022 11:05 AM     09/13/2022 11:05 AM    MPV 8.1 09/13/2022 11:05 AM     BMP:    Lab Results   Component Value Date/Time     09/13/2022 11:05 AM    K 3.8 09/13/2022 11:05 AM     09/13/2022 11:05 AM    CO2 23 09/13/2022 11:05 AM    BUN 9 09/13/2022 11:05 AM    CREATININE 0.8 09/13/2022 11:05 AM    CALCIUM 10.0 09/13/2022 11:05 AM    GFRAA >60 09/13/2022 11:05 AM    GFRAA >60 12/19/2012 09:20 PM    LABGLOM >60 09/13/2022 11:05 AM    LABGLOM 67 05/14/2016 08:52 AM    GLUCOSE 115 09/13/2022 11:05 AM    GLUCOSE 96 11/14/2011 08:44 AM     XR CHEST PORTABLE   Final Result   No acute cardiopulmonary process. Ch    Problem List  Principal Problem:    NSTEMI (non-ST elevated myocardial infarction) ([facility] [address]  Active Problems:    PAF (paroxysmal atrial fibrillation) ([facility])    Coronary atherosclerosis of native coronary artery    Allergic reaction    Pure hypercholesterolemia    CAD (coronary artery disease)    Depression    Essential hypertension    Bilateral carotid artery stenosis    Typical atrial flutter (HCC)    Obesity  Resolved Problems:    * No resolved hospital problems. *        Assessment/Plan:   Admit to CVU for NSTEMI  NTG gtt for CP  Hep gtt for NSTEMI  ASA 81 mg daily  No statin due to listed allergies  Cardio consult for CP  Morphine IV PRN severe pain  Solumedrol 40 mg IV q6h  Benadryl 50 mg IV q6h  Pepcid 20 mg IV q12h  Monitor for anaphylaxis      DVT prophylaxis Hep gtt  Code status Full code  Diet NPO  IV access Peripheral  Bloom Catheter No    Admit as inpatient. I anticipate hospitalization spanning more than two midnights for investigation and treatment of the above medically necessary diagnoses.     Called by ED RN that patient with eye swelling around 3:50 PM.

## 2022-09-14 DIAGNOSIS — I25.118 CORONARY ARTERY DISEASE OF NATIVE ARTERY OF NATIVE HEART WITH STABLE ANGINA PECTORIS (HCC): Primary | ICD-10-CM

## 2022-09-14 LAB
ANION GAP SERPL CALCULATED.3IONS-SCNC: 15 MMOL/L (ref 3–16)
BASOPHILS ABSOLUTE: 0 K/UL (ref 0–0.2)
BASOPHILS RELATIVE PERCENT: 0.3 %
BUN BLDV-MCNC: 9 MG/DL (ref 7–20)
CALCIUM SERPL-MCNC: 9.4 MG/DL (ref 8.3–10.6)
CHLORIDE BLD-SCNC: 104 MMOL/L (ref 99–110)
CO2: 20 MMOL/L (ref 21–32)
CREAT SERPL-MCNC: 0.9 MG/DL (ref 0.6–1.2)
EKG ATRIAL RATE: 71 BPM
EKG ATRIAL RATE: 73 BPM
EKG DIAGNOSIS: NORMAL
EKG DIAGNOSIS: NORMAL
EKG P AXIS: -9 DEGREES
EKG P AXIS: 0 DEGREES
EKG P-R INTERVAL: 142 MS
EKG P-R INTERVAL: 146 MS
EKG Q-T INTERVAL: 408 MS
EKG Q-T INTERVAL: 428 MS
EKG QRS DURATION: 76 MS
EKG QRS DURATION: 98 MS
EKG QTC CALCULATION (BAZETT): 449 MS
EKG QTC CALCULATION (BAZETT): 465 MS
EKG R AXIS: -16 DEGREES
EKG R AXIS: -32 DEGREES
EKG T AXIS: 28 DEGREES
EKG T AXIS: 65 DEGREES
EKG VENTRICULAR RATE: 71 BPM
EKG VENTRICULAR RATE: 73 BPM
EOSINOPHILS ABSOLUTE: 0 K/UL (ref 0–0.6)
EOSINOPHILS RELATIVE PERCENT: 0 %
GFR AFRICAN AMERICAN: >60
GFR NON-AFRICAN AMERICAN: >60
GLUCOSE BLD-MCNC: 154 MG/DL (ref 70–99)
HCT VFR BLD CALC: 42.2 % (ref 36–48)
HEMOGLOBIN: 14.3 G/DL (ref 12–16)
LV EF: 63 %
LVEF MODALITY: NORMAL
LYMPHOCYTES ABSOLUTE: 0.9 K/UL (ref 1–5.1)
LYMPHOCYTES RELATIVE PERCENT: 6.3 %
MAGNESIUM: 2 MG/DL (ref 1.8–2.4)
MCH RBC QN AUTO: 28.5 PG (ref 26–34)
MCHC RBC AUTO-ENTMCNC: 33.8 G/DL (ref 31–36)
MCV RBC AUTO: 84.3 FL (ref 80–100)
MONOCYTES ABSOLUTE: 0.1 K/UL (ref 0–1.3)
MONOCYTES RELATIVE PERCENT: 0.8 %
NEUTROPHILS ABSOLUTE: 13.2 K/UL (ref 1.7–7.7)
NEUTROPHILS RELATIVE PERCENT: 92.6 %
PDW BLD-RTO: 13.7 % (ref 12.4–15.4)
PLATELET # BLD: 296 K/UL (ref 135–450)
PMV BLD AUTO: 8.1 FL (ref 5–10.5)
POTASSIUM REFLEX MAGNESIUM: 3.5 MMOL/L (ref 3.5–5.1)
RBC # BLD: 5.01 M/UL (ref 4–5.2)
SODIUM BLD-SCNC: 139 MMOL/L (ref 136–145)
WBC # BLD: 14.2 K/UL (ref 4–11)

## 2022-09-14 PROCEDURE — 85025 COMPLETE CBC W/AUTO DIFF WBC: CPT

## 2022-09-14 PROCEDURE — 2580000003 HC RX 258: Performed by: INTERNAL MEDICINE

## 2022-09-14 PROCEDURE — 99233 SBSQ HOSP IP/OBS HIGH 50: CPT | Performed by: INTERNAL MEDICINE

## 2022-09-14 PROCEDURE — 6370000000 HC RX 637 (ALT 250 FOR IP): Performed by: INTERNAL MEDICINE

## 2022-09-14 PROCEDURE — 6360000002 HC RX W HCPCS: Performed by: INTERNAL MEDICINE

## 2022-09-14 PROCEDURE — 2500000003 HC RX 250 WO HCPCS: Performed by: INTERNAL MEDICINE

## 2022-09-14 PROCEDURE — 93306 TTE W/DOPPLER COMPLETE: CPT

## 2022-09-14 PROCEDURE — 93010 ELECTROCARDIOGRAM REPORT: CPT | Performed by: INTERNAL MEDICINE

## 2022-09-14 PROCEDURE — 83735 ASSAY OF MAGNESIUM: CPT

## 2022-09-14 PROCEDURE — 2000000000 HC ICU R&B

## 2022-09-14 PROCEDURE — A4216 STERILE WATER/SALINE, 10 ML: HCPCS | Performed by: INTERNAL MEDICINE

## 2022-09-14 PROCEDURE — 80048 BASIC METABOLIC PNL TOTAL CA: CPT

## 2022-09-14 RX ORDER — OXYCODONE HYDROCHLORIDE AND ACETAMINOPHEN 5; 325 MG/1; MG/1
1 TABLET ORAL EVERY 4 HOURS PRN
Status: DISCONTINUED | OUTPATIENT
Start: 2022-09-14 | End: 2022-09-18 | Stop reason: HOSPADM

## 2022-09-14 RX ORDER — HYDROMORPHONE HYDROCHLORIDE 1 MG/ML
1 INJECTION, SOLUTION INTRAMUSCULAR; INTRAVENOUS; SUBCUTANEOUS EVERY 4 HOURS PRN
Status: DISCONTINUED | OUTPATIENT
Start: 2022-09-14 | End: 2022-09-18 | Stop reason: HOSPADM

## 2022-09-14 RX ORDER — POLYETHYLENE GLYCOL 3350 17 G/17G
17 POWDER, FOR SOLUTION ORAL DAILY
Status: DISCONTINUED | OUTPATIENT
Start: 2022-09-14 | End: 2022-09-18 | Stop reason: HOSPADM

## 2022-09-14 RX ORDER — DIAZEPAM 2 MG/1
2 TABLET ORAL ONCE
Status: COMPLETED | OUTPATIENT
Start: 2022-09-14 | End: 2022-09-14

## 2022-09-14 RX ADMIN — SODIUM CHLORIDE, PRESERVATIVE FREE 10 ML: 5 INJECTION INTRAVENOUS at 04:31

## 2022-09-14 RX ADMIN — OXYCODONE AND ACETAMINOPHEN 1 TABLET: 5; 325 TABLET ORAL at 14:01

## 2022-09-14 RX ADMIN — DILTIAZEM HYDROCHLORIDE 120 MG: 120 CAPSULE, COATED, EXTENDED RELEASE ORAL at 20:03

## 2022-09-14 RX ADMIN — OXYCODONE AND ACETAMINOPHEN 1 TABLET: 5; 325 TABLET ORAL at 08:56

## 2022-09-14 RX ADMIN — ASPIRIN 81 MG 81 MG: 81 TABLET ORAL at 08:54

## 2022-09-14 RX ADMIN — TICAGRELOR 90 MG: 90 TABLET ORAL at 08:53

## 2022-09-14 RX ADMIN — MORPHINE SULFATE 2 MG: 2 INJECTION, SOLUTION INTRAMUSCULAR; INTRAVENOUS at 04:31

## 2022-09-14 RX ADMIN — CETIRIZINE HYDROCHLORIDE 5 MG: 10 TABLET, FILM COATED ORAL at 08:53

## 2022-09-14 RX ADMIN — DILTIAZEM HYDROCHLORIDE 120 MG: 120 CAPSULE, COATED, EXTENDED RELEASE ORAL at 08:54

## 2022-09-14 RX ADMIN — FAMOTIDINE 20 MG: 10 INJECTION, SOLUTION INTRAVENOUS at 08:54

## 2022-09-14 RX ADMIN — TICAGRELOR 90 MG: 90 TABLET ORAL at 20:03

## 2022-09-14 RX ADMIN — DIAZEPAM 2 MG: 2 TABLET ORAL at 17:38

## 2022-09-14 RX ADMIN — Medication 10 ML: at 09:02

## 2022-09-14 RX ADMIN — POLYETHYLENE GLYCOL 3350 17 G: 17 POWDER, FOR SOLUTION ORAL at 08:54

## 2022-09-14 RX ADMIN — FAMOTIDINE 20 MG: 10 INJECTION, SOLUTION INTRAVENOUS at 20:03

## 2022-09-14 RX ADMIN — VALSARTAN 80 MG: 80 TABLET, FILM COATED ORAL at 08:53

## 2022-09-14 RX ADMIN — OXYCODONE AND ACETAMINOPHEN 1 TABLET: 5; 325 TABLET ORAL at 20:23

## 2022-09-14 ASSESSMENT — PAIN SCALES - GENERAL
PAINLEVEL_OUTOF10: 3
PAINLEVEL_OUTOF10: 4
PAINLEVEL_OUTOF10: 7
PAINLEVEL_OUTOF10: 2
PAINLEVEL_OUTOF10: 7
PAINLEVEL_OUTOF10: 8
PAINLEVEL_OUTOF10: 7
PAINLEVEL_OUTOF10: 5

## 2022-09-14 ASSESSMENT — PAIN DESCRIPTION - DESCRIPTORS
DESCRIPTORS: DULL;ACHING
DESCRIPTORS: PRESSURE;ACHING
DESCRIPTORS: ACHING
DESCRIPTORS: ACHING

## 2022-09-14 ASSESSMENT — PAIN DESCRIPTION - ORIENTATION
ORIENTATION: RIGHT
ORIENTATION: MID
ORIENTATION: RIGHT

## 2022-09-14 ASSESSMENT — PAIN DESCRIPTION - LOCATION
LOCATION: LEG
LOCATION: CHEST

## 2022-09-14 NOTE — ACP (ADVANCE CARE PLANNING)
Advanced Care Planning Note. Purpose of Encounter: Advanced care planning in light of CAD  Parties In Attendance: Patient  Decisional Capacity: Yes  Subjective: Patient is anxious  Objective: Cr 0.9  Goals of Care Determination: Patient wants full support (CPR, vent, surgery, HD, trach, PEG)  Plan:  C. Cardio consult. IV Solumedrol, IV Pepcid, IV Benadryl  Code Status: Full code   Time spent on Advanced care Plannin minutes  Advanced Care Planning Documents: Completed advanced directives on chart,  is the POA.     Jo-Ann Fong MD  2022 1:52 PM

## 2022-09-14 NOTE — PROGRESS NOTES
Following message sent to hospitalist on call; \"This patinet is here followinig a left heart cath yesterday, this evening she was informed that she will need another procedure in three weeks and this is causing her a great deal of distress. She states that in the past she has taken Valium and it has helped. Can we get this for her here? \"   1730-See new orders

## 2022-09-14 NOTE — PROGRESS NOTES
Hospitalist Progress Note      PCP: Desmond Ledesma MD    Date of Admission: 9/13/2022    Chief Complaint: Chest pain    Hospital Course:  74 y.o. female with history of CAD s/p stents, pAfib/atrial flutter s/p ablation not on anticoagulation, HTN, hyperlipidemia, many listed allergies came to ER with acute onset of chest pain radiating to both arms and jaw. Describes heavy pressure on chest associated with SOB. SBP in 220s in ED. Admitted as inpatient to CVU for NSTEMI and allergic reaction. Underwent LHC on 9/13:    LM-30% distal  LAD-widely patent mid stent  D1-widely patent proximal stent, 90% distal to the stent which is very small caliber  Cx-80% mid diffuse, 100% distal  OM1-normal  RCA-70% proximal, widely patent mid stent, 50% just distal to the stent  RPDA-patent with luminal regularities  LVEF-60%  PCI: Circumflex 100% to 0% distal with a 2.25 mm x 38 mm Xience Constance ALEC and then 80% to 0% mid with overlapping stents, including overlapping the distal stent, with distal to proximal 3.0 mm x 38 mm Xience Constance ALEC and 3.25 mm x 18 mm Xience [address] ALEC. Impression:  1. Occlusion of the distal circumflex along with severe stenosis of the mid circumflex ultimately revascularized with ALEC x3.  2.  Small vessel disease. 3.  Normal LV systolic function. 4.  Elevated LVEDP. Plan:  1. DAPT with aspirin and Brilinta. 2.  Trial of Zetia as it is intolerant of statins. Farida Janus has been ordered but not started yet. 3.  Intolerant of beta-blockers. Consider trial of bisoprolol if patient will agree. 4.  Add valsartan for better blood pressure control started 80 mg daily. Titrate as hemodynamics allow. 5.  Consider return for FFR of proximal RCA versus nuclear stress testing. Her primary cardiologist Dr. Curt Vann to decide. Started on IV Solumedrol, IV Pepcid and IV Benadryl for 72 hrs for allergic reaction that lead to eye swelling. Subjective: Patient denies CP and SOB.   Did not sleep due to steroids. No HA, abdominal pain or fevers. Swelling of eyes improved. Medications:  Reviewed    Infusion Medications    sodium chloride      sodium chloride Stopped (09/14/22 0316)    sodium chloride       Scheduled Medications    polyethylene glycol  17 g Oral Daily    sodium chloride flush  5-40 mL IntraVENous 2 times per day    cetirizine  5 mg Oral Daily    dilTIAZem  120 mg Oral BID    aspirin  81 mg Oral Daily    famotidine (PEPCID) injection  20 mg IntraVENous BID    sodium chloride flush  5-40 mL IntraVENous 2 times per day    ticagrelor  90 mg Oral BID    valsartan  80 mg Oral Daily     PRN Meds: HYDROmorphone, oxyCODONE-acetaminophen, nitroGLYCERIN, sodium chloride flush, sodium chloride, ondansetron **OR** ondansetron, polyethylene glycol, [DISCONTINUED] acetaminophen **OR** acetaminophen, perflutren lipid microspheres, sodium chloride flush, sodium chloride, acetaminophen, labetalol      Intake/Output Summary (Last 24 hours) at 9/14/2022 1347  Last data filed at 9/14/2022 0900  Gross per 24 hour   Intake 1162.05 ml   Output --   Net 1162.05 ml       Physical Exam Performed:    BP (!) 143/83   Pulse 85   Temp 98 °F (36.7 °C) (Temporal)   Resp 16   Ht 5' 4\" (1.626 m)   Wt 196 lb 3.4 oz (89 kg)   SpO2 96%   BMI 33.68 kg/m²     General appearance:  Appears comfortable. Well nourished  Eyes: Sclera clear, pupils equal  ENT: Moist mucus membranes, no thrush. Trachea midline. Cardiovascular: Regular rhythm, normal S1, S2. No murmur, gallop, rub. No edema in lower extremities  Respiratory: Clear to auscultation bilaterally, no wheeze, good inspiratory effort  Gastrointestinal: Abdomen soft, obese, non-tender, not distended, normal bowel sounds  Musculoskeletal: No cyanosis in digits, neck supple  Neurology: Cranial nerves grossly intact. Alert and oriented in time, place and person. No speech or motor deficits  Psychiatry: Anxious affect.  Not agitated  Skin: Warm, dry, normal turgor, no rash  Brisk capillary refill, peripheral pulses palpable       Labs:   Recent Labs     09/13/22  1105 09/14/22  0455   WBC 9.2 14.2*   HGB 15.2 14.3   HCT 46.0 42.2    296     Recent Labs     09/13/22  1105 09/14/22  0455    139   K 3.8 3.5    104   CO2 23 20*   BUN 9 9   CREATININE 0.8 0.9   CALCIUM 10.0 9.4     Recent Labs     09/13/22  1105   AST 24   ALT 12   BILITOT 0.5   ALKPHOS 172*     No results for input(s): INR in the last 72 hours. Recent Labs     09/13/22  1105   TROPONINI 0.02*       Urinalysis:      Lab Results   Component Value Date/Time    BLOODU Trace 03/05/2021 03:56 PM    SPECGRAV 1.005 03/05/2021 03:56 PM    GLUCOSEU Negative 03/05/2021 03:56 PM       Radiology:  XR CHEST PORTABLE   Final Result   No acute cardiopulmonary process. Assessment/Plan:    Active Hospital Problems    Diagnosis     PAF (paroxysmal atrial fibrillation) ([facility]) [I48.0]      Priority: High    Allergic reaction [T78.40XA]      Priority: Medium    Coronary atherosclerosis of native coronary artery [I25.10]      Priority: Medium    Obesity [E66.9]     Typical atrial flutter (HCC) [I48.3]     Bilateral carotid artery stenosis [I65.23]     NSTEMI (non-ST elevated myocardial infarction) ([facility] [address] [I21.4]     Essential hypertension [I10]     Depression [F32. A]     CAD (coronary artery disease) [I25.10]     Pure hypercholesterolemia [E78.00]      Transfer to floor  Continue ASA 81 mg daily  No statin due to listed allergies  Cardio consult for CP appreciated  Continue Brilinta and Diovan  Continue Solumedrol 40 mg IV q6h  Continue Benadryl 50 mg IV q6h  Continue Pepcid 20 mg IV q12h  Need to finish 72 hrs of IV steroids, H1/H2  Add Valium PRN    DVT Prophylaxis: SCD  Diet: ADULT DIET; Regular; Low Fat/Low Chol/High Fiber/2 gm Na  Code Status: Full Code  PT/OT Eval Status: not needed    Dispo - Home    Discussed with patient, nursing and CM. Transfer to floor.   Home on Friday after 72 hrs of IV

## 2022-09-14 NOTE — PROGRESS NOTES
[address]   Progress Note  Cardiology      Della James   Admission date:  9/13/2022  CC-f/up post MI  Subjective:  She feels much better    Objective:  Medications/Labs all Reviewed     polyethylene glycol  17 g Oral Daily    sodium chloride flush  5-40 mL IntraVENous 2 times per day    cetirizine  5 mg Oral Daily    dilTIAZem  120 mg Oral BID    aspirin  81 mg Oral Daily    famotidine (PEPCID) injection  20 mg IntraVENous BID    sodium chloride flush  5-40 mL IntraVENous 2 times per day    ticagrelor  90 mg Oral BID    valsartan  80 mg Oral Daily       BMP:   Lab Results   Component Value Date/Time     09/14/2022 04:55 AM    K 3.5 09/14/2022 04:55 AM     09/14/2022 04:55 AM    CO2 20 09/14/2022 04:55 AM    BUN 9 09/14/2022 04:55 AM    CREATININE 0.9 09/14/2022 04:55 AM    MG 2.00 09/14/2022 04:55 AM     CBC:    Lab Results   Component Value Date/Time    WBC 14.2 09/14/2022 04:55 AM    RBC 5.01 09/14/2022 04:55 AM    HGB 14.3 09/14/2022 04:55 AM    HCT 42.2 09/14/2022 04:55 AM    MCV 84.3 09/14/2022 04:55 AM    RDW 13.7 09/14/2022 04:55 AM     09/14/2022 04:55 AM      PT/INR:    Lab Results   Component Value Date    INR 1.29 (H) 02/25/2021    PROTIME 15.0 (H) 02/25/2021     Cardiac Enzymes:    Lab Results   Component Value Date/Time    CKTOTAL 46 06/13/2018 01:04 PM    CKMB 3.1 01/18/2017 07:46 PM     Lab Results   Component Value Date    TROPONINI 0.02 (H) 09/13/2022    TROPONINI <0.01 11/02/2021    TROPONINI <0.01 02/17/2021     BNP:    Lab Results   Component Value Date/Time    BNP 94.0 11/19/2017 10:03 AM     FASTING LIPID PANEL:    Lab Results   Component Value Date/Time    CHOL 244 06/29/2022 11:59 AM    HDL 47 06/29/2022 11:59 AM    TRIG 277 06/29/2022 11:59 AM       Physical Examination:    /78   Pulse 89   Temp 98.2 °F (36.8 °C) (Temporal)   Resp 18   Ht 5' 4\" (1.626 m)   Wt 196 lb 3.4 oz (89 kg)   SpO2 97%   BMI 33.68 kg/m²      Respiratory:  Resp

## 2022-09-14 NOTE — CARE COORDINATION
CM reviewed chart for d/c planning. Pt from home, has PCP. Had cardiac cath yesterday. CM will monitor for any d/c needs.       Bala Malone RN, BSN  [phone]

## 2022-09-14 NOTE — PROGRESS NOTES
Bed rest up. Patient ambulated to BR. Tolerated well. BP 160s. Night time meds administered. Reporting CT but rated 6/10 and pain has improved after administration of morphine. Patient given snacks. Groin site WNL. Will continue to monitor.

## 2022-09-14 NOTE — PLAN OF CARE
Problem: Pain  Goal: Verbalizes/displays adequate comfort level or baseline comfort level  9/14/2022 1041 by Yobany Kimbrough RN  Outcome: Progressing  9/14/2022 0028 by Charis Little RN  Outcome: Progressing   Patient able to report pain, 0-10 scale used, pharmacologic and non pharmacologic  Interventions in use and discussed with patient. Problem: Discharge Planning  Goal: Discharge to home or other facility with appropriate resources  9/14/2022 1041 by Yobany Kimbrough RN  Outcome: Progressing  Flowsheets (Taken 9/14/2022 0231)  Discharge to home or other facility with appropriate resources:   Identify barriers to discharge with patient and caregiver   Arrange for needed discharge resources and transportation as appropriate   Identify discharge learning needs (meds, wound care, etc)   Arrange for interpreters to assist at discharge as needed   Refer to discharge planning if patient needs post-hospital services based on physician order or complex needs related to functional status, cognitive ability or social support system  9/14/2022 0028 by Charis Little RN  Outcome: Progressing   Plan to stay one more night for observation following allergic reaction yesterday.      Problem: Safety - Adult  Goal: Free from fall injury  Outcome: Progressing   Fall precautions in place, hourly rounding, call light and belongings in reach, bed in lowest position, wheels locked in place, side rails up x 2, walkways free of clutter

## 2022-09-15 ENCOUNTER — APPOINTMENT (OUTPATIENT)
Dept: GENERAL RADIOLOGY | Age: 67
DRG: 247 | End: 2022-09-15
Payer: MEDICARE

## 2022-09-15 DIAGNOSIS — I25.10 ATHEROSCLEROSIS OF NATIVE CORONARY ARTERY OF NATIVE HEART, UNSPECIFIED WHETHER ANGINA PRESENT: Primary | ICD-10-CM

## 2022-09-15 LAB
ANION GAP SERPL CALCULATED.3IONS-SCNC: 9 MMOL/L (ref 3–16)
BASOPHILS ABSOLUTE: 0 K/UL (ref 0–0.2)
BASOPHILS RELATIVE PERCENT: 0.1 %
BUN BLDV-MCNC: 14 MG/DL (ref 7–20)
CALCIUM SERPL-MCNC: 9.3 MG/DL (ref 8.3–10.6)
CHLORIDE BLD-SCNC: 102 MMOL/L (ref 99–110)
CO2: 26 MMOL/L (ref 21–32)
CREAT SERPL-MCNC: 0.7 MG/DL (ref 0.6–1.2)
EKG ATRIAL RATE: 76 BPM
EKG DIAGNOSIS: NORMAL
EKG P AXIS: -23 DEGREES
EKG P-R INTERVAL: 154 MS
EKG Q-T INTERVAL: 388 MS
EKG QRS DURATION: 80 MS
EKG QTC CALCULATION (BAZETT): 436 MS
EKG R AXIS: -7 DEGREES
EKG T AXIS: 84 DEGREES
EKG VENTRICULAR RATE: 76 BPM
EOSINOPHILS ABSOLUTE: 0 K/UL (ref 0–0.6)
EOSINOPHILS RELATIVE PERCENT: 0 %
GFR AFRICAN AMERICAN: >60
GFR NON-AFRICAN AMERICAN: >60
GLUCOSE BLD-MCNC: 109 MG/DL (ref 70–99)
HCT VFR BLD CALC: 36.8 % (ref 36–48)
HEMOGLOBIN: 12.3 G/DL (ref 12–16)
LYMPHOCYTES ABSOLUTE: 1.7 K/UL (ref 1–5.1)
LYMPHOCYTES RELATIVE PERCENT: 9.4 %
MCH RBC QN AUTO: 28.3 PG (ref 26–34)
MCHC RBC AUTO-ENTMCNC: 33.5 G/DL (ref 31–36)
MCV RBC AUTO: 84.4 FL (ref 80–100)
MONOCYTES ABSOLUTE: 1.1 K/UL (ref 0–1.3)
MONOCYTES RELATIVE PERCENT: 6.3 %
NEUTROPHILS ABSOLUTE: 15 K/UL (ref 1.7–7.7)
NEUTROPHILS RELATIVE PERCENT: 84.2 %
PDW BLD-RTO: 13.9 % (ref 12.4–15.4)
PLATELET # BLD: 269 K/UL (ref 135–450)
PMV BLD AUTO: 8.3 FL (ref 5–10.5)
POC ACT LR: >400 SEC
POTASSIUM REFLEX MAGNESIUM: 4.3 MMOL/L (ref 3.5–5.1)
RBC # BLD: 4.36 M/UL (ref 4–5.2)
SODIUM BLD-SCNC: 137 MMOL/L (ref 136–145)
TROPONIN: 1.09 NG/ML
WBC # BLD: 17.8 K/UL (ref 4–11)

## 2022-09-15 PROCEDURE — 2580000003 HC RX 258: Performed by: INTERNAL MEDICINE

## 2022-09-15 PROCEDURE — 6370000000 HC RX 637 (ALT 250 FOR IP): Performed by: INTERNAL MEDICINE

## 2022-09-15 PROCEDURE — 93010 ELECTROCARDIOGRAM REPORT: CPT | Performed by: INTERNAL MEDICINE

## 2022-09-15 PROCEDURE — 85025 COMPLETE CBC W/AUTO DIFF WBC: CPT

## 2022-09-15 PROCEDURE — 6360000002 HC RX W HCPCS: Performed by: INTERNAL MEDICINE

## 2022-09-15 PROCEDURE — 2500000003 HC RX 250 WO HCPCS: Performed by: INTERNAL MEDICINE

## 2022-09-15 PROCEDURE — A4216 STERILE WATER/SALINE, 10 ML: HCPCS | Performed by: INTERNAL MEDICINE

## 2022-09-15 PROCEDURE — 2000000000 HC ICU R&B

## 2022-09-15 PROCEDURE — 99233 SBSQ HOSP IP/OBS HIGH 50: CPT | Performed by: NURSE PRACTITIONER

## 2022-09-15 PROCEDURE — 93005 ELECTROCARDIOGRAM TRACING: CPT | Performed by: NURSE PRACTITIONER

## 2022-09-15 PROCEDURE — 74018 RADEX ABDOMEN 1 VIEW: CPT

## 2022-09-15 PROCEDURE — 80048 BASIC METABOLIC PNL TOTAL CA: CPT

## 2022-09-15 PROCEDURE — 84484 ASSAY OF TROPONIN QUANT: CPT

## 2022-09-15 RX ORDER — MAGNESIUM HYDROXIDE/ALUMINUM HYDROXICE/SIMETHICONE 120; 1200; 1200 MG/30ML; MG/30ML; MG/30ML
30 SUSPENSION ORAL EVERY 6 HOURS PRN
Status: DISCONTINUED | OUTPATIENT
Start: 2022-09-15 | End: 2022-09-18 | Stop reason: HOSPADM

## 2022-09-15 RX ORDER — DIAZEPAM 5 MG/1
5 TABLET ORAL EVERY 4 HOURS PRN
Status: DISCONTINUED | OUTPATIENT
Start: 2022-09-15 | End: 2022-09-16 | Stop reason: DRUGHIGH

## 2022-09-15 RX ORDER — METHYLPREDNISOLONE SODIUM SUCCINATE 40 MG/ML
40 INJECTION, POWDER, LYOPHILIZED, FOR SOLUTION INTRAMUSCULAR; INTRAVENOUS EVERY 6 HOURS
Status: DISCONTINUED | OUTPATIENT
Start: 2022-09-15 | End: 2022-09-17

## 2022-09-15 RX ADMIN — HYDROMORPHONE HYDROCHLORIDE 1 MG: 1 INJECTION, SOLUTION INTRAMUSCULAR; INTRAVENOUS; SUBCUTANEOUS at 08:02

## 2022-09-15 RX ADMIN — Medication 5 ML: at 08:17

## 2022-09-15 RX ADMIN — CETIRIZINE HYDROCHLORIDE 5 MG: 10 TABLET, FILM COATED ORAL at 08:08

## 2022-09-15 RX ADMIN — OXYCODONE AND ACETAMINOPHEN 1 TABLET: 5; 325 TABLET ORAL at 06:48

## 2022-09-15 RX ADMIN — OXYCODONE AND ACETAMINOPHEN 1 TABLET: 5; 325 TABLET ORAL at 11:03

## 2022-09-15 RX ADMIN — METHYLPREDNISOLONE SODIUM SUCCINATE 40 MG: 40 INJECTION, POWDER, FOR SOLUTION INTRAMUSCULAR; INTRAVENOUS at 16:00

## 2022-09-15 RX ADMIN — Medication 10 ML: at 08:09

## 2022-09-15 RX ADMIN — DILTIAZEM HYDROCHLORIDE 120 MG: 120 CAPSULE, COATED, EXTENDED RELEASE ORAL at 20:37

## 2022-09-15 RX ADMIN — TICAGRELOR 90 MG: 90 TABLET ORAL at 20:38

## 2022-09-15 RX ADMIN — FAMOTIDINE 20 MG: 10 INJECTION, SOLUTION INTRAVENOUS at 08:07

## 2022-09-15 RX ADMIN — ASPIRIN 81 MG 81 MG: 81 TABLET ORAL at 08:09

## 2022-09-15 RX ADMIN — DILTIAZEM HYDROCHLORIDE 120 MG: 120 CAPSULE, COATED, EXTENDED RELEASE ORAL at 08:08

## 2022-09-15 RX ADMIN — Medication 10 ML: at 20:38

## 2022-09-15 RX ADMIN — OXYCODONE AND ACETAMINOPHEN 1 TABLET: 5; 325 TABLET ORAL at 20:49

## 2022-09-15 RX ADMIN — HYDROMORPHONE HYDROCHLORIDE 1 MG: 1 INJECTION, SOLUTION INTRAMUSCULAR; INTRAVENOUS; SUBCUTANEOUS at 12:59

## 2022-09-15 RX ADMIN — DIAZEPAM 5 MG: 5 TABLET ORAL at 17:06

## 2022-09-15 RX ADMIN — TICAGRELOR 90 MG: 90 TABLET ORAL at 08:10

## 2022-09-15 RX ADMIN — METHYLPREDNISOLONE SODIUM SUCCINATE 40 MG: 40 INJECTION, POWDER, FOR SOLUTION INTRAMUSCULAR; INTRAVENOUS at 20:37

## 2022-09-15 RX ADMIN — METHYLPREDNISOLONE SODIUM SUCCINATE 40 MG: 40 INJECTION, POWDER, FOR SOLUTION INTRAMUSCULAR; INTRAVENOUS at 08:14

## 2022-09-15 RX ADMIN — FAMOTIDINE 20 MG: 10 INJECTION, SOLUTION INTRAVENOUS at 20:37

## 2022-09-15 RX ADMIN — POLYETHYLENE GLYCOL 3350 17 G: 17 POWDER, FOR SOLUTION ORAL at 08:07

## 2022-09-15 RX ADMIN — OXYCODONE AND ACETAMINOPHEN 1 TABLET: 5; 325 TABLET ORAL at 17:10

## 2022-09-15 RX ADMIN — VALSARTAN 80 MG: 80 TABLET, FILM COATED ORAL at 08:08

## 2022-09-15 RX ADMIN — OXYCODONE AND ACETAMINOPHEN 1 TABLET: 5; 325 TABLET ORAL at 01:15

## 2022-09-15 ASSESSMENT — PAIN SCALES - GENERAL
PAINLEVEL_OUTOF10: 9
PAINLEVEL_OUTOF10: 7
PAINLEVEL_OUTOF10: 10
PAINLEVEL_OUTOF10: 6
PAINLEVEL_OUTOF10: 8
PAINLEVEL_OUTOF10: 10
PAINLEVEL_OUTOF10: 7
PAINLEVEL_OUTOF10: 0
PAINLEVEL_OUTOF10: 8
PAINLEVEL_OUTOF10: 8

## 2022-09-15 ASSESSMENT — PAIN DESCRIPTION - DESCRIPTORS
DESCRIPTORS: SHARP
DESCRIPTORS: ACHING
DESCRIPTORS: ACHING
DESCRIPTORS: DULL
DESCRIPTORS: ACHING
DESCRIPTORS: ACHING

## 2022-09-15 ASSESSMENT — PAIN DESCRIPTION - LOCATION
LOCATION: CHEST
LOCATION: LEG
LOCATION: CHEST
LOCATION: NECK

## 2022-09-15 ASSESSMENT — PAIN DESCRIPTION - ORIENTATION
ORIENTATION: MID
ORIENTATION: MID
ORIENTATION: ANTERIOR;MID
ORIENTATION: RIGHT

## 2022-09-15 NOTE — PROGRESS NOTES
Hospitalist Progress Note      PCP: Yadiel Sanchez MD    Date of Admission: 9/13/2022    Chief Complaint: Chest pain    Hospital Course:  74 y.o. female with history of CAD s/p stents, pAfib/atrial flutter s/p ablation not on anticoagulation, HTN, hyperlipidemia, many listed allergies came to ER with acute onset of chest pain radiating to both arms and jaw. Describes heavy pressure on chest associated with SOB. SBP in 220s in ED. Admitted as inpatient to CVU for NSTEMI and allergic reaction. Underwent LHC on 9/13:    LM-30% distal  LAD-widely patent mid stent  D1-widely patent proximal stent, 90% distal to the stent which is very small caliber  Cx-80% mid diffuse, 100% distal  OM1-normal  RCA-70% proximal, widely patent mid stent, 50% just distal to the stent  RPDA-patent with luminal regularities  LVEF-60%  PCI: Circumflex 100% to 0% distal with a 2.25 mm x 38 mm Xience Constance ALEC and then 80% to 0% mid with overlapping stents, including overlapping the distal stent, with distal to proximal 3.0 mm x 38 mm Xience Constance ALEC and 3.25 mm x 18 mm Xience [address] ALEC. Impression:  1. Occlusion of the distal circumflex along with severe stenosis of the mid circumflex ultimately revascularized with ALEC x3.  2.  Small vessel disease. 3.  Normal LV systolic function. 4.  Elevated LVEDP. Plan:  1. DAPT with aspirin and Brilinta. 2.  Trial of Zetia as it is intolerant of statins. Ether Aspen has been ordered but not started yet. 3.  Intolerant of beta-blockers. Consider trial of bisoprolol if patient will agree. 4.  Add valsartan for better blood pressure control started 80 mg daily. Titrate as hemodynamics allow. 5.  Consider return for FFR of proximal RCA versus nuclear stress testing. Her primary cardiologist Dr. Zana Hurley to decide. Started on IV Solumedrol, IV Pepcid and IV Benadryl for 72 hrs for allergic reaction that lead to eye swelling. Subjective: Patient with CP and hiccups today.   No HA, abdominal pain or fevers. Swelling of eyes resolved. Medications:  Reviewed    Infusion Medications    sodium chloride      sodium chloride Stopped (09/14/22 0316)    sodium chloride       Scheduled Medications    methylPREDNISolone  40 mg IntraVENous Q6H    polyethylene glycol  17 g Oral Daily    sodium chloride flush  5-40 mL IntraVENous 2 times per day    cetirizine  5 mg Oral Daily    dilTIAZem  120 mg Oral BID    aspirin  81 mg Oral Daily    famotidine (PEPCID) injection  20 mg IntraVENous BID    sodium chloride flush  5-40 mL IntraVENous 2 times per day    ticagrelor  90 mg Oral BID    valsartan  80 mg Oral Daily     PRN Meds: aluminum & magnesium hydroxide-simethicone, diazePAM, HYDROmorphone, oxyCODONE-acetaminophen, nitroGLYCERIN, sodium chloride flush, sodium chloride, ondansetron **OR** ondansetron, polyethylene glycol, [DISCONTINUED] acetaminophen **OR** acetaminophen, perflutren lipid microspheres, sodium chloride flush, sodium chloride, acetaminophen, labetalol      Intake/Output Summary (Last 24 hours) at 9/15/2022 1837  Last data filed at 9/15/2022 0403  Gross per 24 hour   Intake 480 ml   Output --   Net 480 ml         Physical Exam Performed:    /81   Pulse 83   Temp 98.1 °F (36.7 °C) (Temporal)   Resp 18   Ht 5' 4\" (1.626 m)   Wt 197 lb 5 oz (89.5 kg)   SpO2 94%   BMI 33.87 kg/m²     General appearance:  Appears comfortable. Well nourished  Eyes: Sclera clear, pupils equal  ENT: Moist mucus membranes, no thrush. Trachea midline. Cardiovascular: Regular rhythm, normal S1, S2. No murmur, gallop, rub. No edema in lower extremities  Respiratory: Clear to auscultation bilaterally, no wheeze, good inspiratory effort  Gastrointestinal: Abdomen soft, obese, non-tender, not distended, normal bowel sounds  Musculoskeletal: No cyanosis in digits, neck supple  Neurology: Cranial nerves grossly intact. Alert and oriented in time, place and person.  No speech or motor hrs of IV steroids, H1/H2  Continue Valium PRN  Maalox PRN  Outpatient staged PCI for stable angina  NTG PRN    DVT Prophylaxis: SCD  Diet: ADULT DIET; Regular; Low Fat/Low Chol/High Fiber/2 gm Na  Code Status: Full Code  PT/OT Eval Status: not needed    Dispo - Home    Discussed with patient, nursing and CM. Home tomorrow evening when 72 hrs of IV Steroids/H1/H2 completed.     Magdy Jennings MD

## 2022-09-15 NOTE — PROGRESS NOTES
[address]   Cardiology Progress Note     Date: 9/15/2022  Admit Date: 9/13/2022     Reason for consultation:     Chief Complaint   Patient presents with    Tingling     Pt reports tingling in bilateral arms, reports chest pain and jaw tightness, cardiac HX (Sunna pt)        History of Present Illness: History obtained from patient and medical record. Lei Roger is a 74 y.o. female patient followed closely by Dr. Blessing Moreau known to have CAD since 2012, s/p numerous stents, s/p STEMI due to D1 occlusion in 2017, most recent cath  2/4/2021 at [facility] multivessel PCI to Distal LAD x 2 (2.5 x 15 and 2.5 x 18) and D1 x 1 (2.25 x 8),     Presents to our ED this am after waking up with severe chest pressure like \"an elephant sitting on me\" with radiation down her left arm. This is exactly how she presented with her STEMI. Initial EKG without ST elevation although new TWI in V1V2. She was markedly hypotensive to 598 systolic upon presentation, which improved to about 140-150 after morphine and nitro. She hasn't taken nitro at home because it gives her an ubearable headace. She is not currently on a statin due severe myalgias and was prescribed Leqvio, but there was a $4000 deductible that she couldn't afford. She had been placed on DAPT at [facility] but that was D.C'd by Dr. Blessing Moreau recently as it was more than a year since her last stents. She also has EP history including ablations for fib and flutter (per consult note)    Interval Hx: Today, she is having some of her \"typical\" angina that she always has but just more persistent that usual. She no longer feels like there is an elephant sitting on her chest. Tele stable. ECG today stable. Right groin procedure site c/d/I. No hematoma or bruising. Good pulses, color, warmth, and sensation to that extremity. Mild tenderness. Patient seen and examined. Clinical notes reviewed. Telemetry reviewed. No new complaints today. No major events overnight. Denies having chest pain, palpitations, shortness of breath, orthopnea/PND, cough, or dizziness at the time of this visit. Past Medical History:  Past Medical History:   Diagnosis Date    Allergic rhinitis, cause unspecified 12/04/2010    Anxiety     Arthritis     Bilateral carotid artery stenosis     < 50% bilaterally    CAD (coronary artery disease)     angioplasty with stent at [facility] Dr. Chen Smith, here Dr. Ashley Tobar at [facility],     Chronic kidney disease     frequency, urgency    Chronic pain     precordial, opiate dependent    Depression 01/22/2013    Erosive esophagitis 12/28/2016    Dr. Bill Bishop; PPI started; LA Class A, Sphincter of Oddi manometry and sphincterotomy if symptoms don't improve. Essential hypertension 04/11/2017    HSV infection     Hypercholesterolemia 06/06/2014    Irritable bowel syndrome with diarrhea 09/27/2016    Migraine headache 06/06/2014    Nonerosive nonspecific gastritis 12/28/2016    Dr. Bill Bishop; body and antrum    OAB (overactive bladder) 03/14/2014        Past Surgical History:    has a past surgical history that includes Appendectomy; Total abdominal hysterectomy w/ bilateral salpingoophorectomy; Cholecystectomy; Tubal ligation; Tonsillectomy; other surgical history; Coronary angioplasty with stent (10/2012); Percutaneous Transluminal Coronary Angio (10/2012); Cardiac catheterization (12/07/2013); Hysterectomy; polypectomy; Cystoscopy (04/2014); Upper gastrointestinal endoscopy; Wrist surgery (Left, 05/21/2015); Abdominal adhesion surgery; Colonoscopy; Endoscopy, colon, diagnostic; Cosmetic surgery; fracture surgery; ERCP (01/25/2017); sinus surgery; Hysterectomy, total abdominal; and Coronary angioplasty with stent (02/04/2021). Social History:  Reviewed. reports that she quit smoking about 15 years ago. Her smoking use included cigarettes. She has a 10.00 pack-year smoking history.  She has never used smokeless tobacco. She reports that she does not drink alcohol and does not use drugs. Allergies: Allergies   Allergen Reactions    Hydralazine Anaphylaxis    Shellfish-Derived Products Anaphylaxis and Swelling    Xarelto [Rivaroxaban] Swelling    Asa [Aspirin] Nausea Only    Crestor [Rosuvastatin]      Myalgia      Fenofibrate      Myalgia      Hctz [Hydrochlorothiazide]     Lipitor [Atorvastatin Calcium]     Lipitor [Atorvastatin]      Myalgia      Lisinopril     Metoprolol      Weight gain    Mometasone Furoate Swelling    Nasonex [Mometasone] Swelling    Nsaids      GI PAIN    Plavix [Clopidogrel] Hives, Itching and Swelling    Sulfa Antibiotics Swelling    Contrast [Iodides] Swelling and Rash    Flagyl [Metronidazole] Nausea And Vomiting       Family History:  Reviewed. family history includes Heart Disease in her brother, father, maternal uncle, mother, and another family member; High Blood Pressure in her sister and sister; Hypertension in her father and mother; Other in an other family member; Stroke in her sister and another family member. Denies family history of sudden cardiac death, arrhythmia, premature CAD    Home Meds:  Prior to Visit Medications    Medication Sig Taking?  Authorizing Provider   POTASSIUM GLUCONATE PO Take by mouth  Historical Provider, MD   Cholecalciferol (VITAMIN D3) 125 MCG (5000 UT) TABS Take by mouth daily   Historical Provider, MD   cetirizine (ZYRTEC) 5 MG tablet Take 5 mg by mouth daily  Historical Provider, MD   dilTIAZem (CARDIZEM CD) 120 MG extended release capsule TAKE 1 CAPSULE BY MOUTH TWICE DAILY  MD Laine López Aspart-Potassium Aspart (POTASSIUM & MAGNESIUM ASPARTAT PO) Take 1 tablet by mouth daily   Historical Provider, MD   Zinc-Vitamin C  MG LOZG Take 1 lozenge by mouth daily   Historical Provider, MD   L-Lysine 1000 MG TABS Take 1 tablet by mouth every morning (before breakfast)   Historical Provider, MD   cyanocobalamin 1000 MCG tablet Take 1,000 mcg by mouth daily  Historical Provider, MD   nitroGLYCERIN (NITROSTAT) 0.4 MG SL tablet Place 1 tablet under the tongue every 5 minutes as needed for Chest pain up to max of 3 total doses. If no relief after 1 dose, call 911. Patient not taking: No sig reported  Peña Huddleston MD   estradiol (ESTRACE) 0.1 MG/GM vaginal cream PLACE 0.5 GRAM VAGINALLY 3 TIMES A WEEK  Peña Huddleston MD        Scheduled Meds:   methylPREDNISolone  40 mg IntraVENous Q6H    polyethylene glycol  17 g Oral Daily    sodium chloride flush  5-40 mL IntraVENous 2 times per day    cetirizine  5 mg Oral Daily    dilTIAZem  120 mg Oral BID    aspirin  81 mg Oral Daily    famotidine (PEPCID) injection  20 mg IntraVENous BID    sodium chloride flush  5-40 mL IntraVENous 2 times per day    ticagrelor  90 mg Oral BID    valsartan  80 mg Oral Daily     Continuous Infusions:   sodium chloride      sodium chloride Stopped (09/14/22 0316)    sodium chloride       PRN Meds:HYDROmorphone, oxyCODONE-acetaminophen, nitroGLYCERIN, sodium chloride flush, sodium chloride, ondansetron **OR** ondansetron, polyethylene glycol, [DISCONTINUED] acetaminophen **OR** acetaminophen, perflutren lipid microspheres, sodium chloride flush, sodium chloride, acetaminophen, labetalol     Review of Systems:  Constitutional: Negative for fever, night sweats, chills,  Skin: Negative for rash, pruritus, bleeding, or bruising    HEENT: Negative for vision changes, ringing in the ears, dysphagia  Respiratory: Reviewed in HPI  Cardiovascular: Reviewed in HPI  Gastrointestinal: Negative for abdominal pain, N/V/D, constipation, or black/tarry stools  Genito-Urinary: Negative for dysuria, incontinence, or hematuria  Musculoskeletal: Negative for joint swelling, muscle pain, or injuries  Neurological/Psych: Negative for confusion, seizures, headaches, or TIA-like symptoms. No anxiety or depression.      Physical Examination:  Vitals:    09/15/22 0745   BP: (!) 150/90   Pulse:    Resp: 16   Temp: 97.5 °F (36.4 °C)   SpO2: 97%      In: 1560 [P.O.:1560]  Out: -    Wt Readings from Last 3 Encounters:   09/15/22 197 lb 5 oz (89.5 kg)   07/22/22 194 lb (88 kg)   07/19/22 198 lb 9.6 oz (90.1 kg)       Intake/Output Summary (Last 24 hours) at 9/15/2022 1056  Last data filed at 9/15/2022 0403  Gross per 24 hour   Intake 960 ml   Output --   Net 960 ml       Telemetry: Personally Reviewed  - Sinus rhythm   Constitutional: Cooperative and in no apparent distress, and appears well nourished  Skin: Warm and pink; no pallor, cyanosis, clubbing, or bruising   HEENT: Symmetric and normocephalic. PERRL. Conjunctiva pink with clear sclera. Mucus membranes moist.   Cardiovascular: Regular rate and rhythm. S1/S2 present without murmurs, no rubs or gallops. Peripheral pulses 2+, capillary refill < 3 seconds. No elevation of JVP. No peripheral edema  Respiratory: Respirations symmetric and unlabored. Lungs clear to auscultation bilaterally, no wheezing, crackles, or rhonchi  Gastrointestinal: Abdomen soft and round. Bowel sounds active without tenderness. Musculoskeletal: Bilateral upper and lower extremity strength 5/5 with full ROM  Neurologic/Psych: Awake and orientated to person, place and time. Calm affect, appropriate mood    Pertinent labs, diagnostic, device, and imaging results reviewed as a part of this visit    Labs:    BMP:   Recent Labs     09/13/22  1105 09/14/22  0455 09/15/22  0614    139 137   K 3.8 3.5 4.3    104 102   CO2 23 20* 26   BUN 9 9 14   CREATININE 0.8 0.9 0.7   MG  --  2.00  --      Estimated Creatinine Clearance: 87 mL/min (based on SCr of 0.7 mg/dL).    CBC:   Recent Labs     09/13/22  1105 09/14/22  0455 09/15/22  0614   WBC 9.2 14.2* 17.8*   HGB 15.2 14.3 12.3   HCT 46.0 42.2 36.8   MCV 85.4 84.3 84.4    296 269     Thyroid:   Lab Results   Component Value Date/Time    TSH 4.67 12/20/2021 08:08 AM     Lipids:   Lab Results   Component Value Date/Time    CHOL 244 06/29/2022 11:59 AM    HDL and 3.25 mm x 18 mm [address] ALEC. Impression:  1. Occlusion of the distal circumflex along with severe stenosis of the mid circumflex ultimately revascularized with ALEC x3.  2.  Small vessel disease. 3.  Normal LV systolic function. 4.  Elevated LVEDP. Plan:  1. DAPT with aspirin and Brilinta. 2.  Trial of Zetia as it is intolerant of statins. Melvena Hamming has been ordered but not started yet. 3.  Intolerant of beta-blockers. Consider trial of bisoprolol if patient will agree. 4.  Add valsartan for better blood pressure control started 80 mg daily. Titrate as hemodynamics allow. 5.  Consider return for FFR of proximal RCA versus nuclear stress testing. Her primary cardiologist Dr. Fabian Elizondo to decide. Problem List:   Patient Active Problem List    Diagnosis Date Noted    PAF (paroxysmal atrial fibrillation) ([facility] 02/18/2021    Allergic reaction 09/13/2022    Elevated lipoprotein A level 07/18/2022    Coronary atherosclerosis of native coronary artery 07/18/2022    Bruit of right carotid artery 02/18/2021    Obesity 04/29/2021    Typical atrial flutter (HCC)     Atypical atrial flutter (HCC)     Bilateral carotid artery stenosis     Mild episode of recurrent major depressive disorder ([facility] [address] [facility] 12/18/2020    [facility]non-ST elevated [facility] infarction) [facility] 11/19/2017    Essential hypertension 04/11/2017    Mood disorder due to a general medical condition     Irritable bowel syndrome with diarrhea 09/27/2016    Postmenopausal atrophic vaginitis 08/05/2016    Left wrist fracture     Hypercholesterolemia 06/06/2014    Migraine headache 06/06/2014    OAB (overactive bladder) 03/14/2014    Depression 01/22/2013    Chronic pain 01/17/2013    CAD (coronary artery disease)     HSV infection     Pure hypercholesterolemia 12/04/2010    Allergic rhinitis 12/04/2010        Assessment and Plan:     Coronary artery disease / NSTEMI   ~ hx of multiple PCIs, STEMI '14  ~ [facility] 9/13/22: s/p PCI of Cx with ALEC x3. Residual 70% RCA stenosis. Plan for FFR evaluation in 2-3 weeks. ~ continue DAPT with aspirin and Brilinta. Start Zetia as she is intolerant to statins. Will work on coverage with PCSK9 inhibitor. Consider trial of bisoprolol.   ~ Some stable angina today. ECG remains stable. Discussed with Dr. Gio Roche. Continue plan with FFR evaluation/ staged PCI in a couple weeks. Paroxysmal atrial fibrillation   Hyperlipidemia- uncontrolled   Hypertension- improved with addition of valsartan      Multiple medical conditions with risk of decompensation. All pertinent information and plan of care discussed with the physician. All questions and concerns were addressed to the patient. Alternatives to my treatment were discussed. I have discussed the above stated plan with patient and the nurse. The patient verbalized understanding and agreed with the plan. Thank you for allowing to us to participate in the care of Slade Castellanos. Total visit time > 35 minutes; > 50% spend counseling / coordinating care. I reviewed interval history, physical exam, review of data including labs, imaging, development and implementation of treatment plan and coordination of complex care. Counseled on risk factor modifications. Malcolm CLEMENS-CNP  [facility]   Office: [phone]    NOTE:  This report was transcribed using voice recognition software. Every effort was made to ensure accuracy; however, inadvertent computerized transcription errors may be present.

## 2022-09-16 LAB
ANION GAP SERPL CALCULATED.3IONS-SCNC: 13 MMOL/L (ref 3–16)
BASOPHILS ABSOLUTE: 0 K/UL (ref 0–0.2)
BASOPHILS RELATIVE PERCENT: 0.1 %
BUN BLDV-MCNC: 14 MG/DL (ref 7–20)
CALCIUM SERPL-MCNC: 10.1 MG/DL (ref 8.3–10.6)
CHLORIDE BLD-SCNC: 103 MMOL/L (ref 99–110)
CO2: 25 MMOL/L (ref 21–32)
CREAT SERPL-MCNC: 0.8 MG/DL (ref 0.6–1.2)
EOSINOPHILS ABSOLUTE: 0 K/UL (ref 0–0.6)
EOSINOPHILS RELATIVE PERCENT: 0 %
GFR AFRICAN AMERICAN: >60
GFR NON-AFRICAN AMERICAN: >60
GLUCOSE BLD-MCNC: 144 MG/DL (ref 70–99)
HCT VFR BLD CALC: 38.8 % (ref 36–48)
HEMOGLOBIN: 13.2 G/DL (ref 12–16)
LYMPHOCYTES ABSOLUTE: 0.8 K/UL (ref 1–5.1)
LYMPHOCYTES RELATIVE PERCENT: 4.4 %
MCH RBC QN AUTO: 29 PG (ref 26–34)
MCHC RBC AUTO-ENTMCNC: 34.1 G/DL (ref 31–36)
MCV RBC AUTO: 85.1 FL (ref 80–100)
MONOCYTES ABSOLUTE: 0.4 K/UL (ref 0–1.3)
MONOCYTES RELATIVE PERCENT: 2.2 %
NEUTROPHILS ABSOLUTE: 17.2 K/UL (ref 1.7–7.7)
NEUTROPHILS RELATIVE PERCENT: 93.3 %
PDW BLD-RTO: 13.9 % (ref 12.4–15.4)
PLATELET # BLD: 316 K/UL (ref 135–450)
PMV BLD AUTO: 8.1 FL (ref 5–10.5)
POC ACT LR: 220 SEC
POC ACT LR: 265 SEC
POTASSIUM REFLEX MAGNESIUM: 4.4 MMOL/L (ref 3.5–5.1)
RBC # BLD: 4.56 M/UL (ref 4–5.2)
REASON FOR REJECTION: NORMAL
REJECTED TEST: NORMAL
SODIUM BLD-SCNC: 141 MMOL/L (ref 136–145)
TROPONIN: 0.86 NG/ML
TROPONIN: 0.96 NG/ML
TROPONIN: 1.02 NG/ML
WBC # BLD: 18.4 K/UL (ref 4–11)

## 2022-09-16 PROCEDURE — 99233 SBSQ HOSP IP/OBS HIGH 50: CPT | Performed by: NURSE PRACTITIONER

## 2022-09-16 PROCEDURE — 2500000003 HC RX 250 WO HCPCS

## 2022-09-16 PROCEDURE — 93571 IV DOP VEL&/PRESS C FLO 1ST: CPT | Performed by: INTERNAL MEDICINE

## 2022-09-16 PROCEDURE — 6370000000 HC RX 637 (ALT 250 FOR IP): Performed by: INTERNAL MEDICINE

## 2022-09-16 PROCEDURE — 2580000003 HC RX 258: Performed by: INTERNAL MEDICINE

## 2022-09-16 PROCEDURE — 6360000004 HC RX CONTRAST MEDICATION: Performed by: INTERNAL MEDICINE

## 2022-09-16 PROCEDURE — 85347 COAGULATION TIME ACTIVATED: CPT

## 2022-09-16 PROCEDURE — 80048 BASIC METABOLIC PNL TOTAL CA: CPT

## 2022-09-16 PROCEDURE — 2500000003 HC RX 250 WO HCPCS: Performed by: INTERNAL MEDICINE

## 2022-09-16 PROCEDURE — 2060000000 HC ICU INTERMEDIATE R&B

## 2022-09-16 PROCEDURE — C1769 GUIDE WIRE: HCPCS

## 2022-09-16 PROCEDURE — 99153 MOD SED SAME PHYS/QHP EA: CPT

## 2022-09-16 PROCEDURE — 93454 CORONARY ARTERY ANGIO S&I: CPT | Performed by: INTERNAL MEDICINE

## 2022-09-16 PROCEDURE — C1887 CATHETER, GUIDING: HCPCS

## 2022-09-16 PROCEDURE — 6360000002 HC RX W HCPCS: Performed by: INTERNAL MEDICINE

## 2022-09-16 PROCEDURE — A4216 STERILE WATER/SALINE, 10 ML: HCPCS | Performed by: INTERNAL MEDICINE

## 2022-09-16 PROCEDURE — 4A023N7 MEASUREMENT OF CARDIAC SAMPLING AND PRESSURE, LEFT HEART, PERCUTANEOUS APPROACH: ICD-10-PCS | Performed by: INTERNAL MEDICINE

## 2022-09-16 PROCEDURE — 93571 IV DOP VEL&/PRESS C FLO 1ST: CPT

## 2022-09-16 PROCEDURE — 2580000003 HC RX 258

## 2022-09-16 PROCEDURE — C1753 CATH, INTRAVAS ULTRASOUND: HCPCS

## 2022-09-16 PROCEDURE — 99152 MOD SED SAME PHYS/QHP 5/>YRS: CPT | Performed by: INTERNAL MEDICINE

## 2022-09-16 PROCEDURE — B2111ZZ FLUOROSCOPY OF MULTIPLE CORONARY ARTERIES USING LOW OSMOLAR CONTRAST: ICD-10-PCS | Performed by: INTERNAL MEDICINE

## 2022-09-16 PROCEDURE — 99152 MOD SED SAME PHYS/QHP 5/>YRS: CPT

## 2022-09-16 PROCEDURE — 92978 ENDOLUMINL IVUS OCT C 1ST: CPT | Performed by: INTERNAL MEDICINE

## 2022-09-16 PROCEDURE — 94760 N-INVAS EAR/PLS OXIMETRY 1: CPT

## 2022-09-16 PROCEDURE — B2151ZZ FLUOROSCOPY OF LEFT HEART USING LOW OSMOLAR CONTRAST: ICD-10-PCS | Performed by: INTERNAL MEDICINE

## 2022-09-16 PROCEDURE — 85025 COMPLETE CBC W/AUTO DIFF WBC: CPT

## 2022-09-16 PROCEDURE — 92978 ENDOLUMINL IVUS OCT C 1ST: CPT

## 2022-09-16 PROCEDURE — 2140000000 HC CCU INTERMEDIATE R&B

## 2022-09-16 PROCEDURE — C1894 INTRO/SHEATH, NON-LASER: HCPCS

## 2022-09-16 PROCEDURE — 2709999900 HC NON-CHARGEABLE SUPPLY

## 2022-09-16 PROCEDURE — 93454 CORONARY ARTERY ANGIO S&I: CPT

## 2022-09-16 PROCEDURE — C1760 CLOSURE DEV, VASC: HCPCS

## 2022-09-16 PROCEDURE — 6360000002 HC RX W HCPCS

## 2022-09-16 PROCEDURE — 84484 ASSAY OF TROPONIN QUANT: CPT

## 2022-09-16 RX ORDER — SODIUM CHLORIDE 0.9 % (FLUSH) 0.9 %
5-40 SYRINGE (ML) INJECTION PRN
Status: DISCONTINUED | OUTPATIENT
Start: 2022-09-16 | End: 2022-09-18 | Stop reason: HOSPADM

## 2022-09-16 RX ORDER — BISACODYL 10 MG
10 SUPPOSITORY, RECTAL RECTAL ONCE
Status: COMPLETED | OUTPATIENT
Start: 2022-09-16 | End: 2022-09-16

## 2022-09-16 RX ORDER — SODIUM CHLORIDE 0.9 % (FLUSH) 0.9 %
5-40 SYRINGE (ML) INJECTION EVERY 12 HOURS SCHEDULED
Status: DISCONTINUED | OUTPATIENT
Start: 2022-09-16 | End: 2022-09-18 | Stop reason: HOSPADM

## 2022-09-16 RX ORDER — BISOPROLOL FUMARATE 5 MG/1
2.5 TABLET ORAL DAILY
Status: DISCONTINUED | OUTPATIENT
Start: 2022-09-16 | End: 2022-09-16 | Stop reason: RX

## 2022-09-16 RX ORDER — DIAZEPAM 5 MG/1
5 TABLET ORAL EVERY 6 HOURS PRN
Status: DISCONTINUED | OUTPATIENT
Start: 2022-09-16 | End: 2022-09-18 | Stop reason: HOSPADM

## 2022-09-16 RX ORDER — SODIUM CHLORIDE 9 MG/ML
INJECTION, SOLUTION INTRAVENOUS PRN
Status: DISCONTINUED | OUTPATIENT
Start: 2022-09-16 | End: 2022-09-18 | Stop reason: HOSPADM

## 2022-09-16 RX ORDER — EZETIMIBE 10 MG/1
10 TABLET ORAL NIGHTLY
Status: DISCONTINUED | OUTPATIENT
Start: 2022-09-16 | End: 2022-09-16 | Stop reason: RX

## 2022-09-16 RX ORDER — ACETAMINOPHEN 325 MG/1
650 TABLET ORAL EVERY 4 HOURS PRN
Status: DISCONTINUED | OUTPATIENT
Start: 2022-09-16 | End: 2022-09-16 | Stop reason: SDUPTHER

## 2022-09-16 RX ADMIN — OXYCODONE AND ACETAMINOPHEN 1 TABLET: 5; 325 TABLET ORAL at 18:11

## 2022-09-16 RX ADMIN — Medication 10 ML: at 08:33

## 2022-09-16 RX ADMIN — HYDROMORPHONE HYDROCHLORIDE 1 MG: 1 INJECTION, SOLUTION INTRAMUSCULAR; INTRAVENOUS; SUBCUTANEOUS at 00:28

## 2022-09-16 RX ADMIN — HYDROMORPHONE HYDROCHLORIDE 1 MG: 1 INJECTION, SOLUTION INTRAMUSCULAR; INTRAVENOUS; SUBCUTANEOUS at 12:30

## 2022-09-16 RX ADMIN — CETIRIZINE HYDROCHLORIDE 5 MG: 10 TABLET, FILM COATED ORAL at 08:29

## 2022-09-16 RX ADMIN — OXYCODONE AND ACETAMINOPHEN 1 TABLET: 5; 325 TABLET ORAL at 10:33

## 2022-09-16 RX ADMIN — VALSARTAN 80 MG: 80 TABLET, FILM COATED ORAL at 08:29

## 2022-09-16 RX ADMIN — FAMOTIDINE 20 MG: 10 INJECTION, SOLUTION INTRAVENOUS at 20:36

## 2022-09-16 RX ADMIN — DIAZEPAM 5 MG: 5 TABLET ORAL at 06:37

## 2022-09-16 RX ADMIN — POLYETHYLENE GLYCOL 3350 17 G: 17 POWDER, FOR SOLUTION ORAL at 08:29

## 2022-09-16 RX ADMIN — DIAZEPAM 5 MG: 5 TABLET ORAL at 20:50

## 2022-09-16 RX ADMIN — METHYLPREDNISOLONE SODIUM SUCCINATE 40 MG: 40 INJECTION, POWDER, FOR SOLUTION INTRAMUSCULAR; INTRAVENOUS at 20:36

## 2022-09-16 RX ADMIN — OXYCODONE AND ACETAMINOPHEN 1 TABLET: 5; 325 TABLET ORAL at 06:02

## 2022-09-16 RX ADMIN — DIAZEPAM 5 MG: 5 TABLET ORAL at 00:28

## 2022-09-16 RX ADMIN — DILTIAZEM HYDROCHLORIDE 120 MG: 120 CAPSULE, COATED, EXTENDED RELEASE ORAL at 08:30

## 2022-09-16 RX ADMIN — SODIUM CHLORIDE: 9 INJECTION, SOLUTION INTRAVENOUS at 16:16

## 2022-09-16 RX ADMIN — TICAGRELOR 90 MG: 90 TABLET ORAL at 08:29

## 2022-09-16 RX ADMIN — METHYLPREDNISOLONE SODIUM SUCCINATE 40 MG: 40 INJECTION, POWDER, FOR SOLUTION INTRAMUSCULAR; INTRAVENOUS at 01:57

## 2022-09-16 RX ADMIN — BISACODYL 10 MG: 10 SUPPOSITORY RECTAL at 20:44

## 2022-09-16 RX ADMIN — DILTIAZEM HYDROCHLORIDE 120 MG: 120 CAPSULE, COATED, EXTENDED RELEASE ORAL at 20:37

## 2022-09-16 RX ADMIN — HYDROMORPHONE HYDROCHLORIDE 1 MG: 1 INJECTION, SOLUTION INTRAMUSCULAR; INTRAVENOUS; SUBCUTANEOUS at 06:37

## 2022-09-16 RX ADMIN — IOPAMIDOL 52 ML: 755 INJECTION, SOLUTION INTRAVENOUS at 15:46

## 2022-09-16 RX ADMIN — METHYLPREDNISOLONE SODIUM SUCCINATE 40 MG: 40 INJECTION, POWDER, FOR SOLUTION INTRAMUSCULAR; INTRAVENOUS at 08:30

## 2022-09-16 RX ADMIN — ACETAMINOPHEN 650 MG: 325 TABLET ORAL at 08:29

## 2022-09-16 RX ADMIN — SODIUM CHLORIDE, PRESERVATIVE FREE 10 ML: 5 INJECTION INTRAVENOUS at 20:44

## 2022-09-16 RX ADMIN — TICAGRELOR 90 MG: 90 TABLET ORAL at 20:37

## 2022-09-16 RX ADMIN — ASPIRIN 81 MG 81 MG: 81 TABLET ORAL at 08:30

## 2022-09-16 RX ADMIN — FAMOTIDINE 20 MG: 10 INJECTION, SOLUTION INTRAVENOUS at 08:30

## 2022-09-16 ASSESSMENT — PAIN SCALES - GENERAL
PAINLEVEL_OUTOF10: 6
PAINLEVEL_OUTOF10: 0
PAINLEVEL_OUTOF10: 6
PAINLEVEL_OUTOF10: 8
PAINLEVEL_OUTOF10: 8
PAINLEVEL_OUTOF10: 6
PAINLEVEL_OUTOF10: 7
PAINLEVEL_OUTOF10: 10
PAINLEVEL_OUTOF10: 6
PAINLEVEL_OUTOF10: 8
PAINLEVEL_OUTOF10: 4
PAINLEVEL_OUTOF10: 10

## 2022-09-16 ASSESSMENT — PAIN DESCRIPTION - DESCRIPTORS
DESCRIPTORS: ACHING

## 2022-09-16 ASSESSMENT — PAIN DESCRIPTION - LOCATION
LOCATION: BACK
LOCATION: CHEST
LOCATION: CHEST;BACK
LOCATION: CHEST
LOCATION: GROIN
LOCATION: CHEST
LOCATION: CHEST

## 2022-09-16 ASSESSMENT — PAIN DESCRIPTION - ORIENTATION
ORIENTATION: MID

## 2022-09-16 NOTE — PROGRESS NOTES
Hospitalist Progress Note      PCP: Gerson Weaver MD    Date of Admission: 9/13/2022    Chief Complaint: Chest pain    Hospital Course:  74 y.o. female with history of CAD s/p stents, pAfib/atrial flutter s/p ablation not on anticoagulation, HTN, hyperlipidemia, many listed allergies came to ER with acute onset of chest pain radiating to both arms and jaw. Describes heavy pressure on chest associated with SOB. SBP in 220s in ED. Admitted as inpatient to CVU for NSTEMI and allergic reaction. Underwent LHC on 9/13:    LM-30% distal  LAD-widely patent mid stent  D1-widely patent proximal stent, 90% distal to the stent which is very small caliber  Cx-80% mid diffuse, 100% distal  OM1-normal  RCA-70% proximal, widely patent mid stent, 50% just distal to the stent  RPDA-patent with luminal regularities  LVEF-60%  PCI: Circumflex 100% to 0% distal with a 2.25 mm x 38 mm Xience [place] ALEC and then 80% to 0% mid with overlapping stents, including overlapping the distal stent, with distal to proximal 3.0 mm x 38 mm Xience [place] ALEC and 3.25 mm x 18 mm Xience [place] ALEC. Impression:  1. Occlusion of the distal circumflex along with severe stenosis of the mid circumflex ultimately revascularized with ALEC x3.  2.  Small vessel disease. 3.  Normal LV systolic function. 4.  Elevated LVEDP. Plan:  1. DAPT with aspirin and Brilinta. 2.  Trial of Zetia as it is intolerant of statins. Zari Handler has been ordered but not started yet. 3.  Intolerant of beta-blockers. Consider trial of bisoprolol if patient will agree. 4.  Add valsartan for better blood pressure control started 80 mg daily. Titrate as hemodynamics allow. 5.  Consider return for FFR of proximal RCA versus nuclear stress testing. Her primary cardiologist Dr. Sagar Lux to decide. Started on IV Solumedrol, IV Pepcid and IV Benadryl for 72 hrs for allergic reaction that lead to eye swelling. Back to cath lab on 9/16 for ongoing CP.     Consulted GI to evaluate for atypical chest pain, patient refused to see GI. Subjective: Patient with CP, no hiccups today. No HA, abdominal pain or fevers. Wants cardiac intervention now. Medications:  Reviewed    Infusion Medications    sodium chloride      sodium chloride Stopped (09/14/22 0316)    sodium chloride       Scheduled Medications    bisacodyl  10 mg Rectal Once    bisoprolol  2.5 mg Oral Daily    methylPREDNISolone  40 mg IntraVENous Q6H    polyethylene glycol  17 g Oral Daily    sodium chloride flush  5-40 mL IntraVENous 2 times per day    cetirizine  5 mg Oral Daily    dilTIAZem  120 mg Oral BID    aspirin  81 mg Oral Daily    famotidine (PEPCID) injection  20 mg IntraVENous BID    sodium chloride flush  5-40 mL IntraVENous 2 times per day    ticagrelor  90 mg Oral BID    valsartan  80 mg Oral Daily     PRN Meds: aluminum & magnesium hydroxide-simethicone, diazePAM, HYDROmorphone, oxyCODONE-acetaminophen, nitroGLYCERIN, sodium chloride flush, sodium chloride, ondansetron **OR** ondansetron, polyethylene glycol, [DISCONTINUED] acetaminophen **OR** acetaminophen, perflutren lipid microspheres, sodium chloride flush, sodium chloride, acetaminophen, labetalol      Intake/Output Summary (Last 24 hours) at 9/16/2022 1028  Last data filed at 9/16/2022 0018  Gross per 24 hour   Intake 240 ml   Output --   Net 240 ml         Physical Exam Performed:    BP (!) 140/90   Pulse 81   Temp 97.9 °F (36.6 °C) (Temporal)   Resp 16   Ht 5' 4\" (1.626 m)   Wt 197 lb (89.4 kg)   SpO2 96%   BMI 33.81 kg/m²     General appearance:  Appears comfortable. Well nourished  Eyes: Sclera clear, pupils equal  ENT: Moist mucus membranes, no thrush. Trachea midline. Cardiovascular: Regular rhythm, normal S1, S2. No murmur, gallop, rub.  No edema in lower extremities  Respiratory: Clear to auscultation bilaterally, no wheeze, good inspiratory effort  Gastrointestinal: Abdomen soft, obese, non-tender, not distended, normal bowel sounds  Musculoskeletal: No cyanosis in digits, neck supple  Neurology: Cranial nerves grossly intact. Alert and oriented in time, place and person. No speech or motor deficits  Psychiatry: Anxious affect. Not agitated  Skin: Warm, dry, normal turgor, no rash  Brisk capillary refill, peripheral pulses palpable       Labs:   Recent Labs     09/14/22  0455 09/15/22  0614 09/16/22  0848   WBC 14.2* 17.8* 18.4*   HGB 14.3 12.3 13.2   HCT 42.2 36.8 38.8    269 316       Recent Labs     09/14/22  0455 09/15/22  0614 09/16/22  0619    137 141   K 3.5 4.3 4.4    102 103   CO2 20* 26 25   BUN 9 14 14   CREATININE 0.9 0.7 0.8   CALCIUM 9.4 9.3 10.1       Recent Labs     09/13/22  1105   AST 24   ALT 12   BILITOT 0.5   ALKPHOS 172*       No results for input(s): INR in the last 72 hours. Recent Labs     09/15/22  1315 09/16/22  0015 09/16/22  0619   TROPONINI 1.09* 0.96* 1.02*         Urinalysis:      Lab Results   Component Value Date/Time    BLOODU Trace 03/05/2021 03:56 PM    SPECGRAV 1.005 03/05/2021 03:56 PM    GLUCOSEU Negative 03/05/2021 03:56 PM       Radiology:  XR ABDOMEN (KUB) (SINGLE AP VIEW)   Final Result   No significant finding in the abdomen. XR CHEST PORTABLE   Final Result   No acute cardiopulmonary process. Assessment/Plan:    Active Hospital Problems    Diagnosis     PAF (paroxysmal atrial fibrillation) (HCC) [I48.0]      Priority: High    Allergic reaction [T78.40XA]      Priority: Medium    Coronary atherosclerosis of native coronary artery [I25.10]      Priority: Medium    Obesity [E66.9]     Typical atrial flutter (HCC) [I48.3]     Bilateral carotid artery stenosis [I65.23]     NSTEMI (non-ST elevated myocardial infarction) ([facility] [address] [facility] [I21.4]     Essential hypertension [I10]     Depression [F32. A]     CAD (coronary artery disease) [I25.10]     Pure hypercholesterolemia [E78.00]      Ate breakfast, NPO now  Back to [facility]  Continue ASA 81 mg daily  No statin due to listed allergies  Cardio consult for CP appreciated  Continue Brilinta and Diovan  Finish Solumedrol 40 mg IV q6h today  Finish Benadryl 50 mg IV q6h  Finish Pepcid 20 mg IV q12h  Continue Valium PRN  Maalox PRN  Offered GI consult, patient refused  NTG PRN    DVT Prophylaxis: SCD  Diet: Diet NPO  Code Status: Full Code  PT/OT Eval Status: not needed    Dispo - Home    Discussed with patient, Dr Mely Morris (Farooq Pennant), Dr Cheryl Murphy (Cardio), J Carlos Egan (GI PA), nursing and CM. LHC today.       Madeline Sheppard MD

## 2022-09-16 NOTE — PROGRESS NOTES
Patient brought over to CVU from cath lab and attached to CVU monitoring. Report reviewed with cath lab RN. Right groin soft and no hematoma or oozing noted. Occlusive dressing dry and intact. Pedal pulses easily palpated. Primary RN Hali.

## 2022-09-16 NOTE — BRIEF OP NOTE
Cardiac Cath 9/16/2022:  Access: Right CFA  Hemostasis: Angio-Seal closure device    Moderate Sedation:  Start time: 4438  Stop time: 1543  4 mg versed   150 mcg fentanyl   An independent trained observer pushed medications at my direction. We monitored the patient's level of consciousness and vital signs/physiologic status throughout the procedure duration (see start and start times above). Bleeding risk: Intermediate  LVEDP: - mmHg  AO: 134/89 mmHg  Estimated blood loss: Less than 20 mL  Contrast: 52 mL  Fluoroscopy time: 3.4 min. Anatomy:   LM-30% distal  LAD-widely patent mid stents  D1-widely patent stent proximally, distal 90% small caliber  Cx-widely patent mid and distal stents  RCA-diffusely diseased, 70% proximal, widely patent mid stent, 50% distal, dominant, DFR 0.94, FFR 0.87, IVUS MLA 5.72 mm²  RPDA-patent  LVEF-not done    Impression:  1. Patent LAD and circumflex stents. 2.  Borderline significant angiographic stenosis in proximal RCA however with IVUS and FFR testing, it is within medical management range. Plan:  1. Medical management.

## 2022-09-16 NOTE — FLOWSHEET NOTE
09/15/22 2032   Vitals   Temp 97.5 °F (36.4 °C)   Temp Source Temporal   Heart Rate 77   Heart Rate Source Monitor   Resp 16   BP (!) 144/88   MAP (mmHg) 105   BP Location Right upper arm   BP Upper/Lower Upper   BP Method Automatic   Patient Position Semi fowlers   Level of Consciousness 0   MEWS Score 1   Cardiac Rhythm Sinus rhythm   Pain Assessment   Pain Assessment 0-10   Pain Level 8   Pain Location Chest   Pain Orientation Mid   Pain Descriptors Aching   Non-Pharmaceutical Pain Intervention(s) Cold pack   Response to Pain Intervention Patient satisfied   Oxygen Therapy   SpO2 97 %   Pulse Oximetry Type Intermittent   Pulse Oximeter Device Mode Intermittent   Pulse Oximeter Device Location Finger   O2 Device None (Room air)     Shift assessment complete. VSS. Pt. Is alert and oriented resting comfortably in bed. Pt. Has complaints of chest pain. Pain medication per [facility], pt in agreement with pain management plan and states medication helps chest pain. POC discussed with patient and patient states understanding and is in agreement with plan. Call light and bedside table within reach. No further needs expressed at this time.  Josh Elizondo, RN

## 2022-09-16 NOTE — PROGRESS NOTES
[facility]   Cardiology Progress Note     Date: 9/16/2022  Admit Date: 9/13/2022     Reason for consultation:     Chief Complaint   Patient presents with    Tingling     Pt reports tingling in bilateral arms, reports chest pain and jaw tightness, cardiac HX ([facility] pt)        History of Present Illness: History obtained from patient and medical record. Jacklyn Santillan is a 74 y.o. female patient followed closely by Dr. Kena Almodovar known to have CAD since 2012, s/p numerous stents, s/p STEMI due to D1 occlusion in 2017, most recent cath  2/4/2021 at [facility] [facility] multivessel PCI to Distal LAD x 2 (2.5 x 15 and 2.5 x 18) and D1 x 1 (2.25 x 8),     Presents to our ED this am after waking up with severe chest pressure like \"an elephant sitting on me\" with radiation down her left arm. This is exactly how she presented with her STEMI. Initial EKG without ST elevation although new TWI in V1V2. She was markedly hypotensive to 113 systolic upon presentation, which improved to about 140-150 after morphine and nitro. She hasn't taken nitro at home because it gives her an ubearable headace. She is not currently on a statin due severe myalgias and was prescribed Leqvio, but there was a $4000 deductible that she couldn't afford. She had been placed on DAPT at [facility] but that was D.C'd by Dr. Kena Almodovar recently as it was more than a year since her last stents. She also has EP history including ablations for fib and flutter (per consult note)    Interval Hx: Today, she continues to have chest discomfort and back pain. Tele stable. Patient seen and examined. Clinical notes reviewed. Telemetry reviewed. No new complaints today. No major events overnight. Denies having chest pain, palpitations, shortness of breath, orthopnea/PND, cough, or dizziness at the time of this visit.       Past Medical History:  Past Medical History:   Diagnosis Date    Allergic rhinitis, cause unspecified 12/04/2010    Anxiety     Arthritis Bilateral carotid artery stenosis     < 50% bilaterally    CAD (coronary artery disease)     angioplasty with stent at [facility] Dr. Brando Posadas, here Dr. Virginia Petty at [place],     Chronic kidney disease     frequency, urgency    Chronic pain     precordial, opiate dependent    Depression 01/22/2013    Erosive esophagitis 12/28/2016    Dr. Cathy Nunez; PPI started; LA Class A, Sphincter of Oddi manometry and sphincterotomy if symptoms don't improve. Essential hypertension 04/11/2017    HSV infection     Hypercholesterolemia 06/06/2014    Irritable bowel syndrome with diarrhea 09/27/2016    Migraine headache 06/06/2014    Nonerosive nonspecific gastritis 12/28/2016    Dr. Cathy Nunez; body and antrum    OAB (overactive bladder) 03/14/2014        Past Surgical History:    has a past surgical history that includes Appendectomy; Total abdominal hysterectomy w/ bilateral salpingoophorectomy; Cholecystectomy; Tubal ligation; Tonsillectomy; other surgical history; Coronary angioplasty with stent (10/2012); Percutaneous Transluminal Coronary Angio (10/2012); Cardiac catheterization (12/07/2013); Hysterectomy; polypectomy; Cystoscopy (04/2014); Upper gastrointestinal endoscopy; Wrist surgery (Left, 05/21/2015); Abdominal adhesion surgery; Colonoscopy; Endoscopy, colon, diagnostic; Cosmetic surgery; fracture surgery; ERCP (01/25/2017); sinus surgery; Hysterectomy, total abdominal; and Coronary angioplasty with stent (02/04/2021). Social History:  Reviewed. reports that she quit smoking about 15 years ago. Her smoking use included cigarettes. She has a 10.00 pack-year smoking history. She has never used smokeless tobacco. She reports that she does not drink alcohol and does not use drugs. Allergies:   Allergies   Allergen Reactions    Hydralazine Anaphylaxis    Shellfish-Derived Products Anaphylaxis and Swelling    Xarelto [Rivaroxaban] Swelling    Asa [Aspirin] Nausea Only    Chicken Allergy     Crestor [Rosuvastatin]      Myalgia      Fenofibrate      Myalgia      Hctz [Hydrochlorothiazide]     Lipitor [Atorvastatin Calcium]     Lipitor [Atorvastatin]      Myalgia      Lisinopril     Metoprolol      Weight gain    Mometasone Furoate Swelling    Nasonex [Mometasone] Swelling    Nsaids      GI PAIN    Other      Any melons, grilled food, turkey, red/green/yellow peppers. Pineapple     Plavix [Clopidogrel] Hives, Itching and Swelling    Pork Allergy     Sulfa Antibiotics Swelling    Contrast [Iodides] Swelling and Rash    Flagyl [Metronidazole] Nausea And Vomiting       Family History:  Reviewed. family history includes Heart Disease in her brother, father, maternal uncle, mother, and another family member; High Blood Pressure in her sister and sister; Hypertension in her father and mother; Other in an other family member; Stroke in her sister and another family member. Denies family history of sudden cardiac death, arrhythmia, premature CAD    Home Meds:  Prior to Visit Medications    Medication Sig Taking?  Authorizing Provider   POTASSIUM GLUCONATE PO Take by mouth  Historical Provider, MD   Cholecalciferol (VITAMIN D3) 125 MCG (5000 UT) TABS Take by mouth daily   Historical Provider, MD   cetirizine (ZYRTEC) 5 MG tablet Take 5 mg by mouth daily  Historical Provider, MD   dilTIAZem (CARDIZEM CD) 120 MG extended release capsule TAKE 1 CAPSULE BY MOUTH TWICE DAILY  Mj Koo MD   Mag Aspart-Potassium Aspart (POTASSIUM & MAGNESIUM ASPARTAT PO) Take 1 tablet by mouth daily   Historical Provider, MD   Zinc-Vitamin C  MG LOZG Take 1 lozenge by mouth daily   Historical Provider, MD   L-Lysine 1000 MG TABS Take 1 tablet by mouth every morning (before breakfast)   Historical Provider, MD   cyanocobalamin 1000 MCG tablet Take 1,000 mcg by mouth daily  Historical Provider, MD   nitroGLYCERIN (NITROSTAT) 0.4 MG SL tablet Place 1 tablet under the tongue every 5 minutes as needed for Chest pain up to max of 3 total doses. If no relief after 1 dose, call 911. Patient not taking: No sig reported  Patrick Rowley MD   estradiol (ESTRACE) 0.1 MG/GM vaginal cream PLACE 0.5 GRAM VAGINALLY 3 TIMES A WEEK  Patrick Rowley MD        Scheduled Meds:   methylPREDNISolone  40 mg IntraVENous Q6H    polyethylene glycol  17 g Oral Daily    sodium chloride flush  5-40 mL IntraVENous 2 times per day    cetirizine  5 mg Oral Daily    dilTIAZem  120 mg Oral BID    aspirin  81 mg Oral Daily    famotidine (PEPCID) injection  20 mg IntraVENous BID    sodium chloride flush  5-40 mL IntraVENous 2 times per day    ticagrelor  90 mg Oral BID    valsartan  80 mg Oral Daily     Continuous Infusions:   sodium chloride      sodium chloride Stopped (09/14/22 0316)    sodium chloride       PRN Meds:aluminum & magnesium hydroxide-simethicone, diazePAM, HYDROmorphone, oxyCODONE-acetaminophen, nitroGLYCERIN, sodium chloride flush, sodium chloride, ondansetron **OR** ondansetron, polyethylene glycol, [DISCONTINUED] acetaminophen **OR** acetaminophen, perflutren lipid microspheres, sodium chloride flush, sodium chloride, acetaminophen, labetalol     Review of Systems:  Constitutional: Negative for fever, night sweats, chills,  Skin: Negative for rash, pruritus, bleeding, or bruising    HEENT: Negative for vision changes, ringing in the ears, dysphagia  Respiratory: Reviewed in HPI  Cardiovascular: Reviewed in HPI  Gastrointestinal: Negative for abdominal pain, N/V/D, constipation, or black/tarry stools  Genito-Urinary: Negative for dysuria, incontinence, or hematuria  Musculoskeletal: Negative for joint swelling, muscle pain, or injuries  Neurological/Psych: Negative for confusion, seizures, headaches, or TIA-like symptoms. No anxiety or depression.      Physical Examination:  Vitals:    09/16/22 0841   BP: (!) 140/90   Pulse: 81   Resp:    Temp:    SpO2:       In: 240 [P.O.:240]  Out: -    Wt Readings from Last 3 Encounters:   09/16/22 197 lb (89.4 kg) 07/22/22 194 lb (88 kg)   07/19/22 198 lb 9.6 oz (90.1 kg)       Intake/Output Summary (Last 24 hours) at 9/16/2022 0843  Last data filed at 9/16/2022 0018  Gross per 24 hour   Intake 240 ml   Output --   Net 240 ml       Telemetry: Personally Reviewed  - Sinus rhythm   Constitutional: Cooperative and in no apparent distress, and appears well nourished  Skin: Warm and pink; no pallor, cyanosis, clubbing, or bruising   HEENT: Symmetric and normocephalic. PERRL. Conjunctiva pink with clear sclera. Mucus membranes moist.   Cardiovascular: Regular rate and rhythm. S1/S2 present without murmurs, no rubs or gallops. Peripheral pulses 2+, capillary refill < 3 seconds. No elevation of JVP. No peripheral edema  Respiratory: Respirations symmetric and unlabored. Lungs clear to auscultation bilaterally, no wheezing, crackles, or rhonchi  Gastrointestinal: Abdomen soft and round. Bowel sounds active without tenderness. Musculoskeletal: Bilateral upper and lower extremity strength 5/5 with full ROM  Neurologic/Psych: Awake and orientated to person, place and time. Calm affect, appropriate mood    Pertinent labs, diagnostic, device, and imaging results reviewed as a part of this visit    Labs:    BMP:   Recent Labs     09/14/22  0455 09/15/22  0614 09/16/22 0619    137 141   K 3.5 4.3 4.4    102 103   CO2 20* 26 25   BUN 9 14 14   CREATININE 0.9 0.7 0.8   MG 2.00  --   --      Estimated Creatinine Clearance: 76 mL/min (based on SCr of 0.8 mg/dL).    CBC:   Recent Labs     09/13/22  1105 09/14/22  0455 09/15/22  0614   WBC 9.2 14.2* 17.8*   HGB 15.2 14.3 12.3   HCT 46.0 42.2 36.8   MCV 85.4 84.3 84.4    296 269     Thyroid:   Lab Results   Component Value Date/Time    TSH 4.67 12/20/2021 08:08 AM     Lipids:   Lab Results   Component Value Date/Time    CHOL 244 06/29/2022 11:59 AM    HDL 47 06/29/2022 11:59 AM    TRIG 277 06/29/2022 11:59 AM     LFTS:   Lab Results   Component Value Date/Time    ALT 12 stenosis of the mid circumflex ultimately revascularized with ALEC x3.  2.  Small vessel disease. 3.  Normal LV systolic function. 4.  Elevated LVEDP. Plan:  1. DAPT with aspirin and Brilinta. 2.  Trial of Zetia as it is intolerant of statins. Saratha Huge has been ordered but not started yet. 3.  Intolerant of beta-blockers. Consider trial of bisoprolol if patient will agree. 4.  Add valsartan for better blood pressure control started 80 mg daily. Titrate as hemodynamics allow. 5.  Consider return for FFR of proximal RCA versus nuclear stress testing. Her primary cardiologist Dr. Alexis Chopra to decide. Problem List:   Patient Active Problem List    Diagnosis Date Noted    PAF (paroxysmal atrial fibrillation) ([facility] [address] 02/18/2021    Allergic reaction 09/13/2022    Elevated lipoprotein A level 07/18/2022    Coronary atherosclerosis of native coronary artery 07/18/2022    Bruit of right carotid artery 02/18/2021    Obesity 04/29/2021    Typical atrial flutter (HCC)     Atypical atrial flutter (HCC)     Bilateral carotid artery stenosis     Mild episode of recurrent major depressive disorder ([facility] [address] 12/18/2020    NSTEMI (non-ST elevated myocardial infarction) ([facility] [address] 11/19/2017    Essential hypertension 04/11/2017    Mood disorder due to a general medical condition     Irritable bowel syndrome with diarrhea 09/27/2016    Postmenopausal atrophic vaginitis 08/05/2016    Left wrist fracture     Hypercholesterolemia 06/06/2014    Migraine headache 06/06/2014    OAB (overactive bladder) 03/14/2014    Depression 01/22/2013    Chronic pain 01/17/2013    CAD (coronary artery disease)     HSV infection     Pure hypercholesterolemia 12/04/2010    Allergic rhinitis 12/04/2010        Assessment and Plan:     Coronary artery disease / NSTEMI   ~ hx of multiple PCIs, STEMI '14  ~ [facility] 9/13/22: s/p PCI of Cx with ALEC x3. Residual 70% RCA stenosis. Plan for FFR evaluation in 2-3 weeks. ~ continue DAPT with aspirin and Brilinta.  Start Zetia as she is intolerant to statins. Will work on coverage with PCSK9 inhibitor. Start bisoprolol. ~ continues to have significant chest discomfort. Plan to return to the cath lab today for evaluation of RCA. Pt agreeable. Keep NPO. Paroxysmal atrial fibrillation   Hyperlipidemia- uncontrolled   Hypertension- improved with addition of valsartan      Multiple medical conditions with risk of decompensation. All pertinent information and plan of care discussed with the physician. All questions and concerns were addressed to the patient. Alternatives to my treatment were discussed. I have discussed the above stated plan with patient and the nurse. The patient verbalized understanding and agreed with the plan. Thank you for allowing to us to participate in the care of Ever Morgan. Total visit time > 35 minutes; > 50% spend counseling / coordinating care. I reviewed interval history, physical exam, review of data including labs, imaging, development and implementation of treatment plan and coordination of complex care. Counseled on risk factor modifications. Kirk Naranjo APRN-CNP  [facility]   Office: [phone]    NOTE:  This report was transcribed using voice recognition software. Every effort was made to ensure accuracy; however, inadvertent computerized transcription errors may be present.

## 2022-09-16 NOTE — PROGRESS NOTES
Patient ordered to be NPO, Dr. Saige Rousseau to do possible cath today. Patient updated. Already had breakfast this morning.      Madan HENRYN, RN, CCRN

## 2022-09-17 LAB
ANION GAP SERPL CALCULATED.3IONS-SCNC: 12 MMOL/L (ref 3–16)
BUN BLDV-MCNC: 19 MG/DL (ref 7–20)
CALCIUM SERPL-MCNC: 9.2 MG/DL (ref 8.3–10.6)
CHLORIDE BLD-SCNC: 98 MMOL/L (ref 99–110)
CO2: 20 MMOL/L (ref 21–32)
CREAT SERPL-MCNC: 0.8 MG/DL (ref 0.6–1.2)
GFR AFRICAN AMERICAN: >60
GFR NON-AFRICAN AMERICAN: >60
GLUCOSE BLD-MCNC: 174 MG/DL (ref 70–99)
HCT VFR BLD CALC: 40.4 % (ref 36–48)
HCT VFR BLD CALC: 40.8 % (ref 36–48)
HEMOGLOBIN: 13.3 G/DL (ref 12–16)
HEMOGLOBIN: 13.5 G/DL (ref 12–16)
MCH RBC QN AUTO: 27.7 PG (ref 26–34)
MCHC RBC AUTO-ENTMCNC: 32.6 G/DL (ref 31–36)
MCV RBC AUTO: 85.2 FL (ref 80–100)
PDW BLD-RTO: 14.1 % (ref 12.4–15.4)
PLATELET # BLD: 314 K/UL (ref 135–450)
PMV BLD AUTO: 8.1 FL (ref 5–10.5)
POTASSIUM SERPL-SCNC: 3.3 MMOL/L (ref 3.5–5.1)
RBC # BLD: 4.79 M/UL (ref 4–5.2)
SODIUM BLD-SCNC: 130 MMOL/L (ref 136–145)
WBC # BLD: 15.3 K/UL (ref 4–11)

## 2022-09-17 PROCEDURE — 2580000003 HC RX 258: Performed by: INTERNAL MEDICINE

## 2022-09-17 PROCEDURE — A4216 STERILE WATER/SALINE, 10 ML: HCPCS | Performed by: INTERNAL MEDICINE

## 2022-09-17 PROCEDURE — 2060000000 HC ICU INTERMEDIATE R&B

## 2022-09-17 PROCEDURE — 6370000000 HC RX 637 (ALT 250 FOR IP): Performed by: INTERNAL MEDICINE

## 2022-09-17 PROCEDURE — 85027 COMPLETE CBC AUTOMATED: CPT

## 2022-09-17 PROCEDURE — 85018 HEMOGLOBIN: CPT

## 2022-09-17 PROCEDURE — 2500000003 HC RX 250 WO HCPCS: Performed by: INTERNAL MEDICINE

## 2022-09-17 PROCEDURE — 93926 LOWER EXTREMITY STUDY: CPT

## 2022-09-17 PROCEDURE — 99233 SBSQ HOSP IP/OBS HIGH 50: CPT | Performed by: NURSE PRACTITIONER

## 2022-09-17 PROCEDURE — 80048 BASIC METABOLIC PNL TOTAL CA: CPT

## 2022-09-17 PROCEDURE — 6360000002 HC RX W HCPCS: Performed by: INTERNAL MEDICINE

## 2022-09-17 PROCEDURE — 85014 HEMATOCRIT: CPT

## 2022-09-17 PROCEDURE — 36415 COLL VENOUS BLD VENIPUNCTURE: CPT

## 2022-09-17 RX ORDER — OXYCODONE HYDROCHLORIDE AND ACETAMINOPHEN 5; 325 MG/1; MG/1
1 TABLET ORAL EVERY 6 HOURS PRN
Qty: 20 TABLET | Refills: 0 | Status: SHIPPED | OUTPATIENT
Start: 2022-09-17 | End: 2022-09-22

## 2022-09-17 RX ORDER — VALSARTAN 80 MG/1
80 TABLET ORAL DAILY
Qty: 30 TABLET | Refills: 0 | Status: SHIPPED | OUTPATIENT
Start: 2022-09-18 | End: 2022-09-27

## 2022-09-17 RX ORDER — ASPIRIN 81 MG/1
81 TABLET, CHEWABLE ORAL DAILY
Qty: 30 TABLET | Refills: 0 | Status: SHIPPED | OUTPATIENT
Start: 2022-09-18

## 2022-09-17 RX ORDER — BISACODYL 10 MG
10 SUPPOSITORY, RECTAL RECTAL DAILY PRN
Qty: 10 SUPPOSITORY | Refills: 0 | Status: SHIPPED | OUTPATIENT
Start: 2022-09-17 | End: 2022-09-27

## 2022-09-17 RX ORDER — DICYCLOMINE HYDROCHLORIDE 10 MG/1
10 CAPSULE ORAL EVERY 6 HOURS
Status: DISCONTINUED | OUTPATIENT
Start: 2022-09-17 | End: 2022-09-18 | Stop reason: HOSPADM

## 2022-09-17 RX ADMIN — OXYCODONE AND ACETAMINOPHEN 1 TABLET: 5; 325 TABLET ORAL at 08:24

## 2022-09-17 RX ADMIN — OXYCODONE AND ACETAMINOPHEN 1 TABLET: 5; 325 TABLET ORAL at 18:59

## 2022-09-17 RX ADMIN — DICYCLOMINE HYDROCHLORIDE 10 MG: 10 CAPSULE ORAL at 22:02

## 2022-09-17 RX ADMIN — VALSARTAN 80 MG: 80 TABLET, FILM COATED ORAL at 10:21

## 2022-09-17 RX ADMIN — HYDROMORPHONE HYDROCHLORIDE 1 MG: 1 INJECTION, SOLUTION INTRAMUSCULAR; INTRAVENOUS; SUBCUTANEOUS at 22:06

## 2022-09-17 RX ADMIN — TICAGRELOR 90 MG: 90 TABLET ORAL at 22:02

## 2022-09-17 RX ADMIN — DICYCLOMINE HYDROCHLORIDE 10 MG: 10 CAPSULE ORAL at 15:24

## 2022-09-17 RX ADMIN — DIAZEPAM 5 MG: 5 TABLET ORAL at 08:24

## 2022-09-17 RX ADMIN — SODIUM CHLORIDE, PRESERVATIVE FREE 10 ML: 5 INJECTION INTRAVENOUS at 08:29

## 2022-09-17 RX ADMIN — DIAZEPAM 5 MG: 5 TABLET ORAL at 18:59

## 2022-09-17 RX ADMIN — DILTIAZEM HYDROCHLORIDE 120 MG: 120 CAPSULE, COATED, EXTENDED RELEASE ORAL at 22:02

## 2022-09-17 RX ADMIN — Medication 10 ML: at 22:09

## 2022-09-17 RX ADMIN — HYDROMORPHONE HYDROCHLORIDE 1 MG: 1 INJECTION, SOLUTION INTRAMUSCULAR; INTRAVENOUS; SUBCUTANEOUS at 16:21

## 2022-09-17 RX ADMIN — TICAGRELOR 90 MG: 90 TABLET ORAL at 08:24

## 2022-09-17 RX ADMIN — HYDROMORPHONE HYDROCHLORIDE 1 MG: 1 INJECTION, SOLUTION INTRAMUSCULAR; INTRAVENOUS; SUBCUTANEOUS at 10:44

## 2022-09-17 RX ADMIN — POLYETHYLENE GLYCOL 3350 17 G: 17 POWDER, FOR SOLUTION ORAL at 08:24

## 2022-09-17 RX ADMIN — DILTIAZEM HYDROCHLORIDE 120 MG: 120 CAPSULE, COATED, EXTENDED RELEASE ORAL at 08:24

## 2022-09-17 RX ADMIN — SODIUM CHLORIDE, PRESERVATIVE FREE 10 ML: 5 INJECTION INTRAVENOUS at 22:09

## 2022-09-17 RX ADMIN — METHYLPREDNISOLONE SODIUM SUCCINATE 40 MG: 40 INJECTION, POWDER, FOR SOLUTION INTRAMUSCULAR; INTRAVENOUS at 08:25

## 2022-09-17 RX ADMIN — Medication 10 ML: at 08:37

## 2022-09-17 RX ADMIN — SODIUM CHLORIDE, PRESERVATIVE FREE 10 ML: 5 INJECTION INTRAVENOUS at 03:15

## 2022-09-17 RX ADMIN — FAMOTIDINE 20 MG: 10 INJECTION, SOLUTION INTRAVENOUS at 08:26

## 2022-09-17 RX ADMIN — OXYCODONE AND ACETAMINOPHEN 1 TABLET: 5; 325 TABLET ORAL at 01:27

## 2022-09-17 RX ADMIN — ASPIRIN 81 MG 81 MG: 81 TABLET ORAL at 08:24

## 2022-09-17 RX ADMIN — Medication 10 ML: at 10:45

## 2022-09-17 RX ADMIN — CETIRIZINE HYDROCHLORIDE 5 MG: 10 TABLET, FILM COATED ORAL at 08:24

## 2022-09-17 RX ADMIN — OXYCODONE AND ACETAMINOPHEN 1 TABLET: 5; 325 TABLET ORAL at 13:55

## 2022-09-17 RX ADMIN — METHYLPREDNISOLONE SODIUM SUCCINATE 40 MG: 40 INJECTION, POWDER, FOR SOLUTION INTRAMUSCULAR; INTRAVENOUS at 03:15

## 2022-09-17 RX ADMIN — DIAZEPAM 5 MG: 5 TABLET ORAL at 03:20

## 2022-09-17 ASSESSMENT — PAIN SCALES - GENERAL
PAINLEVEL_OUTOF10: 7
PAINLEVEL_OUTOF10: 8
PAINLEVEL_OUTOF10: 4
PAINLEVEL_OUTOF10: 9
PAINLEVEL_OUTOF10: 8
PAINLEVEL_OUTOF10: 8
PAINLEVEL_OUTOF10: 9
PAINLEVEL_OUTOF10: 9
PAINLEVEL_OUTOF10: 6
PAINLEVEL_OUTOF10: 5
PAINLEVEL_OUTOF10: 8

## 2022-09-17 ASSESSMENT — PAIN DESCRIPTION - LOCATION
LOCATION: ABDOMEN;GROIN;CHEST
LOCATION: ABDOMEN;GROIN
LOCATION: CHEST;GROIN
LOCATION: ABDOMEN;CHEST;GROIN
LOCATION: GROIN;CHEST
LOCATION: GROIN
LOCATION: GROIN;CHEST
LOCATION: ABDOMEN;GROIN

## 2022-09-17 ASSESSMENT — PAIN DESCRIPTION - ORIENTATION
ORIENTATION: RIGHT

## 2022-09-17 ASSESSMENT — PAIN DESCRIPTION - FREQUENCY: FREQUENCY: INTERMITTENT

## 2022-09-17 ASSESSMENT — PAIN DESCRIPTION - DESCRIPTORS
DESCRIPTORS: ACHING;CRAMPING
DESCRIPTORS: CRAMPING;OTHER (COMMENT)
DESCRIPTORS: ACHING;CRAMPING
DESCRIPTORS: ACHING;CRAMPING
DESCRIPTORS: THROBBING;CRAMPING
DESCRIPTORS: ACHING;CRAMPING

## 2022-09-17 ASSESSMENT — PAIN DESCRIPTION - PAIN TYPE
TYPE: ACUTE PAIN
TYPE: ACUTE PAIN

## 2022-09-17 ASSESSMENT — PAIN DESCRIPTION - ONSET: ONSET: ON-GOING

## 2022-09-17 NOTE — DISCHARGE SUMMARY
Hospital Medicine Discharge Summary    Patient: Mariia Mccartney     Gender: female  : 1955   Age: 74 y.o. MRN: [id number]    Admitting Physician: Magdy Gabriel MD  Discharge Physician: Magdy Gabriel MD     Code Status: Full Code     Admit Date: 2022   Discharge Date:   22    Disposition:  Home    Discharge Diagnoses: Active Hospital Problems    Diagnosis Date Noted    PAF (paroxysmal atrial fibrillation) ([facility] [address] [I48.0] 2021     Priority: High    Allergic reaction [T78.40XA] 2022     Priority: Medium    Coronary atherosclerosis of native coronary artery [I25.10] 2022     Priority: Medium    Obesity [E66.9] 2021    Typical atrial flutter (HCC) [I48.3]     Bilateral carotid artery stenosis [I65.23]     NSTEMI (non-ST elevated myocardial infarction) ([facility] [address] [I21.4] 2017    Essential hypertension [I10] 2017    Depression [F32. A] 2013    CAD (coronary artery disease) [I25.10]     Pure hypercholesterolemia [E78.00] 2010       Follow-up appointments:  one week    Outpatient to do list: F/U with PCP and Cardiology    Condition at Discharge:  [facility] [place] Course:   74 y.o. female with history of CAD s/p stents, pAfib/atrial flutter s/p ablation not on anticoagulation, HTN, hyperlipidemia, many listed allergies came to ER with acute onset of chest pain radiating to both arms and jaw. Describes heavy pressure on chest associated with SOB. SBP in 220s in ED. Admitted as inpatient to CVU for NSTEMI and allergic reaction.   Underwent LHC on :     LM-30% distal  LAD-widely patent mid stent  D1-widely patent proximal stent, 90% distal to the stent which is very small caliber  Cx-80% mid diffuse, 100% distal  OM1-normal  RCA-70% proximal, widely patent mid stent, 50% just distal to the stent  RPDA-patent with luminal regularities  LVEF-60%  PCI: Circumflex 100% to 0% distal with a 2.25 mm x 38 mm Xience [place] ALEC and then 80% to 0% mid with overlapping stents, including overlapping the distal stent, with distal to proximal 3.0 mm x 38 mm Xience [place] ALEC and 3.25 mm x 18 mm Xience [place] ALEC. Impression:  1. Occlusion of the distal circumflex along with severe stenosis of the mid circumflex ultimately revascularized with ALEC x3.  2.  Small vessel disease. 3.  Normal LV systolic function. 4.  Elevated LVEDP. Plan:  1. DAPT with aspirin and Brilinta. 2.  Trial of Zetia as it is intolerant of statins. [place] has been ordered but not started yet. 3.  Intolerant of beta-blockers. Consider trial of bisoprolol if patient will agree. 4.  Add valsartan for better blood pressure control started 80 mg daily. Titrate as hemodynamics allow. 5.  Consider return for FFR of proximal RCA versus nuclear stress testing. Her primary cardiologist Dr. Michael Lui to decide. Started on IV Solumedrol, IV Pepcid and IV Benadryl for 72 hrs for allergic reaction that lead to eye swelling. Back to cath lab on 9/16 for ongoing CP. Anatomy:   LM-30% distal  LAD-widely patent mid stents  D1-widely patent stent proximally, distal 90% small caliber  Cx-widely patent mid and distal stents  RCA-diffusely diseased, 70% proximal, widely patent mid stent, 50% distal, dominant, DFR 0.94, FFR 0.87, IVUS MLA 5.72 mm²  RPDA-patent  LVEF-not done     Impression:  1. Patent LAD and circumflex stents. 2.  Borderline significant angiographic stenosis in proximal RCA however with IVUS and FFR testing, it is within medical management range. Plan:  1. Medical management. Offered GI to evaluate for atypical chest pain, patient refused to see GI.    R groin pain after LHC:  There is an SHYAM of 1.01 on the right. Imaging of the right lower extremity reveals normal multiphasic flow with no   evidence of hemodynamically significant stenosis. No evidence for   pseudoaneurysm was noted in the right groin. Left   The left SHYAM is 1.05. Will be on Percocet PRN pain.     On ASA, Brilinta, Diovan. No B-blocker or statin due to numerous allergies. F/U with PCP and Cardio. Discharge Medications:   Current Discharge Medication List        START taking these medications    Details   aspirin 81 MG chewable tablet Take 1 tablet by mouth daily  Qty: 30 tablet, Refills: 0      ticagrelor (BRILINTA) 90 MG TABS tablet Take 1 tablet by mouth 2 times daily  Qty: 60 tablet, Refills: 0      valsartan (DIOVAN) 80 MG tablet Take 1 tablet by mouth daily  Qty: 30 tablet, Refills: 0      oxyCODONE-acetaminophen (PERCOCET) 5-325 MG per tablet Take 1 tablet by mouth every 6 hours as needed for Pain for up to 5 days. Qty: 20 tablet, Refills: 0    Comments: Reduce doses taken as pain becomes manageable  Associated Diagnoses: Groin pain, right      bisacodyl (DULCOLAX) 10 MG suppository Place 1 suppository rectally daily as needed for Constipation  Qty: 10 suppository, Refills: 0           Current Discharge Medication List        Current Discharge Medication List        CONTINUE these medications which have NOT CHANGED    Details   POTASSIUM GLUCONATE PO Take by mouth      Cholecalciferol (VITAMIN D3) 125 MCG (5000 UT) TABS Take by mouth daily       cetirizine (ZYRTEC) 5 MG tablet Take 5 mg by mouth daily      dilTIAZem (CARDIZEM CD) 120 MG extended release capsule TAKE 1 CAPSULE BY MOUTH TWICE DAILY  Qty: 180 capsule, Refills: 3      Mag Aspart-Potassium Aspart (POTASSIUM & MAGNESIUM ASPARTAT PO) Take 1 tablet by mouth daily       Zinc-Vitamin C  MG LOZG Take 1 lozenge by mouth daily       L-Lysine 1000 MG TABS Take 1 tablet by mouth every morning (before breakfast)       cyanocobalamin 1000 MCG tablet Take 1,000 mcg by mouth daily      nitroGLYCERIN (NITROSTAT) 0.4 MG SL tablet Place 1 tablet under the tongue every 5 minutes as needed for Chest pain up to max of 3 total doses. If no relief after 1 dose, call 911.   Qty: 25 tablet, Refills: 3      estradiol (ESTRACE) 0.1 MG/GM vaginal cream PLACE 0. 5 GRAM VAGINALLY 3 TIMES A WEEK  Qty: 42.5 g, Refills: 0           Current Discharge Medication List        STOP taking these medications       Inclisiran Sodium (LEQVIO) 284 MG/1.5ML SOSY Comments:   Reason for Stopping:             Discharge Exam:    BP (!) 156/80   Pulse 71   Temp 97.6 °F (36.4 °C) (Oral)   Resp 18   Ht 5' 4\" (1.626 m)   Wt 192 lb 10.9 oz (87.4 kg)   SpO2 95%   BMI 33.07 kg/m²   General appearance:  Appears comfortable. Well nourished  Eyes: Sclera clear, pupils equal  ENT: Moist mucus membranes, no thrush. Trachea midline. Cardiovascular: Regular rhythm, normal S1, S2. No murmur, gallop, rub. No edema in lower extremities  Respiratory: Clear to auscultation bilaterally, no wheeze, good inspiratory effort  Gastrointestinal: Abdomen soft, obese, non-tender, not distended, normal bowel sounds  Musculoskeletal: No cyanosis in digits, neck supple  Neurology: Cranial nerves grossly intact. Alert and oriented in time, place and person. No speech or motor deficits  Psychiatry: Anxious affect. Not agitated  Skin: Warm, dry, normal turgor, no rash  Brisk capillary refill, peripheral pulses palpable        Labs:  For convenience and continuity at follow-up the following most recent labs are provided:    Lab Results   Component Value Date/Time    WBC 15.3 09/17/2022 10:09 AM    HGB 13.3 09/17/2022 10:09 AM    HCT 40.8 09/17/2022 10:09 AM    MCV 85.2 09/17/2022 10:09 AM     09/17/2022 10:09 AM     09/17/2022 10:09 AM    K 3.3 09/17/2022 10:09 AM    K 4.4 09/16/2022 06:19 AM    CL 98 09/17/2022 10:09 AM    CO2 20 09/17/2022 10:09 AM    BUN 19 09/17/2022 10:09 AM    CREATININE 0.8 09/17/2022 10:09 AM    CALCIUM 9.2 09/17/2022 10:09 AM    PHOS 4.3 01/16/2017 05:52 PM    BNP 94.0 11/19/2017 10:03 AM    ALKPHOS 172 09/13/2022 11:05 AM    ALT 12 09/13/2022 11:05 AM    AST 24 09/13/2022 11:05 AM    BILITOT 0.5 09/13/2022 11:05 AM    BILIDIR <0.2 12/20/2021 08:08 AM    LABALBU 4.7 09/13/2022 11:05 AM    LDLCALC 142 06/29/2022 11:59 AM    TRIG 277 06/29/2022 11:59 AM     Lab Results   Component Value Date    INR 1.29 (H) 02/25/2021    INR 1.21 (H) 02/19/2021       Radiology:  Echo Complete    Result Date: 9/14/2022  Transthoracic Echocardiography Report (TTE)  Demographics   Patient Name       Magdalena ALFARO   Date of Study      09/14/2022        Gender              Female   Patient Number     [id number]        Date of Birth       1955   Visit Number       [id number]         Age                 74 year(s)   Accession Number   [id number]        Room Number         4399   Corporate ID       [id number]          Katrine Saint, [facility]   Ordering Physician eNyda Lucas MD  Interpreting        Vamsi Case MD                                       Physician  Procedure Type of Study   TTE procedure:ECHOCARDIOGRAM COMPLETE 2D W DOPPLER W COLOR. Procedure Date Date: 09/14/2022 Start: 09:33 AM Study Location: [facility] - Echo Lab Technical Quality: Adequate visualization Indications:Chest pain. Patient Status: Routine Height: 64 inches BP: 134/76 mmHg  Conclusions   Summary  Left ventricular cavity size is normal with mild concentric left ventricular  hypertrophy. Ejection fraction is visually estimated to be 60-65%. No regional wall motion abnormalities are noted. Grade I diastolic dysfunction with normal LV filling pressures. E/e' = 12.3. Left atrium is dilated. Trivial aortic regurgitation. Mitral annular calcification is present. Mild tricuspid regurgitation. RVSP = 32 mmHG. Signature   ------------------------------------------------------------------  Electronically signed by Vamsi Case MD (Interpreting  physician) on 09/14/2022 at 04:55 PM  ------------------------------------------------------------------   Findings   Left Ventricle  Left ventricular cavity size is normal with mild concentric left ventricular  hypertrophy.   Ejection fraction is visually estimated to be 60-65%. No regional wall motion abnormalities are noted. Grade I diastolic dysfunction with normal LV filling pressures. E/e' = 12.3. Mitral Valve  Mitral annular calcification is present. No evidence of mitral regurgitation. Left Atrium  Left atrium is dilated. Aortic Valve  The aortic valve is structurally normal.  Trivial aortic regurgitation. Aorta  The aortic root is normal in size. Right Ventricle  The right ventricle is normal in size and function. TAPSE: 2.30 cm. RVS  velocity: 13.2 cm/s. RVSP = 32 mmHG. Tricuspid Valve  Tricuspid valve is structurally normal.  Mild tricuspid regurgitation. Right Atrium  The right atrial size is normal.   Pulmonic Valve  The pulmonic valve appears structurally normal. No evidence of pulmonic  valve regurgitation. Pericardial Effusion  No pericardial effusion noted. Pleural Effusion  No pleural effusion. Miscellaneous  IVC size is normal (<2.1cm) and collapses > 50% with respiration consistent  with normal RA pressure (3mmHg).   M-Mode/2D Measurements (cm)   LV Diastolic Dimension: 1.54 cm LV Systolic Dimension: 7.96 cm  LV Septum Diastolic: 6.85 cm  LV PW Diastolic: 0.88 cm        AO Root Dimension: 3 cm                                  LA Dimension: 3.7 cm                                  LA Area: 16.4 cm2  LVOT: 2 cm                      LA volume/Index: 47.7 ml  Doppler Measurements   AV Peak Velocity: 160 cm/s     MV Peak E-Wave: 93.8 cm/s  AV Peak Gradient: 10.24 mmHg   MV Peak A-Wave: 96.4 cm/s  AV Mean Gradient: 6 mmHg       MV E/A Ratio: 0.97  LVOT Peak Velocity: 181 cm/s  AV Area (Continuity):2.78 cm2   TR Velocity:179 cm/s  TR Gradient:12.82 mmHg         MV Deceleration Time: 285 msec   E' Septal Velocity: 7.18 cm/s  E' Lateral Velocity: 8.16 cm/s   Aortic Valve   Peak Velocity: 160 cm/s     Mean Velocity: 113 cm/s  Peak Gradient: 10.24 mmHg   Mean Gradient: 6 mmHg  Area (continuity): 2.78 cm2  AV VTI: 30.4 cm  Aorta   Aortic right. Imaging of the right lower extremity reveals normal multiphasic flow with no evidence of hemodynamically significant stenosis. No evidence for pseudoaneurysm was noted in the right groin. Left The left SHYAM is 1.05. The patient was seen and examined on day of discharge and this discharge summary is in conjunction with any daily progress note from day of discharge. Time Spent on discharge is 45 minutes  in the examination, evaluation, counseling and review of medications and discharge plan. Note that more than 30 minutes was spent in preparing discharge papers, discussing discharge with patient, medication review, etc.       Signed:    Madeline Sheppard MD   9/17/2022      Thank you Trevon Rowland MD for the opportunity to be involved in this patient's care.  If you have any questions or concerns please feel free to contact me at [address] [place] [address]

## 2022-09-17 NOTE — FLOWSHEET NOTE
Emotional and tearful about noise from vents on unit. Support provided, pleased with transfer to new room. Report given to MARSHALL Nguyen. To be transferred to [id number] on tele.

## 2022-09-17 NOTE — PROGRESS NOTES
Hospitalist Progress Note      PCP: Dorian Sanchez MD    Date of Admission: 9/13/2022    Chief Complaint: Chest pain    Hospital Course:  74 y.o. female with history of CAD s/p stents, pAfib/atrial flutter s/p ablation not on anticoagulation, HTN, hyperlipidemia, many listed allergies came to ER with acute onset of chest pain radiating to both arms and jaw. Describes heavy pressure on chest associated with SOB. SBP in 220s in ED. Admitted as inpatient to CVU for NSTEMI and allergic reaction. Underwent LHC on 9/13:    LM-30% distal  LAD-widely patent mid stent  D1-widely patent proximal stent, 90% distal to the stent which is very small caliber  Cx-80% mid diffuse, 100% distal  OM1-normal  RCA-70% proximal, widely patent mid stent, 50% just distal to the stent  RPDA-patent with luminal regularities  LVEF-60%  PCI: Circumflex 100% to 0% distal with a 2.25 mm x 38 mm Xience [place] ALEC and then 80% to 0% mid with overlapping stents, including overlapping the distal stent, with distal to proximal 3.0 mm x 38 mm Xience [place] ALEC and 3.25 mm x 18 mm Xience [address] ALEC. Impression:  1. Occlusion of the distal circumflex along with severe stenosis of the mid circumflex ultimately revascularized with ALEC x3.  2.  Small vessel disease. 3.  Normal LV systolic function. 4.  Elevated LVEDP. Plan:  1. DAPT with aspirin and Brilinta. 2.  Trial of Zetia as it is intolerant of statins. [place] has been ordered but not started yet. 3.  Intolerant of beta-blockers. Consider trial of bisoprolol if patient will agree. 4.  Add valsartan for better blood pressure control started 80 mg daily. Titrate as hemodynamics allow. 5.  Consider return for FFR of proximal RCA versus nuclear stress testing. Her primary cardiologist Dr. Suleman Coburn to decide. Started on IV Solumedrol, IV Pepcid and IV Benadryl for 72 hrs for allergic reaction that lead to eye swelling. Back to cath lab on 9/16 for ongoing CP.   Anatomy: chloride, ondansetron **OR** ondansetron, polyethylene glycol, [DISCONTINUED] acetaminophen **OR** acetaminophen, perflutren lipid microspheres, sodium chloride flush, sodium chloride, acetaminophen, labetalol      Intake/Output Summary (Last 24 hours) at 9/17/2022 1417  Last data filed at 9/17/2022 0315  Gross per 24 hour   Intake 662.25 ml   Output 0 ml   Net 662.25 ml         Physical Exam Performed:    BP (!) 156/80   Pulse 71   Temp 97.6 °F (36.4 °C) (Oral)   Resp 18   Ht 5' 4\" (1.626 m)   Wt 192 lb 10.9 oz (87.4 kg)   SpO2 95%   BMI 33.07 kg/m²     General appearance:  Appears comfortable. Well nourished  Eyes: Sclera clear, pupils equal  ENT: Moist mucus membranes, no thrush. Trachea midline. Cardiovascular: Regular rhythm, normal S1, S2. No murmur, gallop, rub. No edema in lower extremities  Respiratory: Clear to auscultation bilaterally, no wheeze, good inspiratory effort  Gastrointestinal: Abdomen soft, obese, non-tender, not distended, normal bowel sounds  Musculoskeletal: No cyanosis in digits, neck supple  Neurology: Cranial nerves grossly intact. Alert and oriented in time, place and person. No speech or motor deficits  Psychiatry: Anxious affect. Not agitated  Skin: Warm, dry, normal turgor, no rash  Brisk capillary refill, peripheral pulses palpable       Labs:   Recent Labs     09/15/22  0614 09/16/22  0848 09/17/22  1009   WBC 17.8* 18.4* 15.3*   HGB 12.3 13.2 13.3   HCT 36.8 38.8 40.8    316 314       Recent Labs     09/15/22  0614 09/16/22  0619 09/17/22  1009    141 130*   K 4.3 4.4 3.3*    103 98*   CO2 26 25 20*   BUN 14 14 19   CREATININE 0.7 0.8 0.8   CALCIUM 9.3 10.1 9.2       No results for input(s): AST, ALT, BILIDIR, BILITOT, ALKPHOS in the last 72 hours. No results for input(s): INR in the last 72 hours.   Recent Labs     09/16/22  0015 09/16/22  0619 09/16/22  1631   TROPONINI 0.96* 1.02* 0.86*         Urinalysis:      Lab Results   Component Value Date/Time    BLOODU Trace 03/05/2021 03:56 PM    SPECGRAV 1.005 03/05/2021 03:56 PM    GLUCOSEU Negative 03/05/2021 03:56 PM       Radiology:  VL DUP LOWER EXTREMITY ARTERIES RIGHT         XR ABDOMEN (KUB) (SINGLE AP VIEW)   Final Result   No significant finding in the abdomen. XR CHEST PORTABLE   Final Result   No acute cardiopulmonary process. Assessment/Plan:    Active Hospital Problems    Diagnosis     PAF (paroxysmal atrial fibrillation) ([facility]) [I48.0]      Priority: High    Allergic reaction [T78.40XA]      Priority: Medium    Coronary atherosclerosis of native coronary artery [I25.10]      Priority: Medium    Obesity [E66.9]     Typical atrial flutter (HCC) [I48.3]     Bilateral carotid artery stenosis [I65.23]     NSTEMI (non-ST elevated myocardial infarction) ([facility] [address] [I21.4]     Essential hypertension [I10]     Depression [F32. A]     CAD (coronary artery disease) [I25.10]     Pure hypercholesterolemia [E78.00]      GI consult for rectal bleeding  Check occult stool  Continue ASA 81 mg daily  No statin due to listed allergies  Cardio consult for CP appreciated  Continue Brilinta and Diovan  R groin duplex does not show pseudoaneurysm  Bentyl PRN  Continue Valium PRN  Maalox PRN  NTG PRN  Need to keep on ASA and Brilinta for stent  Serial H/H    DVT Prophylaxis: SCD  Diet: ADULT DIET; Regular; Low Fat/Low Chol/High Fiber/2 gm Na  Code Status: Full Code  PT/OT Eval Status: not needed    Dispo - Home    Discussed with patient, Regina Jaimes (Cardio NP) and nursing. Now has rectal bleeding.        Florencia Lopes MD

## 2022-09-17 NOTE — PROGRESS NOTES
[address]   Cardiology Progress Note     Date: 9/17/2022  Admit Date: 9/13/2022     Reason for consultation:     Chief Complaint   Patient presents with    Tingling     Pt reports tingling in bilateral arms, reports chest pain and jaw tightness, cardiac HX (Sunna pt)        History of Present Illness: History obtained from patient and medical record. Aida Hernandez is a 74 y.o. female patient followed closely by Dr. Meryl Liu known to have CAD since 2012, s/p numerous stents, s/p STEMI due to D1 occlusion in 2017, most recent cath  2/4/2021 at [facility] multivessel PCI to Distal LAD x 2 (2.5 x 15 and 2.5 x 18) and D1 x 1 (2.25 x 8),     Presents to our ED this am after waking up with severe chest pressure like \"an elephant sitting on me\" with radiation down her left arm. This is exactly how she presented with her STEMI. Initial EKG without ST elevation although new TWI in V1V2. She was markedly hypotensive to 469 systolic upon presentation, which improved to about 140-150 after morphine and nitro. She hasn't taken nitro at home because it gives her an ubearable headace. She is not currently on a statin due severe myalgias and was prescribed Leqvio, but there was a $4000 deductible that she couldn't afford. She had been placed on DAPT at [facility] but that was D.C'd by Dr. Meryl Liu recently as it was more than a year since her last stents. She also has EP history including ablations for fib and flutter (per consult note)    Interval Hx: Today, she is feeling ok. Chest feels sore like a bruise. Walked this AM and her chest and breathing felt ok. Tele stable. Right groin procedure site c/d/I. Mild bruising. Good pulses, color, warmth, and sensation to that extremity. Pt reports significant pain to groin. Patient seen and examined. Clinical notes reviewed. Telemetry reviewed. No new complaints today. No major events overnight.    Denies having chest pain, palpitations, shortness of breath, orthopnea/PND, cough, or dizziness at the time of this visit. Past Medical History:  Past Medical History:   Diagnosis Date    Allergic rhinitis, cause unspecified 12/04/2010    Anxiety     Arthritis     Bilateral carotid artery stenosis     < 50% bilaterally    CAD (coronary artery disease)     angioplasty with stent at [facility] Dr. Derek Sanabria, here Dr. Brian Everett at [facility],     Chronic kidney disease     frequency, urgency    Chronic pain     precordial, opiate dependent    Depression 01/22/2013    Erosive esophagitis 12/28/2016    Dr. Lesley Menjivar; PPI started; LA Class A, Sphincter of Oddi manometry and sphincterotomy if symptoms don't improve. Essential hypertension 04/11/2017    HSV infection     Hypercholesterolemia 06/06/2014    Irritable bowel syndrome with diarrhea 09/27/2016    Migraine headache 06/06/2014    Nonerosive nonspecific gastritis 12/28/2016    Dr. Lesley Menjivar; body and antrum    OAB (overactive bladder) 03/14/2014        Past Surgical History:    has a past surgical history that includes Appendectomy; Total abdominal hysterectomy w/ bilateral salpingoophorectomy; Cholecystectomy; Tubal ligation; Tonsillectomy; other surgical history; Coronary angioplasty with stent (10/2012); Percutaneous Transluminal Coronary Angio (10/2012); Cardiac catheterization (12/07/2013); Hysterectomy; polypectomy; Cystoscopy (04/2014); Upper gastrointestinal endoscopy; Wrist surgery (Left, 05/21/2015); Abdominal adhesion surgery; Colonoscopy; Endoscopy, colon, diagnostic; Cosmetic surgery; fracture surgery; ERCP (01/25/2017); sinus surgery; Hysterectomy, total abdominal; and Coronary angioplasty with stent (02/04/2021). Social History:  Reviewed. reports that she quit smoking about 15 years ago. Her smoking use included cigarettes. She has a 10.00 pack-year smoking history. She has never used smokeless tobacco. She reports that she does not drink alcohol and does not use drugs. Allergies:   Allergies Allergen Reactions    Hydralazine Anaphylaxis    Shellfish-Derived Products Anaphylaxis and Swelling    Xarelto [Rivaroxaban] Swelling    Asa [Aspirin] Nausea Only    Chicken Allergy     Crestor [Rosuvastatin]      Myalgia      Fenofibrate      Myalgia      Hctz [Hydrochlorothiazide]     Lipitor [Atorvastatin Calcium]     Lipitor [Atorvastatin]      Myalgia      Lisinopril     Metoprolol      Weight gain    Mometasone Furoate Swelling    Nasonex [Mometasone] Swelling    Nsaids      GI PAIN    Other      Any melons, grilled food, turkey, red/green/yellow peppers. Pineapple     Plavix [Clopidogrel] Hives, Itching and Swelling    Pork Allergy     Sulfa Antibiotics Swelling    Contrast [Iodides] Swelling and Rash    Flagyl [Metronidazole] Nausea And Vomiting       Family History:  Reviewed. family history includes Heart Disease in her brother, father, maternal uncle, mother, and another family member; High Blood Pressure in her sister and sister; Hypertension in her father and mother; Other in an other family member; Stroke in her sister and another family member. Denies family history of sudden cardiac death, arrhythmia, premature CAD    Home Meds:  Prior to Visit Medications    Medication Sig Taking?  Authorizing Provider   POTASSIUM GLUCONATE PO Take by mouth  Historical Provider, MD   Cholecalciferol (VITAMIN D3) 125 MCG (5000 UT) TABS Take by mouth daily   Historical Provider, MD   cetirizine (ZYRTEC) 5 MG tablet Take 5 mg by mouth daily  Historical Provider, MD   dilTIAZem (CARDIZEM CD) 120 MG extended release capsule TAKE 1 CAPSULE BY MOUTH TWICE DAILY  Milderd Charter, MD   Mag Aspart-Potassium Aspart (POTASSIUM & MAGNESIUM ASPARTAT PO) Take 1 tablet by mouth daily   Historical Provider, MD   Zinc-Vitamin C  MG LOZG Take 1 lozenge by mouth daily   Historical Provider, MD   L-Lysine 1000 MG TABS Take 1 tablet by mouth every morning (before breakfast)   Historical Provider, MD   cyanocobalamin 1000 MCG tablet Take 1,000 mcg by mouth daily  Historical Provider, MD   nitroGLYCERIN (NITROSTAT) 0.4 MG SL tablet Place 1 tablet under the tongue every 5 minutes as needed for Chest pain up to max of 3 total doses. If no relief after 1 dose, call 911.   Patient not taking: No sig reported  Piotr Clayton MD   estradiol (ESTRACE) 0.1 MG/GM vaginal cream PLACE 0.5 GRAM VAGINALLY 3 TIMES A WEEK  Piotr Clayton MD        Scheduled Meds:   sodium chloride flush  5-40 mL IntraVENous 2 times per day    methylPREDNISolone  40 mg IntraVENous Q6H    polyethylene glycol  17 g Oral Daily    sodium chloride flush  5-40 mL IntraVENous 2 times per day    cetirizine  5 mg Oral Daily    dilTIAZem  120 mg Oral BID    aspirin  81 mg Oral Daily    famotidine (PEPCID) injection  20 mg IntraVENous BID    sodium chloride flush  5-40 mL IntraVENous 2 times per day    ticagrelor  90 mg Oral BID    valsartan  80 mg Oral Daily     Continuous Infusions:   sodium chloride      sodium chloride      sodium chloride Stopped (09/17/22 0145)    sodium chloride       PRN Meds:diazePAM, sodium chloride flush, sodium chloride, aluminum & magnesium hydroxide-simethicone, HYDROmorphone, oxyCODONE-acetaminophen, nitroGLYCERIN, sodium chloride flush, sodium chloride, ondansetron **OR** ondansetron, polyethylene glycol, [DISCONTINUED] acetaminophen **OR** acetaminophen, perflutren lipid microspheres, sodium chloride flush, sodium chloride, acetaminophen, labetalol     Review of Systems:  Constitutional: Negative for fever, night sweats, chills,  Skin: Negative for rash, pruritus, bleeding, or bruising    HEENT: Negative for vision changes, ringing in the ears, dysphagia  Respiratory: Reviewed in HPI  Cardiovascular: Reviewed in HPI  Gastrointestinal: Negative for abdominal pain, N/V/D, constipation, or black/tarry stools  Genito-Urinary: Negative for dysuria, incontinence, or hematuria  Musculoskeletal: Negative for joint swelling, muscle pain, or injuries  Neurological/Psych: Negative for confusion, seizures, headaches, or TIA-like symptoms. No anxiety or depression. Physical Examination:  Vitals:    09/17/22 0745   BP: (!) 157/95   Pulse: 68   Resp: 18   Temp: 97.6 °F (36.4 °C)   SpO2: 97%      In: 662.3 [P.O.:480; I.V.:182.3]  Out: 0    Wt Readings from Last 3 Encounters:   09/17/22 192 lb 10.9 oz (87.4 kg)   07/22/22 194 lb (88 kg)   07/19/22 198 lb 9.6 oz (90.1 kg)       Intake/Output Summary (Last 24 hours) at 9/17/2022 0940  Last data filed at 9/17/2022 0315  Gross per 24 hour   Intake 662.25 ml   Output 0 ml   Net 662.25 ml       Telemetry: Personally Reviewed  - Sinus rhythm   Constitutional: Cooperative and in no apparent distress, and appears well nourished  Skin: Warm and pink; no pallor, cyanosis, clubbing, or bruising   HEENT: Symmetric and normocephalic. PERRL. Conjunctiva pink with clear sclera. Mucus membranes moist.   Cardiovascular: Regular rate and rhythm. S1/S2 present without murmurs, no rubs or gallops. Peripheral pulses 2+, capillary refill < 3 seconds. No elevation of JVP. No peripheral edema  Respiratory: Respirations symmetric and unlabored. Lungs clear to auscultation bilaterally, no wheezing, crackles, or rhonchi  Gastrointestinal: Abdomen soft and round. Bowel sounds active without tenderness. Musculoskeletal: Bilateral upper and lower extremity strength 5/5 with full ROM  Neurologic/Psych: Awake and orientated to person, place and time. Calm affect, appropriate mood    Pertinent labs, diagnostic, device, and imaging results reviewed as a part of this visit    Labs:    BMP:   Recent Labs     09/15/22  0614 09/16/22  0619    141   K 4.3 4.4    103   CO2 26 25   BUN 14 14   CREATININE 0.7 0.8     Estimated Creatinine Clearance: 75 mL/min (based on SCr of 0.8 mg/dL).    CBC:   Recent Labs     09/15/22  0614 09/16/22  0848   WBC 17.8* 18.4*   HGB 12.3 13.2   HCT 36.8 38.8   MCV 84.4 85.1    316     Thyroid: Lab Results   Component Value Date/Time    TSH 4.67 2021 08:08 AM     Lipids:   Lab Results   Component Value Date/Time    CHOL 244 2022 11:59 AM    HDL 47 2022 11:59 AM    TRIG 277 2022 11:59 AM     LFTS:   Lab Results   Component Value Date/Time    ALT 12 2022 11:05 AM    AST 24 2022 11:05 AM     2017 05:58 AM    ALKPHOS 172 2022 11:05 AM    PROT 7.9 2022 11:05 AM    PROT 6.2 2013 11:49 AM    AGRATIO 1.5 2022 11:05 AM    BILITOT 0.5 2022 11:05 AM     Cardiac Enzymes:   Lab Results   Component Value Date/Time    CKTOTAL 46 2018 01:04 PM    CKMB 3.1 2017 07:46 PM    TROPONINI 0.86 2022 04:31 PM    TROPONINI 1.02 2022 06:19 AM    TROPONINI 0.96 2022 12:15 AM     Coags:   Lab Results   Component Value Date/Time    PROTIME 15.0 2021 07:10 AM    INR 1.29 2021 07:10 AM       EC22  Normal sinus rhythm  Left axis deviation  Possible Lateral infarct , age undetermined  When compared with ECG of 13-SEP-2022 12:30,  Nonspecific T wave abnormality now evident in Lateral leads    ECHO:  22  Summary   Left ventricular cavity size is normal with mild concentric left ventricular   hypertrophy. Ejection fraction is visually estimated to be 60-65%. No regional wall motion abnormalities are noted. Grade I diastolic dysfunction with normal LV filling pressures. E/e' = 12.3. Left atrium is dilated. Trivial aortic regurgitation. Mitral annular calcification is present. Mild tricuspid regurgitation. RVSP = 32 mmHG.     Cath: 22  Anatomy:   LM-30% distal  LAD-widely patent mid stent  D1-widely patent proximal stent, 90% distal to the stent which is very small caliber  Cx-80% mid diffuse, 100% distal  OM1-normal  RCA-70% proximal, widely patent mid stent, 50% just distal to the stent  RPDA-patent with luminal regularities  LVEF-60%  PCI: Circumflex 100% to 0% distal with a 2.25 mm x 38 mm Xience [facility] and then 80% to 0% mid with overlapping stents, including overlapping the distal stent, with distal to proximal 3.0 mm x 38 mm Xience Constance ALEC and 3.25 mm x 18 mm Xience Patsey Jolanta ALEC. Impression:  1. Occlusion of the distal circumflex along with severe stenosis of the mid circumflex ultimately revascularized with ALEC x3.  2.  Small vessel disease. 3.  Normal LV systolic function. 4.  Elevated LVEDP. Plan:  1. DAPT with aspirin and Brilinta. 2.  Trial of Zetia as it is intolerant of statins. Prince Haagensen has been ordered but not started yet. 3.  Intolerant of beta-blockers. Consider trial of bisoprolol if patient will agree. 4.  Add valsartan for better blood pressure control started 80 mg daily. Titrate as hemodynamics allow. 5.  Consider return for FFR of proximal RCA versus nuclear stress testing. Her primary cardiologist Dr. Gio Roche to decide. [facility]: 9/16/22  Anatomy:   LM-30% distal  LAD-widely patent mid stents  D1-widely patent stent proximally, distal 90% small caliber  Cx-widely patent mid and distal stents  RCA-diffusely diseased, 70% proximal, widely patent mid stent, 50% distal, dominant, DFR 0.94, FFR 0.87, IVUS MLA 5.72 mm²  RPDA-patent  LVEF-not done     Impression:  1. Patent LAD and circumflex stents. 2.  Borderline significant angiographic stenosis in proximal RCA however with IVUS and FFR testing, it is within medical management range. Plan:  1. Medical management.        Problem List:   Patient Active Problem List    Diagnosis Date Noted    PAF (paroxysmal atrial fibrillation) ([facility] [address] 02/18/2021    Allergic reaction 09/13/2022    Elevated lipoprotein A level 07/18/2022    Coronary atherosclerosis of native coronary artery 07/18/2022    Bruit of right carotid artery 02/18/2021    Obesity 04/29/2021    Typical atrial flutter (HCC)     Atypical atrial flutter (HCC)     Bilateral carotid artery stenosis     Mild episode of recurrent major depressive disorder ([facility] 12/18/2020    NSTEMI (non-ST elevated myocardial infarction) ([facility] 11/19/2017    Essential hypertension 04/11/2017    Mood disorder due to a general medical condition     Irritable bowel syndrome with diarrhea 09/27/2016    Postmenopausal atrophic vaginitis 08/05/2016    Left wrist fracture     Hypercholesterolemia 06/06/2014    Migraine headache 06/06/2014    OAB (overactive bladder) 03/14/2014    Depression 01/22/2013    Chronic pain 01/17/2013    CAD (coronary artery disease)     HSV infection     Pure hypercholesterolemia 12/04/2010    Allergic rhinitis 12/04/2010        Assessment and Plan:     Coronary artery disease / NSTEMI   ~ hx of multiple PCIs, STEMI '17  ~ [facility] 9/13/22: s/p PCI of Cx with ALEC x3. Residual 70% RCA stenosis. ~ returned to cath lab d/t persistent chest pain for FFR evaluation of RCA. [facility] 9/16/22: revealed patent LAD and Cx stents. RCA with FFR proven nonobstructive disease. ~ continue DAPT with aspirin and Brilinta. Start Zetia as she is intolerant to statins. Will work on coverage with PCSK9 inhibitor. Start bisoprolol 5 mg daily at discharge. Paroxysmal atrial fibrillation   Hyperlipidemia- uncontrolled   Hypertension- improved with addition of valsartan      AM labs pending. Significant groin pain s/p [facility]. Obtain duplex, if unremarkable ok for discharge. Follow up in office in 1-2 weeks. Multiple medical conditions with risk of decompensation. All pertinent information and plan of care discussed with the physician. All questions and concerns were addressed to the patient. Alternatives to my treatment were discussed. I have discussed the above stated plan with patient and the nurse. The patient verbalized understanding and agreed with the plan. Thank you for allowing to us to participate in the care of Lei Roger. Total visit time > 35 minutes; > 50% spend counseling / coordinating care.  I reviewed interval history, physical exam, review of data including

## 2022-09-17 NOTE — PLAN OF CARE
Problem: Pain  Goal: Verbalizes/displays adequate comfort level or baseline comfort level  Outcome: Completed     Problem: Discharge Planning  Goal: Discharge to home or other facility with appropriate resources  Outcome: Completed  Flowsheets (Taken 9/17/2022 4100)  Discharge to home or other facility with appropriate resources:   Identify barriers to discharge with patient and caregiver   Arrange for needed discharge resources and transportation as appropriate   Identify discharge learning needs (meds, wound care, etc)   Refer to discharge planning if patient needs post-hospital services based on physician order or complex needs related to functional status, cognitive ability or social support system     Problem: Safety - Adult  Goal: Free from fall injury  Outcome: Completed     Problem: ABCDS Injury Assessment  Goal: Absence of physical injury  Outcome: Completed     Problem: Hematologic - Adult  Goal: Maintains hematologic stability  9/17/2022 1308 by Erik Hollingsworth RN  Outcome: Completed  Flowsheets (Taken 9/17/2022 6514)  Maintains hematologic stability: Assess for signs and symptoms of bleeding or hemorrhage  9/17/2022 0307 by Kendra Barrno RN  Outcome: Progressing

## 2022-09-18 VITALS
HEIGHT: 64 IN | RESPIRATION RATE: 16 BRPM | DIASTOLIC BLOOD PRESSURE: 93 MMHG | SYSTOLIC BLOOD PRESSURE: 144 MMHG | TEMPERATURE: 97.8 F | BODY MASS INDEX: 32.9 KG/M2 | HEART RATE: 63 BPM | WEIGHT: 192.68 LBS | OXYGEN SATURATION: 96 %

## 2022-09-18 LAB
ANION GAP SERPL CALCULATED.3IONS-SCNC: 11 MMOL/L (ref 3–16)
BASOPHILS ABSOLUTE: 0 K/UL (ref 0–0.2)
BASOPHILS RELATIVE PERCENT: 0.1 %
BUN BLDV-MCNC: 22 MG/DL (ref 7–20)
CALCIUM SERPL-MCNC: 9.2 MG/DL (ref 8.3–10.6)
CHLORIDE BLD-SCNC: 102 MMOL/L (ref 99–110)
CO2: 23 MMOL/L (ref 21–32)
CREAT SERPL-MCNC: 0.7 MG/DL (ref 0.6–1.2)
EOSINOPHILS ABSOLUTE: 0 K/UL (ref 0–0.6)
EOSINOPHILS RELATIVE PERCENT: 0 %
GFR AFRICAN AMERICAN: >60
GFR NON-AFRICAN AMERICAN: >60
GLUCOSE BLD-MCNC: 103 MG/DL (ref 70–99)
HCT VFR BLD CALC: 38.9 % (ref 36–48)
HCT VFR BLD CALC: 41.9 % (ref 36–48)
HEMOGLOBIN: 13.2 G/DL (ref 12–16)
HEMOGLOBIN: 13.8 G/DL (ref 12–16)
LYMPHOCYTES ABSOLUTE: 0.7 K/UL (ref 1–5.1)
LYMPHOCYTES RELATIVE PERCENT: 4.8 %
MCH RBC QN AUTO: 28.8 PG (ref 26–34)
MCHC RBC AUTO-ENTMCNC: 33.9 G/DL (ref 31–36)
MCV RBC AUTO: 84.9 FL (ref 80–100)
MONOCYTES ABSOLUTE: 1.2 K/UL (ref 0–1.3)
MONOCYTES RELATIVE PERCENT: 8.1 %
NEUTROPHILS ABSOLUTE: 13.2 K/UL (ref 1.7–7.7)
NEUTROPHILS RELATIVE PERCENT: 87 %
PDW BLD-RTO: 13.9 % (ref 12.4–15.4)
PLATELET # BLD: 305 K/UL (ref 135–450)
PMV BLD AUTO: 8.1 FL (ref 5–10.5)
POTASSIUM SERPL-SCNC: 3.6 MMOL/L (ref 3.5–5.1)
RBC # BLD: 4.58 M/UL (ref 4–5.2)
SODIUM BLD-SCNC: 136 MMOL/L (ref 136–145)
TROPONIN: 0.97 NG/ML
WBC # BLD: 15.1 K/UL (ref 4–11)

## 2022-09-18 PROCEDURE — 36415 COLL VENOUS BLD VENIPUNCTURE: CPT

## 2022-09-18 PROCEDURE — 85014 HEMATOCRIT: CPT

## 2022-09-18 PROCEDURE — 85018 HEMOGLOBIN: CPT

## 2022-09-18 PROCEDURE — 85025 COMPLETE CBC W/AUTO DIFF WBC: CPT

## 2022-09-18 PROCEDURE — 80048 BASIC METABOLIC PNL TOTAL CA: CPT

## 2022-09-18 PROCEDURE — 6370000000 HC RX 637 (ALT 250 FOR IP): Performed by: INTERNAL MEDICINE

## 2022-09-18 PROCEDURE — 2580000003 HC RX 258: Performed by: INTERNAL MEDICINE

## 2022-09-18 PROCEDURE — 84484 ASSAY OF TROPONIN QUANT: CPT

## 2022-09-18 PROCEDURE — 99233 SBSQ HOSP IP/OBS HIGH 50: CPT | Performed by: NURSE PRACTITIONER

## 2022-09-18 PROCEDURE — 6360000002 HC RX W HCPCS: Performed by: INTERNAL MEDICINE

## 2022-09-18 RX ORDER — BISOPROLOL FUMARATE 5 MG/1
5 TABLET ORAL DAILY
Qty: 30 TABLET | Refills: 0 | Status: SHIPPED | OUTPATIENT
Start: 2022-09-18 | End: 2022-10-14 | Stop reason: SDUPTHER

## 2022-09-18 RX ADMIN — ONDANSETRON 4 MG: 4 TABLET, ORALLY DISINTEGRATING ORAL at 12:27

## 2022-09-18 RX ADMIN — OXYCODONE AND ACETAMINOPHEN 1 TABLET: 5; 325 TABLET ORAL at 01:10

## 2022-09-18 RX ADMIN — DILTIAZEM HYDROCHLORIDE 120 MG: 120 CAPSULE, COATED, EXTENDED RELEASE ORAL at 08:18

## 2022-09-18 RX ADMIN — DIAZEPAM 5 MG: 5 TABLET ORAL at 01:10

## 2022-09-18 RX ADMIN — SODIUM CHLORIDE, PRESERVATIVE FREE 10 ML: 5 INJECTION INTRAVENOUS at 08:19

## 2022-09-18 RX ADMIN — DICYCLOMINE HYDROCHLORIDE 10 MG: 10 CAPSULE ORAL at 05:40

## 2022-09-18 RX ADMIN — ASPIRIN 81 MG 81 MG: 81 TABLET ORAL at 08:18

## 2022-09-18 RX ADMIN — POLYETHYLENE GLYCOL 3350 17 G: 17 POWDER, FOR SOLUTION ORAL at 08:16

## 2022-09-18 RX ADMIN — TICAGRELOR 90 MG: 90 TABLET ORAL at 08:17

## 2022-09-18 RX ADMIN — OXYCODONE AND ACETAMINOPHEN 1 TABLET: 5; 325 TABLET ORAL at 05:40

## 2022-09-18 RX ADMIN — HYDROMORPHONE HYDROCHLORIDE 1 MG: 1 INJECTION, SOLUTION INTRAMUSCULAR; INTRAVENOUS; SUBCUTANEOUS at 08:19

## 2022-09-18 RX ADMIN — DICYCLOMINE HYDROCHLORIDE 10 MG: 10 CAPSULE ORAL at 08:18

## 2022-09-18 RX ADMIN — DIAZEPAM 5 MG: 5 TABLET ORAL at 09:21

## 2022-09-18 RX ADMIN — CETIRIZINE HYDROCHLORIDE 5 MG: 10 TABLET, FILM COATED ORAL at 08:17

## 2022-09-18 RX ADMIN — VALSARTAN 80 MG: 80 TABLET, FILM COATED ORAL at 08:17

## 2022-09-18 ASSESSMENT — PAIN DESCRIPTION - ORIENTATION
ORIENTATION: RIGHT
ORIENTATION: RIGHT

## 2022-09-18 ASSESSMENT — PAIN DESCRIPTION - DESCRIPTORS
DESCRIPTORS: ACHING;CRAMPING
DESCRIPTORS: ACHING;CRAMPING

## 2022-09-18 ASSESSMENT — PAIN DESCRIPTION - PAIN TYPE
TYPE: ACUTE PAIN
TYPE: ACUTE PAIN

## 2022-09-18 ASSESSMENT — PAIN DESCRIPTION - LOCATION
LOCATION: GROIN;CHEST

## 2022-09-18 ASSESSMENT — PAIN DESCRIPTION - ONSET
ONSET: ON-GOING
ONSET: ON-GOING

## 2022-09-18 ASSESSMENT — PAIN SCALES - GENERAL
PAINLEVEL_OUTOF10: 7
PAINLEVEL_OUTOF10: 8
PAINLEVEL_OUTOF10: 8
PAINLEVEL_OUTOF10: 7

## 2022-09-18 ASSESSMENT — PAIN DESCRIPTION - FREQUENCY
FREQUENCY: INTERMITTENT
FREQUENCY: INTERMITTENT

## 2022-09-18 NOTE — DISCHARGE SUMMARY
Hospital Medicine Discharge Summary    Patient: Ever Morgan     Gender: female  : 1955   Age: 74 y.o. MRN: [id number]    Admitting Physician: Jennifer Green MD  Discharge Physician: Jennifer Green MD     Code Status: Full Code     Admit Date: 2022   Discharge Date:   22    Disposition:  Home    Discharge Diagnoses: Active Hospital Problems    Diagnosis Date Noted    PAF (paroxysmal atrial fibrillation) ([facility] [address] [I48.0] 2021     Priority: High    Allergic reaction [T78.40XA] 2022     Priority: Medium    Coronary atherosclerosis of native coronary artery [I25.10] 2022     Priority: Medium    Obesity [E66.9] 2021    Typical atrial flutter (HCC) [I48.3]     Bilateral carotid artery stenosis [I65.23]     NSTEMI (non-ST elevated myocardial infarction) ([facility] [address] [I21.4] 2017    Essential hypertension [I10] 2017    Depression [F32. A] 2013    CAD (coronary artery disease) [I25.10]     Pure hypercholesterolemia [E78.00] 2010       Follow-up appointments:  one week    Outpatient to do list: F/U with PCP, Cardiology and Dr Shante Valladares (GI)    Condition at Discharge:  [facility] [place] Course:   74 y.o. female with history of CAD s/p stents, pAfib/atrial flutter s/p ablation not on anticoagulation, HTN, hyperlipidemia, many listed allergies came to ER with acute onset of chest pain radiating to both arms and jaw. Describes heavy pressure on chest associated with SOB. SBP in 220s in ED. Admitted as inpatient to CVU for NSTEMI and allergic reaction.   Underwent LHC on :     LM-30% distal  LAD-widely patent mid stent  D1-widely patent proximal stent, 90% distal to the stent which is very small caliber  Cx-80% mid diffuse, 100% distal  OM1-normal  RCA-70% proximal, widely patent mid stent, 50% just distal to the stent  RPDA-patent with luminal regularities  LVEF-60%  PCI: Circumflex 100% to 0% distal with a 2.25 mm x 38 mm Xience [place] ALEC and then 80% to 0% mid with overlapping stents, including overlapping the distal stent, with distal to proximal 3.0 mm x 38 mm Xience [place] ALEC and 3.25 mm x 18 mm Xience [address] ALEC. Impression:  1. Occlusion of the distal circumflex along with severe stenosis of the mid circumflex ultimately revascularized with ALEC x3.  2.  Small vessel disease. 3.  Normal LV systolic function. 4.  Elevated LVEDP. Plan:  1. DAPT with aspirin and Brilinta. 2.  Trial of Zetia as it is intolerant of statins. Marguerita Lias has been ordered but not started yet. 3.  Intolerant of beta-blockers. Consider trial of bisoprolol if patient will agree. 4.  Add valsartan for better blood pressure control started 80 mg daily. Titrate as hemodynamics allow. 5.  Consider return for FFR of proximal RCA versus nuclear stress testing. Her primary cardiologist Dr. Kacie Currie to decide. Started on IV Solumedrol, IV Pepcid and IV Benadryl for 72 hrs for allergic reaction that lead to eye swelling. Back to cath lab on 9/16 for ongoing CP. Anatomy:   LM-30% distal  LAD-widely patent mid stents  D1-widely patent stent proximally, distal 90% small caliber  Cx-widely patent mid and distal stents  RCA-diffusely diseased, 70% proximal, widely patent mid stent, 50% distal, dominant, DFR 0.94, FFR 0.87, IVUS MLA 5.72 mm²  RPDA-patent  LVEF-not done     Impression:  1. Patent LAD and circumflex stents. 2.  Borderline significant angiographic stenosis in proximal RCA however with IVUS and FFR testing, it is within medical management range. Plan:  1. Medical management. Offered GI to evaluate for atypical chest pain, patient refused to see GI.    R groin pain after LHC:  There is an SHYAM of 1.01 on the right. Imaging of the right lower extremity reveals normal multiphasic flow with no   evidence of hemodynamically significant stenosis. No evidence for   pseudoaneurysm was noted in the right groin. Left   The left SHYAM is 1.05.      Will be on Percocet PRN pain.    On ASA, Brilinta, Diovan and Bisoprolol added upon DC per Cardio. No statin due to numerous allergies. She complained of hematochezia on 9/17. Hg stable around 13. Offered GI consult again and she refused again. She wants to see her own GI, Dr Shante Valladares. DAPT cannot be stopped. F/U with PCP, Cardio and GI. Discharge Medications:   Current Discharge Medication List        START taking these medications    Details   bisoprolol (ZEBETA) 5 MG tablet Take 1 tablet by mouth daily  Qty: 30 tablet, Refills: 0      aspirin 81 MG chewable tablet Take 1 tablet by mouth daily  Qty: 30 tablet, Refills: 0      ticagrelor (BRILINTA) 90 MG TABS tablet Take 1 tablet by mouth 2 times daily  Qty: 60 tablet, Refills: 0      valsartan (DIOVAN) 80 MG tablet Take 1 tablet by mouth daily  Qty: 30 tablet, Refills: 0      oxyCODONE-acetaminophen (PERCOCET) 5-325 MG per tablet Take 1 tablet by mouth every 6 hours as needed for Pain for up to 5 days.   Qty: 20 tablet, Refills: 0    Comments: Reduce doses taken as pain becomes manageable  Associated Diagnoses: Groin pain, right      bisacodyl (DULCOLAX) 10 MG suppository Place 1 suppository rectally daily as needed for Constipation  Qty: 10 suppository, Refills: 0           Current Discharge Medication List        Current Discharge Medication List        CONTINUE these medications which have NOT CHANGED    Details   POTASSIUM GLUCONATE PO Take by mouth      Cholecalciferol (VITAMIN D3) 125 MCG (5000 UT) TABS Take by mouth daily       cetirizine (ZYRTEC) 5 MG tablet Take 5 mg by mouth daily      dilTIAZem (CARDIZEM CD) 120 MG extended release capsule TAKE 1 CAPSULE BY MOUTH TWICE DAILY  Qty: 180 capsule, Refills: 3      Mag Aspart-Potassium Aspart (POTASSIUM & MAGNESIUM ASPARTAT PO) Take 1 tablet by mouth daily       Zinc-Vitamin C  MG LOZG Take 1 lozenge by mouth daily       L-Lysine 1000 MG TABS Take 1 tablet by mouth every morning (before breakfast)       cyanocobalamin 1000 MCG tablet Take 1,000 mcg by mouth daily      nitroGLYCERIN (NITROSTAT) 0.4 MG SL tablet Place 1 tablet under the tongue every 5 minutes as needed for Chest pain up to max of 3 total doses. If no relief after 1 dose, call 911. Qty: 25 tablet, Refills: 3      estradiol (ESTRACE) 0.1 MG/GM vaginal cream PLACE 0.5 GRAM VAGINALLY 3 TIMES A WEEK  Qty: 42.5 g, Refills: 0           Current Discharge Medication List        STOP taking these medications       Inclisiran Sodium (LEQVIO) 284 MG/1.5ML SOSY Comments:   Reason for Stopping:             Discharge Exam:    BP (!) 144/93   Pulse 63   Temp 97.8 °F (36.6 °C) (Oral)   Resp 16   Ht 5' 4\" (1.626 m)   Wt 192 lb 10.9 oz (87.4 kg)   SpO2 96%   BMI 33.07 kg/m²   General appearance:  Appears comfortable. Well nourished  Eyes: Sclera clear, pupils equal  ENT: Moist mucus membranes, no thrush. Trachea midline. Cardiovascular: Regular rhythm, normal S1, S2. No murmur, gallop, rub. No edema in lower extremities  Respiratory: Clear to auscultation bilaterally, no wheeze, good inspiratory effort  Gastrointestinal: Abdomen soft, obese, non-tender, not distended, normal bowel sounds  Musculoskeletal: No cyanosis in digits, neck supple  Neurology: Cranial nerves grossly intact. Alert and oriented in time, place and person. No speech or motor deficits  Psychiatry: Anxious affect. Not agitated  Skin: Warm, dry, normal turgor, no rash  Brisk capillary refill, peripheral pulses palpable        Labs:  For convenience and continuity at follow-up the following most recent labs are provided:    Lab Results   Component Value Date/Time    WBC 15.1 09/18/2022 01:19 AM    HGB 13.2 09/18/2022 01:19 AM    HCT 38.9 09/18/2022 01:19 AM    MCV 84.9 09/18/2022 01:19 AM     09/18/2022 01:19 AM     09/17/2022 10:09 AM    K 3.3 09/17/2022 10:09 AM    K 4.4 09/16/2022 06:19 AM    CL 98 09/17/2022 10:09 AM    CO2 20 09/17/2022 10:09 AM    BUN 19 09/17/2022 10:09 AM    CREATININE 0.8 09/17/2022 10:09 AM    CALCIUM 9.2 09/17/2022 10:09 AM    PHOS 4.3 01/16/2017 05:52 PM    BNP 94.0 11/19/2017 10:03 AM    ALKPHOS 172 09/13/2022 11:05 AM    ALT 12 09/13/2022 11:05 AM    AST 24 09/13/2022 11:05 AM    BILITOT 0.5 09/13/2022 11:05 AM    BILIDIR <0.2 12/20/2021 08:08 AM    LABALBU 4.7 09/13/2022 11:05 AM    LDLCALC 142 06/29/2022 11:59 AM    TRIG 277 06/29/2022 11:59 AM     Lab Results   Component Value Date    INR 1.29 (H) 02/25/2021    INR 1.21 (H) 02/19/2021       Radiology:  Echo Complete    Result Date: 9/14/2022  Transthoracic Echocardiography Report (TTE)  Demographics   Patient Name       Bernadette ALFARO   Date of Study      09/14/2022        Gender              Female   Patient Number     [id number]        Date of Birth       1955   Visit Number       [id number]         Age                 74 year(s)   Accession Number   [id number]        Room Number         4044   Corporate ID       [id number]          Lorrie Elder [facility]   Ordering Physician Dianne Sandoval MD  Interpreting        Valerie Holcomb MD                                       Physician  Procedure Type of Study   TTE procedure:ECHOCARDIOGRAM COMPLETE 2D W DOPPLER W COLOR. Procedure Date Date: 09/14/2022 Start: 09:33 AM Study Location: [facility] - Echo Lab Technical Quality: Adequate visualization Indications:Chest pain. Patient Status: Routine Height: 64 inches BP: 134/76 mmHg  Conclusions   Summary  Left ventricular cavity size is normal with mild concentric left ventricular  hypertrophy. Ejection fraction is visually estimated to be 60-65%. No regional wall motion abnormalities are noted. Grade I diastolic dysfunction with normal LV filling pressures. E/e' = 12.3. Left atrium is dilated. Trivial aortic regurgitation. Mitral annular calcification is present. Mild tricuspid regurgitation. RVSP = 32 mmHG.    Signature   ------------------------------------------------------------------ Electronically signed by Loi Fitch MD (Interpreting  physician) on 09/14/2022 at 04:55 PM  ------------------------------------------------------------------   Findings   Left Ventricle  Left ventricular cavity size is normal with mild concentric left ventricular  hypertrophy. Ejection fraction is visually estimated to be 60-65%. No regional wall motion abnormalities are noted. Grade I diastolic dysfunction with normal LV filling pressures. E/e' = 12.3. Mitral Valve  Mitral annular calcification is present. No evidence of mitral regurgitation. Left Atrium  Left atrium is dilated. Aortic Valve  The aortic valve is structurally normal.  Trivial aortic regurgitation. Aorta  The aortic root is normal in size. Right Ventricle  The right ventricle is normal in size and function. TAPSE: 2.30 cm. RVS  velocity: 13.2 cm/s. RVSP = 32 mmHG. Tricuspid Valve  Tricuspid valve is structurally normal.  Mild tricuspid regurgitation. Right Atrium  The right atrial size is normal.   Pulmonic Valve  The pulmonic valve appears structurally normal. No evidence of pulmonic  valve regurgitation. Pericardial Effusion  No pericardial effusion noted. Pleural Effusion  No pleural effusion. Miscellaneous  IVC size is normal (<2.1cm) and collapses > 50% with respiration consistent  with normal RA pressure (3mmHg).   M-Mode/2D Measurements (cm)   LV Diastolic Dimension: 0.46 cm LV Systolic Dimension: 6.39 cm  LV Septum Diastolic: 5.06 cm  LV PW Diastolic: 3.08 cm        AO Root Dimension: 3 cm                                  LA Dimension: 3.7 cm                                  LA Area: 16.4 cm2  LVOT: 2 cm                      LA volume/Index: 47.7 ml  Doppler Measurements   AV Peak Velocity: 160 cm/s     MV Peak E-Wave: 93.8 cm/s  AV Peak Gradient: 10.24 mmHg   MV Peak A-Wave: 96.4 cm/s  AV Mean Gradient: 6 mmHg       MV E/A Ratio: 0.97  LVOT Peak Velocity: 181 cm/s  AV Area (Continuity):2.78 cm2   TR Velocity:179 cm/s  TR Gradient:12.82 mmHg         MV Deceleration Time: 285 msec   E' Septal Velocity: 7.18 cm/s  E' Lateral Velocity: 8.16 cm/s   Aortic Valve   Peak Velocity: 160 cm/s     Mean Velocity: 113 cm/s  Peak Gradient: 10.24 mmHg   Mean Gradient: 6 mmHg  Area (continuity): 2.78 cm2  AV VTI: 30.4 cm  Aorta   Aortic Root: 3 cm  Ascending Aorta: 3.4 cm  LVOT Diameter: 2 cm      XR ABDOMEN (KUB) (SINGLE AP VIEW)    Result Date: 9/15/2022  EXAMINATION: ONE SUPINE XRAY VIEW(S) OF THE ABDOMEN 9/15/2022 1:31 pm COMPARISON: None. HISTORY: ORDERING SYSTEM PROVIDED HISTORY: Hiccups TECHNOLOGIST PROVIDED HISTORY: Reason for exam:->Hiccups Reason for Exam: hiccups FINDINGS: No evidence of pneumoperitoneum. Nonspecific, nonobstructed bowel gas pattern. No abnormal soft tissue mass or calcification. No significant skeletal finding. No significant finding in the abdomen. XR CHEST PORTABLE    Result Date: 9/13/2022  EXAMINATION: ONE XRAY VIEW OF THE CHEST 9/13/2022 11:14 am COMPARISON: November 2, 2021 HISTORY: ORDERING SYSTEM PROVIDED HISTORY: chest pain no fever/cough TECHNOLOGIST PROVIDED HISTORY: Reason for exam:->chest pain no fever/cough Reason for Exam: chest pain FINDINGS: The cardiomediastinal silhouette is normal in size. The lungs are clear. No pleural effusion or pneumothorax. No acute cardiopulmonary process.      VL DUP LOWER EXTREMITY ARTERIES RIGHT    Result Date: 9/17/2022  Vascular Lower Extremities Arterial Duplex Procedure -- PRELIMINARY SONOGRAPHER REPORT --   Demographics   Patient Name      Alpa ALFARO   Date of Study     09/17/2022         Gender              Female   Patient Number    [id number]         Date of Birth       1955   Visit Number      [id number]          Age                 74 year(s)   Accession Number  [id number]         Room Number         8990   Corporate ID      [id number]           [address] [place] [address] Jose Blum MD  Physician         CNP                Physician  Procedure Type of Study:   Generic Orders:VL LOWER EXTREMITY ARTERIES DUPLEX RIGHT. Extremities Arteries:Lower Extremities Arterial Duplex, VL LOWER EXTREMITY  ARTERIES DUPLEX RIGHT. Tech Comments Right There is an SHYAM of 1.01 on the right. Imaging of the right lower extremity reveals normal multiphasic flow with no evidence of hemodynamically significant stenosis. No evidence for pseudoaneurysm was noted in the right groin. Left The left SHYAM is 1.05. The patient was seen and examined on day of discharge and this discharge summary is in conjunction with any daily progress note from day of discharge. Time Spent on discharge is 45 minutes  in the examination, evaluation, counseling and review of medications and discharge plan. Note that more than 30 minutes was spent in preparing discharge papers, discussing discharge with patient, medication review, etc.       Signed:    Angela Walter MD   9/18/2022      Thank you Geoffrey العراقي MD for the opportunity to be involved in this patient's care.  If you have any questions or concerns please feel free to contact me at [address] [place] [address]

## 2022-09-18 NOTE — DISCHARGE INSTRUCTIONS
Your information:  Name: Della James  : 1955    Your Discharge Instructions    What to do after you leave the hospital:    Read, review and familiarize yourself with the information provided below and in a separate packet on   Opioids:  General Info, Valsartan, Ticagrelor    Diet: Cardiac    Recommended activity:   as tolerated  Avoid strenuous activity until instructed by your physician to resume; balance rest with periods of light to normal activity. If you experience any of the following: Unusual or inadequately controlled pain; unusual transient shortness of breath; recurrent or persistent nausea, heartburn, palpitations or lightheadedness; increased swelling; increased fatigue; fever >100; please follow up with @PCP@ [unfilled] or go to the Emergency Room. Home Health/ Outpatient Services: ***      Information obtained by:  By signing below, I understand and acknowledge receipt of the instructions indicated above, and I understand that if any problems occur once I leave the hospital I am to contact @[email or web address].

## 2022-09-18 NOTE — PROGRESS NOTES
[address]   Cardiology Progress Note     Date: 9/18/2022  Admit Date: 9/13/2022     Reason for consultation:     Chief Complaint   Patient presents with    Tingling     Pt reports tingling in bilateral arms, reports chest pain and jaw tightness, cardiac HX (Sunna pt)        History of Present Illness: History obtained from patient and medical record. Gopal Hoyos is a 74 y.o. female patient followed closely by Dr. Nicky Ross known to have CAD since 2012, s/p numerous stents, s/p STEMI due to D1 occlusion in 2017, most recent cath  2/4/2021 at [facility] multivessel PCI to Distal LAD x 2 (2.5 x 15 and 2.5 x 18) and D1 x 1 (2.25 x 8),     Presents to our ED this am after waking up with severe chest pressure like \"an elephant sitting on me\" with radiation down her left arm. This is exactly how she presented with her STEMI. Initial EKG without ST elevation although new TWI in V1V2. She was markedly hypotensive to 179 systolic upon presentation, which improved to about 140-150 after morphine and nitro. She hasn't taken nitro at home because it gives her an ubearable headace. She is not currently on a statin due severe myalgias and was prescribed Leqvio, but there was a $4000 deductible that she couldn't afford. She had been placed on DAPT at [facility] but that was D.C'd by Dr. Nicky Ross recently as it was more than a year since her last stents. She also has EP history including ablations for fib and flutter (per consult note)    Interval Hx: Today, she is feeling ok. Chest soreness. Breathing stable. Groin stable. Reports BRBPR yesterday. None today. Hgb stable. Patient seen and examined. Clinical notes reviewed. Telemetry reviewed. No new complaints today. No major events overnight. Denies having chest pain, palpitations, shortness of breath, orthopnea/PND, cough, or dizziness at the time of this visit.       Past Medical History:  Past Medical History:   Diagnosis Date    Allergic rhinitis, cause unspecified 12/04/2010    Anxiety     Arthritis     Bilateral carotid artery stenosis     < 50% bilaterally    CAD (coronary artery disease)     angioplasty with stent at [facility] Dr. Essie Valdivia, here Dr. Yetta Galeazzi at [place],     Chronic kidney disease     frequency, urgency    Chronic pain     precordial, opiate dependent    Depression 01/22/2013    Erosive esophagitis 12/28/2016    Dr. Kendal Dupree; PPI started; LA Class A, Sphincter of Oddi manometry and sphincterotomy if symptoms don't improve. Essential hypertension 04/11/2017    HSV infection     Hypercholesterolemia 06/06/2014    Irritable bowel syndrome with diarrhea 09/27/2016    Migraine headache 06/06/2014    Nonerosive nonspecific gastritis 12/28/2016    Dr. Kendal Dupree; body and antrum    OAB (overactive bladder) 03/14/2014        Past Surgical History:    has a past surgical history that includes Appendectomy; Total abdominal hysterectomy w/ bilateral salpingoophorectomy; Cholecystectomy; Tubal ligation; Tonsillectomy; other surgical history; Coronary angioplasty with stent (10/2012); Percutaneous Transluminal Coronary Angio (10/2012); Cardiac catheterization (12/07/2013); Hysterectomy; polypectomy; Cystoscopy (04/2014); Upper gastrointestinal endoscopy; Wrist surgery (Left, 05/21/2015); Abdominal adhesion surgery; Colonoscopy; Endoscopy, colon, diagnostic; Cosmetic surgery; fracture surgery; ERCP (01/25/2017); sinus surgery; Hysterectomy, total abdominal; and Coronary angioplasty with stent (02/04/2021). Social History:  Reviewed. reports that she quit smoking about 15 years ago. Her smoking use included cigarettes. She has a 10.00 pack-year smoking history. She has never used smokeless tobacco. She reports that she does not drink alcohol and does not use drugs. Allergies:   Allergies   Allergen Reactions    Hydralazine Anaphylaxis    Shellfish-Derived Products Anaphylaxis and Swelling    Xarelto [Rivaroxaban] Swelling    [place] [Aspirin] Nausea Only    Chicken Allergy     Crestor [Rosuvastatin]      Myalgia      Fenofibrate      Myalgia      Hctz [Hydrochlorothiazide]     Lipitor [Atorvastatin Calcium]     Lipitor [Atorvastatin]      Myalgia      Lisinopril     Metoprolol      Weight gain    Mometasone Furoate Swelling    Nasonex [Mometasone] Swelling    Nsaids      GI PAIN    Other      Any melons, grilled food, turkey, red/green/yellow peppers. Pineapple     Plavix [Clopidogrel] Hives, Itching and Swelling    Pork Allergy     Sulfa Antibiotics Swelling    Contrast [Iodides] Swelling and Rash    Flagyl [Metronidazole] Nausea And Vomiting       Family History:  Reviewed. family history includes Heart Disease in her brother, father, maternal uncle, mother, and another family member; High Blood Pressure in her sister and sister; Hypertension in her father and mother; Other in an other family member; Stroke in her sister and another family member. Denies family history of sudden cardiac death, arrhythmia, premature CAD    Home Meds:  Prior to Visit Medications    Medication Sig Taking? Authorizing Provider   aspirin 81 MG chewable tablet Take 1 tablet by mouth daily Yes Anthony Mccurdy MD   ticagrelor (BRILINTA) 90 MG TABS tablet Take 1 tablet by mouth 2 times daily Yes Anthony Mccurdy MD   valsartan (DIOVAN) 80 MG tablet Take 1 tablet by mouth daily Yes Anthony Mccurdy MD   oxyCODONE-acetaminophen (PERCOCET) 5-325 MG per tablet Take 1 tablet by mouth every 6 hours as needed for Pain for up to 5 days.  Yes Anthony Mccurdy MD   bisacodyl (DULCOLAX) 10 MG suppository Place 1 suppository rectally daily as needed for Constipation Yes Anthony Mccurdy MD   POTASSIUM GLUCONATE PO Take by mouth  Historical Provider, MD   Cholecalciferol (VITAMIN D3) 125 MCG (5000 UT) TABS Take by mouth daily   Historical Provider, MD   cetirizine (ZYRTEC) 5 MG tablet Take 5 mg by mouth daily  Historical Provider, MD   dilTIAZem (CARDIZEM CD) 120 MG extended release capsule TAKE 1 CAPSULE BY MOUTH TWICE DAILY  Derrick Shen MD   Mag Aspart-Potassium Aspart (POTASSIUM & MAGNESIUM ASPARTAT PO) Take 1 tablet by mouth daily   Historical Provider, MD   Zinc-Vitamin C  MG LOZG Take 1 lozenge by mouth daily   Historical Provider, MD   L-Lysine 1000 MG TABS Take 1 tablet by mouth every morning (before breakfast)   Historical Provider, MD   cyanocobalamin 1000 MCG tablet Take 1,000 mcg by mouth daily  Historical Provider, MD   nitroGLYCERIN (NITROSTAT) 0.4 MG SL tablet Place 1 tablet under the tongue every 5 minutes as needed for Chest pain up to max of 3 total doses. If no relief after 1 dose, call 911.   Emani Kingsley MD   estradiol (ESTRACE) 0.1 MG/GM vaginal cream PLACE 0.5 GRAM VAGINALLY 3 TIMES A WEEK  Emani Kingsley MD        Scheduled Meds:   dicyclomine  10 mg Oral Q6H    sodium chloride flush  5-40 mL IntraVENous 2 times per day    polyethylene glycol  17 g Oral Daily    sodium chloride flush  5-40 mL IntraVENous 2 times per day    cetirizine  5 mg Oral Daily    dilTIAZem  120 mg Oral BID    aspirin  81 mg Oral Daily    sodium chloride flush  5-40 mL IntraVENous 2 times per day    ticagrelor  90 mg Oral BID    valsartan  80 mg Oral Daily     Continuous Infusions:   sodium chloride      sodium chloride      sodium chloride       PRN Meds:diazePAM, sodium chloride flush, sodium chloride, aluminum & magnesium hydroxide-simethicone, HYDROmorphone, oxyCODONE-acetaminophen, nitroGLYCERIN, sodium chloride flush, sodium chloride, ondansetron **OR** ondansetron, polyethylene glycol, [DISCONTINUED] acetaminophen **OR** acetaminophen, perflutren lipid microspheres, sodium chloride flush, sodium chloride, acetaminophen, labetalol     Review of Systems:  Constitutional: Negative for fever, night sweats, chills,  Skin: Negative for rash, pruritus, bleeding, or bruising    HEENT: Negative for vision changes, ringing in the ears, dysphagia  Respiratory: Reviewed in HPI  Cardiovascular: Reviewed in HPI  Gastrointestinal: Negative for abdominal pain, N/V/D, constipation, or black/tarry stools  Genito-Urinary: Negative for dysuria, incontinence, or hematuria  Musculoskeletal: Negative for joint swelling, muscle pain, or injuries  Neurological/Psych: Negative for confusion, seizures, headaches, or TIA-like symptoms. No anxiety or depression. Physical Examination:  Vitals:    09/18/22 0743   BP: (!) 144/93   Pulse: 63   Resp: 16   Temp: 97.8 °F (36.6 °C)   SpO2: 96%      No intake/output data recorded. Wt Readings from Last 3 Encounters:   09/17/22 192 lb 10.9 oz (87.4 kg)   07/22/22 194 lb (88 kg)   07/19/22 198 lb 9.6 oz (90.1 kg)     No intake or output data in the 24 hours ending 09/18/22 0752      Telemetry: Personally Reviewed  - Sinus rhythm   Constitutional: Cooperative and in no apparent distress, and appears well nourished  Skin: Warm and pink; no pallor, cyanosis, clubbing, or bruising   HEENT: Symmetric and normocephalic. PERRL. Conjunctiva pink with clear sclera. Mucus membranes moist.   Cardiovascular: Regular rate and rhythm. S1/S2 present without murmurs, no rubs or gallops. Peripheral pulses 2+, capillary refill < 3 seconds. No elevation of JVP. No peripheral edema  Respiratory: Respirations symmetric and unlabored. Lungs clear to auscultation bilaterally, no wheezing, crackles, or rhonchi  Gastrointestinal: Abdomen soft and round. Bowel sounds active without tenderness. Musculoskeletal: Bilateral upper and lower extremity strength 5/5 with full ROM  Neurologic/Psych: Awake and orientated to person, place and time. Calm affect, appropriate mood    Pertinent labs, diagnostic, device, and imaging results reviewed as a part of this visit    Labs:    BMP:   Recent Labs     09/16/22  0619 09/17/22  1009    130*   K 4.4 3.3*    98*   CO2 25 20*   BUN 14 19   CREATININE 0.8 0.8     Estimated Creatinine Clearance: 75 mL/min (based on SCr of 0.8 mg/dL).    CBC:   Recent Labs 22  0848 22  1009 22  1927 22  0119   WBC 18.4* 15.3*  --  15.1*   HGB 13.2 13.3 13.5 13.2   HCT 38.8 40.8 40.4 38.9   MCV 85.1 85.2  --  84.9    314  --  305     Thyroid:   Lab Results   Component Value Date/Time    TSH 4.67 2021 08:08 AM     Lipids:   Lab Results   Component Value Date/Time    CHOL 244 2022 11:59 AM    HDL 47 2022 11:59 AM    TRIG 277 2022 11:59 AM     LFTS:   Lab Results   Component Value Date/Time    ALT 12 2022 11:05 AM    AST 24 2022 11:05 AM     2017 05:58 AM    ALKPHOS 172 2022 11:05 AM    PROT 7.9 2022 11:05 AM    PROT 6.2 2013 11:49 AM    AGRATIO 1.5 2022 11:05 AM    BILITOT 0.5 2022 11:05 AM     Cardiac Enzymes:   Lab Results   Component Value Date/Time    CKTOTAL 46 2018 01:04 PM    CKMB 3.1 2017 07:46 PM    TROPONINI 0.86 2022 04:31 PM    TROPONINI 1.02 2022 06:19 AM    TROPONINI 0.96 2022 12:15 AM     Coags:   Lab Results   Component Value Date/Time    PROTIME 15.0 2021 07:10 AM    INR 1.29 2021 07:10 AM       EC22  Normal sinus rhythm  Left axis deviation  Possible Lateral infarct , age undetermined  When compared with ECG of 13-SEP-2022 12:30,  Nonspecific T wave abnormality now evident in Lateral leads    ECHO:  22  Summary   Left ventricular cavity size is normal with mild concentric left ventricular   hypertrophy. Ejection fraction is visually estimated to be 60-65%. No regional wall motion abnormalities are noted. Grade I diastolic dysfunction with normal LV filling pressures. E/e' = 12.3. Left atrium is dilated. Trivial aortic regurgitation. Mitral annular calcification is present. Mild tricuspid regurgitation. RVSP = 32 mmHG.     Cath: 22  Anatomy:   LM-30% distal  LAD-widely patent mid stent  D1-widely patent proximal stent, 90% distal to the stent which is very small caliber  Cx-80% mid diffuse, 100% distal  OM1-normal  RCA-70% proximal, widely patent mid stent, 50% just distal to the stent  RPDA-patent with luminal regularities  LVEF-60%  PCI: Circumflex 100% to 0% distal with a 2.25 mm x 38 mm Xience [address] ALEC and then 80% to 0% mid with overlapping stents, including overlapping the distal stent, with distal to proximal 3.0 mm x 38 mm Xience [place] ALEC and 3.25 mm x 18 mm Xience [address] ALEC. Impression:  1. Occlusion of the distal circumflex along with severe stenosis of the mid circumflex ultimately revascularized with ALEC x3.  2.  Small vessel disease. 3.  Normal LV systolic function. 4.  Elevated LVEDP. Plan:  1. DAPT with aspirin and Brilinta. 2.  Trial of Zetia as it is intolerant of statins. Charolet Antonio has been ordered but not started yet. 3.  Intolerant of beta-blockers. Consider trial of bisoprolol if patient will agree. 4.  Add valsartan for better blood pressure control started 80 mg daily. Titrate as hemodynamics allow. 5.  Consider return for FFR of proximal RCA versus nuclear stress testing. Her primary cardiologist Dr. Emmy Cortes to decide. [facility]: 9/16/22  Anatomy:   LM-30% distal  LAD-widely patent mid stents  D1-widely patent stent proximally, distal 90% small caliber  Cx-widely patent mid and distal stents  RCA-diffusely diseased, 70% proximal, widely patent mid stent, 50% distal, dominant, DFR 0.94, FFR 0.87, IVUS MLA 5.72 mm²  RPDA-patent  LVEF-not done     Impression:  1. Patent LAD and circumflex stents. 2.  Borderline significant angiographic stenosis in proximal RCA however with IVUS and FFR testing, it is within medical management range. Plan:  1. Medical management. RLE duplex: 9/17/22  Right   There is an SHYAM of 1.01 on the right. Imaging of the right lower extremity reveals normal multiphasic flow with no   evidence of hemodynamically significant stenosis. No evidence for   pseudoaneurysm was noted in the right groin.    Left   The left SHYAM is 1.05.     Problem List:   Patient Active Problem List    Diagnosis Date Noted    PAF (paroxysmal atrial fibrillation) ([facility] 02/18/2021    Allergic reaction 09/13/2022    Elevated lipoprotein A level 07/18/2022    Coronary atherosclerosis of native coronary artery 07/18/2022    Bruit of right carotid artery 02/18/2021    Obesity 04/29/2021    Typical atrial flutter (HCC)     Atypical atrial flutter (HCC)     Bilateral carotid artery stenosis     Mild episode of recurrent major depressive disorder ([facility] 12/18/2020    NSTEMI (non-ST elevated myocardial infarction) ([facility] 11/19/2017    Essential hypertension 04/11/2017    Mood disorder due to a general medical condition     Irritable bowel syndrome with diarrhea 09/27/2016    Postmenopausal atrophic vaginitis 08/05/2016    Left wrist fracture     Hypercholesterolemia 06/06/2014    Migraine headache 06/06/2014    OAB (overactive bladder) 03/14/2014    Depression 01/22/2013    Chronic pain 01/17/2013    CAD (coronary artery disease)     HSV infection     Pure hypercholesterolemia 12/04/2010    Allergic rhinitis 12/04/2010        Assessment and Plan:     Coronary artery disease / NSTEMI   ~ hx of multiple PCIs, STEMI '14  ~ [facility] 9/13/22: s/p PCI of Cx with ALEC x3. Residual 70% RCA stenosis. ~ returned to cath lab d/t persistent chest pain for FFR evaluation of RCA. [facility] 9/16/22: revealed patent LAD and Cx stents. RCA with FFR proven nonobstructive disease. ~ continue DAPT with aspirin and Brilinta. Start Zetia as she is intolerant to statins. Will work on coverage with PCSK9 inhibitor. Start bisoprolol 5 mg daily at discharge. ~ significant groin pain s/p LHC > RLE duplex without evidence of pseudoaneurysm       BRBPR  ~ occult pending   ~ defer workup to primary   ~ will need to continue on DAPT. Cannot change Brilinta to plavix d/t allergy.      Paroxysmal atrial fibrillation   Hyperlipidemia- uncontrolled   Hypertension- improved with addition of valsartan, will add bisoprolol at dc. Pt stable from a cardiac standpoint. Follow up in office in 1-2 weeks. Multiple medical conditions with risk of decompensation. All pertinent information and plan of care discussed with the physician. All questions and concerns were addressed to the patient. Alternatives to my treatment were discussed. I have discussed the above stated plan with patient and the nurse. The patient verbalized understanding and agreed with the plan. Thank you for allowing to us to participate in the care of Robert Kimble. Total visit time > 35 minutes; > 50% spend counseling / coordinating care. I reviewed interval history, physical exam, review of data including labs, imaging, development and implementation of treatment plan and coordination of complex care. Counseled on risk factor modifications. Lexi CLEMENS-CNP  [address]   Office: [phone]    NOTE:  This report was transcribed using voice recognition software. Every effort was made to ensure accuracy; however, inadvertent computerized transcription errors may be present.

## 2022-09-23 ENCOUNTER — OFFICE VISIT (OUTPATIENT)
Dept: FAMILY MEDICINE CLINIC | Age: 67
End: 2022-09-23
Payer: MEDICARE

## 2022-09-23 VITALS
DIASTOLIC BLOOD PRESSURE: 68 MMHG | SYSTOLIC BLOOD PRESSURE: 110 MMHG | RESPIRATION RATE: 14 BRPM | HEART RATE: 74 BPM | TEMPERATURE: 97.9 F | BODY MASS INDEX: 33.13 KG/M2 | WEIGHT: 193 LBS | OXYGEN SATURATION: 97 %

## 2022-09-23 DIAGNOSIS — F33.0 MILD EPISODE OF RECURRENT MAJOR DEPRESSIVE DISORDER (HCC): ICD-10-CM

## 2022-09-23 DIAGNOSIS — I10 ESSENTIAL HYPERTENSION: ICD-10-CM

## 2022-09-23 DIAGNOSIS — E78.00 HYPERCHOLESTEROLEMIA: ICD-10-CM

## 2022-09-23 DIAGNOSIS — I25.10 CORONARY ARTERY DISEASE INVOLVING NATIVE CORONARY ARTERY OF NATIVE HEART WITHOUT ANGINA PECTORIS: Primary | ICD-10-CM

## 2022-09-23 PROCEDURE — 1123F ACP DISCUSS/DSCN MKR DOCD: CPT | Performed by: FAMILY MEDICINE

## 2022-09-23 PROCEDURE — 99214 OFFICE O/P EST MOD 30 MIN: CPT | Performed by: FAMILY MEDICINE

## 2022-09-23 RX ORDER — EVOLOCUMAB 140 MG/ML
140 INJECTION, SOLUTION SUBCUTANEOUS
Qty: 2 EACH | Refills: 5 | Status: ON HOLD | OUTPATIENT
Start: 2022-09-23 | End: 2022-10-31

## 2022-09-23 NOTE — PROGRESS NOTES
daily 30 tablet 0    aspirin 81 MG chewable tablet Take 1 tablet by mouth daily 30 tablet 0    ticagrelor (BRILINTA) 90 MG TABS tablet Take 1 tablet by mouth 2 times daily 60 tablet 0    valsartan (DIOVAN) 80 MG tablet Take 1 tablet by mouth daily 30 tablet 0    bisacodyl (DULCOLAX) 10 MG suppository Place 1 suppository rectally daily as needed for Constipation 10 suppository 0    POTASSIUM GLUCONATE PO Take by mouth      Cholecalciferol (VITAMIN D3) 125 MCG (5000 UT) TABS Take by mouth daily       cetirizine (ZYRTEC) 5 MG tablet Take 5 mg by mouth daily      dilTIAZem (CARDIZEM CD) 120 MG extended release capsule TAKE 1 CAPSULE BY MOUTH TWICE DAILY 180 capsule 3    Mag Aspart-Potassium Aspart (POTASSIUM & MAGNESIUM ASPARTAT PO) Take 1 tablet by mouth daily       Zinc-Vitamin C  MG LOZG Take 1 lozenge by mouth daily       L-Lysine 1000 MG TABS Take 1 tablet by mouth every morning (before breakfast)       cyanocobalamin 1000 MCG tablet Take 1,000 mcg by mouth daily      nitroGLYCERIN (NITROSTAT) 0.4 MG SL tablet Place 1 tablet under the tongue every 5 minutes as needed for Chest pain up to max of 3 total doses. If no relief after 1 dose, call 911. 25 tablet 3    estradiol (ESTRACE) 0.1 MG/GM vaginal cream PLACE 0.5 GRAM VAGINALLY 3 TIMES A WEEK 42.5 g 0     No current facility-administered medications for this visit.        Lab Results   Component Value Date    CREATININE 0.7 09/18/2022    BUN 22 (H) 09/18/2022     09/18/2022    K 3.6 09/18/2022     09/18/2022    CO2 23 09/18/2022         No components found for: CHLPL  Lab Results   Component Value Date    TRIG 277 (H) 06/29/2022     Lab Results   Component Value Date    HDL 47 06/29/2022     Lab Results   Component Value Date    LDLCALC 142 (H) 06/29/2022     Lab Results   Component Value Date    LABVLDL 55 06/29/2022       Lab Results   Component Value Date    ALT 12 09/13/2022    AST 24 09/13/2022     (H) 01/17/2017    ALKPHOS 172 (H) 09/13/2022    BILITOT 0.5 09/13/2022              ASSESSMENT / PLAN:    1. Coronary artery disease involving native coronary artery of native heart without angina pectoris  S/p addition stent placement x 3  Reviewed hospitalization  Cont max medical therapy  Intol statin, zetia  Needs to be on therapy, rx sent to see if we can get repatha covered  Will get samples    2. Hypercholesterolemia  As above  Needs reduction in LDL  Trial repatha if we can get covered    3. Mild episode of recurrent major depressive disorder (HCC)  Doing ok despite stressors    4. Essential hypertension  Stable @ goal, controlled         Follow-up appointment:   1 month    Discussed use, benefit, and side effects of all prescribed medications. Barriers to medication compliance addressed. All patient questions answered. Pt voiced understanding. When applicable, patient's outside records were reviewed through [facility]. The patient has signed appropriate paperworks/consents.

## 2022-09-23 NOTE — LETTER
[facility] [place]  Phone: [phone]  Fax: [phone]    Raysa North MD         September 23, 2022     Patient: Feliberto Jeans   YOB: 1955   Date of Visit: 9/23/2022       To Whom It May Concern: It is my medical opinion that Staci Tavarez requires a disability parking placard for the following reasons:  She has limited walking ability due to an cardiac condition. Duration of need: 5 years    If you have any questions or concerns, please don't hesitate to call.     Sincerely,        Raysa North MD

## 2022-09-27 ENCOUNTER — HOSPITAL ENCOUNTER (OUTPATIENT)
Age: 67
Discharge: HOME | End: 2022-09-27
Payer: MEDICARE

## 2022-09-27 ENCOUNTER — OFFICE VISIT (OUTPATIENT)
Dept: CARDIOLOGY CLINIC | Age: 67
End: 2022-09-27
Payer: MEDICARE

## 2022-09-27 VITALS
HEIGHT: 64 IN | HEART RATE: 78 BPM | OXYGEN SATURATION: 98 % | BODY MASS INDEX: 37.34 KG/M2 | DIASTOLIC BLOOD PRESSURE: 70 MMHG | SYSTOLIC BLOOD PRESSURE: 110 MMHG | WEIGHT: 218.7 LBS

## 2022-09-27 DIAGNOSIS — R06.02 SHORTNESS OF BREATH: ICD-10-CM

## 2022-09-27 DIAGNOSIS — R07.89 OTHER CHEST PAIN: ICD-10-CM

## 2022-09-27 DIAGNOSIS — I10 ESSENTIAL HYPERTENSION: ICD-10-CM

## 2022-09-27 DIAGNOSIS — I48.0 PAF (PAROXYSMAL ATRIAL FIBRILLATION) (HCC): ICD-10-CM

## 2022-09-27 DIAGNOSIS — E78.2 MIXED HYPERLIPIDEMIA: ICD-10-CM

## 2022-09-27 DIAGNOSIS — I25.10 CORONARY ARTERY DISEASE INVOLVING NATIVE CORONARY ARTERY OF NATIVE HEART WITHOUT ANGINA PECTORIS: Primary | ICD-10-CM

## 2022-09-27 LAB
ANION GAP SERPL CALCULATED.3IONS-SCNC: 10 MMOL/L (ref 3–16)
BUN BLDV-MCNC: 10 MG/DL (ref 7–20)
CALCIUM SERPL-MCNC: 9 MG/DL (ref 8.3–10.6)
CHLORIDE BLD-SCNC: 100 MMOL/L (ref 99–110)
CO2: 27 MMOL/L (ref 21–32)
CREAT SERPL-MCNC: 0.9 MG/DL (ref 0.6–1.2)
GFR AFRICAN AMERICAN: >60
GFR NON-AFRICAN AMERICAN: >60
GLUCOSE BLD-MCNC: 95 MG/DL (ref 70–99)
POTASSIUM SERPL-SCNC: 4.5 MMOL/L (ref 3.5–5.1)
PRO-BNP: 684 PG/ML (ref 0–124)
SODIUM BLD-SCNC: 137 MMOL/L (ref 136–145)

## 2022-09-27 PROCEDURE — 99214 OFFICE O/P EST MOD 30 MIN: CPT | Performed by: NURSE PRACTITIONER

## 2022-09-27 PROCEDURE — 36415 COLL VENOUS BLD VENIPUNCTURE: CPT

## 2022-09-27 PROCEDURE — 83880 ASSAY OF NATRIURETIC PEPTIDE: CPT

## 2022-09-27 PROCEDURE — 1123F ACP DISCUSS/DSCN MKR DOCD: CPT | Performed by: NURSE PRACTITIONER

## 2022-09-27 PROCEDURE — 80048 BASIC METABOLIC PNL TOTAL CA: CPT

## 2022-09-27 RX ORDER — PRASUGREL 10 MG/1
10 TABLET, FILM COATED ORAL DAILY
Qty: 30 TABLET | Refills: 2 | Status: CANCELLED | OUTPATIENT
Start: 2022-09-27

## 2022-09-27 RX ORDER — ISOSORBIDE MONONITRATE 30 MG/1
30 TABLET, EXTENDED RELEASE ORAL DAILY
Qty: 30 TABLET | Refills: 3 | Status: SHIPPED | OUTPATIENT
Start: 2022-09-27 | End: 2022-09-28

## 2022-09-27 RX ORDER — VALSARTAN 40 MG/1
40 TABLET ORAL DAILY
Qty: 30 TABLET | Refills: 2 | Status: SHIPPED | OUTPATIENT
Start: 2022-09-27 | End: 2022-09-28

## 2022-09-27 NOTE — PROGRESS NOTES
[facility]     Outpatient Follow Up Note    CHIEF COMPLAINT / HPI: Hospital Follow Up secondary to coronary artery disease    Hospital record has been reviewed  Hospital Course progressed as follows per discharge summary:     9/13/22-9/18/22  Hospital Course:  74 y.o. female with history of CAD s/p stents, pAfib/atrial flutter s/p ablation not on anticoagulation, HTN, hyperlipidemia, many listed allergies came to ER with acute onset of chest pain radiating to both arms and jaw. Describes heavy pressure on chest associated with SOB. SBP in 220s in ED. Admitted as inpatient to CVU for NSTEMI and allergic reaction. Underwent LHC on 9/13:     LM-30% distal  LAD-widely patent mid stent  D1-widely patent proximal stent, 90% distal to the stent which is very small caliber  Cx-80% mid diffuse, 100% distal  OM1-normal  RCA-70% proximal, widely patent mid stent, 50% just distal to the stent  RPDA-patent with luminal regularities  LVEF-60%  PCI: Circumflex 100% to 0% distal with a 2.25 mm x 38 mm Xience [place] ALEC and then 80% to 0% mid with overlapping stents, including overlapping the distal stent, with distal to proximal 3.0 mm x 38 mm Xience Constance ALEC and 3.25 mm x 18 mm Xience [address] ALEC. Impression:  1. Occlusion of the distal circumflex along with severe stenosis of the mid circumflex ultimately revascularized with ALEC x3.  2.  Small vessel disease. 3.  Normal LV systolic function. 4.  Elevated LVEDP. Plan:  1. DAPT with aspirin and Brilinta. 2.  Trial of Zetia as it is intolerant of statins. Charo[place] has been ordered but not started yet. 3.  Intolerant of beta-blockers. Consider trial of bisoprolol if patient will agree. 4.  Add valsartan for better blood pressure control started 80 mg daily. Titrate as hemodynamics allow. 5.  Consider return for FFR of proximal RCA versus nuclear stress testing. Her primary cardiologist Dr. Emmy Cortes to decide.      Started on IV Solumedrol, IV Pepcid dilTIAZem (CARDIZEM CD) 120 MG extended release capsule TAKE 1 CAPSULE BY MOUTH TWICE DAILY 180 capsule 3    Mag Aspart-Potassium Aspart (POTASSIUM & MAGNESIUM ASPARTAT PO) Take 1 tablet by mouth daily       Zinc-Vitamin C  MG LOZG Take 1 lozenge by mouth daily       L-Lysine 1000 MG TABS Take 1 tablet by mouth every morning (before breakfast)       cyanocobalamin 1000 MCG tablet Take 1,000 mcg by mouth daily      nitroGLYCERIN (NITROSTAT) 0.4 MG SL tablet Place 1 tablet under the tongue every 5 minutes as needed for Chest pain up to max of 3 total doses. If no relief after 1 dose, call 911. 25 tablet 3    estradiol (ESTRACE) 0.1 MG/GM vaginal cream PLACE 0.5 GRAM VAGINALLY 3 TIMES A WEEK 42.5 g 0    Evolocumab (REPATHA) 140 MG/ML SOSY Inject 1 mL into the skin every 14 days (Patient not taking: Reported on 9/27/2022) 2 each 5    Cholecalciferol (VITAMIN D3) 125 MCG (5000 UT) TABS Take by mouth daily        No current facility-administered medications for this visit. REVIEW OF SYSTEMS:   CONSTITUTIONAL: No major weight gain or loss, fatigue, weakness, night sweats or fever. There's been no change in energy level, sleep pattern, or activity level. HEENT: No new vision difficulties or ringing in the ears. RESPIRATORY: No new PND, orthopnea or cough. + SOB  CARDIOVASCULAR: See HPI  GI: No nausea, vomiting, diarrhea, constipation, abdominal pain or changes in bowel habits. : No urinary frequency, urgency, incontinence hematuria or dysuria. SKIN: No cyanosis or skin lesions. MUSCULOSKELETAL: No new muscle or joint pain. NEUROLOGICAL: No syncope or TIA-like symptoms.   PSYCHIATRIC: No anxiety, pain, insomnia or depression    Objective:   PHYSICAL EXAM:        VITALS:  /70 (Site: Left Upper Arm, Position: Sitting, Cuff Size: Medium Adult)   Pulse 78   Ht 5' 4\" (1.626 m)   Wt 218 lb 11.2 oz (99.2 kg)   SpO2 98%   BMI 37.54 kg/m²     CONSTITUTIONAL: Cooperative, no apparent distress, and appears well nourished / developed + overweight   NEUROLOGIC:  Awake and orientated to person, place and time. PSYCH: Calm affect. SKIN: Warm and dry. + groin site c/d/i  HEENT: Sclera non-icteric, normocephalic, neck supple, no elevation of JVP, normal carotid pulses with no bruits and thyroid normal size. LUNGS:  No increased work of breathing and clear to auscultation, no crackles or wheezing. CARDIOVASCULAR:  Regular rate and rhythm with no murmurs, gallops, rubs, or abnormal heart sounds, normal PMI. The apical impulses not displaced. Heart tones are crisp and normal                                                                                                                                                                    The carotid upstroke is normal in amplitude and contour without delay or bruit    ABDOMEN:  Normal bowel sounds, non-distended and non-tender to palpation   EXT: No edema, no calf tenderness. Pulses are present bilaterally.     DATA:    Lab Results   Component Value Date    ALT 12 09/13/2022    AST 24 09/13/2022     (H) 01/17/2017    ALKPHOS 172 (H) 09/13/2022    BILITOT 0.5 09/13/2022     Lab Results   Component Value Date    CREATININE 0.7 09/18/2022    BUN 22 (H) 09/18/2022     09/18/2022    K 3.6 09/18/2022     09/18/2022    CO2 23 09/18/2022     Lab Results   Component Value Date    TSH 4.67 (H) 12/20/2021     Lab Results   Component Value Date    WBC 15.1 (H) 09/18/2022    HGB 13.8 09/18/2022    HCT 41.9 09/18/2022    MCV 84.9 09/18/2022     09/18/2022     No components found for: CHLPL  Lab Results   Component Value Date    TRIG 277 (H) 06/29/2022    TRIG 225 (H) 12/20/2021    TRIG 262 (H) 02/18/2021     Lab Results   Component Value Date    HDL 47 06/29/2022    HDL 46 12/20/2021    HDL 37 (L) 02/18/2021     Lab Results   Component Value Date    LDLCALC 142 (H) 06/29/2022    LDLCALC 114 (H) 12/20/2021    LDLCALC 90 affording medication  Patient would like to try isosorbide again to see if it helps with pain. Will decrease losartan to 40 mg daily to make room in BP for medication. She is to call if she changes her mind and wants cardiac rehab  Follow up appt scheduled with Dr Cheryl Murphy 10/14    I have addressed the patient's cardiac risk factors and adjusted pharmacologic treatment as needed. In addition, I have reinforced the need for patient directed risk factor modification. Further evaluation will be based upon the patient's clinical course and testing results. All questions and concerns were addressed to the patient/family (, Glo Calvo)    The patient currently is not smoking. The risks related to smoking were reviewed with the patient. Recommend maintaining a smoke-free lifestyle. Dual Antiplatelet therapy has been recommended / prescribed for this patient. Education conducted on adverse reactions including bleeding was discussed. The patient verbalizes understanding. Pt is on a BB  Pt is on an ace-i/ARB  Pt is on a statin      Saturated fat diet discussed  Exercise program discussed    Thank you for allowing to us to participate in the care of Nancy Villaseñor.       [facility]  Documentation of today's visit sent to PCP

## 2022-09-27 NOTE — PATIENT INSTRUCTIONS
Get blood work checked today     Decrease valsartan and take 40 mg tablets     Start isosorbide 30 mg daily for chest pain    Copay card given for Repatha.  Call me if there is any issue with cost.

## 2022-09-28 ENCOUNTER — TELEPHONE (OUTPATIENT)
Dept: CARDIOLOGY CLINIC | Age: 67
End: 2022-09-28

## 2022-09-28 PROBLEM — E78.2 MIXED HYPERLIPIDEMIA: Status: ACTIVE | Noted: 2022-09-28

## 2022-09-28 RX ORDER — VALSARTAN 40 MG/1
40 TABLET ORAL DAILY
Qty: 90 TABLET | Refills: 1 | Status: SHIPPED | OUTPATIENT
Start: 2022-09-28

## 2022-09-28 RX ORDER — FUROSEMIDE 20 MG/1
TABLET ORAL
Qty: 90 TABLET | OUTPATIENT
Start: 2022-09-28

## 2022-09-28 RX ORDER — FUROSEMIDE 20 MG/1
20 TABLET ORAL DAILY PRN
Qty: 30 TABLET | Refills: 0 | Status: SHIPPED | OUTPATIENT
Start: 2022-09-28 | End: 2022-10-14 | Stop reason: SINTOL

## 2022-09-28 RX ORDER — ISOSORBIDE MONONITRATE 30 MG/1
30 TABLET, EXTENDED RELEASE ORAL DAILY
Qty: 90 TABLET | Refills: 1 | Status: SHIPPED | OUTPATIENT
Start: 2022-09-28 | End: 2022-10-05

## 2022-09-28 NOTE — PROGRESS NOTES
[facility]   Cardiac Follow Up     Referring Provider:  Brandyn Wilcox MD     No chief complaint on file. History of Present Illness:  Emelyn Oleary is a 74 y.o. female with a history of coronary artery disease, STEMI with PCI (PCI/ALEC RCA 10/2012, PCI to Ramus 10/2012, ) , hypertension and AFIB. Afib ablation 2/25/21   PCI of distal LAD 2/21  Cath 2021 with patent stents  Repeat ablation done     She presented to [facility] on [facility]/2017 with complaints of chest pain that radiates to her jaw and arms. She was seen by cardiology and diagnosed with a STEMI and a LHC was completed with a ALEC in the diagonal branch. Developed chest pain again 11/20/2017 and a repeat LHC was completed with no further intervention necessary. She states that she has intolerance to many medications. Today,    Past Medical History:   has a past medical history of Allergic rhinitis, cause unspecified, Anxiety, Arthritis, Bilateral carotid artery stenosis, CAD (coronary artery disease), Chronic kidney disease, Chronic pain, Depression, Erosive esophagitis, Essential hypertension, HSV infection, Hypercholesterolemia, Irritable bowel syndrome with diarrhea, Migraine headache, Nonerosive nonspecific gastritis, and OAB (overactive bladder). Surgical History:   has a past surgical history that includes Appendectomy; Total abdominal hysterectomy w/ bilateral salpingoophorectomy; Cholecystectomy; Tubal ligation; Tonsillectomy; other surgical history; Coronary angioplasty with stent (10/2012); Percutaneous Transluminal Coronary Angio (10/2012); Cardiac catheterization (12/07/2013); Hysterectomy; polypectomy; Cystoscopy (04/2014); Upper gastrointestinal endoscopy; Wrist surgery (Left, 05/21/2015); Abdominal adhesion surgery; Colonoscopy; Endoscopy, colon, diagnostic; Cosmetic surgery; fracture surgery; ERCP (01/25/2017); sinus surgery;  Hysterectomy, total abdominal; Coronary angioplasty with stent (02/04/2021); and Coronary angioplasty with stent (09/2022). Social History:   reports that she quit smoking about 15 years ago. Her smoking use included cigarettes. She has a 10.00 pack-year smoking history. She has never used smokeless tobacco. She reports that she does not drink alcohol and does not use drugs. Family History:  family history includes Heart Disease in her brother, father, maternal uncle, mother, and another family member; High Blood Pressure in her sister and sister; Hypertension in her father and mother; Other in an other family member; Stroke in her sister and another family member. Home Medications:  Prior to Admission medications    Medication Sig Start Date End Date Taking?  Authorizing Provider   valsartan (DIOVAN) 40 MG tablet TAKE 1 TABLET BY MOUTH DAILY 9/28/22   JAYLEEN Persaud CNP   isosorbide mononitrate (IMDUR) 30 MG extended release tablet TAKE 1 TABLET BY MOUTH DAILY 9/28/22   JAYLEEN Persaud CNP   furosemide (LASIX) 20 MG tablet Take 1 tablet by mouth daily as needed (swelling or shortness of breath) 9/28/22   JAYLEEN Persaud CNP   ticagrelor (BRILINTA) 90 MG TABS tablet Take 1 tablet by mouth 2 times daily 9/27/22   JAYLEEN Persaud CNP   Evolocumab (REPATHA) 140 MG/ML SOSY Inject 1 mL into the skin every 14 days  Patient not taking: Reported on 9/27/2022 9/23/22   Tameka Shelby MD   bisoprolol (ZEBETA) 5 MG tablet Take 1 tablet by mouth daily 9/18/22   Symone Osman MD   aspirin 81 MG chewable tablet Take 1 tablet by mouth daily 9/18/22   Symone Osman MD   POTASSIUM GLUCONATE PO Take by mouth    Historical Provider, MD   Cholecalciferol (VITAMIN D3) 125 MCG (5000 UT) TABS Take by mouth daily     Historical Provider, MD   cetirizine (ZYRTEC) 5 MG tablet Take 5 mg by mouth daily    Historical Provider, MD   dilTIAZem (CARDIZEM CD) 120 MG extended release capsule TAKE 1 CAPSULE BY MOUTH TWICE DAILY 9/9/21   Tiffani Winston MD   Mag Aspart-Potassium Aspart (POTASSIUM & MAGNESIUM ASPARTAT PO) Take 1 tablet by mouth daily     Historical Provider, MD   Zinc-Vitamin C  MG LOZG Take 1 lozenge by mouth daily     Historical Provider, MD   L-Lysine 1000 MG TABS Take 1 tablet by mouth every morning (before breakfast)     Historical Provider, MD   cyanocobalamin 1000 MCG tablet Take 1,000 mcg by mouth daily    Historical Provider, MD   nitroGLYCERIN (NITROSTAT) 0.4 MG SL tablet Place 1 tablet under the tongue every 5 minutes as needed for Chest pain up to max of 3 total doses. If no relief after 1 dose, call 911. 8/31/20   Meggan Villaseñor MD   estradiol (ESTRACE) 0.1 MG/GM vaginal cream PLACE 0.5 GRAM VAGINALLY 3 TIMES A WEEK 6/5/20   Meggan Villaseñor MD        Allergies:  Hydralazine, Shellfish-derived products, Xarelto [rivaroxaban], Asa [aspirin], Chicken allergy, Crestor [rosuvastatin], Fenofibrate, Hctz [hydrochlorothiazide], Lipitor [atorvastatin calcium], Lipitor [atorvastatin], Lisinopril, Metoprolol, Mometasone furoate, Nasonex [mometasone], Nsaids, Other, Pineapple, Plavix [clopidogrel], Pork allergy, Sulfa antibiotics, Zetia [ezetimibe], Contrast [iodides], and Flagyl [metronidazole]     Review of Systems:   Constitutional: there has been no unanticipated weight loss. There's been no change in energy level, sleep pattern, or activity level.   +fatigue   Eyes: No visual changes or diplopia. No scleral icterus. ENT: No Headaches, hearing loss or vertigo. No mouth sores or sore throat. Cardiovascular: Reviewed in HPI  Respiratory: No cough or wheezing, no sputum production. No hematemesis. +sob  Gastrointestinal: No abdominal pain, appetite loss, blood in stools. No change in bowel or bladder habits. Genitourinary: No dysuria, trouble voiding, or hematuria. Musculoskeletal:  No gait disturbance, weakness or joint complaints. +cp   Integumentary: No rash or pruritis. Neurological: No headache, diplopia, change in muscle strength, numbness or tingling.  No change in gait, balance, coordination, mood, affect, memory, mentation, behavior. Psychiatric: No anxiety, no depression. Endocrine: No malaise, fatigue or temperature intolerance. No excessive thirst, fluid intake, or urination. No tremor. Hematologic/Lymphatic: No abnormal bruising or bleeding, blood clots or swollen lymph nodes. Allergic/Immunologic: No nasal congestion or hives. Physical Examination:    There were no vitals filed for this visit. Wt Readings from Last 1 Encounters:   09/27/22 218 lb 11.2 oz (99.2 kg)       Constitutional and General Appearance:Appears uncomfortable  Skin:good turgor,intact without lesions  HEENT: EOMI ,normal  Neck:no JVD    Respiratory:  Normal excursion and expansion without use of accessory muscles  Resp Auscultation: Normal breath sounds without dullness  Cardiovascular: The apical impulses not displaced  Heart tones are crisp and normal  Cervical veins are not engorged  The carotid upstroke is normal in amplitude and contour without delay or bruit  Peripheral pulses are symmetrical and full  There is no clubbing, cyanosis of the extremities. No edema  Femoral Arteries: 2+ and equal  Pedal Pulses: 2+ and equal   Abdomen:  No masses or tenderness  Liver/Spleen: No Abnormalities Noted  Neurological/Psychiatric:  Alert and oriented in all spheres  Moves all extremities well  Exhibits normal gait balance and coordination  No abnormalities of mood, affect, memory, mentation, or behavior are noted    GXT Myoview 12/23/20   Summary    -There is a small-moderate sized, moderate intensity fixed anterior defect    suggestive of scar.    -There is no ischemia. -LV function is normal with EF=70%    -Low-intermediate risk.          Assessment:      Atrial fibrillation and flutter with RVR  Ablation on  2/25/21,repeat this year        CAD  History of multiple stents to RCA, STEMI 2017  [facility] was completed with a ALEC in the diagonal branch at [facility] 2017  12/12/20 stress > stable but fixed anterior defect    LAD stent x 2 in distal LAD 2/4/21    Hypertension   Stable- Blood pressure 138/80, pulse 72, height 5' 4\" (1.626 m), weight 208 lb 3.2 oz (94.4 kg), SpO2 98 %      Hyperlipidemia   She is highly statin intolerant- severe myalgia    Will consider either PCSK9 or leqvio    Plan:          I appreciate the opportunity of cooperating in the care of this individual.    Kwaku Whiting M.D., [facility]    Patient's problem list, medications, allergies, past medical, surgical, social and family histories were reviewed and updated as appropriate.

## 2022-09-28 NOTE — TELEPHONE ENCOUNTER
Called patient and went over blood work result. BNP is slightly elevated. Will give her PRN lasix (which she will take daily for the next 7 days) as she had elevated LVEDP on cath and c/o SOB. Follow up scheduled with Dr Kacie Currie 10/14.

## 2022-09-29 ENCOUNTER — TELEPHONE (OUTPATIENT)
Dept: FAMILY MEDICINE CLINIC | Age: 67
End: 2022-09-29

## 2022-09-29 NOTE — TELEPHONE ENCOUNTER
Detailed message left on pt voicemail letting her know that the samples of Epifanio Severance are here at the office ready for .

## 2022-10-05 ENCOUNTER — TELEPHONE (OUTPATIENT)
Dept: CARDIOLOGY CLINIC | Age: 67
End: 2022-10-05

## 2022-10-05 NOTE — TELEPHONE ENCOUNTER
Called patient. She thinks Ranexa was prescribed to her back in 4/2021 when she had her first heart attack. She recently started taking it again and it helps her angina. She has 2 tabs left. She is not taking Imdur. States she had a reaction to it. She thinks leg pain but not sure.  (Took Imdur off med list)

## 2022-10-05 NOTE — TELEPHONE ENCOUNTER
Pt called asking if [facility] can fill the RX for the Ranolazine 500 mg, this RX was given to the pt while in the hospital. Pt stated she only has couple pills left and it helps with the   Angina.     Pls advise thank you     Ranolazine 500 mg  One BID    [address], [address] [facility] [address] Margarita Alvarez [phone]   [address] [place] [address] Iveth Mcclendon [place] [address]   Phone:  [phone]  Fax:  [phone]

## 2022-10-06 RX ORDER — RANOLAZINE 500 MG/1
500 TABLET, EXTENDED RELEASE ORAL 2 TIMES DAILY
Qty: 180 TABLET | Refills: 3 | Status: SHIPPED | OUTPATIENT
Start: 2022-10-06 | End: 2022-11-11 | Stop reason: SDUPTHER

## 2022-10-14 ENCOUNTER — OFFICE VISIT (OUTPATIENT)
Dept: CARDIOLOGY CLINIC | Age: 67
End: 2022-10-14
Payer: MEDICARE

## 2022-10-14 VITALS
SYSTOLIC BLOOD PRESSURE: 120 MMHG | BODY MASS INDEX: 32.78 KG/M2 | OXYGEN SATURATION: 96 % | HEIGHT: 64 IN | WEIGHT: 192 LBS | HEART RATE: 76 BPM | DIASTOLIC BLOOD PRESSURE: 84 MMHG

## 2022-10-14 DIAGNOSIS — E78.00 PURE HYPERCHOLESTEROLEMIA: ICD-10-CM

## 2022-10-14 DIAGNOSIS — I10 ESSENTIAL HYPERTENSION: ICD-10-CM

## 2022-10-14 DIAGNOSIS — I25.10 CORONARY ARTERY DISEASE INVOLVING NATIVE CORONARY ARTERY OF NATIVE HEART WITHOUT ANGINA PECTORIS: Primary | ICD-10-CM

## 2022-10-14 DIAGNOSIS — I48.3 TYPICAL ATRIAL FLUTTER (HCC): ICD-10-CM

## 2022-10-14 DIAGNOSIS — R07.89 OTHER CHEST PAIN: ICD-10-CM

## 2022-10-14 PROCEDURE — 1123F ACP DISCUSS/DSCN MKR DOCD: CPT | Performed by: INTERNAL MEDICINE

## 2022-10-14 PROCEDURE — 93000 ELECTROCARDIOGRAM COMPLETE: CPT | Performed by: INTERNAL MEDICINE

## 2022-10-14 PROCEDURE — 99214 OFFICE O/P EST MOD 30 MIN: CPT | Performed by: INTERNAL MEDICINE

## 2022-10-14 RX ORDER — DIAZEPAM 5 MG/1
TABLET ORAL
COMMUNITY
Start: 2022-08-11

## 2022-10-14 RX ORDER — TRAMADOL HYDROCHLORIDE 50 MG/1
50 TABLET ORAL EVERY 8 HOURS PRN
Qty: 30 TABLET | Refills: 0 | Status: ON HOLD | OUTPATIENT
Start: 2022-10-14 | End: 2022-11-03 | Stop reason: SDUPTHER

## 2022-10-14 RX ORDER — NITROGLYCERIN 0.4 MG/1
0.4 TABLET SUBLINGUAL EVERY 5 MIN PRN
Qty: 25 TABLET | Refills: 3 | Status: SHIPPED | OUTPATIENT
Start: 2022-10-14

## 2022-10-14 RX ORDER — BISOPROLOL FUMARATE 5 MG/1
5 TABLET ORAL DAILY
Qty: 30 TABLET | Refills: 5 | Status: ON HOLD | OUTPATIENT
Start: 2022-10-14 | End: 2022-11-01 | Stop reason: ALTCHOICE

## 2022-10-14 RX ORDER — TRAMADOL HYDROCHLORIDE 50 MG/1
50 TABLET ORAL EVERY 8 HOURS PRN
Qty: 30 TABLET | Refills: 0 | Status: SHIPPED | OUTPATIENT
Start: 2022-10-14 | End: 2022-10-31

## 2022-10-14 RX ORDER — TRAMADOL HYDROCHLORIDE 50 MG/1
50 TABLET ORAL EVERY 8 HOURS PRN
Qty: 30 TABLET | Refills: 0 | Status: SHIPPED | OUTPATIENT
Start: 2022-10-14 | End: 2022-10-14 | Stop reason: SDUPTHER

## 2022-10-14 NOTE — PROGRESS NOTES
[facility]   Cardiac Follow Up     Referring Provider:  Eric Swanson MD     Chief Complaint   Patient presents with    Coronary Artery Disease    Hypertension    3 Month Follow-Up     Pt reports ED visit 9/13 for heart attack. History of Present Illness:  Mehreen Thompson is a 74 y.o. female with a history of coronary artery disease, STEMI with PCI (PCI/ALEC RCA 10/2012, PCI to Ramus 10/2012, ) , hypertension and AFIB. Afib ablation 2/25/21   PCI of distal LAD 2/21  Cath 2021 with patent stents  Repeat ablation done     She presented to [facility] on 11/19/2017 with complaints of chest pain that radiates to her jaw and arms. She was seen by cardiology and diagnosed with a STEMI and a LHC was completed with a ALEC in the diagonal branch. Developed chest pain again 11/20/2017 and a repeat LHC was completed with no further intervention necessary. She states that she has intolerance to many medications. She was hospitalized with NSTEMI on 9/13/22 (likely plaque rupture) and underwent revascularization with ALEC x 3 to an occluded Lcx artery. Today, she is at the visit with her . She has chest discomfort in her left upper chest which is sore when area is palpated. She also reports intermittent dizziness although BP was stable when rechecked. She received Repatha samples from Dr. Jared Higgins to try as lipids remain sub-optimal (no treatment currently). She is afraid to take new medications as she has multiple drug allergies. She denies palpitations, or edema. Yael Vergara NP started furosemide 20 mg after patient complained of persistent shortness of breath after discharge. ProBNP was elevated at 684 (9/27/22). Patient stopped furosemide because it \"took my legs away and caused itching. \"      Past Medical History:   has a past medical history of Allergic rhinitis, cause unspecified, Anxiety, Arthritis, Bilateral carotid artery stenosis, CAD (coronary artery disease), Chronic kidney disease, Chronic pain, Depression, Erosive esophagitis, Essential hypertension, HSV infection, Hypercholesterolemia, Irritable bowel syndrome with diarrhea, Migraine headache, Nonerosive nonspecific gastritis, and OAB (overactive bladder). Surgical History:   has a past surgical history that includes Appendectomy; Total abdominal hysterectomy w/ bilateral salpingoophorectomy; Cholecystectomy; Tubal ligation; Tonsillectomy; other surgical history; Coronary angioplasty with stent (10/2012); Percutaneous Transluminal Coronary Angio (10/2012); Cardiac catheterization (12/07/2013); Hysterectomy; polypectomy; Cystoscopy (04/2014); Upper gastrointestinal endoscopy; Wrist surgery (Left, 05/21/2015); Abdominal adhesion surgery; Colonoscopy; Endoscopy, colon, diagnostic; Cosmetic surgery; fracture surgery; ERCP (01/25/2017); sinus surgery; Hysterectomy, total abdominal; Coronary angioplasty with stent (02/04/2021); and Coronary angioplasty with stent (09/2022). Social History:   reports that she quit smoking about 15 years ago. Her smoking use included cigarettes. She has a 10.00 pack-year smoking history. She has never used smokeless tobacco. She reports that she does not drink alcohol and does not use drugs. Family History:  family history includes Heart Disease in her brother, father, maternal uncle, mother, and another family member; High Blood Pressure in her sister and sister; Hypertension in her father and mother; Other in an other family member; Stroke in her sister and another family member. Home Medications:  Prior to Admission medications    Medication Sig Start Date End Date Taking?  Authorizing Provider   diazePAM (VALIUM) 5 MG tablet INSERT 1 TABLET VAGINALLY TWICE DAILY AS NEEDED FOR VAGINAL/URETHRAL PAIN 8/11/22  Yes Historical Provider, MD   ranolazine (RANEXA) 500 MG extended release tablet Take 1 tablet by mouth 2 times daily 10/6/22  Yes Elenita Parsons MD   valsartan (DIOVAN) 40 MG tablet TAKE 1 TABLET BY MOUTH DAILY 9/28/22  Yes JAYLEEN Dalal CNP   ticagrelor (BRILINTA) 90 MG TABS tablet Take 1 tablet by mouth 2 times daily 9/27/22  Yes JAYLEEN Rider CNP   cetirizine (ZYRTEC) 5 MG tablet Take 5 mg by mouth daily   Yes Historical Provider, MD   dilTIAZem (CARDIZEM CD) 120 MG extended release capsule TAKE 1 CAPSULE BY MOUTH TWICE DAILY 9/9/21  Yes Crystal Delgadillo MD   L-Lysine 1000 MG TABS Take 1 tablet by mouth every morning (before breakfast)    Yes Historical Provider, MD   nitroGLYCERIN (NITROSTAT) 0.4 MG SL tablet Place 1 tablet under the tongue every 5 minutes as needed for Chest pain up to max of 3 total doses.  If no relief after 1 dose, call 911. 8/31/20  Yes Sunitha Howard MD   estradiol (ESTRACE) 0.1 MG/GM vaginal cream PLACE 0.5 GRAM VAGINALLY 3 TIMES A WEEK 6/5/20  Yes Sunitha Howard MD   furosemide (LASIX) 20 MG tablet Take 1 tablet by mouth daily as needed (swelling or shortness of breath)  Patient not taking: Reported on 10/14/2022 9/28/22   JAYLEEN Dalal CNP   Evolocumab (REPATHA) 140 MG/ML SOSY Inject 1 mL into the skin every 14 days  Patient not taking: No sig reported 9/23/22   Sunitha Howard MD   bisoprolol (ZEBETA) 5 MG tablet Take 1 tablet by mouth daily  Patient not taking: Reported on 10/14/2022 9/18/22   Manny Rosales MD   aspirin 81 MG chewable tablet Take 1 tablet by mouth daily  Patient not taking: Reported on 10/14/2022 9/18/22   Manny Rosales MD   POTASSIUM GLUCONATE PO Take by mouth  Patient not taking: Reported on 10/14/2022    Historical Provider, MD   Cholecalciferol (VITAMIN D3) 125 MCG (5000 UT) TABS Take by mouth daily   Patient not taking: Reported on 10/14/2022    Historical Provider, MD   Mag Aspart-Potassium Aspart (POTASSIUM & MAGNESIUM ASPARTAT PO) Take 1 tablet by mouth daily   Patient not taking: Reported on 10/14/2022    Historical Provider, MD   Zinc-Vitamin C  MG LOZG Take 1 lozenge by mouth daily   Patient not taking: Reported on 10/14/2022    Historical Provider, MD   cyanocobalamin 1000 MCG tablet Take 1,000 mcg by mouth daily  Patient not taking: Reported on 10/14/2022    Historical Provider, MD        Allergies:  Hydralazine, Shellfish-derived products, Xarelto [rivaroxaban], Asa [aspirin], Chicken allergy, Crestor [rosuvastatin], Fenofibrate, Hctz [hydrochlorothiazide], Lipitor [atorvastatin calcium], Lipitor [atorvastatin], Lisinopril, Metoprolol, Mometasone furoate, Nasonex [mometasone], Nsaids, Other, Pineapple, Plavix [clopidogrel], Pork allergy, Sulfa antibiotics, Zetia [ezetimibe], Contrast [iodides], and Flagyl [metronidazole]     Review of Systems:   Constitutional: there has been no unanticipated weight loss. There's been no change in energy level, sleep pattern, or activity level.   +fatigue   Eyes: No visual changes or diplopia. No scleral icterus. ENT: No Headaches, hearing loss or vertigo. No mouth sores or sore throat. Cardiovascular: Reviewed in HPI  Respiratory: No cough or wheezing, no sputum production. No hematemesis. +sob  Gastrointestinal: No abdominal pain, appetite loss, blood in stools. No change in bowel or bladder habits. Genitourinary: No dysuria, trouble voiding, or hematuria. Musculoskeletal:  No gait disturbance, weakness or joint complaints. +cp in left upper chest  Integumentary: No rash or pruritis. Neurological: No headache, diplopia, change in muscle strength, numbness or tingling. No change in gait, balance, coordination, mood, affect, memory, mentation, behavior. Psychiatric: No anxiety, no depression. Endocrine: No malaise, fatigue or temperature intolerance. No excessive thirst, fluid intake, or urination. No tremor. Hematologic/Lymphatic: No abnormal bruising or bleeding, blood clots or swollen lymph nodes. Allergic/Immunologic: No nasal congestion or hives.     Physical Examination:    Vitals:    10/14/22 1427   BP: 120/84   Pulse: 76   SpO2: 96%          Wt Readings from Last 1 Encounters:   10/14/22 192 lb (87.1 kg)       Constitutional and General Appearance:Appears uncomfortable  Skin:good turgor,intact without lesions  HEENT: EOMI ,normal  Neck:no JVD    Respiratory:  Normal excursion and expansion without use of accessory muscles  Resp Auscultation: Normal breath sounds without dullness  Cardiovascular: The apical impulses not displaced  Heart tones are crisp and normal  Cervical veins are not engorged  The carotid upstroke is normal in amplitude and contour without delay or bruit  Peripheral pulses are symmetrical and full  There is no clubbing, cyanosis of the extremities. No edema  Femoral Arteries: 2+ and equal  Pedal Pulses: 2+ and equal   Abdomen:  No masses or tenderness  Liver/Spleen: No Abnormalities Noted  Neurological/Psychiatric:  Alert and oriented in all spheres  Moves all extremities well  Exhibits normal gait balance and coordination  No abnormalities of mood, affect, memory, mentation, or behavior are noted    GXT Myoview 12/23/20   Summary    -There is a small-moderate sized, moderate intensity fixed anterior defect    suggestive of scar.    -There is no ischemia. -LV function is normal with EF=70%    -Low-intermediate risk. Assessment:      Atrial fibrillation and flutter with RVR  Ablation on  2/25/21,  S/p RFCA with PVI, CTI dependent flutter ablation on 4/12/22  ECG 10/14/22 - sinus rhythm, HR 85 bpm  No current complaints of palpitations       CAD  History of multiple stents to RCA, STEMI 2017  [facility] was completed with a ALEC in the diagonal branch at [facility] 2017 12/12/20 stress > stable but fixed anterior defect    LAD stent x 2 in distal LAD 2/4/21  S/p NSTEMI with PCI of mid and distal Lcx with ALEC x 3 (9/13/22)     On Brilinta 90 mg daily but is not on ASA as it tears her stomach up  Has left sided chest pain, pain worsens with palpation.   Will give a short course of Ultram.  Also suggested Bio-freeze or Salon-pas to help atypical chest pain  Will prescribe NTG to use as needed for recurrent angina      Hypertension   Stable-  Blood pressure 120/84, pulse 76, height 5' 4\" (1.626 m), weight 192 lb (87.1 kg), SpO2 96 %, not currently breastfeeding. Hyperlipidemia   She is highly statin intolerant- severe myalgia     6/29/22 - TC- 244, TG- 277, HDL- 47, LDL- 142. We discussed the importance of improving the management of her lipids to prevent further progression of atherosclerosis. Recommend trying Repatha 140 mg injection (4 sample syringes given by Dr. Scarlet Anne and prescription sent to pharmacy)  Recommend checking fasting lipids between the 4th and 5 th dose. Patient to call our office if she develops side effects on Repatha. Plan:   Ultrum as needed for pain. Consider Bio-freeze roll on for pain, Salon-pas  Research injectable meds. Repatha.Jixee. Cargo.io. Jixee  Start Repatha injection 140 mg injection every 14 days as ordered by Dr. Scarlet Anne (first dose 10/14/22). Get fasting lipids between the 4th and 5th dose. Recommend labs a few days before 5th dose  Call our office with any concerning cardiac symptoms  Follow up in three months        I appreciate the opportunity of cooperating in the care of this individual.    Carlos A Noel M.D., [facility] [place]    Patient's problem list, medications, allergies, past medical, surgical, social and family histories were reviewed and updated as appropriate. Scribe's attestation: This note was scribed in the presence of Carlos A Noel M.D. by Beverly Mendoza RN    The scribe's documentation has been prepared under my direction and personally reviewed by me in its entirety. I confirm that the note above accurately reflects all work, treatment, procedures, and medical decision making performed by me.

## 2022-10-14 NOTE — PATIENT INSTRUCTIONS
Ultrum as needed for pain. Consider Bio-freeze roll on for pain, Mary-pas  Research injectable meds. Repatha.com. Praluent. Telvent Git  Start Repatha injection 140 mg injection every 14 days as ordered by Dr. Terra Atkinson (first dose 10/14/22). Get fasting lipids between the 4th and 5th dose.   Recommend labs a few days before 5th dose  Call our office with any concerning cardiac symptoms  Follow up in three months

## 2022-10-18 ENCOUNTER — TELEPHONE (OUTPATIENT)
Dept: FAMILY MEDICINE CLINIC | Age: 67
End: 2022-10-18

## 2022-10-18 NOTE — TELEPHONE ENCOUNTER
Per [facility]    Just an FYI:     Pt wanted to tell PCP Poyntelle thank you for the   Rapatha samples for Pt. Pt has not tried them yet but wanted PCP to know   her appreciation. Pt does not need a call back.      Thank you

## 2022-10-31 ENCOUNTER — HOSPITAL ENCOUNTER (INPATIENT)
Age: 67
LOS: 1 days | Discharge: HOME | DRG: 247 | End: 2022-11-03
Attending: INTERNAL MEDICINE | Admitting: HOSPITALIST
Payer: MEDICARE

## 2022-10-31 ENCOUNTER — TELEPHONE (OUTPATIENT)
Dept: CARDIOLOGY CLINIC | Age: 67
End: 2022-10-31

## 2022-10-31 ENCOUNTER — APPOINTMENT (OUTPATIENT)
Dept: GENERAL RADIOLOGY | Age: 67
DRG: 247 | End: 2022-10-31
Payer: MEDICARE

## 2022-10-31 DIAGNOSIS — I25.10 CORONARY ARTERY DISEASE INVOLVING NATIVE CORONARY ARTERY OF NATIVE HEART WITHOUT ANGINA PECTORIS: ICD-10-CM

## 2022-10-31 DIAGNOSIS — R07.9 ACUTE CHEST PAIN: Primary | ICD-10-CM

## 2022-10-31 DIAGNOSIS — I10 ACCELERATED HYPERTENSION: ICD-10-CM

## 2022-10-31 DIAGNOSIS — R07.89 OTHER CHEST PAIN: ICD-10-CM

## 2022-10-31 LAB
A/G RATIO: 1.6 (ref 1.1–2.2)
ALBUMIN SERPL-MCNC: 4.5 G/DL (ref 3.4–5)
ALP BLD-CCNC: 136 U/L (ref 40–129)
ALT SERPL-CCNC: 9 U/L (ref 10–40)
ANION GAP SERPL CALCULATED.3IONS-SCNC: 12 MMOL/L (ref 3–16)
APTT: 29.5 SEC (ref 23–34.3)
AST SERPL-CCNC: 19 U/L (ref 15–37)
BASOPHILS ABSOLUTE: 0.1 K/UL (ref 0–0.2)
BASOPHILS RELATIVE PERCENT: 0.7 %
BILIRUB SERPL-MCNC: 0.3 MG/DL (ref 0–1)
BUN BLDV-MCNC: 7 MG/DL (ref 7–20)
CALCIUM SERPL-MCNC: 9.8 MG/DL (ref 8.3–10.6)
CHLORIDE BLD-SCNC: 99 MMOL/L (ref 99–110)
CO2: 24 MMOL/L (ref 21–32)
CREAT SERPL-MCNC: 0.8 MG/DL (ref 0.6–1.2)
EKG ATRIAL RATE: 91 BPM
EKG DIAGNOSIS: NORMAL
EKG P AXIS: 63 DEGREES
EKG P-R INTERVAL: 158 MS
EKG Q-T INTERVAL: 362 MS
EKG QRS DURATION: 78 MS
EKG QTC CALCULATION (BAZETT): 445 MS
EKG R AXIS: -29 DEGREES
EKG T AXIS: 33 DEGREES
EKG VENTRICULAR RATE: 91 BPM
EOSINOPHILS ABSOLUTE: 0.2 K/UL (ref 0–0.6)
EOSINOPHILS RELATIVE PERCENT: 2.8 %
GFR SERPL CREATININE-BSD FRML MDRD: >60 ML/MIN/{1.73_M2}
GLUCOSE BLD-MCNC: 146 MG/DL (ref 70–99)
HCT VFR BLD CALC: 42 % (ref 36–48)
HEMOGLOBIN: 14 G/DL (ref 12–16)
INR BLD: 1.11 (ref 0.87–1.14)
LIPASE: 52 U/L (ref 13–60)
LYMPHOCYTES ABSOLUTE: 1.9 K/UL (ref 1–5.1)
LYMPHOCYTES RELATIVE PERCENT: 25.1 %
MCH RBC QN AUTO: 28.4 PG (ref 26–34)
MCHC RBC AUTO-ENTMCNC: 33.3 G/DL (ref 31–36)
MCV RBC AUTO: 85.2 FL (ref 80–100)
MONOCYTES ABSOLUTE: 0.6 K/UL (ref 0–1.3)
MONOCYTES RELATIVE PERCENT: 8.5 %
NEUTROPHILS ABSOLUTE: 4.6 K/UL (ref 1.7–7.7)
NEUTROPHILS RELATIVE PERCENT: 62.9 %
PDW BLD-RTO: 14.1 % (ref 12.4–15.4)
PLATELET # BLD: 321 K/UL (ref 135–450)
PMV BLD AUTO: 7.5 FL (ref 5–10.5)
POTASSIUM SERPL-SCNC: 3.7 MMOL/L (ref 3.5–5.1)
PRO-BNP: 542 PG/ML (ref 0–124)
PROTHROMBIN TIME: 14.3 SEC (ref 11.7–14.5)
RBC # BLD: 4.93 M/UL (ref 4–5.2)
SODIUM BLD-SCNC: 135 MMOL/L (ref 136–145)
TOTAL PROTEIN: 7.4 G/DL (ref 6.4–8.2)
TROPONIN: 0.02 NG/ML
TROPONIN: <0.01 NG/ML
TROPONIN: <0.01 NG/ML
WBC # BLD: 7.4 K/UL (ref 4–11)

## 2022-10-31 PROCEDURE — 85730 THROMBOPLASTIN TIME PARTIAL: CPT

## 2022-10-31 PROCEDURE — 6360000002 HC RX W HCPCS: Performed by: PHYSICIAN ASSISTANT

## 2022-10-31 PROCEDURE — 93010 ELECTROCARDIOGRAM REPORT: CPT | Performed by: INTERNAL MEDICINE

## 2022-10-31 PROCEDURE — 99285 EMERGENCY DEPT VISIT HI MDM: CPT

## 2022-10-31 PROCEDURE — 80053 COMPREHEN METABOLIC PANEL: CPT

## 2022-10-31 PROCEDURE — 83880 ASSAY OF NATRIURETIC PEPTIDE: CPT

## 2022-10-31 PROCEDURE — 93005 ELECTROCARDIOGRAM TRACING: CPT | Performed by: EMERGENCY MEDICINE

## 2022-10-31 PROCEDURE — G0378 HOSPITAL OBSERVATION PER HR: HCPCS

## 2022-10-31 PROCEDURE — 84484 ASSAY OF TROPONIN QUANT: CPT

## 2022-10-31 PROCEDURE — 85025 COMPLETE CBC W/AUTO DIFF WBC: CPT

## 2022-10-31 PROCEDURE — 6370000000 HC RX 637 (ALT 250 FOR IP): Performed by: PHYSICIAN ASSISTANT

## 2022-10-31 PROCEDURE — 6370000000 HC RX 637 (ALT 250 FOR IP): Performed by: HOSPITALIST

## 2022-10-31 PROCEDURE — 71045 X-RAY EXAM CHEST 1 VIEW: CPT

## 2022-10-31 PROCEDURE — 96376 TX/PRO/DX INJ SAME DRUG ADON: CPT

## 2022-10-31 PROCEDURE — 36415 COLL VENOUS BLD VENIPUNCTURE: CPT

## 2022-10-31 PROCEDURE — 96375 TX/PRO/DX INJ NEW DRUG ADDON: CPT

## 2022-10-31 PROCEDURE — 027034Z DILATION OF CORONARY ARTERY, ONE ARTERY WITH DRUG-ELUTING INTRALUMINAL DEVICE, PERCUTANEOUS APPROACH: ICD-10-PCS | Performed by: INTERNAL MEDICINE

## 2022-10-31 PROCEDURE — 83690 ASSAY OF LIPASE: CPT

## 2022-10-31 PROCEDURE — 99223 1ST HOSP IP/OBS HIGH 75: CPT | Performed by: INTERNAL MEDICINE

## 2022-10-31 PROCEDURE — 6360000002 HC RX W HCPCS: Performed by: HOSPITALIST

## 2022-10-31 PROCEDURE — 96372 THER/PROPH/DIAG INJ SC/IM: CPT

## 2022-10-31 PROCEDURE — 4A023N7 MEASUREMENT OF CARDIAC SAMPLING AND PRESSURE, LEFT HEART, PERCUTANEOUS APPROACH: ICD-10-PCS | Performed by: INTERNAL MEDICINE

## 2022-10-31 PROCEDURE — B2151ZZ FLUOROSCOPY OF LEFT HEART USING LOW OSMOLAR CONTRAST: ICD-10-PCS | Performed by: INTERNAL MEDICINE

## 2022-10-31 PROCEDURE — 96374 THER/PROPH/DIAG INJ IV PUSH: CPT

## 2022-10-31 PROCEDURE — 85610 PROTHROMBIN TIME: CPT

## 2022-10-31 RX ORDER — ENOXAPARIN SODIUM 100 MG/ML
40 INJECTION SUBCUTANEOUS DAILY
Status: DISCONTINUED | OUTPATIENT
Start: 2022-10-31 | End: 2022-11-03 | Stop reason: HOSPADM

## 2022-10-31 RX ORDER — METOPROLOL SUCCINATE 50 MG/1
50 TABLET, EXTENDED RELEASE ORAL DAILY
Status: DISCONTINUED | OUTPATIENT
Start: 2022-10-31 | End: 2022-11-03

## 2022-10-31 RX ORDER — ONDANSETRON 2 MG/ML
4 INJECTION INTRAMUSCULAR; INTRAVENOUS EVERY 6 HOURS PRN
Status: DISCONTINUED | OUTPATIENT
Start: 2022-10-31 | End: 2022-11-03 | Stop reason: HOSPADM

## 2022-10-31 RX ORDER — DILTIAZEM HYDROCHLORIDE 120 MG/1
120 CAPSULE, COATED, EXTENDED RELEASE ORAL 2 TIMES DAILY
Status: DISCONTINUED | OUTPATIENT
Start: 2022-10-31 | End: 2022-11-03 | Stop reason: HOSPADM

## 2022-10-31 RX ORDER — ACETAMINOPHEN 325 MG/1
650 TABLET ORAL EVERY 6 HOURS PRN
Status: DISCONTINUED | OUTPATIENT
Start: 2022-10-31 | End: 2022-11-01 | Stop reason: SDUPTHER

## 2022-10-31 RX ORDER — SODIUM CHLORIDE 9 MG/ML
INJECTION, SOLUTION INTRAVENOUS PRN
Status: DISCONTINUED | OUTPATIENT
Start: 2022-10-31 | End: 2022-11-03 | Stop reason: HOSPADM

## 2022-10-31 RX ORDER — ONDANSETRON 4 MG/1
4 TABLET, ORALLY DISINTEGRATING ORAL EVERY 8 HOURS PRN
Status: DISCONTINUED | OUTPATIENT
Start: 2022-10-31 | End: 2022-11-03 | Stop reason: HOSPADM

## 2022-10-31 RX ORDER — DILTIAZEM HYDROCHLORIDE 120 MG/1
120 CAPSULE, COATED, EXTENDED RELEASE ORAL DAILY
Status: DISCONTINUED | OUTPATIENT
Start: 2022-10-31 | End: 2022-10-31

## 2022-10-31 RX ORDER — ASPIRIN 81 MG/1
162 TABLET, CHEWABLE ORAL ONCE
Status: COMPLETED | OUTPATIENT
Start: 2022-10-31 | End: 2022-10-31

## 2022-10-31 RX ORDER — MORPHINE SULFATE 4 MG/ML
4 INJECTION, SOLUTION INTRAMUSCULAR; INTRAVENOUS ONCE
Status: COMPLETED | OUTPATIENT
Start: 2022-10-31 | End: 2022-10-31

## 2022-10-31 RX ORDER — ACETAMINOPHEN 650 MG/1
650 SUPPOSITORY RECTAL EVERY 6 HOURS PRN
Status: DISCONTINUED | OUTPATIENT
Start: 2022-10-31 | End: 2022-11-03 | Stop reason: HOSPADM

## 2022-10-31 RX ORDER — VALSARTAN 40 MG/1
40 TABLET ORAL DAILY
Status: DISCONTINUED | OUTPATIENT
Start: 2022-10-31 | End: 2022-11-03 | Stop reason: HOSPADM

## 2022-10-31 RX ORDER — POLYETHYLENE GLYCOL 3350 17 G/17G
17 POWDER, FOR SOLUTION ORAL DAILY PRN
Status: DISCONTINUED | OUTPATIENT
Start: 2022-10-31 | End: 2022-11-03 | Stop reason: HOSPADM

## 2022-10-31 RX ORDER — RANOLAZINE 500 MG/1
500 TABLET, EXTENDED RELEASE ORAL 2 TIMES DAILY
Status: DISCONTINUED | OUTPATIENT
Start: 2022-10-31 | End: 2022-11-03 | Stop reason: HOSPADM

## 2022-10-31 RX ORDER — ASPIRIN 81 MG/1
81 TABLET, CHEWABLE ORAL DAILY
Status: DISCONTINUED | OUTPATIENT
Start: 2022-11-01 | End: 2022-11-03 | Stop reason: HOSPADM

## 2022-10-31 RX ORDER — ONDANSETRON 2 MG/ML
4 INJECTION INTRAMUSCULAR; INTRAVENOUS ONCE
Status: COMPLETED | OUTPATIENT
Start: 2022-10-31 | End: 2022-10-31

## 2022-10-31 RX ORDER — MORPHINE SULFATE 2 MG/ML
2 INJECTION, SOLUTION INTRAMUSCULAR; INTRAVENOUS
Status: DISCONTINUED | OUTPATIENT
Start: 2022-10-31 | End: 2022-11-03 | Stop reason: HOSPADM

## 2022-10-31 RX ORDER — SODIUM CHLORIDE 0.9 % (FLUSH) 0.9 %
5-40 SYRINGE (ML) INJECTION EVERY 12 HOURS SCHEDULED
Status: DISCONTINUED | OUTPATIENT
Start: 2022-10-31 | End: 2022-11-03 | Stop reason: HOSPADM

## 2022-10-31 RX ORDER — TRAMADOL HYDROCHLORIDE 50 MG/1
50 TABLET ORAL EVERY 8 HOURS PRN
Status: DISCONTINUED | OUTPATIENT
Start: 2022-10-31 | End: 2022-11-03 | Stop reason: HOSPADM

## 2022-10-31 RX ORDER — SODIUM CHLORIDE 0.9 % (FLUSH) 0.9 %
5-40 SYRINGE (ML) INJECTION PRN
Status: DISCONTINUED | OUTPATIENT
Start: 2022-10-31 | End: 2022-11-03 | Stop reason: HOSPADM

## 2022-10-31 RX ADMIN — MORPHINE SULFATE 4 MG: 4 INJECTION, SOLUTION INTRAMUSCULAR; INTRAVENOUS at 11:11

## 2022-10-31 RX ADMIN — ACETAMINOPHEN 650 MG: 325 TABLET ORAL at 18:38

## 2022-10-31 RX ADMIN — MORPHINE SULFATE 2 MG: 2 INJECTION, SOLUTION INTRAMUSCULAR; INTRAVENOUS at 20:21

## 2022-10-31 RX ADMIN — ENOXAPARIN SODIUM 40 MG: 100 INJECTION SUBCUTANEOUS at 16:55

## 2022-10-31 RX ADMIN — TRAMADOL HYDROCHLORIDE 50 MG: 50 TABLET, COATED ORAL at 18:41

## 2022-10-31 RX ADMIN — ONDANSETRON 4 MG: 2 INJECTION INTRAMUSCULAR; INTRAVENOUS at 11:11

## 2022-10-31 RX ADMIN — NITROGLYCERIN 1 INCH: 20 OINTMENT TOPICAL at 11:12

## 2022-10-31 RX ADMIN — MORPHINE SULFATE 2 MG: 2 INJECTION, SOLUTION INTRAMUSCULAR; INTRAVENOUS at 13:25

## 2022-10-31 RX ADMIN — ASPIRIN 81 MG 162 MG: 81 TABLET ORAL at 11:09

## 2022-10-31 RX ADMIN — MORPHINE SULFATE 2 MG: 2 INJECTION, SOLUTION INTRAMUSCULAR; INTRAVENOUS at 16:55

## 2022-10-31 RX ADMIN — RANOLAZINE 500 MG: 500 TABLET, FILM COATED, EXTENDED RELEASE ORAL at 20:21

## 2022-10-31 RX ADMIN — TICAGRELOR 90 MG: 90 TABLET ORAL at 20:21

## 2022-10-31 ASSESSMENT — PAIN SCALES - GENERAL
PAINLEVEL_OUTOF10: 4
PAINLEVEL_OUTOF10: 8
PAINLEVEL_OUTOF10: 3
PAINLEVEL_OUTOF10: 5
PAINLEVEL_OUTOF10: 0
PAINLEVEL_OUTOF10: 7
PAINLEVEL_OUTOF10: 4
PAINLEVEL_OUTOF10: 6

## 2022-10-31 ASSESSMENT — ENCOUNTER SYMPTOMS
COLOR CHANGE: 0
VOMITING: 0
COUGH: 0
BACK PAIN: 0
CONSTIPATION: 0
NAUSEA: 0
STRIDOR: 0
DIARRHEA: 0
ABDOMINAL PAIN: 0
WHEEZING: 0
SHORTNESS OF BREATH: 1

## 2022-10-31 ASSESSMENT — PAIN DESCRIPTION - LOCATION
LOCATION: CHEST
LOCATION: HEAD
LOCATION: CHEST
LOCATION: CHEST;SHOULDER;BACK
LOCATION: CHEST

## 2022-10-31 ASSESSMENT — LIFESTYLE VARIABLES: HOW OFTEN DO YOU HAVE A DRINK CONTAINING ALCOHOL: NEVER

## 2022-10-31 ASSESSMENT — PAIN DESCRIPTION - ORIENTATION
ORIENTATION: MID;RIGHT
ORIENTATION: MID

## 2022-10-31 ASSESSMENT — PAIN DESCRIPTION - PAIN TYPE: TYPE: ACUTE PAIN

## 2022-10-31 ASSESSMENT — PAIN DESCRIPTION - DESCRIPTORS: DESCRIPTORS: ACHING;CRUSHING

## 2022-10-31 ASSESSMENT — PAIN - FUNCTIONAL ASSESSMENT: PAIN_FUNCTIONAL_ASSESSMENT: ACTIVITIES ARE NOT PREVENTED

## 2022-10-31 NOTE — OP NOTE
Cardiac Catheterization     Procedure: LHC, LVG, PCI-RCA  Complications: None  Medications: Pretreated with 50 mg Benadryl, 20 mg Pepcid, 125 mg Solumedrol   Procedural sedation with minimal conscious sedation (4 Versed/175 Fentanyl)  An independent trained observer pushed medications at my direction. We monitored the patient's level of consciousness and vital signs/physiologic status throughout the procedure duration (see start and stop times in log). Estimated Blood Loss: Less than 20 mL  Specimens: were not obtained  Access: US guided 6 Fr Right CFA  Closure: Mynx  Contrast: 110 cc  Start time: 5775  Stop time: 1330  Fluoro time: 8.8  Findings:     Left Heart Cath  Dominance: Right     LM: 30% distal   LAD: mid stents patent, first diagonal proximal stent patent (90% distal D1)   LCx: mid and distal stents patent into OM2  RCA: 85% proximal, mid stent patent, 50% distal     LVEDP: 21 mmHg  LVEF: 55%    6 Fr JR4 Guide  BMW wire  Vessel prep of proximal RCA with 3.5 x 15 mm Trek  3.5 x 23 mm Xience ALEC deployed from near ostial RCA to 2 mm into previous mid RCA stent and postdilated from 3.76 mm back to 4.01 mm.  0% residual       Impression/Recommendations:  S/P Successful PCI to proximal RCA with one drug eluting stent. Stressed the importance of ASA 81 mg and Brilinta 90 mg bid. Eyad Hernandez is with known statin intolerance and previous hesitation with injectable lipid lowering medications. Discussed outpatient initiation of PCSK9 inhibitor. Beta blocker intolerance. Continue home calcium channel blocker, Ranexa, and ARB.        Ponciano Peabody, DO, [facility] [place]  Interventional Cardiology     o: [phone]  [address] [place] [address]., [address] [facility], [address] [place] [address]

## 2022-10-31 NOTE — CONSULTS
Cardiovascular Consultation    PATIENT: Richard Haddad  : 1955  MRN: [id number]    Chief complaint:   Chief Complaint   Patient presents with    Chest Pain     Patient in with complaints of chest pain since Friday. States she has 8 stents. Hx of two heart attacks. History of present illness:   Ms. Richard Haddad is a 74 y.o. female patient, well known to our practice with a complex cardiac history, who presented to the ER per Dr. Melva You recommendation for concerns of worsening angina. She was scheduled to see Dr. Umer Scott as an outpatient today, however felt her symptoms were too severe and was taken to the ER before she could be seen. Chest pain is exertional and she reports it feels similar to her previous coronary presentations. Reports associated nausea. She did not take Nitroglycerin; however, she did take 2 baby ASA. She does not take Aspirin daily as it \"tears up her stomach\" but is compliant with Brilinta. BP was 180H systolic on arrival.     Medical History:      Diagnosis Date    Allergic rhinitis, cause unspecified 2010    Anxiety     Arthritis     Bilateral carotid artery stenosis     < 50% bilaterally    CAD (coronary artery disease)     angioplasty with stent at [facility] Dr. José Miguel Davey, here Dr. Porfirio Swan at [facility],     Chronic kidney disease     frequency, urgency    Chronic pain     precordial, opiate dependent    Depression 2013    Erosive esophagitis 2016    Dr. Re Dinh; PPI started; LA Class A, Sphincter of Oddi manometry and sphincterotomy if symptoms don't improve.      Essential hypertension 2017    HSV infection     Hypercholesterolemia 2014    Irritable bowel syndrome with diarrhea 2016    Migraine headache 2014    Nonerosive nonspecific gastritis 2016    Dr. Re Dinh; body and antrum    OAB (overactive bladder) 2014       Surgical History:      Procedure Laterality Date ABDOMINAL ADHESION SURGERY      APPENDECTOMY      CARDIAC CATHETERIZATION  12/07/2013    recheck arteries unchanged    CHOLECYSTECTOMY      COLONOSCOPY      CORONARY ANGIOPLASTY WITH STENT PLACEMENT  10/2012    right- TOTAL OF 3 STENTS    CORONARY ANGIOPLASTY WITH STENT PLACEMENT  02/04/2021    CORONARY ANGIOPLASTY WITH STENT PLACEMENT  09/2022    COSMETIC SURGERY      rhinoplasty    CYSTOSCOPY  04/2014    ENDOSCOPY, COLON, DIAGNOSTIC      ERCP  01/25/2017    FRACTURE SURGERY      LUE, plate and screws    HYSTERECTOMY (CERVIX STATUS UNKNOWN)      HYSTERECTOMY, TOTAL ABDOMINAL (CERVIX REMOVED)      OTHER SURGICAL HISTORY      Incision and drainage of Lingual Introral Lesion    POLYPECTOMY      Dr. Ly Owen    PTCA  10/2012    right    SINUS SURGERY      JOSE AND BSO (CERVIX REMOVED)      TONSILLECTOMY      TUBAL LIGATION      UPPER GASTROINTESTINAL ENDOSCOPY      WRIST SURGERY Left 05/21/2015    OPEN REDUCTION INTERNAL FIXATION LEFT WRIST       Social History:  Social History     Socioeconomic History    Marital status:      Spouse name: Not on file    Number of children: Not on file    Years of education: Not on file    Highest education level: Not on file   Occupational History    Not on file   Tobacco Use    Smoking status: Former     Packs/day: 0.50     Years: 20.00     Pack years: 10.00     Types: Cigarettes     Quit date: 2/4/2007     Years since quitting: 15.7    Smokeless tobacco: Never   Vaping Use    Vaping Use: Never used   Substance and Sexual Activity    Alcohol use: No    Drug use: No    Sexual activity: Yes     Partners: Male   Other Topics Concern    Not on file   Social History Narrative    Not on file     Social Determinants of Health     Financial Resource Strain: Low Risk     Difficulty of Paying Living Expenses: Not hard at all   Food Insecurity: No Food Insecurity    Worried About Running Out of Food in the Last Year: Never true    [address] in the Last Year: Never true   Transportation Needs: No Transportation Needs    Lack of Transportation (Medical): No    Lack of Transportation (Non-Medical): No   Physical Activity: Not on file   Stress: Not on file   Social Connections: Not on file   Intimate Partner Violence: Not on file   Housing Stability: Not on file        Family History:  No evidence for sudden cardiac death or premature CAD. Problem Relation Age of Onset    Hypertension Mother     Heart Disease Mother     Hypertension Father     Heart Disease Father     Heart Disease Maternal Uncle     High Blood Pressure Sister     Stroke Other     Heart Disease Other     Stroke Sister     High Blood Pressure Sister     Other Other         hypercoag state    Heart Disease Brother        Medications:  Prior to Admission medications    Medication Sig Start Date End Date Taking? Authorizing Provider   diazePAM (VALIUM) 5 MG tablet INSERT 1 TABLET VAGINALLY TWICE DAILY AS NEEDED FOR VAGINAL/URETHRAL PAIN 8/11/22   Historical Provider, MD   traMADol (ULTRAM) 50 MG tablet Take 1 tablet by mouth every 8 hours as needed for Pain for up to 30 days. Intended supply: 3 days. Take lowest dose possible to manage pain 10/14/22 11/13/22  Nir Crump MD   nitroGLYCERIN (NITROSTAT) 0.4 MG SL tablet Place 1 tablet under the tongue every 5 minutes as needed for Chest pain up to max of 3 total doses.  If no relief after 1 dose, call 911. 10/14/22   Nir Crump MD   bisoprolol (ZEBETA) 5 MG tablet Take 1 tablet by mouth daily 10/14/22   Nir Crump MD   ranolazine (RANEXA) 500 MG extended release tablet Take 1 tablet by mouth 2 times daily 10/6/22   Nir Crump MD   valsartan (DIOVAN) 40 MG tablet TAKE 1 TABLET BY MOUTH DAILY 9/28/22   JAYLEEN Barr CNP   ticagrelor (BRILINTA) 90 MG TABS tablet Take 1 tablet by mouth 2 times daily 9/27/22   JAYLEEN Barr CNP   Evolocumab (REPATHA) 140 MG/ML SOSY Inject 1 mL into the skin every 14 days  Patient not taking: No sig reported 9/23/22 Sandy Thorpe MD   aspirin 81 MG chewable tablet Take 1 tablet by mouth daily  Patient not taking: Reported on 10/14/2022 9/18/22   Lena Abbott MD   POTASSIUM GLUCONATE PO Take by mouth  Patient not taking: Reported on 10/14/2022    Historical Provider, MD   Cholecalciferol (VITAMIN D3) 125 MCG (5000 UT) TABS Take by mouth daily   Patient not taking: Reported on 10/14/2022    Historical Provider, MD   cetirizine (ZYRTEC) 5 MG tablet Take 5 mg by mouth daily    Historical Provider, MD   dilTIAZem (CARDIZEM CD) 120 MG extended release capsule TAKE 1 CAPSULE BY MOUTH TWICE DAILY 9/9/21   Kristyn Aaron MD   Mag Aspart-Potassium Aspart (POTASSIUM & MAGNESIUM ASPARTAT PO) Take 1 tablet by mouth daily   Patient not taking: Reported on 10/14/2022    Historical Provider, MD   L-Lysine 1000 MG TABS Take 1 tablet by mouth every morning (before breakfast)     Historical Provider, MD   estradiol (ESTRACE) 0.1 MG/GM vaginal cream PLACE 0.5 GRAM VAGINALLY 3 TIMES A WEEK 6/5/20   Sandy Thorpe MD       Allergies:  Hydralazine, Shellfish-derived products, Xarelto [rivaroxaban], Asa [aspirin], Chicken allergy, Crestor [rosuvastatin], Fenofibrate, Hctz [hydrochlorothiazide], Lipitor [atorvastatin calcium], Lipitor [atorvastatin], Lisinopril, Metoprolol, Mometasone furoate, Nasonex [mometasone], Nsaids, Other, Pineapple, Plavix [clopidogrel], Pork allergy, Sulfa antibiotics, Zetia [ezetimibe], Contrast [iodides], and Flagyl [metronidazole]     Review of Systems:   [x]Full ROS obtained and negative except as mentioned in HPI    Physical Examination:    /85   Pulse 78   Temp 97.7 °F (36.5 °C) (Oral)   Resp 20   SpO2 95%   Wt Readings from Last 3 Encounters:   10/14/22 192 lb (87.1 kg)   09/27/22 218 lb 11.2 oz (99.2 kg)   09/23/22 193 lb (87.5 kg)       GENERAL: Well developed, well nourished, no acute distress  NEUROLOGICAL: Alert and oriented x3  PSYCH: Normal mood and affect   SKIN: Warm and dry, without lesions  HEENT: Normocephalic, atraumatic, Sclera non-icteric, mucous membranes moist  NECK: supple, JVP normal, thyroid not enlarged   CAROTID: Normal upstroke, no bruits  CARDIAC: Normal PMI, regular rate and rhythm, normal S1S2, no murmur, rub  RESPIRATORY: Normal respiratory effort, clear to auscultation bilaterally  EXTREMITIES: No cyanosis, clubbing or edema, palpable pulses bilaterally   MUSCULOSKELETAL: No joint swelling or tenderness, no chest wall tenderness  GASTROINTESTINAL:  soft, non-tender, no bruit    Labs:  Lab Review   Lab Results   Component Value Date/Time     10/31/2022 10:52 AM    K 3.7 10/31/2022 10:52 AM    K 4.4 09/16/2022 06:19 AM    CL 99 10/31/2022 10:52 AM    CO2 24 10/31/2022 10:52 AM    BUN 7 10/31/2022 10:52 AM    CREATININE 0.8 10/31/2022 10:52 AM    GLUCOSE 146 10/31/2022 10:52 AM    GLUCOSE 96 11/14/2011 08:44 AM    CALCIUM 9.8 10/31/2022 10:52 AM     Lab Results   Component Value Date/Time    CKTOTAL 46 06/13/2018 01:04 PM    CKMB 3.1 01/18/2017 07:46 PM    TROPONINI <0.01 10/31/2022 10:52 AM     Lab Results   Component Value Date/Time    WBC 7.4 10/31/2022 10:52 AM    HGB 14.0 10/31/2022 10:52 AM    HCT 42.0 10/31/2022 10:52 AM    MCV 85.2 10/31/2022 10:52 AM     10/31/2022 10:52 AM     Lab Results   Component Value Date/Time    CHOL 244 06/29/2022 11:59 AM    TRIG 277 06/29/2022 11:59 AM    HDL 47 06/29/2022 11:59 AM         Imaging:  I have reviewed the below testing personally:    2/19/21  Left Heart Cath  Dominance: Right    LM: 30% distal   LAD: 50% mid LAD after first diagonal ; first diagonal and distal LAD stents widely patent   LCx: 20% mid  RCA: stent widely patent      LVEDP: 18 mmHg   LVEF: 60%    Cath 9/13/22 Cornelio Bains)  Anatomy:   LM-30% distal  LAD-widely patent mid stent  D1-widely patent proximal stent, 90% distal to the stent which is very small caliber  Cx-80% mid diffuse, 100% distal  OM1-normal  RCA-70% proximal, widely patent mid stent, 50% just distal to the stent  RPDA-patent with luminal regularities  LVEF-60%  PCI: Circumflex 100% to 0% distal with a 2.25 mm x 38 mm Xience Constance ALEC and then 80% to 0% mid with overlapping stents, including overlapping the distal stent, with distal to proximal 3.0 mm x 38 mm Xience Constance ALEC and 3.25 mm x 18 mm Xience [address] ALEC. Impression:  1. Occlusion of the distal circumflex along with severe stenosis of the mid circumflex ultimately revascularized with ALEC x3.  2.  Small vessel disease. 3.  Normal LV systolic function. 4.  Elevated LVEDP. Plan:  1. DAPT with aspirin and Brilinta. 2.  Trial of Zetia as it is intolerant of statins. Thomasenia Walker has been ordered but not started yet. 3.  Intolerant of beta-blockers. Consider trial of bisoprolol if patient will agree. 4.  Add valsartan for better blood pressure control started 80 mg daily. Titrate as hemodynamics allow. 5.  Consider return for FFR of proximal RCA versus nuclear stress testing. Her primary cardiologist Dr. Esau Booker to decide. [facility] 9/16/22 Mateo Lux)   Anatomy:   LM-30% distal  LAD-widely patent mid stents  D1-widely patent stent proximally, distal 90% small caliber  Cx-widely patent mid and distal stents  RCA-diffusely diseased, 70% proximal, widely patent mid stent, 50% distal, dominant, DFR 0.94, FFR 0.87, IVUS MLA 5.72 mm²  RPDA-patent  LVEF-not done  Impression:  1. Patent LAD and circumflex stents. 2.  Borderline significant angiographic stenosis in proximal RCA however with IVUS and FFR testing, it is within medical management range. SPECT 12/23/20       Summary    -There is a small-moderate sized, moderate intensity fixed anterior defect    suggestive of scar.    -There is no ischemia. -LV function is normal with EF=70%    -Low-intermediate risk. JESSICA 4/2022   No thrombus noted. Overall left ventricular function is normal.   Mitral valve is structurally normal. Mild mitral regurgitation is present.    There is no evidence of mass or thrombus in the left atrium or appendage. Tricuspid valve is structurally normal.   Mild tricuspid regurgitation. PASP 35 mmHg    ECHO 9/14/22  Summary   Left ventricular cavity size is normal with mild concentric left ventricular   hypertrophy. Ejection fraction is visually estimated to be 60-65%. No regional wall motion abnormalities are noted. Grade I diastolic dysfunction with normal LV filling pressures. E/e' = 12.3. Left atrium is dilated. Trivial aortic regurgitation. Mitral annular calcification is present. Mild tricuspid regurgitation. RVSP = 32 mmHG. [id number] Score for Atrial Fibrillation Stroke Risk   Risk   Factors  Component Value   C CHF No 0   H HTN Yes 1   A2 Age >= 76 No,  (68 y.o.) 0   D DM No 0   S2 Prior Stroke/TIA No 0   V Vascular Disease Yes 1   A Age 74-69 Yes,  (68 y.o.) 1   Sc Sex female 1    [id number]  Score  4   Score last updated 10/31/22 63:07 AM EDT    Click here for a link to the UpToDate guideline \"Atrial Fibrillation: Anticoagulation therapy to prevent embolization    Disclaimer: Risk Score calculation is dependent on accuracy of patient problem list and past encounter diagnosis. Troponin <0.01          Impression/Recommendations    Ms. Bella Cota is a 74 y.o. female patient of Dr. Kevin Combs with    Unstable angina  CAD:    -9/16/22: Patent LAD and LCx stents, nonobstructive prox RCA by FFR and IVUS (DFR 0.94, FFR 0.87), Martha Alejandre)    -9/13/22 (NSTEMI): ALEC x3 of mid-distal LCx, Residual 70% RCA stenosis, Martha Clonts)    -2/19/21 D1 and LAD stents widely patent, residual disease unchanged, Law Yuly)    -2/4/21 PCI of dLAD and D1, FFR neg mLAD (0.83), Charlie Jacobson)    -11/20/17 (relook): without restent thrombosis/significant new blockages, Faith Diez)    -11/19/17 (STEMI): ALEC to Diag, Faith Diez)    -10/2012 PCI to RCA and Ramus    PAF, s/p multiple ablations, most recent CIT dependent flutter ablation 4/12/22, PRN OAC, follows with EP  Hypertension, suboptimal control  Hyperlipidemia, untreated 6/29/22:   HDL 47   IBS/hx gastritis, follows with GI  Obesity   Significant allergy profile, reported contrast allergy  Former tobacco use      Despite DAPT recommendation upon last discharge, Kareen Montes remains on Brilinta monotherapy, additionally reporting concerns for taking more than Brilinta once daily. Intolerant to multiple statins, reporting significant myalgias. Has not yet trialed PCSK9I, though has been given samples by PCP. Reported intolerance to Imdur in the past.   Borderline proximal RCA deferred by both FFR & IVUS last month. Reviewed with primary cardiologist.   Coronary angiography this admission. Risks, benefits, goals, and alternatives of left heart catheterization with the potential for percutaneous coronary intervention discussed with patient; including stroke, heart attack, kidney damage, death, paralysis, disability, damage to nerves/arteries/veins. All questions answered and informed consent obtained. Further recommendations pending coronary angiography and clinical course.       Mahamed Griffin D.O., [facility]  Interventional Cardiology     o: [phone]  [address] [place] [address]., [address] [facility], [address] [place] [address]

## 2022-10-31 NOTE — ED PROVIDER NOTES
EKG:  Read by me in the absence of a cardiologist shows:  SR, rate 91, intervals normal, low voltages, no acute injury pattern, no major change from prior study     I did not perform face-to-face evaluation and was not otherwise involved in this patient's care. Please see PA note for further information on this visit.         Deborah Drummond MD  10/31/22 1044

## 2022-10-31 NOTE — CARE COORDINATION
Discharge Planning:      CM/SW has reviewed this patient's medical history in detail and updated the computerized patient record. Patient independent prior to admission. There are no needs identified at this time. If something specific is identified, please notify Discharge Planner. CM/SW Met with patient regarding discharge needs. Patient states he/she lives at home, is independent with ADL's and IADL's. Patient states that he/she anticipates to return home with no needs. Readmission Risk Score: NA    PCP:VANESSA KAY    Notes: Patient was Ind PTA with spouse at home, no needs at the time. CM cont to follow.      Electronically signed by Jesus Romero RN on 10/31/2022 at 3:36 PM

## 2022-10-31 NOTE — ED NOTES
Nitro paste was wiped off per hospitalist due to \"flashing lights in eyes\".       Rachid Cruz RN  10/31/22 [id number]

## 2022-10-31 NOTE — TELEPHONE ENCOUNTER
Pt states over the weekend she started having burning sensation in chest and last night and this morning she feels like she has been run over by a truck. Sob. Chest pain and pain/achy all over her body and fatigue.  Pt would like an appt today, does not want to go to hospital.

## 2022-10-31 NOTE — PRE SEDATION
Pre-Op Note/Sedation Assessment    Lorena Gandhi  1955  [id number]  1:22 PM    Planned Procedure: Cardiac Catheterization Procedure  Post Procedure Plan: Return to same level of care  Consent: I have discussed with the patient and/or the patient representative the indication, alternatives, and the possible risks and/or complications of the planned procedure and the anesthesia methods. The patient and/or patient representative appear to understand and agree to proceed. Chief Complaint:   Anginal Equivalent      Indications for Cath Procedure:  Presentation:  Worsening Angina  2. Anginal Classification within 2 weeks:  CCS IV - Inability to perform any activity without angina or angina at rest, i.e., severe limitation  3. Angina Symptoms Assessment:  Typical Chest Pain  4. Heart Failure Class within last 2 weeks:  No symptoms  5. Cardiovascular Instability:  No    Prior Ischemic Workup/Eval:  Pre-Procedural Medications: Yes: Beta Blockers and Ca Channel Blockers  2. Stress Test Completed? No    Does Patient need surgery? Cath Valve Surgery:  No    Pre-Procedure Medical History:  Vital Signs:  BP (!) 168/92   Pulse 80   Temp 97.7 °F (36.5 °C) (Oral)   Resp 18   SpO2 96%     Allergies: Allergies   Allergen Reactions    Hydralazine Anaphylaxis    Shellfish-Derived Products Anaphylaxis and Swelling    Xarelto [Rivaroxaban] Swelling    Asa [Aspirin] Nausea Only    Chicken Allergy     Crestor [Rosuvastatin]      Myalgia      Fenofibrate      Myalgia      Hctz [Hydrochlorothiazide]     Lipitor [Atorvastatin Calcium]     Lipitor [Atorvastatin]      Myalgia      Lisinopril     Metoprolol      Weight gain    Mometasone Furoate Swelling    Nasonex [Mometasone] Swelling    Nsaids      GI PAIN    Other      Any melons, grilled food, turkey, red/green/yellow peppers.        Pineapple     Plavix [Clopidogrel] Hives, Itching and Swelling    Pork Allergy     Sulfa Antibiotics Swelling    Zetia [Ezetimibe]     Contrast [Iodides] Swelling and Rash    Flagyl [Metronidazole] Nausea And Vomiting     Medications:    Current Facility-Administered Medications   Medication Dose Route Frequency Provider Last Rate Last Admin    morphine (PF) injection 2 mg  2 mg IntraVENous Q3H PRN Cass Mustafa MD           Past Medical History:    Past Medical History:   Diagnosis Date    Allergic rhinitis, cause unspecified 12/04/2010    Anxiety     Arthritis     Bilateral carotid artery stenosis     < 50% bilaterally    CAD (coronary artery disease)     angioplasty with [facility] Dr. Prakash Razo, here Dr. Diana Shi at [facility],     Chronic kidney disease     frequency, urgency    Chronic pain     precordial, opiate dependent    Depression 01/22/2013    Erosive esophagitis 12/28/2016    Dr. Tod Gustafson; PPI started; LA Class A, Sphincter of Oddi manometry and sphincterotomy if symptoms don't improve.      Essential hypertension 04/11/2017    HSV infection     Hypercholesterolemia 06/06/2014    Irritable bowel syndrome with diarrhea 09/27/2016    Migraine headache 06/06/2014    Nonerosive nonspecific gastritis 12/28/2016    Dr. Tod Gustafson; body and antrum    OAB (overactive bladder) 03/14/2014       Surgical History:    Past Surgical History:   Procedure Laterality Date    ABDOMINAL ADHESION SURGERY      APPENDECTOMY      CARDIAC CATHETERIZATION  12/07/2013    recheck arteries unchanged    CHOLECYSTECTOMY      COLONOSCOPY      CORONARY ANGIOPLASTY WITH STENT PLACEMENT  10/2012    right- TOTAL OF 3 STENTS    CORONARY ANGIOPLASTY WITH STENT PLACEMENT  02/04/2021    CORONARY ANGIOPLASTY WITH STENT PLACEMENT  09/2022    COSMETIC SURGERY      rhinoplasty    CYSTOSCOPY  04/2014    ENDOSCOPY, COLON, DIAGNOSTIC      ERCP  01/25/2017    FRACTURE SURGERY      LUE, plate and screws    HYSTERECTOMY (CERVIX STATUS UNKNOWN)      HYSTERECTOMY, TOTAL ABDOMINAL (CERVIX REMOVED)      OTHER SURGICAL HISTORY      Incision and drainage of Lingual Introral Lesion    POLYPECTOMY      Dr. Chinedu Chan    PTCA  10/2012    right    SINUS SURGERY      JOSE AND BSO (CERVIX REMOVED)      TONSILLECTOMY      TUBAL LIGATION      UPPER GASTROINTESTINAL ENDOSCOPY      WRIST SURGERY Left 05/21/2015    OPEN REDUCTION INTERNAL FIXATION LEFT WRIST       Pre-Sedation:  Pre-Sedation Documentation and Exam:  I have assessed the patient and reviewed the H&P on the chart. Prior History of Anesthesia Complications:   none    Modified Mallampati:  II (soft palate, uvula, fauces visible)    ASA Classification:  Class 3 - A patient with severe systemic disease that limits activity but is not incapacitating    Shant Scale: Activity:  2 - Able to move 4 extremities voluntarily on command  Respiration:  2 - Able to breathe deeply and cough freely  Circulation:  2 - BP+/- 20mmHg of normal  Consciousness:  2 - Fully awake  Oxygen Saturation (color):  2 - Able to maintain oxygen saturation >92% on room air    Sedation/Anesthesia Plan:  Guard the patient's safety and welfare. Minimize physical discomfort and pain. Minimize negative psychological responses to treatment by providing sedation and analgesia and maximize the potential amnesia. Patient to meet pre-procedure discharge plan.     Medication Planned:  midazolam intravenously and fentanyl intravenously    Patient is an appropriate candidate for plan of sedation:   yes      Leisa Pemberton DO, [address] [facility]  Interventional Cardiology     o: [phone]  [address] [place] [address]., [facility], [facility]

## 2022-10-31 NOTE — ED PROVIDER NOTES
[address] [facility]        Pt Name: Dimple Dixon  MRN: [id number]  [place] 1955  Date of evaluation: 10/31/2022  Provider: Jose Enrique Macias PA-C  PCP: Farhat Erazo MD  Note Started: 10:44 AM EDT       CHRIS. I have evaluated this patient. My supervising physician was available for consultation. CHIEF COMPLAINT       Chief Complaint   Patient presents with    Chest Pain     Patient in with complaints of chest pain since Friday. States she has 8 stents. Hx of two heart attacks. HISTORY OF PRESENT ILLNESS   (Location, Timing/Onset, Context/Setting, Quality, Duration, Modifying Factors, Severity, Associated Signs and Symptoms)  Note limiting factors. Chief Complaint: Chest pain    Dimple Dixon is a 74 y.o. female who presents to the emergency department complaining of intermittent chest pain since Friday. Patient has history of coronary artery disease and on 9/13/2022 had a heart cath for NSTEMI. She went to Dr. Susu Schrader, her cardiologist, office today but she states that he was busy she didn't want to wait any longer and therefore encouraged to go to the emergency department for further management. Pain is intermittent and worse with exertion. Describes as pressure with radiation to her jaw and arms. Denies any sharp stabbing pain at this time. Denies pleuritic pain. Does feel short of breath at times. It also makes her nauseated. Denies any current abdominal pain, vomiting, diarrhea, constipation. She states that this feels the exact same way it did when she had a previous heart attack. She took 2 baby aspirin this morning. She takes Brilinta daily. She did not take any nitroglycerin although she does have this at home. Nursing Notes were all reviewed and agreed with or any disagreements were addressed in the HPI.     REVIEW OF SYSTEMS    (2-9 systems for level 4, 10 or more for level 5)     Review of Systems   Constitutional:  Negative for chills and fever. HENT: Negative. Eyes:  Negative for visual disturbance. Respiratory:  Positive for shortness of breath. Negative for cough, wheezing and stridor. Cardiovascular:  Positive for chest pain. Negative for palpitations and leg swelling. Gastrointestinal:  Negative for abdominal pain, constipation, diarrhea, nausea and vomiting. Genitourinary: Negative. Musculoskeletal:  Negative for back pain, neck pain and neck stiffness. Skin:  Negative for color change, pallor, rash and wound. Neurological: Negative. Psychiatric/Behavioral:  Negative for confusion. All other systems reviewed and are negative. Positives and Pertinent negatives as per HPI. Except as noted above in the ROS, all other systems were reviewed and negative. PAST MEDICAL HISTORY     Past Medical History:   Diagnosis Date    Allergic rhinitis, cause unspecified 12/04/2010    Anxiety     Arthritis     Bilateral carotid artery stenosis     < 50% bilaterally    CAD (coronary artery disease)     angioplasty with stent at [facility] Dr. Jermaine Estrella, here Dr. Andrés Vásquez at [facility],     Chronic kidney disease     frequency, urgency    Chronic pain     precordial, opiate dependent    Depression 01/22/2013    Erosive esophagitis 12/28/2016    Dr. Lorena Jensen; PPI started; LA Class A, Sphincter of Oddi manometry and sphincterotomy if symptoms don't improve.      Essential hypertension 04/11/2017    HSV infection     Hypercholesterolemia 06/06/2014    Irritable bowel syndrome with diarrhea 09/27/2016    Migraine headache 06/06/2014    Nonerosive nonspecific gastritis 12/28/2016    Dr. Lorena Jensen; body and antrum    OAB (overactive bladder) 03/14/2014         SURGICAL HISTORY     Past Surgical History:   Procedure Laterality Date    ABDOMINAL ADHESION SURGERY      APPENDECTOMY      CARDIAC CATHETERIZATION  12/07/2013    recheck arteries unchanged    CHOLECYSTECTOMY COLONOSCOPY      CORONARY ANGIOPLASTY WITH STENT PLACEMENT  10/2012    right- TOTAL OF 3 STENTS    CORONARY ANGIOPLASTY WITH STENT PLACEMENT  02/04/2021    CORONARY ANGIOPLASTY WITH STENT PLACEMENT  09/2022    COSMETIC SURGERY      rhinoplasty    CYSTOSCOPY  04/2014    ENDOSCOPY, COLON, DIAGNOSTIC      ERCP  01/25/2017    FRACTURE SURGERY      LUE, plate and screws    HYSTERECTOMY (CERVIX STATUS UNKNOWN)      HYSTERECTOMY, TOTAL ABDOMINAL (CERVIX REMOVED)      OTHER SURGICAL HISTORY      Incision and drainage of Lingual Introral Lesion    POLYPECTOMY      Dr. Ivis Almanzar    PTCA  10/2012    right    SINUS SURGERY      JOSE AND BSO (CERVIX REMOVED)      TONSILLECTOMY      TUBAL LIGATION      UPPER GASTROINTESTINAL ENDOSCOPY      WRIST SURGERY Left 05/21/2015    OPEN REDUCTION INTERNAL FIXATION LEFT WRIST         CURRENTMEDICATIONS       Previous Medications    ASPIRIN 81 MG CHEWABLE TABLET    Take 1 tablet by mouth daily    BISOPROLOL (ZEBETA) 5 MG TABLET    Take 1 tablet by mouth daily    CETIRIZINE (ZYRTEC) 5 MG TABLET    Take 5 mg by mouth daily    CHOLECALCIFEROL (VITAMIN D3) 125 MCG (5000 UT) TABS    Take by mouth daily     CYANOCOBALAMIN 1000 MCG TABLET    Take 1,000 mcg by mouth daily    DIAZEPAM (VALIUM) 5 MG TABLET    INSERT 1 TABLET VAGINALLY TWICE DAILY AS NEEDED FOR VAGINAL/URETHRAL PAIN    DILTIAZEM (CARDIZEM CD) 120 MG EXTENDED RELEASE CAPSULE    TAKE 1 CAPSULE BY MOUTH TWICE DAILY    ESTRADIOL (ESTRACE) 0.1 MG/GM VAGINAL CREAM    PLACE 0.5 GRAM VAGINALLY 3 TIMES A WEEK    EVOLOCUMAB (REPATHA) 140 MG/ML SOSY    Inject 1 mL into the skin every 14 days    L-LYSINE 1000 MG TABS    Take 1 tablet by mouth every morning (before breakfast)     MAG ASPART-POTASSIUM ASPART (POTASSIUM & MAGNESIUM ASPARTAT PO)    Take 1 tablet by mouth daily     NITROGLYCERIN (NITROSTAT) 0.4 MG SL TABLET    Place 1 tablet under the tongue every 5 minutes as needed for Chest pain up to max of 3 total doses.  If no relief after 1 dose, call 911.    POTASSIUM GLUCONATE PO    Take by mouth    RANOLAZINE (RANEXA) 500 MG EXTENDED RELEASE TABLET    Take 1 tablet by mouth 2 times daily    TICAGRELOR (BRILINTA) 90 MG TABS TABLET    Take 1 tablet by mouth 2 times daily    TRAMADOL (ULTRAM) 50 MG TABLET    Take 1 tablet by mouth every 8 hours as needed for Pain for up to 30 days. Intended supply: 3 days. Take lowest dose possible to manage pain    TRAMADOL (ULTRAM) 50 MG TABLET    Take 1 tablet by mouth every 8 hours as needed for Pain for up to 30 days. Intended supply: 3 days.  Take lowest dose possible to manage pain    VALSARTAN (DIOVAN) 40 MG TABLET    TAKE 1 TABLET BY MOUTH DAILY    ZINC-VITAMIN C  MG LOZG    Take 1 lozenge by mouth daily          ALLERGIES     Hydralazine, Shellfish-derived products, Xarelto [rivaroxaban], Asa [aspirin], Chicken allergy, Crestor [rosuvastatin], Fenofibrate, Hctz [hydrochlorothiazide], Lipitor [atorvastatin calcium], Lipitor [atorvastatin], Lisinopril, Metoprolol, Mometasone furoate, Nasonex [mometasone], Nsaids, Other, Pineapple, Plavix [clopidogrel], Pork allergy, Sulfa antibiotics, Zetia [ezetimibe], Contrast [iodides], and Flagyl [metronidazole]    FAMILYHISTORY       Family History   Problem Relation Age of Onset    Hypertension Mother     Heart Disease Mother     Hypertension Father     Heart Disease Father     Heart Disease Maternal Uncle     High Blood Pressure Sister     Stroke Other     Heart Disease Other     Stroke Sister     High Blood Pressure Sister     Other Other         hypercoag state    Heart Disease Brother           SOCIAL HISTORY       Social History     Tobacco Use    Smoking status: Former     Packs/day: 0.50     Years: 20.00     Pack years: 10.00     Types: Cigarettes     Quit date: 2/4/2007     Years since quitting: 15.7    Smokeless tobacco: Never   Vaping Use    Vaping Use: Never used   Substance Use Topics    Alcohol use: No    Drug use: No       SCREENINGS    Ingrid Coma Scale  Eye Opening: Spontaneous  Best Verbal Response: Oriented  Best Motor Response: Obeys commands  Ingrid Coma Scale Score: 15        PHYSICAL EXAM    (up to 7 for level 4, 8 or more for level 5)     ED Triage Vitals [10/31/22 1042]   BP Temp Temp Source Heart Rate Resp SpO2 Height Weight   (!) 195/118 97.7 °F (36.5 °C) Oral 93 24 98 % -- --       Physical Exam  Vitals and nursing note reviewed. Constitutional:       Appearance: Normal appearance. She is well-developed. She is obese. She is not toxic-appearing or diaphoretic. HENT:      Head: Normocephalic and atraumatic. Right Ear: External ear normal.      Left Ear: External ear normal.      Nose: Nose normal.      Mouth/Throat:      Mouth: Mucous membranes are moist.      Pharynx: Oropharynx is clear. Eyes:      General: No scleral icterus. Right eye: No discharge. Left eye: No discharge. Extraocular Movements: Extraocular movements intact. Conjunctiva/sclera: Conjunctivae normal.      Pupils: Pupils are equal, round, and reactive to light. Cardiovascular:      Rate and Rhythm: Normal rate. Pulmonary:      Effort: Pulmonary effort is normal.      Breath sounds: Normal breath sounds. Abdominal:      General: Bowel sounds are normal.      Palpations: Abdomen is soft. Tenderness: There is no abdominal tenderness. Musculoskeletal:         General: Normal range of motion. Cervical back: Normal range of motion. Comments: No extremity edema, posterior calf or thigh tenderness, palpable cord, discoloration. Negative homans. Skin:     General: Skin is warm and dry. Coloration: Skin is not jaundiced or pale. Findings: No bruising, erythema, lesion or rash. Neurological:      General: No focal deficit present. Mental Status: She is alert and oriented to person, place, and time.    Psychiatric:         Behavior: Behavior normal.       DIAGNOSTIC RESULTS   LABS:    Labs Reviewed   COMPREHENSIVE METABOLIC PANEL - Abnormal; Notable for the following components:       Result Value    Sodium 135 (*)     Glucose 146 (*)     Alkaline Phosphatase 136 (*)     ALT 9 (*)     All other components within normal limits   BRAIN NATRIURETIC PEPTIDE - Abnormal; Notable for the following components:    Pro- (*)     All other components within normal limits   CBC WITH AUTO DIFFERENTIAL   LIPASE   TROPONIN   PROTIME-INR   APTT       When ordered only abnormal lab results are displayed. All other labs were within normal range or not returned as of this dictation. EKG: When ordered, EKG's are interpreted by the Emergency Department Physician in the absence of a cardiologist.  Please see their note for interpretation of EKG. RADIOLOGY:   Non-plain film images such as CT, Ultrasound and MRI are read by the radiologist. Plain radiographic images are visualized and preliminarily interpreted by the ED Provider with the below findings:        Interpretation per the Radiologist below, if available at the time of this note:    XR CHEST PORTABLE   Final Result   No acute process.                PROCEDURES   Unless otherwise noted below, none     Procedures    CRITICAL CARE TIME   N/a    CONSULTS:  IP CONSULT TO HOSPITALIST      EMERGENCY DEPARTMENT COURSE and DIFFERENTIAL DIAGNOSIS/MDM:   Vitals:    Vitals:    10/31/22 1042 10/31/22 1112 10/31/22 1127 10/31/22 1142   BP: (!) 195/118 (!) 176/103 (!) 155/98 134/89   Pulse: 93 95 95 89   Resp: 24 13 10 13   Temp: 97.7 °F (36.5 °C)      TempSrc: Oral      SpO2: 98% 99% 96% 94%       Patient was given the following medications:  Medications   aspirin chewable tablet 162 mg (162 mg Oral Given 10/31/22 1109)   nitroglycerin (NITRO-BID) 2 % ointment 1 inch (1 inch Topical Given 10/31/22 1112)   morphine injection 4 mg (4 mg IntraVENous Given 10/31/22 1111)   ondansetron (ZOFRAN) injection 4 mg (4 mg IntraVENous Given 10/31/22 1111)         Is this patient to be included in the SEP-1 Core Measure due to severe sepsis or septic shock? No   Exclusion criteria - the patient is NOT to be included for SEP-1 Core Measure due to: Infection is not suspected    This patient presents to the emergency department complaining of chest pain and shortness of breath. This feels the same way it did last month when she had a heart attack. Given her risk factors and clinical presentation, I do feel admission is warranted for further evaluation and management. Patient understands and agrees with plan for admission. FINAL IMPRESSION      1. Acute chest pain    2. Accelerated hypertension          DISPOSITION/PLAN   DISPOSITION Decision To Admit 10/31/2022 11:53:16 AM      PATIENT REFERRED TO:  No follow-up provider specified.     DISCHARGE MEDICATIONS:  New Prescriptions    No medications on file       DISCONTINUED MEDICATIONS:  Discontinued Medications    No medications on file              (Please note that portions of this note were completed with a voice recognition program.  Efforts were made to edit the dictations but occasionally words are mis-transcribed.)    Arlen Kraft PA-C (electronically signed)            Arlen Kraft PA-C  10/31/22 2299

## 2022-10-31 NOTE — H&P
[facility]    10/31/2022 1:34 PM    Patient Information:  Sukumar Leahy is a 74 y.o. female [id number]  PCP:  Alicia Garza MD (Tel: [phone] )    Chief complaint:    Chief Complaint   Patient presents with    Chest Pain     Patient in with complaints of chest pain since Friday. States she has 8 stents. Hx of two heart attacks. History of Present Illness:    Lorena Gandhi is a 74 y.o. present with chest pain. Onset of symptoms Friday. Intermittent. Patient with history of multiple stent. Patient describes pain dull achy pain. Went to cardiology office and was sent here for further evaluation currently patient is chest pain-free and was given nitro and morphine in the ER. Patient had some blurry vision but that resolved. Likely secondary to nitro per patient. Previous cardiac work up  [employer]. History obtained from patient  Old medical records show   REVIEW OF SYSTEMS:   Constitutional: Negative for fever,chills or night sweats  ENT: Negative for rhinorrhea, epistaxis, hoarseness, sore throat. Respiratory: Negative for shortness of breath,wheezing  Cardiovascular:postive  for chest pain, negative for palpitations   Gastrointestinal: Negative for nausea, vomiting, diarrhea  Genitourinary: Negative for polyuria, dysuria   Hematologic/Lymphatic: Negative for bleeding tendency, easy bruising  Musculoskeletal: Negative for myalgias and arthralgias  Neurologic: Negative for confusion,dysarthria. Skin: Negative for itching,rash  Psychiatric: Negative for depression,anxiety, agitation. Endocrine: Negative for polydipsia,polyuria,heat /cold intolerance.     Past Medical History:   has a past medical history of Allergic rhinitis, cause unspecified, Anxiety, Arthritis, Bilateral carotid artery stenosis, CAD (coronary artery disease), Chronic kidney disease, Chronic pain, Depression, Erosive esophagitis, Essential hypertension, HSV infection, Hypercholesterolemia, Irritable bowel syndrome with diarrhea, Migraine headache, Nonerosive nonspecific gastritis, and OAB (overactive bladder). Past Surgical History:   has a past surgical history that includes Appendectomy; Total abdominal hysterectomy w/ bilateral salpingoophorectomy; Cholecystectomy; Tubal ligation; Tonsillectomy; other surgical history; Coronary angioplasty with stent (10/2012); Percutaneous Transluminal Coronary Angio (10/2012); Cardiac catheterization (12/07/2013); Hysterectomy; polypectomy; Cystoscopy (04/2014); Upper gastrointestinal endoscopy; Wrist surgery (Left, 05/21/2015); Abdominal adhesion surgery; Colonoscopy; Endoscopy, colon, diagnostic; Cosmetic surgery; fracture surgery; ERCP (01/25/2017); sinus surgery; Hysterectomy, total abdominal; Coronary angioplasty with stent (02/04/2021); and Coronary angioplasty with stent (09/2022). Medications:  No current facility-administered medications on file prior to encounter. Current Outpatient Medications on File Prior to Encounter   Medication Sig Dispense Refill    diazePAM (VALIUM) 5 MG tablet INSERT 1 TABLET VAGINALLY TWICE DAILY AS NEEDED FOR VAGINAL/URETHRAL PAIN      traMADol (ULTRAM) 50 MG tablet Take 1 tablet by mouth every 8 hours as needed for Pain for up to 30 days. Intended supply: 3 days. Take lowest dose possible to manage pain 30 tablet 0    nitroGLYCERIN (NITROSTAT) 0.4 MG SL tablet Place 1 tablet under the tongue every 5 minutes as needed for Chest pain up to max of 3 total doses.  If no relief after 1 dose, call 911. 25 tablet 3    bisoprolol (ZEBETA) 5 MG tablet Take 1 tablet by mouth daily 30 tablet 5    ranolazine (RANEXA) 500 MG extended release tablet Take 1 tablet by mouth 2 times daily 180 tablet 3    valsartan (DIOVAN) 40 MG tablet TAKE 1 TABLET BY MOUTH DAILY 90 tablet 1    ticagrelor (BRILINTA) 90 MG TABS tablet Take 1 tablet by mouth 2 times daily 60 tablet 0    aspirin 81 MG chewable tablet Take 1 tablet by mouth daily (Patient not taking: Reported on 10/14/2022) 30 tablet 0    cetirizine (ZYRTEC) 5 MG tablet Take 5 mg by mouth daily      dilTIAZem (CARDIZEM CD) 120 MG extended release capsule TAKE 1 CAPSULE BY MOUTH TWICE DAILY 180 capsule 3    L-Lysine 1000 MG TABS Take 1 tablet by mouth every morning (before breakfast)       estradiol (ESTRACE) 0.1 MG/GM vaginal cream PLACE 0.5 GRAM VAGINALLY 3 TIMES A WEEK 42.5 g 0       Allergies: Allergies   Allergen Reactions    Hydralazine Anaphylaxis    Shellfish-Derived Products Anaphylaxis and Swelling    Xarelto [Rivaroxaban] Swelling    Asa [Aspirin] Nausea Only    Chicken Allergy     Crestor [Rosuvastatin]      Myalgia      Fenofibrate      Myalgia      Hctz [Hydrochlorothiazide]     Lipitor [Atorvastatin Calcium]     Lipitor [Atorvastatin]      Myalgia      Lisinopril     Metoprolol      Weight gain    Mometasone Furoate Swelling    Nasonex [Mometasone] Swelling    Nsaids      GI PAIN    Other      Any melons, grilled food, turkey, red/green/yellow peppers. Pineapple     Plavix [Clopidogrel] Hives, Itching and Swelling    Pork Allergy     Sulfa Antibiotics Swelling    Zetia [Ezetimibe]     Contrast [Iodides] Swelling and Rash    Flagyl [Metronidazole] Nausea And Vomiting        Social History:   reports that she quit smoking about 15 years ago. Her smoking use included cigarettes. She has a 10.00 pack-year smoking history. She has never used smokeless tobacco. She reports that she does not drink alcohol and does not use drugs. Family History:  family history includes Heart Disease in her brother, father, maternal uncle, mother, and another family member; High Blood Pressure in her sister and sister; Hypertension in her father and mother; Other in an other family member; Stroke in her sister and another family member.  ,     Physical Exam:  BP (!) 168/92   Pulse 80   Temp 97.9 °F (36.6 °C)   Resp 18 SpO2 96%     General appearance:  Appears comfortable. Well nourished  Eyes: Sclera clear, pupils equal  ENT: Moist mucus membranes, no thrush. Trachea midline. Cardiovascular: Regular rhythm, normal S1, S2. No murmur, gallop, rub. No edema in lower extremities  Respiratory: Clear to auscultation bilaterally, no wheeze, good inspiratory effort  Gastrointestinal: Abdomen soft, non-tender, not distended, normal bowel sounds  Musculoskeletal: No cyanosis in digits, neck supple  Neurology: Cranial nerves grossly intact. Alert and oriented in time, place and person. No speech or motor deficits  Psychiatry: Appropriate affect. Not agitated  Skin: Warm, dry, normal turgor, no rash    Labs:  CBC:   Lab Results   Component Value Date/Time    WBC 7.4 10/31/2022 10:52 AM    RBC 4.93 10/31/2022 10:52 AM    HGB 14.0 10/31/2022 10:52 AM    HCT 42.0 10/31/2022 10:52 AM    MCV 85.2 10/31/2022 10:52 AM    MCH 28.4 10/31/2022 10:52 AM    MCHC 33.3 10/31/2022 10:52 AM    RDW 14.1 10/31/2022 10:52 AM     10/31/2022 10:52 AM    MPV 7.5 10/31/2022 10:52 AM     BMP:    Lab Results   Component Value Date/Time     10/31/2022 10:52 AM    K 3.7 10/31/2022 10:52 AM    K 4.4 09/16/2022 06:19 AM    CL 99 10/31/2022 10:52 AM    CO2 24 10/31/2022 10:52 AM    BUN 7 10/31/2022 10:52 AM    CREATININE 0.8 10/31/2022 10:52 AM    CALCIUM 9.8 10/31/2022 10:52 AM    GFRAA >60 09/27/2022 02:40 PM    GFRAA >60 12/19/2012 09:20 PM    LABGLOM >60 10/31/2022 10:52 AM    GLUCOSE 146 10/31/2022 10:52 AM    GLUCOSE 96 11/14/2011 08:44 AM       Chest Xray:   EKG:    I visualized CXR images and EKG strips-         Problem List  Principal Problem:    Chest pain  Resolved Problems:    * No resolved hospital problems. *        Assessment/Plan:   Assessment:     1.  Chest pain  -With angina equivalent pain.  -Cardiology consult trend troponin  -Morphine nitro as needed.  -Continue CAD medications  -= Further recommendation per cardiology        Bhumit Kendra Sepulveda MD    10/31/2022 1:34 PM

## 2022-10-31 NOTE — TELEPHONE ENCOUNTER
Pt reports symptoms started yesterday and is getting worse, please refer to message below. Please see and advise.

## 2022-11-01 DIAGNOSIS — I25.83 CORONARY ARTERY DISEASE DUE TO LIPID RICH PLAQUE: Primary | ICD-10-CM

## 2022-11-01 DIAGNOSIS — I25.10 CORONARY ARTERY DISEASE DUE TO LIPID RICH PLAQUE: Primary | ICD-10-CM

## 2022-11-01 LAB
LEFT VENTRICULAR EJECTION FRACTION MODE: NORMAL
LV EF: 55 %
POC ACT LR: 279 SEC
POC ACT LR: 283 SEC
TROPONIN: 0.01 NG/ML

## 2022-11-01 PROCEDURE — 2580000003 HC RX 258

## 2022-11-01 PROCEDURE — 6370000000 HC RX 637 (ALT 250 FOR IP): Performed by: HOSPITALIST

## 2022-11-01 PROCEDURE — 99152 MOD SED SAME PHYS/QHP 5/>YRS: CPT | Performed by: INTERNAL MEDICINE

## 2022-11-01 PROCEDURE — 6360000002 HC RX W HCPCS

## 2022-11-01 PROCEDURE — C9600 PERC DRUG-EL COR STENT SING: HCPCS

## 2022-11-01 PROCEDURE — C1725 CATH, TRANSLUMIN NON-LASER: HCPCS

## 2022-11-01 PROCEDURE — 6360000002 HC RX W HCPCS: Performed by: HOSPITALIST

## 2022-11-01 PROCEDURE — A4216 STERILE WATER/SALINE, 10 ML: HCPCS | Performed by: INTERNAL MEDICINE

## 2022-11-01 PROCEDURE — 84484 ASSAY OF TROPONIN QUANT: CPT

## 2022-11-01 PROCEDURE — 6360000004 HC RX CONTRAST MEDICATION: Performed by: INTERNAL MEDICINE

## 2022-11-01 PROCEDURE — 2500000003 HC RX 250 WO HCPCS: Performed by: INTERNAL MEDICINE

## 2022-11-01 PROCEDURE — 2709999900 HC NON-CHARGEABLE SUPPLY

## 2022-11-01 PROCEDURE — 99153 MOD SED SAME PHYS/QHP EA: CPT

## 2022-11-01 PROCEDURE — 6370000000 HC RX 637 (ALT 250 FOR IP)

## 2022-11-01 PROCEDURE — 85347 COAGULATION TIME ACTIVATED: CPT

## 2022-11-01 PROCEDURE — 6370000000 HC RX 637 (ALT 250 FOR IP): Performed by: INTERNAL MEDICINE

## 2022-11-01 PROCEDURE — 93458 L HRT ARTERY/VENTRICLE ANGIO: CPT | Performed by: INTERNAL MEDICINE

## 2022-11-01 PROCEDURE — C1894 INTRO/SHEATH, NON-LASER: HCPCS

## 2022-11-01 PROCEDURE — C1769 GUIDE WIRE: HCPCS

## 2022-11-01 PROCEDURE — C1874 STENT, COATED/COV W/DEL SYS: HCPCS

## 2022-11-01 PROCEDURE — 96375 TX/PRO/DX INJ NEW DRUG ADDON: CPT

## 2022-11-01 PROCEDURE — 92928 PRQ TCAT PLMT NTRAC ST 1 LES: CPT | Performed by: INTERNAL MEDICINE

## 2022-11-01 PROCEDURE — 96376 TX/PRO/DX INJ SAME DRUG ADON: CPT

## 2022-11-01 PROCEDURE — 2580000003 HC RX 258: Performed by: HOSPITALIST

## 2022-11-01 PROCEDURE — 99152 MOD SED SAME PHYS/QHP 5/>YRS: CPT

## 2022-11-01 PROCEDURE — 36415 COLL VENOUS BLD VENIPUNCTURE: CPT

## 2022-11-01 PROCEDURE — 6360000002 HC RX W HCPCS: Performed by: INTERNAL MEDICINE

## 2022-11-01 PROCEDURE — 2500000003 HC RX 250 WO HCPCS

## 2022-11-01 PROCEDURE — 2580000003 HC RX 258: Performed by: INTERNAL MEDICINE

## 2022-11-01 PROCEDURE — 93458 L HRT ARTERY/VENTRICLE ANGIO: CPT

## 2022-11-01 PROCEDURE — C1887 CATHETER, GUIDING: HCPCS

## 2022-11-01 PROCEDURE — C1760 CLOSURE DEV, VASC: HCPCS

## 2022-11-01 PROCEDURE — G0378 HOSPITAL OBSERVATION PER HR: HCPCS

## 2022-11-01 PROCEDURE — 99024 POSTOP FOLLOW-UP VISIT: CPT | Performed by: INTERNAL MEDICINE

## 2022-11-01 PROCEDURE — 93005 ELECTROCARDIOGRAM TRACING: CPT | Performed by: INTERNAL MEDICINE

## 2022-11-01 RX ORDER — SODIUM CHLORIDE 0.9 % (FLUSH) 0.9 %
5-40 SYRINGE (ML) INJECTION EVERY 12 HOURS SCHEDULED
Status: DISCONTINUED | OUTPATIENT
Start: 2022-11-01 | End: 2022-11-03 | Stop reason: HOSPADM

## 2022-11-01 RX ORDER — DIPHENHYDRAMINE HYDROCHLORIDE 50 MG/ML
50 INJECTION INTRAMUSCULAR; INTRAVENOUS ONCE
Status: COMPLETED | OUTPATIENT
Start: 2022-11-01 | End: 2022-11-01

## 2022-11-01 RX ORDER — SODIUM CHLORIDE 0.9 % (FLUSH) 0.9 %
5-40 SYRINGE (ML) INJECTION PRN
Status: DISCONTINUED | OUTPATIENT
Start: 2022-11-01 | End: 2022-11-03 | Stop reason: HOSPADM

## 2022-11-01 RX ORDER — ACETAMINOPHEN 325 MG/1
650 TABLET ORAL EVERY 4 HOURS PRN
Status: DISCONTINUED | OUTPATIENT
Start: 2022-11-01 | End: 2022-11-03 | Stop reason: HOSPADM

## 2022-11-01 RX ORDER — SODIUM CHLORIDE 9 MG/ML
INJECTION, SOLUTION INTRAVENOUS PRN
Status: DISCONTINUED | OUTPATIENT
Start: 2022-11-01 | End: 2022-11-03 | Stop reason: HOSPADM

## 2022-11-01 RX ORDER — METHYLPREDNISOLONE SODIUM SUCCINATE 125 MG/2ML
125 INJECTION, POWDER, LYOPHILIZED, FOR SOLUTION INTRAMUSCULAR; INTRAVENOUS ONCE
Status: COMPLETED | OUTPATIENT
Start: 2022-11-01 | End: 2022-11-01

## 2022-11-01 RX ADMIN — DILTIAZEM HYDROCHLORIDE 120 MG: 120 CAPSULE, COATED, EXTENDED RELEASE ORAL at 20:41

## 2022-11-01 RX ADMIN — FAMOTIDINE 20 MG: 10 INJECTION, SOLUTION INTRAVENOUS at 07:55

## 2022-11-01 RX ADMIN — DIPHENHYDRAMINE HYDROCHLORIDE 50 MG: 50 INJECTION, SOLUTION INTRAMUSCULAR; INTRAVENOUS at 07:55

## 2022-11-01 RX ADMIN — RANOLAZINE 500 MG: 500 TABLET, FILM COATED, EXTENDED RELEASE ORAL at 20:41

## 2022-11-01 RX ADMIN — MORPHINE SULFATE 2 MG: 2 INJECTION, SOLUTION INTRAMUSCULAR; INTRAVENOUS at 00:26

## 2022-11-01 RX ADMIN — MORPHINE SULFATE 2 MG: 2 INJECTION, SOLUTION INTRAMUSCULAR; INTRAVENOUS at 04:50

## 2022-11-01 RX ADMIN — MORPHINE SULFATE 2 MG: 2 INJECTION, SOLUTION INTRAMUSCULAR; INTRAVENOUS at 11:13

## 2022-11-01 RX ADMIN — METOPROLOL SUCCINATE 50 MG: 50 TABLET, EXTENDED RELEASE ORAL at 07:56

## 2022-11-01 RX ADMIN — DILTIAZEM HYDROCHLORIDE 120 MG: 120 CAPSULE, COATED, EXTENDED RELEASE ORAL at 07:55

## 2022-11-01 RX ADMIN — Medication 10 ML: at 07:54

## 2022-11-01 RX ADMIN — MORPHINE SULFATE 2 MG: 2 INJECTION, SOLUTION INTRAMUSCULAR; INTRAVENOUS at 08:00

## 2022-11-01 RX ADMIN — TICAGRELOR 90 MG: 90 TABLET ORAL at 07:55

## 2022-11-01 RX ADMIN — IOPAMIDOL 110 ML: 755 INJECTION, SOLUTION INTRAVENOUS at 13:36

## 2022-11-01 RX ADMIN — METHYLPREDNISOLONE SODIUM SUCCINATE 125 MG: 125 INJECTION, POWDER, FOR SOLUTION INTRAMUSCULAR; INTRAVENOUS at 07:54

## 2022-11-01 RX ADMIN — TRAMADOL HYDROCHLORIDE 50 MG: 50 TABLET, COATED ORAL at 16:30

## 2022-11-01 RX ADMIN — ACETAMINOPHEN 650 MG: 325 TABLET ORAL at 20:46

## 2022-11-01 RX ADMIN — ASPIRIN 81 MG 81 MG: 81 TABLET ORAL at 07:55

## 2022-11-01 RX ADMIN — TICAGRELOR 90 MG: 90 TABLET ORAL at 20:41

## 2022-11-01 RX ADMIN — RANOLAZINE 500 MG: 500 TABLET, FILM COATED, EXTENDED RELEASE ORAL at 07:55

## 2022-11-01 RX ADMIN — VALSARTAN 40 MG: 40 TABLET, FILM COATED ORAL at 11:19

## 2022-11-01 ASSESSMENT — PAIN DESCRIPTION - DESCRIPTORS
DESCRIPTORS: ACHING
DESCRIPTORS: ACHING;DISCOMFORT
DESCRIPTORS: PRESSURE
DESCRIPTORS: ACHING;DISCOMFORT
DESCRIPTORS: PRESSURE

## 2022-11-01 ASSESSMENT — PAIN SCALES - GENERAL
PAINLEVEL_OUTOF10: 5
PAINLEVEL_OUTOF10: 0
PAINLEVEL_OUTOF10: 9
PAINLEVEL_OUTOF10: 6
PAINLEVEL_OUTOF10: 9
PAINLEVEL_OUTOF10: 7
PAINLEVEL_OUTOF10: 0
PAINLEVEL_OUTOF10: 8

## 2022-11-01 ASSESSMENT — PAIN DESCRIPTION - ORIENTATION
ORIENTATION: RIGHT;LEFT;MID
ORIENTATION: RIGHT
ORIENTATION: RIGHT;MID

## 2022-11-01 ASSESSMENT — PAIN - FUNCTIONAL ASSESSMENT
PAIN_FUNCTIONAL_ASSESSMENT: PREVENTS OR INTERFERES SOME ACTIVE ACTIVITIES AND ADLS
PAIN_FUNCTIONAL_ASSESSMENT: PREVENTS OR INTERFERES SOME ACTIVE ACTIVITIES AND ADLS
PAIN_FUNCTIONAL_ASSESSMENT: ACTIVITIES ARE NOT PREVENTED

## 2022-11-01 ASSESSMENT — PAIN DESCRIPTION - LOCATION
LOCATION: GROIN
LOCATION: CHEST
LOCATION: GROIN
LOCATION: CHEST

## 2022-11-01 NOTE — PROGRESS NOTES
Pt A&OX4, BP (!) 151/102   Pulse 76   Temp 97.9 °F (36.6 °C)   Resp 16   Ht 5' 4\" (1.626 m)   Wt 193 lb 1.6 oz (87.6 kg)   SpO2 95%   PF (!) 2 L/min   BMI 33.15 kg/m²   C/o 9/10 chest pressure, morphine given, solu-medrol/benadry and pepcid also given for contrast allergy.

## 2022-11-01 NOTE — PROGRESS NOTES
Cardiovascular Progress Note      Chief Complaint:   Chief Complaint   Patient presents with    Chest Pain     Patient in with complaints of chest pain since Friday. States she has 8 stents. Hx of two heart attacks. Impression/Recommendations:  Ms. Bulmaro Moore is a 74 y.o. female patient of Dr. Tereso Ray with     Unstable angina  CAD:    -9/16/22: Patent LAD and LCx stents, nonobstructive prox RCA by FFR and IVUS (DFR 0.94, FFR 0.87), Dev Giovannijen)    -9/13/22 (NSTEMI): LAEC x3 of mid-distal LCx, Residual 70% RCA stenosis, Akiraradha Moore)    -2/19/21 D1 and LAD stents widely patent, residual disease unchanged, Ubaldo Lemos)    -2/4/21 PCI of dLAD and D1, FFR neg mLAD (0.83), Sierra Warren)    -11/20/17 (relook): without restent thrombosis/significant new blockages, Kendra Zhang)    -11/19/17 (STEMI): ALEC to Diag, Kendra Zhang)    -10/2012 PCI to RCA and Ramus     PAF, s/p multiple ablations, most recent CIT dependent flutter ablation 4/12/22, PRN OAC, follows with EP  Hypertension, suboptimal control  Hyperlipidemia, untreated 6/29/22:   HDL 47   IBS/hx gastritis, follows with GI  Obesity   Significant allergy profile, reported contrast allergy  Former tobacco use        S/P Successful PCI to proximal RCA with one drug eluting stent. Stressed the importance of ASA 81 mg and Brilinta 90 mg bid. Dodie Carbajal is with known statin intolerance and previous hesitation with injectable lipid lowering medications. Discussed outpatient initiation of PCSK9 inhibitor. Beta blocker intolerance. Continue home calcium channel blocker, Ranexa, and ARB. Expected DC tomorrow. Interval History:  No events overnight. No chest pain, shortness of breath, edema. Discussed compliance with Brilinta, ASA. Discussed injectable lipid lowering medications with Dodie Carbajal and her .        Medications:  sodium chloride flush 0.9 % injection 5-40 mL, 2 times per day  sodium chloride flush 0.9 % injection 5-40 mL, PRN  0.9 % sodium joint swelling or tenderness, no chest wall tenderness  GASTROINTESTINAL:  soft, non-tender, no bruit    Data Review:    CBC:   Recent Labs     10/31/22  1052   WBC 7.4   HGB 14.0   HCT 42.0   MCV 85.2        BMP:   Recent Labs     10/31/22  1052   *   K 3.7   CL 99   CO2 24   BUN 7   CREATININE 0.8     LFTS:   Recent Labs     10/31/22  1052   ALT 9*   AST 19   ALKPHOS 136*   PROT 7.4   AGRATIO 1.6   BILITOT 0.3     Cardiac Enzymes:   Recent Labs     10/31/22  1537 10/31/22  2101 11/01/22  0040   TROPONINI <0.01 0.02* 0.01     PT/INR:   Recent Labs     10/31/22  1052   PROTIME 14.3   INR 1.11     APTT:   Recent Labs     10/31/22  1052   APTT 29.5       Raudel Noel DO, [facility]  Interventional Cardiology     o: [phone]  [facility] [address], [address]      NOTE:  This report was transcribed using voice recognition software. Every effort was made to ensure accuracy; however, inadvertent computerized transcription errors may be present.

## 2022-11-01 NOTE — PROGRESS NOTES
After patient was ambulating in room, a few drops of blood were found on her groin drsg, pt instructed to stay in bed, tegaderm removed, pressure held for 5 minutes, new dressing applied.

## 2022-11-01 NOTE — PROGRESS NOTES
[facility] PROGRESS NOTE    11/1/2022 12:06 PM        Name: Bella Cota . Admitted: 10/31/2022  Primary Care Provider: Joselo Kimble MD (Tel: [phone])                        Subjective:  . No acute events overnight. Resting well. Pain control. Diet ok. Labs reviewed with intermittent chest pain  Advised. Reviewed interval ancillary notes    Current Medications  morphine (PF) injection 2 mg, Q3H PRN  aspirin chewable tablet 81 mg, Daily  metoprolol succinate (TOPROL XL) extended release tablet 50 mg, Daily  ranolazine (RANEXA) extended release tablet 500 mg, BID  ticagrelor (BRILINTA) tablet 90 mg, BID  traMADol (ULTRAM) tablet 50 mg, Q8H PRN  valsartan (DIOVAN) tablet 40 mg, Daily  sodium chloride flush 0.9 % injection 5-40 mL, 2 times per day  sodium chloride flush 0.9 % injection 5-40 mL, PRN  0.9 % sodium chloride infusion, PRN  enoxaparin (LOVENOX) injection 40 mg, Daily  ondansetron (ZOFRAN-ODT) disintegrating tablet 4 mg, Q8H PRN   Or  ondansetron (ZOFRAN) injection 4 mg, Q6H PRN  polyethylene glycol (GLYCOLAX) packet 17 g, Daily PRN  acetaminophen (TYLENOL) tablet 650 mg, Q6H PRN   Or  acetaminophen (TYLENOL) suppository 650 mg, Q6H PRN  dilTIAZem (CARDIZEM CD) extended release capsule 120 mg, BID        Objective:  BP (!) 162/101   Pulse 74   Temp 98.7 °F (37.1 °C)   Resp 16   Ht 5' 4\" (1.626 m)   Wt 193 lb 1.6 oz (87.6 kg)   SpO2 94%   PF (!) 2 L/min   BMI 33.15 kg/m²     Intake/Output Summary (Last 24 hours) at 11/1/2022 1206  Last data filed at 10/31/2022 1630  Gross per 24 hour   Intake 480 ml   Output --   Net 480 ml      Wt Readings from Last 3 Encounters:   11/01/22 193 lb 1.6 oz (87.6 kg)   10/14/22 192 lb (87.1 kg)   09/27/22 218 lb 11.2 oz (99.2 kg)       General appearance:  Appears comfortable  Eyes: Sclera clear.  Pupils equal.  ENT: Moist oral mucosa. Trachea midline, no adenopathy. Cardiovascular: Regular rhythm, normal S1, S2. No murmur. No edema in lower extremities  Respiratory: Not using accessory muscles. Good inspiratory effort. Clear to auscultation bilaterally, no wheeze or crackles. GI: Abdomen soft, no tenderness, not distended, normal bowel sounds  Musculoskeletal: No cyanosis in digits, neck supple  Neurology: CN 2-12 grossly intact. No speech or motor deficits  Psych: Normal affect. Alert and oriented in time, place and person  Skin: Warm, dry, normal turgor    Labs and Tests:  CBC:   Recent Labs     10/31/22  1052   WBC 7.4   HGB 14.0        BMP:    Recent Labs     10/31/22  1052   *   K 3.7   CL 99   CO2 24   BUN 7   CREATININE 0.8   GLUCOSE 146*     Hepatic:   Recent Labs     10/31/22  1052   AST 19   ALT 9*   BILITOT 0.3   ALKPHOS 136*       Discussed care with patient             Problem List  Principal Problem:    Chest pain  Resolved Problems:    * No resolved hospital problems. *       Assessment & Plan:   Chest pain  -We will monitor elevated troponin 0.02.   CAD  -Plan for cardiac cath today further recommendation to follow      Diet: Diet NPO  Code:Full Code  DVT PPX elvira Stanley MD   11/1/2022 12:06 PM

## 2022-11-02 LAB
ANION GAP SERPL CALCULATED.3IONS-SCNC: 17 MMOL/L (ref 3–16)
BACTERIA: ABNORMAL /HPF
BILIRUBIN URINE: NEGATIVE
BLOOD, URINE: NEGATIVE
BUN BLDV-MCNC: 14 MG/DL (ref 7–20)
CALCIUM SERPL-MCNC: 9.7 MG/DL (ref 8.3–10.6)
CHLORIDE BLD-SCNC: 100 MMOL/L (ref 99–110)
CLARITY: ABNORMAL
CO2: 18 MMOL/L (ref 21–32)
COLOR: YELLOW
COMMENT UA: ABNORMAL
CREAT SERPL-MCNC: 1 MG/DL (ref 0.6–1.2)
EKG ATRIAL RATE: 73 BPM
EKG DIAGNOSIS: NORMAL
EKG P AXIS: -12 DEGREES
EKG P-R INTERVAL: 172 MS
EKG Q-T INTERVAL: 430 MS
EKG QRS DURATION: 92 MS
EKG QTC CALCULATION (BAZETT): 473 MS
EKG R AXIS: -40 DEGREES
EKG T AXIS: 61 DEGREES
EKG VENTRICULAR RATE: 73 BPM
EPITHELIAL CELLS, UA: 5 /HPF (ref 0–5)
GFR SERPL CREATININE-BSD FRML MDRD: >60 ML/MIN/{1.73_M2}
GLUCOSE BLD-MCNC: 183 MG/DL (ref 70–99)
GLUCOSE URINE: NEGATIVE MG/DL
HCT VFR BLD CALC: 39.2 % (ref 36–48)
HEMOGLOBIN: 13.4 G/DL (ref 12–16)
HYALINE CASTS: 0 /LPF (ref 0–8)
KETONES, URINE: NEGATIVE MG/DL
LEUKOCYTE ESTERASE, URINE: ABNORMAL
MCH RBC QN AUTO: 29.1 PG (ref 26–34)
MCHC RBC AUTO-ENTMCNC: 34.1 G/DL (ref 31–36)
MCV RBC AUTO: 85.6 FL (ref 80–100)
MICROSCOPIC EXAMINATION: YES
NITRITE, URINE: POSITIVE
PDW BLD-RTO: 14.2 % (ref 12.4–15.4)
PH UA: 6 (ref 5–8)
PLATELET # BLD: 354 K/UL (ref 135–450)
PMV BLD AUTO: 7.9 FL (ref 5–10.5)
POTASSIUM SERPL-SCNC: 4.5 MMOL/L (ref 3.5–5.1)
PROTEIN UA: NEGATIVE MG/DL
RBC # BLD: 4.58 M/UL (ref 4–5.2)
RBC UA: ABNORMAL /HPF (ref 0–4)
SODIUM BLD-SCNC: 135 MMOL/L (ref 136–145)
SPECIFIC GRAVITY UA: 1.01 (ref 1–1.03)
URINE TYPE: ABNORMAL
UROBILINOGEN, URINE: 0.2 E.U./DL
WBC # BLD: 21.4 K/UL (ref 4–11)
WBC UA: 9 /HPF (ref 0–5)

## 2022-11-02 PROCEDURE — 2580000003 HC RX 258: Performed by: HOSPITALIST

## 2022-11-02 PROCEDURE — 6370000000 HC RX 637 (ALT 250 FOR IP): Performed by: HOSPITALIST

## 2022-11-02 PROCEDURE — 6370000000 HC RX 637 (ALT 250 FOR IP): Performed by: INTERNAL MEDICINE

## 2022-11-02 PROCEDURE — 81001 URINALYSIS AUTO W/SCOPE: CPT

## 2022-11-02 PROCEDURE — 96372 THER/PROPH/DIAG INJ SC/IM: CPT

## 2022-11-02 PROCEDURE — 93010 ELECTROCARDIOGRAM REPORT: CPT | Performed by: INTERNAL MEDICINE

## 2022-11-02 PROCEDURE — 1200000000 HC SEMI PRIVATE

## 2022-11-02 PROCEDURE — 80048 BASIC METABOLIC PNL TOTAL CA: CPT

## 2022-11-02 PROCEDURE — 6370000000 HC RX 637 (ALT 250 FOR IP): Performed by: PHYSICIAN ASSISTANT

## 2022-11-02 PROCEDURE — 85027 COMPLETE CBC AUTOMATED: CPT

## 2022-11-02 PROCEDURE — 6360000002 HC RX W HCPCS: Performed by: HOSPITALIST

## 2022-11-02 PROCEDURE — 2580000003 HC RX 258: Performed by: INTERNAL MEDICINE

## 2022-11-02 PROCEDURE — 36415 COLL VENOUS BLD VENIPUNCTURE: CPT

## 2022-11-02 PROCEDURE — 99233 SBSQ HOSP IP/OBS HIGH 50: CPT | Performed by: INTERNAL MEDICINE

## 2022-11-02 RX ORDER — DIPHENHYDRAMINE HYDROCHLORIDE 50 MG/ML
25 INJECTION INTRAMUSCULAR; INTRAVENOUS ONCE
Status: COMPLETED | OUTPATIENT
Start: 2022-11-02 | End: 2022-11-02

## 2022-11-02 RX ORDER — DIAZEPAM 5 MG/1
5 TABLET ORAL ONCE
Status: COMPLETED | OUTPATIENT
Start: 2022-11-02 | End: 2022-11-02

## 2022-11-02 RX ORDER — EPINEPHRINE 1 MG/ML
0.3 INJECTION, SOLUTION, CONCENTRATE INTRAVENOUS ONCE
Status: COMPLETED | OUTPATIENT
Start: 2022-11-02 | End: 2022-11-02

## 2022-11-02 RX ORDER — DIAZEPAM 5 MG/1
5 TABLET ORAL NIGHTLY PRN
Status: DISCONTINUED | OUTPATIENT
Start: 2022-11-02 | End: 2022-11-03 | Stop reason: HOSPADM

## 2022-11-02 RX ORDER — OXYMETAZOLINE HYDROCHLORIDE 0.05 G/100ML
2 SPRAY NASAL 2 TIMES DAILY
Qty: 12 ML | Refills: 11 | Status: SHIPPED | OUTPATIENT
Start: 2022-11-02 | End: 2022-11-11 | Stop reason: ALTCHOICE

## 2022-11-02 RX ORDER — PANTOPRAZOLE SODIUM 40 MG/1
40 TABLET, DELAYED RELEASE ORAL
Qty: 30 TABLET | Refills: 5 | Status: SHIPPED | OUTPATIENT
Start: 2022-11-02 | End: 2022-11-10 | Stop reason: ALTCHOICE

## 2022-11-02 RX ORDER — METHYLPREDNISOLONE SODIUM SUCCINATE 125 MG/2ML
60 INJECTION, POWDER, LYOPHILIZED, FOR SOLUTION INTRAMUSCULAR; INTRAVENOUS ONCE
Status: COMPLETED | OUTPATIENT
Start: 2022-11-02 | End: 2022-11-02

## 2022-11-02 RX ADMIN — EPINEPHRINE 0.3 MG: 1 INJECTION, SOLUTION INTRAMUSCULAR; SUBCUTANEOUS at 10:19

## 2022-11-02 RX ADMIN — Medication 10 ML: at 20:24

## 2022-11-02 RX ADMIN — Medication 10 ML: at 08:22

## 2022-11-02 RX ADMIN — METHYLPREDNISOLONE SODIUM SUCCINATE 60 MG: 125 INJECTION, POWDER, FOR SOLUTION INTRAMUSCULAR; INTRAVENOUS at 10:19

## 2022-11-02 RX ADMIN — DIAZEPAM 5 MG: 5 TABLET ORAL at 22:03

## 2022-11-02 RX ADMIN — ENOXAPARIN SODIUM 40 MG: 100 INJECTION SUBCUTANEOUS at 08:19

## 2022-11-02 RX ADMIN — DIPHENHYDRAMINE HYDROCHLORIDE 25 MG: 50 INJECTION, SOLUTION INTRAMUSCULAR; INTRAVENOUS at 10:14

## 2022-11-02 RX ADMIN — Medication 10 ML: at 08:23

## 2022-11-02 RX ADMIN — CEFTRIAXONE SODIUM 1000 MG: 1 INJECTION, POWDER, FOR SOLUTION INTRAMUSCULAR; INTRAVENOUS at 19:03

## 2022-11-02 RX ADMIN — TICAGRELOR 90 MG: 90 TABLET ORAL at 20:24

## 2022-11-02 RX ADMIN — METOPROLOL SUCCINATE 50 MG: 50 TABLET, EXTENDED RELEASE ORAL at 08:19

## 2022-11-02 RX ADMIN — VALSARTAN 40 MG: 40 TABLET, FILM COATED ORAL at 08:26

## 2022-11-02 RX ADMIN — DILTIAZEM HYDROCHLORIDE 120 MG: 120 CAPSULE, COATED, EXTENDED RELEASE ORAL at 20:24

## 2022-11-02 RX ADMIN — ASPIRIN 81 MG 81 MG: 81 TABLET ORAL at 08:19

## 2022-11-02 RX ADMIN — TICAGRELOR 90 MG: 90 TABLET ORAL at 08:19

## 2022-11-02 RX ADMIN — RANOLAZINE 500 MG: 500 TABLET, FILM COATED, EXTENDED RELEASE ORAL at 20:24

## 2022-11-02 RX ADMIN — ACETAMINOPHEN 650 MG: 325 TABLET ORAL at 13:10

## 2022-11-02 RX ADMIN — RANOLAZINE 500 MG: 500 TABLET, FILM COATED, EXTENDED RELEASE ORAL at 08:19

## 2022-11-02 RX ADMIN — TRAMADOL HYDROCHLORIDE 50 MG: 50 TABLET, COATED ORAL at 00:23

## 2022-11-02 RX ADMIN — DILTIAZEM HYDROCHLORIDE 120 MG: 120 CAPSULE, COATED, EXTENDED RELEASE ORAL at 08:19

## 2022-11-02 RX ADMIN — DIAZEPAM 5 MG: 5 TABLET ORAL at 00:55

## 2022-11-02 ASSESSMENT — PAIN SCALES - GENERAL
PAINLEVEL_OUTOF10: 7
PAINLEVEL_OUTOF10: 0
PAINLEVEL_OUTOF10: 0
PAINLEVEL_OUTOF10: 5
PAINLEVEL_OUTOF10: 1
PAINLEVEL_OUTOF10: 1
PAINLEVEL_OUTOF10: 0
PAINLEVEL_OUTOF10: 8

## 2022-11-02 ASSESSMENT — PAIN DESCRIPTION - LOCATION
LOCATION: HEAD
LOCATION: CHEST
LOCATION: CHEST

## 2022-11-02 ASSESSMENT — PAIN DESCRIPTION - DESCRIPTORS: DESCRIPTORS: ACHING;DISCOMFORT

## 2022-11-02 ASSESSMENT — PAIN DESCRIPTION - ORIENTATION: ORIENTATION: MID

## 2022-11-02 ASSESSMENT — PAIN - FUNCTIONAL ASSESSMENT: PAIN_FUNCTIONAL_ASSESSMENT: PREVENTS OR INTERFERES SOME ACTIVE ACTIVITIES AND ADLS

## 2022-11-02 NOTE — PROGRESS NOTES
[facility] PROGRESS NOTE    11/2/2022 10:29 AM        Name: Jagdeep Devine . Admitted: 10/31/2022  Primary Care Provider: Maricruz Dominique MD (Tel: [phone])                        Subjective:  . No acute events overnight. Resting well. Pain control. Diet ok. Labs reviewed  Denies any chest pain sob.      Reviewed interval ancillary notes    Current Medications  sodium chloride flush 0.9 % injection 5-40 mL, 2 times per day  sodium chloride flush 0.9 % injection 5-40 mL, PRN  0.9 % sodium chloride infusion, PRN  acetaminophen (TYLENOL) tablet 650 mg, Q4H PRN  morphine (PF) injection 2 mg, Q3H PRN  aspirin chewable tablet 81 mg, Daily  metoprolol succinate (TOPROL XL) extended release tablet 50 mg, Daily  ranolazine (RANEXA) extended release tablet 500 mg, BID  ticagrelor (BRILINTA) tablet 90 mg, BID  traMADol (ULTRAM) tablet 50 mg, Q8H PRN  valsartan (DIOVAN) tablet 40 mg, Daily  sodium chloride flush 0.9 % injection 5-40 mL, 2 times per day  sodium chloride flush 0.9 % injection 5-40 mL, PRN  0.9 % sodium chloride infusion, PRN  enoxaparin (LOVENOX) injection 40 mg, Daily  ondansetron (ZOFRAN-ODT) disintegrating tablet 4 mg, Q8H PRN   Or  ondansetron (ZOFRAN) injection 4 mg, Q6H PRN  polyethylene glycol (GLYCOLAX) packet 17 g, Daily PRN  acetaminophen (TYLENOL) suppository 650 mg, Q6H PRN  dilTIAZem (CARDIZEM CD) extended release capsule 120 mg, BID        Objective:  /86   Pulse 90   Temp 98.5 °F (36.9 °C) (Oral)   Resp 16   Ht 5' 4\" (1.626 m)   Wt 193 lb 1.6 oz (87.6 kg)   SpO2 95%   PF (!) 2 L/min   BMI 33.15 kg/m²   No intake or output data in the 24 hours ending 11/02/22 1029   Wt Readings from Last 3 Encounters:   11/01/22 193 lb 1.6 oz (87.6 kg)   10/14/22 192 lb (87.1 kg)   09/27/22 218 lb 11.2 oz (99.2 kg)       General appearance:  Appears comfortable  Eyes: Sclera clear. Pupils equal.  ENT: Moist oral mucosa. Trachea midline, no adenopathy. Cardiovascular: Regular rhythm, normal S1, S2. No murmur. No edema in lower extremities  Respiratory: Not using accessory muscles. Good inspiratory effort. Clear to auscultation bilaterally, no wheeze or crackles. GI: Abdomen soft, no tenderness, not distended, normal bowel sounds  Musculoskeletal: No cyanosis in digits, neck supple  Neurology: CN 2-12 grossly intact. No speech or motor deficits  Psych: Normal affect. Alert and oriented in time, place and person  Skin: Warm, dry, normal turgor    Labs and Tests:  CBC:   Recent Labs     10/31/22  1052 11/02/22  0848   WBC 7.4 21.4*   HGB 14.0 13.4    354     BMP:    Recent Labs     10/31/22  1052 11/02/22  0848   * 135*   K 3.7 4.5   CL 99 100   CO2 24 18*   BUN 7 14   CREATININE 0.8 1.0   GLUCOSE 146* 183*     Hepatic:   Recent Labs     10/31/22  1052   AST 19   ALT 9*   BILITOT 0.3   ALKPHOS 136*       Discussed care with patient             Problem List  Principal Problem:    Chest pain  Active Problems:    Unstable angina (HCC)  Resolved Problems:    * No resolved hospital problems. *       Assessment & Plan:   Unstable angina  = Patient is status post cath with stents  =-Continue dual antiplatelet therapy with aspirin and Brilinta      Allergic reaction  -Patient had allergic reaction with redness puffiness of the face and throat closing sensation  -We have given epi and Solu-Medrol and Benadryl x1  -Unclear what patient is allergic to no new medication that we can think of  -Only thing sleeping continue with metoprolol which is a substitution to what she takes at home  -We will monitor for another few hours to make sure she is doing better    CAD      Diet: ADULT DIET; Regular; 4 carb choices (60 gm/meal);  Low Fat/Low Chol/High Fiber/2 gm Na  Code:Full Code  DVT PPX lovenox       Luis Mobley MD   11/2/2022 10:29 AM

## 2022-11-02 NOTE — PROGRESS NOTES
[address]   Progress Note  Cardiology      [facility]   Admission date:  10/31/2022  CC-f/up post PCI  Subjective:  Breathing and chest was much better.  Developed flushing and dyspnea after toprol xl    Objective:  Medications/Labs all Reviewed     Evolocumab  140 mg SubCUTAneous Once    sodium chloride flush  5-40 mL IntraVENous 2 times per day    aspirin  81 mg Oral Daily    metoprolol succinate  50 mg Oral Daily    ranolazine  500 mg Oral BID    ticagrelor  90 mg Oral BID    valsartan  40 mg Oral Daily    sodium chloride flush  5-40 mL IntraVENous 2 times per day    enoxaparin  40 mg SubCUTAneous Daily    dilTIAZem  120 mg Oral BID       BMP:   Lab Results   Component Value Date/Time     11/02/2022 08:48 AM    K 4.5 11/02/2022 08:48 AM    K 4.4 09/16/2022 06:19 AM     11/02/2022 08:48 AM    CO2 18 11/02/2022 08:48 AM    BUN 14 11/02/2022 08:48 AM    CREATININE 1.0 11/02/2022 08:48 AM    MG 2.00 09/14/2022 04:55 AM     CBC:    Lab Results   Component Value Date/Time    WBC 21.4 11/02/2022 08:48 AM    RBC 4.58 11/02/2022 08:48 AM    HGB 13.4 11/02/2022 08:48 AM    HCT 39.2 11/02/2022 08:48 AM    MCV 85.6 11/02/2022 08:48 AM    RDW 14.2 11/02/2022 08:48 AM     11/02/2022 08:48 AM      PT/INR:    Lab Results   Component Value Date    INR 1.11 10/31/2022    PROTIME 14.3 10/31/2022     Cardiac Enzymes:    Lab Results   Component Value Date/Time    CKTOTAL 46 06/13/2018 01:04 PM    CKMB 3.1 01/18/2017 07:46 PM     Lab Results   Component Value Date    TROPONINI 0.01 11/01/2022    TROPONINI 0.02 (H) 10/31/2022    TROPONINI <0.01 10/31/2022     BNP:    Lab Results   Component Value Date/Time    BNP 94.0 11/19/2017 10:03 AM     FASTING LIPID PANEL:    Lab Results   Component Value Date/Time    CHOL 244 06/29/2022 11:59 AM    HDL 47 06/29/2022 11:59 AM    TRIG 277 06/29/2022 11:59 AM       Physical Examination:    /81   Pulse 81   Temp 97.8 °F (36.6 °C) (Oral)   Resp 16 Ht 5' 4\" (1.626 m)   Wt 193 lb 1.6 oz (87.6 kg)   SpO2 96%   PF (!) 2 L/min   BMI 33.15 kg/m²      Respiratory:  Resp Assessment: Normal respiratory effort  Resp Auscultation: Clear to auscultation bilaterally   Cardiovascular: Auscultation: regular rate and rhythm, normal S1S2, no murmur, rub or gallop  Palpation:  Nl PMI  JVP:  normal  Extremities: No Edema  Abdomen:  Soft, non-tender  Normal bowel sounds  Extremities:   No Cyanosis or Clubbing  Neurological/Psychiatric:  Oriented to time, place, and person  Non-anxious  Skin Warm and dry    Assessment:    Principal Problem:    Chest pain  Plan: improved  Active Problems:    Unstable angina ([address]  Plan: post PCI of RCA    EF normal with mild proBNP elevation c/w acute coronary syndrome.  Not due to acute CHF  Familial hyperlipidemia with statin intolerance   with repatha prescribed    Intolerance to beta blocker with acute dyspnea    She has samples of repatha at home and  will bring in dose from home for her to take first dose with plans to continue q 2weeks              Plan:  Start repatha in hospital. Likely home tomorrow  I have spent  35    minutes of face to face time with the patient with more than 50%  spent  counseling and coordinating care for Jaime Nolasco

## 2022-11-02 NOTE — DISCHARGE INSTR - COC
Continuity of Care Form    Patient Name: Aakash Koehler   :  1955  MRN:  [id number]    Admit date:  10/31/2022  Discharge date:  ***    Code Status Order: Full Code   Advance Directives:     Admitting Physician:  Jf Anthony MD  PCP: Jeannie Lane MD    Discharging Nurse: [facility] Unit/Room#: 1OD-2546/8858-23  Discharging Unit Phone Number: ***    Emergency Contact:   Extended Emergency Contact Information  Primary Emergency Contact: Jessica EASTMAN  Address: [address] [place] [address], [facility]. [address] Phone: [phone]  Work Phone: [phone]  Mobile Phone: [phone]  Relation: Spouse    Past Surgical History:  Past Surgical History:   Procedure Laterality Date    ABDOMINAL ADHESION SURGERY      APPENDECTOMY      CARDIAC CATHETERIZATION  2013    recheck arteries unchanged    CHOLECYSTECTOMY      COLONOSCOPY      CORONARY ANGIOPLASTY WITH STENT PLACEMENT  10/2012    right- TOTAL OF 3 STENTS    CORONARY ANGIOPLASTY WITH STENT PLACEMENT  2021    CORONARY ANGIOPLASTY WITH STENT PLACEMENT  2022    COSMETIC SURGERY      rhinoplasty    CYSTOSCOPY  2014    ENDOSCOPY, COLON, DIAGNOSTIC      ERCP  2017    FRACTURE SURGERY      LUE, plate and screws    HYSTERECTOMY (CERVIX STATUS UNKNOWN)      HYSTERECTOMY, TOTAL ABDOMINAL (CERVIX REMOVED)      OTHER SURGICAL HISTORY      Incision and drainage of Lingual Introral Lesion    POLYPECTOMY      Dr. Alfie Montaño    PTCA  10/2012    right    SINUS SURGERY      JOSE AND BSO (CERVIX REMOVED)      TONSILLECTOMY      TUBAL LIGATION      UPPER GASTROINTESTINAL ENDOSCOPY      WRIST SURGERY Left 2015    OPEN REDUCTION INTERNAL FIXATION LEFT WRIST       Immunization History:   Immunization History   Administered Date(s) Administered    Tdap (Boostrix, Adacel) 2016       Active Problems:  Patient Active Problem List   Diagnosis Code    Pure hypercholesterolemia E78.00    Allergic rhinitis J30.9    HSV infection B00.9    CAD (coronary artery disease) I25.10    Other chest pain R07.89    Chronic pain G89.29    Depression F32. A    OAB (overactive bladder) N32.81    Hypercholesterolemia E78.00    Migraine headache G43.909    Left wrist fracture S62.102A    Postmenopausal atrophic vaginitis N95.2    Irritable bowel syndrome with diarrhea K58.0    Mood disorder due to a general medical condition F06.30    Essential hypertension I10    NSTEMI (non-ST elevated myocardial infarction) ([facility]) I21.4    Unstable angina ([facility]) I20.0    Mild episode of recurrent major depressive disorder ([facility]) F33.0    PAF (paroxysmal atrial fibrillation) ([facility]) I48.0    Bruit of right carotid artery R09.89    Bilateral carotid artery stenosis I65.23    Typical atrial flutter ([facility]) I48.3    Atypical atrial flutter ([facility]) I48.4    Obesity E66.9    Elevated lipoprotein A level E78.41    Coronary atherosclerosis of native coronary artery I25.10    Allergic reaction T78.40XA    Mixed hyperlipidemia E78.2    Chest pain R07.9       Isolation/Infection:   Isolation            No Isolation          Patient Infection Status       Infection Onset Added Last Indicated Last Indicated By Review Planned Expiration Resolved Resolved By    None active    Resolved    COVID-19 (Rule Out) 02/19/21 02/19/21 02/19/21 COVID-19 (Ordered)   02/20/21 Rule-Out Test Resulted            Nurse Assessment:  Last Vital Signs: /81   Pulse 81   Temp 97.8 °F (36.6 °C) (Oral)   Resp 16   Ht 5' 4\" (1.626 m)   Wt 193 lb 1.6 oz (87.6 kg)   SpO2 96%   PF (!) 2 L/min   BMI 33.15 kg/m²     Last documented pain score (0-10 scale): Pain Level: 5  Last Weight:   Wt Readings from Last 1 Encounters:   11/01/22 193 lb 1.6 oz (87.6 kg)     Mental Status:  {IP PT MENTAL STATUS:20030}    IV Access:  {[facility] IV ACCESS:107008668}    Nursing Mobility/ADLs:  Walking   {P DME NGXM:602425117}  Transfer  {[facility] DME WAIQ:205407801}  Bathing  {[facility] DME KESO:617338976}  Dressing {[facility] DME PDSA:004974743}  Toileting  {[facility] DME LSSX:141622889}  Feeding  {[facility] DME WFAA:987315090}  Med Admin  {[facility] DME QJHY:309370987}  Med Delivery   { [facility] Delivery:421104323}    Wound Care Documentation and Therapy:  Puncture 22 Femoral Anterior;Proximal;Right (Active)   Number of days: 203        Elimination:  Continence: Bowel: {YES / JK:11035}  Bladder: {YES / AH:32866}  Urinary Catheter: {Urinary Catheter:670491101}   Colostomy/Ileostomy/Ileal Conduit: {YES / HV:95509}       Date of Last BM: ***  No intake or output data in the 24 hours ending 22 1314  No intake/output data recorded.     Safety Concerns:     [employer] Safety Concerns:380229171}    Impairments/Disabilities:      [employer] Impairments/Disabilities:645574657}    Nutrition Therapy:  Current Nutrition Therapy:   [employer] Diet List:477079455}    Routes of Feeding: {[facility] DME Other Feedings:559850618}  Liquids: {Slp liquid thickness:20740}  Daily Fluid Restriction: {[facility] DME Yes amt example:493055045}  Last Modified Barium Swallow with Video (Video Swallowing Test): {Done Not Done JPOO:901767724}    Treatments at the Time of Hospital Discharge:   Respiratory Treatments: ***  Oxygen Therapy:  {Therapy; copd oxygen:60561}  Ventilator:    { CC Vent OGHO:121837240}    Rehab Therapies: {THERAPEUTIC INTERVENTION:3112816310}  Weight Bearing Status/Restrictions: [employer] Weight [employer]}  Other Medical Equipment (for information only, NOT a DME order):  {EQUIPMENT:741063338}  Other Treatments: ***    Patient's personal belongings (please select all that are sent with patient):  {[facility] DME Belongings:400245747}    RN SIGNATURE:  {Esignature:009386024}    CASE MANAGEMENT/SOCIAL WORK SECTION    Inpatient Status Date: 22    Readmission Risk Assessment Score:  Readmission Risk              Risk of Unplanned Readmission:  12             / signature: Electronically signed by Moise Hale RN on 22 at 1:14 PM EDT    PHYSICIAN SECTION    Prognosis: {Prognosis:0026130795}    Condition at Discharge: Violette Issa Patient Condition:947615796}    Rehab Potential (if transferring to Rehab): {Prognosis:5101357994}    Recommended Labs or Other Treatments After Discharge: ***    Physician Certification: I certify the above information and transfer of Corrie King  is necessary for the continuing treatment of the diagnosis listed and that she requires {Admit to Appropriate Level of Care:53454} for {GREATER/LESS:085187667} 30 days.      Update Admission H&P: {CHP DME Changes in SJZOR:687192481}    PHYSICIAN SIGNATURE:  {Esignature:565241904}

## 2022-11-03 VITALS
HEIGHT: 64 IN | RESPIRATION RATE: 16 BRPM | HEART RATE: 71 BPM | WEIGHT: 194.8 LBS | BODY MASS INDEX: 33.26 KG/M2 | TEMPERATURE: 98.1 F | SYSTOLIC BLOOD PRESSURE: 157 MMHG | OXYGEN SATURATION: 96 % | DIASTOLIC BLOOD PRESSURE: 92 MMHG

## 2022-11-03 PROCEDURE — 94760 N-INVAS EAR/PLS OXIMETRY 1: CPT

## 2022-11-03 PROCEDURE — 6360000002 HC RX W HCPCS: Performed by: HOSPITALIST

## 2022-11-03 PROCEDURE — 2580000003 HC RX 258: Performed by: HOSPITALIST

## 2022-11-03 PROCEDURE — 6370000000 HC RX 637 (ALT 250 FOR IP): Performed by: INTERNAL MEDICINE

## 2022-11-03 PROCEDURE — 6370000000 HC RX 637 (ALT 250 FOR IP): Performed by: HOSPITALIST

## 2022-11-03 PROCEDURE — 2580000003 HC RX 258: Performed by: INTERNAL MEDICINE

## 2022-11-03 RX ORDER — CEPHALEXIN 500 MG/1
500 CAPSULE ORAL 2 TIMES DAILY
Qty: 10 CAPSULE | Refills: 0 | Status: SHIPPED | OUTPATIENT
Start: 2022-11-03 | End: 2022-11-08

## 2022-11-03 RX ORDER — METHYLPREDNISOLONE SODIUM SUCCINATE 40 MG/ML
40 INJECTION, POWDER, LYOPHILIZED, FOR SOLUTION INTRAMUSCULAR; INTRAVENOUS ONCE
Status: COMPLETED | OUTPATIENT
Start: 2022-11-03 | End: 2022-11-03

## 2022-11-03 RX ORDER — SENNA PLUS 8.6 MG/1
1 TABLET ORAL 2 TIMES DAILY
Qty: 60 TABLET | Refills: 11 | Status: SHIPPED | OUTPATIENT
Start: 2022-11-03 | End: 2022-11-11 | Stop reason: ALTCHOICE

## 2022-11-03 RX ORDER — GABAPENTIN 100 MG/1
100 CAPSULE ORAL 3 TIMES DAILY
Qty: 90 CAPSULE | Refills: 0 | Status: SHIPPED | OUTPATIENT
Start: 2022-11-03 | End: 2022-12-03

## 2022-11-03 RX ORDER — TRAMADOL HYDROCHLORIDE 50 MG/1
50 TABLET ORAL EVERY 8 HOURS PRN
Qty: 15 TABLET | Refills: 0 | Status: SHIPPED | OUTPATIENT
Start: 2022-11-03 | End: 2022-11-08

## 2022-11-03 RX ORDER — PREDNISONE 20 MG/1
20 TABLET ORAL DAILY
Qty: 5 TABLET | Refills: 0 | Status: SHIPPED | OUTPATIENT
Start: 2022-11-03 | End: 2022-11-08

## 2022-11-03 RX ADMIN — ASPIRIN 81 MG 81 MG: 81 TABLET ORAL at 08:16

## 2022-11-03 RX ADMIN — VALSARTAN 40 MG: 40 TABLET, FILM COATED ORAL at 08:16

## 2022-11-03 RX ADMIN — TRAMADOL HYDROCHLORIDE 50 MG: 50 TABLET, COATED ORAL at 08:16

## 2022-11-03 RX ADMIN — Medication 10 ML: at 08:17

## 2022-11-03 RX ADMIN — ACETAMINOPHEN 650 MG: 325 TABLET ORAL at 09:44

## 2022-11-03 RX ADMIN — ENOXAPARIN SODIUM 40 MG: 100 INJECTION SUBCUTANEOUS at 08:17

## 2022-11-03 RX ADMIN — DILTIAZEM HYDROCHLORIDE 120 MG: 120 CAPSULE, COATED, EXTENDED RELEASE ORAL at 08:16

## 2022-11-03 RX ADMIN — METHYLPREDNISOLONE SODIUM SUCCINATE 40 MG: 40 INJECTION, POWDER, FOR SOLUTION INTRAMUSCULAR; INTRAVENOUS at 09:44

## 2022-11-03 RX ADMIN — METOPROLOL SUCCINATE 50 MG: 50 TABLET, EXTENDED RELEASE ORAL at 08:16

## 2022-11-03 RX ADMIN — TICAGRELOR 90 MG: 90 TABLET ORAL at 08:18

## 2022-11-03 RX ADMIN — RANOLAZINE 500 MG: 500 TABLET, FILM COATED, EXTENDED RELEASE ORAL at 08:16

## 2022-11-03 RX ADMIN — Medication 10 ML: at 08:18

## 2022-11-03 RX ADMIN — TRAMADOL HYDROCHLORIDE 50 MG: 50 TABLET, COATED ORAL at 04:04

## 2022-11-03 ASSESSMENT — PAIN SCALES - GENERAL
PAINLEVEL_OUTOF10: 8
PAINLEVEL_OUTOF10: 7

## 2022-11-03 ASSESSMENT — PAIN - FUNCTIONAL ASSESSMENT: PAIN_FUNCTIONAL_ASSESSMENT: ACTIVITIES ARE NOT PREVENTED

## 2022-11-03 ASSESSMENT — PAIN DESCRIPTION - PAIN TYPE: TYPE: SURGICAL PAIN

## 2022-11-03 ASSESSMENT — PAIN DESCRIPTION - LOCATION: LOCATION: GROIN

## 2022-11-03 NOTE — PLAN OF CARE
Problem: Discharge Planning  Goal: Discharge to home or other facility with appropriate resources  Outcome: Progressing     Problem: Pain  Goal: Verbalizes/displays adequate comfort level or baseline comfort level  Outcome: Progressing     Problem: Safety - Adult  Goal: Free from fall injury  Outcome: Progressing     Problem: ABCDS Injury Assessment  Goal: Absence of physical injury  Outcome: Progressing     Problem: Cardiovascular - Adult  Goal: Maintains optimal cardiac output and hemodynamic stability  Outcome: Progressing     Problem: Skin/Tissue Integrity - Adult  Goal: Skin integrity remains intact  Outcome: Progressing     Problem: Musculoskeletal - Adult  Goal: Return mobility to safest level of function  Outcome: Progressing     Problem: Gastrointestinal - Adult  Goal: Minimal or absence of nausea and vomiting  Outcome: Progressing     Problem: Genitourinary - Adult  Goal: Absence of urinary retention  Outcome: Progressing     Problem: Infection - Adult  Goal: Absence of infection during hospitalization  Outcome: Progressing     Problem: Metabolic/Fluid and Electrolytes - Adult  Goal: Electrolytes maintained within normal limits  Outcome: Progressing     Problem: Hematologic - Adult  Goal: Maintains hematologic stability  Outcome: Progressing

## 2022-11-03 NOTE — PROGRESS NOTES
Data- discharge order received, pt verbalized agreement to discharge, disposition to previous residence, no needs for HHC/DME. Action- discharge instructions prepared and given to pt, pt verbalized understanding. Medication information packet given r/t NEW and/or CHANGED prescriptions emphasizing name/purpose/side effects, pt verbalized understanding. Discharge instruction summary: Diet- general, Activity- as tolerated, Primary Care Physician as follows: Abigail Prader, [phone] f/u appointment to be made by pt, immunizations reviewed, prescription medications filled in pt pharmacy. 1. WEIGHT: Admit Weight: 194 lb 11.2 oz (88.3 kg) (10/31/22 1315)        Today  Weight: 194 lb 12.8 oz (88.4 kg) (11/03/22 0404)       2. O2 SAT.: SpO2: 96 % (11/03/22 0846)    Response- Pt belongings gathered, IV removed. Disposition is home (no HHC/DME needs), transported with belongings, taken to lobby via w/c w/ family, no complications.

## 2022-11-03 NOTE — PROGRESS NOTES
Pt developed reaction to metoprolol this morning. MD aware. Pt cheeks red upon assessment. No difficulty breathing, no swelling noted. Administered solumedrol ordered. See MAR. No further needs at this time.

## 2022-11-04 ENCOUNTER — TELEPHONE (OUTPATIENT)
Dept: FAMILY MEDICINE CLINIC | Age: 67
End: 2022-11-04

## 2022-11-04 NOTE — TELEPHONE ENCOUNTER
[address] [facility] Initial Follow Up Call    Outreach made within 2 business days of discharge: Yes    Patient: Josse Coronel Patient : 1955   MRN: [id number]  Reason for Admission: There are no discharge diagnoses documented for the most recent discharge. Discharge Date: 11/3/22       Spoke with: Jeimy Marshall    Discharge department/facility: [place] [address] Interactive Patient Contact:  Was patient able to fill all prescriptions: Yes  Was patient instructed to bring all medications to the follow-up visit: Yes  Is patient taking all medications as directed in the discharge summary?  Yes  Does patient understand their discharge instructions: Yes  Does patient have questions or concerns that need addressed prior to 7-14 day follow up office visit: no    Scheduled appointment with PCP within 7-14 days    Follow Up  Future Appointments   Date Time Provider Damaso Morales   11/10/2022 11:10 AM MD KRISTEN Menjivar [place] - [facility]   2022  1:00 PM JAYLEEN Cho CNP [facility]   2023 11:30 AM Louis Muir MD [facility]       [place], [place]

## 2022-11-04 NOTE — DISCHARGE SUMMARY
[address] [place] [address] DISCHARGE SUMMARY    Patient Demographics    Patient. Aakash Koehler  Date of Birth. 1955  MRN. [id number]     Primary care provider. Jeannie Lane MD  (Tel: [phone])    Admit date: 10/31/2022    Discharge date (blank if same as Note Date): 11/3/2022  Note Date: 11/4/2022     Reason for Hospitalization. Chief Complaint   Patient presents with    Chest Pain     Patient in with complaints of chest pain since Friday. States she has 8 stents. Hx of two heart attacks. [facility] Course. Unstable angina  -Underwent cardiac cath with stent placement.  -Continue dual antiplatelet therapy aspirin and Brilinta      Allergic reaction  -Patient likely allergic reaction she was concerned that beta-blocker caused the symptom. -Beta-blocker was discontinued at the time of discharge patient needed epi Solu-Medrol Benadryl with resolution of symptoms. Patient was discharged stable    Consults. IP CONSULT TO HOSPITALIST  IP CONSULT TO CARDIOLOGY  IP CONSULT TO CARDIAC REHAB  IP CONSULT TO CARDIAC REHAB    Physical examination on discharge day. BP (!) 157/92   Pulse 71   Temp 98.1 °F (36.7 °C) (Oral)   Resp 16   Ht 5' 4\" (1.626 m)   Wt 194 lb 12.8 oz (88.4 kg)   SpO2 96%   PF (!) 2 L/min   BMI 33.44 kg/m²   General appearance. Alert. Looks comfortable. HEENT. Sclera clear. Moist mucus membranes. Cardiovascular. Regular rate and rhythm, normal S1, S2. No murmur. Respiratory. Not using accessory muscles. Clear to auscultation bilaterally, no wheeze. Gastrointestinal. Abdomen soft, non-tender, not distended, normal bowel sounds  Neurology. Facial symmetry. No speech deficits. Moving all extremities equally. Extremities. No edema in lower extremities. Skin.  Warm, dry, normal turgor    Condition at time of discharge stable     Medication instructions provided to patient at discharge. Medication List        START taking these medications      aspirin 81 MG chewable tablet  Take 1 tablet by mouth daily  Notes to patient: Thins blood to prevent clotting, ie heart attack or stroke  Side effects: may cause extra bruising or bleeding      cephALEXin 500 MG capsule  Commonly known as: KEFLEX  Take 1 capsule by mouth 2 times daily for 5 days  Notes to patient: Uses: treats bacterial infection  Side effects: nausea, vomiting, diarrhea      gabapentin 100 MG capsule  Commonly known as: NEURONTIN  Take 1 capsule by mouth 3 times daily for 30 days. Intended supply: 90 days     oxymetazoline 0.05 % nasal spray  Commonly known as: 12 Hour Nasal [place]  2 sprays by Nasal route 2 times daily  Notes to patient: Take next dose tonight around 9pm, then twice daily. pantoprazole 40 MG tablet  Commonly known as: PROTONIX  Take 1 tablet by mouth every morning (before breakfast)  Notes to patient: Use: Prevention and treatment of gastric ulcers  Side Effects: Headache, fatigue, constipation, dry  mouth     predniSONE 20 MG tablet  Commonly known as: DELTASONE  Take 1 tablet by mouth daily for 5 days  Notes to patient: Uses: It can treat many diseases and conditions, especially those associated with inflammation.   Side effects: Retention of sodium (salt) and fluid, Weight gain, High blood pressure, Loss of potassium, Headache, Nausea, Muscle weakness     ranolazine 500 MG extended release tablet  Commonly known as: Ranexa  Take 1 tablet by mouth 2 times daily  Notes to patient: Use: treats chronic chest pain     Side effects: difficulty breathing , fast, irregular, pounding, or racing heartbeat or pulse, feeling of constant movement of self or surroundings, hearing loss lightheadedness     senna 8.6 MG tablet  Commonly known as: Senokot  Take 1 tablet by mouth 2 times daily            CHANGE how you take these medications      ticagrelor 90 MG Tabs tablet  Commonly known as: BRILINTA  Take 1 tablet by mouth 2 times daily  What changed: additional instructions  Notes to patient: ses: thins the blood, blood thinner   Side effects: bleeding, bruising, blood in urine, blood in stool, bleeding gums, nose bleed            CONTINUE taking these medications      cetirizine 5 MG tablet  Commonly known as: ZYRTEC     diazePAM 5 MG tablet  Commonly known as: VALIUM     dilTIAZem 120 MG extended release capsule  Commonly known as: CARDIZEM CD  TAKE 1 CAPSULE BY MOUTH TWICE DAILY     estradiol 0.1 MG/GM vaginal cream  Commonly known as: ESTRACE  PLACE 0.5 GRAM VAGINALLY 3 TIMES A WEEK     L-Lysine 1000 MG Tabs     nitroGLYCERIN 0.4 MG SL tablet  Commonly known as: Nitrostat  Place 1 tablet under the tongue every 5 minutes as needed for Chest pain up to max of 3 total doses. If no relief after 1 dose, call 911. traMADol 50 MG tablet  Commonly known as: Ultram  Take 1 tablet by mouth every 8 hours as needed for Pain for up to 5 days. Intended supply: 3 days. Take lowest dose possible to manage pain     valsartan 40 MG tablet  Commonly known as: DIOVAN  TAKE 1 TABLET BY MOUTH DAILY            STOP taking these medications      bisoprolol 5 MG tablet  Commonly known as: ZEBETA     POTASSIUM & MAGNESIUM ASPARTAT PO     POTASSIUM GLUCONATE PO     Repatha 140 MG/ML Sosy  Generic drug: Evolocumab     Vitamin D3 125 MCG (5000 UT) Tabs            ASK your doctor about these medications      Evolocumab 140 MG/ML Soaj  Inject 140 mg into the skin once for 1 dose  Ask about: Should I take this medication?                Where to Get Your Medications        These medications were sent to [address], [address] [place] [address] [phone] Mj Hogan [phone]  [facility], [address] [address]      Phone: [phone]   cephALEXin 500 MG capsule  Evolocumab 140 MG/ML Soaj  gabapentin 100 MG capsule  oxymetazoline 0.05 % nasal spray  pantoprazole 40 MG tablet  predniSONE 20 MG tablet  senna 8.6 MG tablet  traMADol 50 MG tablet         Discharge recommendations given to patient. Follow Up. pcp in 1 week   Disposition. home  Activity. activity as tolerated  Diet: No diet orders on file      Spent 45  minutes in discharge process.     Signed:  Jake Simpson MD     11/4/2022 6:06 PM

## 2022-11-10 ENCOUNTER — OFFICE VISIT (OUTPATIENT)
Dept: FAMILY MEDICINE CLINIC | Age: 67
End: 2022-11-10
Payer: MEDICARE

## 2022-11-10 VITALS
HEART RATE: 90 BPM | BODY MASS INDEX: 33.47 KG/M2 | TEMPERATURE: 97.8 F | OXYGEN SATURATION: 98 % | WEIGHT: 195 LBS | RESPIRATION RATE: 15 BRPM | SYSTOLIC BLOOD PRESSURE: 116 MMHG | DIASTOLIC BLOOD PRESSURE: 70 MMHG

## 2022-11-10 DIAGNOSIS — E78.00 HYPERCHOLESTEROLEMIA: ICD-10-CM

## 2022-11-10 DIAGNOSIS — I25.10 CORONARY ARTERY DISEASE INVOLVING NATIVE CORONARY ARTERY OF NATIVE HEART WITHOUT ANGINA PECTORIS: Primary | ICD-10-CM

## 2022-11-10 DIAGNOSIS — I10 ESSENTIAL HYPERTENSION: ICD-10-CM

## 2022-11-10 DIAGNOSIS — N39.0 ACUTE UTI: ICD-10-CM

## 2022-11-10 DIAGNOSIS — F43.9 STRESS: ICD-10-CM

## 2022-11-10 DIAGNOSIS — E78.2 MIXED HYPERLIPIDEMIA: ICD-10-CM

## 2022-11-10 DIAGNOSIS — I25.119 ATHEROSCLEROSIS OF NATIVE CORONARY ARTERY WITH ANGINA PECTORIS, UNSPECIFIED WHETHER NATIVE OR TRANSPLANTED HEART (HCC): ICD-10-CM

## 2022-11-10 PROCEDURE — 3078F DIAST BP <80 MM HG: CPT | Performed by: FAMILY MEDICINE

## 2022-11-10 PROCEDURE — 99214 OFFICE O/P EST MOD 30 MIN: CPT | Performed by: FAMILY MEDICINE

## 2022-11-10 PROCEDURE — 1123F ACP DISCUSS/DSCN MKR DOCD: CPT | Performed by: FAMILY MEDICINE

## 2022-11-10 PROCEDURE — 3074F SYST BP LT 130 MM HG: CPT | Performed by: FAMILY MEDICINE

## 2022-11-10 RX ORDER — OMEPRAZOLE 40 MG/1
40 CAPSULE, DELAYED RELEASE ORAL DAILY
COMMUNITY

## 2022-11-10 RX ORDER — EVOLOCUMAB 140 MG/ML
140 INJECTION, SOLUTION SUBCUTANEOUS
Qty: 2 EACH | Refills: 5
Start: 2022-11-10

## 2022-11-10 NOTE — PROGRESS NOTES
Here for hospital followup, pt was back in hospital with recurrent episode of chest pain a few days ago. PT did have stents placed a few weeks prior in September when she had 2 stents placed. Pt has longstanding hx of CAD, stent x 8. Pt was seen in ER and admitted, and seen by cardiology who took her to cath lab and placed 1 stent proximal to prior RCA stent. Pt was monitored and d/c home. Pt started on BBlocker but had reaction and it was stopped, she was given benadryl    Cardiology did discuss her statin intolerance and they did discuss PCSK9 therapy. Pt was continued on brilinta, ARB, CCB. We had discussed use of repatha in the past, and did get first dose of repatha in the hospital and has tolerated so far. Pt here for follow up of blood pressure. Pt states doing great with adherence to therapy and feels well. No issues of chest pain, shortness of breath. No vision changes, headache, swelling in legs. Except as noted above in the history of present illness, the review of systems is  negative for headache, vision changes, chest pain, shortness of breath, abdominal pain, urinary sx, bowel changes. Past medical, surgical, and social history reviewed and updated  Medications and allergies reviewed and updated      O: /70   Pulse 90   Temp 97.8 °F (36.6 °C) (Temporal)   Resp 15   Wt 195 lb (88.5 kg)   SpO2 98%   BMI 33.47 kg/m²   GEN: No acute distress, cooperative, well nourished, alert. HEENT: PEERLA, EOMI , normocephalic/atraumatic, nares and oropharynx clear. Mucous membranes normal, Tympanic membranes clear bilaterally. Neck: soft, supple, no thyromegaly, mass, no Lymphadenopathy  CV: Regular rate and rhythm, no murmur, rubs, gallops. No edema. Resp: Clear to auscultation bilaterally good air entry bilaterally  No crackles, wheeze. Breathing comfortably. Psych: mood stable, No suicidal thoughts or ideation         ASSESSMENT / PLAN:    1.  Coronary artery disease involving native coronary artery of native heart without angina pectoris  Reviewed recent hospitalization, stent placement in proximal RCA  Cont max medical therapy  Did get started on repatha, will continue injections q 2 weeks  F/u bloodwork 2-3 months  Discussed use, benefit, and side effects of prescribed medications. Barriers to medication compliance addressed. All patient questions answered. Pt voiced understanding.   - Lipid Panel; Future  - Comprehensive Metabolic Panel; Future  - TSH; Future  - CBC; Future    2. Essential hypertension  Stable @ goal    3. Hypercholesterolemia  Intol to statins, nabor  Has been cautious about use of other meds but is now on therapy with repatha, will continue every 14d injections  F/u bloodwork 3 months    4. Atherosclerosis of native coronary artery with angina pectoris, unspecified whether native or transplanted heart ([address]  As above    5. Mixed hyperlipidemia  Sa above    6. Acute UTI  Resolved with abx  Reviewed labs from hospital  Check UA, urine cx  Management pending results. - Urinalysis; Future  - Culture, Urine; Future    7. Stress  Moderate stress related to recent hospitalization, need for several angiograms to fix issues and concern that doctors are being honest with her  Support given, f/u here any time to discuss medical issues           Follow-up appointment:   Pending f/u bloodwork 3 months    Discussed use, benefit, and side effects of all prescribed medications. Barriers to medication compliance addressed. All patient questions answered. Pt voiced understanding. When applicable, patient's outside records were reviewed through [facility]. The patient has signed appropriate paperworks/consents.

## 2022-11-11 ENCOUNTER — OFFICE VISIT (OUTPATIENT)
Dept: CARDIOLOGY CLINIC | Age: 67
End: 2022-11-11
Payer: MEDICARE

## 2022-11-11 VITALS
WEIGHT: 194.8 LBS | OXYGEN SATURATION: 96 % | HEART RATE: 91 BPM | DIASTOLIC BLOOD PRESSURE: 60 MMHG | HEIGHT: 64 IN | SYSTOLIC BLOOD PRESSURE: 118 MMHG | BODY MASS INDEX: 33.26 KG/M2

## 2022-11-11 DIAGNOSIS — R07.89 OTHER CHEST PAIN: ICD-10-CM

## 2022-11-11 DIAGNOSIS — I10 ESSENTIAL HYPERTENSION: ICD-10-CM

## 2022-11-11 DIAGNOSIS — I25.10 CORONARY ARTERY DISEASE INVOLVING NATIVE CORONARY ARTERY OF NATIVE HEART WITHOUT ANGINA PECTORIS: Primary | ICD-10-CM

## 2022-11-11 DIAGNOSIS — E78.2 MIXED HYPERLIPIDEMIA: ICD-10-CM

## 2022-11-11 DIAGNOSIS — I48.0 PAF (PAROXYSMAL ATRIAL FIBRILLATION) (HCC): ICD-10-CM

## 2022-11-11 LAB
BILIRUBIN URINE: NEGATIVE
BLOOD, URINE: NEGATIVE
CLARITY: CLEAR
COLOR: YELLOW
GLUCOSE URINE: NEGATIVE MG/DL
KETONES, URINE: NEGATIVE MG/DL
LEUKOCYTE ESTERASE, URINE: NEGATIVE
MICROSCOPIC EXAMINATION: NORMAL
NITRITE, URINE: NEGATIVE
PH UA: 6 (ref 5–8)
PROTEIN UA: NEGATIVE MG/DL
SPECIFIC GRAVITY UA: <=1.005 (ref 1–1.03)
URINE TYPE: NORMAL
UROBILINOGEN, URINE: 0.2 E.U./DL

## 2022-11-11 PROCEDURE — 1123F ACP DISCUSS/DSCN MKR DOCD: CPT | Performed by: NURSE PRACTITIONER

## 2022-11-11 PROCEDURE — 99214 OFFICE O/P EST MOD 30 MIN: CPT | Performed by: NURSE PRACTITIONER

## 2022-11-11 PROCEDURE — 3078F DIAST BP <80 MM HG: CPT | Performed by: NURSE PRACTITIONER

## 2022-11-11 PROCEDURE — 3074F SYST BP LT 130 MM HG: CPT | Performed by: NURSE PRACTITIONER

## 2022-11-11 RX ORDER — TRAMADOL HYDROCHLORIDE 50 MG/1
50 TABLET ORAL EVERY 6 HOURS PRN
COMMUNITY

## 2022-11-11 RX ORDER — RANOLAZINE 1000 MG/1
1000 TABLET, EXTENDED RELEASE ORAL 2 TIMES DAILY
Qty: 180 TABLET | Refills: 2 | Status: SHIPPED | OUTPATIENT
Start: 2022-11-11

## 2022-11-11 RX ORDER — VALSARTAN 40 MG/1
40 TABLET ORAL DAILY
Qty: 90 TABLET | Refills: 1 | Status: SHIPPED | OUTPATIENT
Start: 2022-11-11

## 2022-11-11 NOTE — TELEPHONE ENCOUNTER
Patient came to the office to pickup samples of Mikal Trejo as she states pharmacy is currently out.

## 2022-11-11 NOTE — PROGRESS NOTES
[address]     Outpatient Follow Up Note    CHIEF COMPLAINT / HPI: Hospital Follow Up secondary to coronary artery disease    Hospital record has been reviewed  Hospital Course progressed as follows per discharge summary:     10/31/22-11/4/2022  Hospital Course:     [facility] Course. Unstable angina  -Underwent cardiac cath with stent placement.  -Continue dual antiplatelet therapy aspirin and Brilinta     Allergic reaction  -Patient likely allergic reaction she was concerned that beta-blocker caused the symptom. -Beta-blocker was discontinued at the time of discharge patient needed epi Solu-Medrol Benadryl with resolution of symptoms. Patient was discharged stable     Hunter Dixon is 74 y.o. female who presents today for a routine follow up after a recent hospitalization related to the above mentioned issues. Subjective:   Since the time of discharge, the patient states she was feeling great after discharge but then had chest pain all night last night. She says she felt like an elephant was sitting on her chest. She took a tramadol and ate and was finally able to go to bed around 430 this morning. She continues to have some exertional shortness of breath. Denies any issue with groin site. Dizziness is unchanged. She has been taking her Repatha and plans to recheck lipids before her appointment with Dr Eufemia Williamson. With regard to medication therapy the patient has been compliant with prescribed regimen. They have tolerated therapy to date.      Past Medical History:   Diagnosis Date    Allergic rhinitis, cause unspecified 12/04/2010    Anxiety     Arthritis     Bilateral carotid artery stenosis     < 50% bilaterally    CAD (coronary artery disease)     angioplasty with stent at [facility] Dr. Mellissa Khoury, here Dr. Ligia Tejada at [facility],     Chronic kidney disease     frequency, urgency    Chronic pain     precordial, opiate dependent    Depression 01/22/2013 Erosive esophagitis 12/28/2016    Dr. Don Valente; PPI started; LA Class A, Sphincter of Oddi manometry and sphincterotomy if symptoms don't improve. Essential hypertension 04/11/2017    HSV infection     Hypercholesterolemia 06/06/2014    Irritable bowel syndrome with diarrhea 09/27/2016    Migraine headache 06/06/2014    Nonerosive nonspecific gastritis 12/28/2016    Dr. Don Valente; body and antrum    OAB (overactive bladder) 03/14/2014     Social History:    Social History     Tobacco Use   Smoking Status Former    Packs/day: 0.50    Years: 20.00    Pack years: 10.00    Types: Cigarettes    Quit date: 2/4/2007    Years since quitting: 15.7   Smokeless Tobacco Never     Current Medications:  Current Outpatient Medications   Medication Sig Dispense Refill    traMADol (ULTRAM) 50 MG tablet Take 50 mg by mouth every 6 hours as needed for Pain. omeprazole (PRILOSEC) 40 MG delayed release capsule Take 40 mg by mouth daily      Evolocumab (REPATHA) 140 MG/ML SOSY Inject 1 mL into the skin every 14 days 2 each 5    gabapentin (NEURONTIN) 100 MG capsule Take 1 capsule by mouth 3 times daily for 30 days. Intended supply: 90 days 90 capsule 0    diazePAM (VALIUM) 5 MG tablet INSERT 1 TABLET VAGINALLY TWICE DAILY AS NEEDED FOR VAGINAL/URETHRAL PAIN      nitroGLYCERIN (NITROSTAT) 0.4 MG SL tablet Place 1 tablet under the tongue every 5 minutes as needed for Chest pain up to max of 3 total doses.  If no relief after 1 dose, call 911. 25 tablet 3    ranolazine (RANEXA) 500 MG extended release tablet Take 1 tablet by mouth 2 times daily 180 tablet 3    valsartan (DIOVAN) 40 MG tablet TAKE 1 TABLET BY MOUTH DAILY 90 tablet 1    ticagrelor (BRILINTA) 90 MG TABS tablet Take 1 tablet by mouth 2 times daily 60 tablet 0    aspirin 81 MG chewable tablet Take 1 tablet by mouth daily 30 tablet 0    cetirizine (ZYRTEC) 5 MG tablet Take 5 mg by mouth daily      dilTIAZem (CARDIZEM CD) 120 MG extended release capsule TAKE 1 CAPSULE BY MOUTH The carotid upstroke is normal in amplitude and contour without delay or bruit    ABDOMEN:  Normal bowel sounds, non-distended and non-tender to palpation   EXT: No edema, no calf tenderness. Pulses are present bilaterally. DATA:    Lab Results   Component Value Date    ALT 9 (L) 10/31/2022    AST 19 10/31/2022     (H) 01/17/2017    ALKPHOS 136 (H) 10/31/2022    BILITOT 0.3 10/31/2022     Lab Results   Component Value Date    CREATININE 1.0 11/02/2022    BUN 14 11/02/2022     (L) 11/02/2022    K 4.5 11/02/2022     11/02/2022    CO2 18 (L) 11/02/2022     Lab Results   Component Value Date    TSH 4.67 (H) 12/20/2021     Lab Results   Component Value Date    WBC 21.4 (H) 11/02/2022    HGB 13.4 11/02/2022    HCT 39.2 11/02/2022    MCV 85.6 11/02/2022     11/02/2022     No components found for: CHLPL  Lab Results   Component Value Date    TRIG 277 (H) 06/29/2022    TRIG 225 (H) 12/20/2021    TRIG 262 (H) 02/18/2021     Lab Results   Component Value Date    HDL 47 06/29/2022    HDL 46 12/20/2021    HDL 37 (L) 02/18/2021     Lab Results   Component Value Date    LDLCALC 142 (H) 06/29/2022    LDLCALC 114 (H) 12/20/2021    [facility] 90 02/18/2021     Lab Results   Component Value Date    LABVLDL 55 06/29/2022    LABVLDL 45 12/20/2021    LABVLDL 52 02/18/2021     Radiology Review:  Pertinent images / reports were reviewed as a part of this visit and reveals the following:    Last Echo: 9/14/22   Summary   Left ventricular cavity size is normal with mild concentric left ventricular   hypertrophy. Ejection fraction is visually estimated to be 60-65%. No regional wall motion abnormalities are noted. Grade I diastolic dysfunction with normal LV filling pressures. E/e' = 12.3. Left atrium is dilated. Trivial aortic regurgitation. Mitral annular calcification is present. Mild tricuspid regurgitation. RVSP = 32 mmHG.      Last Stress Test: 12/23/20       Summary    -There is a small-moderate sized, moderate intensity fixed anterior defect    suggestive of scar.    -There is no ischemia. -LV function is normal with EF=70%    -Low-intermediate risk. Last Angiogram: 10/31/22  Left Heart Cath  Dominance: Right      LM: 30% distal   LAD: mid stents patent, first diagonal proximal stent patent (90% distal D1)   LCx: mid and distal stents patent into OM2  RCA: 85% proximal, mid stent patent, 50% distal      LVEDP: 21 mmHg  LVEF: 55%     6 Fr JR4 Guide  BMW wire  Vessel prep of proximal RCA with 3.5 x 15 mm Trek  3.5 x 23 mm Xience ALEC deployed from near ostial RCA to 2 mm into previous mid RCA stent and postdilated from 3.76 mm back to 4.01 mm.  0% residual      Impression/Recommendations:  S/P Successful PCI to proximal RCA with one drug eluting stent. Stressed the importance of ASA 81 mg and Brilinta 90 mg bid. Aiyana Baran is with known statin intolerance and previous hesitation with injectable lipid lowering medications. Discussed outpatient initiation of PCSK9 inhibitor. Beta blocker intolerance. Continue home calcium channel blocker, Ranexa, and ARB. Angiogram: 9/16/22  Anatomy:   LM-30% distal  LAD-widely patent mid stents  D1-widely patent stent proximally, distal 90% small caliber  Cx-widely patent mid and distal stents  RCA-diffusely diseased, 70% proximal, widely patent mid stent, 50% distal, dominant, DFR 0.94, FFR 0.87, IVUS MLA 5.72 mm²  RPDA-patent  LVEF-not done     Impression:  1. Patent LAD and circumflex stents. 2.  Borderline significant angiographic stenosis in proximal RCA however with IVUS and FFR testing, it is within medical management range. Plan:  1. Medical management.     9/13/22  Anatomy:   LM-30% distal  LAD-widely patent mid stent  D1-widely patent proximal stent, 90% distal to the stent which is very small caliber  Cx-80% mid diffuse, 100% distal  OM1-normal  RCA-70% proximal, widely patent mid stent, 50% just distal to the stent  RPDA-patent with luminal regularities  LVEF-60%  PCI: Circumflex 100% to 0% distal with a 2.25 mm x 38 mm Xience [place] ALEC and then 80% to 0% mid with overlapping stents, including overlapping the distal stent, with distal to proximal 3.0 mm x 38 mm Xience [place] ALEC and 3.25 mm x 18 mm Xience [place] ALEC. Impression:  1. Occlusion of the distal circumflex along with severe stenosis of the mid circumflex ultimately revascularized with ALEC x3.  2.  Small vessel disease. 3.  Normal LV systolic function. 4.  Elevated LVEDP. Last EC22  Normal sinus rhythm  Left axis deviation  Low voltage QRS  Incomplete right bundle branch block  Nonspecific T wave abnormality  Prolonged QT    Assessment:      Diagnosis Orders   1.  2. Other chest pain  Coronary artery disease involving native coronary artery of native heart without angina pectoris   ~ c/o pain last night, elephant sitting on her chest  ~ [facility] 10/31/22 PCI to RCA  ~ [facility] 22 patent LAD and Cx stents. Borderline significant aniographic stenosis in prox RCA (FFR not significant)  ~ [facility] (NSTEMI) 22 occlusion of distal Cx along with severe stenosis of mid Cx revascularized with ALEC x3. Small vessel disease. Elevated LVEDP.   ~ [facility] 21 D1 and LAD stents widely patent. (mid LAD was FFR negative last week). ~ [facility] 21 PCI of LAD and D1  ~ [facility] 2017 without restent thrombosis/significant new blockages  ~ [facility] 2017 (STEMI) ALEC to Diag  ~ asa / bb / CCB / Ranexa / Repatha       3. Shortness of breath   ~ unchanged  ~ Echo 2022 EF 65%, grade I DD  ~ intolerant of lasix        4. Essential hypertension   ~ controlled; 118/60 in office today        5. Mixed hyperlipidemia   ~ intolerant of statins, on Repatha  ~ lipids to be rechecked before next office appt        6.  PAF (paroxysmal atrial fibrillation) ([address]   ~ denies palpitations  ~ s/p CIT dependent flutter ablation 22  ~ PRN eliquis for breakthrough episodes of afib   ~ follows with EP         Patient  is stable since hospital discharge. Plan:   Scripts for medications printed as requested   Referral placed for cardiac rehab  Increase Ranexa to 1000 mg BID  Appointment scheduled with Dr Judson Archuleta 1/27/23    I have addressed the patient's cardiac risk factors and adjusted pharmacologic treatment as needed. In addition, I have reinforced the need for patient directed risk factor modification. Further evaluation will be based upon the patient's clinical course and testing results. All questions and concerns were addressed to the patient/family (, Michell Nunez)    The patient currently is not smoking. The risks related to smoking were reviewed with the patient. Recommend maintaining a smoke-free lifestyle. Dual Antiplatelet therapy has been recommended / prescribed for this patient. Education conducted on adverse reactions including bleeding was discussed. The patient verbalizes understanding. Pt is on a BB  Pt is on an ace-i/ARB  Pt is on a statin      Saturated fat diet discussed  Exercise program discussed    Thank you for allowing to us to participate in the care of Aakash Flory       [facility]  Documentation of today's visit sent to PCP

## 2022-11-11 NOTE — PATIENT INSTRUCTIONS
Medications reordered as requested    Referral placed for cardiac rehab     Increase Ranexa to 1000 mg twice a day

## 2022-11-12 LAB
ORGANISM: ABNORMAL
URINE CULTURE, ROUTINE: ABNORMAL

## 2022-11-14 ENCOUNTER — TELEPHONE (OUTPATIENT)
Dept: CARDIOLOGY CLINIC | Age: 67
End: 2022-11-14

## 2022-11-14 ENCOUNTER — TELEPHONE (OUTPATIENT)
Dept: FAMILY MEDICINE CLINIC | Age: 67
End: 2022-11-14

## 2022-11-14 RX ORDER — TRAMADOL HYDROCHLORIDE 50 MG/1
50 TABLET ORAL EVERY 6 HOURS PRN
Status: CANCELLED | OUTPATIENT
Start: 2022-11-14

## 2022-11-14 NOTE — TELEPHONE ENCOUNTER
Pt states she seen cardiology on Friday and asked if she could get a refill of tramadol 50 mg for discomfort from her heart procedures. Cardiology advised she call her pcp and see if the prescription can be filled. Prescription is pending with no qty. Pt was advised that an appt may be needed even though she was just here.     Connor george

## 2022-11-14 NOTE — TELEPHONE ENCOUNTER
Patient saw Brown Snell Friday 11/11 and her medication Ranolazine was changed from 500 mg to 1000 mg. Patient had a bad reaction to the medication change. She had throat, hand, ankle swelling. Patient took Benaydryl to bring swelling down. Patient also had a raspy throat. Please call patient and advise.   maricarmen

## 2022-11-14 NOTE — TELEPHONE ENCOUNTER
Spoke with the patient and advised her of the message below per LES. Patient voiced understanding.  Call complete

## 2022-11-15 NOTE — TELEPHONE ENCOUNTER
I would try and avoid use of opioid pain medications, her pain is cardiac in nature and there is no indication for use of pain meds for that.   Would encourage use of tylenol   thx

## 2022-11-18 ENCOUNTER — OFFICE VISIT (OUTPATIENT)
Dept: FAMILY MEDICINE CLINIC | Age: 67
End: 2022-11-18
Payer: MEDICARE

## 2022-11-18 VITALS
DIASTOLIC BLOOD PRESSURE: 72 MMHG | BODY MASS INDEX: 33.51 KG/M2 | SYSTOLIC BLOOD PRESSURE: 124 MMHG | WEIGHT: 195.2 LBS | OXYGEN SATURATION: 98 % | TEMPERATURE: 97.7 F | HEART RATE: 97 BPM | RESPIRATION RATE: 14 BRPM

## 2022-11-18 DIAGNOSIS — R07.9 CHEST PAIN, UNSPECIFIED TYPE: ICD-10-CM

## 2022-11-18 DIAGNOSIS — I10 ESSENTIAL HYPERTENSION: ICD-10-CM

## 2022-11-18 DIAGNOSIS — I25.10 CORONARY ARTERY DISEASE INVOLVING NATIVE CORONARY ARTERY OF NATIVE HEART WITHOUT ANGINA PECTORIS: Primary | ICD-10-CM

## 2022-11-18 DIAGNOSIS — Z88.9 MULTIPLE DRUG ALLERGIES: ICD-10-CM

## 2022-11-18 DIAGNOSIS — R10.12 LUQ ABDOMINAL PAIN: ICD-10-CM

## 2022-11-18 DIAGNOSIS — R10.84 GENERALIZED ABDOMINAL PAIN: ICD-10-CM

## 2022-11-18 PROCEDURE — 99214 OFFICE O/P EST MOD 30 MIN: CPT | Performed by: FAMILY MEDICINE

## 2022-11-18 PROCEDURE — 1123F ACP DISCUSS/DSCN MKR DOCD: CPT | Performed by: FAMILY MEDICINE

## 2022-11-18 PROCEDURE — 3074F SYST BP LT 130 MM HG: CPT | Performed by: FAMILY MEDICINE

## 2022-11-18 PROCEDURE — 3078F DIAST BP <80 MM HG: CPT | Performed by: FAMILY MEDICINE

## 2022-11-18 NOTE — PROGRESS NOTES
Here for f/u, pt states that she has been feeling better overall, about 2 1/2 weeks out from her last stent. Pt was given dilaudid, morphine and tramadol for her pain associated with groin pain and chest pain. Pt was on tramadol 50mg BID -QID for about 2 months,  Pt does not want to continue to take meds long term but with issues with trying to get off. Pt still does have some chest pain but sx have improved overall. Pt was put on ranexa but states that she developed an allergic reaction and stopped due to swelling, lip swelling. Pt did resume at 500mg dose but does need a lower dose rx. Pt has been on the 500mg BID but with increase to 1000mg BID had reaction, and now is going back to 500mg BID. Pt has noted some LUQ abd pain intermittent. Pts bowels are ok but states that In the past few weeks has noted some irregularity. No blood in bowels. Except as noted above in the history of present illness, the review of systems is  negative for headache, vision changes, chest pain, shortness of breath, abdominal pain, urinary sx, bowel changes. Past medical, surgical, and social history reviewed and updated  Medications and allergies reviewed and updated        O: /72   Pulse 97   Temp 97.7 °F (36.5 °C) (Temporal)   Resp 14   Wt 195 lb 3.2 oz (88.5 kg)   SpO2 98%   BMI 33.51 kg/m²   GEN: No acute distress, cooperative, well nourished, alert. HEENT: PEERLA, EOMI , normocephalic/atraumatic, nares and oropharynx clear. Mucous membranes normal, Tympanic membranes clear bilaterally. Neck: soft, supple, no thyromegaly, mass, no Lymphadenopathy  CV: Regular rate and rhythm, no murmur, rubs, gallops. No edema. Resp: Clear to auscultation bilaterally good air entry bilaterally  No crackles, wheeze. Breathing comfortably. Psych: mood stable, No suicidal thoughts or ideation   Abd: soft, mild LUQ tender to palpation. normoactive bowels sounds.   No hepatosplenomegaly        Current Outpatient Medications   Medication Sig Dispense Refill    traMADol-acetaminophen (ULTRACET) 37.5-325 MG per tablet Take 1 tablet by mouth in the morning and at bedtime for 30 days. 60 tablet 0    traMADol (ULTRAM) 50 MG tablet Take 50 mg by mouth every 6 hours as needed for Pain.      valsartan (DIOVAN) 40 MG tablet Take 1 tablet by mouth daily 90 tablet 1    ranolazine (RANEXA) 1000 MG extended release tablet Take 1 tablet by mouth 2 times daily (Patient taking differently: Take 500 mg by mouth 2 times daily) 180 tablet 2    ticagrelor (BRILINTA) 90 MG TABS tablet Take 1 tablet by mouth 2 times daily 32 tablet 0    omeprazole (PRILOSEC) 40 MG delayed release capsule Take 40 mg by mouth daily      Evolocumab (REPATHA) 140 MG/ML SOSY Inject 1 mL into the skin every 14 days 2 each 5    gabapentin (NEURONTIN) 100 MG capsule Take 1 capsule by mouth 3 times daily for 30 days. Intended supply: 90 days 90 capsule 0    diazePAM (VALIUM) 5 MG tablet INSERT 1 TABLET VAGINALLY TWICE DAILY AS NEEDED FOR VAGINAL/URETHRAL PAIN      nitroGLYCERIN (NITROSTAT) 0.4 MG SL tablet Place 1 tablet under the tongue every 5 minutes as needed for Chest pain up to max of 3 total doses. If no relief after 1 dose, call 911. 25 tablet 3    aspirin 81 MG chewable tablet Take 1 tablet by mouth daily 30 tablet 0    cetirizine (ZYRTEC) 5 MG tablet Take 5 mg by mouth daily      dilTIAZem (CARDIZEM CD) 120 MG extended release capsule TAKE 1 CAPSULE BY MOUTH TWICE DAILY 180 capsule 3    L-Lysine 1000 MG TABS Take 1 tablet by mouth every morning (before breakfast)       estradiol (ESTRACE) 0.1 MG/GM vaginal cream PLACE 0.5 GRAM VAGINALLY 3 TIMES A WEEK 42.5 g 0     No current facility-administered medications for this visit. ASSESSMENT / PLAN:    1.  Coronary artery disease involving native coronary artery of native heart without angina pectoris  Stable w/ supportive therapy s/p stent  Cont supportive therapy   F/u with cardiology  Decreased dose of ranexa to 500mg BID d/t sensitivity    2. Essential hypertension  Stable @ goal    3. Chest pain, unspecified type  Muscular pain s/p stents  Has been on tramadol 50mg TID-QID, working to taper off  On 50mg BID now  Decrease to ultracet to get lower dose of tramadol, decrease to BID dosing and then further decrease to 1/2 tab BID as possible and then d/c  - traMADol-acetaminophen (ULTRACET) 37.5-325 MG per tablet; Take 1 tablet by mouth in the morning and at bedtime for 30 days. Dispense: 60 tablet; Refill: 0    4. LUQ abdominal pain  Exam nonfocal  Reviewed normal bloodwork from hospital  Check cmp, tryptase  - TRYPTASE; Future    5. Multiple drug allergies  Check tryptase to r/o MCAS  - TRYPTASE; Future    6. Generalized abdominal pain  As above  - TRYPTASE; Future           Follow-up appointment:   Pending bloodwork  Prn     Discussed use, benefit, and side effects of all prescribed medications. Barriers to medication compliance addressed. All patient questions answered. Pt voiced understanding. When applicable, patient's outside records were reviewed [facility]. The patient has signed appropriate paperworks/consents.

## 2022-11-29 DIAGNOSIS — R10.84 GENERALIZED ABDOMINAL PAIN: ICD-10-CM

## 2022-11-29 DIAGNOSIS — R10.12 LUQ ABDOMINAL PAIN: ICD-10-CM

## 2022-11-29 DIAGNOSIS — Z88.9 MULTIPLE DRUG ALLERGIES: ICD-10-CM

## 2022-12-01 LAB — TRYPTASE: 5.5 UG/L

## 2023-01-05 NOTE — PROGRESS NOTES
[address]   Cardiac Follow Up     Referring Provider:  Shalom Fraser MD     Chief Complaint   Patient presents with    Follow-up     Pt reports improvement in SOB and chest pain, no cardiac concerns reported. Coronary Artery Disease          History of Present Illness:  Mackenzie Hanna is a 74 y.o. female with a history of coronary artery disease, STEMI with PCI (PCI/ALEC RCA 10/2012, PCI to Ramus 10/2012, ) , hypertension and AFIB. Afib ablation 2/25/21   PCI of distal LAD 2/21  Cath 2021 with patent stents  Repeat ablation done     She presented to [facility] on 11/19/2017 with complaints of chest pain that radiates to her jaw and arms. She was seen by cardiology and diagnosed with a STEMI and a LHC was completed with a ALEC in the diagonal branch. Developed chest pain again 11/20/2017 and a repeat LHC was completed with no further intervention necessary. She states that she has intolerance to many medications. She was hospitalized with NSTEMI on 9/13/22 (likely plaque rupture) and underwent revascularization with ALEC x 3 to an occluded Lcx artery. She was hospitalized 10-31-22-11-4-22. She underwent cardiac cath with stent placement. Today, she is here for follow up. She is with her . She pays about $130 per mos for her Repatha. She takes fiber every day. She is not taking Ranexa or Brilinta due side effect of upset stomach. January 1, 2023 she stopped taking Brilinta. She's not taking diovan. She's been taking Aspirin, cardizem 120 mg once a day. At times, her heart beats \"really hard\" but it does not feel like afib. BP typically 128/80 at home. Pt denies exertional chest pain, LOPEZ/PND, palpitations, light-headedness, edema.       Past Medical History:   has a past medical history of Allergic rhinitis, cause unspecified, Anxiety, Arthritis, Bilateral carotid artery stenosis, CAD (coronary artery disease), Chronic kidney disease, Chronic pain, Depression, Erosive esophagitis, Essential hypertension, HSV infection, Hypercholesterolemia, Irritable bowel syndrome with diarrhea, Migraine headache, Nonerosive nonspecific gastritis, and OAB (overactive bladder). Surgical History:   has a past surgical history that includes Appendectomy; Total abdominal hysterectomy w/ bilateral salpingoophorectomy; Cholecystectomy; Tubal ligation; Tonsillectomy; other surgical history; Coronary angioplasty with stent (10/2012); Percutaneous Transluminal Coronary Angio (10/2012); Cardiac catheterization (12/07/2013); Hysterectomy; polypectomy; Cystoscopy (04/2014); Upper gastrointestinal endoscopy; Wrist surgery (Left, 05/21/2015); Abdominal adhesion surgery; Colonoscopy; Endoscopy, colon, diagnostic; Cosmetic surgery; fracture surgery; ERCP (01/25/2017); sinus surgery; Hysterectomy, total abdominal; Coronary angioplasty with stent (02/04/2021); and Coronary angioplasty with stent (09/2022). Social History:   reports that she quit smoking about 15 years ago. Her smoking use included cigarettes. She has a 10.00 pack-year smoking history. She has never used smokeless tobacco. She reports that she does not drink alcohol and does not use drugs. Family History:  family history includes Heart Disease in her brother, father, maternal uncle, mother, and another family member; High Blood Pressure in her sister and sister; Hypertension in her father and mother; Other in an other family member; Stroke in her sister and another family member. Home Medications:  Prior to Admission medications    Medication Sig Start Date End Date Taking?  Authorizing Provider   vitamin C (ASCORBIC ACID) 500 MG tablet Take 500 mg by mouth daily   Yes Historical Provider, MD   Cholecalciferol (VITAMIN D3) 125 MCG (5000 UT) TABS Take by mouth   Yes Historical Provider, MD   vitamin B-12 (CYANOCOBALAMIN) 1000 MCG tablet Take 1,000 mcg by mouth daily   Yes Historical Provider, MD   Potassium 99 MG TABS Take by mouth   Yes Historical Provider, MD   omeprazole (PRILOSEC) 40 MG delayed release capsule Take 40 mg by mouth daily   Yes Historical Provider, MD   Evolocumab (REPATHA) 140 MG/ML SOSY Inject 1 mL into the skin every 14 days 11/10/22  Yes Ethan Bess MD   diazePAM (VALIUM) 5 MG tablet INSERT 1 TABLET VAGINALLY TWICE DAILY AS NEEDED FOR VAGINAL/URETHRAL PAIN 8/11/22  Yes Historical Provider, MD   nitroGLYCERIN (NITROSTAT) 0.4 MG SL tablet Place 1 tablet under the tongue every 5 minutes as needed for Chest pain up to max of 3 total doses. If no relief after 1 dose, call 911. 10/14/22  Yes Rabia Garcia MD   aspirin 81 MG chewable tablet Take 1 tablet by mouth daily 9/18/22  Yes Florencia Lopes MD   cetirizine (ZYRTEC) 5 MG tablet Take 5 mg by mouth daily   Yes Historical Provider, MD   dilTIAZem (CARDIZEM CD) 120 MG extended release capsule TAKE 1 CAPSULE BY MOUTH TWICE DAILY 9/9/21  Yes Rabia Garcia MD   L-Lysine 1000 MG TABS Take 1 tablet by mouth every morning (before breakfast)    Yes Historical Provider, MD   estradiol (ESTRACE) 0.1 MG/GM vaginal cream PLACE 0.5 GRAM VAGINALLY 3 TIMES A WEEK 6/5/20  Yes Ethan Bess MD   traMADol (ULTRAM) 50 MG tablet Take 50 mg by mouth every 6 hours as needed for Pain. Patient not taking: Reported on 1/27/2023    Historical Provider, MD   valsartan (DIOVAN) 40 MG tablet Take 1 tablet by mouth daily  Patient not taking: Reported on 1/27/2023 11/11/22   JAYLEEN Linares CNP   ranolazine (RANEXA) 1000 MG extended release tablet Take 1 tablet by mouth 2 times daily  Patient not taking: Reported on 1/27/2023 11/11/22   JAYLEEN Linares CNP   ticagrelor (BRILINTA) 90 MG TABS tablet Take 1 tablet by mouth 2 times daily  Patient not taking: Reported on 1/27/2023 11/11/22   JAYLEEN Linares CNP   gabapentin (NEURONTIN) 100 MG capsule Take 1 capsule by mouth 3 times daily for 30 days.  Intended supply: 90 days  Patient not taking: Reported on 1/27/2023 11/3/22 12/3/22  Yady Bennett MD        Allergies:  Beta adrenergic blockers, Chicken allergy, Contrast [iodides], Hydralazine, Pineapple, Pork allergy, Shellfish-derived products, Asa [aspirin], Crestor [rosuvastatin], Fenofibrate, Furosemide, Gabapentin, Hctz [hydrochlorothiazide], Lipitor [atorvastatin], Lisinopril, Mometasone furoate, Nasonex [mometasone], Nsaids, Other, Plavix [clopidogrel], Ranexa [ranolazine], Sulfa antibiotics, Xarelto [rivaroxaban], Zetia [ezetimibe], and Flagyl [metronidazole]     Review of Systems:   Constitutional: there has been no unanticipated weight loss. There's been no change in energy level, sleep pattern, or activity level.   +fatigue   Eyes: No visual changes or diplopia. No scleral icterus. ENT: No Headaches, hearing loss or vertigo. No mouth sores or sore throat. Cardiovascular: Reviewed in HPI  Respiratory: No cough or wheezing, no sputum production. No hematemesis. +sob  Gastrointestinal: No abdominal pain, appetite loss, blood in stools. No change in bowel or bladder habits. Genitourinary: No dysuria, trouble voiding, or hematuria. Musculoskeletal:  No gait disturbance, weakness or joint complaints. +cp in left upper chest  Integumentary: No rash or pruritis. Neurological: No headache, diplopia, change in muscle strength, numbness or tingling. No change in gait, balance, coordination, mood, affect, memory, mentation, behavior. Psychiatric: No anxiety, no depression. Endocrine: No malaise, fatigue or temperature intolerance. No excessive thirst, fluid intake, or urination. No tremor. Hematologic/Lymphatic: No abnormal bruising or bleeding, blood clots or swollen lymph nodes. Allergic/Immunologic: No nasal congestion or hives.     Physical Examination:    Vitals:    01/27/23 1151   BP: (!) 138/90   Pulse: 86   SpO2: 96%            Wt Readings from Last 1 Encounters:   01/27/23 198 lb (89.8 kg)       Constitutional and General Appearance:Appears uncomfortable  Skin:good turgor,intact without lesions  HEENT: EOMI ,normal  Neck:no JVD    Respiratory:  Normal excursion and expansion without use of accessory muscles  Resp Auscultation: Normal breath sounds without dullness  Cardiovascular: The apical impulses not displaced  Heart tones are crisp and normal  Cervical veins are not engorged  The carotid upstroke is normal in amplitude and contour without delay or bruit  Peripheral pulses are symmetrical and full  There is no clubbing, cyanosis of the extremities. No edema  Femoral Arteries: 2+ and equal  Pedal Pulses: 2+ and equal   Abdomen:  No masses or tenderness  Liver/Spleen: No Abnormalities Noted  Neurological/Psychiatric:  Alert and oriented in all spheres  Moves all extremities well  Exhibits normal gait balance and coordination  No abnormalities of mood, affect, memory, mentation, or behavior are noted    GXT Myoview 12/23/20   Summary    -There is a small-moderate sized, moderate intensity fixed anterior defect    suggestive of scar.    -There is no ischemia. -LV function is normal with EF=70%    -Low-intermediate risk. Assessment:    CAD  Denies anginal symptoms   History of multiple stents to RCA, STEMI 2017  LHC was completed with a ALEC in the diagonal branch at [facility] 2017 12/12/20 stress > stable but fixed anterior defect    LAD stent x 2 in distal LAD 2/4/21  S/p NSTEMI with PCI of mid and distal Lcx with ALEC x 3 (9/13/22)  10/31/22 LHC >  S/P Successful PCI to proximal RCA with one drug eluting stent. Pt stopped her Brilinta on Jan 1 2023 due to side effect of upset stomach.  Definitely suboptimal and she understands    Will prescribe NTG to use as needed for recurrent angina  Cont ASA/statin/repatha     Atrial fibrillation and flutter with RVR  Ablation on  2/25/21,  S/p RFCA with PVI, CTI dependent flutter ablation on 4/12/22  ECG 10/14/22 - sinus rhythm, HR 85 bpm  Continue cardizem  No current complaints of palpitations        Hypertension   Stable  BP today 138/90  Continue current medication regimen      Hyperlipidemia   She is highly statin intolerant- severe myalgia     6/29/22 - TC- 244, TG- 277, HDL- 47, LDL- 142. Now down to LDL 49!!! Continue on Repatha      Shortness of breath   No c/o sob today  ~ Echo 9/2022 EF 65%, grade I DD  ~ intolerant of lasix        Plan:   Cardiac test and lab results personally reviewed by me during this office visit and discussed. Refills for omeprazole, cardizem  Add cardizem 60 mg every evening  Continue risk factor modifications. Call for any change in symptoms, call to report any changes in shortness of breath or development of chest pain with activity. Absolutely continue repatha    Follow up in 4 mos          I appreciate the opportunity of cooperating in the care of this individual.    Susan Reyes. Zana Hurley M.D., [facility] [place]    Patient's problem list, medications, allergies, past medical, surgical, social and family histories were reviewed and updated as appropriate. Scribe's attestation: This note was scribed in the presence of Dr Zana Hurley by Joey Alba RN. The kathrynibbora's documentation has been prepared under my direction and personally reviewed by me in its entirety. I confirm that the note above accurately reflects all work, treatment, procedures, and medical decision making performed by me.

## 2023-01-17 DIAGNOSIS — I25.10 CORONARY ARTERY DISEASE INVOLVING NATIVE CORONARY ARTERY OF NATIVE HEART WITHOUT ANGINA PECTORIS: ICD-10-CM

## 2023-01-17 LAB
A/G RATIO: 2.1 (ref 1.1–2.2)
ALBUMIN SERPL-MCNC: 4.4 G/DL (ref 3.4–5)
ALP BLD-CCNC: 124 U/L (ref 40–129)
ALT SERPL-CCNC: 9 U/L (ref 10–40)
ANION GAP SERPL CALCULATED.3IONS-SCNC: 13 MMOL/L (ref 3–16)
AST SERPL-CCNC: 21 U/L (ref 15–37)
BILIRUB SERPL-MCNC: 0.4 MG/DL (ref 0–1)
BUN BLDV-MCNC: 9 MG/DL (ref 7–20)
CALCIUM SERPL-MCNC: 9.9 MG/DL (ref 8.3–10.6)
CHLORIDE BLD-SCNC: 102 MMOL/L (ref 99–110)
CHOLESTEROL, TOTAL: 138 MG/DL (ref 0–199)
CO2: 25 MMOL/L (ref 21–32)
CREAT SERPL-MCNC: 0.9 MG/DL (ref 0.6–1.2)
GFR SERPL CREATININE-BSD FRML MDRD: >60 ML/MIN/{1.73_M2}
GLUCOSE BLD-MCNC: 92 MG/DL (ref 70–99)
HCT VFR BLD CALC: 43.8 % (ref 36–48)
HDLC SERPL-MCNC: 62 MG/DL (ref 40–60)
HEMOGLOBIN: 14.5 G/DL (ref 12–16)
LDL CHOLESTEROL CALCULATED: 49 MG/DL
MCH RBC QN AUTO: 28.9 PG (ref 26–34)
MCHC RBC AUTO-ENTMCNC: 33.1 G/DL (ref 31–36)
MCV RBC AUTO: 87.4 FL (ref 80–100)
PDW BLD-RTO: 12.5 % (ref 12.4–15.4)
PLATELET # BLD: 290 K/UL (ref 135–450)
PMV BLD AUTO: 8.4 FL (ref 5–10.5)
POTASSIUM SERPL-SCNC: 3.9 MMOL/L (ref 3.5–5.1)
RBC # BLD: 5.01 M/UL (ref 4–5.2)
SODIUM BLD-SCNC: 140 MMOL/L (ref 136–145)
TOTAL PROTEIN: 6.5 G/DL (ref 6.4–8.2)
TRIGL SERPL-MCNC: 134 MG/DL (ref 0–150)
TSH SERPL DL<=0.05 MIU/L-ACNC: 3.91 UIU/ML (ref 0.27–4.2)
VLDLC SERPL CALC-MCNC: 27 MG/DL
WBC # BLD: 6.7 K/UL (ref 4–11)

## 2023-01-27 ENCOUNTER — OFFICE VISIT (OUTPATIENT)
Dept: CARDIOLOGY CLINIC | Age: 68
End: 2023-01-27
Payer: MEDICARE

## 2023-01-27 VITALS
OXYGEN SATURATION: 96 % | WEIGHT: 198 LBS | SYSTOLIC BLOOD PRESSURE: 138 MMHG | BODY MASS INDEX: 33.8 KG/M2 | HEART RATE: 86 BPM | HEIGHT: 64 IN | DIASTOLIC BLOOD PRESSURE: 90 MMHG

## 2023-01-27 DIAGNOSIS — I25.10 CORONARY ARTERY DISEASE INVOLVING NATIVE CORONARY ARTERY OF NATIVE HEART WITHOUT ANGINA PECTORIS: Primary | ICD-10-CM

## 2023-01-27 DIAGNOSIS — I10 ESSENTIAL HYPERTENSION: ICD-10-CM

## 2023-01-27 DIAGNOSIS — I48.0 PAF (PAROXYSMAL ATRIAL FIBRILLATION) (HCC): ICD-10-CM

## 2023-01-27 DIAGNOSIS — R06.02 SHORTNESS OF BREATH: ICD-10-CM

## 2023-01-27 DIAGNOSIS — E78.2 MIXED HYPERLIPIDEMIA: ICD-10-CM

## 2023-01-27 PROCEDURE — 3017F COLORECTAL CA SCREEN DOC REV: CPT | Performed by: INTERNAL MEDICINE

## 2023-01-27 PROCEDURE — 1123F ACP DISCUSS/DSCN MKR DOCD: CPT | Performed by: INTERNAL MEDICINE

## 2023-01-27 PROCEDURE — G8417 CALC BMI ABV UP PARAM F/U: HCPCS | Performed by: INTERNAL MEDICINE

## 2023-01-27 PROCEDURE — 99214 OFFICE O/P EST MOD 30 MIN: CPT | Performed by: INTERNAL MEDICINE

## 2023-01-27 PROCEDURE — G8484 FLU IMMUNIZE NO ADMIN: HCPCS | Performed by: INTERNAL MEDICINE

## 2023-01-27 PROCEDURE — G8400 PT W/DXA NO RESULTS DOC: HCPCS | Performed by: INTERNAL MEDICINE

## 2023-01-27 PROCEDURE — 1090F PRES/ABSN URINE INCON ASSESS: CPT | Performed by: INTERNAL MEDICINE

## 2023-01-27 PROCEDURE — G8427 DOCREV CUR MEDS BY ELIG CLIN: HCPCS | Performed by: INTERNAL MEDICINE

## 2023-01-27 PROCEDURE — 1036F TOBACCO NON-USER: CPT | Performed by: INTERNAL MEDICINE

## 2023-01-27 PROCEDURE — 3078F DIAST BP <80 MM HG: CPT | Performed by: INTERNAL MEDICINE

## 2023-01-27 PROCEDURE — 3074F SYST BP LT 130 MM HG: CPT | Performed by: INTERNAL MEDICINE

## 2023-01-27 RX ORDER — DILTIAZEM HYDROCHLORIDE 120 MG/1
CAPSULE, COATED, EXTENDED RELEASE ORAL
Qty: 180 CAPSULE | Refills: 3 | Status: SHIPPED | OUTPATIENT
Start: 2023-01-27

## 2023-01-27 RX ORDER — OMEPRAZOLE 40 MG/1
40 CAPSULE, DELAYED RELEASE ORAL DAILY
Qty: 90 CAPSULE | Refills: 3 | Status: SHIPPED | OUTPATIENT
Start: 2023-01-27

## 2023-01-27 RX ORDER — ASCORBIC ACID 500 MG
500 TABLET ORAL DAILY
COMMUNITY

## 2023-01-27 RX ORDER — DILTIAZEM HYDROCHLORIDE 60 MG/1
60 TABLET, FILM COATED ORAL NIGHTLY
Qty: 90 TABLET | Refills: 3 | Status: SHIPPED | OUTPATIENT
Start: 2023-01-27

## 2023-01-27 RX ORDER — LANOLIN ALCOHOL/MO/W.PET/CERES
1000 CREAM (GRAM) TOPICAL DAILY
COMMUNITY

## 2023-01-27 NOTE — PATIENT INSTRUCTIONS
Refills for omeprazole, cardizem  Add cardizem 60 mg every evening  Continue risk factor modifications. Call for any change in symptoms, call to report any changes in shortness of breath or development of chest pain with activity.     Follow up in 4 mos

## 2023-03-16 ENCOUNTER — APPOINTMENT (OUTPATIENT)
Dept: GENERAL RADIOLOGY | Age: 68
DRG: 251 | End: 2023-03-16
Payer: MEDICARE

## 2023-03-16 ENCOUNTER — TELEPHONE (OUTPATIENT)
Dept: CARDIOLOGY CLINIC | Age: 68
End: 2023-03-16

## 2023-03-16 ENCOUNTER — HOSPITAL ENCOUNTER (INPATIENT)
Age: 68
LOS: 1 days | Discharge: HOME | DRG: 251 | End: 2023-03-18
Attending: EMERGENCY MEDICINE | Admitting: INTERNAL MEDICINE
Payer: MEDICARE

## 2023-03-16 DIAGNOSIS — R07.9 CHEST PAIN, UNSPECIFIED TYPE: Primary | ICD-10-CM

## 2023-03-16 LAB
ALBUMIN SERPL-MCNC: 4.2 G/DL (ref 3.4–5)
ALBUMIN/GLOB SERPL: 1.5 {RATIO} (ref 1.1–2.2)
ALP SERPL-CCNC: 137 U/L (ref 40–129)
ALT SERPL-CCNC: 10 U/L (ref 10–40)
ANION GAP SERPL CALCULATED.3IONS-SCNC: 11 MMOL/L (ref 3–16)
APTT BLD: 27.5 SEC (ref 23–34.3)
AST SERPL-CCNC: 21 U/L (ref 15–37)
BASOPHILS # BLD: 0.1 K/UL (ref 0–0.2)
BASOPHILS NFR BLD: 0.7 %
BILIRUB SERPL-MCNC: 0.3 MG/DL (ref 0–1)
BUN SERPL-MCNC: 11 MG/DL (ref 7–20)
CALCIUM SERPL-MCNC: 9.3 MG/DL (ref 8.3–10.6)
CHLORIDE SERPL-SCNC: 102 MMOL/L (ref 99–110)
CO2 SERPL-SCNC: 23 MMOL/L (ref 21–32)
CREAT SERPL-MCNC: 0.8 MG/DL (ref 0.6–1.2)
DEPRECATED RDW RBC AUTO: 13 % (ref 12.4–15.4)
EKG ATRIAL RATE: 91 BPM
EKG DIAGNOSIS: NORMAL
EKG P-R INTERVAL: 152 MS
EKG Q-T INTERVAL: 368 MS
EKG QRS DURATION: 78 MS
EKG QTC CALCULATION (BAZETT): 452 MS
EKG R AXIS: -20 DEGREES
EKG T AXIS: 39 DEGREES
EKG VENTRICULAR RATE: 91 BPM
EOSINOPHIL # BLD: 0.1 K/UL (ref 0–0.6)
EOSINOPHIL NFR BLD: 1.2 %
GFR SERPLBLD CREATININE-BSD FMLA CKD-EPI: >60 ML/MIN/{1.73_M2}
GLUCOSE SERPL-MCNC: 108 MG/DL (ref 70–99)
HCT VFR BLD AUTO: 42.5 % (ref 36–48)
HGB BLD-MCNC: 14.1 G/DL (ref 12–16)
INR PPP: 1.09 (ref 0.87–1.14)
LYMPHOCYTES # BLD: 1.8 K/UL (ref 1–5.1)
LYMPHOCYTES NFR BLD: 26.5 %
MCH RBC QN AUTO: 27.8 PG (ref 26–34)
MCHC RBC AUTO-ENTMCNC: 33.2 G/DL (ref 31–36)
MCV RBC AUTO: 83.8 FL (ref 80–100)
MONOCYTES # BLD: 0.6 K/UL (ref 0–1.3)
MONOCYTES NFR BLD: 9 %
NEUTROPHILS # BLD: 4.3 K/UL (ref 1.7–7.7)
NEUTROPHILS NFR BLD: 62.6 %
NT-PROBNP SERPL-MCNC: 210 PG/ML (ref 0–124)
PLATELET # BLD AUTO: 272 K/UL (ref 135–450)
PMV BLD AUTO: 7.7 FL (ref 5–10.5)
POTASSIUM SERPL-SCNC: 3.5 MMOL/L (ref 3.5–5.1)
PROT SERPL-MCNC: 7 G/DL (ref 6.4–8.2)
PROTHROMBIN TIME: 14 SEC (ref 11.7–14.5)
RBC # BLD AUTO: 5.07 M/UL (ref 4–5.2)
SODIUM SERPL-SCNC: 136 MMOL/L (ref 136–145)
TROPONIN T SERPL-MCNC: <0.01 NG/ML
WBC # BLD AUTO: 6.9 K/UL (ref 4–11)

## 2023-03-16 PROCEDURE — 6360000002 HC RX W HCPCS: Performed by: EMERGENCY MEDICINE

## 2023-03-16 PROCEDURE — 93010 ELECTROCARDIOGRAM REPORT: CPT | Performed by: INTERNAL MEDICINE

## 2023-03-16 PROCEDURE — 6370000000 HC RX 637 (ALT 250 FOR IP): Performed by: HOSPITALIST

## 2023-03-16 PROCEDURE — 85730 THROMBOPLASTIN TIME PARTIAL: CPT

## 2023-03-16 PROCEDURE — 2580000003 HC RX 258: Performed by: HOSPITALIST

## 2023-03-16 PROCEDURE — 84484 ASSAY OF TROPONIN QUANT: CPT

## 2023-03-16 PROCEDURE — 96375 TX/PRO/DX INJ NEW DRUG ADDON: CPT

## 2023-03-16 PROCEDURE — 71045 X-RAY EXAM CHEST 1 VIEW: CPT

## 2023-03-16 PROCEDURE — 85025 COMPLETE CBC W/AUTO DIFF WBC: CPT

## 2023-03-16 PROCEDURE — 80053 COMPREHEN METABOLIC PANEL: CPT

## 2023-03-16 PROCEDURE — G0378 HOSPITAL OBSERVATION PER HR: HCPCS

## 2023-03-16 PROCEDURE — 93005 ELECTROCARDIOGRAM TRACING: CPT | Performed by: EMERGENCY MEDICINE

## 2023-03-16 PROCEDURE — 85610 PROTHROMBIN TIME: CPT

## 2023-03-16 PROCEDURE — 96374 THER/PROPH/DIAG INJ IV PUSH: CPT

## 2023-03-16 PROCEDURE — 6360000002 HC RX W HCPCS: Performed by: PHYSICIAN ASSISTANT

## 2023-03-16 PROCEDURE — 99285 EMERGENCY DEPT VISIT HI MDM: CPT

## 2023-03-16 PROCEDURE — 83880 ASSAY OF NATRIURETIC PEPTIDE: CPT

## 2023-03-16 PROCEDURE — 96376 TX/PRO/DX INJ SAME DRUG ADON: CPT

## 2023-03-16 PROCEDURE — 36415 COLL VENOUS BLD VENIPUNCTURE: CPT

## 2023-03-16 PROCEDURE — 6360000002 HC RX W HCPCS: Performed by: HOSPITALIST

## 2023-03-16 PROCEDURE — 6370000000 HC RX 637 (ALT 250 FOR IP): Performed by: PHYSICIAN ASSISTANT

## 2023-03-16 PROCEDURE — 96372 THER/PROPH/DIAG INJ SC/IM: CPT

## 2023-03-16 RX ORDER — ONDANSETRON 2 MG/ML
4 INJECTION INTRAMUSCULAR; INTRAVENOUS EVERY 6 HOURS PRN
Status: DISCONTINUED | OUTPATIENT
Start: 2023-03-16 | End: 2023-03-18 | Stop reason: HOSPADM

## 2023-03-16 RX ORDER — PANTOPRAZOLE SODIUM 40 MG/1
40 TABLET, DELAYED RELEASE ORAL
Status: DISCONTINUED | OUTPATIENT
Start: 2023-03-17 | End: 2023-03-18 | Stop reason: HOSPADM

## 2023-03-16 RX ORDER — GABAPENTIN 100 MG/1
100 CAPSULE ORAL 3 TIMES DAILY
Status: DISCONTINUED | OUTPATIENT
Start: 2023-03-16 | End: 2023-03-16 | Stop reason: ALTCHOICE

## 2023-03-16 RX ORDER — ASPIRIN 81 MG/1
81 TABLET, CHEWABLE ORAL DAILY
Status: DISCONTINUED | OUTPATIENT
Start: 2023-03-16 | End: 2023-03-18 | Stop reason: HOSPADM

## 2023-03-16 RX ORDER — ONDANSETRON 4 MG/1
4 TABLET, ORALLY DISINTEGRATING ORAL EVERY 8 HOURS PRN
Status: DISCONTINUED | OUTPATIENT
Start: 2023-03-16 | End: 2023-03-18 | Stop reason: HOSPADM

## 2023-03-16 RX ORDER — SODIUM CHLORIDE 0.9 % (FLUSH) 0.9 %
5-40 SYRINGE (ML) INJECTION PRN
Status: DISCONTINUED | OUTPATIENT
Start: 2023-03-16 | End: 2023-03-18 | Stop reason: HOSPADM

## 2023-03-16 RX ORDER — ACETAMINOPHEN 325 MG/1
650 TABLET ORAL EVERY 6 HOURS PRN
Status: DISCONTINUED | OUTPATIENT
Start: 2023-03-16 | End: 2023-03-17

## 2023-03-16 RX ORDER — DILTIAZEM HYDROCHLORIDE 120 MG/1
120 CAPSULE, COATED, EXTENDED RELEASE ORAL NIGHTLY
Status: DISCONTINUED | OUTPATIENT
Start: 2023-03-16 | End: 2023-03-18 | Stop reason: HOSPADM

## 2023-03-16 RX ORDER — NITROGLYCERIN 0.4 MG/1
0.4 TABLET SUBLINGUAL EVERY 5 MIN PRN
Status: DISCONTINUED | OUTPATIENT
Start: 2023-03-16 | End: 2023-03-18 | Stop reason: HOSPADM

## 2023-03-16 RX ORDER — ACETAMINOPHEN 650 MG/1
650 SUPPOSITORY RECTAL EVERY 6 HOURS PRN
Status: DISCONTINUED | OUTPATIENT
Start: 2023-03-16 | End: 2023-03-18 | Stop reason: HOSPADM

## 2023-03-16 RX ORDER — ENOXAPARIN SODIUM 100 MG/ML
40 INJECTION SUBCUTANEOUS DAILY
Status: DISCONTINUED | OUTPATIENT
Start: 2023-03-16 | End: 2023-03-18 | Stop reason: HOSPADM

## 2023-03-16 RX ORDER — SODIUM CHLORIDE 9 MG/ML
INJECTION, SOLUTION INTRAVENOUS PRN
Status: DISCONTINUED | OUTPATIENT
Start: 2023-03-16 | End: 2023-03-18 | Stop reason: HOSPADM

## 2023-03-16 RX ORDER — MORPHINE SULFATE 4 MG/ML
4 INJECTION, SOLUTION INTRAMUSCULAR; INTRAVENOUS ONCE
Status: COMPLETED | OUTPATIENT
Start: 2023-03-16 | End: 2023-03-16

## 2023-03-16 RX ORDER — MORPHINE SULFATE 4 MG/ML
4 INJECTION, SOLUTION INTRAMUSCULAR; INTRAVENOUS
Status: COMPLETED | OUTPATIENT
Start: 2023-03-16 | End: 2023-03-16

## 2023-03-16 RX ORDER — DILTIAZEM HYDROCHLORIDE 60 MG/1
60 TABLET, FILM COATED ORAL DAILY
Status: DISCONTINUED | OUTPATIENT
Start: 2023-03-17 | End: 2023-03-18 | Stop reason: HOSPADM

## 2023-03-16 RX ORDER — MORPHINE SULFATE 2 MG/ML
2 INJECTION, SOLUTION INTRAMUSCULAR; INTRAVENOUS EVERY 4 HOURS PRN
Status: DISCONTINUED | OUTPATIENT
Start: 2023-03-16 | End: 2023-03-17

## 2023-03-16 RX ORDER — POLYETHYLENE GLYCOL 3350 17 G/17G
17 POWDER, FOR SOLUTION ORAL DAILY PRN
Status: DISCONTINUED | OUTPATIENT
Start: 2023-03-16 | End: 2023-03-18 | Stop reason: HOSPADM

## 2023-03-16 RX ORDER — ONDANSETRON 2 MG/ML
4 INJECTION INTRAMUSCULAR; INTRAVENOUS ONCE
Status: COMPLETED | OUTPATIENT
Start: 2023-03-16 | End: 2023-03-16

## 2023-03-16 RX ORDER — CETIRIZINE HYDROCHLORIDE 10 MG/1
5 TABLET ORAL DAILY
Status: DISCONTINUED | OUTPATIENT
Start: 2023-03-16 | End: 2023-03-18 | Stop reason: HOSPADM

## 2023-03-16 RX ORDER — SODIUM CHLORIDE 0.9 % (FLUSH) 0.9 %
5-40 SYRINGE (ML) INJECTION EVERY 12 HOURS SCHEDULED
Status: DISCONTINUED | OUTPATIENT
Start: 2023-03-16 | End: 2023-03-18 | Stop reason: HOSPADM

## 2023-03-16 RX ADMIN — ONDANSETRON 4 MG: 2 INJECTION INTRAMUSCULAR; INTRAVENOUS at 14:29

## 2023-03-16 RX ADMIN — MORPHINE SULFATE 2 MG: 2 INJECTION, SOLUTION INTRAMUSCULAR; INTRAVENOUS at 23:51

## 2023-03-16 RX ADMIN — DILTIAZEM HYDROCHLORIDE 120 MG: 120 CAPSULE, COATED, EXTENDED RELEASE ORAL at 20:02

## 2023-03-16 RX ADMIN — MORPHINE SULFATE 2 MG: 2 INJECTION, SOLUTION INTRAMUSCULAR; INTRAVENOUS at 20:02

## 2023-03-16 RX ADMIN — NITROGLYCERIN 1 INCH: 20 OINTMENT TOPICAL at 14:29

## 2023-03-16 RX ADMIN — SODIUM CHLORIDE, PRESERVATIVE FREE 10 ML: 5 INJECTION INTRAVENOUS at 20:03

## 2023-03-16 RX ADMIN — MORPHINE SULFATE 4 MG: 4 INJECTION, SOLUTION INTRAMUSCULAR; INTRAVENOUS at 17:54

## 2023-03-16 RX ADMIN — MORPHINE SULFATE 4 MG: 4 INJECTION, SOLUTION INTRAMUSCULAR; INTRAVENOUS at 14:28

## 2023-03-16 RX ADMIN — ACETAMINOPHEN 650 MG: 325 TABLET ORAL at 20:02

## 2023-03-16 RX ADMIN — CETIRIZINE HYDROCHLORIDE 5 MG: 10 TABLET, FILM COATED ORAL at 20:02

## 2023-03-16 RX ADMIN — MORPHINE SULFATE 4 MG: 4 INJECTION, SOLUTION INTRAMUSCULAR; INTRAVENOUS at 15:35

## 2023-03-16 RX ADMIN — ENOXAPARIN SODIUM 40 MG: 100 INJECTION SUBCUTANEOUS at 20:03

## 2023-03-16 ASSESSMENT — ENCOUNTER SYMPTOMS
COUGH: 0
ABDOMINAL PAIN: 0
RHINORRHEA: 0
CHEST TIGHTNESS: 1
NAUSEA: 1
WHEEZING: 0
VOMITING: 0
DIARRHEA: 0
SHORTNESS OF BREATH: 1

## 2023-03-16 ASSESSMENT — PAIN DESCRIPTION - LOCATION
LOCATION: CHEST

## 2023-03-16 ASSESSMENT — PAIN DESCRIPTION - FREQUENCY
FREQUENCY: CONTINUOUS
FREQUENCY: CONTINUOUS

## 2023-03-16 ASSESSMENT — PAIN SCALES - GENERAL
PAINLEVEL_OUTOF10: 6
PAINLEVEL_OUTOF10: 8
PAINLEVEL_OUTOF10: 5
PAINLEVEL_OUTOF10: 8
PAINLEVEL_OUTOF10: 10
PAINLEVEL_OUTOF10: 5
PAINLEVEL_OUTOF10: 10

## 2023-03-16 ASSESSMENT — PAIN DESCRIPTION - DESCRIPTORS
DESCRIPTORS: PRESSURE;SHARP
DESCRIPTORS: STABBING
DESCRIPTORS: PRESSURE
DESCRIPTORS: PRESSURE;SHARP

## 2023-03-16 ASSESSMENT — PAIN - FUNCTIONAL ASSESSMENT
PAIN_FUNCTIONAL_ASSESSMENT: ACTIVITIES ARE NOT PREVENTED
PAIN_FUNCTIONAL_ASSESSMENT: 0-10

## 2023-03-16 ASSESSMENT — PAIN DESCRIPTION - ORIENTATION
ORIENTATION: MID

## 2023-03-16 ASSESSMENT — HEART SCORE: ECG: 0

## 2023-03-16 ASSESSMENT — PAIN DESCRIPTION - PAIN TYPE: TYPE: ACUTE PAIN

## 2023-03-16 NOTE — PROGRESS NOTES
Pt. To room 5911 from ED via wheelchair. Pt. VSS, patient denies pain, patient oriented to room. Pt. Updated on POC, denies questions. Pt. Given toiletries, denies further needs. Bed in lowest position, bed alarm activated, call light and bedside table within reach.

## 2023-03-16 NOTE — ED PROVIDER NOTES
I independently saw performed a substantive portion of the visit (history, physical, and MDM) on Teodoro Osei. All diagnostic, treatment, and disposition decisions were made by myself in conjunction with the advanced practice provider. I have participated in the medical decision making and directed the treatment plan and disposition of the patient. For further details of Jeannie Hicks's emergency department encounter, please see the advanced practice provider's documentation. Doretha Shelby MD, am the primary physician provider of record. CHIEF COMPLAINT  Chief Complaint   Patient presents with    Chest Pain     Mid sternal cp down arm into left jaw and around to back starting at 0100       Briefly, Teodoro Osei is a 74 y.o. female  who presents to the ED complaining of midsternal chest pain and pressure radiating into the jaw similar to previous angina. She has many stents from previous heart catheterizations and a strong family history of CAD as well. She does not feel acutely short of breath at this time but did earlier. Already took full dose ASA today. FOCUSED PHYSICAL EXAMINATION  BP (!) 152/80   Pulse 85   Temp 97.6 °F (36.4 °C)   Resp 16   Ht 5' 4\" (1.626 m)   Wt 190 lb (86.2 kg) Comment: no scale on bed, active cp patient  SpO2 98%   BMI 32.61 kg/m²    Focused physical examination notable for no acute distress, well-appearing, well-nourished, normal speech and mentation without obvious facial droop, no obvious rash. No obvious cranial nerve deficits on my initial exam. RRR CTAB, no chest wall ttp, no calf swelling/ttp, no murmur, 2+ radial pulses bilat.       EKG performed in ED:  The 12 lead EKG was interpreted by me independent of a cardiologist as follows:  Rate: normal with a rate of 91  Rhythm: sinus  Axis: normal  Intervals: normal DE, narrow QRS, normal QTc  ST segments: no ST elevations or depressions  T waves: no abnormal inversions  Non-specific T wave changes: present  Prior EKG comparison: EKG dated 22 is not significantly different        ED COURSE/MDM  Patient seen and evaluated. Old records reviewed. Labs and imaging reviewed. After initial evaluation, differential diagnostic considerations included but not limited to: acute coronary syndrome, pulmonary embolism, COPD/asthma, pneumonia, musculoskeletal, reflux/PUD/gastritis, pneumothorax, CHF, thoracic aortic dissection, anxiety      The patient's ED workup was notable for concern for anginal discomfort until proven otherwise. Although her initial EKG is not acutely ischemic it is nonspecific and stable compared to previous with a negative troponin. Nonetheless with her severe CAD history and highly concerning history I do feel that admission is warranted to rule out acute coronary syndrome. Heart failure was considered but chest x-ray is clear she has no rales on exam no peripheral edema and a BNP of only 210. She has no metabolic derangement or leukocytosis or signs of fever or infectious process. Patient took aspirin prehospital and was given IV morphine x2 here with minimal relief as well as nitroglycerin paste and IV Zofran. Is this patient to be included in the SEP-1 Core Measure? No   Exclusion criteria - the patient is NOT to be included for SEP-1 Core Measure due to: Infection is not suspected    HEART SCORE:    History: 2  EC  Patient Age: 4  *Risk factors for Atherosclerotic disease: Coronary Artery Disease  Risk Factors: 2  Troponin: 0  Heart Score Total: 6      Heart score: 6.   This falls under the following category: Score of 4-6, which indicates low/moderate risk for major adverse cardiac event and supports observation with repeated troponins and/or non-invasive testing    Consults:  None    History obtained from: Patient    Pertinent social determinants of health: None applicable    Chronic conditions potentially affecting care:  prior CAD    Review of other records:  None    Reassessment:  See [facility] for details of medications given and reassessment    During the patient's ED course, the patient was given:  Medications   morphine injection 4 mg (4 mg IntraVENous Given 3/16/23 1535)   nitroglycerin (NITRO-BID) 2 % ointment 1 inch (1 inch Topical Given 3/16/23 1429)   morphine injection 4 mg (4 mg IntraVENous Given 3/16/23 1428)   ondansetron (ZOFRAN) injection 4 mg (4 mg IntraVENous Given 3/16/23 1429)        CLINICAL IMPRESSION  1. Chest pain, unspecified type        Blood pressure (!) 152/80, pulse 85, temperature 97.6 °F (36.4 °C), resp. rate 16, height 5' 4\" (1.626 m), weight 190 lb (86.2 kg), SpO2 98 %, not currently breastfeeding. DISPOSITION  Mariah Caban was admitted in fair condition. The plan is to admit to the hospital at this time under the hospitalist service. Hospitalist accepted the patient and will take over the patient's care. Critical care time:  None    DISCLAIMER: This chart was created using Dragon dictation software. Efforts were made by me to ensure accuracy, however some errors may be present due to limitations of this technology and occasionally words are not transcribed correctly.        Shanique Yang MD  03/16/23 [id number]

## 2023-03-16 NOTE — H&P
[facility]    3/16/2023 3:24 PM    Patient Information:  Raz Fall is a 74 y.o. female [id number]  PCP:  Sunitha Howard MD (Tel: [phone] )    Chief complaint:    Chief Complaint   Patient presents with    Chest Pain     Mid sternal cp down arm into left jaw and around to back starting at 0100        History of Present Illness:    Dilcia Chakraborty is a 74 y.o. present with chest pain. Onset of symptoms around 1 AM.  Patient said midsternal chest pain going down to the left jaw and arm. On and off. Since feel similar to previous history. Patient said history of 9 stents and full similar. Given aspirin nitro and morphine with some relief. Patient was given oxygen. onset about chronically nonpainful happening was 4 out of 10  History obtained from patient  Old medical records show   REVIEW OF SYSTEMS:   Constitutional: Negative for fever,chills or night sweats  ENT: Negative for rhinorrhea, epistaxis, hoarseness, sore throat. Respiratory: Negative for shortness of breath,wheezing  Cardiovascular:postive  for chest pain, negative for palpitations   Gastrointestinal: Negative for nausea, vomiting, diarrhea  Genitourinary: Negative for polyuria, dysuria   Hematologic/Lymphatic: Negative for bleeding tendency, easy bruising  Musculoskeletal: Negative for myalgias and arthralgias  Neurologic: Negative for confusion,dysarthria. Skin: Negative for itching,rash  Psychiatric: Negative for depression,anxiety, agitation. Endocrine: Negative for polydipsia,polyuria,heat /cold intolerance.     Past Medical History:   has a past medical history of Allergic rhinitis, cause unspecified, Anxiety, Arthritis, Bilateral carotid artery stenosis, CAD (coronary artery disease), Chronic kidney disease, Chronic pain, Depression, Erosive esophagitis, Essential hypertension, HSV infection, Hypercholesterolemia, Irritable bowel syndrome with diarrhea, Migraine headache, Nonerosive nonspecific gastritis, and OAB (overactive bladder). Past Surgical History:   has a past surgical history that includes Appendectomy; Total abdominal hysterectomy w/ bilateral salpingoophorectomy; Cholecystectomy; Tubal ligation; Tonsillectomy; other surgical history; Coronary angioplasty with stent (10/2012); Percutaneous Transluminal Coronary Angio (10/2012); Cardiac catheterization (12/07/2013); Hysterectomy; polypectomy; Cystoscopy (04/2014); Upper gastrointestinal endoscopy; Wrist surgery (Left, 05/21/2015); Abdominal adhesion surgery; Colonoscopy; Endoscopy, colon, diagnostic; Cosmetic surgery; fracture surgery; ERCP (01/25/2017); sinus surgery; Hysterectomy, total abdominal; Coronary angioplasty with stent (02/04/2021); and Coronary angioplasty with stent (09/2022). Medications:  No current facility-administered medications on file prior to encounter.      Current Outpatient Medications on File Prior to Encounter   Medication Sig Dispense Refill    vitamin C (ASCORBIC ACID) 500 MG tablet Take 500 mg by mouth daily      Cholecalciferol (VITAMIN D3) 125 MCG (5000 UT) TABS Take by mouth      vitamin B-12 (CYANOCOBALAMIN) 1000 MCG tablet Take 1,000 mcg by mouth daily      Potassium 99 MG TABS Take by mouth      omeprazole (PRILOSEC) 40 MG delayed release capsule Take 1 capsule by mouth daily 90 capsule 3    dilTIAZem (CARDIZEM CD) 120 MG extended release capsule TAKE 1 CAPSULE BY MOUTH TWICE DAILY 180 capsule 3    dilTIAZem (CARDIZEM) 60 MG tablet Take 1 tablet by mouth at bedtime 90 tablet 3    traMADol (ULTRAM) 50 MG tablet Take 50 mg by mouth every 6 hours as needed for Pain.      valsartan (DIOVAN) 40 MG tablet Take 1 tablet by mouth daily 90 tablet 1    ticagrelor (BRILINTA) 90 MG TABS tablet Take 1 tablet by mouth 2 times daily (Patient not taking: Reported on 1/27/2023) 32 tablet 0    Evolocumab (REPATHA) 140 MG/ML SOSY Inject 1 mL into the skin every 14 days 2 each 5    gabapentin (NEURONTIN) 100 MG capsule Take 1 capsule by mouth 3 times daily for 30 days. Intended supply: 90 days 90 capsule 0    diazePAM (VALIUM) 5 MG tablet INSERT 1 TABLET VAGINALLY TWICE DAILY AS NEEDED FOR VAGINAL/URETHRAL PAIN      nitroGLYCERIN (NITROSTAT) 0.4 MG SL tablet Place 1 tablet under the tongue every 5 minutes as needed for Chest pain up to max of 3 total doses. If no relief after 1 dose, call 911. 25 tablet 3    aspirin 81 MG chewable tablet Take 1 tablet by mouth daily 30 tablet 0    cetirizine (ZYRTEC) 5 MG tablet Take 5 mg by mouth daily      L-Lysine 1000 MG TABS Take 1 tablet by mouth every morning (before breakfast)       estradiol (ESTRACE) 0.1 MG/GM vaginal cream PLACE 0.5 GRAM VAGINALLY 3 TIMES A WEEK 42.5 g 0       Allergies: Allergies   Allergen Reactions    Beta Adrenergic Blockers Anaphylaxis    Chicken Allergy     Contrast [Iodides] Swelling and Rash     Rash, severe swelling in face and body, SOB Nausea and vomitting    Contrast from MRI       Hydralazine Anaphylaxis    Pineapple     Pork Allergy     Shellfish-Derived Products Anaphylaxis and Swelling     Throat swelling    Asa [Aspirin] Nausea Only    Crestor [Rosuvastatin] Myalgia     Myalgia      Fenofibrate Myalgia     Myalgia      Furosemide Swelling    Gabapentin Swelling    Hctz [Hydrochlorothiazide]     Lipitor [Atorvastatin] Myalgia     Myalgia      Lisinopril     Mometasone Furoate Swelling    Nasonex [Mometasone] Swelling    Nsaids      GI PAIN    Other      Any melons, grilled food, turkey, red/green/yellow peppers. Plavix [Clopidogrel] Hives, Itching and Swelling    Ranexa [Ranolazine]     Sulfa Antibiotics Swelling    Xarelto [Rivaroxaban] Swelling    Zetia [Ezetimibe]     Flagyl [Metronidazole] Nausea And Vomiting        Social History:   reports that she quit smoking about 16 years ago. Her smoking use included cigarettes. She has a 10.00 pack-year smoking history.  She has never used smokeless tobacco. She reports that she does not drink alcohol and does not use drugs. Family History:  family history includes Heart Disease in her brother, father, maternal uncle, mother, and another family member; High Blood Pressure in her sister and sister; Hypertension in her father and mother; Other in an other family member; Stroke in her sister and another family member. ,     Physical Exam:  BP (!) 167/92   Pulse 87   Temp 97.6 °F (36.4 °C)   Resp 16   Ht 5' 4\" (1.626 m)   Wt 190 lb (86.2 kg) Comment: no scale on bed, active cp patient  SpO2 100%   BMI 32.61 kg/m²     General appearance:  Appears comfortable. Well nourished  Eyes: Sclera clear, pupils equal  ENT: Moist mucus membranes, no thrush. Trachea midline. Cardiovascular: Regular rhythm, normal S1, S2. No murmur, gallop, rub. No edema in lower extremities  Respiratory: Clear to auscultation bilaterally, no wheeze, good inspiratory effort  Gastrointestinal: Abdomen soft, non-tender, not distended, normal bowel sounds  Musculoskeletal: No cyanosis in digits, neck supple  Neurology: Cranial nerves grossly intact. Alert and oriented in time, place and person. No speech or motor deficits  Psychiatry: Appropriate affect.  Not agitated  Skin: Warm, dry, normal turgor, no rash    Labs:  CBC:   Lab Results   Component Value Date/Time    WBC 6.9 03/16/2023 02:26 PM    RBC 5.07 03/16/2023 02:26 PM    HGB 14.1 03/16/2023 02:26 PM    HCT 42.5 03/16/2023 02:26 PM    MCV 83.8 03/16/2023 02:26 PM    MCH 27.8 03/16/2023 02:26 PM    MCHC 33.2 03/16/2023 02:26 PM    RDW 13.0 03/16/2023 02:26 PM     03/16/2023 02:26 PM    MPV 7.7 03/16/2023 02:26 PM     BMP:    Lab Results   Component Value Date/Time     03/16/2023 02:26 PM    K 3.5 03/16/2023 02:26 PM    K 4.4 09/16/2022 06:19 AM     03/16/2023 02:26 PM    CO2 23 03/16/2023 02:26 PM    BUN 11 03/16/2023 02:26 PM    CREATININE 0.8 03/16/2023 02:26 PM    CALCIUM 9.3 03/16/2023 02:26 PM GFRAA >60 09/27/2022 02:40 PM    GFRAA >60 12/19/2012 09:20 PM    LABGLOM >60 03/16/2023 02:26 PM    GLUCOSE 108 03/16/2023 02:26 PM    GLUCOSE 96 11/14/2011 08:44 AM       Chest Xray:   EKG:    I visualized CXR images and EKG strips-         Problem List  Principal Problem:    Chest pain  Resolved Problems:    * No resolved hospital problems. *        Assessment/Plan:   Assessment:     1. Chest pain  Chest pain  - unclear etilogy. ddx iclude SHAYNE, angina, MSK, GI cause  -Symptoms full similar to her anginal pain prior  -Multiple history of CAD and stent. Will be admitted for further evaluation  -IEKG is nondiagnostic  - intial work up no acute ischemia  - does have risk factors and warrants further work up  consult cardiogly  - negative trops. Matheus trops x2.   - ASA, statin BB, morphine and oxygen therapy. - risk factor discussed.   Continue medical management         Luis Mobley MD    3/16/2023 3:24 PM

## 2023-03-16 NOTE — ED PROVIDER NOTES
[facility] EMERGENCY DEPARTMENT  EMERGENCY DEPARTMENT ENCOUNTER        Pt Name: Nithya Hicks  MRN: [id number]  Birthdate 1955  Date of evaluation: 3/16/2023  Provider: Naida Leary PA-C  PCP: Nolberto Roche MD  Note Started: 2:16 PM EDT 3/16/23       I have seen and evaluated this patient with my supervising physician Robert Suero MD.      CHIEF COMPLAINT       Chief Complaint   Patient presents with    Chest Pain     Mid sternal cp down arm into left jaw and around to back starting at 0100       HISTORY OF PRESENT ILLNESS: 1 or more Elements     History From: patient  Limitations to history : None    Nithya Hicks is a 67 y.o. female who presents for evaluation of chest pain that started around 1:00 in the morning.  Patient states that it feels like an elephant on her chest.  States that it goes up into her left jaw and down her left arm as well as through to her back with associated nausea, shortness of breath.  No dizziness/lightheadedness, weakness, visual disturbances or syncope.  Patient's does have a history of CAD and has a total of 9 stents.  States that it does feel similar to her history of cardiac pain.  She has contacted her cardiologist, taken aspirin and nitro prior to arrival without significant symptomatic improvement.  No other complaints or concerns at this time.    Nursing Notes were all reviewed and agreed with or any disagreements were addressed in the HPI.    REVIEW OF SYSTEMS :      Review of Systems   Constitutional:  Negative for appetite change, chills and fever.   HENT:  Negative for congestion and rhinorrhea.    Eyes:  Negative for visual disturbance.   Respiratory:  Positive for chest tightness and shortness of breath. Negative for cough and wheezing.    Cardiovascular:  Positive for chest pain.   Gastrointestinal:  Positive for nausea. Negative for abdominal pain, diarrhea and vomiting.   Genitourinary:  Negative for difficulty urinating,  dysuria and hematuria. Musculoskeletal:  Negative for neck pain and neck stiffness. Skin:  Negative for rash. Neurological:  Negative for dizziness, syncope, weakness, light-headedness and headaches. Positives and Pertinent negatives as per HPI.      SURGICAL HISTORY     Past Surgical History:   Procedure Laterality Date    ABDOMINAL ADHESION SURGERY      APPENDECTOMY      CARDIAC CATHETERIZATION  12/07/2013    recheck arteries unchanged    CHOLECYSTECTOMY      COLONOSCOPY      CORONARY ANGIOPLASTY WITH STENT PLACEMENT  10/2012    right- TOTAL OF 3 STENTS    CORONARY ANGIOPLASTY WITH STENT PLACEMENT  02/04/2021    CORONARY ANGIOPLASTY WITH STENT PLACEMENT  09/2022    COSMETIC SURGERY      rhinoplasty    CYSTOSCOPY  04/2014    ENDOSCOPY, COLON, DIAGNOSTIC      ERCP  01/25/2017    FRACTURE SURGERY      LUE, plate and screws    HYSTERECTOMY (CERVIX STATUS UNKNOWN)      HYSTERECTOMY, TOTAL ABDOMINAL (CERVIX REMOVED)      OTHER SURGICAL HISTORY      Incision and drainage of Lingual Introral Lesion    POLYPECTOMY      Dr. Dominique Cooper    [facility]  10/2012    right    SINUS SURGERY      JOSE AND BSO (CERVIX REMOVED)      TONSILLECTOMY      TUBAL LIGATION      UPPER GASTROINTESTINAL ENDOSCOPY      WRIST SURGERY Left 05/21/2015    OPEN REDUCTION INTERNAL FIXATION LEFT WRIST       CURRENTMEDICATIONS       Previous Medications    ASPIRIN 81 MG CHEWABLE TABLET    Take 1 tablet by mouth daily    CETIRIZINE (ZYRTEC) 5 MG TABLET    Take 5 mg by mouth daily    CHOLECALCIFEROL (VITAMIN D3) 125 MCG (5000 UT) TABS    Take by mouth    DIAZEPAM (VALIUM) 5 MG TABLET    INSERT 1 TABLET VAGINALLY TWICE DAILY AS NEEDED FOR VAGINAL/URETHRAL PAIN    DILTIAZEM (CARDIZEM CD) 120 MG EXTENDED RELEASE CAPSULE    TAKE 1 CAPSULE BY MOUTH TWICE DAILY    DILTIAZEM (CARDIZEM) 60 MG TABLET    Take 1 tablet by mouth at bedtime    ESTRADIOL (ESTRACE) 0.1 MG/GM VAGINAL CREAM    PLACE 0.5 GRAM VAGINALLY 3 TIMES A WEEK    EVOLOCUMAB (REPATHA) 140 MG/ML SOSY Inject 1 mL into the skin every 14 days    GABAPENTIN (NEURONTIN) 100 MG CAPSULE    Take 1 capsule by mouth 3 times daily for 30 days. Intended supply: 90 days    L-LYSINE 1000 MG TABS    Take 1 tablet by mouth every morning (before breakfast)     NITROGLYCERIN (NITROSTAT) 0.4 MG SL TABLET    Place 1 tablet under the tongue every 5 minutes as needed for Chest pain up to max of 3 total doses. If no relief after 1 dose, call 911. OMEPRAZOLE (PRILOSEC) 40 MG DELAYED RELEASE CAPSULE    Take 1 capsule by mouth daily    POTASSIUM 99 MG TABS    Take by mouth    TICAGRELOR (BRILINTA) 90 MG TABS TABLET    Take 1 tablet by mouth 2 times daily    TRAMADOL (ULTRAM) 50 MG TABLET    Take 50 mg by mouth every 6 hours as needed for Pain.     VALSARTAN (DIOVAN) 40 MG TABLET    Take 1 tablet by mouth daily    VITAMIN B-12 (CYANOCOBALAMIN) 1000 MCG TABLET    Take 1,000 mcg by mouth daily    VITAMIN C (ASCORBIC ACID) 500 MG TABLET    Take 500 mg by mouth daily       ALLERGIES     Beta adrenergic blockers, Chicken allergy, Contrast [iodides], Hydralazine, Pineapple, Pork allergy, Shellfish-derived products, Asa [aspirin], Crestor [rosuvastatin], Fenofibrate, Furosemide, Gabapentin, Hctz [hydrochlorothiazide], Lipitor [atorvastatin], Lisinopril, Mometasone furoate, Nasonex [mometasone], Nsaids, Other, Plavix [clopidogrel], Ranexa [ranolazine], Sulfa antibiotics, Xarelto [rivaroxaban], Zetia [ezetimibe], and Flagyl [metronidazole]    FAMILYHISTORY       Family History   Problem Relation Age of Onset    Hypertension Mother     Heart Disease Mother     Hypertension Father     Heart Disease Father     Heart Disease Maternal Uncle     High Blood Pressure Sister     Stroke Other     Heart Disease Other     Stroke Sister     High Blood Pressure Sister     Other Other         hypercoag state    Heart Disease Brother         SOCIAL HISTORY       Social History     Tobacco Use    Smoking status: Former     Packs/day: 0.50     Years: 20.00 Pack years: 10.00     Types: Cigarettes     Quit date: 2007     Years since quittin.1    Smokeless tobacco: Never   Vaping Use    Vaping Use: Never used   Substance Use Topics    Alcohol use: No    Drug use: No       SCREENINGS        [place] Coma Scale  Eye Opening: Spontaneous  Best Verbal Response: Oriented  Best Motor Response: Obeys commands  [place] Coma Scale Score: 15        Heart Score for chest pain patients  History: Highly Suspicious  ECG: Normal  Patient Age: > 65 years  *Risk factors for Atherosclerotic disease: Coronary Artery Disease  Risk Factors: > 3 Risk factors or history of atherosclerotic disease*  Troponin: < 1X normal limit  Heart Score Total: 6       CIWA Assessment  BP: (!) 167/92  Heart Rate: 87           PHYSICAL EXAM  1 or more Elements     ED Triage Vitals   BP Temp Temp src Pulse Resp SpO2 Height Weight   -- -- -- -- -- -- -- --       Physical Exam  Vitals and nursing note reviewed. Constitutional:       Appearance: She is well-developed. She is not diaphoretic. HENT:      Head: Normocephalic and atraumatic. Right Ear: External ear normal.      Left Ear: External ear normal.      Nose: Nose normal.   Eyes:      General:         Right eye: No discharge. Left eye: No discharge. Cardiovascular:      Rate and Rhythm: Normal rate and regular rhythm. Heart sounds: Normal heart sounds. Pulmonary:      Effort: Pulmonary effort is normal. No respiratory distress. Breath sounds: Normal breath sounds. Chest:      Chest wall: No tenderness. Abdominal:      General: There is no distension. Palpations: Abdomen is soft. Tenderness: There is no abdominal tenderness. Musculoskeletal:         General: Normal range of motion. Cervical back: Normal range of motion and neck supple. Skin:     General: Skin is warm and dry. Neurological:      Mental Status: She is alert and oriented to person, place, and time.    Psychiatric:         Behavior: Behavior normal.         DIAGNOSTIC RESULTS   LABS:    Labs Reviewed   COMPREHENSIVE METABOLIC PANEL - Abnormal; Notable for the following components:       Result Value    Glucose 108 (*)     Alkaline Phosphatase 137 (*)     All other components within normal limits   BRAIN NATRIURETIC PEPTIDE - Abnormal; Notable for the following components:    Pro- (*)     All other components within normal limits   CBC WITH AUTO DIFFERENTIAL   TROPONIN   APTT   PROTIME-INR       When ordered only abnormal lab results are displayed. All other labs were within normal range or not returned as of this dictation. EKG: When ordered, EKG's are interpreted by the Emergency Department Physician in the absence of a cardiologist.  Please see their note for interpretation of EKG. RADIOLOGY:   Non-plain film images such as CT, Ultrasound and MRI are read by the radiologist. Plain radiographic images are visualized and preliminarily interpreted by the ED Provider with the below findings:    Negative     Interpretation per the Radiologist below, if available at the time of this note:    XR CHEST PORTABLE   Final Result   No acute cardiopulmonary findings           No results found. No results found. PROCEDURES   Unless otherwise noted below, none     Procedures    CRITICAL CARE TIME (.cctime)       PAST MEDICAL HISTORY      has a past medical history of Allergic rhinitis, cause unspecified (12/04/2010), Anxiety, Arthritis, Bilateral carotid artery stenosis, CAD (coronary artery disease), Chronic kidney disease, Chronic pain, Depression (01/22/2013), Erosive esophagitis (12/28/2016), Essential hypertension (04/11/2017), HSV infection, Hypercholesterolemia (06/06/2014), Irritable bowel syndrome with diarrhea (09/27/2016), Migraine headache (06/06/2014), Nonerosive nonspecific gastritis (12/28/2016), and OAB (overactive bladder) (03/14/2014).      EMERGENCY DEPARTMENT COURSE and DIFFERENTIAL DIAGNOSIS/MDM:   Vitals:    Vitals: 03/16/23 1416 03/16/23 1436 03/16/23 1438   BP: (!) 167/92     Pulse: 89 87    Resp: 16     Temp:   97.6 °F (36.4 °C)   SpO2: 99% 100%    Weight: 190 lb (86.2 kg)     Height: 5' 4\" (1.626 m)         Patient was given the following medications:  Medications   nitroglycerin (NITRO-BID) 2 % ointment 1 inch (1 inch Topical Given 3/16/23 1429)   morphine injection 4 mg (4 mg IntraVENous Given 3/16/23 1428)   ondansetron (ZOFRAN) injection 4 mg (4 mg IntraVENous Given 3/16/23 1429)             Is this patient to be included in the SEP-1 Core Measure due to severe sepsis or septic shock? No   Exclusion criteria - the patient is NOT to be included for SEP-1 Core Measure due to: Infection is not suspected    Chronic Conditions affecting care:    has a past medical history of Allergic rhinitis, cause unspecified (12/04/2010), Anxiety, Arthritis, Bilateral carotid artery stenosis, CAD (coronary artery disease), Chronic kidney disease, Chronic pain, Depression (01/22/2013), Erosive esophagitis (12/28/2016), Essential hypertension (04/11/2017), HSV infection, Hypercholesterolemia (06/06/2014), Irritable bowel syndrome with diarrhea (09/27/2016), Migraine headache (06/06/2014), Nonerosive nonspecific gastritis (12/28/2016), and OAB (overactive bladder) (03/14/2014). CONSULTS: (Who and What was discussed)  None      Social Determinants Significantly Affecting Health : None    Records Reviewed (External and Source) cardiology notes    CC/HPI Summary, DDx, ED Course, and Reassessment: Patient presents for evaluation of chest pain that started this morning. On exam, she appears uncomfortable but is in no acute distress and nontoxic. She is hypertensive but vitals otherwise stable and she is afebrile. Lungs are clear to auscultation bilaterally, chest is nontender and abdomen is benign.   Patient had already taken nitro x2 in full-strength aspirin prior to arrival.  Nitropaste was applied and she was given morphine and Zofran for symptomatic relief. She will be reevaluated. Please see attending note for EKG interpretation. Disposition Considerations (tests considered but not done, Admit vs D/C, Shared Decision Making, Pt Expectation of Test or Tx.): CBC and CMP are unremarkable. Troponin is negative. . Chest x-ray is negative. Patient has a heart score of 6 and I do believe warrants admission for further evaluation and cardiology consultation for additional ACS rule out. Hospitalist resume care the patient at this time. Patient was informed and agreeable. She is stable for admission. I am the Primary Clinician of Record. FINAL IMPRESSION      1. Chest pain, unspecified type          DISPOSITION/PLAN     DISPOSITION Decision To Admit 03/16/2023 03:19:00 PM      PATIENT REFERRED TO:  No follow-up provider specified.     DISCHARGE MEDICATIONS:  New Prescriptions    No medications on file       DISCONTINUED MEDICATIONS:  Discontinued Medications    No medications on file              (Please note that portions of this note were completed with a voice recognition program.  Efforts were made to edit the dictations but occasionally words are mis-transcribed.)    Israel Biggs PA-C (electronically signed)           Yashira Berrios PA-C  03/16/23 [id number]

## 2023-03-16 NOTE — PROGRESS NOTES
Patient seen in ED, room 18. Initiated admission and completed  to and through History (completed through family history). At this time transport at bedside to take patient to assigned room. The floor RN will need to continue with history and on as well as complete the following:  Home Medications ; 4 Eyes Assessment, Immunizations, Covid Vaccines, Rights and Responsibilities, Orientation to room, Plan of Care, Education/Learning Assessment and Education, white board, height and weight, pain assessment and head to toe assessment. Patient is alert and oriented X 4. Patient is being admitted for chest pain. Plan of care updated if indicated. All questions answered.  is at bedside.

## 2023-03-16 NOTE — TELEPHONE ENCOUNTER
FYI- Pt states she has been having increasing chest pain since Monday and she wanted to let LES know that she is going to be going to [facility].

## 2023-03-17 ENCOUNTER — TELEPHONE (OUTPATIENT)
Dept: CARDIOLOGY CLINIC | Age: 68
End: 2023-03-17

## 2023-03-17 LAB
DEPRECATED RDW RBC AUTO: 13.2 % (ref 12.4–15.4)
EKG ATRIAL RATE: 88 BPM
EKG DIAGNOSIS: NORMAL
EKG P AXIS: -3 DEGREES
EKG P-R INTERVAL: 156 MS
EKG Q-T INTERVAL: 396 MS
EKG QRS DURATION: 90 MS
EKG QTC CALCULATION (BAZETT): 479 MS
EKG R AXIS: -22 DEGREES
EKG T AXIS: 53 DEGREES
EKG VENTRICULAR RATE: 88 BPM
HCT VFR BLD AUTO: 43.6 % (ref 36–48)
HGB BLD-MCNC: 14.2 G/DL (ref 12–16)
LEFT VENTRICULAR EJECTION FRACTION MODE: NORMAL
LV EF: 55 %
MCH RBC QN AUTO: 28.1 PG (ref 26–34)
MCHC RBC AUTO-ENTMCNC: 32.6 G/DL (ref 31–36)
MCV RBC AUTO: 86.1 FL (ref 80–100)
PLATELET # BLD AUTO: 272 K/UL (ref 135–450)
PMV BLD AUTO: 7.8 FL (ref 5–10.5)
POC ACT LR: 255 SEC
POC ACT LR: 272 SEC
POC ACT LR: 312 SEC
RBC # BLD AUTO: 5.06 M/UL (ref 4–5.2)
TROPONIN T SERPL-MCNC: <0.01 NG/ML
TROPONIN T SERPL-MCNC: <0.01 NG/ML
WBC # BLD AUTO: 7.7 K/UL (ref 4–11)

## 2023-03-17 PROCEDURE — 6360000002 HC RX W HCPCS: Performed by: HOSPITALIST

## 2023-03-17 PROCEDURE — 6360000002 HC RX W HCPCS: Performed by: NURSE PRACTITIONER

## 2023-03-17 PROCEDURE — 02703ZZ DILATION OF CORONARY ARTERY, ONE ARTERY, PERCUTANEOUS APPROACH: ICD-10-PCS | Performed by: INTERNAL MEDICINE

## 2023-03-17 PROCEDURE — 99152 MOD SED SAME PHYS/QHP 5/>YRS: CPT

## 2023-03-17 PROCEDURE — 6370000000 HC RX 637 (ALT 250 FOR IP): Performed by: HOSPITALIST

## 2023-03-17 PROCEDURE — 2500000003 HC RX 250 WO HCPCS

## 2023-03-17 PROCEDURE — B2151ZZ FLUOROSCOPY OF LEFT HEART USING LOW OSMOLAR CONTRAST: ICD-10-PCS | Performed by: INTERNAL MEDICINE

## 2023-03-17 PROCEDURE — B2111ZZ FLUOROSCOPY OF MULTIPLE CORONARY ARTERIES USING LOW OSMOLAR CONTRAST: ICD-10-PCS | Performed by: INTERNAL MEDICINE

## 2023-03-17 PROCEDURE — 6360000004 HC RX CONTRAST MEDICATION: Performed by: INTERNAL MEDICINE

## 2023-03-17 PROCEDURE — C1760 CLOSURE DEV, VASC: HCPCS

## 2023-03-17 PROCEDURE — 92920 PRQ TRLUML C ANGIOP 1ART&/BR: CPT | Performed by: INTERNAL MEDICINE

## 2023-03-17 PROCEDURE — 93458 L HRT ARTERY/VENTRICLE ANGIO: CPT

## 2023-03-17 PROCEDURE — 93458 L HRT ARTERY/VENTRICLE ANGIO: CPT | Performed by: INTERNAL MEDICINE

## 2023-03-17 PROCEDURE — 92920 PRQ TRLUML C ANGIOP 1ART&/BR: CPT

## 2023-03-17 PROCEDURE — 96376 TX/PRO/DX INJ SAME DRUG ADON: CPT

## 2023-03-17 PROCEDURE — 2580000003 HC RX 258: Performed by: HOSPITALIST

## 2023-03-17 PROCEDURE — 99223 1ST HOSP IP/OBS HIGH 75: CPT | Performed by: INTERNAL MEDICINE

## 2023-03-17 PROCEDURE — 92973 PRQ TRLUML C MCHN ASP THRMBC: CPT | Performed by: INTERNAL MEDICINE

## 2023-03-17 PROCEDURE — 99152 MOD SED SAME PHYS/QHP 5/>YRS: CPT | Performed by: INTERNAL MEDICINE

## 2023-03-17 PROCEDURE — 6370000000 HC RX 637 (ALT 250 FOR IP): Performed by: INTERNAL MEDICINE

## 2023-03-17 PROCEDURE — 99153 MOD SED SAME PHYS/QHP EA: CPT

## 2023-03-17 PROCEDURE — 92973 PRQ TRLUML C MCHN ASP THRMBC: CPT

## 2023-03-17 PROCEDURE — 2140000000 HC CCU INTERMEDIATE R&B

## 2023-03-17 PROCEDURE — 36415 COLL VENOUS BLD VENIPUNCTURE: CPT

## 2023-03-17 PROCEDURE — 2709999900 HC NON-CHARGEABLE SUPPLY

## 2023-03-17 PROCEDURE — 85027 COMPLETE CBC AUTOMATED: CPT

## 2023-03-17 PROCEDURE — C1757 CATH, THROMBECTOMY/EMBOLECT: HCPCS

## 2023-03-17 PROCEDURE — C1887 CATHETER, GUIDING: HCPCS

## 2023-03-17 PROCEDURE — 93010 ELECTROCARDIOGRAM REPORT: CPT | Performed by: INTERNAL MEDICINE

## 2023-03-17 PROCEDURE — C1894 INTRO/SHEATH, NON-LASER: HCPCS

## 2023-03-17 PROCEDURE — 85347 COAGULATION TIME ACTIVATED: CPT

## 2023-03-17 PROCEDURE — 2720000010 HC SURG SUPPLY STERILE

## 2023-03-17 PROCEDURE — 6360000002 HC RX W HCPCS

## 2023-03-17 PROCEDURE — 94760 N-INVAS EAR/PLS OXIMETRY 1: CPT

## 2023-03-17 PROCEDURE — 93005 ELECTROCARDIOGRAM TRACING: CPT | Performed by: INTERNAL MEDICINE

## 2023-03-17 PROCEDURE — C1725 CATH, TRANSLUMIN NON-LASER: HCPCS

## 2023-03-17 PROCEDURE — 6360000002 HC RX W HCPCS: Performed by: INTERNAL MEDICINE

## 2023-03-17 PROCEDURE — 6370000000 HC RX 637 (ALT 250 FOR IP)

## 2023-03-17 PROCEDURE — 84484 ASSAY OF TROPONIN QUANT: CPT

## 2023-03-17 PROCEDURE — 2580000003 HC RX 258: Performed by: INTERNAL MEDICINE

## 2023-03-17 PROCEDURE — C1769 GUIDE WIRE: HCPCS

## 2023-03-17 PROCEDURE — 4A023N7 MEASUREMENT OF CARDIAC SAMPLING AND PRESSURE, LEFT HEART, PERCUTANEOUS APPROACH: ICD-10-PCS | Performed by: INTERNAL MEDICINE

## 2023-03-17 PROCEDURE — 02C03ZZ EXTIRPATION OF MATTER FROM CORONARY ARTERY, ONE ARTERY, PERCUTANEOUS APPROACH: ICD-10-PCS | Performed by: INTERNAL MEDICINE

## 2023-03-17 RX ORDER — EPTIFIBATIDE 0.75 MG/ML
2 INJECTION, SOLUTION INTRAVENOUS CONTINUOUS
Status: DISPENSED | OUTPATIENT
Start: 2023-03-17 | End: 2023-03-18

## 2023-03-17 RX ORDER — SODIUM CHLORIDE 9 MG/ML
INJECTION, SOLUTION INTRAVENOUS PRN
Status: DISCONTINUED | OUTPATIENT
Start: 2023-03-17 | End: 2023-03-18 | Stop reason: HOSPADM

## 2023-03-17 RX ORDER — SODIUM CHLORIDE 0.9 % (FLUSH) 0.9 %
5-40 SYRINGE (ML) INJECTION PRN
Status: DISCONTINUED | OUTPATIENT
Start: 2023-03-17 | End: 2023-03-18 | Stop reason: HOSPADM

## 2023-03-17 RX ORDER — ONDANSETRON 2 MG/ML
4 INJECTION INTRAMUSCULAR; INTRAVENOUS EVERY 6 HOURS PRN
Status: DISCONTINUED | OUTPATIENT
Start: 2023-03-17 | End: 2023-03-17 | Stop reason: SDUPTHER

## 2023-03-17 RX ORDER — MORPHINE SULFATE 2 MG/ML
2 INJECTION, SOLUTION INTRAMUSCULAR; INTRAVENOUS
Status: DISCONTINUED | OUTPATIENT
Start: 2023-03-17 | End: 2023-03-18 | Stop reason: HOSPADM

## 2023-03-17 RX ORDER — SODIUM CHLORIDE 0.9 % (FLUSH) 0.9 %
5-40 SYRINGE (ML) INJECTION EVERY 12 HOURS SCHEDULED
Status: DISCONTINUED | OUTPATIENT
Start: 2023-03-17 | End: 2023-03-18 | Stop reason: HOSPADM

## 2023-03-17 RX ORDER — ACETAMINOPHEN 325 MG/1
650 TABLET ORAL EVERY 4 HOURS PRN
Status: DISCONTINUED | OUTPATIENT
Start: 2023-03-17 | End: 2023-03-18 | Stop reason: HOSPADM

## 2023-03-17 RX ADMIN — SODIUM CHLORIDE, PRESERVATIVE FREE 10 ML: 5 INJECTION INTRAVENOUS at 08:48

## 2023-03-17 RX ADMIN — SODIUM CHLORIDE, PRESERVATIVE FREE 10 ML: 5 INJECTION INTRAVENOUS at 20:52

## 2023-03-17 RX ADMIN — EPTIFIBATIDE 2 MCG/KG/MIN: 0.75 INJECTION INTRAVENOUS at 17:48

## 2023-03-17 RX ADMIN — IOPAMIDOL 200 ML: 755 INJECTION, SOLUTION INTRAVENOUS at 17:01

## 2023-03-17 RX ADMIN — MORPHINE SULFATE 2 MG: 2 INJECTION, SOLUTION INTRAMUSCULAR; INTRAVENOUS at 20:52

## 2023-03-17 RX ADMIN — EPTIFIBATIDE 2 MCG/KG/MIN: 0.75 INJECTION INTRAVENOUS at 20:40

## 2023-03-17 RX ADMIN — MORPHINE SULFATE 2 MG: 2 INJECTION, SOLUTION INTRAMUSCULAR; INTRAVENOUS at 08:48

## 2023-03-17 RX ADMIN — ASPIRIN 81 MG: 81 TABLET, CHEWABLE ORAL at 08:46

## 2023-03-17 RX ADMIN — PANTOPRAZOLE SODIUM 40 MG: 40 TABLET, DELAYED RELEASE ORAL at 08:51

## 2023-03-17 RX ADMIN — DILTIAZEM HYDROCHLORIDE 120 MG: 120 CAPSULE, COATED, EXTENDED RELEASE ORAL at 20:53

## 2023-03-17 RX ADMIN — TICAGRELOR 90 MG: 90 TABLET ORAL at 20:53

## 2023-03-17 RX ADMIN — MORPHINE SULFATE 2 MG: 2 INJECTION, SOLUTION INTRAMUSCULAR; INTRAVENOUS at 04:28

## 2023-03-17 RX ADMIN — Medication 10 ML: at 20:55

## 2023-03-17 RX ADMIN — MORPHINE SULFATE 2 MG: 2 INJECTION, SOLUTION INTRAMUSCULAR; INTRAVENOUS at 17:46

## 2023-03-17 RX ADMIN — CETIRIZINE HYDROCHLORIDE 5 MG: 10 TABLET, FILM COATED ORAL at 20:53

## 2023-03-17 RX ADMIN — MORPHINE SULFATE 2 MG: 2 INJECTION, SOLUTION INTRAMUSCULAR; INTRAVENOUS at 12:02

## 2023-03-17 RX ADMIN — DILTIAZEM HYDROCHLORIDE 60 MG: 60 TABLET, FILM COATED ORAL at 08:46

## 2023-03-17 ASSESSMENT — PAIN DESCRIPTION - ORIENTATION
ORIENTATION: MID

## 2023-03-17 ASSESSMENT — PAIN DESCRIPTION - LOCATION
LOCATION: CHEST

## 2023-03-17 ASSESSMENT — PAIN SCALES - GENERAL
PAINLEVEL_OUTOF10: 0
PAINLEVEL_OUTOF10: 5
PAINLEVEL_OUTOF10: 9
PAINLEVEL_OUTOF10: 5
PAINLEVEL_OUTOF10: 7
PAINLEVEL_OUTOF10: 10
PAINLEVEL_OUTOF10: 10
PAINLEVEL_OUTOF10: 8
PAINLEVEL_OUTOF10: 10

## 2023-03-17 ASSESSMENT — PAIN DESCRIPTION - PAIN TYPE: TYPE: ACUTE PAIN

## 2023-03-17 ASSESSMENT — PAIN DESCRIPTION - FREQUENCY: FREQUENCY: INTERMITTENT

## 2023-03-17 ASSESSMENT — PAIN DESCRIPTION - DESCRIPTORS
DESCRIPTORS: PRESSURE;SHARP
DESCRIPTORS: ACHING
DESCRIPTORS: DISCOMFORT
DESCRIPTORS: ACHING

## 2023-03-17 ASSESSMENT — PAIN DESCRIPTION - ONSET: ONSET: ON-GOING

## 2023-03-17 NOTE — PROGRESS NOTES
Pt reporting 10/10 chest pain that feels like an \"elephant is sitting on her chest\" and has started radiating down her left arm and to her jaw. Pt states that she feels like the pain is progressively get worse. She is unable to have morphine at this time and is refusing nitro because it causes her severe headaches. Cardiology RN notified of pt's symptoms. Requesting an EKG and troponin. Hospitalist notified of pt's pain. Morphine increased to every 3 hours.

## 2023-03-17 NOTE — PROGRESS NOTES
Pt arrived to [facility] 2910 from cath lab. Rt groin site closed with mynx closure device, small amount of oozing noted to dressing. Area soft and nontender. Pt to lay flat until 2245. Integrilin running @ 2mcg/kg/min upon arrival and nitro running @ 30 mcg/min with goal to keep SBP <160. Call light in reach. All needs met at this time.

## 2023-03-17 NOTE — PRE SEDATION
Pre-Op Note/Sedation Assessment    Artem Patel  1955  [id number]  2:33 PM    Planned Procedure: Cardiac Catheterization Procedure  Post Procedure Plan: Return to same level of care  Consent: I have discussed with the patient and/or the patient representative the indication, alternatives, and the possible risks and/or complications of the planned procedure and the anesthesia methods. The patient and/or patient representative appear to understand and agree to proceed. Chief Complaint:   Anginal Equivalent      Indications for Cath Procedure:  Presentation:  Worsening Angina  2. Anginal Classification within 2 weeks:  CCS IV - Inability to perform any activity without angina or angina at rest, i.e., severe limitation  3. Angina Symptoms Assessment:  Typical Chest Pain  4. Heart Failure Class within last 2 weeks:  No symptoms  5. Cardiovascular Instability:  No    Prior Ischemic Workup/Eval:  Pre-Procedural Medications: Yes: Aspirin, Ca Channel Blockers, and PCSK9 Inhibitors  2. Stress Test Completed? Yes:  Stress or Imaging Studies Performed (within ANY time period):   Type:  Stress Nuclear  Results:  Negative Extent of Ischemia:  Low Risk (<1% annual death or MI)    Does Patient need surgery? Cath Valve Surgery:  No    Pre-Procedure Medical History:  Vital Signs:  BP (!) 167/95   Pulse 79   Temp 97 °F (36.1 °C) (Temporal)   Resp 16   Ht 5' 4\" (1.626 m)   Wt 190 lb 1.6 oz (86.2 kg)   SpO2 93%   BMI 32.63 kg/m²     Allergies:     Allergies   Allergen Reactions    Beta Adrenergic Blockers Anaphylaxis    Chicken Allergy     Contrast [Iodides] Swelling and Rash     Rash, severe swelling in face and body, SOB Nausea and vomitting    Contrast from MRI       Hydralazine Anaphylaxis    Pineapple     Pork Allergy     Shellfish-Derived Products Anaphylaxis and Swelling     Throat swelling    Asa [Aspirin] Nausea Only    Crestor [Rosuvastatin] Myalgia     Myalgia      Fenofibrate Myalgia Myalgia      Furosemide Swelling    Gabapentin Swelling    Hctz [Hydrochlorothiazide]     Lipitor [Atorvastatin] Myalgia     Myalgia      Lisinopril     Mometasone Furoate Swelling    Nasonex [Mometasone] Swelling    Nsaids      GI PAIN    Other      Any melons, grilled food, turkey, red/green/yellow peppers.        Plavix [Clopidogrel] Hives, Itching and Swelling    Ranexa [Ranolazine]     Sulfa Antibiotics Swelling    Xarelto [Rivaroxaban] Swelling    Zetia [Ezetimibe]     Flagyl [Metronidazole] Nausea And Vomiting     Medications:    Current Facility-Administered Medications   Medication Dose Route Frequency Provider Last Rate Last Admin    perflutren lipid microspheres (DEFINITY) injection 1.5 mL  1.5 mL IntraVENous ONCE PRN Polly Shelton DO        morphine (PF) injection 2 mg  2 mg IntraVENous Q3H PRN JAYLEEN Samuels - CNP   2 mg at 03/17/23 1202    aspirin chewable tablet 81 mg  81 mg Oral Daily Lazaro Yuan MD   81 mg at 03/17/23 0846    cetirizine (ZYRTEC) tablet 5 mg  5 mg Oral Daily Lazaro Yuan MD   5 mg at 03/16/23 2002    dilTIAZem (CARDIZEM CD) extended release capsule 120 mg  120 mg Oral Nightly Lazaro Yuan MD   120 mg at 03/16/23 2002    pantoprazole (PROTONIX) tablet 40 mg  40 mg Oral QAM AC Lazaro Yuan MD   40 mg at 03/17/23 0851    nitroGLYCERIN (NITROSTAT) SL tablet 0.4 mg  0.4 mg SubLINGual Q5 Min PRN Lazaro Yuan MD        sodium chloride flush 0.9 % injection 5-40 mL  5-40 mL IntraVENous 2 times per day Rhea Schreiber MD   10 mL at 03/17/23 0848    sodium chloride flush 0.9 % injection 5-40 mL  5-40 mL IntraVENous PRN Lazaro Yuan MD        0.9 % sodium chloride infusion   IntraVENous PRN Lazaro Yuan MD        enoxaparin (LOVENOX) injection 40 mg  40 mg SubCUTAneous Daily Lazaro Yuan MD   40 mg at 03/16/23 2003    ondansetron (ZOFRAN-ODT) disintegrating tablet 4 mg  4 mg Oral Q8H PRN Lazaro Yuan MD        Or    ondansetron (ZOFRAN) injection 4 mg  4 mg IntraVENous Q6H PRN Luis Mobley MD        polyethylene glycol (GLYCOLAX) packet 17 g  17 g Oral Daily PRN Lazaro Yuan MD        acetaminophen (TYLENOL) tablet 650 mg  650 mg Oral Q6H PRN Lazaro uYan MD   650 mg at 03/16/23 2002    Or    acetaminophen (TYLENOL) suppository 650 mg  650 mg Rectal Q6H PRN Luis Mobley MD        dilTIAZem (CARDIZEM) tablet 60 mg  60 mg Oral Daily Luis Mobley MD   60 mg at 03/17/23 0846       Past Medical History:    Past Medical History:   Diagnosis Date    Allergic rhinitis, cause unspecified 12/04/2010    Arthritis     Bilateral carotid artery stenosis     < 50% bilaterally    CAD (coronary artery disease)     angioplasty with stent at [facility] Dr. Aletta Litten, here Dr. Krysten Li at [facility],     Chronic pain     precordial, opiate dependent    Depression 01/22/2013    resolved    Erosive esophagitis 12/28/2016    Dr. Espinosa Edmore; PPI started; LA Class A, Sphincter of Oddi manometry and sphincterotomy if symptoms don't improve.      Essential hypertension 04/11/2017    H/O bladder infections     HSV infection     Hypercholesterolemia 06/06/2014    Irritable bowel syndrome with diarrhea 09/27/2016    Migraine headache 06/06/2014    controlled    Nonerosive nonspecific gastritis 12/28/2016    Dr. Jesús Gamez; body and antrum    OAB (overactive bladder) 03/14/2014    Urgency of urination        Surgical History:    Past Surgical History:   Procedure Laterality Date    ABDOMINAL ADHESION SURGERY      APPENDECTOMY      CARDIAC CATHETERIZATION  12/07/2013    recheck arteries unchanged    CHOLECYSTECTOMY      COLONOSCOPY      CORONARY ANGIOPLASTY WITH STENT PLACEMENT  10/2012    right- TOTAL OF 3 STENTS (patient states 9 stents total)    CORONARY ANGIOPLASTY WITH STENT PLACEMENT  02/04/2021    CORONARY ANGIOPLASTY WITH STENT PLACEMENT  09/2022    COSMETIC SURGERY      rhinoplasty    CYSTOSCOPY  04/2014    ENDOSCOPY, COLON, DIAGNOSTIC      ERCP  01/25/2017    FRACTURE SURGERY      LUE, plate and screws    HYSTERECTOMY (CERVIX STATUS UNKNOWN)      HYSTERECTOMY, TOTAL ABDOMINAL (CERVIX REMOVED)      OTHER SURGICAL HISTORY      Incision and drainage of Lingual Introral Lesion    POLYPECTOMY      Dr. Sudarshan Cifuentes    PTCA  10/2012    right    SINUS SURGERY      JOSE AND BSO (CERVIX REMOVED)      TONSILLECTOMY      TUBAL LIGATION      UPPER GASTROINTESTINAL ENDOSCOPY      WRIST SURGERY Left 05/21/2015    OPEN REDUCTION INTERNAL FIXATION LEFT WRIST       Pre-Sedation:  Pre-Sedation Documentation and Exam:  I have assessed the patient and reviewed the H&P on the chart. Prior History of Anesthesia Complications:   none    Modified Mallampati:  III (soft palate, base of uvula visible)    ASA Classification:  Class 3 - A patient with severe systemic disease that limits activity but is not incapacitating    Shant Scale: Activity:  2 - Able to move 4 extremities voluntarily on command  Respiration:  2 - Able to breathe deeply and cough freely  Circulation:  2 - BP+/- 20mmHg of normal  Consciousness:  2 - Fully awake  Oxygen Saturation (color):  2 - Able to maintain oxygen saturation >92% on room air    Sedation/Anesthesia Plan:  Guard the patient's safety and welfare. Minimize physical discomfort and pain. Minimize negative psychological responses to treatment by providing sedation and analgesia and maximize the potential amnesia. Patient to meet pre-procedure discharge plan.     Medication Planned:  midazolam intravenously and fentanyl intravenously    Patient is an appropriate candidate for plan of sedation:   yes      Rockney Severin, DO, [facility] [place]  Interventional Cardiology     o: [phone]  [address] [place] [address].,  Rock Hong Alaniz, [address] [place] [address]

## 2023-03-17 NOTE — TELEPHONE ENCOUNTER
states pt is in [facility] as directed. She is in room 5911. States pt seems to be getting worse, she is reddish in color. States nurse said they were going to call cardiology. Please advise.

## 2023-03-17 NOTE — OP NOTE
Cardiac Catheterization     Procedure: LHC, LVG, Aspiration thrombectomy, mechanical thrombectomy and POBA of mid RCA in stent thrombosis   Complications: None  Medications: Procedural sedation with minimal conscious sedation (5 Versed/250 Fentanyl)  An independent trained observer pushed medications at my direction. We monitored the patient's level of consciousness and vital signs/physiologic status throughout the procedure duration (see start and stop times in log). Estimated Blood Loss: Less than 20 mL  Specimens: were not obtained  Access: 6 Fr Right CFA  Closure: Mynx   Contrast: 200 cc  Start time: 1438  Stop time: 1646  Fluoro time: 21.6  Findings:     Left Heart Cath  Dominance: Right      LM: 20-30% distal   LAD: 20-30% proximal, mid stents patent, first diagonal proximal stent patent    LCx: mid and distal stents patent into OM2  RCA: proximal and mid stents patent with filling defect consistent with thrombus midway, 40% distal      LVEDP: 18 mmHg  LVEF: 55%    6 Fr AL 0.75 Guide  Choice PT ES wire  Pronto LP aspiration thrombectomy  Penumbra mechanical thrombectomy  3.5 x 12 mm NC Trek    0% residual     Impression/Recommendations:    S/P Successful PCI of RCA in stent thrombosis    Therapeutic Heparin. Integrilin boluses and gtt for 12 hours. Stressed the importance of medication compliance. ASA and Brilinta rebolused in lab and recommended long term. Continue Repatha and Diltiazem. Beta blocker and ACE inhibitor allergies.        Tyler DonahueDO, [facility] [place]  Interventional Cardiology     o: [phone]  [address] [place] [address]., [address] [facility], [address] [place] [address]

## 2023-03-17 NOTE — CONSULTS
Cardiovascular Consultation     Attending Physician: Ana Brown MD    PATIENT: Nandini Kidd  : 1955  MRN: [id number]    Reason for Consultation:   Chief Complaint   Patient presents with    Chest Pain     Mid sternal cp down arm into left jaw and around to back starting at 0100     History of present illness:   Ms. Nandini Kidd is a 74 y.o. female patient of Dr. Noel Saucedo, who presented to the ER this morning reporting midsternal chest pain radiating to her left arm and jaw, which started at 1 AM. Reports it reminds her of her angina. Had some relief with ASA and Nitro in the ER. Most recently she underwent stenting to RCA 2022, but stopped her Brilinta 23 due to an upset stomach. She asmits to not being \"great about aspirin\" either and trying tto take it a few times per week. \"Haven't talen a full week of Aspirin since January \" 2/2 intestinal issues. Today states\" it's a blockage, specifically a clot in the back of my heart\". States Morphine and Nitro are not offering relief. Medical History:      Diagnosis Date    Allergic rhinitis, cause unspecified 2010    Arthritis     Bilateral carotid artery stenosis     < 50% bilaterally    CAD (coronary artery disease)     angioplasty with stent at [facility] Dr. Dick Corley, here Dr. Lev Troy at [place] [address],     Chronic pain     precordial, opiate dependent    Depression 2013    resolved    Erosive esophagitis 2016    Dr. Elroy Stevens; PPI started; LA Class A, Sphincter of Oddi manometry and sphincterotomy if symptoms don't improve.      Essential hypertension 2017    H/O bladder infections     HSV infection     Hypercholesterolemia 2014    Irritable bowel syndrome with diarrhea 2016    Migraine headache 2014    controlled    Nonerosive nonspecific gastritis 2016    Dr. Elroy Stevens; body and antrum    OAB (overactive bladder) 2014    Urgency of urination        Surgical History:      Procedure Laterality Date    ABDOMINAL ADHESION SURGERY      APPENDECTOMY      CARDIAC CATHETERIZATION  2013    recheck arteries unchanged    CHOLECYSTECTOMY      COLONOSCOPY      CORONARY ANGIOPLASTY WITH STENT PLACEMENT  10/2012    right- TOTAL OF 3 STENTS (patient states 9 stents total)    CORONARY ANGIOPLASTY WITH STENT PLACEMENT  2021    CORONARY ANGIOPLASTY WITH STENT PLACEMENT  2022    COSMETIC SURGERY      rhinoplasty    CYSTOSCOPY  2014    ENDOSCOPY, COLON, DIAGNOSTIC      ERCP  2017    FRACTURE SURGERY      LUE, plate and screws    HYSTERECTOMY (CERVIX STATUS UNKNOWN)      HYSTERECTOMY, TOTAL ABDOMINAL (CERVIX REMOVED)      OTHER SURGICAL HISTORY      Incision and drainage of Lingual Introral Lesion    POLYPECTOMY      Dr. Samantha Butcher    PTCA  10/2012    right    SINUS SURGERY      JOSE AND BSO (CERVIX REMOVED)      TONSILLECTOMY      TUBAL LIGATION      UPPER GASTROINTESTINAL ENDOSCOPY      WRIST SURGERY Left 2015    OPEN REDUCTION INTERNAL FIXATION LEFT WRIST       Social History:  Social History     Socioeconomic History    Marital status:      Spouse name: Not on file    Number of children: Not on file    Years of education: Not on file    Highest education level: Not on file   Occupational History    Not on file   Tobacco Use    Smoking status: Former     Packs/day: 0.50     Years: 20.00     Pack years: 10.00     Types: Cigarettes     Quit date: 2007     Years since quittin.1    Smokeless tobacco: Never   Vaping Use    Vaping Use: Never used   Substance and Sexual Activity    Alcohol use: No    Drug use: No    Sexual activity: Yes     Partners: Male   Other Topics Concern    Not on file   Social History Narrative    Not on file     Social Determinants of Health     Financial Resource Strain: Not on file   Food Insecurity: Not on file   Transportation Needs: Not on file   Physical Activity: Not on file   Stress: Not on file Social Connections: Not on file   Intimate Partner Violence: Not on file   Housing Stability: Not on file        Family History:  No evidence for sudden cardiac death or premature CAD.       Problem Relation Age of Onset    Hypertension Mother     Heart Disease Mother     Heart Attack Mother     Hypertension Father     Heart Disease Father     High Blood Pressure Sister     Stroke Sister     High Blood Pressure Sister     Heart Disease Brother     Heart Attack Brother     Heart Disease Maternal Uncle     Stroke Other     Heart Disease Other     Other Other         hypercoag state       Medications:  perflutren lipid microspheres (DEFINITY) injection 1.5 mL, ONCE PRN  aspirin chewable tablet 81 mg, Daily  cetirizine (ZYRTEC) tablet 5 mg, Daily  dilTIAZem (CARDIZEM CD) extended release capsule 120 mg, Nightly  pantoprazole (PROTONIX) tablet 40 mg, QAM AC  morphine (PF) injection 2 mg, Q4H PRN  nitroGLYCERIN (NITROSTAT) SL tablet 0.4 mg, Q5 Min PRN  sodium chloride flush 0.9 % injection 5-40 mL, 2 times per day  sodium chloride flush 0.9 % injection 5-40 mL, PRN  0.9 % sodium chloride infusion, PRN  enoxaparin (LOVENOX) injection 40 mg, Daily  ondansetron (ZOFRAN-ODT) disintegrating tablet 4 mg, Q8H PRN   Or  ondansetron (ZOFRAN) injection 4 mg, Q6H PRN  polyethylene glycol (GLYCOLAX) packet 17 g, Daily PRN  acetaminophen (TYLENOL) tablet 650 mg, Q6H PRN   Or  acetaminophen (TYLENOL) suppository 650 mg, Q6H PRN  dilTIAZem (CARDIZEM) tablet 60 mg, Daily      Allergies:  Beta adrenergic blockers, Chicken allergy, Contrast [iodides], Hydralazine, Pineapple, Pork allergy, Shellfish-derived products, Asa [aspirin], Crestor [rosuvastatin], Fenofibrate, Furosemide, Gabapentin, Hctz [hydrochlorothiazide], Lipitor [atorvastatin], Lisinopril, Mometasone furoate, Nasonex [mometasone], Nsaids, Other, Plavix [clopidogrel], Ranexa [ranolazine], Sulfa antibiotics, Xarelto [rivaroxaban], Zetia [ezetimibe], and Flagyl [metronidazole] Review of Systems:   [x]Full ROS obtained and negative except as mentioned in HPI    Physical Examination:    BP (!) 167/95   Pulse 79   Temp 97 °F (36.1 °C) (Temporal)   Resp 16   Ht 5' 4\" (1.626 m)   Wt 190 lb 1.6 oz (86.2 kg)   SpO2 93%   BMI 32.63 kg/m²   Wt Readings from Last 3 Encounters:   03/17/23 190 lb 1.6 oz (86.2 kg)   01/27/23 198 lb (89.8 kg)   11/18/22 195 lb 3.2 oz (88.5 kg)       GENERAL: Well developed, well nourished, no acute distress  NEUROLOGICAL: Alert and oriented x3  PSYCH: Normal mood and affect   SKIN: Warm and dry, without lesions  HEENT: Normocephalic, atraumatic, Sclera non-icteric, mucous membranes moist  NECK: supple, JVP normal, thyroid not enlarged   CAROTID: Normal upstroke, no bruits  CARDIAC: Normal PMI, regular rate and rhythm, normal S1S2, no murmur, rub  RESPIRATORY: Normal respiratory effort, clear to auscultation bilaterally  EXTREMITIES: No cyanosis, clubbing or edema, palpable pulses bilaterally   MUSCULOSKELETAL: No joint swelling or tenderness, no chest wall tenderness  GASTROINTESTINAL:  soft, non-tender, no bruit    Labs:  Lab Review   Lab Results   Component Value Date/Time     03/16/2023 02:26 PM    K 3.5 03/16/2023 02:26 PM    K 4.4 09/16/2022 06:19 AM     03/16/2023 02:26 PM    CO2 23 03/16/2023 02:26 PM    BUN 11 03/16/2023 02:26 PM    CREATININE 0.8 03/16/2023 02:26 PM    GLUCOSE 108 03/16/2023 02:26 PM    GLUCOSE 96 11/14/2011 08:44 AM    CALCIUM 9.3 03/16/2023 02:26 PM     Lab Results   Component Value Date/Time    CKTOTAL 46 06/13/2018 01:04 PM    CKMB 3.1 01/18/2017 07:46 PM    TROPONINI <0.01 03/17/2023 03:43 AM     Lab Results   Component Value Date/Time    WBC 6.9 03/16/2023 02:26 PM    HGB 14.1 03/16/2023 02:26 PM    HCT 42.5 03/16/2023 02:26 PM    MCV 83.8 03/16/2023 02:26 PM     03/16/2023 02:26 PM     Lab Results   Component Value Date/Time    CHOL 138 01/17/2023 07:49 AM    TRIG 134 01/17/2023 07:49 AM    HDL 62 01/17/2023 07:49 AM       Imaging:  I have reviewed the below testing personally:    SPECT 12/23/20       Summary    -There is a small-moderate sized, moderate intensity fixed anterior defect    suggestive of scar.    -There is no ischemia. -LV function is normal with EF=70%    -Low-intermediate risk. JESSICA 4/2022   No thrombus noted. Overall left ventricular function is normal.   Mitral valve is structurally normal. Mild mitral regurgitation is present. There is no evidence of mass or thrombus in the left atrium or appendage. Tricuspid valve is structurally normal.   Mild tricuspid regurgitation. PASP 35 mmHg     ECHO 9/14/22  Summary   Left ventricular cavity size is normal with mild concentric left ventricular   hypertrophy. Ejection fraction is visually estimated to be 60-65%. No regional wall motion abnormalities are noted. Grade I diastolic dysfunction with normal LV filling pressures. E/e' = 12.3. Left atrium is dilated. Trivial aortic regurgitation. Mitral annular calcification is present. Mild tricuspid regurgitation. RVSP = 32 mmHG. 2/19/21  Left Heart Cath  Dominance: Right    LM: 30% distal   LAD: 50% mid LAD after first diagonal ; first diagonal and distal LAD stents widely patent   LCx: 20% mid  RCA: stent widely patent      LVEDP: 18 mmHg   LVEF: 60%     Cath 9/13/22 [facility])  Anatomy:   LM-30% distal  LAD-widely patent mid stent  D1-widely patent proximal stent, 90% distal to the stent which is very small caliber  Cx-80% mid diffuse, 100% distal  OM1-normal  RCA-70% proximal, widely patent mid stent, 50% just distal to the stent  RPDA-patent with luminal regularities  LVEF-60%  PCI: Circumflex 100% to 0% distal with a 2.25 mm x 38 mm Xience Constance ALEC and then 80% to 0% mid with overlapping stents, including overlapping the distal stent, with distal to proximal 3.0 mm x 38 mm Xience Constance ALEC and 3.25 mm x 18 mm Xience [address] ALEC. Impression:  1.   Occlusion of the distal circumflex along with severe stenosis of the mid circumflex ultimately revascularized with ALEC x3.  2.  Small vessel disease. 3.  Normal LV systolic function. 4.  Elevated LVEDP. Plan:  1. DAPT with aspirin and Brilinta. 2.  Trial of Zetia as it is intolerant of statins. Britton Gayer has been ordered but not started yet. 3.  Intolerant of beta-blockers. Consider trial of bisoprolol if patient will agree. 4.  Add valsartan for better blood pressure control started 80 mg daily. Titrate as hemodynamics allow. 5.  Consider return for FFR of proximal RCA versus nuclear stress testing. Her primary cardiologist Dr. Freddie Recio to decide. [facility] 9/16/22 Sandy Palencia)   Anatomy:   LM-30% distal  LAD-widely patent mid stents  D1-widely patent stent proximally, distal 90% small caliber  Cx-widely patent mid and distal stents  RCA-diffusely diseased, 70% proximal, widely patent mid stent, 50% distal, dominant, DFR 0.94, FFR 0.87, IVUS MLA 5.72 mm²  RPDA-patent  LVEF-not done  Impression:  1. Patent LAD and circumflex stents. 2.  Borderline significant angiographic stenosis in proximal RCA however with IVUS and FFR testing, it is within medical management range. 11/1/22  Left Heart Cath  Dominance: Right      LM: 30% distal   LAD: mid stents patent, first diagonal proximal stent patent (90% distal D1)   LCx: mid and distal stents patent into OM2  RCA: 85% proximal, mid stent patent, 50% distal      LVEDP: 21 mmHg  LVEF: 55%     6 Fr JR4 Guide  BMW wire  Vessel prep of proximal RCA with 3.5 x 15 mm Trek  3.5 x 23 mm Xience ALEC deployed from near ostial RCA to 2 mm into previous mid RCA stent and postdilated from 3.76 mm back to 4.01 mm.  0% residual     CXR 3/17/23  Impression   No acute cardiopulmonary findings     EKG 3/16/23  Normal sinus rhythm  Low voltage QRS  Septal infarct , age undetermined       Troponin <0.01 x4    Impression/Recommendations    Ms. Rachel Ramey is a 74 y.o. female patient of  Jennifer [place] with    Chest pain, endorsing worsening angina    CAD:    -11/1/22: ALEC x1 to prox RCA Cleopatra Marquez)    -9/16/22: Patent LAD and LCx stents, nonobstructive prox RCA by FFR and IVUS (DFR 0.94, FFR 0.87), Jeny Vazquez)    -9/13/22 (NSTEMI): ALEC x3 of mid-distal LCx, Residual 70% RCA stenosis, Jeny Vazquez)    -2/19/21 D1 and LAD stents widely patent, residual disease unchanged, Cleopatra Marquez)    -2/4/21 PCI of dLAD and D1, FFR neg mLAD (0.83), Matthew Caldera)    -11/20/17 (relook): without restent thrombosis/significant new blockages, Jairon Davidson)    -11/19/17 (STEMI): ALEC to Diag, Jairon Davidson)    -10/2012 PCI to RCA and Ramus     PAF, s/p multiple ablations, most recent CIT dependent flutter ablation 4/12/22, PRN OAC, follows with EP  Hypertension, suboptimal control   Familial hypercholesteremia,  --> 49 on PCSK9  IBS/hx gastritis, follows with GI  Obesity   Significant allergy profile, reported contrast allergy  Former tobacco use         Duke Raul admits to stopping her Brilinta January 1, 2023 due to an upset stomach. She has been inconsistent with ASA in recent weeks as well. She is newly compliant with PCSK9 inhibitor. Risks, benefits, goals, and alternatives of left heart catheterization with the potential for percutaneous coronary intervention discussed with patient; including stroke, heart attack, kidney damage, death, paralysis, disability, damage to nerves/arteries/veins. All questions answered and informed consent obtained. Further recommendations pending coronary angiography and clinical course. Thank you for allowing me to participate in the care of your patient. Please do not hesitate to call. Nini Duffy DO, [facility] [place]  Interventional Cardiology     o: [phone]  [address] [place] [address]., [address] [facility], [address] [facility]      NOTE:  This report was transcribed using voice recognition software. Every effort was made to ensure accuracy; however, inadvertent computerized transcription errors may be present.

## 2023-03-17 NOTE — PROGRESS NOTES
[facility]    3/17/2023 7:25 AM        Name: Artem Patel . Admitted: 3/16/2023  Primary Care Provider: Severo Golas, MD (Tel: [phone])      Subjective: Florencio Patel is a 74 y.o. female with a past medical history of hypertension, hyperlipidemia, CAD s/p several stents most recent 11/2022, gastritis and PAF s/p ablation who presented with chest pain. Reports that she was not tolerating Brilinta, stopped in 1/2023 and then resumed it in the last week or 2. Interval History: Today, she is uncomfortable and complaining of 10/10 chest pain. She is frustrated at the consideration of stress test and does not feel that she can physically complete it given her pain. Denies swelling or lapidations. Stat ECG performed with no significant changes. Pain improved with morphine, she refuses nitro due to HA. No GI/ complaints including N/V/D. Independently reviewed interval ancillary notes from cardiology. Problem List  Principal Problem:    Chest pain  Resolved Problems:    * No resolved hospital problems. *       Assessment and Plan:    Chest Pain   - 10/10 mid sternal CP that is crushing and radiates to the left arm and left neck   - Reviewed case with cardiology and planning for C later today   - Keep NPO   - Increase frequency of Morphine, if no relief with morphine she will take nitro despite SE  CAD   - Hx of many stents, most recent 11/1/2022 ALEC to RCA by Dr. Navin Sandoval   - SR on telemetry    - Denies palpitations,uses [address] PRN per EP  Hypertension   - Uncontrolled, can be due to pain, will follow, continue diltiazem  Hyperlipidemia   - On repatha as OP  Obesity: Body mass index is 32.63 kg/m².    - Weight loss recommended       Discussed care with patient and nursing  Reviewed case with cardiology team.   All pertinent information and plan of care discussed with the attending physician. Diet: Diet NPO  Code:Full Code  DVT PPX: Lovenox PPx    Disposition: Home without needs when medically    Current Medications  aspirin chewable tablet 81 mg, Daily  cetirizine (ZYRTEC) tablet 5 mg, Daily  dilTIAZem (CARDIZEM CD) extended release capsule 120 mg, Nightly  pantoprazole (PROTONIX) tablet 40 mg, QAM AC  morphine (PF) injection 2 mg, Q4H PRN  nitroGLYCERIN (NITROSTAT) SL tablet 0.4 mg, Q5 Min PRN  sodium chloride flush 0.9 % injection 5-40 mL, 2 times per day  sodium chloride flush 0.9 % injection 5-40 mL, PRN  0.9 % sodium chloride infusion, PRN  enoxaparin (LOVENOX) injection 40 mg, Daily  ondansetron (ZOFRAN-ODT) disintegrating tablet 4 mg, Q8H PRN   Or  ondansetron (ZOFRAN) injection 4 mg, Q6H PRN  polyethylene glycol (GLYCOLAX) packet 17 g, Daily PRN  acetaminophen (TYLENOL) tablet 650 mg, Q6H PRN   Or  acetaminophen (TYLENOL) suppository 650 mg, Q6H PRN  dilTIAZem (CARDIZEM) tablet 60 mg, Daily        Objective:  BP (!) 169/96   Pulse 82   Temp 97.5 °F (36.4 °C) (Temporal)   Resp 16   Ht 5' 4\" (1.626 m)   Wt 190 lb 1.6 oz (86.2 kg)   SpO2 97%   BMI 32.63 kg/m²   Vitals:    03/17/23 0415   BP: (!) 169/96   Pulse:    Resp: 16   Temp: 97.5 °F (36.4 °C)   SpO2: 97%     No intake or output data in the 24 hours ending 03/17/23 0725   Wt Readings from Last 3 Encounters:   03/17/23 190 lb 1.6 oz (86.2 kg)   01/27/23 198 lb (89.8 kg)   11/18/22 195 lb 3.2 oz (88.5 kg)     Review of Systems:  Constitutional: Negative for fever, weight changes, or weakness  Skin: Negative for bruising, bleeding, blood clots, or changes in skin pigment  HEENT: Negative for vision changes or dysphagia  Respiratory: No Wheezes or recent URI.    Cardiovascular: No palpitations, dizziness, or syncope. + CP and SOB  Gastrointestinal: Negative for abdominal pain, N/V/D, constipation, or black/tarry stools  Genito-Urinary: Negative for hematuria  Musculoskeletal: No focal weakness  Neurological/Psych: Negative for confusion or TIA-like symptoms. No anxiety, depression, or insomnia    Physical Examination:  Telemetry: Personally Reviewed Normal sinus rhythm  Constitutional: Cooperative and in no apparent distress, appears well nourished, Yes obesity  Skin: Warm and pink; no cyanosis, bruising, or clubbing, No lesions/incisions  HEENT: Symmetric and normocephalic. Conjunctiva pink with clear sclera. Mucus membranes pink and moist.   Cardiovascular: regular rate and rhythm. S1 & S2, negative for murmurs. Peripheral pulses 2+, No peripheral edema  Respiratory: Respirations symmetric and unlabored. Lungs clear to auscultation bilaterally, no wheezing, crackles, or rhonchi  Gastrointestinal: Abdomen soft and round. normal bowel sounds. No tenderness  Musculoskeletal: No focal weakness, muscle strength 5/5 bilaterally  Neurologic/Psych: Awake and orientated to person, place and time. Calm affect, appropriate mood. Pertinent labs, diagnostic, and imaging results reviewed as a part of this visit    Labs and Tests:  CBC:   Recent Labs     03/16/23  1426   WBC 6.9   HGB 14.1        BMP:    Recent Labs     03/16/23  1426      K 3.5      CO2 23   BUN 11   CREATININE 0.8   GLUCOSE 108*     Hepatic:   Recent Labs     03/16/23  1426   AST 21   ALT 10   BILITOT 0.3   ALKPHOS 137*       Relevant results:  SPECT 12/23/20       Summary    -There is a small-moderate sized, moderate intensity fixed anterior defect    suggestive of scar.    -There is no ischemia. -LV function is normal with EF=70%    -Low-intermediate risk. JESSICA 4/2022   No thrombus noted. Overall left ventricular function is normal.   Mitral valve is structurally normal. Mild mitral regurgitation is present. There is no evidence of mass or thrombus in the left atrium or appendage. Tricuspid valve is structurally normal.   Mild tricuspid regurgitation.  PASP 35 mmHg     ECHO 9/14/22  Summary   Left ventricular cavity size is normal with mild concentric left ventricular   hypertrophy. Ejection fraction is visually estimated to be 60-65%. No regional wall motion abnormalities are noted. Grade I diastolic dysfunction with normal LV filling pressures. E/e' = 12.3. Left atrium is dilated. Trivial aortic regurgitation. Mitral annular calcification is present. Mild tricuspid regurgitation. RVSP = 32 mmHG. [facility] 9/16/22 Geronimo Dilmanoj)   Anatomy:   LM-30% distal  LAD-widely patent mid stents  D1-widely patent stent proximally, distal 90% small caliber  Cx-widely patent mid and distal stents  RCA-diffusely diseased, 70% proximal, widely patent mid stent, 50% distal, dominant, DFR 0.94, FFR 0.87, IVUS MLA 5.72 mm²  RPDA-patent  LVEF-not done  Impression:  1. Patent LAD and circumflex stents. 2.  Borderline significant angiographic stenosis in proximal RCA however with IVUS and FFR testing, it is within medical management range.      11/1/22  Left Heart Cath  Dominance: Right      LM: 30% distal   LAD: mid stents patent, first diagonal proximal stent patent (90% distal D1)   LCx: mid and distal stents patent into OM2  RCA: 85% proximal, mid stent patent, 50% distal      LVEDP: 21 mmHg  LVEF: 55%     6 Fr JR4 Guide  BMW wire  Vessel prep of proximal RCA with 3.5 x 15 mm Trek  3.5 x 23 mm Xience ALEC deployed from near ostial RCA to 2 mm into previous mid RCA stent and postdilated from 3.76 mm back to 4.01 mm.  0% residual      CXR 3/17/23  Impression   No acute cardiopulmonary findings     EKG 3/16/23  Normal sinus rhythm  Low voltage QRS  Septal infarct , age undetermined     Edythe Congress, APRN - CNP   3/17/2023 7:25 AM

## 2023-03-18 VITALS
RESPIRATION RATE: 18 BRPM | TEMPERATURE: 97.7 F | HEIGHT: 64 IN | SYSTOLIC BLOOD PRESSURE: 147 MMHG | DIASTOLIC BLOOD PRESSURE: 78 MMHG | WEIGHT: 202.38 LBS | BODY MASS INDEX: 34.55 KG/M2 | OXYGEN SATURATION: 95 % | HEART RATE: 85 BPM

## 2023-03-18 LAB
ANION GAP SERPL CALCULATED.3IONS-SCNC: 12 MMOL/L (ref 3–16)
BUN SERPL-MCNC: 13 MG/DL (ref 7–20)
CALCIUM SERPL-MCNC: 9.3 MG/DL (ref 8.3–10.6)
CHLORIDE SERPL-SCNC: 102 MMOL/L (ref 99–110)
CHOLEST SERPL-MCNC: 145 MG/DL (ref 0–199)
CO2 SERPL-SCNC: 24 MMOL/L (ref 21–32)
CREAT SERPL-MCNC: 1 MG/DL (ref 0.6–1.2)
D DIMER: 0.41 UG/ML FEU (ref 0–0.6)
DEPRECATED RDW RBC AUTO: 12.8 % (ref 12.4–15.4)
EKG ATRIAL RATE: 84 BPM
EKG DIAGNOSIS: NORMAL
EKG P AXIS: 0 DEGREES
EKG P-R INTERVAL: 162 MS
EKG Q-T INTERVAL: 410 MS
EKG QRS DURATION: 78 MS
EKG QTC CALCULATION (BAZETT): 484 MS
EKG R AXIS: -32 DEGREES
EKG T AXIS: 48 DEGREES
EKG VENTRICULAR RATE: 84 BPM
GFR SERPLBLD CREATININE-BSD FMLA CKD-EPI: >60 ML/MIN/{1.73_M2}
GLUCOSE SERPL-MCNC: 150 MG/DL (ref 70–99)
HCT VFR BLD AUTO: 41.7 % (ref 36–48)
HDLC SERPL-MCNC: 66 MG/DL (ref 40–60)
HGB BLD-MCNC: 14.1 G/DL (ref 12–16)
LDLC SERPL CALC-MCNC: 58 MG/DL
MCH RBC QN AUTO: 28.1 PG (ref 26–34)
MCHC RBC AUTO-ENTMCNC: 33.8 G/DL (ref 31–36)
MCV RBC AUTO: 83.3 FL (ref 80–100)
PLATELET # BLD AUTO: 344 K/UL (ref 135–450)
PMV BLD AUTO: 7.9 FL (ref 5–10.5)
POTASSIUM SERPL-SCNC: 3.9 MMOL/L (ref 3.5–5.1)
RBC # BLD AUTO: 5.01 M/UL (ref 4–5.2)
SODIUM SERPL-SCNC: 138 MMOL/L (ref 136–145)
TRIGL SERPL-MCNC: 106 MG/DL (ref 0–150)
VLDLC SERPL CALC-MCNC: 21 MG/DL
WBC # BLD AUTO: 13.4 K/UL (ref 4–11)

## 2023-03-18 PROCEDURE — 80048 BASIC METABOLIC PNL TOTAL CA: CPT

## 2023-03-18 PROCEDURE — 6360000002 HC RX W HCPCS: Performed by: NURSE PRACTITIONER

## 2023-03-18 PROCEDURE — 80061 LIPID PANEL: CPT

## 2023-03-18 PROCEDURE — 93005 ELECTROCARDIOGRAM TRACING: CPT | Performed by: PHYSICIAN ASSISTANT

## 2023-03-18 PROCEDURE — 85379 FIBRIN DEGRADATION QUANT: CPT

## 2023-03-18 PROCEDURE — 6370000000 HC RX 637 (ALT 250 FOR IP): Performed by: HOSPITALIST

## 2023-03-18 PROCEDURE — 85027 COMPLETE CBC AUTOMATED: CPT

## 2023-03-18 PROCEDURE — 36415 COLL VENOUS BLD VENIPUNCTURE: CPT

## 2023-03-18 PROCEDURE — 93010 ELECTROCARDIOGRAM REPORT: CPT | Performed by: INTERNAL MEDICINE

## 2023-03-18 PROCEDURE — 99233 SBSQ HOSP IP/OBS HIGH 50: CPT | Performed by: INTERNAL MEDICINE

## 2023-03-18 PROCEDURE — 2580000003 HC RX 258: Performed by: HOSPITALIST

## 2023-03-18 PROCEDURE — 6370000000 HC RX 637 (ALT 250 FOR IP): Performed by: INTERNAL MEDICINE

## 2023-03-18 PROCEDURE — 2580000003 HC RX 258: Performed by: INTERNAL MEDICINE

## 2023-03-18 PROCEDURE — 6370000000 HC RX 637 (ALT 250 FOR IP): Performed by: NURSE PRACTITIONER

## 2023-03-18 RX ORDER — VALSARTAN 80 MG/1
80 TABLET ORAL DAILY
Qty: 30 TABLET | Refills: 3 | Status: SHIPPED | OUTPATIENT
Start: 2023-03-19

## 2023-03-18 RX ORDER — VALSARTAN 40 MG/1
80 TABLET ORAL DAILY
Status: DISCONTINUED | OUTPATIENT
Start: 2023-03-19 | End: 2023-03-18 | Stop reason: HOSPADM

## 2023-03-18 RX ORDER — VALSARTAN 40 MG/1
40 TABLET ORAL DAILY
Qty: 30 TABLET | Refills: 3 | Status: SHIPPED | OUTPATIENT
Start: 2023-03-19 | End: 2023-03-18 | Stop reason: HOSPADM

## 2023-03-18 RX ORDER — TRAMADOL HYDROCHLORIDE 50 MG/1
50 TABLET ORAL EVERY 6 HOURS PRN
Status: DISCONTINUED | OUTPATIENT
Start: 2023-03-18 | End: 2023-03-18 | Stop reason: HOSPADM

## 2023-03-18 RX ORDER — VALSARTAN 40 MG/1
40 TABLET ORAL DAILY
Status: DISCONTINUED | OUTPATIENT
Start: 2023-03-18 | End: 2023-03-18

## 2023-03-18 RX ORDER — VALSARTAN 40 MG/1
40 TABLET ORAL ONCE
Status: COMPLETED | OUTPATIENT
Start: 2023-03-18 | End: 2023-03-18

## 2023-03-18 RX ADMIN — PANTOPRAZOLE SODIUM 40 MG: 40 TABLET, DELAYED RELEASE ORAL at 07:13

## 2023-03-18 RX ADMIN — TICAGRELOR 90 MG: 90 TABLET ORAL at 09:25

## 2023-03-18 RX ADMIN — TRAMADOL HYDROCHLORIDE 50 MG: 50 TABLET ORAL at 11:03

## 2023-03-18 RX ADMIN — SODIUM CHLORIDE, PRESERVATIVE FREE 10 ML: 5 INJECTION INTRAVENOUS at 09:27

## 2023-03-18 RX ADMIN — MORPHINE SULFATE 2 MG: 2 INJECTION, SOLUTION INTRAMUSCULAR; INTRAVENOUS at 13:12

## 2023-03-18 RX ADMIN — MORPHINE SULFATE 2 MG: 2 INJECTION, SOLUTION INTRAMUSCULAR; INTRAVENOUS at 08:34

## 2023-03-18 RX ADMIN — MORPHINE SULFATE 2 MG: 2 INJECTION, SOLUTION INTRAMUSCULAR; INTRAVENOUS at 00:04

## 2023-03-18 RX ADMIN — ASPIRIN 81 MG: 81 TABLET, CHEWABLE ORAL at 09:26

## 2023-03-18 RX ADMIN — VALSARTAN 40 MG: 40 TABLET, FILM COATED ORAL at 11:05

## 2023-03-18 RX ADMIN — Medication 5 ML: at 09:00

## 2023-03-18 RX ADMIN — MORPHINE SULFATE 2 MG: 2 INJECTION, SOLUTION INTRAMUSCULAR; INTRAVENOUS at 04:33

## 2023-03-18 RX ADMIN — DILTIAZEM HYDROCHLORIDE 60 MG: 60 TABLET, FILM COATED ORAL at 09:25

## 2023-03-18 RX ADMIN — VALSARTAN 40 MG: 40 TABLET, FILM COATED ORAL at 14:25

## 2023-03-18 ASSESSMENT — PAIN DESCRIPTION - DESCRIPTORS
DESCRIPTORS: ACHING
DESCRIPTORS: DISCOMFORT

## 2023-03-18 ASSESSMENT — PAIN SCALES - GENERAL
PAINLEVEL_OUTOF10: 7
PAINLEVEL_OUTOF10: 8
PAINLEVEL_OUTOF10: 8

## 2023-03-18 ASSESSMENT — PAIN DESCRIPTION - LOCATION
LOCATION: CHEST
LOCATION: CHEST
LOCATION: GROIN
LOCATION: CHEST

## 2023-03-18 ASSESSMENT — PAIN - FUNCTIONAL ASSESSMENT: PAIN_FUNCTIONAL_ASSESSMENT: ACTIVITIES ARE NOT PREVENTED

## 2023-03-18 ASSESSMENT — PAIN DESCRIPTION - ORIENTATION
ORIENTATION: RIGHT
ORIENTATION: MID;ANTERIOR
ORIENTATION: ANTERIOR;MID

## 2023-03-18 NOTE — PROGRESS NOTES
Cardiovascular Progress Note      Chief Complaint:   Chief Complaint   Patient presents with    Chest Pain     Mid sternal cp down arm into left jaw and around to back starting at 0100     Impression/Recommendations:    Ms. Lorena Gandhi is a 74 y.o. female patient of Dr. Stefani Rock with     Unstable angina presentation 3/17/2023  RCA in stent thrombosis 2/2 medication noncompliance (off both antiplatelets and anticoagulant). CAD:    -11/1/22: ALEC x1 to prox RCA Kinza Danbury)    -9/16/22: Patent LAD and LCx stents, nonobstructive prox RCA by FFR and IVUS (DFR 0.94, FFR 0.87), Manolo Blade)    -9/13/22 (NSTEMI): ALEC x3 of mid-distal LCx, Residual 70% RCA stenosis, Manolo Blade)    -2/19/21 D1 and LAD stents widely patent, residual disease unchanged, Kinza Danbury)    -2/4/21 PCI of dLAD and D1, FFR neg mLAD (0.83), Thelda Bjornstad)    -11/20/17 (relook): without restent thrombosis/significant new blockages, Viv Risk)    -11/19/17 (STEMI): ALEC to Diag, Viv Risk)    -10/2012 PCI to RCA and Ramus     PAF, s/p multiple ablations, most recent CIT dependent flutter ablation 4/12/22, follows with EP  Hypertension  Familial hypercholesteremia, now controlled on PCSK9I  IBS/hx gastritis, follows with GI  Obesity   Significant allergy profile, reported contrast allergy  Former tobacco use      S/P Successful thrombectomy/PTA of RCA in stent thrombosis 3/17/2023      Therapeutic Heparin/Integrilin gtt yesterday. Stressed the importance of medication compliance. ASA and Brilinta rebolused in lab and recommended long term. Continue Repatha and Diltiazem. Beta blocker and ACE inhibitor allergies. Interval History:   Tearful today- feels that pain never fully goes away into her back. Back pain gets her \"blood pressure elevated\". Frustrated with her \"situation, having stomach and bowel issues with different blood thinners\".  Discussed at length stent thrombosis yesterday and critical nature of such going forward if noncompliant with antiplatelets.  She and her  both express understanding that she is at high risk for such if noncompliant and that it could be fatal.     SR 80s     3/17/2023  Left Heart Cath  Dominance: Right      LM: 20-30% distal   LAD: 20-30% proximal, mid stents patent, first diagonal proximal stent patent    LCx: mid and distal stents patent into OM2  RCA: proximal and mid stents patent with filling defect consistent with thrombus midway, 40% distal      LVEDP: 18 mmHg  LVEF: 55%     6 Fr AL 0.75 Guide  Choice PT ES wire  Pronto LP aspiration thrombectomy  [facility] mechanical thrombectomy  3.5 x 12 mm NC Trek     0% residual      CXR 3/17/23  Impression   No acute cardiopulmonary findings      EKG 3/18/2023  Normal sinus rhythm  Low voltage QRS    D Dimer 0.41     Troponin <0.01 x4      Medications:  traMADol (ULTRAM) tablet 50 mg, Q6H PRN  [START ON 3/19/2023] valsartan (DIOVAN) tablet 80 mg, Daily  perflutren lipid microspheres (DEFINITY) injection 1.5 mL, ONCE PRN  morphine (PF) injection 2 mg, Q3H PRN  sodium chloride flush 0.9 % injection 5-40 mL, 2 times per day  sodium chloride flush 0.9 % injection 5-40 mL, PRN  0.9 % sodium chloride infusion, PRN  acetaminophen (TYLENOL) tablet 650 mg, Q4H PRN  ticagrelor (BRILINTA) tablet 90 mg, BID  aspirin chewable tablet 81 mg, Daily  cetirizine (ZYRTEC) tablet 5 mg, Daily  dilTIAZem (CARDIZEM CD) extended release capsule 120 mg, Nightly  pantoprazole (PROTONIX) tablet 40 mg, QAM AC  nitroGLYCERIN (NITROSTAT) SL tablet 0.4 mg, Q5 Min PRN  sodium chloride flush 0.9 % injection 5-40 mL, 2 times per day  sodium chloride flush 0.9 % injection 5-40 mL, PRN  0.9 % sodium chloride infusion, PRN  enoxaparin (LOVENOX) injection 40 mg, Daily  ondansetron (ZOFRAN-ODT) disintegrating tablet 4 mg, Q8H PRN   Or  ondansetron (ZOFRAN) injection 4 mg, Q6H PRN  polyethylene glycol (GLYCOLAX) packet 17 g, Daily PRN  acetaminophen (TYLENOL) suppository 650 mg, Q6H PRN  dilTIAZem (CARDIZEM) tablet 60 mg, Daily        I/O:     Intake/Output Summary (Last 24 hours) at 3/18/2023 1703  Last data filed at 3/18/2023 0438  Gross per 24 hour   Intake 300 ml   Output 850 ml   Net -550 ml       Physical Exam:    BP (!) 147/78   Pulse 85   Temp 97.7 °F (36.5 °C) (Temporal)   Resp 18   Ht 5' 4\" (1.626 m)   Wt 202 lb 6.1 oz (91.8 kg)   SpO2 95%   BMI 34.74 kg/m²   Wt Readings from Last 3 Encounters:   03/18/23 202 lb 6.1 oz (91.8 kg)   01/27/23 198 lb (89.8 kg)   11/18/22 195 lb 3.2 oz (88.5 kg)       GENERAL: Well developed, well nourished, no acute distress  NEUROLOGICAL: Alert and oriented x3  PSYCH: Normal mood and affect   SKIN: Warm and dry, without lesions  HEENT: Normocephalic, atraumatic, Sclera non-icteric, mucous membranes moist  NECK: supple, JVP normal, thyroid not enlarged   CAROTID: Normal upstroke, no bruits  CARDIAC: Normal PMI, regular rate and rhythm, normal S1S2, no murmur, rub  RESPIRATORY: Normal respiratory effort, clear to auscultation bilaterally  EXTREMITIES: No cyanosis, clubbing or edema, palpable pulses bilaterally   MUSCULOSKELETAL: No joint swelling or tenderness, no chest wall tenderness  GASTROINTESTINAL:  soft, non-tender, no bruit    Data Review:    CBC:   Recent Labs     03/16/23  1426 03/17/23  1800 03/18/23  0425   WBC 6.9 7.7 13.4*   HGB 14.1 14.2 14.1   HCT 42.5 43.6 41.7   MCV 83.8 86.1 83.3    272 344     BMP:   Recent Labs     03/16/23  1426 03/18/23  0425    138   K 3.5 3.9    102   CO2 23 24   BUN 11 13   CREATININE 0.8 1.0     LFTS:   Recent Labs     03/16/23  1426   ALT 10   AST 21   ALKPHOS 137*   PROT 7.0   AGRATIO 1.5   BILITOT 0.3     Cardiac Enzymes:   Recent Labs     03/16/23  2304 03/17/23  0343 03/17/23  1122   TROPONINI <0.01 <0.01 <0.01     PT/INR:   Recent Labs     03/16/23  1426   PROTIME 14.0   INR 1.09     APTT:   Recent Labs     03/16/23  1426   APTT 27.5         Karen Webb DO, [facility] [place]  Interventional Cardiology o: [phone]  [facility] [address], [address]      NOTE:  This report was transcribed using voice recognition software. Every effort was made to ensure accuracy; however, inadvertent computerized transcription errors may be present.

## 2023-03-18 NOTE — PROGRESS NOTES
[facility] PROGRESS NOTE    3/18/2023 4:29 PM        Name: Rachel Ramey . Admitted: 3/16/2023  Primary Care Provider: Ede Morrissey MD (Tel: [phone])      Subjective: Earnest Ramey is a 74 y.o. female with a past medical history of hypertension, hyperlipidemia, CAD s/p several stents most recent 11/2022, gastritis and PAF s/p ablation who presented with chest pain. Reports that she was not tolerating Brilinta, stopped in 1/2023 and then resumed it in the last week or 2. S/p LHC and PCI of RCA in stent thrombosis with mechanical and aspiration thrombectomy 3/17    Interval History: Today, she is complaining of chest pain with radiation to her back. Reports that she feels this is related to her heart. PCI of RCA in stent thrombosis. Received Integrilin. On ASA and brilinta. BP has been elevated. Denies SOB, palpitations or swelling. No GI/ complaints. Independently reviewed interval ancillary notes from cardiology. Problem List  Principal Problem:    Chest pain  Active Problems:    Unstable angina (HCC)  Resolved Problems:    * No resolved hospital problems. *     Assessment and Plan:    Chest Pain   - Complains of mid sternal CP with radiation to back   - S/p PCI of RCA due to in stent thrombosis   - Repeat ECG with no acute changes, she refuses nitroglycerin due to HA   - Notified cardiology   CAD   - Hx of many stents, most recent 11/1/2022 ALEC to RCA by Dr. Monika Mcconnell   - SR on telemetry    - Denies palpitations,uses [address] PRN per EP  Hypertension   - Uncontrolled, can be due to pain, will follow, continue diltiazem  Hyperlipidemia   - On repatha as OP  Obesity: Body mass index is 34.74 kg/m².    - Weight loss recommended       - ECG and d-dimer  - Tramadol for back pain  - Defer troponin to cards    Plan for discharge when cleared by cards    Discussed care with patient and nursing  Reviewed case, symptoms, d-dimer results and hx with Dr. Taylor Roe, no further workup recommended from medical team, will discharge if cleared by cards. Diet: ADULT DIET; Regular; 4 carb choices (60 gm/meal);  Low Fat/Low Chol/High Fiber/2 gm Na  Code:Full Code  DVT PPX: Lovenox PPx    Disposition: Home without needs when medically stable    Current Medications  traMADol (ULTRAM) tablet 50 mg, Q6H PRN  valsartan (DIOVAN) tablet 40 mg, Daily  perflutren lipid microspheres (DEFINITY) injection 1.5 mL, ONCE PRN  morphine (PF) injection 2 mg, Q3H PRN  sodium chloride flush 0.9 % injection 5-40 mL, 2 times per day  sodium chloride flush 0.9 % injection 5-40 mL, PRN  0.9 % sodium chloride infusion, PRN  acetaminophen (TYLENOL) tablet 650 mg, Q4H PRN  ticagrelor (BRILINTA) tablet 90 mg, BID  aspirin chewable tablet 81 mg, Daily  cetirizine (ZYRTEC) tablet 5 mg, Daily  dilTIAZem (CARDIZEM CD) extended release capsule 120 mg, Nightly  pantoprazole (PROTONIX) tablet 40 mg, QAM AC  nitroGLYCERIN (NITROSTAT) SL tablet 0.4 mg, Q5 Min PRN  sodium chloride flush 0.9 % injection 5-40 mL, 2 times per day  sodium chloride flush 0.9 % injection 5-40 mL, PRN  0.9 % sodium chloride infusion, PRN  enoxaparin (LOVENOX) injection 40 mg, Daily  ondansetron (ZOFRAN-ODT) disintegrating tablet 4 mg, Q8H PRN   Or  ondansetron (ZOFRAN) injection 4 mg, Q6H PRN  polyethylene glycol (GLYCOLAX) packet 17 g, Daily PRN  acetaminophen (TYLENOL) suppository 650 mg, Q6H PRN  dilTIAZem (CARDIZEM) tablet 60 mg, Daily      Objective:  BP (!) 147/78   Pulse 85   Temp 97.7 °F (36.5 °C) (Temporal)   Resp 18   Ht 5' 4\" (1.626 m)   Wt 202 lb 6.1 oz (91.8 kg)   SpO2 95%   BMI 34.74 kg/m²   Vitals:    03/18/23 1305   BP: (!) 147/78   Pulse: 85   Resp: 18   Temp: 97.7 °F (36.5 °C)   SpO2: 95%       Intake/Output Summary (Last 24 hours) at 3/18/2023 1629  Last data filed at 3/18/2023 0438  Gross per 24 hour   Intake 300 ml   Output 850 ml   Net -550 ml        Wt Readings from Last 3 Encounters:   03/18/23 202 lb 6.1 oz (91.8 kg)   01/27/23 198 lb (89.8 kg)   11/18/22 195 lb 3.2 oz (88.5 kg)     Review of Systems:  Constitutional: Negative for fever, weight changes, or weakness  Skin: Negative for bruising, bleeding, blood clots, or changes in skin pigment  HEENT: Negative for vision changes or dysphagia  Respiratory: No Wheezes or recent URI. Cardiovascular: No palpitations, dizziness, or syncope. + CP    Gastrointestinal: Negative for abdominal pain, N/V/D, constipation, or black/tarry stools  Genito-Urinary: Negative for hematuria  Musculoskeletal: No focal weakness  Neurological/Psych: Negative for confusion or TIA-like symptoms. No anxiety, depression, or insomnia    Physical Examination:  Telemetry: Personally Reviewed Normal sinus rhythm  Constitutional: Cooperative and in no apparent distress, appears well nourished, Yes obesity  Skin: Warm and pink; no cyanosis, bruising, or clubbing, No lesions/incisions  HEENT: Symmetric and normocephalic. Conjunctiva pink with clear sclera. Mucus membranes pink and moist.   Cardiovascular: regular rate and rhythm. S1 & S2, negative for murmurs. Peripheral pulses 2+, No peripheral edema  Respiratory: Respirations symmetric and unlabored. Lungs clear to auscultation bilaterally, no wheezing, crackles, or rhonchi  Gastrointestinal: Abdomen soft and round. normal bowel sounds. No tenderness  Musculoskeletal: No focal weakness, muscle strength 5/5 bilaterally  Neurologic/Psych: Awake and orientated to person, place and time. Calm affect, appropriate mood.      Pertinent labs, diagnostic, and imaging results reviewed as a part of this visit    Labs and Tests:  CBC:   Recent Labs     03/16/23  1426 03/17/23  1800 03/18/23  0425   WBC 6.9 7.7 13.4*   HGB 14.1 14.2 14.1    272 344       BMP:    Recent Labs     03/16/23  1426 03/18/23  0425    138   K 3.5 3.9    102   CO2 23 24   BUN 11 13   CREATININE 0.8 1.0   GLUCOSE 108* 150*       Hepatic:   Recent Labs     03/16/23  1426   AST 21   ALT 10   BILITOT 0.3   ALKPHOS 137*         Relevant results:  SPECT 12/23/20       Summary    -There is a small-moderate sized, moderate intensity fixed anterior defect    suggestive of scar.    -There is no ischemia. -LV function is normal with EF=70%    -Low-intermediate risk. JESSICA 4/2022   No thrombus noted. Overall left ventricular function is normal.   Mitral valve is structurally normal. Mild mitral regurgitation is present. There is no evidence of mass or thrombus in the left atrium or appendage. Tricuspid valve is structurally normal.   Mild tricuspid regurgitation. PASP 35 mmHg     ECHO 9/14/22  Summary   Left ventricular cavity size is normal with mild concentric left ventricular   hypertrophy. Ejection fraction is visually estimated to be 60-65%. No regional wall motion abnormalities are noted. Grade I diastolic dysfunction with normal LV filling pressures. E/e' = 12.3. Left atrium is dilated. Trivial aortic regurgitation. Mitral annular calcification is present. Mild tricuspid regurgitation. RVSP = 32 mmHG. [facility] 9/16/22 Madhav Lynn   Anatomy:   LM-30% distal  LAD-widely patent mid stents  D1-widely patent stent proximally, distal 90% small caliber  Cx-widely patent mid and distal stents  RCA-diffusely diseased, 70% proximal, widely patent mid stent, 50% distal, dominant, DFR 0.94, FFR 0.87, IVUS MLA 5.72 mm²  RPDA-patent  LVEF-not done  Impression:  1. Patent LAD and circumflex stents. 2.  Borderline significant angiographic stenosis in proximal RCA however with IVUS and FFR testing, it is within medical management range.      11/1/22  Left Heart Cath  Dominance: Right      LM: 30% distal   LAD: mid stents patent, first diagonal proximal stent patent (90% distal D1)   LCx: mid and distal stents patent into OM2  RCA: 85% proximal, mid stent patent, 50% distal      LVEDP: 21 mmHg  LVEF: 55% 6 Fr JR4 Guide  BMW wire  Vessel prep of proximal RCA with 3.5 x 15 mm Trek  3.5 x 23 mm Xience ALEC deployed from near ostial RCA to 2 mm into previous mid RCA stent and postdilated from 3.76 mm back to 4.01 mm.  0% residual      CXR 3/17/23  Impression   No acute cardiopulmonary findings     EKG 3/16/23  Normal sinus rhythm  Low voltage QRS  Septal infarct , age undetermined     Bell Qualia, APRN - CNP   3/18/2023 4:29 PM

## 2023-03-18 NOTE — DISCHARGE SUMMARY
[facility]    Patient Demographics    Patient. Teodoro Osei  Date of Birth. 1955  MRN. [id number]     Primary care provider. Lavern Paget, MD  (Tel: [phone])    Admit date: 3/16/2023    Discharge date (blank if same as Note Date): Note Date: 3/18/2023     Reason for Hospitalization. Chief Complaint   Patient presents with    Chest Pain     Mid sternal cp down arm into left jaw and around to back starting at 0100         Significant Findings. Principal Problem:    Chest pain  Active Problems:    Unstable angina (HCC)  Resolved Problems:    * No resolved hospital problems. [facility] Course. Teodoro Osei is a 74 y.o. female with a past medical history of hypertension, hyperlipidemia, CAD s/p several stents most recent 11/2022, gastritis and PAF s/p ablation who presented with chest pain. Reports that she was not tolerating Brilinta, stopped in 1/2023 and then resumed it in the last week or 2. S/p LHC and PCI of RCA in stent thrombosis with mechanical and aspiration thrombectomy 3/17.  3/18 She reported chest pain with radiation to her back much less severe than prior. ECG wa ordered without acute changes. She was treated with Tramadol for her back pain. She was seen by cardiologist Dr. Jewel Rose and no plans for further cardiac workup. She was cleared for discharge by cardiology. D-dimer was negative. She had no hypoxia or tachycardia. Assessment and Plan:  Chest Pain              - Complains of mid sternal CP with radiation to back              - S/p PCI of RCA due to in stent thrombosis              - Repeat ECG with no acute changes, she refuses nitroglycerin due to HA              - Discussed with cardiology numerous times and no plans for further workup, recommend medication compliance.  She was cleared for discharge from cardiology perspective  CAD              - Hx of many stents, most recent 11/1/2022 ALEC to RCA by Dr. Valeria Prater   - S/p LHC and PCI of RCA in stent thrombosis with mechanical and aspiration thrombectomy 3/17   - Continue ASA/Brilinta/Repatha (she has Brilinta at home)  PAF              - SR on telemetry               - Denies palpitations,uses [address] PRN per EP   - Consider for Watchman as OP, she reports it has been discussed and she declined  Hypertension              - Started on Valsartan 80 mg daily with good effect  Hyperlipidemia              - On repatha as OP, resume at [facility]  Obesity: Body mass index is 34.74 kg/m². - Weight loss recommended        Discussed case with Dr. Judy Wall, agrees with discharge    Discharge recommendations given to patient. Follow Up. PCP in 1 week, Cards 2 weeks   Disposition. home  Activity. activity as tolerated, groin restrictions 1 week  Diet: ADULT DIET; Regular; 4 carb choices (60 gm/meal); Low Fat/Low Chol/High Fiber/2 gm Na      Problems and results from this hospitalization that need follow up. Chest Pain: Cardiology in 2 weeks  Hypertension: PCP or cardiology follow up    Significant test results and incidental findings. XR CHEST PORTABLE   Final Result   No acute cardiopulmonary findings           Invasive procedures and treatments. S/p LHC and PCI of RCA in stent thrombosis with mechanical and aspiration thrombectomy 3/17    Consults. IP CONSULT TO CARDIOLOGY  IP CONSULT TO CARDIAC REHAB  IP CONSULT TO CARDIAC REHAB    Physical examination on discharge day. BP (!) 168/90   Pulse 96   Temp 98 °F (36.7 °C) (Temporal)   Resp 18   Ht 5' 4\" (1.626 m)   Wt 202 lb 6.1 oz (91.8 kg)   SpO2 96%   BMI 34.74 kg/m²   General appearance. Alert. Looks comfortable. Well nourished. HEENT. Sclera clear. Moist mucus membranes. Cardiovascular. Regular rate and rhythm, normal S1, S2. No murmur. No significant peripheral edema  Respiratory. Breathing unlabored, not using accessory muscles.  Clear to auscultation bilaterally, no wheeze, crackles or rhonchi. Gastrointestinal. Abdomen soft, non-tender, not distended, normal bowel sounds. Neurology. Facial symmetry. No speech deficits. Moving all extremities equally. Extremities. No focal weakness. Pulses palpable. Skin. Warm, dry, normal turgor    Condition at time of discharge: Fair    Medication instructions provided to patient at discharge. Medication List        START taking these medications      ticagrelor 90 MG Tabs tablet  Commonly known as: BRILINTA  Take 1 tablet by mouth 2 times daily            CONTINUE taking these medications      aspirin 81 MG chewable tablet  Take 1 tablet by mouth daily     cetirizine 5 MG tablet  Commonly known as: ZYRTEC     diazePAM 5 MG tablet  Commonly known as: VALIUM     dilTIAZem 120 MG extended release capsule  Commonly known as: CARDIZEM CD  TAKE 1 CAPSULE BY MOUTH TWICE DAILY     dilTIAZem 60 MG tablet  Commonly known as: CARDIZEM  Take 1 tablet by mouth at bedtime     estradiol 0.1 MG/GM vaginal cream  Commonly known as: ESTRACE  PLACE 0.5 GRAM VAGINALLY 3 TIMES A WEEK     gabapentin 100 MG capsule  Commonly known as: NEURONTIN  Take 1 capsule by mouth 3 times daily for 30 days. Intended supply: 90 days     L-Lysine 1000 MG Tabs     nitroGLYCERIN 0.4 MG SL tablet  Commonly known as: Nitrostat  Place 1 tablet under the tongue every 5 minutes as needed for Chest pain up to max of 3 total doses. If no relief after 1 dose, call 911.      omeprazole 40 MG delayed release capsule  Commonly known as: PRILOSEC  Take 1 capsule by mouth daily     Potassium 99 MG Tabs     Repatha Sosy syringe  Generic drug: Evolocumab  Inject 1 mL into the skin every 14 days     traMADol 50 MG tablet  Commonly known as: ULTRAM     valsartan 40 MG tablet  Commonly known as: DIOVAN  Take 1 tablet by mouth daily  Start taking on: March 19, 2023     vitamin B-12 1000 MCG tablet  Commonly known as: CYANOCOBALAMIN     vitamin C 500 MG tablet  Commonly known as: ASCORBIC ACID     Vitamin D3 125 MCG (5000 UT) Tabs               Where to Get Your Medications        These medications were sent to [address], [address] [facility] [phone] [facility] [phone]  [address] [place] [address] , [address] [id number]      Phone: [phone]   valsartan 40 MG tablet       Spent 45 minutes in discharge process.     Signed:  JAYLEEN Barr CNP     3/18/2023 12:11 PM

## 2023-03-18 NOTE — PLAN OF CARE
Problem: Discharge Planning  Goal: Discharge to home or other facility with appropriate resources  3/17/2023 2103 by Justin Groves RN  Outcome: Progressing  3/17/2023 1051 by Dexter Pantoja RN  Outcome: Progressing     Problem: Pain  Goal: Verbalizes/displays adequate comfort level or baseline comfort level  3/17/2023 2103 by Justin Groves RN  Outcome: Progressing  3/17/2023 1051 by Dexter Pantoja RN  Outcome: Progressing     Problem: Safety - Adult  Goal: Free from fall injury  3/17/2023 2103 by Justin Groves RN  Outcome: Progressing  3/17/2023 1051 by Dexter Pantoja RN  Outcome: Progressing     Problem: ABCDS Injury Assessment  Goal: Absence of physical injury  3/17/2023 2103 by Justin Groves RN  Outcome: Progressing  3/17/2023 1051 by Dexter Pantoja RN  Outcome: Progressing

## 2023-03-18 NOTE — DISCHARGE INSTRUCTIONS
Post Angiogram/ Intervention Discharge Instructions      Do you have the help you need at home? Activity:  You may drive in [place] unless instructed by your doctor   Resume normal activity in one week  Avoid lifting, pushing, or pulling more than 10 lbs. For one week. (A gallon of milk is 7 lbs)  Limit stair climbing to once a day for two weeks. You may walk at an easy pace on ground level. Plan rest periods during the day. Avoid tension and stress. Learn to manage your stress. Avoid work that increases muscle tension.        (straining with bowel movements, moving furniture)    Wound Care: You may shower, but no bathtubs, pools, or hot tubs for one week. Inspect area daily. Normal observations:  Soreness and tenderness that may last for one week, mild pink to red oozing from incision site for up to 24 hrs after discharge,    bruising that could last up to two weeks. Clean with soap and water. NO lotion or powder. Dry area thoroughly. Apply band    aide for 48 hrs. Nutrition:   Low fat, low salt diet (guidelines for Heart Healthy eating)  Limit caffeine to 1-2 cups per day (coffee, tea, chocolate, soda)  Eat high fiber to avoid constipation and straining during bowel movements (fresh veggies and fruit, whole grain)  Limit alcohol to two servings a day ( 8 oz beer, 1 oz liquor, 4 oz wine )  Drink at least 8 to 10 glasses of decaffeinated, non-alcoholic fluid for the next 24 hours to flush the x-ray dye used for your angiogram out of your body. Depression: It is not unusual to have feelings of anxiety, fear, or depression after your procedure. If you need help with these feelings, call your primary care physician. There are medications to help you and healing usually occurs sooner if you are not depressed. Cardiac Rehab Information Given : [phone]  Your physician has referred you to Cardiac Rehab. This is an important part of your recovery.    You have been given a brief explanation of Cardiac Rehab and instructions when to call Cardiac Rehab. The Cardiac Rehab team works with your cardiologist to start your Rehab at the appropriate time in your recovery. Remember to discuss Cardiac Rehab with your physician at your first follow-up visit. What symptoms or health problems do you need to look out for after you leave the hospital? Call your physician if you have any of these symptoms.      Increased shortness of breath, weakness, or increased fatigue  A fast or a slow heartbeat  Problems or questions with your medications  Bleeding that is not stopped after holding pressure for 10 min  Feeling lightheaded or dizzy  Increased swelling or bruising of the groin or leg  Unusual pain, numbness or tingling at the groin or down the leg  Any signs of infection ( redness, yellow drainage, swelling, pain, fever)  Unusual swelling of lower legs/feet, chest discomfort, unusual weakness or fatigue

## 2023-03-19 LAB
EKG ATRIAL RATE: 79 BPM
EKG DIAGNOSIS: NORMAL
EKG P AXIS: -19 DEGREES
EKG P-R INTERVAL: 144 MS
EKG Q-T INTERVAL: 394 MS
EKG QRS DURATION: 88 MS
EKG QTC CALCULATION (BAZETT): 451 MS
EKG R AXIS: -11 DEGREES
EKG T AXIS: 33 DEGREES
EKG VENTRICULAR RATE: 79 BPM

## 2023-03-19 PROCEDURE — 93010 ELECTROCARDIOGRAM REPORT: CPT | Performed by: INTERNAL MEDICINE

## 2023-03-20 ENCOUNTER — TELEPHONE (OUTPATIENT)
Dept: CARDIOLOGY CLINIC | Age: 68
End: 2023-03-20

## 2023-03-20 DIAGNOSIS — R52 PAIN: Primary | ICD-10-CM

## 2023-03-20 PROBLEM — Z98.890 S/P LEFT HEART CATHETERIZATION BY PERCUTANEOUS APPROACH: Status: ACTIVE | Noted: 2023-03-20

## 2023-03-20 PROBLEM — R06.02 SHORTNESS OF BREATH: Status: ACTIVE | Noted: 2023-03-20

## 2023-03-20 RX ORDER — TRAMADOL HYDROCHLORIDE 50 MG/1
50 TABLET ORAL EVERY 6 HOURS PRN
Qty: 50 TABLET | Refills: 0
Start: 2023-03-20 | End: 2023-04-19

## 2023-03-20 NOTE — TELEPHONE ENCOUNTER
Pt states she had a cath over the weekend and is experiencing a lot of pain. Pt is asking something for pain. Please call to advise.

## 2023-03-20 NOTE — PROGRESS NOTES
without dullness  Cardiovascular: The apical impulses not displaced  Heart tones are crisp and normal  Cervical veins are not engorged  The carotid upstroke is normal in amplitude and contour without delay or bruit  Peripheral pulses are symmetrical and full  There is no clubbing, cyanosis of the extremities. No edema  Femoral Arteries: 2+ and equal  Pedal Pulses: 2+ and equal   Abdomen:  No masses or tenderness  Liver/Spleen: No Abnormalities Noted  Neurological/Psychiatric:  Alert and oriented in all spheres  Moves all extremities well  Exhibits normal gait balance and coordination  No abnormalities of mood, affect, memory, mentation, or behavior are noted    GXT Myoview 12/23/20   Summary    -There is a small-moderate sized, moderate intensity fixed anterior defect    suggestive of scar.    -There is no ischemia. -LV function is normal with EF=70%    -Low-intermediate risk. Assessment:    CAD  Denies anginal symptoms   History of multiple stents to RCA, STEMI 2017  LHC was completed with a ALEC in the diagonal branch at [facility] 2017 12/12/20 stress > stable but fixed anterior defect    LAD stent x 2 in distal LAD 2/4/21  S/p NSTEMI with PCI of mid and distal Lcx with ALEC x 3 (9/13/22)  10/31/22 LHC >  S/P Successful PCI to proximal RCA with one drug eluting stent. Pt stopped her Brilinta on Jan 1 2023 due to side effect of upset stomach. Definitely suboptimal and she understands    Will prescribe NTG to use as needed for recurrent angina  Cont ASA/statin/repatha     [facility] 3/16/23 BNN after presenting to ED for having CP     Atrial fibrillation and flutter with RVR  Ablation on  2/25/21,  S/p RFCA with PVI, CTI dependent flutter ablation on 4/12/22  ECG 10/14/22 - sinus rhythm, HR 85 bpm  Continue cardizem 120mg BID, Cardizem 60mg nightly added on 1/28/23  No current complaints of palpitations        Hypertension   Stable  There were no vitals taken for this visit.   Continue current medication

## 2023-03-20 NOTE — TELEPHONE ENCOUNTER
Per Layne Lopez, her pain is in her \"heart\" similar to pain post stenting in the past. She reports that in the hospital she was given Morphine and then d/c with nothing. Discussed with LES. Rx called in to [address] 50 mg 1 tab q 6 prn. Quantity 50.

## 2023-03-22 NOTE — PROGRESS NOTES
normal  Cervical veins are not engorged  The carotid upstroke is normal in amplitude and contour without delay or bruit  Peripheral pulses are symmetrical and full  There is no clubbing, cyanosis of the extremities. No edema  Femoral Arteries: 2+ and equal  Pedal Pulses: 2+ and equal   Abdomen:  No masses or tenderness  Liver/Spleen: No Abnormalities Noted  Neurological/Psychiatric:  Alert and oriented in all spheres  Moves all extremities well  Exhibits normal gait balance and coordination  No abnormalities of mood, affect, memory, mentation, or behavior are noted            Assessment:    CAD  Continued CP, SOB with exertion- EKG okay today (possible residual clot in small branch artery?)  History of multiple stents to RCA, STEMI 2017  LHC was completed with a ALEC in the diagonal branch at [facility] 2017 12/12/20 stress > stable but fixed anterior defect    LAD stent x 2 in distal LAD 2/4/21  S/p NSTEMI with PCI of mid and distal Lcx with ALEC x 3 (9/13/22)  10/31/22 LHC >  S/P Successful PCI to proximal RCA with one drug eluting stent. Pt stopped her Brilinta on Jan 1 2023 due to side effect of upset stomach.  Definitely suboptimal and she understands  NTG to use as needed for recurrent angina      [facility] 3/16/23 BNN after presenting to ED for having CP- S/P Successful PCI of RCA in stent thrombosis  Complaints of SOB- (possible residual clot in small branch artery?)  EKG looks okay today  Discussed importance of compliance of taking Brilinta, ASA, Repatha  Could consider switching Brilinta to Effient after she returns in 1 month (allergy to Plavix)      Atrial fibrillation and flutter with RVR  Ablation on  2/25/21,  S/p RFCA with PVI, CTI dependent flutter ablation on 4/12/22  ECG today- sinus rhythm, HR 72 bpm  Continue cardizem 120mg BID, Cardizem 60mg nightly added on 1/28/23  No current complaints of palpitations        Hypertension   Stable  /82 (Site: Left Upper Arm, Position: Sitting, Cuff Size: Medium

## 2023-03-24 ENCOUNTER — TELEPHONE (OUTPATIENT)
Dept: CARDIOLOGY CLINIC | Age: 68
End: 2023-03-24

## 2023-03-24 ENCOUNTER — OFFICE VISIT (OUTPATIENT)
Dept: CARDIOLOGY CLINIC | Age: 68
End: 2023-03-24
Payer: MEDICARE

## 2023-03-24 VITALS
HEIGHT: 64 IN | DIASTOLIC BLOOD PRESSURE: 82 MMHG | OXYGEN SATURATION: 99 % | SYSTOLIC BLOOD PRESSURE: 134 MMHG | WEIGHT: 203 LBS | HEART RATE: 80 BPM | BODY MASS INDEX: 34.66 KG/M2

## 2023-03-24 DIAGNOSIS — R06.02 SHORTNESS OF BREATH: ICD-10-CM

## 2023-03-24 DIAGNOSIS — I10 ESSENTIAL HYPERTENSION: ICD-10-CM

## 2023-03-24 DIAGNOSIS — I48.0 PAF (PAROXYSMAL ATRIAL FIBRILLATION) (HCC): ICD-10-CM

## 2023-03-24 DIAGNOSIS — Z98.890 S/P LEFT HEART CATHETERIZATION BY PERCUTANEOUS APPROACH: ICD-10-CM

## 2023-03-24 DIAGNOSIS — I25.10 CORONARY ARTERY DISEASE INVOLVING NATIVE CORONARY ARTERY OF NATIVE HEART WITHOUT ANGINA PECTORIS: Primary | ICD-10-CM

## 2023-03-24 DIAGNOSIS — E78.2 MIXED HYPERLIPIDEMIA: ICD-10-CM

## 2023-03-24 PROCEDURE — 3074F SYST BP LT 130 MM HG: CPT | Performed by: INTERNAL MEDICINE

## 2023-03-24 PROCEDURE — 1036F TOBACCO NON-USER: CPT | Performed by: INTERNAL MEDICINE

## 2023-03-24 PROCEDURE — 1123F ACP DISCUSS/DSCN MKR DOCD: CPT | Performed by: INTERNAL MEDICINE

## 2023-03-24 PROCEDURE — 3078F DIAST BP <80 MM HG: CPT | Performed by: INTERNAL MEDICINE

## 2023-03-24 PROCEDURE — 99214 OFFICE O/P EST MOD 30 MIN: CPT | Performed by: INTERNAL MEDICINE

## 2023-03-24 PROCEDURE — G8400 PT W/DXA NO RESULTS DOC: HCPCS | Performed by: INTERNAL MEDICINE

## 2023-03-24 PROCEDURE — 93000 ELECTROCARDIOGRAM COMPLETE: CPT | Performed by: INTERNAL MEDICINE

## 2023-03-24 PROCEDURE — 1090F PRES/ABSN URINE INCON ASSESS: CPT | Performed by: INTERNAL MEDICINE

## 2023-03-24 PROCEDURE — G8484 FLU IMMUNIZE NO ADMIN: HCPCS | Performed by: INTERNAL MEDICINE

## 2023-03-24 PROCEDURE — G8417 CALC BMI ABV UP PARAM F/U: HCPCS | Performed by: INTERNAL MEDICINE

## 2023-03-24 PROCEDURE — 1111F DSCHRG MED/CURRENT MED MERGE: CPT | Performed by: INTERNAL MEDICINE

## 2023-03-24 PROCEDURE — 3017F COLORECTAL CA SCREEN DOC REV: CPT | Performed by: INTERNAL MEDICINE

## 2023-03-24 PROCEDURE — G8427 DOCREV CUR MEDS BY ELIG CLIN: HCPCS | Performed by: INTERNAL MEDICINE

## 2023-04-05 ENCOUNTER — TELEPHONE (OUTPATIENT)
Dept: CARDIOLOGY CLINIC | Age: 68
End: 2023-04-05

## 2023-04-05 NOTE — TELEPHONE ENCOUNTER
Spoke to Rufino Patterson. Let Rufino Listen know that we did receive her note that she wrote to Dr Samantha Sinclair about need for assist with Repatha (grey folder). I was going to give her the Repatha help line number but she states she already tried that and she was denied assist since she is on Medicare. She is aware that this will be further addressed as soon as possible.

## 2023-04-05 NOTE — TELEPHONE ENCOUNTER
Spoke to Marilyn from [address] [place] [address] to determine other options for assistance for Donta Silva. Marilyn states Donta Silva should go to [email or web address], go to the blue banner at top of screen, choose diagnosis funds, choose hypercholesterolemia, then open at the bottom of the page and then click the link to apply for assist.    Called demian to report above, however no answer.

## 2023-04-05 NOTE — TELEPHONE ENCOUNTER
Returned [employer]. She states she already went to [employer] and she was approved for shari, however , she would prefer to see what results if [employer] writes a letter of appeal instead.

## 2023-04-19 ENCOUNTER — TELEPHONE (OUTPATIENT)
Dept: CARDIOLOGY CLINIC | Age: 68
End: 2023-04-19

## 2023-04-19 NOTE — TELEPHONE ENCOUNTER
Toma Gonzalez called for a Prior authorization and addition information for Repatha,  a clinical question, Toma Gonzalez will send in a fax on 04/19.   Please advise

## 2023-04-20 NOTE — TELEPHONE ENCOUNTER
Dr Shoshana Downing filled out form and it was faxed back to [facility].

## 2023-04-24 ENCOUNTER — OFFICE VISIT (OUTPATIENT)
Dept: CARDIOLOGY CLINIC | Age: 68
End: 2023-04-24
Payer: MEDICARE

## 2023-04-24 ENCOUNTER — TELEPHONE (OUTPATIENT)
Dept: CARDIOLOGY CLINIC | Age: 68
End: 2023-04-24

## 2023-04-24 VITALS
HEART RATE: 73 BPM | OXYGEN SATURATION: 98 % | HEIGHT: 64 IN | DIASTOLIC BLOOD PRESSURE: 86 MMHG | WEIGHT: 205.1 LBS | SYSTOLIC BLOOD PRESSURE: 144 MMHG | BODY MASS INDEX: 35.01 KG/M2

## 2023-04-24 DIAGNOSIS — I48.0 PAF (PAROXYSMAL ATRIAL FIBRILLATION) (HCC): ICD-10-CM

## 2023-04-24 DIAGNOSIS — I10 ESSENTIAL HYPERTENSION: ICD-10-CM

## 2023-04-24 DIAGNOSIS — E78.2 MIXED HYPERLIPIDEMIA: ICD-10-CM

## 2023-04-24 DIAGNOSIS — R06.02 SHORTNESS OF BREATH: ICD-10-CM

## 2023-04-24 DIAGNOSIS — I25.10 CORONARY ARTERY DISEASE INVOLVING NATIVE CORONARY ARTERY OF NATIVE HEART WITHOUT ANGINA PECTORIS: Primary | ICD-10-CM

## 2023-04-24 PROCEDURE — 1123F ACP DISCUSS/DSCN MKR DOCD: CPT | Performed by: INTERNAL MEDICINE

## 2023-04-24 PROCEDURE — G8400 PT W/DXA NO RESULTS DOC: HCPCS | Performed by: INTERNAL MEDICINE

## 2023-04-24 PROCEDURE — 3077F SYST BP >= 140 MM HG: CPT | Performed by: INTERNAL MEDICINE

## 2023-04-24 PROCEDURE — 1036F TOBACCO NON-USER: CPT | Performed by: INTERNAL MEDICINE

## 2023-04-24 PROCEDURE — G8417 CALC BMI ABV UP PARAM F/U: HCPCS | Performed by: INTERNAL MEDICINE

## 2023-04-24 PROCEDURE — 3079F DIAST BP 80-89 MM HG: CPT | Performed by: INTERNAL MEDICINE

## 2023-04-24 PROCEDURE — G8427 DOCREV CUR MEDS BY ELIG CLIN: HCPCS | Performed by: INTERNAL MEDICINE

## 2023-04-24 PROCEDURE — 3017F COLORECTAL CA SCREEN DOC REV: CPT | Performed by: INTERNAL MEDICINE

## 2023-04-24 PROCEDURE — 99214 OFFICE O/P EST MOD 30 MIN: CPT | Performed by: INTERNAL MEDICINE

## 2023-04-24 PROCEDURE — 1090F PRES/ABSN URINE INCON ASSESS: CPT | Performed by: INTERNAL MEDICINE

## 2023-04-24 RX ORDER — PRASUGREL 10 MG/1
10 TABLET, FILM COATED ORAL DAILY
Qty: 90 TABLET | Refills: 3 | Status: SHIPPED | OUTPATIENT
Start: 2023-04-24

## 2023-04-24 NOTE — TELEPHONE ENCOUNTER
I called [employer]. The number on the card to check on status of the [address] [place] [address]. They said it was denied because they needed diagnosis codes and med that they tried. A letter of necessity was composed with the requested requirements.   Faxed to [phone] with the PA # [id number]

## 2023-04-24 NOTE — PATIENT INSTRUCTIONS
Take 2nd dose of brilinta tonight (4/24/23)  Start Effient 4/25/23 (10 mg daily)  Continue risk factor modifications. Call for any change in symptoms, call to report any changes in shortness of breath or development of chest pain with activity.     Follow up in 4 mos

## 2023-04-27 ENCOUNTER — TELEPHONE (OUTPATIENT)
Dept: CARDIOLOGY CLINIC | Age: 68
End: 2023-04-27

## 2023-04-27 NOTE — TELEPHONE ENCOUNTER
Carlene Linares from [facility] called left message on [employer] voicemail that that appeal for the medications has been approved, if the office has any questions you can call M-W 5 am - 3 pm or F 5 am- 3 pm  [phone]

## 2023-04-28 ENCOUNTER — TELEPHONE (OUTPATIENT)
Dept: CARDIOLOGY CLINIC | Age: 68
End: 2023-04-28

## 2023-05-16 ENCOUNTER — TELEPHONE (OUTPATIENT)
Dept: CARDIOLOGY CLINIC | Age: 68
End: 2023-05-16

## 2023-05-17 RX ORDER — CLOPIDOGREL BISULFATE 75 MG/1
75 TABLET ORAL DAILY
Qty: 30 TABLET | Refills: 3 | Status: SHIPPED | OUTPATIENT
Start: 2023-05-17

## 2023-07-26 ENCOUNTER — OFFICE VISIT (OUTPATIENT)
Dept: FAMILY MEDICINE CLINIC | Age: 68
End: 2023-07-26

## 2023-07-26 VITALS
BODY MASS INDEX: 36.05 KG/M2 | WEIGHT: 210 LBS | SYSTOLIC BLOOD PRESSURE: 120 MMHG | HEART RATE: 95 BPM | DIASTOLIC BLOOD PRESSURE: 70 MMHG | TEMPERATURE: 97.1 F | RESPIRATION RATE: 14 BRPM | OXYGEN SATURATION: 98 %

## 2023-07-26 DIAGNOSIS — R30.0 DYSURIA: ICD-10-CM

## 2023-07-26 DIAGNOSIS — R39.9 UTI SYMPTOMS: Primary | ICD-10-CM

## 2023-07-26 DIAGNOSIS — F33.0 MILD EPISODE OF RECURRENT MAJOR DEPRESSIVE DISORDER (HCC): ICD-10-CM

## 2023-07-26 LAB
BILIRUBIN, POC: NEGATIVE
BLOOD URINE, POC: NEGATIVE
CLARITY, POC: CLEAR
COLOR, POC: YELLOW
GLUCOSE URINE, POC: NEGATIVE
KETONES, POC: NEGATIVE
LEUKOCYTE EST, POC: NEGATIVE
NITRITE, POC: NEGATIVE
PH, POC: 5.5
PROTEIN, POC: NEGATIVE
SPECIFIC GRAVITY, POC: 1.01
UROBILINOGEN, POC: 0.2

## 2023-07-26 RX ORDER — NITROFURANTOIN 25; 75 MG/1; MG/1
100 CAPSULE ORAL 2 TIMES DAILY
Qty: 14 CAPSULE | Refills: 0 | Status: SHIPPED | OUTPATIENT
Start: 2023-07-26 | End: 2023-08-02

## 2023-07-26 SDOH — ECONOMIC STABILITY: FOOD INSECURITY: WITHIN THE PAST 12 MONTHS, THE FOOD YOU BOUGHT JUST DIDN'T LAST AND YOU DIDN'T HAVE MONEY TO GET MORE.: NEVER TRUE

## 2023-07-26 SDOH — ECONOMIC STABILITY: FOOD INSECURITY: WITHIN THE PAST 12 MONTHS, YOU WORRIED THAT YOUR FOOD WOULD RUN OUT BEFORE YOU GOT MONEY TO BUY MORE.: NEVER TRUE

## 2023-07-26 SDOH — ECONOMIC STABILITY: INCOME INSECURITY: HOW HARD IS IT FOR YOU TO PAY FOR THE VERY BASICS LIKE FOOD, HOUSING, MEDICAL CARE, AND HEATING?: NOT HARD AT ALL

## 2023-07-26 SDOH — ECONOMIC STABILITY: HOUSING INSECURITY
IN THE LAST 12 MONTHS, WAS THERE A TIME WHEN YOU DID NOT HAVE A STEADY PLACE TO SLEEP OR SLEPT IN A SHELTER (INCLUDING NOW)?: NO

## 2023-07-26 ASSESSMENT — ENCOUNTER SYMPTOMS
BLOOD IN STOOL: 0
COLOR CHANGE: 0
ABDOMINAL PAIN: 0
WHEEZING: 0
NAUSEA: 0
SINUS PRESSURE: 0
SORE THROAT: 0
VOMITING: 0
EYE ITCHING: 0
SHORTNESS OF BREATH: 0
CONSTIPATION: 0
COUGH: 0
EYE REDNESS: 0
CHEST TIGHTNESS: 0
BACK PAIN: 0
DIARRHEA: 0
RHINORRHEA: 0

## 2023-07-26 ASSESSMENT — PATIENT HEALTH QUESTIONNAIRE - PHQ9
5. POOR APPETITE OR OVEREATING: 0
9. THOUGHTS THAT YOU WOULD BE BETTER OFF DEAD, OR OF HURTING YOURSELF: 0
SUM OF ALL RESPONSES TO PHQ QUESTIONS 1-9: 0
3. TROUBLE FALLING OR STAYING ASLEEP: 0
SUM OF ALL RESPONSES TO PHQ QUESTIONS 1-9: 0
SUM OF ALL RESPONSES TO PHQ QUESTIONS 1-9: 0
10. IF YOU CHECKED OFF ANY PROBLEMS, HOW DIFFICULT HAVE THESE PROBLEMS MADE IT FOR YOU TO DO YOUR WORK, TAKE CARE OF THINGS AT HOME, OR GET ALONG WITH OTHER PEOPLE: 0
SUM OF ALL RESPONSES TO PHQ QUESTIONS 1-9: 0
8. MOVING OR SPEAKING SO SLOWLY THAT OTHER PEOPLE COULD HAVE NOTICED. OR THE OPPOSITE, BEING SO FIGETY OR RESTLESS THAT YOU HAVE BEEN MOVING AROUND A LOT MORE THAN USUAL: 0
6. FEELING BAD ABOUT YOURSELF - OR THAT YOU ARE A FAILURE OR HAVE LET YOURSELF OR YOUR FAMILY DOWN: 0
1. LITTLE INTEREST OR PLEASURE IN DOING THINGS: 0
4. FEELING TIRED OR HAVING LITTLE ENERGY: 0
2. FEELING DOWN, DEPRESSED OR HOPELESS: 0
7. TROUBLE CONCENTRATING ON THINGS, SUCH AS READING THE NEWSPAPER OR WATCHING TELEVISION: 0
SUM OF ALL RESPONSES TO PHQ9 QUESTIONS 1 & 2: 0

## 2023-07-26 NOTE — PROGRESS NOTES
Ihsan Rivera (:  1955) is a 79 y.o. female,Established patient, here for evaluation of the following chief complaint(s):  Urinary Tract Infection      ASSESSMENT/PLAN:  1. UTI symptoms  -     POCT Urinalysis no Micro  -     Culture, Urine  2. Dysuria  Assessment & Plan:   UA in office today is negative however given patient's history we will go ahead and send culture and evaluate for the results. We will start her on Macrobid for symptomatic UTI and based on culture results make adjustments as indicated. 3. Mild episode of recurrent major depressive disorder ([address])  Assessment & Plan:   Stable, controlled  No changes  Continue current treatment plan         No follow-ups on file. SUBJECTIVE/OBJECTIVE:  Patient complains of malodorous urine with burning sensation and urgency when she urinates. Confirms that she drinks plenty of water. Denies flank pain. She has not been having any fevers, denies any other GI symptoms of NVDC. She has not taken anything for her symptoms. She states that the last time she had these symptoms, her initial UA was negative, but the culture came back positive.      Current Outpatient Medications   Medication Sig Dispense Refill    nitrofurantoin, macrocrystal-monohydrate, (MACROBID) 100 MG capsule Take 1 capsule by mouth 2 times daily for 7 days 14 capsule 0    clopidogrel (PLAVIX) 75 MG tablet Take 1 tablet by mouth daily 30 tablet 3    valsartan (DIOVAN) 80 MG tablet Take 1 tablet by mouth daily 30 tablet 3    vitamin C (ASCORBIC ACID) 500 MG tablet Take 1 tablet by mouth daily      Cholecalciferol (VITAMIN D3) 125 MCG (5000 UT) TABS Take by mouth      vitamin B-12 (CYANOCOBALAMIN) 1000 MCG tablet Take 1 tablet by mouth daily      Potassium 99 MG TABS Take by mouth      omeprazole (PRILOSEC) 40 MG delayed release capsule Take 1 capsule by mouth daily 90 capsule 3    dilTIAZem (CARDIZEM CD) 120 MG extended release capsule TAKE 1 CAPSULE BY MOUTH TWICE DAILY 180 capsule 3

## 2023-07-26 NOTE — ASSESSMENT & PLAN NOTE
UA in office today is negative however given patient's history we will go ahead and send culture and evaluate for the results. We will start her on Macrobid for symptomatic UTI and based on culture results make adjustments as indicated.

## 2023-07-29 LAB
BACTERIA UR CULT: ABNORMAL
BACTERIA UR CULT: ABNORMAL
ORGANISM: ABNORMAL

## 2023-08-21 ENCOUNTER — OFFICE VISIT (OUTPATIENT)
Dept: FAMILY MEDICINE CLINIC | Age: 68
End: 2023-08-21

## 2023-08-21 VITALS
SYSTOLIC BLOOD PRESSURE: 142 MMHG | HEIGHT: 64 IN | HEART RATE: 79 BPM | RESPIRATION RATE: 16 BRPM | DIASTOLIC BLOOD PRESSURE: 80 MMHG | OXYGEN SATURATION: 97 % | BODY MASS INDEX: 36.12 KG/M2 | TEMPERATURE: 98.3 F | WEIGHT: 211.6 LBS

## 2023-08-21 DIAGNOSIS — T46.6X5A STATIN MYOPATHY: ICD-10-CM

## 2023-08-21 DIAGNOSIS — Z78.0 POSTMENOPAUSAL: ICD-10-CM

## 2023-08-21 DIAGNOSIS — Z12.11 SCREEN FOR COLON CANCER: ICD-10-CM

## 2023-08-21 DIAGNOSIS — Z12.31 ENCOUNTER FOR SCREENING MAMMOGRAM FOR MALIGNANT NEOPLASM OF BREAST: ICD-10-CM

## 2023-08-21 DIAGNOSIS — Z00.00 ROUTINE GENERAL MEDICAL EXAMINATION AT A HEALTH CARE FACILITY: Primary | ICD-10-CM

## 2023-08-21 DIAGNOSIS — E66.01 SEVERE OBESITY (BMI 35.0-39.9) WITH COMORBIDITY (HCC): ICD-10-CM

## 2023-08-21 DIAGNOSIS — I25.10 CORONARY ARTERY DISEASE INVOLVING NATIVE CORONARY ARTERY OF NATIVE HEART WITHOUT ANGINA PECTORIS: ICD-10-CM

## 2023-08-21 DIAGNOSIS — N60.11 FIBROCYSTIC BREAST CHANGES, BILATERAL: ICD-10-CM

## 2023-08-21 DIAGNOSIS — Z88.9 MULTIPLE DRUG ALLERGIES: ICD-10-CM

## 2023-08-21 DIAGNOSIS — Z00.00 INITIAL MEDICARE ANNUAL WELLNESS VISIT: ICD-10-CM

## 2023-08-21 DIAGNOSIS — F33.0 MILD EPISODE OF RECURRENT MAJOR DEPRESSIVE DISORDER (HCC): ICD-10-CM

## 2023-08-21 DIAGNOSIS — G72.0 STATIN MYOPATHY: ICD-10-CM

## 2023-08-21 DIAGNOSIS — E78.00 HYPERCHOLESTEROLEMIA: ICD-10-CM

## 2023-08-21 DIAGNOSIS — Z23 NEED FOR VACCINATION FOR PNEUMOCOCCUS: ICD-10-CM

## 2023-08-21 DIAGNOSIS — N60.12 FIBROCYSTIC BREAST CHANGES, BILATERAL: ICD-10-CM

## 2023-08-21 DIAGNOSIS — I10 ESSENTIAL HYPERTENSION: ICD-10-CM

## 2023-08-21 DIAGNOSIS — Z23 NEED FOR SHINGLES VACCINE: ICD-10-CM

## 2023-08-21 ASSESSMENT — PATIENT HEALTH QUESTIONNAIRE - PHQ9
SUM OF ALL RESPONSES TO PHQ QUESTIONS 1-9: 0
8. MOVING OR SPEAKING SO SLOWLY THAT OTHER PEOPLE COULD HAVE NOTICED. OR THE OPPOSITE, BEING SO FIGETY OR RESTLESS THAT YOU HAVE BEEN MOVING AROUND A LOT MORE THAN USUAL: 0
SUM OF ALL RESPONSES TO PHQ9 QUESTIONS 1 & 2: 0
SUM OF ALL RESPONSES TO PHQ QUESTIONS 1-9: 0
SUM OF ALL RESPONSES TO PHQ QUESTIONS 1-9: 0
9. THOUGHTS THAT YOU WOULD BE BETTER OFF DEAD, OR OF HURTING YOURSELF: 0
3. TROUBLE FALLING OR STAYING ASLEEP: 0
SUM OF ALL RESPONSES TO PHQ QUESTIONS 1-9: 0
10. IF YOU CHECKED OFF ANY PROBLEMS, HOW DIFFICULT HAVE THESE PROBLEMS MADE IT FOR YOU TO DO YOUR WORK, TAKE CARE OF THINGS AT HOME, OR GET ALONG WITH OTHER PEOPLE: 0
4. FEELING TIRED OR HAVING LITTLE ENERGY: 0
7. TROUBLE CONCENTRATING ON THINGS, SUCH AS READING THE NEWSPAPER OR WATCHING TELEVISION: 0
5. POOR APPETITE OR OVEREATING: 0
1. LITTLE INTEREST OR PLEASURE IN DOING THINGS: 0
6. FEELING BAD ABOUT YOURSELF - OR THAT YOU ARE A FAILURE OR HAVE LET YOURSELF OR YOUR FAMILY DOWN: 0
2. FEELING DOWN, DEPRESSED OR HOPELESS: 0

## 2023-08-21 ASSESSMENT — LIFESTYLE VARIABLES
HOW OFTEN DO YOU HAVE A DRINK CONTAINING ALCOHOL: NEVER
HOW MANY STANDARD DRINKS CONTAINING ALCOHOL DO YOU HAVE ON A TYPICAL DAY: PATIENT DOES NOT DRINK

## 2023-08-21 NOTE — PROGRESS NOTES
Here for cpe, AWV, physical    Here for f/u of coronary artery disease. Pt has not had any new issues of chest pain, shortness of breath. No swelling in legs. Pt adherent to medications and denies any exertional chest pain or shortness of breath. Pt denies any bleeding. Pt following up with cardiology dr. Lul Parisi due to some persistent issues of chest pain and shortness of breath. Pt did continue take brilinta but is having issues with cost of repatha. At last visit they switched from bilinta to effient but had moderate issues of side effects and now on plavix. Pt will be seeing them again in a few weeks. Pt has been feeling pretty well overall, is as active as she can. Not exercising formally but gardening, and trying to stay active. Pt notes that when she is active, gets some pain in midback. Sx are localized in the midback. Prior xrays are normal    Pt does have hx of Afib, sp ablation in 2021. Remains on cardizem    Pt here for follow up of blood pressure. Pt states doing great with adherence to therapy and feels well. No issues of chest pain, shortness of breath. No vision changes, headache, swelling in legs. Here for f/u of cholesterol. Pt as been working on diet/exercise and is consistent with therapy. No side effects from current therapy. No chest pain, shortness of breath. No numbness/tingling in extremities. Intol to statins due to myalgias. Pt was not able to get her repatha covered but was given a shari to allow for coverage     Mood does seem to be doing well, as she is feeling better overall and that has done great for her. Does take a nap daily. Pt doing great with all ADLs. Medicare Annual Wellness Visit    Nancy Medellin is here for Medicare AWV and Follow-up (UTI)    Assessment & Plan   Routine general medical examination at a health care facility  -     Hemoglobin A1C; Future  -     Comprehensive Metabolic Panel; Future  -     Lipid Panel;  Future  -     TSH;

## 2023-08-25 ENCOUNTER — TELEPHONE (OUTPATIENT)
Dept: FAMILY MEDICINE CLINIC | Age: 68
End: 2023-08-25

## 2023-08-25 DIAGNOSIS — Z00.00 ROUTINE GENERAL MEDICAL EXAMINATION AT A HEALTH CARE FACILITY: ICD-10-CM

## 2023-08-25 DIAGNOSIS — N39.0 URINARY TRACT INFECTION WITHOUT HEMATURIA, SITE UNSPECIFIED: Primary | ICD-10-CM

## 2023-08-25 LAB
ALBUMIN SERPL-MCNC: 4.7 G/DL (ref 3.4–5)
ALBUMIN/GLOB SERPL: 2.1 {RATIO} (ref 1.1–2.2)
ALP SERPL-CCNC: 136 U/L (ref 40–129)
ALT SERPL-CCNC: 11 U/L (ref 10–40)
ANION GAP SERPL CALCULATED.3IONS-SCNC: 10 MMOL/L (ref 3–16)
AST SERPL-CCNC: 19 U/L (ref 15–37)
BILIRUB SERPL-MCNC: 0.3 MG/DL (ref 0–1)
BUN SERPL-MCNC: 11 MG/DL (ref 7–20)
CALCIUM SERPL-MCNC: 9.8 MG/DL (ref 8.3–10.6)
CHLORIDE SERPL-SCNC: 102 MMOL/L (ref 99–110)
CHOLEST SERPL-MCNC: 135 MG/DL (ref 0–199)
CO2 SERPL-SCNC: 26 MMOL/L (ref 21–32)
CREAT SERPL-MCNC: 0.9 MG/DL (ref 0.6–1.2)
DEPRECATED RDW RBC AUTO: 13.9 % (ref 12.4–15.4)
GFR SERPLBLD CREATININE-BSD FMLA CKD-EPI: >60 ML/MIN/{1.73_M2}
GLUCOSE SERPL-MCNC: 100 MG/DL (ref 70–99)
HCT VFR BLD AUTO: 41.3 % (ref 36–48)
HDLC SERPL-MCNC: 49 MG/DL (ref 40–60)
HGB BLD-MCNC: 14.4 G/DL (ref 12–16)
LDLC SERPL CALC-MCNC: 31 MG/DL
MCH RBC QN AUTO: 29 PG (ref 26–34)
MCHC RBC AUTO-ENTMCNC: 34.7 G/DL (ref 31–36)
MCV RBC AUTO: 83.4 FL (ref 80–100)
PLATELET # BLD AUTO: 275 K/UL (ref 135–450)
PMV BLD AUTO: 8.1 FL (ref 5–10.5)
POTASSIUM SERPL-SCNC: 4.5 MMOL/L (ref 3.5–5.1)
PROT SERPL-MCNC: 6.9 G/DL (ref 6.4–8.2)
RBC # BLD AUTO: 4.96 M/UL (ref 4–5.2)
SODIUM SERPL-SCNC: 138 MMOL/L (ref 136–145)
TRIGL SERPL-MCNC: 275 MG/DL (ref 0–150)
TSH SERPL DL<=0.005 MIU/L-ACNC: 2.57 UIU/ML (ref 0.27–4.2)
VLDLC SERPL CALC-MCNC: 55 MG/DL
WBC # BLD AUTO: 6.7 K/UL (ref 4–11)

## 2023-08-25 NOTE — TELEPHONE ENCOUNTER
Patient neeed a order in system for a urine culture. She has already given urine at at the [facility] lab for a UTI. They are just waiting on the order.

## 2023-08-26 LAB
EST. AVERAGE GLUCOSE BLD GHB EST-MCNC: 119.8 MG/DL
HBA1C MFR BLD: 5.8 %

## 2023-08-27 LAB — BACTERIA UR CULT: NORMAL

## 2023-08-30 ENCOUNTER — TELEPHONE (OUTPATIENT)
Dept: PHARMACY | Facility: CLINIC | Age: 68
End: 2023-08-30

## 2023-08-30 ENCOUNTER — OFFICE VISIT (OUTPATIENT)
Dept: CARDIOLOGY CLINIC | Age: 68
End: 2023-08-30

## 2023-08-30 VITALS
WEIGHT: 210 LBS | HEIGHT: 64 IN | DIASTOLIC BLOOD PRESSURE: 74 MMHG | OXYGEN SATURATION: 97 % | SYSTOLIC BLOOD PRESSURE: 130 MMHG | BODY MASS INDEX: 35.85 KG/M2 | HEART RATE: 84 BPM

## 2023-08-30 DIAGNOSIS — R06.02 SHORTNESS OF BREATH: ICD-10-CM

## 2023-08-30 DIAGNOSIS — I10 ESSENTIAL HYPERTENSION: ICD-10-CM

## 2023-08-30 DIAGNOSIS — I25.10 CORONARY ARTERY DISEASE INVOLVING NATIVE CORONARY ARTERY OF NATIVE HEART WITHOUT ANGINA PECTORIS: Primary | ICD-10-CM

## 2023-08-30 DIAGNOSIS — I48.0 PAF (PAROXYSMAL ATRIAL FIBRILLATION) (HCC): ICD-10-CM

## 2023-08-30 DIAGNOSIS — E78.2 MIXED HYPERLIPIDEMIA: ICD-10-CM

## 2023-08-30 RX ORDER — VALSARTAN 80 MG/1
80 TABLET ORAL DAILY
Qty: 90 TABLET | Refills: 3 | Status: SHIPPED | OUTPATIENT
Start: 2023-08-30

## 2023-08-30 RX ORDER — CLOPIDOGREL BISULFATE 75 MG/1
75 TABLET ORAL DAILY
Qty: 90 TABLET | Refills: 3 | Status: SHIPPED | OUTPATIENT
Start: 2023-08-30

## 2023-08-30 RX ORDER — EVOLOCUMAB 140 MG/ML
140 INJECTION, SOLUTION SUBCUTANEOUS
Qty: 2 EACH | Refills: 5 | Status: SHIPPED | OUTPATIENT
Start: 2023-08-30

## 2023-08-30 NOTE — PATIENT INSTRUCTIONS
Be cautious with sugar and carbohydrate intake  Refilled Plavix and valsartan and Repatha   Continue risk factor modifications. Call for any change in symptoms, call to report any changes in shortness of breath or development of chest pain with activity.     Follow up in 6 mos

## 2023-08-30 NOTE — TELEPHONE ENCOUNTER
[facility] CLINICAL PHARMACY: ADHERENCE REVIEW  Identified care gap per [employer]: fills at [facility]: ACE/ARB adherence    ASSESSMENT    ACE/ARB ADHERENCE    Insurance Records claims through 08/27/2023 (Prior Year [address] = not reported; YTD [address] = 76%; Potential Fail Date: 9/12/23): Valsartan 80 mg tablets last filled on 6/20/23 for 30 day supply. Next refill due: 7/20/23    Per [address], prescription ready today for 90 day supply, $0. 3 refills remaining    BP Readings from Last 3 Encounters:   08/30/23 130/74   08/21/23 (!) 142/80   07/26/23 120/70     Estimated Creatinine Clearance: 69 mL/min (based on SCr of 0.9 mg/dL). Lab Results   Component Value Date    CREATININE 0.9 08/25/2023     Lab Results   Component Value Date    K 4.5 08/25/2023       PLAN  No patient out reach planned at this time. No future appointments.     Miah DeyD, [facility], [address] [facility]  Department, toll free: [phone], option 1    For Pharmacy Admin Tracking Only    Program: Johnna in place:  No  Gap Closed?: Yes   Time Spent (min): 10

## 2023-10-23 ENCOUNTER — OFFICE VISIT (OUTPATIENT)
Dept: FAMILY MEDICINE CLINIC | Age: 68
End: 2023-10-23

## 2023-10-23 VITALS
HEIGHT: 64 IN | SYSTOLIC BLOOD PRESSURE: 130 MMHG | OXYGEN SATURATION: 97 % | WEIGHT: 215.2 LBS | BODY MASS INDEX: 36.74 KG/M2 | HEART RATE: 99 BPM | DIASTOLIC BLOOD PRESSURE: 80 MMHG

## 2023-10-23 DIAGNOSIS — K21.9 GASTROESOPHAGEAL REFLUX DISEASE WITHOUT ESOPHAGITIS: ICD-10-CM

## 2023-10-23 RX ORDER — DICYCLOMINE HYDROCHLORIDE 10 MG/1
10 CAPSULE ORAL 4 TIMES DAILY
Qty: 120 CAPSULE | Refills: 0 | Status: SHIPPED | OUTPATIENT
Start: 2023-10-23 | End: 2023-10-27 | Stop reason: SDUPTHER

## 2023-10-23 RX ORDER — PANTOPRAZOLE SODIUM 40 MG/1
40 TABLET, DELAYED RELEASE ORAL
Qty: 30 TABLET | Refills: 5 | Status: SHIPPED | OUTPATIENT
Start: 2023-10-23 | End: 2023-10-27 | Stop reason: SDUPTHER

## 2023-10-23 ASSESSMENT — ENCOUNTER SYMPTOMS
BLOOD IN STOOL: 0
SINUS PRESSURE: 0
NAUSEA: 0
CHEST TIGHTNESS: 0
COLOR CHANGE: 0
WHEEZING: 0
ABDOMINAL PAIN: 0
COUGH: 0
BACK PAIN: 0
SHORTNESS OF BREATH: 0
EYE ITCHING: 0
SORE THROAT: 0
VOMITING: 0
DIARRHEA: 0
CONSTIPATION: 0
RHINORRHEA: 0
EYE REDNESS: 0

## 2023-10-23 NOTE — PROGRESS NOTES
Devante Schaefer (:  1955) is a 79 y.o. female,Established patient, here for evaluation of the following chief complaint(s):  Follow-up (Pt wants to know if she can get back on the Protonix pt stop taking the omeprazole due to back pain. Pt also states that the atb has messed her stomach up and the acid reflex started back up)      ASSESSMENT/PLAN:  1. Gastroesophageal reflux disease without esophagitis  Assessment & Plan:  The pathophysiology of reflux is discussed. Anti-reflux measures such as raising the head of the bed, avoiding tight clothing or belts, avoiding eating late at night and not lying down shortly after mealtime and achieving weight loss are discussed. Avoid ASA, NSAID's, caffeine, peppermints, alcohol and tobacco. OTC H2 blockers and/or antacids are often very helpful for PRN use. However, for persisting chronic or daily symptoms, prescription strength H2 blockers or a trial of PPI's are often used. Further recommendations to her: Rx for PPI is written. She should alert me if there are persistent symptoms, dysphagia, weight loss or GI bleeding. Tommy Bishop is scheduled. No follow-ups on file. SUBJECTIVE/OBJECTIVE:    patient the office today to discuss acid reflux. She feels that her indigestion symptoms are not as improved on the omeprazole as they were on pantoprazole. She continues to deal with indigestion and reflux. She states that her  had some Protonix that she has used in the past she tried this and symptoms are much better managed. She would like to have a refill of this medication and start this medication again. She has no other issues or concerns today.   Current Outpatient Medications   Medication Sig Dispense Refill    pantoprazole (PROTONIX) 40 MG tablet Take 1 tablet by mouth every morning (before breakfast) 30 tablet 5    dicyclomine (BENTYL) 10 MG capsule Take 1 capsule by mouth 4 times daily 120 capsule 0    clopidogrel (PLAVIX) 75 MG tablet Take 1 tablet by

## 2023-10-23 NOTE — ASSESSMENT & PLAN NOTE
The pathophysiology of reflux is discussed. Anti-reflux measures such as raising the head of the bed, avoiding tight clothing or belts, avoiding eating late at night and not lying down shortly after mealtime and achieving weight loss are discussed. Avoid ASA, NSAID's, caffeine, peppermints, alcohol and tobacco. OTC H2 blockers and/or antacids are often very helpful for PRN use. However, for persisting chronic or daily symptoms, prescription strength H2 blockers or a trial of PPI's are often used. Further recommendations to her: Rx for PPI is written. She should alert me if there are persistent symptoms, dysphagia, weight loss or GI bleeding. Cyndia Hammans is scheduled.

## 2023-10-27 RX ORDER — DICYCLOMINE HYDROCHLORIDE 10 MG/1
10 CAPSULE ORAL 4 TIMES DAILY
Qty: 120 CAPSULE | Refills: 0 | Status: SHIPPED | OUTPATIENT
Start: 2023-10-27 | End: 2023-10-31 | Stop reason: SDUPTHER

## 2023-10-27 RX ORDER — PANTOPRAZOLE SODIUM 40 MG/1
40 TABLET, DELAYED RELEASE ORAL
Qty: 30 TABLET | Refills: 5 | Status: SHIPPED | OUTPATIENT
Start: 2023-10-27

## 2023-10-27 NOTE — TELEPHONE ENCOUNTER
Requested Prescriptions     Pending Prescriptions Disp Refills    pantoprazole (PROTONIX) 40 MG tablet 30 tablet 5     Sig: Take 1 tablet by mouth every morning (before breakfast)          Last Office Visit: 10/23/2023     Next Office Visit: Visit date not found     Last Labs: 8/25/2023
No

## 2023-10-31 RX ORDER — DICYCLOMINE HYDROCHLORIDE 10 MG/1
10 CAPSULE ORAL 4 TIMES DAILY
Qty: 120 CAPSULE | Refills: 0 | Status: SHIPPED | OUTPATIENT
Start: 2023-10-31

## 2023-10-31 NOTE — TELEPHONE ENCOUNTER
Requested Prescriptions     Pending Prescriptions Disp Refills    dicyclomine (BENTYL) 10 MG capsule 120 capsule 0     Sig: Take 1 capsule by mouth 4 times daily          Last Office Visit: 10/23/2023     Next Office Visit: Visit date not found     Last Labs: 8/25/2023

## 2023-11-02 ENCOUNTER — TELEPHONE (OUTPATIENT)
Dept: CARDIOLOGY CLINIC | Age: 68
End: 2023-11-02

## 2023-12-15 ENCOUNTER — HOSPITAL ENCOUNTER (INPATIENT)
Age: 68
LOS: 5 days | Discharge: HOME | DRG: 391 | End: 2023-12-20
Attending: FAMILY MEDICINE | Admitting: FAMILY MEDICINE
Payer: MEDICARE

## 2023-12-15 ENCOUNTER — APPOINTMENT (OUTPATIENT)
Dept: GENERAL RADIOLOGY | Age: 68
DRG: 391 | End: 2023-12-15
Payer: MEDICARE

## 2023-12-15 ENCOUNTER — HOSPITAL ENCOUNTER (OUTPATIENT)
Dept: MRI IMAGING | Age: 68
Discharge: HOME | End: 2023-12-15
Payer: MEDICARE

## 2023-12-15 DIAGNOSIS — R07.9 CHEST PAIN, UNSPECIFIED TYPE: Primary | ICD-10-CM

## 2023-12-15 DIAGNOSIS — R07.89 CHEST PAIN, NON-CARDIAC: ICD-10-CM

## 2023-12-15 DIAGNOSIS — N60.11 FIBROCYSTIC BREAST CHANGES, BILATERAL: ICD-10-CM

## 2023-12-15 DIAGNOSIS — N60.12 FIBROCYSTIC BREAST CHANGES, BILATERAL: ICD-10-CM

## 2023-12-15 DIAGNOSIS — Z12.31 ENCOUNTER FOR SCREENING MAMMOGRAM FOR MALIGNANT NEOPLASM OF BREAST: ICD-10-CM

## 2023-12-15 LAB
ALBUMIN SERPL-MCNC: 4.2 G/DL (ref 3.4–5)
ALBUMIN SERPL-MCNC: 4.6 G/DL (ref 3.4–5)
ALBUMIN/GLOB SERPL: 1.6 {RATIO} (ref 1.1–2.2)
ALBUMIN/GLOB SERPL: 1.6 {RATIO} (ref 1.1–2.2)
ALP SERPL-CCNC: 111 U/L (ref 40–129)
ALP SERPL-CCNC: 118 U/L (ref 40–129)
ALT SERPL-CCNC: 15 U/L (ref 10–40)
ALT SERPL-CCNC: 15 U/L (ref 10–40)
ANION GAP SERPL CALCULATED.3IONS-SCNC: 9 MMOL/L (ref 3–16)
ANION GAP SERPL CALCULATED.3IONS-SCNC: 9 MMOL/L (ref 3–16)
AST SERPL-CCNC: 23 U/L (ref 15–37)
AST SERPL-CCNC: 24 U/L (ref 15–37)
BASOPHILS # BLD: 0.1 K/UL (ref 0–0.2)
BASOPHILS NFR BLD: 0.7 %
BILIRUB SERPL-MCNC: 0.3 MG/DL (ref 0–1)
BILIRUB SERPL-MCNC: <0.2 MG/DL (ref 0–1)
BUN SERPL-MCNC: 10 MG/DL (ref 7–20)
BUN SERPL-MCNC: 9 MG/DL (ref 7–20)
CALCIUM SERPL-MCNC: 9 MG/DL (ref 8.3–10.6)
CALCIUM SERPL-MCNC: 9.4 MG/DL (ref 8.3–10.6)
CHLORIDE SERPL-SCNC: 101 MMOL/L (ref 99–110)
CHLORIDE SERPL-SCNC: 102 MMOL/L (ref 99–110)
CO2 SERPL-SCNC: 23 MMOL/L (ref 21–32)
CO2 SERPL-SCNC: 27 MMOL/L (ref 21–32)
CREAT SERPL-MCNC: 0.7 MG/DL (ref 0.6–1.2)
CREAT SERPL-MCNC: 0.8 MG/DL (ref 0.6–1.2)
DEPRECATED RDW RBC AUTO: 13.1 % (ref 12.4–15.4)
EKG ATRIAL RATE: 80 BPM
EKG DIAGNOSIS: NORMAL
EKG P AXIS: 43 DEGREES
EKG P-R INTERVAL: 128 MS
EKG Q-T INTERVAL: 380 MS
EKG QRS DURATION: 78 MS
EKG QTC CALCULATION (BAZETT): 438 MS
EKG R AXIS: -13 DEGREES
EKG T AXIS: 29 DEGREES
EKG VENTRICULAR RATE: 80 BPM
EOSINOPHIL # BLD: 0.3 K/UL (ref 0–0.6)
EOSINOPHIL NFR BLD: 4.4 %
GFR SERPLBLD CREATININE-BSD FMLA CKD-EPI: >60 ML/MIN/{1.73_M2}
GFR SERPLBLD CREATININE-BSD FMLA CKD-EPI: >60 ML/MIN/{1.73_M2}
GLUCOSE SERPL-MCNC: 120 MG/DL (ref 70–99)
GLUCOSE SERPL-MCNC: 98 MG/DL (ref 70–99)
HCT VFR BLD AUTO: 41.5 % (ref 36–48)
HGB BLD-MCNC: 14 G/DL (ref 12–16)
LYMPHOCYTES # BLD: 2.5 K/UL (ref 1–5.1)
LYMPHOCYTES NFR BLD: 32.1 %
MCH RBC QN AUTO: 29.5 PG (ref 26–34)
MCHC RBC AUTO-ENTMCNC: 33.8 G/DL (ref 31–36)
MCV RBC AUTO: 87.3 FL (ref 80–100)
MONOCYTES # BLD: 0.8 K/UL (ref 0–1.3)
MONOCYTES NFR BLD: 10 %
NEUTROPHILS # BLD: 4.1 K/UL (ref 1.7–7.7)
NEUTROPHILS NFR BLD: 52.8 %
PLATELET # BLD AUTO: 292 K/UL (ref 135–450)
PMV BLD AUTO: 7.6 FL (ref 5–10.5)
POTASSIUM SERPL-SCNC: 4.3 MMOL/L (ref 3.5–5.1)
POTASSIUM SERPL-SCNC: 4.3 MMOL/L (ref 3.5–5.1)
PROT SERPL-MCNC: 6.9 G/DL (ref 6.4–8.2)
PROT SERPL-MCNC: 7.4 G/DL (ref 6.4–8.2)
RBC # BLD AUTO: 4.76 M/UL (ref 4–5.2)
SODIUM SERPL-SCNC: 134 MMOL/L (ref 136–145)
SODIUM SERPL-SCNC: 137 MMOL/L (ref 136–145)
TROPONIN, HIGH SENSITIVITY: 10 NG/L (ref 0–14)
TROPONIN, HIGH SENSITIVITY: 10 NG/L (ref 0–14)
TROPONIN, HIGH SENSITIVITY: 9 NG/L (ref 0–14)
WBC # BLD AUTO: 7.8 K/UL (ref 4–11)

## 2023-12-15 PROCEDURE — 99285 EMERGENCY DEPT VISIT HI MDM: CPT

## 2023-12-15 PROCEDURE — 93010 ELECTROCARDIOGRAM REPORT: CPT | Performed by: INTERNAL MEDICINE

## 2023-12-15 PROCEDURE — 96374 THER/PROPH/DIAG INJ IV PUSH: CPT

## 2023-12-15 PROCEDURE — 6370000000 HC RX 637 (ALT 250 FOR IP): Performed by: FAMILY MEDICINE

## 2023-12-15 PROCEDURE — 93005 ELECTROCARDIOGRAM TRACING: CPT | Performed by: FAMILY MEDICINE

## 2023-12-15 PROCEDURE — 85025 COMPLETE CBC W/AUTO DIFF WBC: CPT

## 2023-12-15 PROCEDURE — 2580000003 HC RX 258: Performed by: FAMILY MEDICINE

## 2023-12-15 PROCEDURE — 36415 COLL VENOUS BLD VENIPUNCTURE: CPT

## 2023-12-15 PROCEDURE — 6370000000 HC RX 637 (ALT 250 FOR IP): Performed by: PHYSICIAN ASSISTANT

## 2023-12-15 PROCEDURE — 6360000002 HC RX W HCPCS: Performed by: FAMILY MEDICINE

## 2023-12-15 PROCEDURE — 71045 X-RAY EXAM CHEST 1 VIEW: CPT

## 2023-12-15 PROCEDURE — 84484 ASSAY OF TROPONIN QUANT: CPT

## 2023-12-15 PROCEDURE — 6360000002 HC RX W HCPCS: Performed by: PHYSICIAN ASSISTANT

## 2023-12-15 PROCEDURE — 1200000000 HC SEMI PRIVATE

## 2023-12-15 PROCEDURE — 80053 COMPREHEN METABOLIC PANEL: CPT

## 2023-12-15 RX ORDER — PANTOPRAZOLE SODIUM 40 MG/1
40 TABLET, DELAYED RELEASE ORAL
Status: DISCONTINUED | OUTPATIENT
Start: 2023-12-16 | End: 2023-12-20

## 2023-12-15 RX ORDER — DIAZEPAM 5 MG/1
5 TABLET ORAL 2 TIMES DAILY PRN
Status: DISCONTINUED | OUTPATIENT
Start: 2023-12-15 | End: 2023-12-20 | Stop reason: HOSPADM

## 2023-12-15 RX ORDER — ASPIRIN 81 MG/1
81 TABLET, CHEWABLE ORAL DAILY
Status: DISCONTINUED | OUTPATIENT
Start: 2023-12-16 | End: 2023-12-20 | Stop reason: HOSPADM

## 2023-12-15 RX ORDER — ASPIRIN 81 MG/1
324 TABLET, CHEWABLE ORAL ONCE
Status: COMPLETED | OUTPATIENT
Start: 2023-12-15 | End: 2023-12-15

## 2023-12-15 RX ORDER — ONDANSETRON 2 MG/ML
4 INJECTION INTRAMUSCULAR; INTRAVENOUS EVERY 6 HOURS PRN
Status: DISCONTINUED | OUTPATIENT
Start: 2023-12-15 | End: 2023-12-20 | Stop reason: HOSPADM

## 2023-12-15 RX ORDER — DILTIAZEM HYDROCHLORIDE 120 MG/1
120 CAPSULE, COATED, EXTENDED RELEASE ORAL NIGHTLY
Status: DISCONTINUED | OUTPATIENT
Start: 2023-12-15 | End: 2023-12-20 | Stop reason: HOSPADM

## 2023-12-15 RX ORDER — SODIUM CHLORIDE 0.9 % (FLUSH) 0.9 %
5-40 SYRINGE (ML) INJECTION PRN
Status: DISCONTINUED | OUTPATIENT
Start: 2023-12-15 | End: 2023-12-20 | Stop reason: HOSPADM

## 2023-12-15 RX ORDER — MAGNESIUM SULFATE IN WATER 40 MG/ML
2000 INJECTION, SOLUTION INTRAVENOUS PRN
Status: DISCONTINUED | OUTPATIENT
Start: 2023-12-15 | End: 2023-12-20 | Stop reason: HOSPADM

## 2023-12-15 RX ORDER — SODIUM CHLORIDE 9 MG/ML
INJECTION, SOLUTION INTRAVENOUS PRN
Status: DISCONTINUED | OUTPATIENT
Start: 2023-12-15 | End: 2023-12-19 | Stop reason: SDUPTHER

## 2023-12-15 RX ORDER — CLOPIDOGREL BISULFATE 75 MG/1
75 TABLET ORAL DAILY
Status: DISCONTINUED | OUTPATIENT
Start: 2023-12-15 | End: 2023-12-20 | Stop reason: HOSPADM

## 2023-12-15 RX ORDER — ACETAMINOPHEN 325 MG/1
650 TABLET ORAL EVERY 6 HOURS PRN
Status: DISCONTINUED | OUTPATIENT
Start: 2023-12-15 | End: 2023-12-20 | Stop reason: HOSPADM

## 2023-12-15 RX ORDER — ENOXAPARIN SODIUM 100 MG/ML
40 INJECTION SUBCUTANEOUS NIGHTLY
Status: DISCONTINUED | OUTPATIENT
Start: 2023-12-15 | End: 2023-12-20 | Stop reason: HOSPADM

## 2023-12-15 RX ORDER — MORPHINE SULFATE 4 MG/ML
4 INJECTION, SOLUTION INTRAMUSCULAR; INTRAVENOUS ONCE
Status: COMPLETED | OUTPATIENT
Start: 2023-12-15 | End: 2023-12-15

## 2023-12-15 RX ORDER — ACETAMINOPHEN 650 MG/1
650 SUPPOSITORY RECTAL EVERY 6 HOURS PRN
Status: DISCONTINUED | OUTPATIENT
Start: 2023-12-15 | End: 2023-12-20 | Stop reason: HOSPADM

## 2023-12-15 RX ORDER — NITROGLYCERIN 0.4 MG/1
0.4 TABLET SUBLINGUAL EVERY 5 MIN PRN
Status: DISCONTINUED | OUTPATIENT
Start: 2023-12-15 | End: 2023-12-20 | Stop reason: HOSPADM

## 2023-12-15 RX ORDER — SODIUM CHLORIDE 0.9 % (FLUSH) 0.9 %
5-40 SYRINGE (ML) INJECTION EVERY 12 HOURS SCHEDULED
Status: DISCONTINUED | OUTPATIENT
Start: 2023-12-15 | End: 2023-12-20 | Stop reason: HOSPADM

## 2023-12-15 RX ORDER — POTASSIUM CHLORIDE 20 MEQ/1
40 TABLET, EXTENDED RELEASE ORAL PRN
Status: DISCONTINUED | OUTPATIENT
Start: 2023-12-15 | End: 2023-12-20 | Stop reason: HOSPADM

## 2023-12-15 RX ORDER — POTASSIUM CHLORIDE 7.45 MG/ML
10 INJECTION INTRAVENOUS PRN
Status: DISCONTINUED | OUTPATIENT
Start: 2023-12-15 | End: 2023-12-20 | Stop reason: HOSPADM

## 2023-12-15 RX ORDER — MORPHINE SULFATE 2 MG/ML
2 INJECTION, SOLUTION INTRAMUSCULAR; INTRAVENOUS EVERY 4 HOURS PRN
Status: DISCONTINUED | OUTPATIENT
Start: 2023-12-15 | End: 2023-12-17

## 2023-12-15 RX ORDER — NITROGLYCERIN 40 MG/1
1 PATCH TRANSDERMAL DAILY
Status: DISCONTINUED | OUTPATIENT
Start: 2023-12-15 | End: 2023-12-20 | Stop reason: HOSPADM

## 2023-12-15 RX ORDER — CETIRIZINE HYDROCHLORIDE 10 MG/1
5 TABLET ORAL DAILY
Status: DISCONTINUED | OUTPATIENT
Start: 2023-12-15 | End: 2023-12-20 | Stop reason: HOSPADM

## 2023-12-15 RX ORDER — POLYETHYLENE GLYCOL 3350 17 G/17G
17 POWDER, FOR SOLUTION ORAL DAILY PRN
Status: DISCONTINUED | OUTPATIENT
Start: 2023-12-15 | End: 2023-12-20 | Stop reason: HOSPADM

## 2023-12-15 RX ORDER — VALSARTAN 40 MG/1
80 TABLET ORAL DAILY
Status: DISCONTINUED | OUTPATIENT
Start: 2023-12-15 | End: 2023-12-20 | Stop reason: HOSPADM

## 2023-12-15 RX ORDER — ONDANSETRON 4 MG/1
4 TABLET, ORALLY DISINTEGRATING ORAL EVERY 8 HOURS PRN
Status: DISCONTINUED | OUTPATIENT
Start: 2023-12-15 | End: 2023-12-20 | Stop reason: HOSPADM

## 2023-12-15 RX ADMIN — ENOXAPARIN SODIUM 40 MG: 100 INJECTION SUBCUTANEOUS at 20:24

## 2023-12-15 RX ADMIN — CETIRIZINE HYDROCHLORIDE 5 MG: 10 TABLET, FILM COATED ORAL at 20:23

## 2023-12-15 RX ADMIN — VALSARTAN 80 MG: 40 TABLET, FILM COATED ORAL at 18:41

## 2023-12-15 RX ADMIN — MORPHINE SULFATE 2 MG: 2 INJECTION, SOLUTION INTRAMUSCULAR; INTRAVENOUS at 23:26

## 2023-12-15 RX ADMIN — MORPHINE SULFATE 2 MG: 2 INJECTION, SOLUTION INTRAMUSCULAR; INTRAVENOUS at 19:11

## 2023-12-15 RX ADMIN — DILTIAZEM HYDROCHLORIDE 120 MG: 120 CAPSULE, COATED, EXTENDED RELEASE ORAL at 20:23

## 2023-12-15 RX ADMIN — CLOPIDOGREL BISULFATE 75 MG: 75 TABLET ORAL at 20:24

## 2023-12-15 RX ADMIN — ASPIRIN 324 MG: 81 TABLET, CHEWABLE ORAL at 14:58

## 2023-12-15 RX ADMIN — MORPHINE SULFATE 4 MG: 4 INJECTION, SOLUTION INTRAMUSCULAR; INTRAVENOUS at 15:46

## 2023-12-15 RX ADMIN — Medication 10 ML: at 19:11

## 2023-12-15 ASSESSMENT — PAIN DESCRIPTION - LOCATION
LOCATION: CHEST

## 2023-12-15 ASSESSMENT — PAIN - FUNCTIONAL ASSESSMENT: PAIN_FUNCTIONAL_ASSESSMENT: 0-10

## 2023-12-15 ASSESSMENT — PAIN DESCRIPTION - DESCRIPTORS
DESCRIPTORS: CRUSHING;HEAVINESS
DESCRIPTORS: ACHING

## 2023-12-15 ASSESSMENT — PAIN SCALES - GENERAL
PAINLEVEL_OUTOF10: 8
PAINLEVEL_OUTOF10: 4
PAINLEVEL_OUTOF10: 8
PAINLEVEL_OUTOF10: 5
PAINLEVEL_OUTOF10: 6

## 2023-12-15 ASSESSMENT — PAIN DESCRIPTION - PAIN TYPE
TYPE: ACUTE PAIN
TYPE: ACUTE PAIN

## 2023-12-15 ASSESSMENT — HEART SCORE: ECG: 1

## 2023-12-15 ASSESSMENT — PAIN DESCRIPTION - ORIENTATION: ORIENTATION: LEFT

## 2023-12-15 ASSESSMENT — PAIN DESCRIPTION - FREQUENCY
FREQUENCY: INTERMITTENT
FREQUENCY: INTERMITTENT

## 2023-12-15 ASSESSMENT — PAIN DESCRIPTION - ONSET: ONSET: PROGRESSIVE

## 2023-12-15 NOTE — ED NOTES
Presents to the ED for CP radiating to bilateral arms with dizziness & some SOB that began around 1400; sites similar presentation with previous cardiac event. Hx of heart attack with 9 stents placed & reports blood clot in heart that is being monitored. Takes Plavix; monitors in place.        Michelle Villasenor RN  12/15/23 8733

## 2023-12-15 NOTE — PROGRESS NOTES
Pt admitted to room 5906 from ED. VSS on room air. O x 4. Pt  experiencing before MRI test outpt. Admission Dx: CP. Pt is independent. Pt oriented to room and updated on POC. Pt understands and has no further questions. Call light and bedside table within reach. Safety socks on and  bed in lowest position with 2/4 siderails up. .Report from ED nurse Jasmin العلي. Telemetry verified.  Bed alarm

## 2023-12-15 NOTE — ED NOTES
ED TO INPATIENT SBAR HANDOFF    Patient Name: Nithya Hicks   :  1955  68 y.o.   MRN:  [id number]  Preferred Name    ED Room #:  ED-0020/20  Family/Caregiver Present no   Restraints no   Sitter no   Sepsis Risk Score Sepsis Risk Score: 0.65    Situation  Code Status: Prior No additional code details.    Allergies: Beta adrenergic blockers, Brilinta [ticagrelor], Chicken allergy, Contrast [iodides], Hydralazine, Pineapple, Pork allergy, Shellfish-derived products, Asa [aspirin], Crestor [rosuvastatin], Effient [prasugrel], Fenofibrate, Furosemide, Gabapentin, Hctz [hydrochlorothiazide], Lipitor [atorvastatin], Lisinopril, Mometasone furoate, Nasonex [mometasone], Nsaids, Other, Plavix [clopidogrel], Ranexa [ranolazine], Sulfa antibiotics, Xarelto [rivaroxaban], Zetia [ezetimibe], and Flagyl [metronidazole]  Weight: Patient Vitals for the past 96 hrs (Last 3 readings):   Weight   12/15/23 1431 96.2 kg (212 lb)     Arrived from: home  Chief Complaint:   Chief Complaint   Patient presents with    Chest Pain     Pt was in MRI this afternoon when she started having SOB and chest pain. Pt has stated she feels like she has \"the elephant\" on her chest.      Hospital Problem/Diagnosis:  Principal Problem:    Chest pain  Resolved Problems:    * No resolved hospital problems. *    Imaging:   XR CHEST PORTABLE   Final Result   No acute cardiopulmonary findings           Abnormal labs: Abnormal Labs Reviewed - No abnormal labs to display  Critical values: no     Abnormal Assessment Findings:     Background  History:   Past Medical History:   Diagnosis Date    Allergic rhinitis, cause unspecified 2010    Arthritis     Bilateral carotid artery stenosis     < 50% bilaterally    CAD (coronary artery disease)     angioplasty with stent at [facility] Dr. Yony Bernal, here Dr. Vinny Casillas at [place],     Chronic pain     precordial, opiate dependent    Depression 2013    resolved    Erosive  tab.    Recommendation    Pending orders ED orders complete   Plan for Discharge (if known):    Additional Comments:    If any further questions, please call Sending RN at ED    Electronically signed by: Electronically signed by Sylvester Martinez RN on 12/15/2023 at 4:42 PM     Sylvester Martinez RN  12/15/23 [facility]

## 2023-12-15 NOTE — PROGRESS NOTES
RN called to MRI pt on MRI table prior to scan. Pt feeling lightheaded and complains of chest pain/pressure.  VS- HR 83, /91, NSR with PVCs, o2 sat 97%  [facility] and [facility] to ED

## 2023-12-15 NOTE — ED PROVIDER NOTES
range)   ondansetron (ZOFRAN-ODT) disintegrating tablet 4 mg (has no administration in time range)     Or   ondansetron (ZOFRAN) injection 4 mg (has no administration in time range)   polyethylene glycol (GLYCOLAX) packet 17 g (has no administration in time range)   acetaminophen (TYLENOL) tablet 650 mg (has no administration in time range)     Or   acetaminophen (TYLENOL) suppository 650 mg (has no administration in time range)   nitroGLYCERIN (NITRODUR) patch REMOVAL (has no administration in time range)   nitroGLYCERIN (NITRODUR) 0.2 MG/HR 1 patch (1 patch TransDERmal Patch Applied 12/15/23 1902)   morphine (PF) injection 2 mg (2 mg IntraVENous Given 12/15/23 1911)   dilTIAZem (CARDIZEM) tablet 60 mg (has no administration in time range)   aspirin chewable tablet 324 mg (324 mg Oral Given 12/15/23 1458)   morphine injection 4 mg (4 mg IntraVENous Given 12/15/23 1546)             Is this patient to be included in the SEP-1 Core Measure due to severe sepsis or septic shock? No   Exclusion criteria - the patient is NOT to be included for SEP-1 Core Measure due to: Infection is not suspected    Chronic Conditions affecting care:    has a past medical history of Allergic rhinitis, cause unspecified (12/04/2010), Arthritis, Bilateral carotid artery stenosis, CAD (coronary artery disease), Chronic pain, Depression (01/22/2013), Erosive esophagitis (12/28/2016), Essential hypertension (04/11/2017), Gastroesophageal reflux disease without esophagitis (10/23/2023), H/O bladder infections, HSV infection, Hypercholesterolemia (06/06/2014), Irritable bowel syndrome with diarrhea (09/27/2016), Migraine headache (06/06/2014), Nonerosive nonspecific gastritis (12/28/2016), OAB (overactive bladder) (03/14/2014), Statin myopathy, and Urgency of urination.     CONSULTS: (Who and What was discussed)  [facility]  Dr. Gage Landers Determinants Significantly Affecting Health : None    Records Reviewed (External and portions of this note were completed with a voice recognition program.  Efforts were made to edit the dictations but occasionally words are mis-transcribed.)    Carter Gordillo PA-C (electronically signed)       Carter Gordillo PA-C  12/15/23 1952

## 2023-12-16 LAB
ANION GAP SERPL CALCULATED.3IONS-SCNC: 8 MMOL/L (ref 3–16)
BUN SERPL-MCNC: 10 MG/DL (ref 7–20)
CALCIUM SERPL-MCNC: 8.8 MG/DL (ref 8.3–10.6)
CHLORIDE SERPL-SCNC: 102 MMOL/L (ref 99–110)
CO2 SERPL-SCNC: 30 MMOL/L (ref 21–32)
CREAT SERPL-MCNC: 0.8 MG/DL (ref 0.6–1.2)
DEPRECATED RDW RBC AUTO: 13 % (ref 12.4–15.4)
EKG ATRIAL RATE: 81 BPM
EKG DIAGNOSIS: NORMAL
EKG P AXIS: 0 DEGREES
EKG P-R INTERVAL: 158 MS
EKG Q-T INTERVAL: 408 MS
EKG QRS DURATION: 82 MS
EKG QTC CALCULATION (BAZETT): 473 MS
EKG R AXIS: -8 DEGREES
EKG T AXIS: 56 DEGREES
EKG VENTRICULAR RATE: 81 BPM
GFR SERPLBLD CREATININE-BSD FMLA CKD-EPI: >60 ML/MIN/{1.73_M2}
GLUCOSE SERPL-MCNC: 100 MG/DL (ref 70–99)
HCT VFR BLD AUTO: 39.2 % (ref 36–48)
HGB BLD-MCNC: 13.4 G/DL (ref 12–16)
MCH RBC QN AUTO: 29.8 PG (ref 26–34)
MCHC RBC AUTO-ENTMCNC: 34.1 G/DL (ref 31–36)
MCV RBC AUTO: 87.5 FL (ref 80–100)
PLATELET # BLD AUTO: 273 K/UL (ref 135–450)
PMV BLD AUTO: 7.6 FL (ref 5–10.5)
POTASSIUM SERPL-SCNC: 4.4 MMOL/L (ref 3.5–5.1)
RBC # BLD AUTO: 4.48 M/UL (ref 4–5.2)
SODIUM SERPL-SCNC: 140 MMOL/L (ref 136–145)
TROPONIN, HIGH SENSITIVITY: 10 NG/L (ref 0–14)
TROPONIN, HIGH SENSITIVITY: 11 NG/L (ref 0–14)
WBC # BLD AUTO: 6.5 K/UL (ref 4–11)

## 2023-12-16 PROCEDURE — 6370000000 HC RX 637 (ALT 250 FOR IP): Performed by: INTERNAL MEDICINE

## 2023-12-16 PROCEDURE — 36415 COLL VENOUS BLD VENIPUNCTURE: CPT

## 2023-12-16 PROCEDURE — 6370000000 HC RX 637 (ALT 250 FOR IP): Performed by: FAMILY MEDICINE

## 2023-12-16 PROCEDURE — 6360000002 HC RX W HCPCS: Performed by: FAMILY MEDICINE

## 2023-12-16 PROCEDURE — 84484 ASSAY OF TROPONIN QUANT: CPT

## 2023-12-16 PROCEDURE — 80048 BASIC METABOLIC PNL TOTAL CA: CPT

## 2023-12-16 PROCEDURE — 99223 1ST HOSP IP/OBS HIGH 75: CPT | Performed by: INTERNAL MEDICINE

## 2023-12-16 PROCEDURE — 93010 ELECTROCARDIOGRAM REPORT: CPT | Performed by: INTERNAL MEDICINE

## 2023-12-16 PROCEDURE — 1200000000 HC SEMI PRIVATE

## 2023-12-16 PROCEDURE — 2580000003 HC RX 258: Performed by: FAMILY MEDICINE

## 2023-12-16 PROCEDURE — 93005 ELECTROCARDIOGRAM TRACING: CPT | Performed by: INTERNAL MEDICINE

## 2023-12-16 PROCEDURE — 85027 COMPLETE CBC AUTOMATED: CPT

## 2023-12-16 RX ORDER — DIPHENHYDRAMINE HYDROCHLORIDE 50 MG/ML
25 INJECTION INTRAMUSCULAR; INTRAVENOUS EVERY 6 HOURS PRN
Status: DISCONTINUED | OUTPATIENT
Start: 2023-12-16 | End: 2023-12-20 | Stop reason: HOSPADM

## 2023-12-16 RX ORDER — CARVEDILOL 3.12 MG/1
3.12 TABLET ORAL 2 TIMES DAILY WITH MEALS
Status: DISCONTINUED | OUTPATIENT
Start: 2023-12-16 | End: 2023-12-20 | Stop reason: HOSPADM

## 2023-12-16 RX ADMIN — ASPIRIN 81 MG: 81 TABLET, CHEWABLE ORAL at 07:58

## 2023-12-16 RX ADMIN — PANTOPRAZOLE SODIUM 40 MG: 40 TABLET, DELAYED RELEASE ORAL at 05:45

## 2023-12-16 RX ADMIN — Medication 10 ML: at 08:00

## 2023-12-16 RX ADMIN — MORPHINE SULFATE 2 MG: 2 INJECTION, SOLUTION INTRAMUSCULAR; INTRAVENOUS at 04:41

## 2023-12-16 RX ADMIN — DILTIAZEM HYDROCHLORIDE 120 MG: 120 CAPSULE, COATED, EXTENDED RELEASE ORAL at 20:04

## 2023-12-16 RX ADMIN — ACETAMINOPHEN 650 MG: 325 TABLET ORAL at 16:48

## 2023-12-16 RX ADMIN — MORPHINE SULFATE 2 MG: 2 INJECTION, SOLUTION INTRAMUSCULAR; INTRAVENOUS at 21:51

## 2023-12-16 RX ADMIN — Medication 10 ML: at 20:04

## 2023-12-16 RX ADMIN — MORPHINE SULFATE 2 MG: 2 INJECTION, SOLUTION INTRAMUSCULAR; INTRAVENOUS at 17:40

## 2023-12-16 RX ADMIN — CARVEDILOL 3.12 MG: 3.12 TABLET, FILM COATED ORAL at 16:43

## 2023-12-16 RX ADMIN — ACETAMINOPHEN 650 MG: 325 TABLET ORAL at 10:24

## 2023-12-16 RX ADMIN — DILTIAZEM HYDROCHLORIDE 60 MG: 30 TABLET, FILM COATED ORAL at 09:01

## 2023-12-16 RX ADMIN — CLOPIDOGREL BISULFATE 75 MG: 75 TABLET ORAL at 07:58

## 2023-12-16 RX ADMIN — CETIRIZINE HYDROCHLORIDE 5 MG: 10 TABLET, FILM COATED ORAL at 20:04

## 2023-12-16 RX ADMIN — ENOXAPARIN SODIUM 40 MG: 100 INJECTION SUBCUTANEOUS at 20:05

## 2023-12-16 RX ADMIN — MORPHINE SULFATE 2 MG: 2 INJECTION, SOLUTION INTRAMUSCULAR; INTRAVENOUS at 09:02

## 2023-12-16 RX ADMIN — VALSARTAN 80 MG: 40 TABLET, FILM COATED ORAL at 07:58

## 2023-12-16 RX ADMIN — MORPHINE SULFATE 2 MG: 2 INJECTION, SOLUTION INTRAMUSCULAR; INTRAVENOUS at 13:36

## 2023-12-16 ASSESSMENT — PAIN SCALES - GENERAL
PAINLEVEL_OUTOF10: 0
PAINLEVEL_OUTOF10: 6
PAINLEVEL_OUTOF10: 10
PAINLEVEL_OUTOF10: 8
PAINLEVEL_OUTOF10: 6
PAINLEVEL_OUTOF10: 8
PAINLEVEL_OUTOF10: 8
PAINLEVEL_OUTOF10: 0
PAINLEVEL_OUTOF10: 6
PAINLEVEL_OUTOF10: 8
PAINLEVEL_OUTOF10: 8

## 2023-12-16 ASSESSMENT — PAIN DESCRIPTION - ORIENTATION
ORIENTATION: MID
ORIENTATION: RIGHT;LEFT;MID

## 2023-12-16 ASSESSMENT — PAIN DESCRIPTION - LOCATION
LOCATION: CHEST
LOCATION: HEAD
LOCATION: HEAD
LOCATION: HEAD;CHEST
LOCATION: CHEST

## 2023-12-16 ASSESSMENT — PAIN DESCRIPTION - DESCRIPTORS
DESCRIPTORS: ACHING
DESCRIPTORS: ACHING;PRESSURE
DESCRIPTORS: ACHING
DESCRIPTORS: ACHING;PRESSURE
DESCRIPTORS: ACHING;PRESSURE

## 2023-12-16 ASSESSMENT — PAIN DESCRIPTION - PAIN TYPE: TYPE: ACUTE PAIN

## 2023-12-16 NOTE — PLAN OF CARE
Problem: Discharge Planning  Goal: Discharge to home or other facility with appropriate resources  Outcome: Progressing     Problem: Pain  Goal: Verbalizes/displays adequate comfort level or baseline comfort level  Outcome: Progressing     Problem: Metabolic/Fluid and Electrolytes - Adult  Goal: Electrolytes maintained within normal limits  Outcome: Progressing negative Soft, non-tender, no hepatosplenomegaly, normal bowel sounds

## 2023-12-16 NOTE — PROGRESS NOTES
Per Dr. Rahul Cavazos, [facility] to do stress test. Called for patient. Transport cancelled per RN due to patient having current chest pain and elevated BP. Stress test on hold.

## 2023-12-16 NOTE — PROGRESS NOTES
Pt awake in bed. BP elevated and c/o chest pain. Will notify hospitalist. Pt unable to go for tress test. Assessment complete and medications given. Denies any other needs. Call light in reach and bed alarm engaged.

## 2023-12-16 NOTE — PROGRESS NOTES
Pt awake in bed. VSS and pt talking to cardiologist at this time. Denies any needs. Call light in reach and bed alarm engaged.

## 2023-12-16 NOTE — CONSULTS
[facility]      CONSULTATION  [phone]      Chief Complaint   Patient presents with    Chest Pain     Pt was in MRI this afternoon when she started having SOB and chest pain. Pt has stated she feels like she has \"the elephant\" on her chest.         Reason for consult:  chest pain    ASSESSMENT AND PLAN:  Unstable angina  CAD in native artery s/p several PCIs  History of stent thrombosis  Hypertension  PAF, currently in sinus s/p ablation  Elevated liprotein A level  Numerous drug allergies    Plan  -Given stents in diagonal / Cx territory she could present with stent thrombosis without EKG changes, however troponins remain negative several hours after start of pain, which effectively rules this out  -Her CP has been totally gone since her last stent and although it has only been 9 months, her risk factors are such that she would likely have rapid progression.  -Given acuity and severity of CP, cath rather than stress test is appropriate    -Will start carvedilol 3.125 po BID to lower BP and HR to relieve angina   -Although patient has a beta blocker allergy, I verified that she has never had a reaction to carvedilol  - No need to start IV heparin at this time unless she becomes hemodynamically unstable, has new EKG changes, or new troponin leak    -Continue nitropaste  -Ok to eat today  -No plans to cath unless pain severe and not controllable by nitro  -Plan to cath Monday  -NPO at midnight Sunday  -Will need premedication for IVP allergy    -At high risk of recurrent events given Lp(a) elevation  -Goal LDL < 55 (now down to 31 on Repatha)  -Has multiple statin allergies - on Repatha      History of Present Illness:  Nithya Hicks is a 68 y.o. patient with a history of several PCIs, Lp(a) level elevated, bad family history of CAD, last chest pain event 3/2023 was a NSTEMI due to stent thrombosis, pain totally relieved after that was treated, and has had no pain until 1 pm yesterday morning when  ventricular cavity size is normal with mild concentric left ventricular   hypertrophy. Ejection fraction is visually estimated to be 60-65%. No regional wall motion abnormalities are noted. Grade I diastolic dysfunction with normal LV filling pressures. E/e' = 12.3. Left atrium is dilated. Trivial aortic regurgitation. Mitral annular calcification is present. Mild tricuspid regurgitation. RVSP = 32 mmHG. JESSICA:  4/2022  Summary   No thrombus noted. Overall left ventricular function is normal.   Mitral valve is structurally normal.   Mild mitral regurgitation is present. There is no evidence of mass or thrombus in the left atrium or appendage. Tricuspid valve is structurally normal.   Mild tricuspid regurgitation. PASP 35 mmHg       Stress:  12/2020  Summary  -There is a small-moderate sized, moderate intensity fixed anterior defect  suggestive of scar.  -There is no ischemia. -LV function is normal with EF=70%  -Low-intermediate risk. CATH/CORONARY INTERVENTIONS  3/2023  Left Heart Cath  Dominance: Right      LM: 20-30% distal   LAD: 20-30% proximal, mid stents patent, first diagonal proximal stent patent    LCx: mid and distal stents patent into OM2  RCA: proximal and mid stents patent with filling defect consistent with thrombus midway, 40% distal      LVEDP: 18 mmHg  LVEF: 55%     6 Fr AL 0.75 Guide  Choice PT ES wire  Pronto LP aspiration thrombectomy  Penumbra mechanical thrombectomy  3.5 x 12 mm NC Trek     0% residual     CARDIAC RHYTHM ASSESSMENT:  Holter/Event monitor      Old notes reviewed  Telemetry reviewd  Ekg personally reviewed  Chest xray personally reviewed  Echo, stress, cath, and/or other cardiac testing reviewed in detail   Medications and labs reviewed    [x] High (any 2)    A.  Problems (any 1)  [x] Acute/Chronic Illness/injury posing threat to life or bodily function:  unstable angina  [] Severe exacerbation of chronic illness:

## 2023-12-16 NOTE — PLAN OF CARE
Problem: Pain  Goal: Verbalizes/displays adequate comfort level or baseline comfort level  Outcome: Progressing   Pt can rate pain on a 0-10 scale. Pt pain will remain at a level that is tolerable to pt. PRN pain medication as needed. Problem: Hematologic - Adult  Goal: Maintains hematologic stability  Outcome: Progressing   VSS monitored q4 hrs   Problem: Safety - Adult  Goal: Free from fall injury  Outcome: Progressing   Pt will remain free from falls. Fall precautions in place and alarm engaged. Bedside table and call light within reach. Pt aware to use call light for assistance ambulating.

## 2023-12-17 LAB
ANION GAP SERPL CALCULATED.3IONS-SCNC: 9 MMOL/L (ref 3–16)
BUN SERPL-MCNC: 14 MG/DL (ref 7–20)
CALCIUM SERPL-MCNC: 9.3 MG/DL (ref 8.3–10.6)
CHLORIDE SERPL-SCNC: 104 MMOL/L (ref 99–110)
CO2 SERPL-SCNC: 26 MMOL/L (ref 21–32)
CREAT SERPL-MCNC: 0.8 MG/DL (ref 0.6–1.2)
DEPRECATED RDW RBC AUTO: 12.6 % (ref 12.4–15.4)
GFR SERPLBLD CREATININE-BSD FMLA CKD-EPI: >60 ML/MIN/{1.73_M2}
GLUCOSE SERPL-MCNC: 95 MG/DL (ref 70–99)
HCT VFR BLD AUTO: 37.7 % (ref 36–48)
HGB BLD-MCNC: 12.9 G/DL (ref 12–16)
MCH RBC QN AUTO: 30 PG (ref 26–34)
MCHC RBC AUTO-ENTMCNC: 34.4 G/DL (ref 31–36)
MCV RBC AUTO: 87.2 FL (ref 80–100)
PLATELET # BLD AUTO: 263 K/UL (ref 135–450)
PMV BLD AUTO: 7.9 FL (ref 5–10.5)
POTASSIUM SERPL-SCNC: 4.1 MMOL/L (ref 3.5–5.1)
RBC # BLD AUTO: 4.32 M/UL (ref 4–5.2)
SODIUM SERPL-SCNC: 139 MMOL/L (ref 136–145)
TROPONIN, HIGH SENSITIVITY: 9 NG/L (ref 0–14)
WBC # BLD AUTO: 7.5 K/UL (ref 4–11)

## 2023-12-17 PROCEDURE — 2580000003 HC RX 258: Performed by: FAMILY MEDICINE

## 2023-12-17 PROCEDURE — 93005 ELECTROCARDIOGRAM TRACING: CPT | Performed by: NURSE PRACTITIONER

## 2023-12-17 PROCEDURE — 6370000000 HC RX 637 (ALT 250 FOR IP): Performed by: INTERNAL MEDICINE

## 2023-12-17 PROCEDURE — 1200000000 HC SEMI PRIVATE

## 2023-12-17 PROCEDURE — 85027 COMPLETE CBC AUTOMATED: CPT

## 2023-12-17 PROCEDURE — 84484 ASSAY OF TROPONIN QUANT: CPT

## 2023-12-17 PROCEDURE — 6370000000 HC RX 637 (ALT 250 FOR IP): Performed by: FAMILY MEDICINE

## 2023-12-17 PROCEDURE — 6360000002 HC RX W HCPCS: Performed by: INTERNAL MEDICINE

## 2023-12-17 PROCEDURE — 80048 BASIC METABOLIC PNL TOTAL CA: CPT

## 2023-12-17 PROCEDURE — 6360000002 HC RX W HCPCS: Performed by: FAMILY MEDICINE

## 2023-12-17 PROCEDURE — 99233 SBSQ HOSP IP/OBS HIGH 50: CPT | Performed by: NURSE PRACTITIONER

## 2023-12-17 PROCEDURE — 36415 COLL VENOUS BLD VENIPUNCTURE: CPT

## 2023-12-17 RX ORDER — MORPHINE SULFATE 4 MG/ML
4 INJECTION, SOLUTION INTRAMUSCULAR; INTRAVENOUS EVERY 4 HOURS PRN
Status: DISCONTINUED | OUTPATIENT
Start: 2023-12-17 | End: 2023-12-20 | Stop reason: HOSPADM

## 2023-12-17 RX ADMIN — ONDANSETRON 4 MG: 2 INJECTION INTRAMUSCULAR; INTRAVENOUS at 11:26

## 2023-12-17 RX ADMIN — MORPHINE SULFATE 2 MG: 2 INJECTION, SOLUTION INTRAMUSCULAR; INTRAVENOUS at 02:08

## 2023-12-17 RX ADMIN — MORPHINE SULFATE 2 MG: 2 INJECTION, SOLUTION INTRAMUSCULAR; INTRAVENOUS at 11:19

## 2023-12-17 RX ADMIN — ONDANSETRON 4 MG: 2 INJECTION INTRAMUSCULAR; INTRAVENOUS at 20:35

## 2023-12-17 RX ADMIN — ACETAMINOPHEN 650 MG: 325 TABLET ORAL at 23:23

## 2023-12-17 RX ADMIN — DILTIAZEM HYDROCHLORIDE 120 MG: 120 CAPSULE, COATED, EXTENDED RELEASE ORAL at 20:33

## 2023-12-17 RX ADMIN — Medication 10 ML: at 20:41

## 2023-12-17 RX ADMIN — CARVEDILOL 3.12 MG: 3.12 TABLET, FILM COATED ORAL at 09:11

## 2023-12-17 RX ADMIN — PANTOPRAZOLE SODIUM 40 MG: 40 TABLET, DELAYED RELEASE ORAL at 05:54

## 2023-12-17 RX ADMIN — VALSARTAN 80 MG: 40 TABLET, FILM COATED ORAL at 09:11

## 2023-12-17 RX ADMIN — ACETAMINOPHEN 650 MG: 325 TABLET ORAL at 09:10

## 2023-12-17 RX ADMIN — CARVEDILOL 3.12 MG: 3.12 TABLET, FILM COATED ORAL at 16:13

## 2023-12-17 RX ADMIN — ENOXAPARIN SODIUM 40 MG: 100 INJECTION SUBCUTANEOUS at 20:34

## 2023-12-17 RX ADMIN — MORPHINE SULFATE 2 MG: 2 INJECTION, SOLUTION INTRAMUSCULAR; INTRAVENOUS at 06:43

## 2023-12-17 RX ADMIN — CETIRIZINE HYDROCHLORIDE 5 MG: 10 TABLET, FILM COATED ORAL at 09:10

## 2023-12-17 RX ADMIN — Medication 10 ML: at 09:11

## 2023-12-17 RX ADMIN — CLOPIDOGREL BISULFATE 75 MG: 75 TABLET ORAL at 09:11

## 2023-12-17 RX ADMIN — DILTIAZEM HYDROCHLORIDE 60 MG: 30 TABLET, FILM COATED ORAL at 09:11

## 2023-12-17 RX ADMIN — ACETAMINOPHEN 650 MG: 325 TABLET ORAL at 16:07

## 2023-12-17 RX ADMIN — ASPIRIN 81 MG: 81 TABLET, CHEWABLE ORAL at 09:10

## 2023-12-17 RX ADMIN — MORPHINE SULFATE 4 MG: 4 INJECTION, SOLUTION INTRAMUSCULAR; INTRAVENOUS at 20:37

## 2023-12-17 RX ADMIN — MORPHINE SULFATE 4 MG: 4 INJECTION, SOLUTION INTRAMUSCULAR; INTRAVENOUS at 15:56

## 2023-12-17 ASSESSMENT — PAIN DESCRIPTION - ORIENTATION
ORIENTATION: MID
ORIENTATION: MID
ORIENTATION: MID;LEFT;RIGHT
ORIENTATION: RIGHT;LEFT;MID
ORIENTATION: MID

## 2023-12-17 ASSESSMENT — PAIN DESCRIPTION - LOCATION
LOCATION: HEAD
LOCATION: CHEST
LOCATION: CHEST;HEAD
LOCATION: CHEST;HEAD
LOCATION: CHEST
LOCATION: HEAD
LOCATION: HEAD

## 2023-12-17 ASSESSMENT — PAIN DESCRIPTION - DESCRIPTORS
DESCRIPTORS: ACHING
DESCRIPTORS: ACHING;PRESSURE
DESCRIPTORS: ACHING
DESCRIPTORS: ACHING

## 2023-12-17 ASSESSMENT — PAIN SCALES - GENERAL
PAINLEVEL_OUTOF10: 7
PAINLEVEL_OUTOF10: 0
PAINLEVEL_OUTOF10: 0
PAINLEVEL_OUTOF10: 7
PAINLEVEL_OUTOF10: 7
PAINLEVEL_OUTOF10: 8
PAINLEVEL_OUTOF10: 6
PAINLEVEL_OUTOF10: 7
PAINLEVEL_OUTOF10: 9
PAINLEVEL_OUTOF10: 6
PAINLEVEL_OUTOF10: 10

## 2023-12-17 ASSESSMENT — PAIN DESCRIPTION - PAIN TYPE: TYPE: ACUTE PAIN

## 2023-12-17 NOTE — PLAN OF CARE
Problem: Discharge Planning  Goal: Discharge to home or other facility with appropriate resources  Outcome: Progressing     Problem: Pain  Goal: Verbalizes/displays adequate comfort level or baseline comfort level  12/17/2023 0248 by Suzanna Sahu RN  Outcome: Progressing  12/16/2023 1403 by Fay Jack RN  Outcome: Progressing     Problem: Metabolic/Fluid and Electrolytes - Adult  Goal: Electrolytes maintained within normal limits  Outcome: Progressing  Goal: Hemodynamic stability and optimal renal function maintained  12/17/2023 0248 by Suzanna Sahu RN  Outcome: Progressing  12/16/2023 1403 by Fay Jack RN  Outcome: Progressing     Problem: Hematologic - Adult  Goal: Maintains hematologic stability  12/16/2023 1403 by Fay Jack RN  Outcome: Progressing     Problem: Safety - Adult  Goal: Free from fall injury  12/16/2023 1403 by Fay Jack RN  Outcome: Progressing

## 2023-12-17 NOTE — PROGRESS NOTES
Pt awake in bed and VSS. PRN pain medications given and pt denies any other needs. Call light in reach and pt refusing bed alarm.

## 2023-12-17 NOTE — PROGRESS NOTES
Pt awake in bed. VSS, assessment complete and medications given. PRN tylenol for headache. Cards NP in room at this time. Pt denies any other needs. Call light in reach and pt refusing bed alarm. Pt educated on reason for the bed alarm and fall precautions but insist she is ok to get up alone and does not want bed alarm.

## 2023-12-17 NOTE — PROGRESS NOTES
Pt awake in bed and VSS.  at bedside. P denies any needs. Call light in reach and pt refusing bed alarm.

## 2023-12-17 NOTE — PLAN OF CARE
Problem: Pain  Goal: Verbalizes/displays adequate comfort level or baseline comfort level  12/17/2023 1442 by Stephany Andrade RN  Outcome: Progressing  12/17/2023 0248 by Amor Cameron RN  Outcome: Progressing   Pt can rate pain on a 0-10 scale. Pt pain will remain at a level that is tolerable to pt. PRN pain medication as needed. Problem: Safety - Adult  Goal: Free from fall injury  Outcome: Progressing   Pt will remain free from falls. Fall precautions in place and alarm engaged. Bedside table and call light within reach. Pt aware to use call light for assistance ambulating.   Pt refusing bed alarm

## 2023-12-18 LAB
ANION GAP SERPL CALCULATED.3IONS-SCNC: 6 MMOL/L (ref 3–16)
BUN SERPL-MCNC: 11 MG/DL (ref 7–20)
CALCIUM SERPL-MCNC: 9.3 MG/DL (ref 8.3–10.6)
CHLORIDE SERPL-SCNC: 104 MMOL/L (ref 99–110)
CO2 SERPL-SCNC: 29 MMOL/L (ref 21–32)
CREAT SERPL-MCNC: 0.8 MG/DL (ref 0.6–1.2)
DEPRECATED RDW RBC AUTO: 13.2 % (ref 12.4–15.4)
EKG ATRIAL RATE: 84 BPM
EKG DIAGNOSIS: NORMAL
EKG P AXIS: -14 DEGREES
EKG P-R INTERVAL: 162 MS
EKG Q-T INTERVAL: 376 MS
EKG QRS DURATION: 84 MS
EKG QTC CALCULATION (BAZETT): 444 MS
EKG R AXIS: -16 DEGREES
EKG T AXIS: 44 DEGREES
EKG VENTRICULAR RATE: 84 BPM
GFR SERPLBLD CREATININE-BSD FMLA CKD-EPI: >60 ML/MIN/{1.73_M2}
GLUCOSE SERPL-MCNC: 97 MG/DL (ref 70–99)
HCT VFR BLD AUTO: 39.7 % (ref 36–48)
HGB BLD-MCNC: 13.5 G/DL (ref 12–16)
MCH RBC QN AUTO: 30 PG (ref 26–34)
MCHC RBC AUTO-ENTMCNC: 33.9 G/DL (ref 31–36)
MCV RBC AUTO: 88.3 FL (ref 80–100)
PLATELET # BLD AUTO: 275 K/UL (ref 135–450)
PMV BLD AUTO: 7.8 FL (ref 5–10.5)
POTASSIUM SERPL-SCNC: 4.4 MMOL/L (ref 3.5–5.1)
RBC # BLD AUTO: 4.49 M/UL (ref 4–5.2)
SODIUM SERPL-SCNC: 139 MMOL/L (ref 136–145)
WBC # BLD AUTO: 7.6 K/UL (ref 4–11)

## 2023-12-18 PROCEDURE — 80048 BASIC METABOLIC PNL TOTAL CA: CPT

## 2023-12-18 PROCEDURE — 85027 COMPLETE CBC AUTOMATED: CPT

## 2023-12-18 PROCEDURE — 6360000002 HC RX W HCPCS: Performed by: INTERNAL MEDICINE

## 2023-12-18 PROCEDURE — 2580000003 HC RX 258: Performed by: INTERNAL MEDICINE

## 2023-12-18 PROCEDURE — 99233 SBSQ HOSP IP/OBS HIGH 50: CPT | Performed by: INTERNAL MEDICINE

## 2023-12-18 PROCEDURE — 6370000000 HC RX 637 (ALT 250 FOR IP): Performed by: FAMILY MEDICINE

## 2023-12-18 PROCEDURE — 93010 ELECTROCARDIOGRAM REPORT: CPT | Performed by: INTERNAL MEDICINE

## 2023-12-18 PROCEDURE — 36415 COLL VENOUS BLD VENIPUNCTURE: CPT

## 2023-12-18 PROCEDURE — 6360000002 HC RX W HCPCS: Performed by: FAMILY MEDICINE

## 2023-12-18 PROCEDURE — 6370000000 HC RX 637 (ALT 250 FOR IP): Performed by: INTERNAL MEDICINE

## 2023-12-18 PROCEDURE — 1200000000 HC SEMI PRIVATE

## 2023-12-18 PROCEDURE — 2580000003 HC RX 258: Performed by: FAMILY MEDICINE

## 2023-12-18 RX ORDER — DIPHENHYDRAMINE HYDROCHLORIDE 50 MG/ML
50 INJECTION INTRAMUSCULAR; INTRAVENOUS ONCE
Status: COMPLETED | OUTPATIENT
Start: 2023-12-18 | End: 2023-12-19

## 2023-12-18 RX ADMIN — CLOPIDOGREL BISULFATE 75 MG: 75 TABLET ORAL at 09:20

## 2023-12-18 RX ADMIN — ASPIRIN 81 MG: 81 TABLET, CHEWABLE ORAL at 09:20

## 2023-12-18 RX ADMIN — MORPHINE SULFATE 4 MG: 4 INJECTION, SOLUTION INTRAMUSCULAR; INTRAVENOUS at 14:21

## 2023-12-18 RX ADMIN — ACETAMINOPHEN 650 MG: 325 TABLET ORAL at 05:56

## 2023-12-18 RX ADMIN — MORPHINE SULFATE 4 MG: 4 INJECTION, SOLUTION INTRAMUSCULAR; INTRAVENOUS at 18:50

## 2023-12-18 RX ADMIN — Medication 10 ML: at 20:53

## 2023-12-18 RX ADMIN — Medication 10 ML: at 09:22

## 2023-12-18 RX ADMIN — VALSARTAN 80 MG: 40 TABLET, FILM COATED ORAL at 09:20

## 2023-12-18 RX ADMIN — ENOXAPARIN SODIUM 40 MG: 100 INJECTION SUBCUTANEOUS at 20:51

## 2023-12-18 RX ADMIN — ACETAMINOPHEN 650 MG: 325 TABLET ORAL at 12:48

## 2023-12-18 RX ADMIN — PANTOPRAZOLE SODIUM 40 MG: 40 TABLET, DELAYED RELEASE ORAL at 05:56

## 2023-12-18 RX ADMIN — MORPHINE SULFATE 4 MG: 4 INJECTION, SOLUTION INTRAMUSCULAR; INTRAVENOUS at 05:57

## 2023-12-18 RX ADMIN — WATER 125 MG: 1 INJECTION INTRAMUSCULAR; INTRAVENOUS; SUBCUTANEOUS at 17:15

## 2023-12-18 RX ADMIN — MORPHINE SULFATE 4 MG: 4 INJECTION, SOLUTION INTRAMUSCULAR; INTRAVENOUS at 22:27

## 2023-12-18 RX ADMIN — DILTIAZEM HYDROCHLORIDE 120 MG: 120 CAPSULE, COATED, EXTENDED RELEASE ORAL at 20:52

## 2023-12-18 RX ADMIN — CARVEDILOL 3.12 MG: 3.12 TABLET, FILM COATED ORAL at 17:11

## 2023-12-18 RX ADMIN — MORPHINE SULFATE 4 MG: 4 INJECTION, SOLUTION INTRAMUSCULAR; INTRAVENOUS at 01:38

## 2023-12-18 RX ADMIN — MORPHINE SULFATE 4 MG: 4 INJECTION, SOLUTION INTRAMUSCULAR; INTRAVENOUS at 10:18

## 2023-12-18 RX ADMIN — ONDANSETRON 4 MG: 2 INJECTION INTRAMUSCULAR; INTRAVENOUS at 14:25

## 2023-12-18 RX ADMIN — CARVEDILOL 3.12 MG: 3.12 TABLET, FILM COATED ORAL at 09:20

## 2023-12-18 RX ADMIN — DILTIAZEM HYDROCHLORIDE 60 MG: 30 TABLET, FILM COATED ORAL at 09:20

## 2023-12-18 ASSESSMENT — PAIN SCALES - GENERAL
PAINLEVEL_OUTOF10: 7
PAINLEVEL_OUTOF10: 9
PAINLEVEL_OUTOF10: 8
PAINLEVEL_OUTOF10: 10
PAINLEVEL_OUTOF10: 0
PAINLEVEL_OUTOF10: 8
PAINLEVEL_OUTOF10: 10
PAINLEVEL_OUTOF10: 8
PAINLEVEL_OUTOF10: 10
PAINLEVEL_OUTOF10: 9
PAINLEVEL_OUTOF10: 0

## 2023-12-18 ASSESSMENT — PAIN DESCRIPTION - ORIENTATION
ORIENTATION: RIGHT

## 2023-12-18 ASSESSMENT — PAIN DESCRIPTION - LOCATION
LOCATION: CHEST
LOCATION: ARM;CHEST;BACK
LOCATION: CHEST;BACK;ARM
LOCATION: BACK;CHEST;ARM
LOCATION: CHEST;BACK;ARM
LOCATION: ARM;BACK;CHEST
LOCATION: ARM;BACK;CHEST

## 2023-12-18 ASSESSMENT — PAIN DESCRIPTION - DESCRIPTORS
DESCRIPTORS: PRESSURE

## 2023-12-18 NOTE — PLAN OF CARE
Problem: Discharge Planning  Goal: Discharge to home or other facility with appropriate resources  Outcome: Progressing     Problem: Pain  Goal: Verbalizes/displays adequate comfort level or baseline comfort level  12/17/2023 1930 by Marilee Shaw RN  Outcome: Progressing  12/17/2023 1442 by Jessica Saenz RN  Outcome: Progressing     Problem: Metabolic/Fluid and Electrolytes - Adult  Goal: Electrolytes maintained within normal limits  Outcome: Progressing  Goal: Hemodynamic stability and optimal renal function maintained  Outcome: Progressing     Problem: Safety - Adult  Goal: Free from fall injury  12/17/2023 1442 by Jessica Saenz RN  Outcome: Progressing

## 2023-12-18 NOTE — PROGRESS NOTES
[address] [facility]    12/18/2023 5:39 PM        Name: Deedee Gonzalez .               Admitted: 12/15/2023  Primary Care Provider: Alex Barajas MD (Tel: [phone])        Subjective:    John Hinojosa chst discomfort no n/v    Current Medications  famotidine (PEPCID) 20 mg in sodium chloride (PF) 0.9 % 10 mL injection, Once  diphenhydrAMINE (BENADRYL) injection 50 mg, Once  [START ON 12/19/2023] methylPREDNISolone sodium succ (SOLU-MEDROL) 80 mg in sterile water 1.28 mL injection, Daily  morphine injection 4 mg, Q4H PRN  carvedilol (COREG) tablet 3.125 mg, BID WC  diphenhydrAMINE (BENADRYL) injection 25 mg, Q6H PRN  nitroGLYCERIN (NITROSTAT) SL tablet 0.4 mg, Q5 Min PRN  aspirin chewable tablet 81 mg, Daily  cetirizine (ZYRTEC) tablet 5 mg, Daily  clopidogrel (PLAVIX) tablet 75 mg, Daily  diazePAM (VALIUM) tablet 5 mg, BID PRN  dilTIAZem (CARDIZEM CD) extended release capsule 120 mg, Nightly  pantoprazole (PROTONIX) tablet 40 mg, QAM AC  valsartan (DIOVAN) tablet 80 mg, Daily  sodium chloride flush 0.9 % injection 5-40 mL, 2 times per day  sodium chloride flush 0.9 % injection 5-40 mL, PRN  0.9 % sodium chloride infusion, PRN  potassium chloride (KLOR-CON M) extended release tablet 40 mEq, PRN   Or  potassium bicarb-citric acid (EFFER-K) effervescent tablet 40 mEq, PRN   Or  potassium chloride 10 mEq/100 mL IVPB (Peripheral Line), PRN  magnesium sulfate 2000 mg in 50 mL IVPB premix, PRN  enoxaparin (LOVENOX) injection 40 mg, Nightly  ondansetron (ZOFRAN-ODT) disintegrating tablet 4 mg, Q8H PRN   Or  ondansetron (ZOFRAN) injection 4 mg, Q6H PRN  polyethylene glycol (GLYCOLAX) packet 17 g, Daily PRN  acetaminophen (TYLENOL) tablet 650 mg, Q6H PRN   Or  acetaminophen (TYLENOL) suppository 650 mg, Q6H PRN  nitroGLYCERIN (NITRODUR) patch REMOVAL, Nightly  nitroGLYCERIN (NITRODUR) 0.2 MG/HR 1 patch, Daily  dilTIAZem (CARDIZEM) tablet 60 mg, QAM        Objective:  /82   Pulse 76   Temp 97.6 °F (36.4 °C) (Temporal)   Resp 16   Ht 1.626 m (5' 4\")   Wt 98.4 kg (217 lb)   SpO2 96%   BMI 37.25 kg/m²   No intake or output data in the 24 hours ending 12/18/23 1739     Wt Readings from Last 3 Encounters:   12/18/23 98.4 kg (217 lb)   10/23/23 97.6 kg (215 lb 3.2 oz)   08/30/23 95.3 kg (210 lb)       General appearance:  Appears comfortable. AAOx3  HEENT: atraumatic, Pupils equal, muscous membranes moist, no masses appreciated  Cardiovascular: Regular rate and rhythm no murmurs appreciated  Respiratory: CTAB no wheezing  Gastrointestinal: Abdomen soft, non-tender, BS+  EXT: no edema  Neurology: no gross focal deficts  Psychiatry: Appropriate affect. Not agitated  Skin: Warm, dry, no rashes appreciated    Labs and Tests:  CBC:   Recent Labs     12/16/23  0456 12/17/23  0609 12/18/23  0513   WBC 6.5 7.5 7.6   HGB 13.4 12.9 13.5    263 275       BMP:    Recent Labs     12/16/23  0456 12/17/23  0609 12/18/23  0513    139 139   K 4.4 4.1 4.4    104 104   CO2 30 26 29   BUN 10 14 11   CREATININE 0.8 0.8 0.8   GLUCOSE 100* 95 97       Hepatic:   No results for input(s): \"AST\", \"ALT\", \"ALB\", \"BILITOT\", \"ALKPHOS\" in the last 72 hours.    XR CHEST PORTABLE   Final Result   No acute cardiopulmonary findings             Recent imaging reviewed    Problem List  Principal Problem:    Chest pain  Resolved Problems:    * No resolved hospital problems. *       Assessment & Plan:   Unstable angina  Continue morphien nitro and tele plan for [facility] per cards    CAD s/p stents home asa and plavix      Diet: ADULT DIET; Regular  Code:Full Code  DVT PPXlovenox      Dee Dee Bar MD   12/18/2023 5:39 PM

## 2023-12-18 NOTE — CARE COORDINATION
Discharge Planning Note:    Chart reviewed and it appears that patient has minimal needs for discharge at this time. Risk Score 7 %     Primary Care Physician is Dandy Klein MD    Primary insurance is [facility] [employer]     Please notify case management if any discharge needs are identified. Case management will continue to follow progress and update discharge plan as needed.        Electronically signed by JUAN LUIS Cabrera on 12/18/2023 at 8:39 AM

## 2023-12-19 ENCOUNTER — APPOINTMENT (OUTPATIENT)
Dept: CT IMAGING | Age: 68
DRG: 391 | End: 2023-12-19
Payer: MEDICARE

## 2023-12-19 LAB
LEFT VENTRICULAR EJECTION FRACTION MODE: NORMAL
LV EF: 60 %
POC ACT LR: 150 SEC

## 2023-12-19 PROCEDURE — 99152 MOD SED SAME PHYS/QHP 5/>YRS: CPT | Performed by: INTERNAL MEDICINE

## 2023-12-19 PROCEDURE — 85347 COAGULATION TIME ACTIVATED: CPT

## 2023-12-19 PROCEDURE — 99153 MOD SED SAME PHYS/QHP EA: CPT

## 2023-12-19 PROCEDURE — C1760 CLOSURE DEV, VASC: HCPCS

## 2023-12-19 PROCEDURE — 6360000002 HC RX W HCPCS: Performed by: FAMILY MEDICINE

## 2023-12-19 PROCEDURE — 6360000002 HC RX W HCPCS: Performed by: INTERNAL MEDICINE

## 2023-12-19 PROCEDURE — 4A033BC MEASUREMENT OF ARTERIAL PRESSURE, CORONARY, PERCUTANEOUS APPROACH: ICD-10-PCS | Performed by: INTERNAL MEDICINE

## 2023-12-19 PROCEDURE — 6360000002 HC RX W HCPCS: Performed by: NURSE PRACTITIONER

## 2023-12-19 PROCEDURE — 93572 IV DOP VEL&/PRESS C FLO EA: CPT | Performed by: INTERNAL MEDICINE

## 2023-12-19 PROCEDURE — 2500000003 HC RX 250 WO HCPCS

## 2023-12-19 PROCEDURE — 6370000000 HC RX 637 (ALT 250 FOR IP): Performed by: INTERNAL MEDICINE

## 2023-12-19 PROCEDURE — 6370000000 HC RX 637 (ALT 250 FOR IP): Performed by: FAMILY MEDICINE

## 2023-12-19 PROCEDURE — C1769 GUIDE WIRE: HCPCS

## 2023-12-19 PROCEDURE — B2111ZZ FLUOROSCOPY OF MULTIPLE CORONARY ARTERIES USING LOW OSMOLAR CONTRAST: ICD-10-PCS | Performed by: INTERNAL MEDICINE

## 2023-12-19 PROCEDURE — 99152 MOD SED SAME PHYS/QHP 5/>YRS: CPT

## 2023-12-19 PROCEDURE — 93571 IV DOP VEL&/PRESS C FLO 1ST: CPT | Performed by: INTERNAL MEDICINE

## 2023-12-19 PROCEDURE — C1894 INTRO/SHEATH, NON-LASER: HCPCS

## 2023-12-19 PROCEDURE — 2580000003 HC RX 258: Performed by: INTERNAL MEDICINE

## 2023-12-19 PROCEDURE — 2580000003 HC RX 258: Performed by: FAMILY MEDICINE

## 2023-12-19 PROCEDURE — 6360000004 HC RX CONTRAST MEDICATION: Performed by: INTERNAL MEDICINE

## 2023-12-19 PROCEDURE — B2151ZZ FLUOROSCOPY OF LEFT HEART USING LOW OSMOLAR CONTRAST: ICD-10-PCS | Performed by: INTERNAL MEDICINE

## 2023-12-19 PROCEDURE — 2060000000 HC ICU INTERMEDIATE R&B

## 2023-12-19 PROCEDURE — 4A023N7 MEASUREMENT OF CARDIAC SAMPLING AND PRESSURE, LEFT HEART, PERCUTANEOUS APPROACH: ICD-10-PCS | Performed by: INTERNAL MEDICINE

## 2023-12-19 PROCEDURE — 2580000003 HC RX 258

## 2023-12-19 PROCEDURE — 93458 L HRT ARTERY/VENTRICLE ANGIO: CPT | Performed by: INTERNAL MEDICINE

## 2023-12-19 PROCEDURE — 2500000003 HC RX 250 WO HCPCS: Performed by: INTERNAL MEDICINE

## 2023-12-19 PROCEDURE — 71260 CT THORAX DX C+: CPT

## 2023-12-19 PROCEDURE — 93458 L HRT ARTERY/VENTRICLE ANGIO: CPT

## 2023-12-19 PROCEDURE — 93571 IV DOP VEL&/PRESS C FLO 1ST: CPT

## 2023-12-19 PROCEDURE — 2709999900 HC NON-CHARGEABLE SUPPLY

## 2023-12-19 PROCEDURE — C1887 CATHETER, GUIDING: HCPCS

## 2023-12-19 PROCEDURE — 6360000002 HC RX W HCPCS

## 2023-12-19 PROCEDURE — A4216 STERILE WATER/SALINE, 10 ML: HCPCS | Performed by: INTERNAL MEDICINE

## 2023-12-19 RX ORDER — ACETAMINOPHEN 325 MG/1
650 TABLET ORAL EVERY 4 HOURS PRN
Status: DISCONTINUED | OUTPATIENT
Start: 2023-12-19 | End: 2023-12-19 | Stop reason: SDUPTHER

## 2023-12-19 RX ORDER — SODIUM CHLORIDE 9 MG/ML
INJECTION, SOLUTION INTRAVENOUS PRN
Status: DISCONTINUED | OUTPATIENT
Start: 2023-12-19 | End: 2023-12-20 | Stop reason: HOSPADM

## 2023-12-19 RX ORDER — SODIUM CHLORIDE 0.9 % (FLUSH) 0.9 %
5-40 SYRINGE (ML) INJECTION EVERY 12 HOURS SCHEDULED
Status: DISCONTINUED | OUTPATIENT
Start: 2023-12-19 | End: 2023-12-20 | Stop reason: HOSPADM

## 2023-12-19 RX ORDER — SODIUM CHLORIDE 0.9 % (FLUSH) 0.9 %
5-40 SYRINGE (ML) INJECTION PRN
Status: DISCONTINUED | OUTPATIENT
Start: 2023-12-19 | End: 2023-12-20 | Stop reason: HOSPADM

## 2023-12-19 RX ORDER — DIPHENHYDRAMINE HYDROCHLORIDE 50 MG/ML
25 INJECTION INTRAMUSCULAR; INTRAVENOUS ONCE
Status: COMPLETED | OUTPATIENT
Start: 2023-12-19 | End: 2023-12-19

## 2023-12-19 RX ORDER — SODIUM CHLORIDE 9 MG/ML
INJECTION, SOLUTION INTRAVENOUS CONTINUOUS
Status: ACTIVE | OUTPATIENT
Start: 2023-12-19 | End: 2023-12-19

## 2023-12-19 RX ORDER — SODIUM CHLORIDE 9 MG/ML
INJECTION, SOLUTION INTRAVENOUS PRN
Status: DISCONTINUED | OUTPATIENT
Start: 2023-12-19 | End: 2023-12-19

## 2023-12-19 RX ADMIN — MORPHINE SULFATE 4 MG: 4 INJECTION, SOLUTION INTRAMUSCULAR; INTRAVENOUS at 22:17

## 2023-12-19 RX ADMIN — IOPAMIDOL 58 ML: 755 INJECTION, SOLUTION INTRAVENOUS at 15:07

## 2023-12-19 RX ADMIN — DILTIAZEM HYDROCHLORIDE 120 MG: 120 CAPSULE, COATED, EXTENDED RELEASE ORAL at 20:28

## 2023-12-19 RX ADMIN — MORPHINE SULFATE 4 MG: 4 INJECTION, SOLUTION INTRAMUSCULAR; INTRAVENOUS at 06:46

## 2023-12-19 RX ADMIN — Medication 10 ML: at 20:28

## 2023-12-19 RX ADMIN — ASPIRIN 81 MG: 81 TABLET, CHEWABLE ORAL at 07:42

## 2023-12-19 RX ADMIN — DIPHENHYDRAMINE HYDROCHLORIDE 50 MG: 50 INJECTION INTRAMUSCULAR; INTRAVENOUS at 11:58

## 2023-12-19 RX ADMIN — ENOXAPARIN SODIUM 40 MG: 100 INJECTION SUBCUTANEOUS at 20:28

## 2023-12-19 RX ADMIN — IOPAMIDOL 75 ML: 755 INJECTION, SOLUTION INTRAVENOUS at 19:31

## 2023-12-19 RX ADMIN — MORPHINE SULFATE 4 MG: 4 INJECTION, SOLUTION INTRAMUSCULAR; INTRAVENOUS at 02:29

## 2023-12-19 RX ADMIN — MORPHINE SULFATE 4 MG: 4 INJECTION, SOLUTION INTRAMUSCULAR; INTRAVENOUS at 11:20

## 2023-12-19 RX ADMIN — SODIUM CHLORIDE: 9 INJECTION, SOLUTION INTRAVENOUS at 15:36

## 2023-12-19 RX ADMIN — ONDANSETRON 4 MG: 2 INJECTION INTRAMUSCULAR; INTRAVENOUS at 03:03

## 2023-12-19 RX ADMIN — MORPHINE SULFATE 4 MG: 4 INJECTION, SOLUTION INTRAMUSCULAR; INTRAVENOUS at 18:05

## 2023-12-19 RX ADMIN — CLOPIDOGREL BISULFATE 75 MG: 75 TABLET ORAL at 07:43

## 2023-12-19 RX ADMIN — DILTIAZEM HYDROCHLORIDE 60 MG: 30 TABLET, FILM COATED ORAL at 07:42

## 2023-12-19 RX ADMIN — WATER 80 MG: 1 INJECTION INTRAMUSCULAR; INTRAVENOUS; SUBCUTANEOUS at 06:33

## 2023-12-19 RX ADMIN — FAMOTIDINE 20 MG: 10 INJECTION, SOLUTION INTRAVENOUS at 11:57

## 2023-12-19 RX ADMIN — DIPHENHYDRAMINE HYDROCHLORIDE 25 MG: 50 INJECTION INTRAMUSCULAR; INTRAVENOUS at 23:09

## 2023-12-19 RX ADMIN — Medication 10 ML: at 07:46

## 2023-12-19 RX ADMIN — ACETAMINOPHEN 650 MG: 325 TABLET ORAL at 07:42

## 2023-12-19 RX ADMIN — PANTOPRAZOLE SODIUM 40 MG: 40 TABLET, DELAYED RELEASE ORAL at 06:33

## 2023-12-19 RX ADMIN — CARVEDILOL 3.12 MG: 3.12 TABLET, FILM COATED ORAL at 18:05

## 2023-12-19 RX ADMIN — VALSARTAN 80 MG: 40 TABLET, FILM COATED ORAL at 07:42

## 2023-12-19 RX ADMIN — CARVEDILOL 3.12 MG: 3.12 TABLET, FILM COATED ORAL at 07:43

## 2023-12-19 RX ADMIN — ACETAMINOPHEN 650 MG: 325 TABLET ORAL at 23:12

## 2023-12-19 RX ADMIN — DIPHENHYDRAMINE HYDROCHLORIDE 25 MG: 50 INJECTION, SOLUTION INTRAMUSCULAR; INTRAVENOUS at 19:15

## 2023-12-19 ASSESSMENT — PAIN SCALES - GENERAL
PAINLEVEL_OUTOF10: 0
PAINLEVEL_OUTOF10: 7
PAINLEVEL_OUTOF10: 10
PAINLEVEL_OUTOF10: 0
PAINLEVEL_OUTOF10: 0
PAINLEVEL_OUTOF10: 8
PAINLEVEL_OUTOF10: 0
PAINLEVEL_OUTOF10: 7
PAINLEVEL_OUTOF10: 0
PAINLEVEL_OUTOF10: 7
PAINLEVEL_OUTOF10: 10
PAINLEVEL_OUTOF10: 4
PAINLEVEL_OUTOF10: 8

## 2023-12-19 ASSESSMENT — PAIN DESCRIPTION - LOCATION
LOCATION: ARM;BACK;CHEST
LOCATION: CHEST;BACK
LOCATION: ARM;BACK;CHEST
LOCATION: CHEST;BACK;SHOULDER
LOCATION: CHEST

## 2023-12-19 ASSESSMENT — PAIN DESCRIPTION - DESCRIPTORS: DESCRIPTORS: PRESSURE

## 2023-12-19 ASSESSMENT — PAIN DESCRIPTION - ORIENTATION
ORIENTATION: RIGHT

## 2023-12-19 NOTE — PROGRESS NOTES
Pt return from cath to room 5906 at [phone]. Right groin cath site CDI, pulses +2 bilaterally, Bedrest complete at 1700. Post cath fluids for 6 hours. Vitals stable. Patient denies questions. Report from cath nurse Fredrick. Telemetry verified. Will continue to monitor.

## 2023-12-19 NOTE — PROGRESS NOTES
[address] [facility]    12/19/2023 8:24 AM        Name: Deedee Gonzalez . Admitted: 12/15/2023  Primary Care Provider: Alex Barajas MD (Tel: [phone])      Subjective:  . For [facility] today:  Reports some exertional chest pain when walking to the bathroom        Summary:      Ms. Ernestina Hercules is a 79 y.o. female with a history of multiple PCIs, NSTEMI 3/2023 d/t stent thrombosis,  pain totally relieved after that was treated, and has had no pain until 1 pm yesterday morning when she started developing severe chest pressure while trying to get a breast MRI. Patient is new to me this admission. She reports she was in the MRI machine in a prone position for a short while, she felt sharp chest pain/pressure, down shoulders and arms into her back. She felt like she would pass out. MRI was cancelled and she was sent to the ER. Her symptoms are similar to when she had previous stents/clots. She does mention that she had been feeling good since her stents in March '23.             Reviewed interval ancillary notes    Current Medications  famotidine (PEPCID) 20 mg in sodium chloride (PF) 0.9 % 10 mL injection, Once  diphenhydrAMINE (BENADRYL) injection 50 mg, Once  methylPREDNISolone sodium succ (SOLU-MEDROL) 80 mg in sterile water 1.28 mL injection, Daily  morphine injection 4 mg, Q4H PRN  carvedilol (COREG) tablet 3.125 mg, BID WC  diphenhydrAMINE (BENADRYL) injection 25 mg, Q6H PRN  nitroGLYCERIN (NITROSTAT) SL tablet 0.4 mg, Q5 Min PRN  aspirin chewable tablet 81 mg, Daily  cetirizine (ZYRTEC) tablet 5 mg, Daily  clopidogrel (PLAVIX) tablet 75 mg, Daily  diazePAM (VALIUM) tablet 5 mg, BID PRN  dilTIAZem (CARDIZEM CD) extended release capsule 120 mg, Nightly  pantoprazole (PROTONIX) tablet 40 mg, QAM AC  valsartan (DIOVAN) tablet 80 mg, Daily  sodium chloride flush 0.9 % injection 5-40 mL, 2 times per day  sodium chloride flush 0.9 % injection 5-40 mL, PRN  0.9 % sodium

## 2023-12-19 NOTE — PROGRESS NOTES
PATIENT HISTORY    ECHO: DATE: 09/13/2022       EF:  63%  STRESS TEST PREFORMED:  Yes FINDINGS:  Stress Nuclear: Date:  12/23/2020  Result: Negative     EF: 60%  EKG: Yes    ECG     Result: Normal  Pre CATH Rhythm: Normal Sinus Rhythm  HYPERTENSION: Yes  DYSLIPIDEMIA: Yes  FAMILY HX OF CAD: Yes  PRIOR MI: No  PRIOR PCI: Yes. Most recent date: 11/01/2022  PRIOR CABG: No  CEREBROVASCULAR DX: No  PERIPHERAL ARTERIAL DISEASE: No  CHRONIC LUNG DISEASE: No  TOBACCO: Former.    Quit Date: 02/04/2007  DIABETIC: No  CARDIAC ARREST: {No  DIALYSIS: No  HEART FAILURE: No  FRAILTY SCORE: 5 MILDLY FRAIL (more evident slowing, need help with IADLs finances, heavy housework, transport, meds)  CARDIAC CTA PREFORMED:  No  AGATSTON CORONARY CALCIUM SCORE:   Assessed: No  Prior Diagnostic Coronary Angioplasty Procedure:  Yes    Most Recent Date: 03/20/2023    Results: Non-obstructive

## 2023-12-19 NOTE — CONSULTS
Gastrointestinal Consult Note    Patient: No Dejesus CSN: [id number]     YOB: 1955  Age: 79 y.o. Sex: female    Unit: [facility] MED/SURG Room/Bed: D5Q-5650/4621-46 Location: [address] [place] [address]     Admitting Physician: Guadalupe Uribe    Date of  Admission: 12/15/2023   Admission type: Emergency  Primary Care Physician: Zeina Johnson MD          Referring Physician: [unfilled]    Chief Complaint: Chest pain from a noncardiac  Consult Date: 12/19/2023     Subjective:     History of Present Illness: No Dejesus is a 79 y.o. female who is seen at the request of [address] [place] [address] for chest pain from a noncardiac source    This is a very pleasant 68-year-old female comes in today because she has been having problems with chest pain  The patient started having her chest pain after she had an MRI and she was brought to the hospital because she continued to have this problem with chest pain    She has undergone a cardiac workup  Which included today her undergoing a heart catheter ARC heart cath revealed mild to moderate nonobstructive coronary artery disease and a normal left ventricular systolic function    Because of this we have been asked to see and evaluate the patient for GI source of her chest pain.     The patient was to be seen in consultation by Dr. Mady Castañeda in 2022  But she was discharged before she could be evaluated    The patient states that she has had a workup for her chest pain from a GI standpoint and that she was on a proton pump inhibitor and when she stopped her proton pump inhibitor recently her chest pain came back    She has a sleep number bed which she does sleep upright and she has been using Tums    But she was worried that her proton pump inhibitor was giving her leg pain or numbness    Past Medical History:   Diagnosis Date    Allergic rhinitis, cause unspecified 12/04/2010    Arthritis     Bilateral carotid artery stenosis     < 50% bilaterally    CAD

## 2023-12-20 VITALS
BODY MASS INDEX: 37.28 KG/M2 | WEIGHT: 218.4 LBS | DIASTOLIC BLOOD PRESSURE: 74 MMHG | HEIGHT: 64 IN | HEART RATE: 77 BPM | TEMPERATURE: 96.8 F | OXYGEN SATURATION: 92 % | RESPIRATION RATE: 16 BRPM | SYSTOLIC BLOOD PRESSURE: 128 MMHG

## 2023-12-20 LAB
ANION GAP SERPL CALCULATED.3IONS-SCNC: 9 MMOL/L (ref 3–16)
BUN SERPL-MCNC: 16 MG/DL (ref 7–20)
CALCIUM SERPL-MCNC: 8.8 MG/DL (ref 8.3–10.6)
CHLORIDE SERPL-SCNC: 102 MMOL/L (ref 99–110)
CO2 SERPL-SCNC: 25 MMOL/L (ref 21–32)
CREAT SERPL-MCNC: 0.9 MG/DL (ref 0.6–1.2)
DEPRECATED RDW RBC AUTO: 12.9 % (ref 12.4–15.4)
GFR SERPLBLD CREATININE-BSD FMLA CKD-EPI: >60 ML/MIN/{1.73_M2}
GLUCOSE SERPL-MCNC: 165 MG/DL (ref 70–99)
HCT VFR BLD AUTO: 36.1 % (ref 36–48)
HGB BLD-MCNC: 12.2 G/DL (ref 12–16)
MCH RBC QN AUTO: 29.9 PG (ref 26–34)
MCHC RBC AUTO-ENTMCNC: 33.9 G/DL (ref 31–36)
MCV RBC AUTO: 88.1 FL (ref 80–100)
PLATELET # BLD AUTO: 275 K/UL (ref 135–450)
PMV BLD AUTO: 8.1 FL (ref 5–10.5)
POTASSIUM SERPL-SCNC: 4.2 MMOL/L (ref 3.5–5.1)
RBC # BLD AUTO: 4.1 M/UL (ref 4–5.2)
SODIUM SERPL-SCNC: 136 MMOL/L (ref 136–145)
WBC # BLD AUTO: 21.4 K/UL (ref 4–11)

## 2023-12-20 PROCEDURE — 7100000000 HC PACU RECOVERY - FIRST 15 MIN: Performed by: INTERNAL MEDICINE

## 2023-12-20 PROCEDURE — 88305 TISSUE EXAM BY PATHOLOGIST: CPT

## 2023-12-20 PROCEDURE — C1753 CATH, INTRAVAS ULTRASOUND: HCPCS | Performed by: INTERNAL MEDICINE

## 2023-12-20 PROCEDURE — 2580000003 HC RX 258: Performed by: INTERNAL MEDICINE

## 2023-12-20 PROCEDURE — 6360000002 HC RX W HCPCS: Performed by: INTERNAL MEDICINE

## 2023-12-20 PROCEDURE — 99233 SBSQ HOSP IP/OBS HIGH 50: CPT | Performed by: INTERNAL MEDICINE

## 2023-12-20 PROCEDURE — 6370000000 HC RX 637 (ALT 250 FOR IP): Performed by: INTERNAL MEDICINE

## 2023-12-20 PROCEDURE — 0DB68ZX EXCISION OF STOMACH, VIA NATURAL OR ARTIFICIAL OPENING ENDOSCOPIC, DIAGNOSTIC: ICD-10-PCS | Performed by: INTERNAL MEDICINE

## 2023-12-20 PROCEDURE — 3609018500 HC EGD US SCOPE W/ADJACENT STRUCTURES: Performed by: INTERNAL MEDICINE

## 2023-12-20 PROCEDURE — 85027 COMPLETE CBC AUTOMATED: CPT

## 2023-12-20 PROCEDURE — 7100000001 HC PACU RECOVERY - ADDTL 15 MIN: Performed by: INTERNAL MEDICINE

## 2023-12-20 PROCEDURE — 2709999900 HC NON-CHARGEABLE SUPPLY: Performed by: INTERNAL MEDICINE

## 2023-12-20 PROCEDURE — 3700000000 HC ANESTHESIA ATTENDED CARE: Performed by: INTERNAL MEDICINE

## 2023-12-20 PROCEDURE — 80048 BASIC METABOLIC PNL TOTAL CA: CPT

## 2023-12-20 PROCEDURE — 2580000003 HC RX 258: Performed by: ANESTHESIOLOGY

## 2023-12-20 PROCEDURE — 3609012400 HC EGD TRANSORAL BIOPSY SINGLE/MULTIPLE: Performed by: INTERNAL MEDICINE

## 2023-12-20 PROCEDURE — 3700000001 HC ADD 15 MINUTES (ANESTHESIA): Performed by: INTERNAL MEDICINE

## 2023-12-20 PROCEDURE — 36415 COLL VENOUS BLD VENIPUNCTURE: CPT

## 2023-12-20 PROCEDURE — 6360000002 HC RX W HCPCS: Performed by: NURSE PRACTITIONER

## 2023-12-20 RX ORDER — HYDROCODONE BITARTRATE AND ACETAMINOPHEN 5; 325 MG/1; MG/1
1 TABLET ORAL EVERY 6 HOURS PRN
Qty: 12 TABLET | Refills: 0 | Status: SHIPPED | OUTPATIENT
Start: 2023-12-20 | End: 2023-12-23

## 2023-12-20 RX ORDER — SODIUM CHLORIDE 9 MG/ML
INJECTION, SOLUTION INTRAVENOUS CONTINUOUS
Status: DISCONTINUED | OUTPATIENT
Start: 2023-12-20 | End: 2023-12-20 | Stop reason: HOSPADM

## 2023-12-20 RX ORDER — CARVEDILOL 3.12 MG/1
3.12 TABLET ORAL 2 TIMES DAILY WITH MEALS
Qty: 60 TABLET | Refills: 1 | Status: CANCELLED | OUTPATIENT
Start: 2023-12-20

## 2023-12-20 RX ORDER — METOCLOPRAMIDE 10 MG/1
10 TABLET ORAL NIGHTLY
Status: DISCONTINUED | OUTPATIENT
Start: 2023-12-20 | End: 2023-12-20 | Stop reason: HOSPADM

## 2023-12-20 RX ORDER — METOCLOPRAMIDE 10 MG/1
10 TABLET ORAL
Status: DISCONTINUED | OUTPATIENT
Start: 2023-12-20 | End: 2023-12-20

## 2023-12-20 RX ORDER — PANTOPRAZOLE SODIUM 40 MG/1
40 TABLET, DELAYED RELEASE ORAL
Status: DISCONTINUED | OUTPATIENT
Start: 2023-12-20 | End: 2023-12-20 | Stop reason: HOSPADM

## 2023-12-20 RX ORDER — DILTIAZEM HYDROCHLORIDE 60 MG/1
60 TABLET, FILM COATED ORAL EVERY MORNING
Qty: 90 TABLET | Refills: 3 | Status: SHIPPED
Start: 2023-12-20

## 2023-12-20 RX ORDER — DILTIAZEM HYDROCHLORIDE 120 MG/1
120 CAPSULE, COATED, EXTENDED RELEASE ORAL NIGHTLY
Qty: 180 CAPSULE | Refills: 3 | Status: SHIPPED
Start: 2023-12-20

## 2023-12-20 RX ORDER — EPINEPHRINE 0.3 MG/.3ML
0.3 INJECTION SUBCUTANEOUS ONCE
Qty: 0.3 ML | Refills: 2 | Status: SHIPPED | OUTPATIENT
Start: 2023-12-20 | End: 2023-12-20

## 2023-12-20 RX ORDER — KETOROLAC TROMETHAMINE 15 MG/ML
15 INJECTION, SOLUTION INTRAMUSCULAR; INTRAVENOUS EVERY 6 HOURS
Status: DISCONTINUED | OUTPATIENT
Start: 2023-12-20 | End: 2023-12-20 | Stop reason: HOSPADM

## 2023-12-20 RX ORDER — PANTOPRAZOLE SODIUM 40 MG/1
40 TABLET, DELAYED RELEASE ORAL
Qty: 60 TABLET | Refills: 2 | Status: CANCELLED | OUTPATIENT
Start: 2023-12-20

## 2023-12-20 RX ORDER — CARVEDILOL 3.12 MG/1
3.12 TABLET ORAL 2 TIMES DAILY WITH MEALS
Qty: 180 TABLET | Refills: 1 | Status: SHIPPED | OUTPATIENT
Start: 2023-12-20

## 2023-12-20 RX ORDER — PANTOPRAZOLE SODIUM 40 MG/1
40 TABLET, DELAYED RELEASE ORAL
Qty: 180 TABLET | Refills: 1 | Status: SHIPPED | OUTPATIENT
Start: 2023-12-20

## 2023-12-20 RX ADMIN — CARVEDILOL 3.12 MG: 3.12 TABLET, FILM COATED ORAL at 08:41

## 2023-12-20 RX ADMIN — MORPHINE SULFATE 4 MG: 4 INJECTION, SOLUTION INTRAMUSCULAR; INTRAVENOUS at 03:50

## 2023-12-20 RX ADMIN — KETOROLAC TROMETHAMINE 15 MG: 15 INJECTION, SOLUTION INTRAMUSCULAR; INTRAVENOUS at 13:56

## 2023-12-20 RX ADMIN — MORPHINE SULFATE 4 MG: 4 INJECTION, SOLUTION INTRAMUSCULAR; INTRAVENOUS at 14:34

## 2023-12-20 RX ADMIN — CLOPIDOGREL BISULFATE 75 MG: 75 TABLET ORAL at 08:41

## 2023-12-20 RX ADMIN — CETIRIZINE HYDROCHLORIDE 5 MG: 10 TABLET, FILM COATED ORAL at 08:41

## 2023-12-20 RX ADMIN — PANTOPRAZOLE SODIUM 40 MG: 40 TABLET, DELAYED RELEASE ORAL at 17:11

## 2023-12-20 RX ADMIN — SODIUM CHLORIDE: 9 INJECTION, SOLUTION INTRAVENOUS at 11:10

## 2023-12-20 RX ADMIN — ACETAMINOPHEN 650 MG: 325 TABLET ORAL at 13:02

## 2023-12-20 RX ADMIN — WATER 80 MG: 1 INJECTION INTRAMUSCULAR; INTRAVENOUS; SUBCUTANEOUS at 08:42

## 2023-12-20 RX ADMIN — MORPHINE SULFATE 4 MG: 4 INJECTION, SOLUTION INTRAMUSCULAR; INTRAVENOUS at 10:15

## 2023-12-20 RX ADMIN — DILTIAZEM HYDROCHLORIDE 60 MG: 30 TABLET, FILM COATED ORAL at 08:42

## 2023-12-20 RX ADMIN — CARVEDILOL 3.12 MG: 3.12 TABLET, FILM COATED ORAL at 17:12

## 2023-12-20 RX ADMIN — PANTOPRAZOLE SODIUM 40 MG: 40 TABLET, DELAYED RELEASE ORAL at 05:02

## 2023-12-20 RX ADMIN — Medication 10 ML: at 07:46

## 2023-12-20 RX ADMIN — ASPIRIN 81 MG: 81 TABLET, CHEWABLE ORAL at 08:41

## 2023-12-20 RX ADMIN — ACETAMINOPHEN 650 MG: 325 TABLET ORAL at 05:02

## 2023-12-20 RX ADMIN — VALSARTAN 80 MG: 40 TABLET, FILM COATED ORAL at 08:42

## 2023-12-20 ASSESSMENT — PAIN DESCRIPTION - LOCATION
LOCATION: CHEST
LOCATION: HEAD;CHEST
LOCATION: HEAD
LOCATION: CHEST
LOCATION: CHEST

## 2023-12-20 ASSESSMENT — PAIN SCALES - GENERAL
PAINLEVEL_OUTOF10: 10
PAINLEVEL_OUTOF10: 4
PAINLEVEL_OUTOF10: 0
PAINLEVEL_OUTOF10: 8
PAINLEVEL_OUTOF10: 6
PAINLEVEL_OUTOF10: 8
PAINLEVEL_OUTOF10: 0
PAINLEVEL_OUTOF10: 0
PAINLEVEL_OUTOF10: 8
PAINLEVEL_OUTOF10: 3

## 2023-12-20 ASSESSMENT — PAIN DESCRIPTION - DESCRIPTORS
DESCRIPTORS: PRESSURE
DESCRIPTORS: SHARP

## 2023-12-20 ASSESSMENT — PAIN DESCRIPTION - ORIENTATION
ORIENTATION: MID
ORIENTATION: MID

## 2023-12-20 NOTE — PROGRESS NOTES
Reviewed pt problem list, history, H&P and assessment preoperatively. Scope verified using 2 person system. Family in waiting room.   Electronically signed by Lynette Odom RN on 12/20/2023 at 11:46 AM

## 2023-12-20 NOTE — DISCHARGE SUMMARY
[facility] DISCHARGE SUMMARY    Patient Demographics    Patient. Nithya Hicks  Date of Birth. 1955  MRN. [id number]     Primary care provider. Nolberto Roche MD  (Tel: [phone])    Admit date: 12/15/2023    Discharge date 12/20/2023  Note Date: 12/20/2023     Reason for Hospitalization.   Chief Complaint   Patient presents with    Chest Pain     Pt was in MRI this afternoon when she started having SOB and chest pain. Pt has stated she feels like she has \"the elephant\" on her chest.          Significant Findings.   Principal Problem:    Chest pain  Resolved Problems:    * No resolved hospital problems. *       Problems and results from this hospitalization that need follow up.  Per GI consider hs reglan at 10 mg     Significant test results and incidental findings.      Invasive procedures and treatments.   12/19/23 LHC:   CT chest pulm 12/19/23  IMPRESSION:  No evidence of pulmonary embolism or acute pulmonary abnormality.        ANGIOGRAM/CORONARY ARTERIOGRAM:     Left main artery: 30% distal.  Left anterior descending artery: 30% mid followed by a widely patent mid stent.  D1: 30% ostial, widely patent proximal and mid stent  Left circumflex artery: Widely patent mid and distal stents.  OM1: Normal  Right coronary artery: Widely patent proximal and mid stents, 40% distal.  RPDA: Normal      LEFT VENTRICULOGRAM:  Left ventricular angiogram was done in the 30° FLORIAN projection and revealed  LVEF-60%  LVEDP-11 mmHg  Aortic pressure-128/80 mmHg. There was no gradient between the left ventricle and aorta    INTERVENTION:  None   IMPRESSION/SUMMARY  -Coronary Angiography w/ mild to moderate nonobstructive CAD and widely patent stents  -LVG with LVEF of 60% and no regional wall motion abnormalities  -No cardiac etiology to explain continued chest discomfort       12/20/23 EGD     Impression: Dilated  capsule  Commonly known as: BENTYL  Take 1 capsule by mouth 4 times daily  What changed: additional instructions  Notes to patient: Use: to help with bladder spasm, incontinence and relax the muscles of bladder for better urinary flow  Side effect: dry mouth, up set stomach constipation     dilTIAZem 120 MG extended release capsule  Commonly known as: CARDIZEM CD  Take 1 capsule by mouth at bedtime TAKE 1 CAPSULE BY MOUTH TWICE DAILY  What changed:   how much to take  how to take this  when to take this     dilTIAZem 60 MG tablet  Commonly known as: CARDIZEM  Take 1 tablet by mouth every morning  What changed: when to take this     pantoprazole 40 MG tablet  Commonly known as: PROTONIX  Take 1 tablet by mouth 2 times daily (before meals)  What changed: when to take this  Notes to patient: Use: Prevention and treatment of gastric ulcers  Side Effects: Headache, fatigue, constipation, dry  mouth     valsartan 80 MG tablet  Commonly known as: DIOVAN  Take 1 tablet by mouth daily  What changed: additional instructions  Notes to patient: Use: is an angiotensin II receptor antagonist used to block the action of tightening blood vessels, allowing blood flow more smoothly and the heart to pump more effectively  Side effects: dizziness, headaches, nausea and changes in bowel patterns (loose stools or constipation)            CONTINUE taking these medications      aspirin 81 MG chewable tablet  Take 1 tablet by mouth daily  Notes to patient: Use: anti inflammatory used to help reduce the risk of heart attack  Side effects: increased bleeding & bruising, upset stomach & nausea     cetirizine 5 MG tablet  Commonly known as: ZYRTEC  Notes to patient: Use: is an antihistamine use to treat hay fever and allergy symptoms, hives and itching  Side effects: drowsiness, dry mouth, stomach pain and indegestion     clopidogrel 75 MG tablet  Commonly known as: Plavix  Take 1 tablet by mouth daily  Notes to patient: Use: is a blood thinner

## 2023-12-20 NOTE — DISCHARGE INSTRUCTIONS
Coronary Angiogram: About This Test  What is a coronary angiogram?     A coronary angiogram is a test to look at the large blood vessels of your heart (coronary arteries). These blood vessels feed blood, oxygen, and nutrients to your heart. Why is this test done? This test is done to check blood flow in your coronary arteries. It can show the size and location of narrowed or blocked sections of an artery. It's done for people who have coronary artery disease, also known as heart disease. The test can show how serious the disease is and how best to treat it. Or it can be done for people who have symptoms of heart disease to find out if there is a problem with the artery. The Doctor can look at the shape of your heart, the motion of the heart, and valves in the heart. What happens during the test?  You will get medicine to help you relax. A thin tube called a catheter is put into a blood vessel in your groin or arm. You will get a shot to numb the skin where the catheter goes in. You may feel pressure when the doctor moves the catheter through your blood vessel into your heart. Dye is put into your coronary arteries through the catheter. Your doctor can see the dye as it moves through the arteries. This lets your doctor look for areas that are narrowed or blocked. You may feel hot or flushed for several seconds when the dye is put in. How long does it take? The test will take about 30 minutes to an hour. But you need time to get ready for it and time to recover. If a problem is found and the doctor treats it, it can take a few hours longer. Care of your puncture site:  Remove bandage 24 hours after the procedure. May shower in 24 hours but do not sit in a bathtub/pool of water for 5 days or until the wound is healed. Inspect the site daily and gently clean using soap and water while standing in the shower. Dry thoroughly and apply a Band-Aid that covers the entire site.  Do not apply powder or lotion.    Normal Observations:  Soreness or tenderness which may last one week.  Mild oozing from the incision site.  Possible bruising that could last 2 weeks.    Activity:  You may resume driving 24 hours following the procedure.  You may resume normal activity in 5 days or after the wound heals.  Avoid lifting more than 10 pounds for 5 days or until the wound heals.  Avoid strenuous exercise or activity for 1 week.    Nutrition:  Regular diet.  Drink at least 8 to 10 glasses of decaffeinated, non-alcoholic fluid for the next 24 hours to flush the x-ray dye used for your angiogram out of your body.    Call your doctor immediately if your condition worsens, for any other concerns, for a follow-up appointment or if you experience any of the following:  Significant bleeding that does not stop after 10 minutes of applying firm pressure on the puncture site.  Increased swelling on the groin or leg.  Unusual pain, numbness, or tingling of the groin or down the leg.  Any signs of infection such as: redness, yellow drainage at the site, swelling or pain.    Other Instructions:  Hold Metformin or Metformin containing drugs for 48 hours after procedure.

## 2023-12-20 NOTE — PROGRESS NOTES
Pt arrived from endo to PACU bay 3. Reported received from endo staff. Pt asleep upon arrival to unit, spontaneous respirations noted. Pt arrives on room air, vitals as charted.

## 2023-12-20 NOTE — PROCEDURES
Endoscopy Note    Patient: Nithya Hicks  : 1955  CSN: [id number]    Procedure: Esophagogastroduodenoscopy with biopsy endoscopic ultrasound    Date:  2023     Surgeon:  Taye Mckenna MD     Referring Physician:  Nolberto Roche MD    Preoperative Diagnosis:  Chest pain, non-cardiac [R07.89]    Postoperative Diagnosis: #1 no evidence of reflux esophagitis #2 small hiatal hernia #3 hemorrhagic gastritis biopsied #4 dilated common bile duct of 11 mm with no evidence of a stone or tumor    Anesthesia:  MAC    EBL: minimal to none.    Indications: This is a 68 y.o. year old female who comes in today she is currently on the cardiology service here at [facility] she has been having problems with chest pain she had a heart cath she has 9 heart stents her heart cath was open so we have been asked to evaluate her for noncardiac chest pain previously she has had a dilated common bile duct after gallbladder removal surgery so we want to see if there is any luminal cause or want to see if there is a gallstone That has not been seen    Description of Procedure:  Informed consent was obtained from the patient after explanation of indications, benefits and possible risks and complications of the procedure.  The patient was then taken to the endoscopy suite, placed in the left lateral decubitus position and the above IV sedation was administrered.    The Olympus video linear EUS scope  Posterior pharynx there was some saliva but otherwise no abnormality  The esophagus was normal in our view I found no evidence of a reflux esophagitis  Hiatal hernia measures 1 to 2 cm  Stomach definitely was inflamed and had some hemorrhage to it I did biopsy with a pediatric biopsy forceps for H. Pylori  Duodenum was normal the ampulla was normal    Ultrasound    Instilling water in the duodenum looking at the ampulla I found no evidence of an ampullary tumor    I found that the common bile duct pancreatic ductal  into the ampulla Common bile duct is dilated into the ampulla    Tracing the common bile duct multiple times in both the short and the long scope position I found no evidence of a tumor in the bile duct I find no evidence of a gallstone in the bile duct the bile duct does measure 11 mm at its biggest dimension    Tracing the pancreatic duct from the ampulla all the way to the tail I find that the pancreatic duct was normal in the body of the pancreas the pancreatic duct measures 1.4 mm  no mass lesions of the pancreatic parenchyma    The pancreatic parenchyma in the head body and tail were all consistent    Adrenal gland was normal  Celiac axis was normal  Liver was very in its appearance suggesting that there is some fatty infiltration    We then terminated the procedure    Gastric or Duodenal ulcer present: No    The patient tolerated the procedure well and was taken to the post anesthesia care unit in good condition. There were no immediate complications. Impression: Dilated common bile duct of unknown etiology  No evidence of a pancreatic tumor  Hemorrhagic gastritis      Recommendations: At this time the noncardiac chest pain could be due to reflux  I think it would be good for the patient to be on a proton pump inhibitor twice daily  I think it would be good for the patient to be on Reglan 10 mg p.o. 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 If there are any questions about the content of this document, please contact the author as some errors in translation may have occurred.

## 2023-12-20 NOTE — PROGRESS NOTES
Pt stable and able to be transferred from PACU to room 5906. A&O , VSS, with no complaints at this time. 5C called and notified that pt is being transferred out of PACU and to room.

## 2023-12-20 NOTE — PROGRESS NOTES
Pt transported back to inpatient room by RN and PCT; bedside report given to floor RN; pt returned with all belongings.

## 2023-12-20 NOTE — PROGRESS NOTES
CT tech called to verify patients allergy to contrast. Patient informed about the difference between the contrast and requested to be pre medicated. PRN benadryl given and patient transported to CT.

## 2023-12-28 ENCOUNTER — CARE COORDINATION (OUTPATIENT)
Dept: CASE MANAGEMENT | Age: 68
End: 2023-12-28

## 2023-12-28 NOTE — CARE COORDINATION
Care Transitions Follow Up Call    Patient Current Location:  Home: [address] [place], [place] [id number]    Care Transition Nurse contacted the patient by telephone to follow up after admission. Verified name and  with patient as identifiers. Patient: Kendall Huynh  Patient : 1955   MRN: [id number]  Reason for Admission:   Discharge Date: 23 RARS: Readmission Risk Score: 8      Needs to be reviewed by the provider   Additional needs identified to be addressed with provider: No  none             Method of communication with provider: none. Pt states doing much better, no issues or concerns. F/U appts listed below. Agreed to more CTC f/u calls. Addressed changes since last contact:  none  Discussed follow-up appointments. If no appointment was previously scheduled, appointment scheduling offered: No.   Is follow up appointment scheduled within 7 days of discharge? Yes. Follow Up  Future Appointments   Date Time Provider [address]   2024 11:50 AM Erika Roche MD [facility] FP [place] [facility]   3/25/2024  2:15 PM Emy Zepeda MD [place] [facility]     External follow up appointment(s):     Care Transition Nurse reviewed medical action plan with patient and discussed any barriers to care and/or understanding of plan of care after discharge. Discussed appropriate site of care based on symptoms and resources available to patient including: When to call 911. The patient agrees to contact the PCP office for questions related to their healthcare. Advance Care Planning:   reviewed and current. Patients top risk factors for readmission: medical condition-non cardiac CP; reflux  Interventions to address risk factors: Assessment and support for treatment adherence and medication management-reflux    Offered patient enrollment in the Remote Patient Monitoring (RPM) program for in-home monitoring:  no qualifying diagnosis .      Care Transitions Subsequent and Final Call

## 2023-12-29 ENCOUNTER — TELEPHONE (OUTPATIENT)
Dept: CARDIOLOGY CLINIC | Age: 68
End: 2023-12-29

## 2023-12-29 NOTE — TELEPHONE ENCOUNTER
Submitted PA for REPATHA  [facility] Key: [id number] STATUS: PENDING.    Follow up done daily; if no decision with in three days we will refax.  If another three days goes by with no decision will call the insurance for status.

## 2023-12-30 NOTE — PROGRESS NOTES
Physician Progress Note      PATIENT:               ROMI LEMUS  [facility] #:                  [id number]  :                       1955  ADMIT DATE:       12/15/2023 2:26 PM  DISCH DATE:        2023 6:37 PM  RESPONDING  PROVIDER #:        TASHA Gonzalez CNP          QUERY TEXT:    Pt admitted with CP.  Pt noted to have Gastritis non obstructive CAD. If   possible, please document in progress notes and discharge summary if you are   evaluating and/or treating any of the following:    The medical record reflects the following:  Risk Factors: CAD chronic pain gastritis GERD  Clinical Indicators: per GI EGD \"Impression:  Dilated common bile duct of   unknown etiology No evidence of a pancreatic tumor  Hemorrhagic gastritis Recommendations:  At this time the noncardiac chest pain   could be due to reflux I think it would be good for the patient to be on a   proton pump inhibitor twice daily I think it would be good for the patient to   be on Reglan 10 mg p.o. nightly\"  per Cardiology LHC \"-Coronary Angiography w/ mild to moderate nonobstructive   CAD and widely patent stents -LVG with LVEF of 60% and no regional wall motion   abnormalities-No cardiac etiology to explain continued chest discomfort\"  Treatment: EGD, LHC, Protonix and Reglan, ASA, Coreg, Plavix, serial troponins  Options provided:  -- Chest pain due to CAD with unstable angina  -- Chest pain due to GERD  -- Chest pain due to, please specify cause.  -- Other - I will add my own diagnosis  -- Disagree - Not applicable / Not valid  -- Disagree - Clinically unable to determine / Unknown  -- Refer to Clinical Documentation Reviewer    PROVIDER RESPONSE TEXT:    This patient has chest pain due to GERD.    Query created by: Kesha Kruger on 2023 6:18 AM      Electronically signed by:  TASHA Gonzalez CNP 2023 10:03   AM

## 2024-01-02 ENCOUNTER — OFFICE VISIT (OUTPATIENT)
Dept: FAMILY MEDICINE CLINIC | Age: 69
End: 2024-01-02
Payer: MEDICARE

## 2024-01-02 VITALS
DIASTOLIC BLOOD PRESSURE: 70 MMHG | SYSTOLIC BLOOD PRESSURE: 120 MMHG | TEMPERATURE: 97.8 F | RESPIRATION RATE: 14 BRPM | BODY MASS INDEX: 37.08 KG/M2 | OXYGEN SATURATION: 98 % | WEIGHT: 216 LBS | HEART RATE: 93 BPM

## 2024-01-02 DIAGNOSIS — F33.0 MILD EPISODE OF RECURRENT MAJOR DEPRESSIVE DISORDER (HCC): ICD-10-CM

## 2024-01-02 DIAGNOSIS — E66.01 SEVERE OBESITY (BMI 35.0-39.9) WITH COMORBIDITY (HCC): ICD-10-CM

## 2024-01-02 DIAGNOSIS — R07.2 PRECORDIAL PAIN: Primary | ICD-10-CM

## 2024-01-02 DIAGNOSIS — I25.119 ATHEROSCLEROSIS OF NATIVE CORONARY ARTERY WITH ANGINA PECTORIS, UNSPECIFIED WHETHER NATIVE OR TRANSPLANTED HEART (HCC): ICD-10-CM

## 2024-01-02 DIAGNOSIS — K21.00 GASTROESOPHAGEAL REFLUX DISEASE WITH ESOPHAGITIS WITHOUT HEMORRHAGE: ICD-10-CM

## 2024-01-02 DIAGNOSIS — I48.0 PAF (PAROXYSMAL ATRIAL FIBRILLATION) (HCC): ICD-10-CM

## 2024-01-02 DIAGNOSIS — I25.10 CORONARY ARTERY DISEASE INVOLVING NATIVE CORONARY ARTERY OF NATIVE HEART WITHOUT ANGINA PECTORIS: ICD-10-CM

## 2024-01-02 PROCEDURE — 1123F ACP DISCUSS/DSCN MKR DOCD: CPT | Performed by: FAMILY MEDICINE

## 2024-01-02 PROCEDURE — 99214 OFFICE O/P EST MOD 30 MIN: CPT | Performed by: FAMILY MEDICINE

## 2024-01-02 PROCEDURE — 1090F PRES/ABSN URINE INCON ASSESS: CPT | Performed by: FAMILY MEDICINE

## 2024-01-02 PROCEDURE — 3017F COLORECTAL CA SCREEN DOC REV: CPT | Performed by: FAMILY MEDICINE

## 2024-01-02 PROCEDURE — 3078F DIAST BP <80 MM HG: CPT | Performed by: FAMILY MEDICINE

## 2024-01-02 PROCEDURE — G8427 DOCREV CUR MEDS BY ELIG CLIN: HCPCS | Performed by: FAMILY MEDICINE

## 2024-01-02 PROCEDURE — G8400 PT W/DXA NO RESULTS DOC: HCPCS | Performed by: FAMILY MEDICINE

## 2024-01-02 PROCEDURE — G8417 CALC BMI ABV UP PARAM F/U: HCPCS | Performed by: FAMILY MEDICINE

## 2024-01-02 PROCEDURE — G8484 FLU IMMUNIZE NO ADMIN: HCPCS | Performed by: FAMILY MEDICINE

## 2024-01-02 PROCEDURE — 1036F TOBACCO NON-USER: CPT | Performed by: FAMILY MEDICINE

## 2024-01-02 PROCEDURE — 3074F SYST BP LT 130 MM HG: CPT | Performed by: FAMILY MEDICINE

## 2024-01-02 PROCEDURE — 1111F DSCHRG MED/CURRENT MED MERGE: CPT | Performed by: FAMILY MEDICINE

## 2024-01-02 RX ORDER — DICYCLOMINE HYDROCHLORIDE 10 MG/1
10 CAPSULE ORAL 4 TIMES DAILY
Qty: 120 CAPSULE | Refills: 5 | Status: SHIPPED | OUTPATIENT
Start: 2024-01-02

## 2024-01-02 ASSESSMENT — PATIENT HEALTH QUESTIONNAIRE - PHQ9
7. TROUBLE CONCENTRATING ON THINGS, SUCH AS READING THE NEWSPAPER OR WATCHING TELEVISION: 0
SUM OF ALL RESPONSES TO PHQ QUESTIONS 1-9: 0
4. FEELING TIRED OR HAVING LITTLE ENERGY: 0
10. IF YOU CHECKED OFF ANY PROBLEMS, HOW DIFFICULT HAVE THESE PROBLEMS MADE IT FOR YOU TO DO YOUR WORK, TAKE CARE OF THINGS AT HOME, OR GET ALONG WITH OTHER PEOPLE: 0
5. POOR APPETITE OR OVEREATING: 0
1. LITTLE INTEREST OR PLEASURE IN DOING THINGS: 0
3. TROUBLE FALLING OR STAYING ASLEEP: 0
6. FEELING BAD ABOUT YOURSELF - OR THAT YOU ARE A FAILURE OR HAVE LET YOURSELF OR YOUR FAMILY DOWN: 0
2. FEELING DOWN, DEPRESSED OR HOPELESS: 0
9. THOUGHTS THAT YOU WOULD BE BETTER OFF DEAD, OR OF HURTING YOURSELF: 0
SUM OF ALL RESPONSES TO PHQ9 QUESTIONS 1 & 2: 0
SUM OF ALL RESPONSES TO PHQ QUESTIONS 1-9: 0
8. MOVING OR SPEAKING SO SLOWLY THAT OTHER PEOPLE COULD HAVE NOTICED. OR THE OPPOSITE, BEING SO FIGETY OR RESTLESS THAT YOU HAVE BEEN MOVING AROUND A LOT MORE THAN USUAL: 0

## 2024-01-02 NOTE — PROGRESS NOTES
suicidal thoughts or ideation   Abd: soft, nontender.          Current Outpatient Medications   Medication Sig Dispense Refill    dicyclomine (BENTYL) 10 MG capsule Take 1 capsule by mouth 4 times daily 120 capsule 5    pantoprazole (PROTONIX) 40 MG tablet Take 1 tablet by mouth 2 times daily (before meals) 180 tablet 1    carvedilol (COREG) 3.125 MG tablet Take 1 tablet by mouth 2 times daily (with meals) 180 tablet 1    dilTIAZem (CARDIZEM CD) 120 MG extended release capsule Take 1 capsule by mouth at bedtime TAKE 1 CAPSULE BY MOUTH TWICE DAILY 180 capsule 3    dilTIAZem (CARDIZEM) 60 MG tablet Take 1 tablet by mouth every morning 90 tablet 3    clopidogrel (PLAVIX) 75 MG tablet Take 1 tablet by mouth daily 90 tablet 3    valsartan (DIOVAN) 80 MG tablet Take 1 tablet by mouth daily 90 tablet 3    Evolocumab (REPATHA) SOSY syringe Inject 1 mL into the skin every 14 days 2 each 5    vitamin C (ASCORBIC ACID) 500 MG tablet Take 1 tablet by mouth daily      Cholecalciferol (VITAMIN D3) 125 MCG (5000 UT) TABS Take by mouth      vitamin B-12 (CYANOCOBALAMIN) 1000 MCG tablet Take 1 tablet by mouth daily      Potassium 99 MG TABS Take by mouth      diazePAM (VALIUM) 5 MG tablet As needed.      nitroGLYCERIN (NITROSTAT) 0.4 MG SL tablet Place 1 tablet under the tongue every 5 minutes as needed for Chest pain up to max of 3 total doses. If no relief after 1 dose, call 911. 25 tablet 3    aspirin 81 MG chewable tablet Take 1 tablet by mouth daily 30 tablet 0    cetirizine (ZYRTEC) 5 MG tablet Take 1 tablet by mouth daily      L-Lysine 1000 MG TABS Take 1 tablet by mouth every morning (before breakfast)       estradiol (ESTRACE) 0.1 MG/GM vaginal cream PLACE 0.5 GRAM VAGINALLY 3 TIMES A WEEK 42.5 g 0    EPINEPHrine (EPIPEN 2-FELIZ) 0.3 MG/0.3ML SOAJ injection Inject 0.3 mLs into the skin once for 1 dose Use as directed for allergic reaction (Patient not taking: Reported on 1/2/2024) 0.3 mL 2     No current facility-administered

## 2024-01-03 RX ORDER — DICYCLOMINE HYDROCHLORIDE 10 MG/1
10 CAPSULE ORAL 4 TIMES DAILY
Qty: 360 CAPSULE | OUTPATIENT
Start: 2024-01-03

## 2024-01-04 ENCOUNTER — CARE COORDINATION (OUTPATIENT)
Dept: CASE MANAGEMENT | Age: 69
End: 2024-01-04

## 2024-01-04 NOTE — CARE COORDINATION
Care Transitions Follow Up Call    Patient Current Location:  Home: [address] [place] [id number]    Care Transition Nurse contacted the patient by telephone to follow up after admission.  Verified name and  with patient as identifiers.    Patient: Nithya Hicks  Patient : 1955   MRN: [id number]  Reason for Admission:   Discharge Date: 23 RARS: Readmission Risk Score: 8      Needs to be reviewed by the provider   Additional needs identified to be addressed with provider: No  none             Method of communication with provider: none.    Pt states doing well, no issues or concerns except that she has noted that depression is one of her diagnoses on her chart. She is meeting with her PCP this month to have it removed. Patient is very upset and adamant about having that diagnosis removed. Agreed to more CTC f/u calls.      Addressed changes since last contact:  none  Discussed follow-up appointments. If no appointment was previously scheduled, appointment scheduling offered: No.   Is follow up appointment scheduled within 7 days of discharge? Yes.    Follow Up  Future Appointments   Date Time Provider Department Center   2024  4:10 PM Nolberto Roche MD [facility] [place] [facility]   3/25/2024  2:15 PM Frank Jones MD [place] [facility]   2024 11:20 AM Nolberto Roche MD [facility] [place] [facility]     External follow up appointment(s):     Care Transition Nurse reviewed medical action plan with patient and discussed any barriers to care and/or understanding of plan of care after discharge. Discussed appropriate site of care based on symptoms and resources available to patient including: When to call 911. The patient agrees to contact the PCP office for questions related to their healthcare.     Advance Care Planning:   reviewed and current.     Patients top risk factors for readmission: medical condition-reflux  Interventions to address risk factors: Assessment and support for treatment

## 2024-01-11 ENCOUNTER — CARE COORDINATION (OUTPATIENT)
Dept: CARE COORDINATION | Age: 69
End: 2024-01-11

## 2024-01-11 NOTE — CARE COORDINATION
Care Transitions Outreach Attempt    Attempted to reach patient for transitions of care follow up. Unable to reach patient.    Patient: Nithya Hicks Patient : 1955 MRN: [id number]    Last Discharge Facility       Date Complaint Diagnosis Description Type Department Provider    12/15/23 Chest Pain Chest pain, unspecified type ... ED to Hosp-Admission (Discharged) (ADMITTED) [facility] 5C Jerome Ferro MD; Yo Grullon...          Noted following upcoming appointments from discharge chart review:   [facility] follow up appointment(s):   Future Appointments   Date Time Provider Department Center   2024  4:10 PM Nolberto Roche MD [facility] [place] [facility]   3/25/2024  2:15 PM Frank Jones MD [place] [facility]   2024 11:20 AM Nolberto Roche MD [facility] [place] [facility]     Non-BS  follow up appointment(s): NA

## 2024-01-18 ENCOUNTER — CARE COORDINATION (OUTPATIENT)
Dept: CASE MANAGEMENT | Age: 69
End: 2024-01-18

## 2024-01-18 NOTE — CARE COORDINATION
Care Transitions Follow Up Call    Patient: Nithya Hicks  Patient : 1955   MRN: [id number]  Reason for Admission: Chest pain  Discharge Date: 23 RARS: Readmission Risk Score: 8      Attempted to call patient for follow up transition call. Left voicemail message requesting a return call and stating this is the final call we will be making. If you have any health concerns or problems arise please call your PCP to schedule an appointment. Expect a f/u call in a few days. No further outreach scheduled.        Follow Up  Future Appointments   Date Time Provider Department Center   2024  4:20 PM Nolberto Roche MD [facility] [place] - [place]   3/25/2024  2:15 PM Frank Jones MD [place] [facility]   2024 11:20 AM Nolberto Roche MD [facility] [place] - [place]        Care Transitions Subsequent and Final Call    Subsequent and Final Calls  Care Transitions Interventions  Other Interventions:             Jackelin Trejo LPN

## 2024-01-19 ENCOUNTER — CARE COORDINATION (OUTPATIENT)
Dept: CARE COORDINATION | Age: 69
End: 2024-01-19

## 2024-01-19 NOTE — CARE COORDINATION
Ambulatory Care Coordination (ACC)    MARSHALL WeaverN [facility]   Ambulatory Care Manager (ACM)      Situation: CTN handoff 1.18.24 s/p final attempt by CTN  Pt is scheduled to see PCP on Monday 1.22.24    Action: Left HIPAA compliant vm requesting return callback.  Invited pt to advise preferred day, time, number best to be reached.    Provided & repeated direct callback to reach this ACM.      Plan: engage for ACC support    MARSHALL WeaverN, [facility] Ambulatory Care Manager  [phone]

## 2024-01-22 ENCOUNTER — OFFICE VISIT (OUTPATIENT)
Dept: FAMILY MEDICINE CLINIC | Age: 69
End: 2024-01-22
Payer: MEDICARE

## 2024-01-22 VITALS
DIASTOLIC BLOOD PRESSURE: 76 MMHG | SYSTOLIC BLOOD PRESSURE: 144 MMHG | TEMPERATURE: 97 F | OXYGEN SATURATION: 99 % | BODY MASS INDEX: 37.08 KG/M2 | WEIGHT: 216 LBS | RESPIRATION RATE: 16 BRPM | HEART RATE: 79 BPM

## 2024-01-22 DIAGNOSIS — M62.89 PELVIC FLOOR DYSFUNCTION: ICD-10-CM

## 2024-01-22 DIAGNOSIS — E78.00 HYPERCHOLESTEROLEMIA: ICD-10-CM

## 2024-01-22 DIAGNOSIS — Z23 NEED FOR SHINGLES VACCINE: ICD-10-CM

## 2024-01-22 DIAGNOSIS — R35.0 FREQUENT URINATION: Primary | ICD-10-CM

## 2024-01-22 DIAGNOSIS — Z12.31 ENCOUNTER FOR SCREENING MAMMOGRAM FOR MALIGNANT NEOPLASM OF BREAST: ICD-10-CM

## 2024-01-22 DIAGNOSIS — N39.0 RECURRENT UTI: ICD-10-CM

## 2024-01-22 DIAGNOSIS — R00.2 PALPITATIONS: ICD-10-CM

## 2024-01-22 DIAGNOSIS — I25.119 ATHEROSCLEROSIS OF NATIVE CORONARY ARTERY WITH ANGINA PECTORIS, UNSPECIFIED WHETHER NATIVE OR TRANSPLANTED HEART (HCC): ICD-10-CM

## 2024-01-22 DIAGNOSIS — Z12.11 SCREEN FOR COLON CANCER: ICD-10-CM

## 2024-01-22 DIAGNOSIS — I10 ESSENTIAL HYPERTENSION: ICD-10-CM

## 2024-01-22 PROBLEM — F33.0 MILD EPISODE OF RECURRENT MAJOR DEPRESSIVE DISORDER (HCC): Status: RESOLVED | Noted: 2020-12-18 | Resolved: 2024-01-22

## 2024-01-22 PROBLEM — R39.9 UTI SYMPTOMS: Status: RESOLVED | Noted: 2023-07-26 | Resolved: 2024-01-22

## 2024-01-22 PROBLEM — R07.9 CHEST PAIN: Status: RESOLVED | Noted: 2022-10-31 | Resolved: 2024-01-22

## 2024-01-22 PROBLEM — R30.0 DYSURIA: Status: RESOLVED | Noted: 2023-07-26 | Resolved: 2024-01-22

## 2024-01-22 LAB
BILIRUBIN, POC: NORMAL
BLOOD URINE, POC: NORMAL
CLARITY, POC: CLEAR
COLOR, POC: NORMAL
GLUCOSE URINE, POC: NORMAL
KETONES, POC: NORMAL
LEUKOCYTE EST, POC: NORMAL
NITRITE, POC: NORMAL
PH, POC: 6.5
PROTEIN, POC: NORMAL
SPECIFIC GRAVITY, POC: <=1.005
UROBILINOGEN, POC: 0.2

## 2024-01-22 PROCEDURE — 3077F SYST BP >= 140 MM HG: CPT | Performed by: FAMILY MEDICINE

## 2024-01-22 PROCEDURE — 3078F DIAST BP <80 MM HG: CPT | Performed by: FAMILY MEDICINE

## 2024-01-22 PROCEDURE — G8417 CALC BMI ABV UP PARAM F/U: HCPCS | Performed by: FAMILY MEDICINE

## 2024-01-22 PROCEDURE — 1123F ACP DISCUSS/DSCN MKR DOCD: CPT | Performed by: FAMILY MEDICINE

## 2024-01-22 PROCEDURE — 99214 OFFICE O/P EST MOD 30 MIN: CPT | Performed by: FAMILY MEDICINE

## 2024-01-22 PROCEDURE — 3017F COLORECTAL CA SCREEN DOC REV: CPT | Performed by: FAMILY MEDICINE

## 2024-01-22 PROCEDURE — G8484 FLU IMMUNIZE NO ADMIN: HCPCS | Performed by: FAMILY MEDICINE

## 2024-01-22 PROCEDURE — 81002 URINALYSIS NONAUTO W/O SCOPE: CPT | Performed by: FAMILY MEDICINE

## 2024-01-22 PROCEDURE — G8400 PT W/DXA NO RESULTS DOC: HCPCS | Performed by: FAMILY MEDICINE

## 2024-01-22 PROCEDURE — 1036F TOBACCO NON-USER: CPT | Performed by: FAMILY MEDICINE

## 2024-01-22 PROCEDURE — G8427 DOCREV CUR MEDS BY ELIG CLIN: HCPCS | Performed by: FAMILY MEDICINE

## 2024-01-22 PROCEDURE — 1090F PRES/ABSN URINE INCON ASSESS: CPT | Performed by: FAMILY MEDICINE

## 2024-01-22 NOTE — PROGRESS NOTES
Here for eval of urinary frequency, pt states that she notices chronic issues where she feels she has UTI.  Pt states that she gets frequency, and does have some mild dyuria.  Pt does feel irritation.  Pt has seen urology in the past and has had dilation in the past.  Pt has not done pelvic floor PT but was recommended.  Pt is on topical hormone for vaginal dryness.      Pt here for follow up of blood pressure.  Pt states doing great with adherence to therapy and feels well.  No issues of chest pain, shortness of breath.  No vision changes, headache, swelling in legs.     Except as noted above in the history of present illness, the review of systems is  negative for headache, vision changes, chest pain, shortness of breath, abdominal pain, urinary sx, bowel changes.    Past medical, surgical, and social history reviewed and updated  Medications and allergies reviewed and updated      O: BP (!) 144/76   Pulse 79   Temp 97 °F (36.1 °C) (Temporal)   Resp 16   Wt 98 kg (216 lb)   SpO2 99%   BMI 37.08 kg/m²   GEN: No acute distress, cooperative, well nourished, alert.   HEENT: PEERLA, EOMI , normocephalic/atraumatic, nares and oropharynx clear.  Mucous membranes normal, Tympanic membranes clear bilaterally.  Neck: soft, supple, no thyromegaly, mass, no Lymphadenopathy  CV: Regular rate and rhythm, no murmur, rubs, gallops. No edema.  Resp: Clear to auscultation bilaterally good air entry bilaterally  No crackles, wheeze. Breathing comfortably.   Psych: mood stable, No suicidal thoughts or ideation       ASSESSMENT / PLAN:    1. Frequent urination  UA neg  C/w OAB and pelvic floor dysfunction  Check urine cx  Refer urogyne   Management pending results.   - POCT Urinalysis no Micro  - [facility]  - Culture, Urine    2. Recurrent UTI  Check UA  F/u with urogyne as below  - [facility]angel[facility]  - Culture, Urine    3. Pelvic floor

## 2024-01-23 ENCOUNTER — CARE COORDINATION (OUTPATIENT)
Dept: CARE COORDINATION | Age: 69
End: 2024-01-23

## 2024-01-23 SDOH — HEALTH STABILITY: PHYSICAL HEALTH: ON AVERAGE, HOW MANY MINUTES DO YOU ENGAGE IN EXERCISE AT THIS LEVEL?: 0 MIN

## 2024-01-23 SDOH — HEALTH STABILITY: PHYSICAL HEALTH: ON AVERAGE, HOW MANY DAYS PER WEEK DO YOU ENGAGE IN MODERATE TO STRENUOUS EXERCISE (LIKE A BRISK WALK)?: 0 DAYS

## 2024-01-23 ASSESSMENT — SOCIAL DETERMINANTS OF HEALTH (SDOH)
IN A TYPICAL WEEK, HOW MANY TIMES DO YOU TALK ON THE PHONE WITH FAMILY, FRIENDS, OR NEIGHBORS?: MORE THAN THREE TIMES A WEEK
HOW OFTEN DO YOU GET TOGETHER WITH FRIENDS OR RELATIVES?: THREE TIMES A WEEK

## 2024-01-23 NOTE — CARE COORDINATION
mental well-being?: No identified areas of concern   Are there any problems with your patient’s lifestyle behaviors (alcohol, drugs, diet, exercise) that are impacting on physical or mental well-being?: No identified areas of concern   Do you have any other concerns about your patient’s mental well-being? How would you rate their severity and impact on the patient?: No identified areas of concern   How would you rate their home environment in terms of safety and stability (including domestic violence, insecure housing, neighbor harassment)?: Consistently safe, supportive, stable, no identified problems   How do daily activities impact on the patient's well-being? (include current or anticipated unemployment, work, caregiving, access to transportation or other): No identified problems or perceived positive benefits   How would you rate their social network (family, work, friends)?: Good participation with social networks   How would you rate their financial resources (including ability to afford all required medical care)?: Financially secure, resources adequate, no identified problems   How wells does the patient now understand their health and well-being (symptoms, signs or risk factors) and what they need to do to manage their health?: Reasonable to good understanding and already engages in managing health or is willing to undertake better management   How well do you think your patient can engage in healthcare discussions? (Barriers include language, deafness, aphasia, alcohol or drug problems, learning difficulties, concentration): Clear and open communication, no identified barriers   Do other services need to be involved to help this patient?: Other care/services not required at this time   Are current services involved with this patient well-coordinated? (Include coordination with other services you are now recommendation): All required care/services in place and well-coordinated   Suggested Interventions and

## 2024-01-24 ENCOUNTER — TELEPHONE (OUTPATIENT)
Dept: CARDIOLOGY CLINIC | Age: 69
End: 2024-01-24

## 2024-01-24 DIAGNOSIS — R00.2 PALPITATIONS: Primary | ICD-10-CM

## 2024-01-24 LAB — BACTERIA UR CULT: NORMAL

## 2024-01-24 NOTE — TELEPHONE ENCOUNTER
Spoke with pt and she has been scheduled for 1/25/24 at 2:15 for 7 day monitor per LES.  Pt verbalized understanding

## 2024-01-24 NOTE — TELEPHONE ENCOUNTER
Pt's PCP noticed her heart was skipping a beat at her office visit, so he ordered her a 24 hr Holter monitor.    Pt wants to know if LES is okay with her getting the monitor.    Please advise.

## 2024-01-25 ENCOUNTER — NURSE ONLY (OUTPATIENT)
Dept: CARDIOLOGY CLINIC | Age: 69
End: 2024-01-25
Payer: MEDICARE

## 2024-01-25 DIAGNOSIS — R00.2 PALPITATIONS: ICD-10-CM

## 2024-01-25 PROCEDURE — 93242 EXT ECG>48HR<7D RECORDING: CPT | Performed by: INTERNAL MEDICINE

## 2024-01-31 ENCOUNTER — CARE COORDINATION (OUTPATIENT)
Dept: CARE COORDINATION | Age: 69
End: 2024-01-31

## 2024-01-31 NOTE — CARE COORDINATION
Ambulatory Care Coordination (ACC)    MARSHALL WeaverN [facility]   Ambulatory Care Manager (ACM)      Situation: routine ACC outreach for review  ACC program initiated 1.18.24; first successful ACC review on 1.23.24     Pt preferred ACC outreach q2-4 weeks  Recently Rx'd pantoprazole BID w/meals for GERD    Action: Left vm requesting return callback.  Inquired if any noted improvement s/p new Rx Protonix  Provided & repeated direct callback to reach this ACM.      Plan: ACC outreach to q 2-4 weeks, per pt preference -  for health coaching, care collaboration, support w/community resources, and help with any newly presenting concerns as needed.  Employ support of fellow ACC team nurse for higher frequency ACC outreach, if indicated.  Pt will continue direct self reporting for any concerning symptoms - new onset or worsening of any pre-existing concerns.  Review of SDOH, ACP throughout current ACC episode as appropriate.  Pt agrees to outreach direct to ACM, as needed, between routine follow up outreach initiated by [facility]     MARSHALL WeaverN, [facility] Ambulatory Care Manager  [phone]

## 2024-02-08 PROCEDURE — 93244 EXT ECG>48HR<7D REV&INTERPJ: CPT | Performed by: INTERNAL MEDICINE

## 2024-02-09 ENCOUNTER — TELEPHONE (OUTPATIENT)
Dept: CARDIOLOGY CLINIC | Age: 69
End: 2024-02-09

## 2024-02-09 NOTE — TELEPHONE ENCOUNTER
Spoke to Nithya, informed her we will contact her when results available. Pt verbalizes understanding.

## 2024-02-13 ENCOUNTER — CARE COORDINATION (OUTPATIENT)
Dept: CARE COORDINATION | Age: 69
End: 2024-02-13

## 2024-02-13 NOTE — CARE COORDINATION
Ambulatory Care Coordination (ACC)    MARSHALL WeaverN [facility]   Ambulatory Care Manager (ACM)    Situation: routine ACC outreach for review  ACC program initiated 1.18.24; first successful ACC review on 1.23.24     Pt preferred ACC outreach q2-4 weeks  Recently Rx'd pantoprazole BID w/meals for GERD     of note - see also pt outreach to cardiology - response pending provider review     Action: Left vm requesting return callback at pt's soonest convenience.  Inquired if any newly identified concerns, resource needs, or barriers requiring ACM assistance  Provided & repeated direct callback to reach this ACM.        Plan: ACC outreach to q 2-4 weeks, per pt preference -  for health coaching, care collaboration, support w/community resources, and help with any newly presenting concerns as needed.  Employ support of fellow ACC team nurse for higher frequency ACC outreach, if indicated.  Pt will continue direct self reporting for any concerning symptoms - new onset or worsening of any pre-existing concerns.  Review of SDOH, ACP throughout current ACC episode as appropriate.  Pt agrees to outreach direct to ACM, as needed, between routine follow up outreach initiated by ACM        MARSHALL WeaverN, [facility] Ambulatory Care Manager  [phone]

## 2024-02-14 RX ORDER — DILTIAZEM HYDROCHLORIDE 120 MG/1
120 CAPSULE, COATED, EXTENDED RELEASE ORAL 2 TIMES DAILY
Qty: 180 CAPSULE | Refills: 3 | Status: SHIPPED | OUTPATIENT
Start: 2024-02-14

## 2024-02-14 NOTE — TELEPHONE ENCOUNTER
Received refill request for diltiazem from [facility] pharmacy.    Last ov:2023 LES        Last EK2023    Last Refill:2023 TAKE 1 CAPSULE BY MOUTH TWICE DAILY Dispense: 180 capsule   3 refills    Next appointment:2024

## 2024-02-16 ENCOUNTER — TELEPHONE (OUTPATIENT)
Dept: CARDIOLOGY CLINIC | Age: 69
End: 2024-02-16

## 2024-02-16 NOTE — TELEPHONE ENCOUNTER
----- Message from Frank Jones MD sent at 2/15/2024  8:19 PM EST -----  Let her know no a. Fib on monitor. Her medications can control the racing of heart seen

## 2024-02-20 ENCOUNTER — CARE COORDINATION (OUTPATIENT)
Dept: CARE COORDINATION | Age: 69
End: 2024-02-20

## 2024-02-20 NOTE — CARE COORDINATION
Ambulatory Care Coordination (ACC)    MARSHALL WeaverN [facility]   Ambulatory Care Manager (ACM)      Situation: routine ACC outreach for review  ACC program initiated 1.18.24; first successful ACC review on 1.23.24     Pt preferred ACC outreach q2-4 weeks  Recently Rx'd pantoprazole BID w/meals for GERD     of note - see also pt outreach to cardiology re recent reports of heart feeling like 'skip beats'-  ongoing review by cardiology      RPM reviewed on 1.23.24 - but pt had not yet enrolled    Action: Left vm requesting return callback at pt's soonest convenience.  Inquired if any newly identified concerns, resource needs, or barriers requiring ACM assistance  Provided & repeated direct callback to reach this ACM.        Plan: review if pt willing to enroll for RPM  ACC outreach to q 2-4 weeks, per pt preference -  for health coaching, care collaboration, support w/community resources, and help with any newly presenting concerns as needed.  Employ support of fellow ACC team nurse for higher frequency ACC outreach, if indicated.  Pt will continue direct self reporting for any concerning symptoms - new onset or worsening of any pre-existing concerns.  Review of SDOH, ACP throughout current ACC episode as appropriate.  Pt agrees to outreach direct to ACM, as needed, between routine follow up outreach initiated by ACM     MARSHALL WeaverN, [facility] Ambulatory Care Manager  [phone]

## 2024-02-20 NOTE — CARE COORDINATION
Community Resources  Other Services or Interventions: reviewed self mgmt concepts; access to care - eg availability for same/next day visit(s) w/PCP and/or use of [facility] urgent care ctrs as needed;          Goals Addressed                      This Visit's Progress      Conditions and Symptoms (pt-stated)   On track      I will schedule office visits, as directed by my provider.  I will keep my appointment or reschedule if I have to cancel.  I will notify my provider of any barriers to my plan of care.  I will follow my Zone Management tool to seek urgent or emergent care.  I will notify my provider of any symptoms that indicate a worsening of my condition.    Barriers: none  Plan for overcoming my barriers: denies any noted barriers, but willingness to engage for added support through Care Coordination, per referral from Care Transition nurse  Confidence: 10/10  Anticipated Goal Completion Date: 5.23.24                Future Appointments   Date Time Provider Department Center   3/25/2024  2:15 PM Frank Jones MD [place] [facility]   7/2/2024 11:20 AM Nolberto Roche MD [facility] [place] - [place]

## 2024-02-22 ENCOUNTER — ENROLLMENT (OUTPATIENT)
Dept: PHARMACY | Facility: CLINIC | Age: 69
End: 2024-02-22

## 2024-02-28 LAB — NONINV COLON CA DNA+OCC BLD SCRN STL QL: NEGATIVE

## 2024-02-28 RX ORDER — DILTIAZEM HYDROCHLORIDE 60 MG/1
60 TABLET, FILM COATED ORAL NIGHTLY
Qty: 90 TABLET | Refills: 3 | Status: SHIPPED | OUTPATIENT
Start: 2024-02-28

## 2024-02-28 NOTE — TELEPHONE ENCOUNTER
Received refill request for diltiazem from [facility] pharmacy.    Last ov: 08/30/2023 LES    Next appointment: 03/11/2024 LES

## 2024-03-12 ENCOUNTER — CARE COORDINATION (OUTPATIENT)
Dept: CARE COORDINATION | Age: 69
End: 2024-03-12

## 2024-03-12 NOTE — CARE COORDINATION
Ambulatory Care Coordination (ACC)    MARSHALL WeaverN [facility]   Ambulatory Care Manager (ACM)      Situation: ACC program initiated 1.23.24 per CTN handoff   Declined RPM on 2.20.24      Notes she recently ended wearing cardiac monitor      Follows closely w/cardiology      Does not feel she requires participation in RPM   current ACC outreach frequency q2-4 wks, per pt preference last successful ACC review (2.20.24)    6 mo follow up scheduled w/cardiology 3.25.24     Action: Left HIPAA compliant vm requesting return callback.    Provided & repeated direct callback to reach this ACM.    Since unable to reach pt today, will retry for review in 1 week, then resume -  Plan (unchanged):   ACC outreach to q 2-4 weeks, per pt preference -  for health coaching, care collaboration, support w/community resources, and help with any newly presenting concerns as needed.  Employ support of fellow ACC team nurse for higher frequency ACC outreach, if indicated.  Pt will continue direct self reporting for any concerning symptoms - new onset or worsening of any pre-existing concerns.  Review of SDOH, ACP throughout current ACC episode as appropriate.  Pt agrees to outreach direct to ACM, as needed, between routine follow up outreach initiated by ACM     Zayda Hebert RN BSN, [facility] Ambulatory Care Manager  [phone]

## 2024-03-19 ENCOUNTER — CARE COORDINATION (OUTPATIENT)
Dept: CARE COORDINATION | Age: 69
End: 2024-03-19

## 2024-03-19 NOTE — CARE COORDINATION
Ambulatory Care Coordination (ACC)    MARSHALL WeaverN [facility]   Ambulatory Care Manager (ACM)    Situation: ACC program initiated 1.23.24 per CTN handoff   Declined RPM on 2.20.24      Notes she recently ended wearing cardiac monitor      Follows closely w/cardiology      Does not feel she requires participation in RPM   current ACC outreach frequency q2-4 wks, per pt preference  Last successful ACC review (2.20.24)     6 mo follow up scheduled w/cardiology 3.25.24      Action: Left HIPAA compliant  requesting return callback.     Provided & repeated direct callback to reach this ACM.     Since unable to reach pt today, will retry for review in 1 week, to determine if pt wishes to continue for ACC outreach; if pt willing, will resume -  Plan (unchanged):   ACC outreach to q 2-4 weeks, per pt preference -  for health coaching, care collaboration, support w/community resources, and help with any newly presenting concerns as needed.  Employ support of fellow ACC team nurse for higher frequency ACC outreach, if indicated.  Pt will continue direct self reporting for any concerning symptoms - new onset or worsening of any pre-existing concerns.  Review of SDOH, ACP throughout current ACC episode as appropriate.  Pt agrees to outreach direct to ACM, as needed, between routine follow up outreach initiated by ACM       MARSHALL Weaver, [facility] Ambulatory Care Manager  [phone]

## 2024-03-22 ENCOUNTER — TELEPHONE (OUTPATIENT)
Dept: PHARMACY | Facility: CLINIC | Age: 69
End: 2024-03-22

## 2024-03-22 ENCOUNTER — CARE COORDINATION (OUTPATIENT)
Dept: CARE COORDINATION | Age: 69
End: 2024-03-22

## 2024-03-22 DIAGNOSIS — T46.6X5A MYALGIA DUE TO STATIN: Primary | ICD-10-CM

## 2024-03-22 DIAGNOSIS — M79.10 MYALGIA DUE TO STATIN: Primary | ICD-10-CM

## 2024-03-22 NOTE — CARE COORDINATION
notify my provider of any barriers to my plan of care.  I will follow my Zone Management tool to seek urgent or emergent care.  I will notify my provider of any symptoms that indicate a worsening of my condition.    Barriers: none  Plan for overcoming my barriers: denies any noted barriers, but willingness to engage for added support through Care Coordination, per referral from Care Transition nurse  Confidence: 10/10  Anticipated Goal Completion Date: 5.23.24                Future Appointments   Date Time Provider Department Center   3/25/2024  2:15 PM Frank Jones MD [place] [facility]   7/2/2024 11:20 AM [facility] [place] [facility]

## 2024-03-25 PROBLEM — T46.6X5A MYALGIA DUE TO STATIN: Status: ACTIVE | Noted: 2024-03-25

## 2024-03-25 PROBLEM — M79.10 MYALGIA DUE TO STATIN: Status: ACTIVE | Noted: 2024-03-25

## 2024-04-01 NOTE — TELEPHONE ENCOUNTER
3/25/24 cardio visit was canceled, next currently scheduled for 6/27/24.    For Pharmacy Admin Tracking Only    Program: Core Competence  CPA in place:  No  Gap Closed?: No   Time Spent (min): 15   
Diagnosis added to problem list.  
  3/25/2024  2:15 PM Frank Jones MD [place] [facility]   7/2/2024 11:20 AM Kaley, Nolberto MARION MD [facility] [place]  [place] Wilkinson, PharmD, BCACP  Population Health Pharmacist  [facility] Clinical Pharmacy  Department, toll free: [phone], option 1

## 2024-04-12 ENCOUNTER — CARE COORDINATION (OUTPATIENT)
Dept: CARE COORDINATION | Age: 69
End: 2024-04-12

## 2024-04-12 NOTE — CARE COORDINATION
Ambulatory Care Coordination Note  2024    Patient Current Location:  Home: [address] [place] [id number]     ACM contacted the patient by telephone. Verified name and  with patient as identifiers. Provided introduction to self, and explanation of the ACM role.     Challenges to be reviewed by the provider   Additional needs identified to be addressed with provider: No  none               Method of communication with provider: none.    ACM: Zayda Hebert RN    Pt shares she had to r/s recent cardiology appt - notes legal/court case re concerns of her mother 'ran over', she fell ill soon thereafter - simply had to r/s for these reasons.  Pt shares her sister  of complications of aneurysm, recently being ended from life support on anniversary on her belated mother's BD.   Additionally, pt's beloved dog of 18.5 yrs passed away.   Denies need for, or belief she requires, grief counseling. Notes her  is very supportive to her and that she has close confidante friends - describes strong network of emotional support.  Pt endorses she has had grief counseling in past - acknowledges its benefits & purpose, but feels strongly she does not require such support at this time.  Reviewed ACM availability - including approaching days of PTO , , , , & .    Offered ACC outreach by fellow ACC team nurse, if needed, during this writer's absence.  Pt declines requiring higher frequency ACC outreach, stating preference to continue ACC outreach frequency q3-4 weeks through remainder of ACC program which is planned to conclude by 24.  Pt verbalized understanding of above and agreement with the following  Plan: pt will schedule for sooner return office visit with either cardiology and/or PCP as appropriate, if needed.  ACC outreach to q 3-4 weeks, per pt preference -  assist with any newly presenting concerns, or resource needs, as needed.  .  Pt will continue direct self reporting

## 2024-05-03 ENCOUNTER — CARE COORDINATION (OUTPATIENT)
Dept: CARE COORDINATION | Age: 69
End: 2024-05-03

## 2024-05-03 ENCOUNTER — TELEPHONE (OUTPATIENT)
Dept: FAMILY MEDICINE CLINIC | Age: 69
End: 2024-05-03

## 2024-05-03 ENCOUNTER — HOSPITAL ENCOUNTER (OUTPATIENT)
Age: 69
Discharge: HOME | End: 2024-05-03
Payer: MEDICARE

## 2024-05-03 ENCOUNTER — TELEPHONE (OUTPATIENT)
Dept: CARDIOLOGY CLINIC | Age: 69
End: 2024-05-03

## 2024-05-03 DIAGNOSIS — I10 ESSENTIAL HYPERTENSION: ICD-10-CM

## 2024-05-03 DIAGNOSIS — I25.10 CORONARY ARTERY DISEASE INVOLVING NATIVE CORONARY ARTERY OF NATIVE HEART WITHOUT ANGINA PECTORIS: ICD-10-CM

## 2024-05-03 DIAGNOSIS — M54.50 LOW BACK PAIN, UNSPECIFIED BACK PAIN LATERALITY, UNSPECIFIED CHRONICITY, UNSPECIFIED WHETHER SCIATICA PRESENT: ICD-10-CM

## 2024-05-03 DIAGNOSIS — R60.9 SWELLING: ICD-10-CM

## 2024-05-03 DIAGNOSIS — R06.00 DYSPNEA, UNSPECIFIED TYPE: ICD-10-CM

## 2024-05-03 DIAGNOSIS — R60.9 SWELLING: Primary | ICD-10-CM

## 2024-05-03 LAB
ALBUMIN SERPL-MCNC: 4.3 G/DL (ref 3.4–5)
ALBUMIN/GLOB SERPL: 1.5 {RATIO} (ref 1.1–2.2)
ALP SERPL-CCNC: 109 U/L (ref 40–129)
ALT SERPL-CCNC: 14 U/L (ref 10–40)
ANION GAP SERPL CALCULATED.3IONS-SCNC: 8 MMOL/L (ref 3–16)
AST SERPL-CCNC: 24 U/L (ref 15–37)
BASOPHILS # BLD: 0 K/UL (ref 0–0.2)
BASOPHILS NFR BLD: 0.4 %
BILIRUB SERPL-MCNC: <0.2 MG/DL (ref 0–1)
BUN SERPL-MCNC: 11 MG/DL (ref 7–20)
CALCIUM SERPL-MCNC: 9.3 MG/DL (ref 8.3–10.6)
CHLORIDE SERPL-SCNC: 101 MMOL/L (ref 99–110)
CO2 SERPL-SCNC: 29 MMOL/L (ref 21–32)
CREAT SERPL-MCNC: 0.8 MG/DL (ref 0.6–1.2)
DEPRECATED RDW RBC AUTO: 13 % (ref 12.4–15.4)
EOSINOPHIL # BLD: 0.4 K/UL (ref 0–0.6)
EOSINOPHIL NFR BLD: 4.3 %
GFR SERPLBLD CREATININE-BSD FMLA CKD-EPI: 81 ML/MIN/{1.73_M2}
GLUCOSE SERPL-MCNC: 116 MG/DL (ref 70–99)
HCT VFR BLD AUTO: 38.6 % (ref 36–48)
HGB BLD-MCNC: 13.5 G/DL (ref 12–16)
LYMPHOCYTES # BLD: 2 K/UL (ref 1–5.1)
LYMPHOCYTES NFR BLD: 21.8 %
MCH RBC QN AUTO: 30.2 PG (ref 26–34)
MCHC RBC AUTO-ENTMCNC: 35 G/DL (ref 31–36)
MCV RBC AUTO: 86.2 FL (ref 80–100)
MONOCYTES # BLD: 0.8 K/UL (ref 0–1.3)
MONOCYTES NFR BLD: 9.2 %
NEUTROPHILS # BLD: 5.9 K/UL (ref 1.7–7.7)
NEUTROPHILS NFR BLD: 64.3 %
NT-PROBNP SERPL-MCNC: 127 PG/ML (ref 0–124)
PLATELET # BLD AUTO: 280 K/UL (ref 135–450)
PMV BLD AUTO: 8.2 FL (ref 5–10.5)
POTASSIUM SERPL-SCNC: 4.5 MMOL/L (ref 3.5–5.1)
PROT SERPL-MCNC: 7.1 G/DL (ref 6.4–8.2)
RBC # BLD AUTO: 4.48 M/UL (ref 4–5.2)
SODIUM SERPL-SCNC: 138 MMOL/L (ref 136–145)
WBC # BLD AUTO: 9.2 K/UL (ref 4–11)

## 2024-05-03 PROCEDURE — 85025 COMPLETE CBC W/AUTO DIFF WBC: CPT

## 2024-05-03 PROCEDURE — 80053 COMPREHEN METABOLIC PANEL: CPT

## 2024-05-03 PROCEDURE — 83880 ASSAY OF NATRIURETIC PEPTIDE: CPT

## 2024-05-03 PROCEDURE — 36415 COLL VENOUS BLD VENIPUNCTURE: CPT

## 2024-05-03 NOTE — TELEPHONE ENCOUNTER
Pt called to inform the office that her hands, feet are swollen, her entire body is hurting, especially her back.  Please advise.  Thank you

## 2024-05-03 NOTE — CARE COORDINATION
Ambulatory Care Coordination Note  5/3/2024    Patient Current Location:  Home: [address] [place] [id number]     ACM contacted the patient by telephone. Verified name and  with patient as identifiers. Provided introduction to self, and explanation of the ACM role.     Challenges to be reviewed by the provider   Additional needs identified to be addressed with provider: No  none               Method of communication with provider: none.    ACM: Zayda Hebert, RN    Pt returned call stating her recently reported concern r/t swelling of BLE has gradually come to current status - gained >12 lbs in past 4-6 weeks.    She acknowledged awareness of provider instruction today that she was advised to report to ED, but adds that she opted to check in w/cardiologist and is now scheduled for visit 24.  Pt shares she attended  for mother of a close friend this week - and has done more walking than usual. Notes mild SOB when talking, but this was not detected during phone conversation - pt maintains 'manageable' and endorses she is closely monitoring, self-pacing. Pt states belief as her status is not new, arrived as such gradually over time, & as she now has appt w/cardiology scheduled Tuesday - denies belief she requires, nor desires, to report to ED at this time.  Pt assures this writer if symptoms continue to worsen and/or new onset for any concerning, associated symptoms - she will report to ED for eval/tx.  Reviewed s/s to report, options for access to care as appropriate.    Pt denies belief she is requiring for any added ACC support at this time. Pt maintains acceptance & readiness to proceed with plan to graduate ACC by 24 as planned.  Offered patient enrollment in the Remote Patient Monitoring (RPM) program for in-home monitoring: notes she recently ended wearing cardiac monitor; does not feel she requires daily RPM; agrees to advise ACM if she changes her mind .    Pt

## 2024-05-03 NOTE — TELEPHONE ENCOUNTER
Patient called.  Patient called with concerns for swelling in legs bilateral and feet.   Slight SOB and some dizziness.   Significant back pain.     Spoke to Dr. Roche.   Sending patient to the ER.     Spoke to the patient and advised with significant medical history she should be evaluated in the ER today.   If she is unable to get herself there, she has been advised to call 911.   She understood.

## 2024-05-03 NOTE — TELEPHONE ENCOUNTER
Called patient  and made appointment for 5/7/24 @ 9:15 per LES.  She is getting her blood work drawn this afternoon.  Patient verbalized understanding.

## 2024-05-03 NOTE — TELEPHONE ENCOUNTER
Spoke with patient at this time patient is not having any chest pain. She stated her weight is 218lb today with swelling in her hands, legs and feet.  Patient is SOB at times. Patient reports she is having a lot of pain in her back.
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
## 2024-05-07 ENCOUNTER — OFFICE VISIT (OUTPATIENT)
Dept: CARDIOLOGY CLINIC | Age: 69
End: 2024-05-07
Payer: MEDICARE

## 2024-05-07 VITALS
DIASTOLIC BLOOD PRESSURE: 94 MMHG | BODY MASS INDEX: 37.56 KG/M2 | SYSTOLIC BLOOD PRESSURE: 152 MMHG | WEIGHT: 220 LBS | HEIGHT: 64 IN | OXYGEN SATURATION: 98 % | HEART RATE: 79 BPM

## 2024-05-07 DIAGNOSIS — I48.0 PAF (PAROXYSMAL ATRIAL FIBRILLATION) (HCC): ICD-10-CM

## 2024-05-07 DIAGNOSIS — R06.02 SHORTNESS OF BREATH: ICD-10-CM

## 2024-05-07 DIAGNOSIS — I25.10 CORONARY ARTERY DISEASE INVOLVING NATIVE CORONARY ARTERY OF NATIVE HEART WITHOUT ANGINA PECTORIS: Primary | ICD-10-CM

## 2024-05-07 DIAGNOSIS — I10 ESSENTIAL HYPERTENSION: ICD-10-CM

## 2024-05-07 PROCEDURE — 1123F ACP DISCUSS/DSCN MKR DOCD: CPT | Performed by: INTERNAL MEDICINE

## 2024-05-07 PROCEDURE — 3080F DIAST BP >= 90 MM HG: CPT | Performed by: INTERNAL MEDICINE

## 2024-05-07 PROCEDURE — 3077F SYST BP >= 140 MM HG: CPT | Performed by: INTERNAL MEDICINE

## 2024-05-07 PROCEDURE — 93000 ELECTROCARDIOGRAM COMPLETE: CPT | Performed by: INTERNAL MEDICINE

## 2024-05-07 PROCEDURE — 99214 OFFICE O/P EST MOD 30 MIN: CPT | Performed by: INTERNAL MEDICINE

## 2024-05-07 RX ORDER — IBUPROFEN 600 MG/1
TABLET ORAL
Qty: 30 TABLET | Refills: 0 | Status: SHIPPED | OUTPATIENT
Start: 2024-05-07

## 2024-05-07 NOTE — PATIENT INSTRUCTIONS
Take motrin 600 mg with food every 6-8 hours x2 days, then change to twice per day x 3-4 days, then 2-3 times per week . Qty 30 .   Continue risk factor modifications.   Call for any change in symptoms, call to report any changes in shortness of breath or development of chest pain with activity.    Follow up as scheduled in June

## 2024-05-08 ENCOUNTER — TELEPHONE (OUTPATIENT)
Dept: PHARMACY | Facility: CLINIC | Age: 69
End: 2024-05-08

## 2024-05-08 ENCOUNTER — CARE COORDINATION (OUTPATIENT)
Dept: CARE COORDINATION | Age: 69
End: 2024-05-08

## 2024-05-08 NOTE — CARE COORDINATION
Ambulatory Care Coordination Note  2024    Patient Current Location:  Home: [address] [place] [id number]     ACM contacted the patient by telephone. Verified name and  with patient as identifiers. Provided introduction to self, and explanation of the ACM role.     Challenges to be reviewed by the provider   Additional needs identified to be addressed with provider: Yes  paramedicine-see below.  Pt verbalizes uncertainty how to specify - describes concern below.               Method of communication with provider: chart routing as for information    ACM: Zayda Hebert RN      Pt endorses willingness to self outreach direct to PCP also - for scheduling office visit for review of current concern, as follows:  Pt c/o experiencing generalized joint pain, back aches, 'all over' aches/pains. She began noticing expanse of the concern approx 30 days ago.  She does not endorse belief pain is r/t arthritis.  Pt explains \"it's more like a pronounced manifestation of how I have felt when having allergic reaction before - only much larger scale\".    Reviewed option for comprehensive review of medications by referral for Amb Pharm team to review.  Pt invites comprehensive review of medications - adding that she will also schedule w/PCP, as she has recently reviewed w/cardiology and shared this concern.       She is grateful recent labs, reportedly, were non-concerning; but adds that it is frustrating to try to convey her concerns, but feels the general belief is her c/o are medically attributed to arthritis. Pt holds firm suspicion this is not the case.  Pt also plans intention to review w/Pharmacist [facility] where [facility], simply as means to explore any/all potential helpful information that may lend contributing solutions to help address her concern - if in any way possible for potential to improve outcomes through medication regimen, in case adjustments are advised or

## 2024-05-08 NOTE — TELEPHONE ENCOUNTER
Received a referral:  from Care Coordinator to review patient’s medications.     Pharmacy team - please see note. Pt invites comprehensive med review to see if potential for any recommendation/feedback by pharmacist - pt hopeful to find out any solution to how she has been feeling; concern possibly r/t her current med regimen (?)  Invites review for collaboration w/care team, if needed.  Thanks.     Called patient to schedule a time to speak with a pharmacist over the telephone.     No answer left VM: Please contact us at  [phone] to schedule this appointment. Pharmacists are available Monday thru Friday 7:30 AM till 5:30 PM.      Deborah Busby CPhT.   Population Health Clinical   [facility]  Toll free: [phone]

## 2024-05-09 NOTE — TELEPHONE ENCOUNTER
Nithya returned a referral call.     Appointment scheduled for 5.15.24 @ 4:00 PM.    Hali Hampton [facility]  Population Health    [facility]   [phone] option 2     For Pharmacy Admin Tracking Only    Program: TalkTo  CPA in place:  No  Recommendation Provided To: Patient/Caregiver: 1 via Telephone  Intervention Detail: Scheduled Appointment  Intervention Accepted By: Patient/Caregiver: 1  Gap Closed?: Yes   Time Spent (min): 5

## 2024-05-15 ENCOUNTER — TELEPHONE (OUTPATIENT)
Dept: PHARMACY | Facility: CLINIC | Age: 69
End: 2024-05-15

## 2024-05-15 NOTE — TELEPHONE ENCOUNTER
CLINICAL PHARMACY NOTE - Medication Review  Patient outreach to review medications - Spoke with patient.      SUBJECTIVE/OBJECTIVE:   Nithya Hicks is a 68 y.o. female referred to a clinical pharmacy specialist by care coordination    Note from Care coordinator:  Pt c/o experiencing generalized joint pain, back aches, 'all over' aches/pains. She began noticing expanse of the concern approx 30 days ago.  She does not endorse belief pain is r/t arthritis.  Pt explains \"it's more like a pronounced manifestation of how I have felt when having allergic reaction before - only much larger scale\".      Medications:  Current Outpatient Medications   Medication Instructions    aspirin 81 mg, Oral, DAILY    carvedilol (COREG) 3.125 mg, Oral, 2 TIMES DAILY WITH MEALS    cetirizine (ZYRTEC) 5 mg, Oral, DAILY    Cholecalciferol (VITAMIN D3) 125 MCG (5000 UT) TABS Oral    clopidogrel (PLAVIX) 75 mg, Oral, DAILY    diazePAM (VALIUM) 5 MG tablet As needed.    dicyclomine (BENTYL) 10 mg, Oral, 4 TIMES DAILY    dilTIAZem (CARDIZEM CD) 120 mg, Oral, 2 TIMES DAILY    dilTIAZem (CARDIZEM) 60 mg, Oral, NIGHTLY, every morning    EPINEPHrine (EPIPEN 2-FELIZ) 0.3 mg, SubCUTAneous, ONCE, Use as directed for allergic reaction    estradiol (ESTRACE) 0.1 MG/GM vaginal cream PLACE 0.5 GRAM VAGINALLY 3 TIMES A WEEK    ibuprofen (ADVIL;MOTRIN) 600 MG tablet Take motrin 600 mg with food every 6-8 hours x2 days, then change to twice per day x 3-4 days, then 2-3 times per week .    L-Lysine 1000 MG TABS 1 tablet, Oral, DAILY BEFORE BREAKFAST    nitroGLYCERIN (NITROSTAT) 0.4 mg, SubLINGual, EVERY 5 MIN PRN, up to max of 3 total doses. If no relief after 1 dose, call 911.    pantoprazole (PROTONIX) 40 mg, Oral, 2 TIMES DAILY BEFORE MEALS    Potassium 99 MG TABS Oral    Repatha 140 mg, SubCUTAneous, EVERY 14 DAYS    valsartan (DIOVAN) 80 mg, Oral, DAILY    vitamin B-12 (CYANOCOBALAMIN) 1,000 mcg, Oral, DAILY    vitamin C (ASCORBIC ACID) 500 mg, Oral,

## 2024-05-16 ENCOUNTER — OFFICE VISIT (OUTPATIENT)
Dept: FAMILY MEDICINE CLINIC | Age: 69
End: 2024-05-16

## 2024-05-16 VITALS
SYSTOLIC BLOOD PRESSURE: 134 MMHG | OXYGEN SATURATION: 98 % | HEART RATE: 104 BPM | BODY MASS INDEX: 38.26 KG/M2 | DIASTOLIC BLOOD PRESSURE: 84 MMHG | WEIGHT: 223 LBS

## 2024-05-16 DIAGNOSIS — M79.89 HAND SWELLING: ICD-10-CM

## 2024-05-16 DIAGNOSIS — M79.10 MYALGIA DUE TO STATIN: ICD-10-CM

## 2024-05-16 DIAGNOSIS — J30.89 NON-SEASONAL ALLERGIC RHINITIS, UNSPECIFIED TRIGGER: ICD-10-CM

## 2024-05-16 DIAGNOSIS — E78.00 HYPERCHOLESTEROLEMIA: ICD-10-CM

## 2024-05-16 DIAGNOSIS — R63.5 WEIGHT GAIN: ICD-10-CM

## 2024-05-16 DIAGNOSIS — M25.50 ARTHRALGIA OF MULTIPLE JOINTS: Primary | ICD-10-CM

## 2024-05-16 DIAGNOSIS — I10 ESSENTIAL HYPERTENSION: ICD-10-CM

## 2024-05-16 DIAGNOSIS — I25.10 CORONARY ARTERY DISEASE INVOLVING NATIVE CORONARY ARTERY OF NATIVE HEART WITHOUT ANGINA PECTORIS: ICD-10-CM

## 2024-05-16 DIAGNOSIS — M25.50 ARTHRALGIA OF MULTIPLE JOINTS: ICD-10-CM

## 2024-05-16 DIAGNOSIS — T46.6X5A MYALGIA DUE TO STATIN: ICD-10-CM

## 2024-05-16 LAB
ALBUMIN SERPL-MCNC: 4.2 G/DL (ref 3.4–5)
ALBUMIN/GLOB SERPL: 1.7 {RATIO} (ref 1.1–2.2)
ALP SERPL-CCNC: 113 U/L (ref 40–129)
ALT SERPL-CCNC: 13 U/L (ref 10–40)
ANION GAP SERPL CALCULATED.3IONS-SCNC: 11 MMOL/L (ref 3–16)
AST SERPL-CCNC: 22 U/L (ref 15–37)
BASOPHILS # BLD: 0.1 K/UL (ref 0–0.2)
BASOPHILS NFR BLD: 0.6 %
BILIRUB SERPL-MCNC: <0.2 MG/DL (ref 0–1)
BILIRUB UR QL STRIP.AUTO: NEGATIVE
BUN SERPL-MCNC: 14 MG/DL (ref 7–20)
CALCIUM SERPL-MCNC: 9.3 MG/DL (ref 8.3–10.6)
CHLORIDE SERPL-SCNC: 101 MMOL/L (ref 99–110)
CLARITY UR: CLEAR
CO2 SERPL-SCNC: 26 MMOL/L (ref 21–32)
COLOR UR: YELLOW
CORTIS AM PEAK SERPL-MCNC: 8.5 UG/DL (ref 4.3–22.4)
CREAT SERPL-MCNC: 0.9 MG/DL (ref 0.6–1.2)
CRP SERPL-MCNC: <3 MG/L (ref 0–5.1)
DEPRECATED RDW RBC AUTO: 12.9 % (ref 12.4–15.4)
EOSINOPHIL # BLD: 0.2 K/UL (ref 0–0.6)
EOSINOPHIL NFR BLD: 2.6 %
ERYTHROCYTE [SEDIMENTATION RATE] IN BLOOD BY WESTERGREN METHOD: 6 MM/HR (ref 0–30)
GFR SERPLBLD CREATININE-BSD FMLA CKD-EPI: 70 ML/MIN/{1.73_M2}
GLUCOSE SERPL-MCNC: 108 MG/DL (ref 70–99)
GLUCOSE UR STRIP.AUTO-MCNC: NEGATIVE MG/DL
HCT VFR BLD AUTO: 39.1 % (ref 36–48)
HGB BLD-MCNC: 13.3 G/DL (ref 12–16)
HGB UR QL STRIP.AUTO: NEGATIVE
KETONES UR STRIP.AUTO-MCNC: NEGATIVE MG/DL
LEUKOCYTE ESTERASE UR QL STRIP.AUTO: NEGATIVE
LYMPHOCYTES # BLD: 2 K/UL (ref 1–5.1)
LYMPHOCYTES NFR BLD: 23.1 %
MCH RBC QN AUTO: 29.3 PG (ref 26–34)
MCHC RBC AUTO-ENTMCNC: 34 G/DL (ref 31–36)
MCV RBC AUTO: 86.1 FL (ref 80–100)
MONOCYTES # BLD: 0.6 K/UL (ref 0–1.3)
MONOCYTES NFR BLD: 7.5 %
NEUTROPHILS # BLD: 5.7 K/UL (ref 1.7–7.7)
NEUTROPHILS NFR BLD: 66.2 %
NITRITE UR QL STRIP.AUTO: NEGATIVE
PH UR STRIP.AUTO: 5.5 [PH] (ref 5–8)
PLATELET # BLD AUTO: 294 K/UL (ref 135–450)
PMV BLD AUTO: 8.2 FL (ref 5–10.5)
POTASSIUM SERPL-SCNC: 4.3 MMOL/L (ref 3.5–5.1)
PROT SERPL-MCNC: 6.7 G/DL (ref 6.4–8.2)
PROT UR STRIP.AUTO-MCNC: NEGATIVE MG/DL
RBC # BLD AUTO: 4.54 M/UL (ref 4–5.2)
RHEUMATOID FACT SER IA-ACNC: <10 IU/ML
SODIUM SERPL-SCNC: 138 MMOL/L (ref 136–145)
SP GR UR STRIP.AUTO: 1.02 (ref 1–1.03)
UA DIPSTICK W REFLEX MICRO PNL UR: NORMAL
URN SPEC COLLECT METH UR: NORMAL
UROBILINOGEN UR STRIP-ACNC: 0.2 E.U./DL
WBC # BLD AUTO: 8.5 K/UL (ref 4–11)

## 2024-05-16 NOTE — PROGRESS NOTES
Here for f/u and recheck of coronary artery disease, and some duffuse swelling.  Pt states that she has noted some moderate swelling and moderate pain, and decrease in function due to the swelling.  Pt feels that her sx are in hands, feet.  No fever, chills.  Has noted some weight gain, as much as 10#.  Pt did see cardiology and they did have CMP, CBC, BNP.  Pts BNP was minimally elevated at 127.  Pt does have some family hx of fibromyalgia.  Pt states that she did stop taking diltiazem for 1 day and pt did feel better.  Pt is worried about possible.  Pt taking ER 120mg nightly and 60mg qAM.  Pt was give motrin with minimal relief.  Pt states that these sx have been present for 2-3 months but has increased    Here for f/u of coronary artery disease.  Pt has not had any new issues of chest pain, shortness of breath.  No swelling in legs.  Pt adherent to medications and denies any exertional chest pain or shortness of breath.  Pt denies any bleeding.   Recent visit last week showed normal / stable EKG and bloodwork was nonfocal    Pt here for follow up of blood pressure.  Pt states doing great with adherence to therapy and feels well.  No issues of chest pain, shortness of breath.  No vision changes, headache, swelling in legs.       Except as noted above in the history of present illness, the review of systems is  negative for headache, vision changes, chest pain, shortness of breath, abdominal pain, urinary sx, bowel changes.    Past medical, surgical, and social history reviewed and updated  Medications and allergies reviewed and updated      O: /84 (Site: Left Upper Arm, Position: Sitting, Cuff Size: Large Adult)   Pulse (!) 104   Wt 101.2 kg (223 lb)   SpO2 98%   BMI 38.26 kg/m²   GEN: No acute distress, cooperative, well nourished, alert.   HEENT: PEERLA, EOMI , normocephalic/atraumatic, nares and oropharynx clear.  Mucous membranes normal, Tympanic membranes clear bilaterally.  Neck: soft, supple, no

## 2024-05-17 LAB
ANA SER QL IA: NEGATIVE
CCP IGG SERPL-ACNC: <0.5 U/ML (ref 0–2.9)

## 2024-05-17 NOTE — TELEPHONE ENCOUNTER
Pt returned email to me. Aware she can reach out with any additional questions. Will sign off for now.    WILLIS Flor, PharmD, BCACP  Ambulatory Care Clinical Pharmacist- [facility]  Department, toll free: [phone]

## 2024-05-18 LAB — TRYPTASE SERPL-MCNC: 5.8 UG/L

## 2024-05-20 ENCOUNTER — TELEPHONE (OUTPATIENT)
Dept: FAMILY MEDICINE CLINIC | Age: 69
End: 2024-05-20

## 2024-05-21 DIAGNOSIS — M25.50 ARTHRALGIA OF MULTIPLE JOINTS: Primary | ICD-10-CM

## 2024-05-22 ENCOUNTER — CARE COORDINATION (OUTPATIENT)
Dept: CARE COORDINATION | Age: 69
End: 2024-05-22

## 2024-05-22 NOTE — CARE COORDINATION
Ambulatory Care Coordination (ACC)    MARSHALL WeaverN [facility]   Ambulatory Care Manager (ACM)      Situation: ACC program initiated 1.23.24  Pt declined enrollment for RPM 2.20.24 noting that she'd recently ended wearing cardiac monitor; does not feel she requires daily RPM; pt had agreed to advise ACM if she ever changed her mind during the current ACC program - however, pt never felt she required RPM enrollment.    Referral to [facility] - COMPLETED; pt engaged w/Pharm D 5.15.24 for comprehensive review.        Action: Left HIPAA compliant  requesting return callback if any question/concerns this final ACC outreach.    Provided & repeated direct callback to reach this ACM.    Advised pt, per , as previously reviewed & mutually agreed, will proceed with established  Plan: graduate ACC d/t goals met.    Zayda Hebert RN BSN, [facility] Ambulatory Care Manager  [phone]

## 2024-05-28 ENCOUNTER — TELEPHONE (OUTPATIENT)
Dept: FAMILY MEDICINE CLINIC | Age: 69
End: 2024-05-28

## 2024-05-28 NOTE — TELEPHONE ENCOUNTER
I put notes on her test results, not sure why she wasn't contacted  I want her to get in to see rheumatology as the next step  There is not really a specliaists who reviews med sensitivities except for an allergist

## 2024-05-28 NOTE — TELEPHONE ENCOUNTER
Called pt to verify if she seen the Rheumatologist, she stated it is not arthritis related but medication related. Pt stated she was advised by Cardiology to start back up on Valsartan. Pt stated when starting  the medication that when all the sx started, pt also stated that she will contact her Cardiologist to get it changed.

## 2024-05-28 NOTE — TELEPHONE ENCOUNTER
Messaged moved from pt  chart.      Dear Dr. Roche: When you last saw Nithya, blood tests ordered, came back good. Nurse said you would call her. Two days later a call from an Arthritis doctor to schedule after tests for arthritis negative and never a call from you. Nithya does not do MY Chart so I'm sending this. She is still very swollen and in physical pain as if a reaction to a drug or combination of drugs. Where should we turn ? Is there a medication specialist or should we consider [facility] ? She is calling Dr. Jones today as she thinks Valsartan may be the cause and wants to see him way before her end of June appointment. Almost took her to ER over weekend from pain in joints and heart but it eased up and while still in pain of joints and swollen hands and ankles, elephant not on chest. Since you never called as the nurse said you would, feels you have given up. I know thats not so but when in pain you think these things. Where should we turn next ? Thanks for any insight into this. Thanks, Km.

## 2024-05-28 NOTE — TELEPHONE ENCOUNTER
Nara Clement MA  5/20/2024  4:26 PM EDT Back to Top      Called and spoke to the patient.  Still feels awful.  Lower back pain. Hard to walk.  Swollen everywhere.

## 2024-05-29 ENCOUNTER — APPOINTMENT (OUTPATIENT)
Dept: GENERAL RADIOLOGY | Age: 69
End: 2024-05-29
Payer: MEDICARE

## 2024-05-29 ENCOUNTER — HOSPITAL ENCOUNTER (OUTPATIENT)
Age: 69
Setting detail: OBSERVATION
Discharge: HOME | End: 2024-05-31
Attending: HOSPITALIST | Admitting: HOSPITALIST
Payer: MEDICARE

## 2024-05-29 DIAGNOSIS — I20.9 ANGINA PECTORIS (HCC): Primary | ICD-10-CM

## 2024-05-29 DIAGNOSIS — R06.02 SHORTNESS OF BREATH: ICD-10-CM

## 2024-05-29 DIAGNOSIS — I20.0 UNSTABLE ANGINA PECTORIS (HCC): ICD-10-CM

## 2024-05-29 PROBLEM — R07.9 CHEST PAIN: Status: ACTIVE | Noted: 2024-05-29

## 2024-05-29 LAB
ALBUMIN SERPL-MCNC: 4.2 G/DL (ref 3.4–5)
ALBUMIN/GLOB SERPL: 1.5 {RATIO} (ref 1.1–2.2)
ALP SERPL-CCNC: 108 U/L (ref 40–129)
ALT SERPL-CCNC: 13 U/L (ref 10–40)
ANION GAP SERPL CALCULATED.3IONS-SCNC: 13 MMOL/L (ref 3–16)
AST SERPL-CCNC: 25 U/L (ref 15–37)
BASOPHILS # BLD: 0.1 K/UL (ref 0–0.2)
BASOPHILS NFR BLD: 0.8 %
BILIRUB SERPL-MCNC: 0.3 MG/DL (ref 0–1)
BUN SERPL-MCNC: 16 MG/DL (ref 7–20)
CALCIUM SERPL-MCNC: 9.3 MG/DL (ref 8.3–10.6)
CHLORIDE SERPL-SCNC: 103 MMOL/L (ref 99–110)
CO2 SERPL-SCNC: 21 MMOL/L (ref 21–32)
CREAT SERPL-MCNC: 0.8 MG/DL (ref 0.6–1.2)
D-DIMER QUANTITATIVE: 0.31 UG/ML FEU (ref 0–0.6)
DEPRECATED RDW RBC AUTO: 13.2 % (ref 12.4–15.4)
EKG ATRIAL RATE: 83 BPM
EKG DIAGNOSIS: NORMAL
EKG P AXIS: 54 DEGREES
EKG P-R INTERVAL: 160 MS
EKG Q-T INTERVAL: 366 MS
EKG QRS DURATION: 80 MS
EKG QTC CALCULATION (BAZETT): 430 MS
EKG R AXIS: -12 DEGREES
EKG T AXIS: 44 DEGREES
EKG VENTRICULAR RATE: 83 BPM
EOSINOPHIL # BLD: 0.3 K/UL (ref 0–0.6)
EOSINOPHIL NFR BLD: 3.4 %
GFR SERPLBLD CREATININE-BSD FMLA CKD-EPI: 81 ML/MIN/{1.73_M2}
GLUCOSE SERPL-MCNC: 151 MG/DL (ref 70–99)
HCT VFR BLD AUTO: 42.2 % (ref 36–48)
HGB BLD-MCNC: 14.4 G/DL (ref 12–16)
LIPASE SERPL-CCNC: 49 U/L (ref 13–60)
LYMPHOCYTES # BLD: 1.8 K/UL (ref 1–5.1)
LYMPHOCYTES NFR BLD: 22.7 %
MCH RBC QN AUTO: 29.4 PG (ref 26–34)
MCHC RBC AUTO-ENTMCNC: 34.2 G/DL (ref 31–36)
MCV RBC AUTO: 86 FL (ref 80–100)
MONOCYTES # BLD: 0.6 K/UL (ref 0–1.3)
MONOCYTES NFR BLD: 7.8 %
NEUTROPHILS # BLD: 5.1 K/UL (ref 1.7–7.7)
NEUTROPHILS NFR BLD: 65.3 %
NT-PROBNP SERPL-MCNC: 97 PG/ML (ref 0–124)
PLATELET # BLD AUTO: 268 K/UL (ref 135–450)
PMV BLD AUTO: 7.5 FL (ref 5–10.5)
POTASSIUM SERPL-SCNC: 4.2 MMOL/L (ref 3.5–5.1)
PROT SERPL-MCNC: 7 G/DL (ref 6.4–8.2)
RBC # BLD AUTO: 4.91 M/UL (ref 4–5.2)
SODIUM SERPL-SCNC: 137 MMOL/L (ref 136–145)
TROPONIN, HIGH SENSITIVITY: 12 NG/L (ref 0–14)
TROPONIN, HIGH SENSITIVITY: 12 NG/L (ref 0–14)
TROPONIN, HIGH SENSITIVITY: 13 NG/L (ref 0–14)
WBC # BLD AUTO: 7.8 K/UL (ref 4–11)

## 2024-05-29 PROCEDURE — 85379 FIBRIN DEGRADATION QUANT: CPT

## 2024-05-29 PROCEDURE — G0378 HOSPITAL OBSERVATION PER HR: HCPCS

## 2024-05-29 PROCEDURE — 6360000002 HC RX W HCPCS: Performed by: PHYSICIAN ASSISTANT

## 2024-05-29 PROCEDURE — 6370000000 HC RX 637 (ALT 250 FOR IP): Performed by: PHYSICIAN ASSISTANT

## 2024-05-29 PROCEDURE — 6370000000 HC RX 637 (ALT 250 FOR IP): Performed by: HOSPITALIST

## 2024-05-29 PROCEDURE — 2580000003 HC RX 258: Performed by: HOSPITALIST

## 2024-05-29 PROCEDURE — 96376 TX/PRO/DX INJ SAME DRUG ADON: CPT

## 2024-05-29 PROCEDURE — 83690 ASSAY OF LIPASE: CPT

## 2024-05-29 PROCEDURE — 96374 THER/PROPH/DIAG INJ IV PUSH: CPT

## 2024-05-29 PROCEDURE — 6360000002 HC RX W HCPCS: Performed by: HOSPITALIST

## 2024-05-29 PROCEDURE — 96375 TX/PRO/DX INJ NEW DRUG ADDON: CPT

## 2024-05-29 PROCEDURE — 85025 COMPLETE CBC W/AUTO DIFF WBC: CPT

## 2024-05-29 PROCEDURE — 93010 ELECTROCARDIOGRAM REPORT: CPT | Performed by: INTERNAL MEDICINE

## 2024-05-29 PROCEDURE — 99285 EMERGENCY DEPT VISIT HI MDM: CPT

## 2024-05-29 PROCEDURE — 93005 ELECTROCARDIOGRAM TRACING: CPT | Performed by: HOSPITALIST

## 2024-05-29 PROCEDURE — 84484 ASSAY OF TROPONIN QUANT: CPT

## 2024-05-29 PROCEDURE — 36415 COLL VENOUS BLD VENIPUNCTURE: CPT

## 2024-05-29 PROCEDURE — 83880 ASSAY OF NATRIURETIC PEPTIDE: CPT

## 2024-05-29 PROCEDURE — 80053 COMPREHEN METABOLIC PANEL: CPT

## 2024-05-29 PROCEDURE — 71045 X-RAY EXAM CHEST 1 VIEW: CPT

## 2024-05-29 RX ORDER — ONDANSETRON 4 MG/1
4 TABLET, ORALLY DISINTEGRATING ORAL EVERY 8 HOURS PRN
Status: DISCONTINUED | OUTPATIENT
Start: 2024-05-29 | End: 2024-05-31 | Stop reason: HOSPADM

## 2024-05-29 RX ORDER — ACETAMINOPHEN 650 MG/1
650 SUPPOSITORY RECTAL EVERY 6 HOURS PRN
Status: DISCONTINUED | OUTPATIENT
Start: 2024-05-29 | End: 2024-05-31 | Stop reason: HOSPADM

## 2024-05-29 RX ORDER — MORPHINE SULFATE 4 MG/ML
4 INJECTION, SOLUTION INTRAMUSCULAR; INTRAVENOUS ONCE
Status: COMPLETED | OUTPATIENT
Start: 2024-05-29 | End: 2024-05-29

## 2024-05-29 RX ORDER — PANTOPRAZOLE SODIUM 40 MG/1
40 TABLET, DELAYED RELEASE ORAL
Status: DISCONTINUED | OUTPATIENT
Start: 2024-05-30 | End: 2024-05-31 | Stop reason: HOSPADM

## 2024-05-29 RX ORDER — SODIUM CHLORIDE 0.9 % (FLUSH) 0.9 %
5-40 SYRINGE (ML) INJECTION PRN
Status: DISCONTINUED | OUTPATIENT
Start: 2024-05-29 | End: 2024-05-31 | Stop reason: HOSPADM

## 2024-05-29 RX ORDER — ASPIRIN 81 MG/1
81 TABLET, CHEWABLE ORAL DAILY
Status: DISCONTINUED | OUTPATIENT
Start: 2024-05-30 | End: 2024-05-31 | Stop reason: HOSPADM

## 2024-05-29 RX ORDER — MAGNESIUM SULFATE IN WATER 40 MG/ML
2000 INJECTION, SOLUTION INTRAVENOUS PRN
Status: DISCONTINUED | OUTPATIENT
Start: 2024-05-29 | End: 2024-05-31 | Stop reason: HOSPADM

## 2024-05-29 RX ORDER — ONDANSETRON 2 MG/ML
4 INJECTION INTRAMUSCULAR; INTRAVENOUS EVERY 6 HOURS PRN
Status: DISCONTINUED | OUTPATIENT
Start: 2024-05-29 | End: 2024-05-31 | Stop reason: HOSPADM

## 2024-05-29 RX ORDER — NITROGLYCERIN 0.4 MG/1
0.4 TABLET SUBLINGUAL EVERY 5 MIN PRN
Status: DISCONTINUED | OUTPATIENT
Start: 2024-05-29 | End: 2024-05-31 | Stop reason: HOSPADM

## 2024-05-29 RX ORDER — ONDANSETRON 2 MG/ML
4 INJECTION INTRAMUSCULAR; INTRAVENOUS ONCE
Status: COMPLETED | OUTPATIENT
Start: 2024-05-29 | End: 2024-05-29

## 2024-05-29 RX ORDER — POLYETHYLENE GLYCOL 3350 17 G/17G
17 POWDER, FOR SOLUTION ORAL DAILY PRN
Status: DISCONTINUED | OUTPATIENT
Start: 2024-05-29 | End: 2024-05-31 | Stop reason: HOSPADM

## 2024-05-29 RX ORDER — REGADENOSON 0.08 MG/ML
0.4 INJECTION, SOLUTION INTRAVENOUS
Status: COMPLETED | OUTPATIENT
Start: 2024-05-29 | End: 2024-05-30

## 2024-05-29 RX ORDER — DICYCLOMINE HYDROCHLORIDE 10 MG/1
10 CAPSULE ORAL NIGHTLY
Status: DISCONTINUED | OUTPATIENT
Start: 2024-05-29 | End: 2024-05-31 | Stop reason: HOSPADM

## 2024-05-29 RX ORDER — MORPHINE SULFATE 2 MG/ML
2 INJECTION, SOLUTION INTRAMUSCULAR; INTRAVENOUS EVERY 4 HOURS PRN
Status: DISCONTINUED | OUTPATIENT
Start: 2024-05-29 | End: 2024-05-30

## 2024-05-29 RX ORDER — VALSARTAN 80 MG/1
80 TABLET ORAL DAILY
Status: DISCONTINUED | OUTPATIENT
Start: 2024-05-30 | End: 2024-05-30

## 2024-05-29 RX ORDER — SODIUM CHLORIDE 9 MG/ML
INJECTION, SOLUTION INTRAVENOUS PRN
Status: DISCONTINUED | OUTPATIENT
Start: 2024-05-29 | End: 2024-05-31 | Stop reason: HOSPADM

## 2024-05-29 RX ORDER — POTASSIUM CHLORIDE 7.45 MG/ML
10 INJECTION INTRAVENOUS PRN
Status: DISCONTINUED | OUTPATIENT
Start: 2024-05-29 | End: 2024-05-31 | Stop reason: HOSPADM

## 2024-05-29 RX ORDER — SODIUM CHLORIDE 0.9 % (FLUSH) 0.9 %
5-40 SYRINGE (ML) INJECTION EVERY 12 HOURS SCHEDULED
Status: DISCONTINUED | OUTPATIENT
Start: 2024-05-29 | End: 2024-05-31 | Stop reason: HOSPADM

## 2024-05-29 RX ORDER — CARVEDILOL 3.12 MG/1
3.12 TABLET ORAL 2 TIMES DAILY WITH MEALS
Status: DISCONTINUED | OUTPATIENT
Start: 2024-05-29 | End: 2024-05-31 | Stop reason: HOSPADM

## 2024-05-29 RX ORDER — ACETAMINOPHEN 325 MG/1
650 TABLET ORAL EVERY 6 HOURS PRN
Status: DISCONTINUED | OUTPATIENT
Start: 2024-05-29 | End: 2024-05-31 | Stop reason: HOSPADM

## 2024-05-29 RX ORDER — DILTIAZEM HYDROCHLORIDE 120 MG/1
120 CAPSULE, COATED, EXTENDED RELEASE ORAL 2 TIMES DAILY
Status: DISCONTINUED | OUTPATIENT
Start: 2024-05-29 | End: 2024-05-31 | Stop reason: HOSPADM

## 2024-05-29 RX ORDER — POTASSIUM CHLORIDE 20 MEQ/1
40 TABLET, EXTENDED RELEASE ORAL PRN
Status: DISCONTINUED | OUTPATIENT
Start: 2024-05-29 | End: 2024-05-31 | Stop reason: HOSPADM

## 2024-05-29 RX ORDER — ENOXAPARIN SODIUM 100 MG/ML
40 INJECTION SUBCUTANEOUS DAILY
Status: DISCONTINUED | OUTPATIENT
Start: 2024-05-30 | End: 2024-05-31 | Stop reason: HOSPADM

## 2024-05-29 RX ORDER — CLOPIDOGREL BISULFATE 75 MG/1
75 TABLET ORAL NIGHTLY
Status: DISCONTINUED | OUTPATIENT
Start: 2024-05-29 | End: 2024-05-31 | Stop reason: HOSPADM

## 2024-05-29 RX ADMIN — SODIUM CHLORIDE, PRESERVATIVE FREE 10 ML: 5 INJECTION INTRAVENOUS at 19:56

## 2024-05-29 RX ADMIN — MORPHINE SULFATE 2 MG: 2 INJECTION, SOLUTION INTRAMUSCULAR; INTRAVENOUS at 23:35

## 2024-05-29 RX ADMIN — NITROGLYCERIN 0.4 MG: 0.4 TABLET, ORALLY DISINTEGRATING SUBLINGUAL at 12:44

## 2024-05-29 RX ADMIN — MORPHINE SULFATE 4 MG: 4 INJECTION, SOLUTION INTRAMUSCULAR; INTRAVENOUS at 15:45

## 2024-05-29 RX ADMIN — ONDANSETRON 4 MG: 2 INJECTION INTRAMUSCULAR; INTRAVENOUS at 12:41

## 2024-05-29 RX ADMIN — CLOPIDOGREL BISULFATE 75 MG: 75 TABLET ORAL at 19:58

## 2024-05-29 RX ADMIN — DICYCLOMINE HYDROCHLORIDE 10 MG: 10 CAPSULE ORAL at 19:57

## 2024-05-29 RX ADMIN — DILTIAZEM HYDROCHLORIDE 120 MG: 120 CAPSULE, EXTENDED RELEASE ORAL at 19:57

## 2024-05-29 RX ADMIN — ACETAMINOPHEN 650 MG: 325 TABLET ORAL at 19:55

## 2024-05-29 RX ADMIN — NITROGLYCERIN 0.4 MG: 0.4 TABLET, ORALLY DISINTEGRATING SUBLINGUAL at 13:30

## 2024-05-29 RX ADMIN — MORPHINE SULFATE 4 MG: 4 INJECTION, SOLUTION INTRAMUSCULAR; INTRAVENOUS at 17:10

## 2024-05-29 RX ADMIN — SODIUM CHLORIDE, PRESERVATIVE FREE 10 ML: 5 INJECTION INTRAVENOUS at 23:35

## 2024-05-29 RX ADMIN — MORPHINE SULFATE 2 MG: 2 INJECTION, SOLUTION INTRAMUSCULAR; INTRAVENOUS at 19:35

## 2024-05-29 RX ADMIN — CARVEDILOL 3.12 MG: 3.12 TABLET, FILM COATED ORAL at 19:35

## 2024-05-29 RX ADMIN — MORPHINE SULFATE 4 MG: 4 INJECTION, SOLUTION INTRAMUSCULAR; INTRAVENOUS at 12:43

## 2024-05-29 ASSESSMENT — PAIN DESCRIPTION - LOCATION
LOCATION: CHEST
LOCATION: CHEST;HEAD

## 2024-05-29 ASSESSMENT — PAIN - FUNCTIONAL ASSESSMENT
PAIN_FUNCTIONAL_ASSESSMENT: PREVENTS OR INTERFERES SOME ACTIVE ACTIVITIES AND ADLS
PAIN_FUNCTIONAL_ASSESSMENT: PREVENTS OR INTERFERES SOME ACTIVE ACTIVITIES AND ADLS
PAIN_FUNCTIONAL_ASSESSMENT: 0-10

## 2024-05-29 ASSESSMENT — PAIN DESCRIPTION - DESCRIPTORS
DESCRIPTORS: STABBING
DESCRIPTORS: ACHING;STABBING
DESCRIPTORS: SHARP;STABBING
DESCRIPTORS: SHARP;STABBING

## 2024-05-29 ASSESSMENT — PAIN SCALES - GENERAL
PAINLEVEL_OUTOF10: 8
PAINLEVEL_OUTOF10: 5
PAINLEVEL_OUTOF10: 8
PAINLEVEL_OUTOF10: 2
PAINLEVEL_OUTOF10: 8
PAINLEVEL_OUTOF10: 5
PAINLEVEL_OUTOF10: 8

## 2024-05-29 ASSESSMENT — ENCOUNTER SYMPTOMS
CHEST TIGHTNESS: 0
NAUSEA: 0
VOMITING: 0
COUGH: 0
ABDOMINAL PAIN: 0
DIARRHEA: 0
SHORTNESS OF BREATH: 1

## 2024-05-29 ASSESSMENT — HEART SCORE: ECG: NON-SPECIFC REPOLARIZATION DISTURBANCE/LBTB/PM

## 2024-05-29 ASSESSMENT — LIFESTYLE VARIABLES
HOW OFTEN DO YOU HAVE A DRINK CONTAINING ALCOHOL: NEVER
HOW MANY STANDARD DRINKS CONTAINING ALCOHOL DO YOU HAVE ON A TYPICAL DAY: PATIENT DOES NOT DRINK
HOW MANY STANDARD DRINKS CONTAINING ALCOHOL DO YOU HAVE ON A TYPICAL DAY: PATIENT DOES NOT DRINK
HOW OFTEN DO YOU HAVE A DRINK CONTAINING ALCOHOL: NEVER

## 2024-05-29 NOTE — ED PROVIDER NOTES
EKG:  Read by me in the absence of a cardiologist shows:Sinus rhythm, normal rate, normal conduction intervals, normal axis, no acute injury pattern, no major change from prior study      I did not perform face-to-face evaluation and was not otherwise involved in this patient's care.  Please see PA/NP note for further information on this visit.        Luanne Phillip MD  05/29/24 [id number]

## 2024-05-29 NOTE — H&P
[facility]    5/29/2024 3:10 PM    Patient Information:  NITHYA HICKS is a 68 y.o. female [id number]  PCP:  Nolberto Roche MD (Tel: [phone] )    Chief complaint:    Chief Complaint   Patient presents with    Chest Pain     Pt arrived wet [place] ems from [facility] for chest ai that began 2 days ago pt states that it began as a chest pressure but today it turned into a sharp pain. HX of 9 stents, 2 previous hear attacks. 4 baby Asprin given, 1 nitro given.         History of Present Illness:    Nithya Hicks is a 68 y.o. present with chest pain.  Onset of symptoms 2 days ago.  Describes as dull pressure-like at times turning into s sharp lasted about few minute.  Took nitro with some improvement.  Patient with significant cardiac history with 9 stent.  Recently seen cardiology about 2 weeks ago.  Patient denies any fevers chills no nausea vomiting.  No sick contact   onset about ork up  Family hx.     History obtained from patient  Old medical records show   REVIEW OF SYSTEMS:   Constitutional: Negative for fever,chills or night sweats  ENT: Negative for rhinorrhea, epistaxis, hoarseness, sore throat.  Respiratory: Negative for shortness of breath,wheezing  Cardiovascular:postive  for chest pain, negative for palpitations   Gastrointestinal: Negative for nausea, vomiting, diarrhea  Genitourinary: Negative for polyuria, dysuria   Hematologic/Lymphatic: Negative for bleeding tendency, easy bruising  Musculoskeletal: Negative for myalgias and arthralgias  Neurologic: Negative for confusion,dysarthria.  Skin: Negative for itching,rash  Psychiatric: Negative for depression,anxiety, agitation.  Endocrine: Negative for polydipsia,polyuria,heat /cold intolerance.    Past Medical History:   has a past medical history of Allergic rhinitis, cause unspecified, Arthritis, Bilateral carotid artery stenosis, CAD (coronary

## 2024-05-29 NOTE — ED PROVIDER NOTES
interpretation of EKG.    RADIOLOGY:   Non-plain film images such as CT, Ultrasound and MRI are read by the radiologist. Plain radiographic images are visualized and preliminarily interpreted by the ED Provider with the below findings:        Interpretation per the Radiologist below, if available at the time of this note:    XR CHEST PORTABLE   Final Result   No acute cardiopulmonary process.               PROCEDURES   Unless otherwise noted below, none     Procedures    CRITICAL CARE TIME (.cctime)       PAST MEDICAL HISTORY      has a past medical history of Allergic rhinitis, cause unspecified (12/04/2010), Arthritis, Bilateral carotid artery stenosis, CAD (coronary artery disease), Chronic pain, Erosive esophagitis (12/28/2016), Essential hypertension (04/11/2017), Gastroesophageal reflux disease without esophagitis (10/23/2023), H/O bladder infections, Heart attack (HCC), History of depression, HSV infection, Hypercholesterolemia (06/06/2014), Irritable bowel syndrome with diarrhea (09/27/2016), Migraine headache (06/06/2014), Nonerosive nonspecific gastritis (12/28/2016), OAB (overactive bladder) (03/14/2014), Statin myopathy, Swelling, and Urgency of urination.     Chronic Conditions affecting Care: None    EMERGENCY DEPARTMENT COURSE and DIFFERENTIAL DIAGNOSIS/MDM:   Vitals:    Vitals:    05/29/24 1315 05/29/24 1330 05/29/24 1345 05/29/24 1400   BP: (!) 152/89 (!) 150/81 (!) 142/88 131/85   Pulse: 82 80 90 78   Resp: 10 13 14 11   Temp:       TempSrc:       SpO2: 93% 95% 93% 95%   Weight:       Height:           Patient was given the following medications:  Medications   nitroGLYCERIN (NITROSTAT) SL tablet 0.4 mg (0.4 mg SubLINGual Given 5/29/24 1330)   morphine injection 4 mg (has no administration in time range)   morphine injection 4 mg (4 mg IntraVENous Given 5/29/24 1243)   ondansetron (ZOFRAN) injection 4 mg (4 mg IntraVENous Given 5/29/24 1241)             Is this patient to be included in the SEP-1  Core Measure due to severe sepsis or septic shock?   No   Exclusion criteria - the patient is NOT to be included for SEP-1 Core Measure due to:  Infection is not suspected    CONSULTS: (Who and What was discussed)  None  Discussion with Other Profesionals : None    Social Determinants : None    Records Reviewed : None    CC/HPI Summary, DDx, ED Course, and Reassessment: Patient with a history of hypertension, hyperlipidemia, CAD, paroxysmal atrial fibrillation, anticoagulated with aspirin and Plavix presented to the emergency department today complaining of chest pain, shortness of breath and bilateral edema she has been having intermittently for the last 2 days.  She states she began having chest pain at 10:15 AM this morning.  She describes a pressure, stabbing, intermittent, 10/10 pain that localizes to her mid sternum and she states the pain radiates to her arms bilaterally as well as to her jaw.  She denies diaphoresis, lightheadedness or dizziness.  She denies fevers, chills, coughing or recent illness.  She does report mild bilateral lower extremity edema.    EKG completed shows no signs of acute ischemia or infarction.  Initial and delta troponins are negative.  proBNP is only 97 and chest x-ray shows no acute cardiopulmonary disease.  D-dimer is negative.    CBC, BMP, LFTs and lipase are unremarkable.    Patient initially given nitroglycerin, morphine and Zofran.  She states this helped with her pain briefly.  She was given 1 additional dose of morphine.    Disposition Considerations (include 1 Tests not done, Shared Decision Making, Pt Expectation of Test or Tx.): I had a lengthy discussion with the patient regarding testing completed in the emergency department today as well as the results.  Patient understands she will require admission for further evaluation and treatment.  She is agreeable with this.  Hospitalist is consulted at 3:02 PM.    As I have deemed necessary from their history, physical, and

## 2024-05-29 NOTE — ED NOTES
How does patient ambulate?   []Low Fall Risk (ambulates by themselves without support)  [x]Stand by assist   []Contact Guard   []Front wheel walker  []Wheelchair   []Steady  []Bed bound  []History of Lower Extremity Amputation  []Unknown, did not assess in the emergency department   How does patient take pills?  []Whole with Water  []Crushed in applesauce  []Crushed in pudding  []Other  [x]Unknown no oral medications were given in the ED  Is patient alert?   [x]Alert  []Drowsy but responds to voice  []Doesn't respond to voice but responds to painful stimuli  []Unresponsive  Is patient oriented?   [x]To person  [x]To place  [x]To time  [x]To situation  []Confused  []Agitated  [x]Follows commands  If patient is disoriented or from a Skill Nursing Facility has family been notified of admission?   []Yes   []No  Patient belongings?   [x]Cell phone  []Wallet   []Dentures  [x]Clothing  Any specific patient or family belongings/needs/dynamics?   none  Miscellaneous comments/pending orders?  See admit orders     If there are any additional questions please reach out to the Emergency Department.

## 2024-05-29 NOTE — PROGRESS NOTES
Patient seen in ED, room 20.  Admission completed with the following exceptions:  4 Eyes Assessment, Immunizations, Vaccines, Rights and Responsibilities, Orientation to room, Plan of Care, Education/Learning Assessment and Education Plan, white board, height and weight, pain assessment and head to toe assessment.  Patient is alert and oriented X 4.  Patient lives in a condo two story with her  and is being admitted for Chest pain.  Home Medications as well as Outside Sources have been verbally reviewed with patient and updated if appropriate.  Medication reconciliation is now Completed.  All questions answered.  is at bedside.    Patient is to not have pork, chicken, peppers, pineapple, shellfish, melons, or grilled foods; patient is to avoid all of these food items due to allergies.

## 2024-05-29 NOTE — PROGRESS NOTES
Assessment completed and documented.  Upon arrival to the floor, pt c/o chest pain.  Dr. CRISTINA Yuan walked into the room.  Orders were given.  Morphine 4mg (one time dose) given at 1710.

## 2024-05-29 NOTE — PROGRESS NOTES
4 Eyes Skin Assessment     NAME:  Nithya Hicks  YOB: 1955  MEDICAL RECORD NUMBER:  [id number]    The patient is being assessed for  Admission    I agree that at least one RN has performed a thorough Head to Toe Skin Assessment on the patient. ALL assessment sites listed below have been assessed.      Areas assessed by both nurses:    Head, Face, Ears, Shoulders, Back, Chest, Arms, Elbows, Hands, Sacrum. Buttock, Coccyx, Ischium, Legs. Feet and Heels, and Under Medical Devices         Does the Patient have a Wound? No noted wound(s)       Josemanuel Prevention initiated by RN: No  Wound Care Orders initiated by RN: No    Pressure Injury (Stage 3,4, Unstageable, DTI, NWPT, and Complex wounds) if present, place Wound referral order by RN under : No    New Ostomies, if present place, Ostomy referral order under : No     Nurse 1 eSignature: Electronically signed by Yareli Nielson RN on 5/29/24 at 5:45 PM EDT    **SHARE this note so that the co-signing nurse can place an eSignature**    Nurse 2 eSignature: Electronically signed by Lauren Henriquez RN on 5/29/24 at 6:15 PM EDT

## 2024-05-30 ENCOUNTER — APPOINTMENT (OUTPATIENT)
Age: 69
End: 2024-05-30
Attending: HOSPITALIST
Payer: MEDICARE

## 2024-05-30 ENCOUNTER — HOSPITAL ENCOUNTER (OUTPATIENT)
Age: 69
Setting detail: OBSERVATION
Discharge: HOME | End: 2024-06-01
Attending: HOSPITALIST
Payer: MEDICARE

## 2024-05-30 VITALS
DIASTOLIC BLOOD PRESSURE: 79 MMHG | WEIGHT: 214 LBS | SYSTOLIC BLOOD PRESSURE: 132 MMHG | HEIGHT: 64 IN | BODY MASS INDEX: 36.54 KG/M2 | HEART RATE: 73 BPM

## 2024-05-30 LAB
ANION GAP SERPL CALCULATED.3IONS-SCNC: 10 MMOL/L (ref 3–16)
BASOPHILS # BLD: 0.1 K/UL (ref 0–0.2)
BASOPHILS NFR BLD: 0.7 %
BUN SERPL-MCNC: 15 MG/DL (ref 7–20)
CALCIUM SERPL-MCNC: 9.2 MG/DL (ref 8.3–10.6)
CHLORIDE SERPL-SCNC: 100 MMOL/L (ref 99–110)
CO2 SERPL-SCNC: 26 MMOL/L (ref 21–32)
CREAT SERPL-MCNC: 0.8 MG/DL (ref 0.6–1.2)
DEPRECATED RDW RBC AUTO: 13.3 % (ref 12.4–15.4)
ECHO AO ASC DIAM: 3.1 CM
ECHO AO ASCENDING AORTA INDEX: 1.54 CM/M2
ECHO AO ROOT DIAM: 3.4 CM
ECHO AO ROOT INDEX: 1.69 CM/M2
ECHO AV AREA PEAK VELOCITY: 3.4 CM2
ECHO AV AREA VTI: 3.2 CM2
ECHO AV AREA/BSA PEAK VELOCITY: 1.7 CM2/M2
ECHO AV AREA/BSA VTI: 1.6 CM2/M2
ECHO AV MEAN GRADIENT: 5 MMHG
ECHO AV MEAN VELOCITY: 1.1 M/S
ECHO AV PEAK GRADIENT: 9 MMHG
ECHO AV PEAK VELOCITY: 1.5 M/S
ECHO AV VELOCITY RATIO: 0.93
ECHO AV VTI: 31.7 CM
ECHO BSA: 2.09 M2
ECHO BSA: 2.09 M2
ECHO EST RA PRESSURE: 3 MMHG
ECHO IVC PROX: 1.6 CM
ECHO LA AREA 2C: 27.7 CM2
ECHO LA AREA 4C: 21.7 CM2
ECHO LA DIAMETER INDEX: 1.69 CM/M2
ECHO LA DIAMETER: 3.4 CM
ECHO LA MAJOR AXIS: 6.1 CM
ECHO LA MINOR AXIS: 7 CM
ECHO LA TO AORTIC ROOT RATIO: 1
ECHO LA VOL BP: 80 ML (ref 22–52)
ECHO LA VOL MOD A2C: 90 ML (ref 22–52)
ECHO LA VOL MOD A4C: 62 ML (ref 22–52)
ECHO LA VOL/BSA BIPLANE: 40 ML/M2 (ref 16–34)
ECHO LA VOLUME INDEX MOD A2C: 45 ML/M2 (ref 16–34)
ECHO LA VOLUME INDEX MOD A4C: 31 ML/M2 (ref 16–34)
ECHO LV E' LATERAL VELOCITY: 11 CM/S
ECHO LV E' SEPTAL VELOCITY: 7 CM/S
ECHO LV EDV A2C: 98 ML
ECHO LV EDV A4C: 111 ML
ECHO LV EDV INDEX A4C: 55 ML/M2
ECHO LV EDV NDEX A2C: 49 ML/M2
ECHO LV EJECTION FRACTION A2C: 51 %
ECHO LV EJECTION FRACTION A4C: 62 %
ECHO LV EJECTION FRACTION BIPLANE: 59 % (ref 55–100)
ECHO LV ESV A2C: 48 ML
ECHO LV ESV A4C: 42 ML
ECHO LV ESV INDEX A2C: 24 ML/M2
ECHO LV ESV INDEX A4C: 21 ML/M2
ECHO LV FRACTIONAL SHORTENING: 38 % (ref 28–44)
ECHO LV INTERNAL DIMENSION DIASTOLE INDEX: 2.59 CM/M2
ECHO LV INTERNAL DIMENSION DIASTOLIC: 5.2 CM (ref 3.9–5.3)
ECHO LV INTERNAL DIMENSION SYSTOLIC INDEX: 1.59 CM/M2
ECHO LV INTERNAL DIMENSION SYSTOLIC: 3.2 CM
ECHO LV IVSD: 1 CM (ref 0.6–0.9)
ECHO LV MASS 2D: 169 G (ref 67–162)
ECHO LV MASS INDEX 2D: 84.1 G/M2 (ref 43–95)
ECHO LV POSTERIOR WALL DIASTOLIC: 0.8 CM (ref 0.6–0.9)
ECHO LV RELATIVE WALL THICKNESS RATIO: 0.31
ECHO LVOT AREA: 3.5 CM2
ECHO LVOT AV VTI INDEX: 0.92
ECHO LVOT DIAM: 2.1 CM
ECHO LVOT MEAN GRADIENT: 4 MMHG
ECHO LVOT PEAK GRADIENT: 8 MMHG
ECHO LVOT PEAK VELOCITY: 1.4 M/S
ECHO LVOT STROKE VOLUME INDEX: 50.3 ML/M2
ECHO LVOT SV: 101.1 ML
ECHO LVOT VTI: 29.2 CM
ECHO MV A VELOCITY: 0.99 M/S
ECHO MV AREA VTI: 3 CM2
ECHO MV E VELOCITY: 1.11 M/S
ECHO MV E/A RATIO: 1.12
ECHO MV E/E' LATERAL: 10.09
ECHO MV E/E' RATIO (AVERAGED): 12.97
ECHO MV E/E' SEPTAL: 15.86
ECHO MV LVOT VTI INDEX: 1.17
ECHO MV MAX VELOCITY: 1 M/S
ECHO MV MEAN GRADIENT: 2 MMHG
ECHO MV MEAN VELOCITY: 0.7 M/S
ECHO MV PEAK GRADIENT: 4 MMHG
ECHO MV VTI: 34.2 CM
ECHO PV ACCELERATION TIME (AT): 116 MS
ECHO PV MAX VELOCITY: 0.9 M/S
ECHO PV PEAK GRADIENT: 3 MMHG
ECHO RA AREA 4C: 16.3 CM2
ECHO RA END SYSTOLIC VOLUME APICAL 4 CHAMBER INDEX BSA: 22 ML/M2
ECHO RA VOLUME: 45 ML
ECHO RIGHT VENTRICULAR SYSTOLIC PRESSURE (RVSP): 27 MMHG
ECHO RV BASAL DIMENSION: 4.3 CM
ECHO RV FREE WALL PEAK S': 10 CM/S
ECHO RV LONGITUDINAL DIMENSION: 6.8 CM
ECHO RV MID DIMENSION: 2.8 CM
ECHO RV TAPSE: 1.6 CM (ref 1.7–?)
ECHO TV REGURGITANT MAX VELOCITY: 2.44 M/S
ECHO TV REGURGITANT PEAK GRADIENT: 24 MMHG
EKG ATRIAL RATE: 71 BPM
EKG DIAGNOSIS: NORMAL
EKG P AXIS: -29 DEGREES
EKG P-R INTERVAL: 152 MS
EKG Q-T INTERVAL: 418 MS
EKG QRS DURATION: 82 MS
EKG QTC CALCULATION (BAZETT): 454 MS
EKG R AXIS: -7 DEGREES
EKG T AXIS: 46 DEGREES
EKG VENTRICULAR RATE: 71 BPM
EOSINOPHIL # BLD: 0.4 K/UL (ref 0–0.6)
EOSINOPHIL NFR BLD: 4.4 %
GFR SERPLBLD CREATININE-BSD FMLA CKD-EPI: 81 ML/MIN/{1.73_M2}
GLUCOSE SERPL-MCNC: 109 MG/DL (ref 70–99)
HCT VFR BLD AUTO: 39.3 % (ref 36–48)
HGB BLD-MCNC: 13.2 G/DL (ref 12–16)
LYMPHOCYTES # BLD: 2.1 K/UL (ref 1–5.1)
LYMPHOCYTES NFR BLD: 24 %
MCH RBC QN AUTO: 29.2 PG (ref 26–34)
MCHC RBC AUTO-ENTMCNC: 33.7 G/DL (ref 31–36)
MCV RBC AUTO: 86.6 FL (ref 80–100)
MONOCYTES # BLD: 0.7 K/UL (ref 0–1.3)
MONOCYTES NFR BLD: 7.6 %
NEUTROPHILS # BLD: 5.5 K/UL (ref 1.7–7.7)
NEUTROPHILS NFR BLD: 63.3 %
NUC STRESS EJECTION FRACTION: 64 %
NUC STRESS LV EDV: 83 ML (ref 56–104)
NUC STRESS LV ESV: 30 ML (ref 19–49)
NUC STRESS LV MASS: 126 G
PLATELET # BLD AUTO: 271 K/UL (ref 135–450)
PMV BLD AUTO: 7.8 FL (ref 5–10.5)
POTASSIUM SERPL-SCNC: 5 MMOL/L (ref 3.5–5.1)
RBC # BLD AUTO: 4.53 M/UL (ref 4–5.2)
SODIUM SERPL-SCNC: 136 MMOL/L (ref 136–145)
STRESS BASELINE DIAS BP: 79 MMHG
STRESS BASELINE HR: 73 BPM
STRESS BASELINE ST DEPRESSION: 0 MM
STRESS BASELINE SYS BP: 132 MMHG
STRESS ESTIMATED WORKLOAD: 1 METS
STRESS O2 SAT PEAK: 92 %
STRESS O2 SAT REST: 93 %
STRESS PEAK DIAS BP: 65 MMHG
STRESS PEAK SYS BP: 111 MMHG
STRESS PERCENT HR ACHIEVED: 54 %
STRESS POST PEAK HR: 83 BPM
STRESS RATE PRESSURE PRODUCT: 9213 BPM*MMHG
STRESS TARGET HR: 154 BPM
TID: 0.91
WBC # BLD AUTO: 8.7 K/UL (ref 4–11)

## 2024-05-30 PROCEDURE — 6360000002 HC RX W HCPCS: Performed by: HOSPITALIST

## 2024-05-30 PROCEDURE — 93010 ELECTROCARDIOGRAM REPORT: CPT | Performed by: INTERNAL MEDICINE

## 2024-05-30 PROCEDURE — C8929 TTE W OR WO FOL WCON,DOPPLER: HCPCS

## 2024-05-30 PROCEDURE — 6360000002 HC RX W HCPCS: Performed by: NURSE PRACTITIONER

## 2024-05-30 PROCEDURE — 36415 COLL VENOUS BLD VENIPUNCTURE: CPT

## 2024-05-30 PROCEDURE — 6360000004 HC RX CONTRAST MEDICATION: Performed by: HOSPITALIST

## 2024-05-30 PROCEDURE — 6370000000 HC RX 637 (ALT 250 FOR IP): Performed by: HOSPITALIST

## 2024-05-30 PROCEDURE — 93306 TTE W/DOPPLER COMPLETE: CPT | Performed by: INTERNAL MEDICINE

## 2024-05-30 PROCEDURE — 78452 HT MUSCLE IMAGE SPECT MULT: CPT

## 2024-05-30 PROCEDURE — 96376 TX/PRO/DX INJ SAME DRUG ADON: CPT

## 2024-05-30 PROCEDURE — 3430000000 HC RX DIAGNOSTIC RADIOPHARMACEUTICAL: Performed by: HOSPITALIST

## 2024-05-30 PROCEDURE — G0378 HOSPITAL OBSERVATION PER HR: HCPCS

## 2024-05-30 PROCEDURE — 85025 COMPLETE CBC W/AUTO DIFF WBC: CPT

## 2024-05-30 PROCEDURE — 6370000000 HC RX 637 (ALT 250 FOR IP): Performed by: PHYSICIAN ASSISTANT

## 2024-05-30 PROCEDURE — A9502 TC99M TETROFOSMIN: HCPCS | Performed by: HOSPITALIST

## 2024-05-30 PROCEDURE — 2580000003 HC RX 258: Performed by: HOSPITALIST

## 2024-05-30 PROCEDURE — 80048 BASIC METABOLIC PNL TOTAL CA: CPT

## 2024-05-30 PROCEDURE — 93017 CV STRESS TEST TRACING ONLY: CPT

## 2024-05-30 PROCEDURE — 6360000002 HC RX W HCPCS: Performed by: INTERNAL MEDICINE

## 2024-05-30 PROCEDURE — 96372 THER/PROPH/DIAG INJ SC/IM: CPT

## 2024-05-30 RX ORDER — AMINOPHYLLINE 25 MG/ML
100 INJECTION, SOLUTION INTRAVENOUS ONCE
Status: COMPLETED | OUTPATIENT
Start: 2024-05-30 | End: 2024-05-30

## 2024-05-30 RX ORDER — MORPHINE SULFATE 2 MG/ML
2 INJECTION, SOLUTION INTRAMUSCULAR; INTRAVENOUS
Status: DISCONTINUED | OUTPATIENT
Start: 2024-05-30 | End: 2024-05-31 | Stop reason: HOSPADM

## 2024-05-30 RX ORDER — ISOSORBIDE MONONITRATE 30 MG/1
30 TABLET, EXTENDED RELEASE ORAL DAILY
Status: DISCONTINUED | OUTPATIENT
Start: 2024-05-30 | End: 2024-05-31 | Stop reason: HOSPADM

## 2024-05-30 RX ADMIN — NITROGLYCERIN 0.4 MG: 0.4 TABLET, ORALLY DISINTEGRATING SUBLINGUAL at 22:37

## 2024-05-30 RX ADMIN — PERFLUTREN 1.5 ML: 6.52 INJECTION, SUSPENSION INTRAVENOUS at 12:27

## 2024-05-30 RX ADMIN — MORPHINE SULFATE 2 MG: 2 INJECTION, SOLUTION INTRAMUSCULAR; INTRAVENOUS at 12:44

## 2024-05-30 RX ADMIN — VALSARTAN 80 MG: 80 TABLET, FILM COATED ORAL at 08:16

## 2024-05-30 RX ADMIN — CLOPIDOGREL BISULFATE 75 MG: 75 TABLET ORAL at 20:37

## 2024-05-30 RX ADMIN — AMINOPHYLLINE 100 MG: 25 INJECTION, SOLUTION INTRAVENOUS at 10:15

## 2024-05-30 RX ADMIN — ENOXAPARIN SODIUM 40 MG: 100 INJECTION SUBCUTANEOUS at 08:16

## 2024-05-30 RX ADMIN — ISOSORBIDE MONONITRATE 30 MG: 30 TABLET, EXTENDED RELEASE ORAL at 19:05

## 2024-05-30 RX ADMIN — SODIUM CHLORIDE, PRESERVATIVE FREE 10 ML: 5 INJECTION INTRAVENOUS at 02:33

## 2024-05-30 RX ADMIN — MORPHINE SULFATE 2 MG: 2 INJECTION, SOLUTION INTRAMUSCULAR; INTRAVENOUS at 02:32

## 2024-05-30 RX ADMIN — TETROFOSMIN 10.3 MILLICURIE: 1.38 INJECTION, POWDER, LYOPHILIZED, FOR SOLUTION INTRAVENOUS at 08:46

## 2024-05-30 RX ADMIN — MORPHINE SULFATE 2 MG: 2 INJECTION, SOLUTION INTRAMUSCULAR; INTRAVENOUS at 22:38

## 2024-05-30 RX ADMIN — SODIUM CHLORIDE, PRESERVATIVE FREE 10 ML: 5 INJECTION INTRAVENOUS at 20:37

## 2024-05-30 RX ADMIN — SODIUM CHLORIDE, PRESERVATIVE FREE 10 ML: 5 INJECTION INTRAVENOUS at 22:38

## 2024-05-30 RX ADMIN — DICYCLOMINE HYDROCHLORIDE 10 MG: 10 CAPSULE ORAL at 20:37

## 2024-05-30 RX ADMIN — MORPHINE SULFATE 2 MG: 2 INJECTION, SOLUTION INTRAMUSCULAR; INTRAVENOUS at 20:37

## 2024-05-30 RX ADMIN — CARVEDILOL 3.12 MG: 3.12 TABLET, FILM COATED ORAL at 16:38

## 2024-05-30 RX ADMIN — DILTIAZEM HYDROCHLORIDE 120 MG: 120 CAPSULE, EXTENDED RELEASE ORAL at 08:16

## 2024-05-30 RX ADMIN — MORPHINE SULFATE 2 MG: 2 INJECTION, SOLUTION INTRAMUSCULAR; INTRAVENOUS at 16:38

## 2024-05-30 RX ADMIN — NITROGLYCERIN 0.4 MG: 0.4 TABLET, ORALLY DISINTEGRATING SUBLINGUAL at 02:36

## 2024-05-30 RX ADMIN — MORPHINE SULFATE 2 MG: 2 INJECTION, SOLUTION INTRAMUSCULAR; INTRAVENOUS at 08:16

## 2024-05-30 RX ADMIN — DILTIAZEM HYDROCHLORIDE 120 MG: 120 CAPSULE, EXTENDED RELEASE ORAL at 20:37

## 2024-05-30 RX ADMIN — CARVEDILOL 3.12 MG: 3.12 TABLET, FILM COATED ORAL at 08:16

## 2024-05-30 RX ADMIN — TETROFOSMIN 32.6 MILLICURIE: 1.38 INJECTION, POWDER, LYOPHILIZED, FOR SOLUTION INTRAVENOUS at 10:07

## 2024-05-30 RX ADMIN — SODIUM CHLORIDE, PRESERVATIVE FREE 10 ML: 5 INJECTION INTRAVENOUS at 08:19

## 2024-05-30 RX ADMIN — ONDANSETRON 4 MG: 2 INJECTION INTRAMUSCULAR; INTRAVENOUS at 08:19

## 2024-05-30 RX ADMIN — ASPIRIN 81 MG: 81 TABLET, CHEWABLE ORAL at 08:16

## 2024-05-30 RX ADMIN — NITROGLYCERIN 0.4 MG: 0.4 TABLET, ORALLY DISINTEGRATING SUBLINGUAL at 02:31

## 2024-05-30 RX ADMIN — NITROGLYCERIN 0.4 MG: 0.4 TABLET, ORALLY DISINTEGRATING SUBLINGUAL at 22:44

## 2024-05-30 RX ADMIN — REGADENOSON 0.4 MG: 0.08 INJECTION, SOLUTION INTRAVENOUS at 10:03

## 2024-05-30 ASSESSMENT — PAIN DESCRIPTION - DESCRIPTORS
DESCRIPTORS: SHARP;STABBING
DESCRIPTORS: STABBING
DESCRIPTORS: ACHING

## 2024-05-30 ASSESSMENT — PAIN SCALES - GENERAL
PAINLEVEL_OUTOF10: 7
PAINLEVEL_OUTOF10: 4
PAINLEVEL_OUTOF10: 7
PAINLEVEL_OUTOF10: 8
PAINLEVEL_OUTOF10: 10
PAINLEVEL_OUTOF10: 8
PAINLEVEL_OUTOF10: 6
PAINLEVEL_OUTOF10: 5

## 2024-05-30 ASSESSMENT — PAIN DESCRIPTION - LOCATION
LOCATION: CHEST

## 2024-05-30 NOTE — PROGRESS NOTES
[facility]   CONSULTATION        Chief Complaint   Patient presents with    Chest Pain     Pt arrived wet [place] ems from [facility] for chest ai that began 2 days ago pt states that it began as a chest pressure but today it turned into a sharp pain. HX of 9 stents, 2 previous hear attacks. 4 baby Asprin given, 1 nitro given.             History of Present Illness:  Nithya Hicks is a 68 y.o. patient who presented to the hospital with complaints of chest pain and pressure. I have been asked to provide consultation regarding further management and testing.      Past Medical History:   has a past medical history of Allergic rhinitis, cause unspecified, Arthritis, Bilateral carotid artery stenosis, CAD (coronary artery disease), Chronic pain, Erosive esophagitis, Essential hypertension, Gastroesophageal reflux disease without esophagitis, H/O bladder infections, Heart attack (HCC), History of depression, HSV infection, Hypercholesterolemia, Irritable bowel syndrome with diarrhea, Migraine headache, Nonerosive nonspecific gastritis, OAB (overactive bladder), Statin myopathy, Swelling, and Urgency of urination.    Surgical History:   has a past surgical history that includes Appendectomy; Total abdominal hysterectomy w/ bilateral salpingoophorectomy; Cholecystectomy; Tubal ligation; Tonsillectomy; other surgical history; Coronary angioplasty with stent (10/2012); Percutaneous Transluminal Coronary Angio (10/2012); Cardiac catheterization (12/07/2013); Hysterectomy; polypectomy; Cystoscopy (04/2014); Upper gastrointestinal endoscopy; Wrist surgery (Left, 05/21/2015); Abdominal adhesion surgery; Colonoscopy; Endoscopy, colon, diagnostic; Cosmetic surgery; fracture surgery; ERCP (01/25/2017); sinus surgery; Hysterectomy, total abdominal; Coronary angioplasty with stent (02/04/2021); Coronary angioplasty with stent (09/2022); Cardiac catheterization (03/2023); Upper gastrointestinal endoscopy (N/A, 12/20/2023); and

## 2024-05-30 NOTE — PROGRESS NOTES
05/29/24 2030   Encounter Summary   Encounter Overview/Reason Spiritual/Emotional Needs   Service Provided For Patient   Referral/Consult From Patient   Last Encounter  05/29/24  ( visited and prayed with savanna WITT)   Complexity of Encounter Low   Begin Time 1900   End Time  1930   Total Time Calculated 30 min   Spiritual/Emotional needs   Type Spiritual Support   Grief, Loss, and Adjustments   Type Adjustment to illness   Assessment/Intervention/Outcome   Assessment Coping;Calm;Hopeful   Intervention Active listening;Discussed belief system/Denominational practices/leonardo;Nurtured Hope;Prayer (assurance of)/[place]   Outcome Comfort;Encouraged;Expressed Gratitude

## 2024-05-30 NOTE — PROGRESS NOTES
Patients head to toe assessment completed. Vital signs WNL. Bed alarm engaged, call light within reach. Scheduled medications given per MAR. Patient complained of headache and chest pain, rates 8/10. PRN Tylenol given. Patient resting in bed.

## 2024-05-30 NOTE — PROGRESS NOTES
Patient complaining of chest pain, rate 10/10. Morphine dose adjusted by hosp. PRN Morphine and nitroglycerin given.

## 2024-05-30 NOTE — CARE COORDINATION
Case Management Note:    Patient admitted as Observation with an anticipated short hospitalization length of stay. Chart reviewed and it appears that patient has minimal needs for discharge at this time. Medical staff to inform case management team if discharge needs are identified.      Case management will continue to follow progress and update discharge plan as needed.         Electronically signed by CALE TOVAR on 5/30/2024 at 8:55 AM

## 2024-05-30 NOTE — PROGRESS NOTES
Nightly    dicyclomine  10 mg Oral Nightly    dilTIAZem  120 mg Oral BID    pantoprazole  40 mg Oral QAM AC    valsartan  80 mg Oral Daily    sodium chloride flush  5-40 mL IntraVENous 2 times per day    enoxaparin  40 mg SubCUTAneous Daily      Infusions:    sodium chloride       PRN Meds: morphine, 2 mg, Q2H PRN  nitroGLYCERIN, 0.4 mg, Q5 Min PRN  regadenoson, 0.4 mg, ONCE PRN  sodium chloride flush, 5-40 mL, PRN  sodium chloride, , PRN  potassium chloride, 40 mEq, PRN   Or  potassium alternative oral replacement, 40 mEq, PRN   Or  potassium chloride, 10 mEq, PRN  magnesium sulfate, 2,000 mg, PRN  ondansetron, 4 mg, Q8H PRN   Or  ondansetron, 4 mg, Q6H PRN  polyethylene glycol, 17 g, Daily PRN  acetaminophen, 650 mg, Q6H PRN   Or  acetaminophen, 650 mg, Q6H PRN        Labs and Imaging   XR CHEST PORTABLE    Result Date: 5/29/2024  EXAMINATION: ONE XRAY VIEW OF THE CHEST 5/29/2024 12:43 pm COMPARISON: None. HISTORY: ORDERING SYSTEM PROVIDED HISTORY: SOB TECHNOLOGIST PROVIDED HISTORY: Reason for exam:->SOB Reason for Exam: chest pain FINDINGS: The lungs appear clear. The heart and mediastinal structures are unremarkable. Bony thorax appears normal. Visualized upper abdomen is unremarkable.     No acute cardiopulmonary process.       CBC:   Recent Labs     05/29/24  1246 05/30/24  0552   WBC 7.8 8.7   HGB 14.4 13.2    271     BMP:    Recent Labs     05/29/24  1246 05/30/24  0552    136   K 4.2 5.0    100   CO2 21 26   BUN 16 15   CREATININE 0.8 0.8   GLUCOSE 151* 109*     Hepatic:   Recent Labs     05/29/24  1246   AST 25   ALT 13   BILITOT 0.3   ALKPHOS 108     Lipids:   Lab Results   Component Value Date/Time    CHOL 135 08/25/2023 11:03 AM    HDL 49 08/25/2023 11:03 AM    TRIG 275 08/25/2023 11:03 AM     Hemoglobin A1C:   Lab Results   Component Value Date/Time    LABA1C 5.8 08/25/2023 11:03 AM     TSH:   Lab Results   Component Value Date/Time    TSH 2.57 08/25/2023 11:03 AM     Troponin: No  results found for: \"TROPONINT\"  Lactic Acid: No results for input(s): \"LACTA\" in the last 72 hours.  BNP:   Recent Labs     05/29/24  1246   PROBNP 97     UA:  Lab Results   Component Value Date/Time    NITRU Negative 05/16/2024 12:06 PM    COLORU Yellow 05/16/2024 12:06 PM    PHUR 5.5 05/16/2024 12:06 PM    PHUR 6.5 01/22/2024 04:18 PM    PHUR 6.0 11/10/2022 12:47 PM    WBCUA 9 11/02/2022 11:45 AM    RBCUA 0-2 11/02/2022 11:45 AM    BACTERIA 4+ 11/02/2022 11:45 AM    CLARITYU Clear 05/16/2024 12:06 PM    SPECGRAV <=1.005 01/22/2024 04:18 PM    LEUKOCYTESUR Negative 05/16/2024 12:06 PM    UROBILINOGEN 0.2 05/16/2024 12:06 PM    BILIRUBINUR Negative 05/16/2024 12:06 PM    BLOODU Negative 05/16/2024 12:06 PM    GLUCOSEU Negative 05/16/2024 12:06 PM    KETUA Negative 05/16/2024 12:06 PM     Urine Cultures:   Lab Results   Component Value Date/Time    LABURIN  01/22/2024 04:55 PM     <10,000 CFU/ml mixed skin/urogenital shay. No further workup     Blood Cultures: No results found for: \"BC\"  No results found for: \"BLOODCULT2\"  Organism:   Lab Results   Component Value Date/Time    ORG Escherichia coli 07/26/2023 10:25 AM         Electronically signed by Lazaro Yuan MD on 5/30/2024 at 9:16 AM

## 2024-05-30 NOTE — PLAN OF CARE
Problem: ABCDS Injury Assessment  Goal: Absence of physical injury  Outcome: Progressing     Problem: Discharge Planning  Goal: Discharge to home or other facility with appropriate resources  Outcome: Progressing     Problem: Pain  Goal: Verbalizes/displays adequate comfort level or baseline comfort level  Outcome: Progressing     Problem: Safety - Adult  Goal: Free from fall injury  Outcome: Progressing     Problem: Cardiovascular - Adult  Goal: Maintains optimal cardiac output and hemodynamic stability  Outcome: Progressing

## 2024-05-31 VITALS
WEIGHT: 222.22 LBS | HEART RATE: 69 BPM | RESPIRATION RATE: 18 BRPM | HEIGHT: 64 IN | DIASTOLIC BLOOD PRESSURE: 74 MMHG | TEMPERATURE: 98.2 F | SYSTOLIC BLOOD PRESSURE: 128 MMHG | BODY MASS INDEX: 37.94 KG/M2 | OXYGEN SATURATION: 95 %

## 2024-05-31 LAB
ANION GAP SERPL CALCULATED.3IONS-SCNC: 10 MMOL/L (ref 3–16)
BASOPHILS # BLD: 0.1 K/UL (ref 0–0.2)
BASOPHILS NFR BLD: 0.7 %
BUN SERPL-MCNC: 14 MG/DL (ref 7–20)
CALCIUM SERPL-MCNC: 9.1 MG/DL (ref 8.3–10.6)
CHLORIDE SERPL-SCNC: 101 MMOL/L (ref 99–110)
CO2 SERPL-SCNC: 27 MMOL/L (ref 21–32)
CREAT SERPL-MCNC: 0.7 MG/DL (ref 0.6–1.2)
DEPRECATED RDW RBC AUTO: 12.9 % (ref 12.4–15.4)
EOSINOPHIL # BLD: 0.4 K/UL (ref 0–0.6)
EOSINOPHIL NFR BLD: 4.9 %
GFR SERPLBLD CREATININE-BSD FMLA CKD-EPI: >90 ML/MIN/{1.73_M2}
GLUCOSE SERPL-MCNC: 101 MG/DL (ref 70–99)
HCT VFR BLD AUTO: 38.2 % (ref 36–48)
HGB BLD-MCNC: 13.2 G/DL (ref 12–16)
LYMPHOCYTES # BLD: 2 K/UL (ref 1–5.1)
LYMPHOCYTES NFR BLD: 27.1 %
MCH RBC QN AUTO: 30 PG (ref 26–34)
MCHC RBC AUTO-ENTMCNC: 34.6 G/DL (ref 31–36)
MCV RBC AUTO: 86.8 FL (ref 80–100)
MONOCYTES # BLD: 0.6 K/UL (ref 0–1.3)
MONOCYTES NFR BLD: 8 %
NEUTROPHILS # BLD: 4.5 K/UL (ref 1.7–7.7)
NEUTROPHILS NFR BLD: 59.3 %
PLATELET # BLD AUTO: 270 K/UL (ref 135–450)
PMV BLD AUTO: 7.6 FL (ref 5–10.5)
POTASSIUM SERPL-SCNC: 4.6 MMOL/L (ref 3.5–5.1)
RBC # BLD AUTO: 4.4 M/UL (ref 4–5.2)
SODIUM SERPL-SCNC: 138 MMOL/L (ref 136–145)
WBC # BLD AUTO: 7.6 K/UL (ref 4–11)

## 2024-05-31 PROCEDURE — 6370000000 HC RX 637 (ALT 250 FOR IP): Performed by: HOSPITALIST

## 2024-05-31 PROCEDURE — 96372 THER/PROPH/DIAG INJ SC/IM: CPT

## 2024-05-31 PROCEDURE — 6360000002 HC RX W HCPCS: Performed by: NURSE PRACTITIONER

## 2024-05-31 PROCEDURE — 2580000003 HC RX 258: Performed by: HOSPITALIST

## 2024-05-31 PROCEDURE — 85025 COMPLETE CBC W/AUTO DIFF WBC: CPT

## 2024-05-31 PROCEDURE — G0378 HOSPITAL OBSERVATION PER HR: HCPCS

## 2024-05-31 PROCEDURE — 6360000002 HC RX W HCPCS: Performed by: HOSPITALIST

## 2024-05-31 PROCEDURE — 96376 TX/PRO/DX INJ SAME DRUG ADON: CPT

## 2024-05-31 PROCEDURE — 80048 BASIC METABOLIC PNL TOTAL CA: CPT

## 2024-05-31 PROCEDURE — 36415 COLL VENOUS BLD VENIPUNCTURE: CPT

## 2024-05-31 RX ORDER — OXYCODONE HYDROCHLORIDE AND ACETAMINOPHEN 5; 325 MG/1; MG/1
1 TABLET ORAL EVERY 6 HOURS PRN
Qty: 12 TABLET | Refills: 0 | Status: SHIPPED | OUTPATIENT
Start: 2024-05-31 | End: 2024-06-03

## 2024-05-31 RX ORDER — ISOSORBIDE MONONITRATE 30 MG/1
30 TABLET, EXTENDED RELEASE ORAL DAILY
Qty: 30 TABLET | Refills: 3 | Status: SHIPPED | OUTPATIENT
Start: 2024-05-31

## 2024-05-31 RX ADMIN — CARVEDILOL 3.12 MG: 3.12 TABLET, FILM COATED ORAL at 08:37

## 2024-05-31 RX ADMIN — NITROGLYCERIN 0.4 MG: 0.4 TABLET, ORALLY DISINTEGRATING SUBLINGUAL at 04:07

## 2024-05-31 RX ADMIN — ISOSORBIDE MONONITRATE 30 MG: 30 TABLET, EXTENDED RELEASE ORAL at 08:37

## 2024-05-31 RX ADMIN — PANTOPRAZOLE SODIUM 40 MG: 40 TABLET, DELAYED RELEASE ORAL at 06:19

## 2024-05-31 RX ADMIN — MORPHINE SULFATE 2 MG: 2 INJECTION, SOLUTION INTRAMUSCULAR; INTRAVENOUS at 08:38

## 2024-05-31 RX ADMIN — SODIUM CHLORIDE, PRESERVATIVE FREE 10 ML: 5 INJECTION INTRAVENOUS at 08:39

## 2024-05-31 RX ADMIN — ENOXAPARIN SODIUM 40 MG: 100 INJECTION SUBCUTANEOUS at 08:37

## 2024-05-31 RX ADMIN — ASPIRIN 81 MG: 81 TABLET, CHEWABLE ORAL at 08:37

## 2024-05-31 RX ADMIN — MORPHINE SULFATE 2 MG: 2 INJECTION, SOLUTION INTRAMUSCULAR; INTRAVENOUS at 04:07

## 2024-05-31 RX ADMIN — NITROGLYCERIN 0.4 MG: 0.4 TABLET, ORALLY DISINTEGRATING SUBLINGUAL at 06:19

## 2024-05-31 RX ADMIN — DILTIAZEM HYDROCHLORIDE 120 MG: 120 CAPSULE, EXTENDED RELEASE ORAL at 08:37

## 2024-05-31 ASSESSMENT — PAIN DESCRIPTION - LOCATION
LOCATION: CHEST

## 2024-05-31 ASSESSMENT — PAIN SCALES - GENERAL
PAINLEVEL_OUTOF10: 5
PAINLEVEL_OUTOF10: 6
PAINLEVEL_OUTOF10: 6
PAINLEVEL_OUTOF10: 7

## 2024-05-31 ASSESSMENT — PAIN DESCRIPTION - DESCRIPTORS
DESCRIPTORS: ACHING
DESCRIPTORS: ACHING

## 2024-05-31 NOTE — PROGRESS NOTES
CLINICAL PHARMACY NOTE: MEDS TO BEDS    Total # of Prescriptions Filled: 1   The following medications were delivered to the patient:  Hydrocodone/APAP 5/325mg    Additional Documentation:     MARSHALL Harris approved medication delivery    Medications were delivered to patient's room and patient's spouse signed for    Celine Palm CPhT

## 2024-05-31 NOTE — PLAN OF CARE
Problem: ABCDS Injury Assessment  Goal: Absence of physical injury  5/31/2024 1054 by Kimberly Mackey RN  Outcome: Adequate for Discharge  5/30/2024 2241 by Madison Wang RN  Outcome: Progressing     Problem: Discharge Planning  Goal: Discharge to home or other facility with appropriate resources  5/31/2024 1054 by Kimberly Mackey RN  Outcome: Adequate for Discharge  5/30/2024 2241 by Madison Wang RN  Outcome: Progressing     Problem: Pain  Goal: Verbalizes/displays adequate comfort level or baseline comfort level  5/31/2024 1054 by Kimberly Mackey RN  Outcome: Adequate for Discharge  5/30/2024 2241 by Madison Wang RN  Outcome: Progressing     Problem: Safety - Adult  Goal: Free from fall injury  5/31/2024 1054 by Kimberly Mackey RN  Outcome: Adequate for Discharge  5/30/2024 2241 by Madison Wang RN  Outcome: Progressing     Problem: Cardiovascular - Adult  Goal: Maintains optimal cardiac output and hemodynamic stability  5/31/2024 1054 by Kimberly Mackey RN  Outcome: Adequate for Discharge  5/30/2024 2241 by Madison Wang RN  Outcome: Progressing

## 2024-05-31 NOTE — PROGRESS NOTES
[facility]   Cardiac Follow Up     Referring Provider:  Nolberto Roche MD     Chief Complaint   Patient presents with    Coronary Artery Disease       History of Present Illness:  Nithya Hicks is a 68 y.o. female with a history of coronary artery disease, STEMI with PCI (PCI/ALEC RCA 10/2012, PCI to Ramus 10/2012, ) , hypertension and AFIB. Afib ablation 2/25/21   PCI of distal LAD 2/21  Cath 2021 with patent stents  Repeat ablation done     She presented to [facility] on 11/19/2017 with complaints of chest pain that radiates to her jaw and arms. She was seen by cardiology and diagnosed with a STEMI and a LHC was completed with a ALEC in the diagonal branch. Developed chest pain again 11/20/2017 and a repeat LHC was completed with no further intervention necessary. She states that she has intolerance to many medications.     She was hospitalized with NSTEMI on 9/13/22 (likely plaque rupture) and underwent revascularization with ALEC x 3 to an occluded Lcx artery.      She was hospitalized 10-31-22-11-4-22. She underwent cardiac cath with stent placement. January 1, 2023 she stopped taking Brilinta.     Since her last visit, she went to ED for chest pain that radiated to L arm, jaw and into back. She was admitted and underwent a LHC, s/p successful PCI of RCA in stent thrombosis 3/16/23 .    5/29/24 she presented to ER with chest pain.  Onset of symptoms 2 days prior.  Describes as dull pressure-like at times turning into s sharp lasted about few minute.  Took nitro with some improvement.  Patient with significant cardiac history with 9 stent.  Recently seen cardiology about 2 weeks ago.  Patient denies any fevers chills no nausea vomiting.  No sick contact   onset about ork up. Stress test 5/29/24 and echo 5/30/24, results below.     Today she is here for follow up. She saw PCP > reports she had UTI, so she took antibiotics and her swelling diminished. PCP recommended seeing rheumatologist. She feels

## 2024-05-31 NOTE — PROGRESS NOTES
Patients head to toe assessment completed. Vital signs WNL. Bed alarm engaged, call light within reach. Scheduled medications given per MAR. Patient complained of chest pain, rates 6/10. PRN Morphine given. Patient resting in bed.

## 2024-06-01 NOTE — DISCHARGE SUMMARY
[facility]    Patient Demographics    Patient. Nithya Hicks  Date of Birth. 1955  MRN. [id number]     Primary care provider. Nolberto Roche MD  (Tel: [phone])    Admit date: 5/29/2024    Discharge date (blank if same as Note Date): 5/31/2024  Note Date: 6/1/2024     Reason for Hospitalization.   Chief Complaint   Patient presents with    Chest Pain     Pt arrived [facility] [place] [facility] from [facility] for chest ai that began 2 days ago pt states that it began as a chest pressure but today it turned into a sharp pain. HX of 9 stents, 2 previous hear attacks. 4 baby Asprin given, 1 nitro given.            Problem-based Hospital Course.  Chest pain  Has both typical atypical features with complex cardiac history  Ongoing symptom chronically.  Initial troponin negative EKG negative underwent echocardiogram and stress test which was normal  Eval by cardiology  Patient I added Imdur on discharge to see if that helps state that losartan could also be causing symptoms so I discontinue losartan at the time outpatient follow-up with cardiology  Patient admitted for chest pain. Initial evaluation w as negative for any acute ischemia.  Underwent stress test which showed no acute ischemia  troponin and ekg negative  Pt was discharged stable       Consults.  IP CONSULT TO CARDIOLOGY  IP CONSULT TO SPIRITUAL SERVICES    Physical examination on discharge day.   /74   Pulse 69   Temp 98.2 °F (36.8 °C) (Oral)   Resp 18   Ht 1.626 m (5' 4.02\")   Wt 100.8 kg (222 lb 3.6 oz)   SpO2 95%   BMI 38.13 kg/m²   General appearance.  Alert. Looks comfortable.  HEENT. Sclera clear. Moist mucus membranes.  Cardiovascular. Regular rate and rhythm, normal S1, S2. No murmur.   Respiratory. Not using accessory muscles.Clear to auscultation bilaterally, no wheeze.  Gastrointestinal. Abdomen soft,  times daily (before meals)     Potassium 99 MG Tabs     Repatha Sosy syringe  Generic drug: Evolocumab  Inject 1 mL into the skin every 14 days     vitamin B-12 1000 MCG tablet  Commonly known as: CYANOCOBALAMIN     vitamin C 500 MG tablet  Commonly known as: ASCORBIC ACID     Vitamin D3 125 MCG (5000 UT) Tabs            STOP taking these medications      cetirizine 5 MG tablet  Commonly known as: ZYRTEC     diazePAM 5 MG tablet  Commonly known as: VALIUM     dilTIAZem 60 MG tablet  Commonly known as: CARDIZEM     ibuprofen 600 MG tablet  Commonly known as: ADVIL;MOTRIN     valsartan 80 MG tablet  Commonly known as: DIOVAN               Where to Get Your Medications        These medications were sent to [facility] [address] - P [phone] - F [phone]  [address] [facility] [address]      Phone: [phone]   oxyCODONE-acetaminophen 5-325 MG per tablet       These medications were sent to [id number] [place], [place] - [address] - P [phone] - F [phone] [address] [address]      Phone: [phone]   isosorbide mononitrate 30 MG extended release tablet         Discharge recommendations given to patient.  Follow Up. pcp in 1 week   Disposition.  home  Activity. activity as tolerated  Diet: No diet orders on file      Spent 45  minutes in discharge process.    Signed:  Lazaro Yuan MD     6/1/2024 3:31 PM

## 2024-06-18 ENCOUNTER — OFFICE VISIT (OUTPATIENT)
Dept: FAMILY MEDICINE CLINIC | Age: 69
End: 2024-06-18
Payer: MEDICARE

## 2024-06-18 VITALS
WEIGHT: 219 LBS | HEART RATE: 88 BPM | SYSTOLIC BLOOD PRESSURE: 142 MMHG | OXYGEN SATURATION: 98 % | BODY MASS INDEX: 37.57 KG/M2 | DIASTOLIC BLOOD PRESSURE: 88 MMHG

## 2024-06-18 DIAGNOSIS — N39.0 RECURRENT UTI: Primary | ICD-10-CM

## 2024-06-18 DIAGNOSIS — I20.0 UNSTABLE ANGINA (HCC): ICD-10-CM

## 2024-06-18 LAB
BILIRUBIN, POC: NEGATIVE
BLOOD URINE, POC: NEGATIVE
CLARITY, POC: NORMAL
COLOR, POC: YELLOW
GLUCOSE URINE, POC: NEGATIVE
KETONES, POC: NEGATIVE
LEUKOCYTE EST, POC: NEGATIVE
NITRITE, POC: NEGATIVE
PH, POC: 7
PROTEIN, POC: NEGATIVE
SPECIFIC GRAVITY, POC: 1.02
UROBILINOGEN, POC: 0.2

## 2024-06-18 PROCEDURE — 3077F SYST BP >= 140 MM HG: CPT | Performed by: NURSE PRACTITIONER

## 2024-06-18 PROCEDURE — 3079F DIAST BP 80-89 MM HG: CPT | Performed by: NURSE PRACTITIONER

## 2024-06-18 PROCEDURE — 99214 OFFICE O/P EST MOD 30 MIN: CPT | Performed by: NURSE PRACTITIONER

## 2024-06-18 PROCEDURE — 81002 URINALYSIS NONAUTO W/O SCOPE: CPT | Performed by: NURSE PRACTITIONER

## 2024-06-18 PROCEDURE — 1123F ACP DISCUSS/DSCN MKR DOCD: CPT | Performed by: NURSE PRACTITIONER

## 2024-06-18 RX ORDER — NITROFURANTOIN 25; 75 MG/1; MG/1
100 CAPSULE ORAL 2 TIMES DAILY
Qty: 14 CAPSULE | Refills: 0 | Status: SHIPPED | OUTPATIENT
Start: 2024-06-18 | End: 2024-06-25

## 2024-06-18 ASSESSMENT — ENCOUNTER SYMPTOMS
COLOR CHANGE: 0
WHEEZING: 0
RHINORRHEA: 0
DIARRHEA: 0
NAUSEA: 0
VOMITING: 0
SHORTNESS OF BREATH: 0
SORE THROAT: 0
EYE ITCHING: 0
CONSTIPATION: 0
ABDOMINAL PAIN: 0
BACK PAIN: 0
COUGH: 0
CHEST TIGHTNESS: 0
EYE REDNESS: 0
BLOOD IN STOOL: 0
SINUS PRESSURE: 0

## 2024-06-18 NOTE — PROGRESS NOTES
adenopathy.      Left side of head: No submental, submandibular, tonsillar, preauricular, posterior auricular or occipital adenopathy.   Skin:     General: Skin is warm and dry.   Neurological:      Mental Status: She is alert.   Psychiatric:         Mood and Affect: Mood normal.         Behavior: Behavior normal.                 An electronic signature was used to authenticate this note.    --Susanna Alex, JAYLEEN - CNP

## 2024-06-18 NOTE — ASSESSMENT & PLAN NOTE
Acute problem not controlled or stable, treatment per orders - also push fluids, may use Pyridium OTC prn. Call or return to clinic prn if these symptoms worsen or fail to improve as anticipated.

## 2024-06-27 ENCOUNTER — OFFICE VISIT (OUTPATIENT)
Dept: CARDIOLOGY CLINIC | Age: 69
End: 2024-06-27
Payer: MEDICARE

## 2024-06-27 VITALS
OXYGEN SATURATION: 97 % | SYSTOLIC BLOOD PRESSURE: 122 MMHG | HEART RATE: 89 BPM | HEIGHT: 64 IN | DIASTOLIC BLOOD PRESSURE: 70 MMHG | BODY MASS INDEX: 37.69 KG/M2 | WEIGHT: 220.8 LBS

## 2024-06-27 DIAGNOSIS — I10 ESSENTIAL HYPERTENSION: ICD-10-CM

## 2024-06-27 DIAGNOSIS — R06.02 SHORTNESS OF BREATH: ICD-10-CM

## 2024-06-27 DIAGNOSIS — E78.2 MIXED HYPERLIPIDEMIA: ICD-10-CM

## 2024-06-27 DIAGNOSIS — I48.0 PAF (PAROXYSMAL ATRIAL FIBRILLATION) (HCC): ICD-10-CM

## 2024-06-27 DIAGNOSIS — I25.10 CORONARY ARTERY DISEASE INVOLVING NATIVE CORONARY ARTERY OF NATIVE HEART WITHOUT ANGINA PECTORIS: Primary | ICD-10-CM

## 2024-06-27 PROCEDURE — 99214 OFFICE O/P EST MOD 30 MIN: CPT | Performed by: INTERNAL MEDICINE

## 2024-06-27 PROCEDURE — 3074F SYST BP LT 130 MM HG: CPT | Performed by: INTERNAL MEDICINE

## 2024-06-27 PROCEDURE — 1123F ACP DISCUSS/DSCN MKR DOCD: CPT | Performed by: INTERNAL MEDICINE

## 2024-06-27 PROCEDURE — 3078F DIAST BP <80 MM HG: CPT | Performed by: INTERNAL MEDICINE

## 2024-06-27 RX ORDER — EVOLOCUMAB 140 MG/ML
140 INJECTION, SOLUTION SUBCUTANEOUS
Qty: 6 ADJUSTABLE DOSE PRE-FILLED PEN SYRINGE | Refills: 3 | Status: SHIPPED | OUTPATIENT
Start: 2024-06-27

## 2024-06-27 RX ORDER — EVOLOCUMAB 140 MG/ML
140 INJECTION, SOLUTION SUBCUTANEOUS
Qty: 2 EACH | Refills: 5 | Status: CANCELLED | OUTPATIENT
Start: 2024-06-27

## 2024-06-27 NOTE — PATIENT INSTRUCTIONS
Lipids in 2 mos  No medications changes  Continue risk factor modifications.   Call for any change in symptoms, call to report any changes in shortness of breath or development of chest pain with activity.    Follow up in 6 mos

## 2024-07-05 RX ORDER — EVOLOCUMAB 140 MG/ML
INJECTION, SOLUTION SUBCUTANEOUS
Qty: 2 ML | OUTPATIENT
Start: 2024-07-05

## 2024-07-05 NOTE — TELEPHONE ENCOUNTER
Requested Prescriptions     Pending Prescriptions Disp Refills    REPATHA SURECLICK 140 MG/ML SOAJ [Pharmacy Med Name: REPATHA SRCLK 140MG/ML PF AUTO INJ] 2 mL      Sig: INJECT 1 ML INTO THE SKIN EVERY 14 DAYS      duplicate

## 2024-07-29 ENCOUNTER — OFFICE VISIT (OUTPATIENT)
Dept: FAMILY MEDICINE CLINIC | Age: 69
End: 2024-07-29
Payer: MEDICARE

## 2024-07-29 VITALS
TEMPERATURE: 97.7 F | HEART RATE: 88 BPM | DIASTOLIC BLOOD PRESSURE: 80 MMHG | SYSTOLIC BLOOD PRESSURE: 138 MMHG | BODY MASS INDEX: 37.52 KG/M2 | RESPIRATION RATE: 14 BRPM | OXYGEN SATURATION: 97 % | WEIGHT: 218.6 LBS

## 2024-07-29 DIAGNOSIS — R39.9 UTI SYMPTOMS: Primary | ICD-10-CM

## 2024-07-29 LAB
BILIRUBIN, POC: NEGATIVE
BLOOD URINE, POC: NORMAL
CLARITY, POC: NORMAL
COLOR, POC: YELLOW
GLUCOSE URINE, POC: NEGATIVE
KETONES, POC: NEGATIVE
LEUKOCYTE EST, POC: NEGATIVE
NITRITE, POC: NEGATIVE
PH, POC: 7
PROTEIN, POC: NEGATIVE
SPECIFIC GRAVITY, POC: 1
UROBILINOGEN, POC: 0.2

## 2024-07-29 PROCEDURE — 3075F SYST BP GE 130 - 139MM HG: CPT | Performed by: NURSE PRACTITIONER

## 2024-07-29 PROCEDURE — 99213 OFFICE O/P EST LOW 20 MIN: CPT | Performed by: NURSE PRACTITIONER

## 2024-07-29 PROCEDURE — 3079F DIAST BP 80-89 MM HG: CPT | Performed by: NURSE PRACTITIONER

## 2024-07-29 PROCEDURE — 81002 URINALYSIS NONAUTO W/O SCOPE: CPT | Performed by: NURSE PRACTITIONER

## 2024-07-29 PROCEDURE — 1123F ACP DISCUSS/DSCN MKR DOCD: CPT | Performed by: NURSE PRACTITIONER

## 2024-07-29 SDOH — ECONOMIC STABILITY: INCOME INSECURITY: HOW HARD IS IT FOR YOU TO PAY FOR THE VERY BASICS LIKE FOOD, HOUSING, MEDICAL CARE, AND HEATING?: NOT HARD AT ALL

## 2024-07-29 SDOH — ECONOMIC STABILITY: FOOD INSECURITY: WITHIN THE PAST 12 MONTHS, THE FOOD YOU BOUGHT JUST DIDN'T LAST AND YOU DIDN'T HAVE MONEY TO GET MORE.: NEVER TRUE

## 2024-07-29 SDOH — ECONOMIC STABILITY: FOOD INSECURITY: WITHIN THE PAST 12 MONTHS, YOU WORRIED THAT YOUR FOOD WOULD RUN OUT BEFORE YOU GOT MONEY TO BUY MORE.: NEVER TRUE

## 2024-07-29 ASSESSMENT — ENCOUNTER SYMPTOMS
SHORTNESS OF BREATH: 0
WHEEZING: 0
COUGH: 0
COLOR CHANGE: 0
SINUS PRESSURE: 0
RHINORRHEA: 0
SORE THROAT: 0
BLOOD IN STOOL: 0
CHEST TIGHTNESS: 0
EYE REDNESS: 0
ABDOMINAL PAIN: 0
VOMITING: 0
DIARRHEA: 0
CONSTIPATION: 0
EYE ITCHING: 0
NAUSEA: 0
BACK PAIN: 0

## 2024-07-29 NOTE — ASSESSMENT & PLAN NOTE
At this time urinalysis today in office does not show evidence of urinary tract infection however there is a trace amount of blood.  I believe she would best be suited to be managed by urogynecology see referral has been placed.  Patient endorses increased pain when her bladder is full so differential of interstitial cystitis cannot be ruled out.  She was given a card and referral and will reach out for an appointment.

## 2024-07-29 NOTE — PROGRESS NOTES
Nithya Hicks (:  1955) is a 69 y.o. female,Established patient, here for evaluation of the following chief complaint(s):  Frequent/Recurrent UTI      ASSESSMENT/PLAN:  1. UTI symptoms  Assessment & Plan:   At this time urinalysis today in office does not show evidence of urinary tract infection however there is a trace amount of blood.  I believe she would best be suited to be managed by urogynecology see referral has been placed.  Patient endorses increased pain when her bladder is full so differential of interstitial cystitis cannot be ruled out.  She was given a card and referral and will reach out for an appointment.  Orders:  -     POCT Urinalysis no Micro  -     Tara - [place] Guzman, SILVERIO, Urogynecology, [place]      No follow-ups on file.    SUBJECTIVE/OBJECTIVE:    Patient in the office today with recurrence of urinary complaints including frequency, urgency, dysuria. She states that she has been treated for multiple urinary infections in the past.  She has a history of urethral stricture and has needed these stretched in the past.  Current Outpatient Medications   Medication Sig Dispense Refill    Evolocumab (REPATHA SURECLICK) 140 MG/ML SOAJ Inject 140 mg into the skin every 14 days 6 Adjustable Dose Pre-filled Pen Syringe 3    isosorbide mononitrate (IMDUR) 30 MG extended release tablet Take 1 tablet by mouth daily 30 tablet 3    dilTIAZem (CARDIZEM CD) 120 MG extended release capsule TAKE 1 CAPSULE BY MOUTH TWICE DAILY 180 capsule 3    dicyclomine (BENTYL) 10 MG capsule Take 1 capsule by mouth 4 times daily (Patient taking differently: Take 1 capsule by mouth at bedtime) 120 capsule 5    carvedilol (COREG) 3.125 MG tablet Take 1 tablet by mouth 2 times daily (with meals) 180 tablet 1    clopidogrel (PLAVIX) 75 MG tablet Take 1 tablet by mouth daily (Patient taking differently: Take 1 tablet by mouth at bedtime) 90 tablet 3    vitamin C (ASCORBIC ACID) 500 MG tablet Take 1 tablet by

## 2024-08-21 ENCOUNTER — OFFICE VISIT (OUTPATIENT)
Dept: UROGYNECOLOGY | Age: 69
End: 2024-08-21
Payer: MEDICARE

## 2024-08-21 VITALS
DIASTOLIC BLOOD PRESSURE: 95 MMHG | SYSTOLIC BLOOD PRESSURE: 145 MMHG | HEART RATE: 89 BPM | RESPIRATION RATE: 16 BRPM | OXYGEN SATURATION: 97 % | TEMPERATURE: 97.7 F

## 2024-08-21 DIAGNOSIS — R39.89 BLADDER PAIN: ICD-10-CM

## 2024-08-21 DIAGNOSIS — N95.2 VAGINAL ATROPHY: ICD-10-CM

## 2024-08-21 DIAGNOSIS — R39.89 URETHRAL PAIN: ICD-10-CM

## 2024-08-21 DIAGNOSIS — R30.9 PAIN WITH URINATION: Primary | ICD-10-CM

## 2024-08-21 LAB
BILIRUBIN, POC: NORMAL
BLOOD URINE, POC: NORMAL
CLARITY, POC: CLEAR
COLOR, POC: NORMAL
GLUCOSE URINE, POC: NORMAL
KETONES, POC: NORMAL
LEUKOCYTE EST, POC: NORMAL
NITRITE, POC: NORMAL
PH, POC: NORMAL
PROTEIN, POC: NORMAL
SPECIFIC GRAVITY, POC: NORMAL
UROBILINOGEN, POC: NORMAL

## 2024-08-21 PROCEDURE — 3077F SYST BP >= 140 MM HG: CPT | Performed by: NURSE PRACTITIONER

## 2024-08-21 PROCEDURE — 51700 IRRIGATION OF BLADDER: CPT | Performed by: NURSE PRACTITIONER

## 2024-08-21 PROCEDURE — 99204 OFFICE O/P NEW MOD 45 MIN: CPT | Performed by: NURSE PRACTITIONER

## 2024-08-21 PROCEDURE — 1123F ACP DISCUSS/DSCN MKR DOCD: CPT | Performed by: NURSE PRACTITIONER

## 2024-08-21 PROCEDURE — 3080F DIAST BP >= 90 MM HG: CPT | Performed by: NURSE PRACTITIONER

## 2024-08-21 PROCEDURE — 81002 URINALYSIS NONAUTO W/O SCOPE: CPT | Performed by: NURSE PRACTITIONER

## 2024-08-21 RX ORDER — LIDOCAINE HYDROCHLORIDE 10 MG/ML
20 INJECTION, SOLUTION INFILTRATION; PERINEURAL ONCE
Status: COMPLETED | OUTPATIENT
Start: 2024-08-21 | End: 2024-08-21

## 2024-08-21 RX ORDER — HEPARIN SODIUM 10000 [USP'U]/ML
10000 INJECTION, SOLUTION INTRAVENOUS; SUBCUTANEOUS ONCE
Status: COMPLETED | OUTPATIENT
Start: 2024-08-21 | End: 2024-08-21

## 2024-08-21 RX ORDER — LIDOCAINE HYDROCHLORIDE 20 MG/ML
JELLY TOPICAL ONCE
Status: COMPLETED | OUTPATIENT
Start: 2024-08-21 | End: 2024-08-21

## 2024-08-21 RX ADMIN — LIDOCAINE HYDROCHLORIDE 20 ML: 10 INJECTION, SOLUTION INFILTRATION; PERINEURAL at 13:24

## 2024-08-21 RX ADMIN — LIDOCAINE HYDROCHLORIDE: 20 JELLY TOPICAL at 13:25

## 2024-08-21 RX ADMIN — Medication 5 MEQ: at 13:24

## 2024-08-21 RX ADMIN — HEPARIN SODIUM 10000 UNITS: 10000 INJECTION, SOLUTION INTRAVENOUS; SUBCUTANEOUS at 12:45

## 2024-08-21 NOTE — PROGRESS NOTES
ENDOSCOPY    UPPER GASTROINTESTINAL ENDOSCOPY N/A 12/20/2023    EGD BIOPSY performed by Taye Mckenna MD at [facility] ENDOSCOPY    WRIST SURGERY Left 05/21/2015    OPEN REDUCTION INTERNAL FIXATION LEFT WRIST     Allergies:   Allergies   Allergen Reactions    Brilinta [Ticagrelor] Shortness Of Breath    Contrast [Iodides] Swelling and Rash     Rash, severe swelling in face and body, SOB Nausea and vomitting    Contrast from MRI       Hydralazine Anaphylaxis    Shellfish-Derived Products Anaphylaxis and Swelling     Throat swelling    Chicken Allergy      Gi upset    Pineapple      Gi upset     Pork Allergy      Gi upset    Asa [Aspirin] Nausea Only    Beta Adrenergic Blockers      swelling    Effient [Prasugrel] Other (See Comments)     bruising    Fenofibrate Myalgia     Myalgia      Furosemide Swelling    Gabapentin Swelling    Hctz [Hydrochlorothiazide]     Lisinopril      swelling    Mometasone Furoate Swelling    Nasonex [Mometasone] Swelling     Throat swelling    Nsaids      GI PAIN    Other      Any melons, outside grilled food, turkey, red/green/yellow peppers= swelling    Ranexa [Ranolazine]     Statins Myalgia    Sulfa Antibiotics Swelling    Xarelto [Rivaroxaban] Swelling    Zetia [Ezetimibe]     Flagyl [Metronidazole] Nausea And Vomiting    Iodine Nausea And Vomiting and Swelling     Current Medications:  Current Outpatient Medications   Medication Sig Dispense Refill    Evolocumab (REPATHA SURECLICK) 140 MG/ML SOAJ Inject 140 mg into the skin every 14 days 6 Adjustable Dose Pre-filled Pen Syringe 3    isosorbide mononitrate (IMDUR) 30 MG extended release tablet Take 1 tablet by mouth daily 30 tablet 3    dilTIAZem (CARDIZEM CD) 120 MG extended release capsule TAKE 1 CAPSULE BY MOUTH TWICE DAILY 180 capsule 3    dicyclomine (BENTYL) 10 MG capsule Take 1 capsule by mouth 4 times daily (Patient taking differently: Take 1 capsule by mouth at bedtime) 120 capsule 5    vitamin C (ASCORBIC ACID) 500 MG tablet

## 2024-08-23 LAB
C TRACH DNA SPEC QL NAA+PROBE: NOT DETECTED
CANDIDA RRNA VAG QL PROBE: NOT DETECTED
CANDIDA RRNA VAG QL PROBE: NOT DETECTED
CARBAPENEM RESISTANCE OXA-48 GENE BY PCR: NOT DETECTED
CEPHALOSPORIN RESISTANCE AMPC GENE: NOT DETECTED
ESBL RESISTANCE: NOT DETECTED
G VAGINALIS RRNA GENITAL QL PROBE: NOT DETECTED
M HOMINIS DNA BLD QL NAA+PROBE: NOT DETECTED
MACROLIDE RESISTANCE: NOT DETECTED
METHICILLIN RESISTANCE: NOT DETECTED
MYCOPLASMA DNA SPEC QL NAA+PROBE: NOT DETECTED
N GONORRHOEA DNA SPEC QL NAA+PROBE: NOT DETECTED
OTHER MICROORG DNA SPEC QL NAA+PROBE: NOT DETECTED
OTHER MICROORG DNA SPEC QL NAA+PROBE: NOT DETECTED
QUINOLONE AND FLUOROQUINOLONE RESISTANCE: NOT DETECTED
T VAGINALIS RRNA SPEC QL NAA+PROBE: NOT DETECTED
TETRACYCLINE RESISTANCE: NOT DETECTED
TRIMETHOPRIM/SULFONAMIDE RESISTANCE: NOT DETECTED

## 2024-08-23 NOTE — RESULT ENCOUNTER NOTE
Advised patient of above results. She verbalized understanding - denies any questions/concerns at this time.

## 2024-08-24 LAB — BACTERIA UR CULT: NORMAL

## 2024-08-28 ENCOUNTER — OFFICE VISIT (OUTPATIENT)
Dept: UROGYNECOLOGY | Age: 69
End: 2024-08-28
Payer: MEDICARE

## 2024-08-28 VITALS
TEMPERATURE: 98 F | HEART RATE: 86 BPM | DIASTOLIC BLOOD PRESSURE: 106 MMHG | OXYGEN SATURATION: 99 % | RESPIRATION RATE: 16 BRPM | SYSTOLIC BLOOD PRESSURE: 155 MMHG

## 2024-08-28 DIAGNOSIS — R35.0 URINARY FREQUENCY: ICD-10-CM

## 2024-08-28 DIAGNOSIS — R30.9 PAIN WITH URINATION: Primary | ICD-10-CM

## 2024-08-28 DIAGNOSIS — R39.89 BLADDER PAIN: ICD-10-CM

## 2024-08-28 DIAGNOSIS — R39.15 URINARY URGENCY: ICD-10-CM

## 2024-08-28 LAB
BILIRUBIN, POC: NORMAL
BLOOD URINE, POC: NORMAL
CLARITY, POC: CLEAR
COLOR, POC: YELLOW
GLUCOSE URINE, POC: NORMAL
KETONES, POC: NORMAL
LEUKOCYTE EST, POC: NORMAL
NITRITE, POC: NORMAL
PH, POC: 6.5
PROTEIN, POC: NORMAL
SPECIFIC GRAVITY, POC: 1.01
UROBILINOGEN, POC: NORMAL

## 2024-08-28 PROCEDURE — 51700 IRRIGATION OF BLADDER: CPT | Performed by: NURSE PRACTITIONER

## 2024-08-28 PROCEDURE — 81002 URINALYSIS NONAUTO W/O SCOPE: CPT | Performed by: NURSE PRACTITIONER

## 2024-08-28 RX ORDER — HEPARIN SODIUM 10000 [USP'U]/ML
10000 INJECTION, SOLUTION INTRAVENOUS; SUBCUTANEOUS ONCE
Status: COMPLETED | OUTPATIENT
Start: 2024-08-28 | End: 2024-08-28

## 2024-08-28 RX ADMIN — HEPARIN SODIUM 10000 UNITS: 10000 INJECTION, SOLUTION INTRAVENOUS; SUBCUTANEOUS at 14:21

## 2024-08-28 RX ADMIN — Medication 5 MEQ: at 14:15

## 2024-08-28 RX ADMIN — Medication 20 ML: at 14:18

## 2024-08-28 NOTE — PROGRESS NOTES
12    Highest education level: Not on file   Occupational History    Not on file   Tobacco Use    Smoking status: Former     Current packs/day: 0.00     Average packs/day: 0.5 packs/day for 20.0 years (10.0 ttl pk-yrs)     Types: Cigarettes     Start date: 1987     Quit date: 2007     Years since quittin.5    Smokeless tobacco: Never   Vaping Use    Vaping status: Never Used   Substance and Sexual Activity    Alcohol use: No    Drug use: No    Sexual activity: Yes     Partners: Male   Other Topics Concern    Not on file   Social History Narrative    Not on file     Social Determinants of Health     Financial Resource Strain: Low Risk  (2024)    Overall Financial Resource Strain (CARDIA)     Difficulty of Paying Living Expenses: Not hard at all   Food Insecurity: No Food Insecurity (2024)    Hunger Vital Sign     Worried About Running Out of Food in the Last Year: Never true     Ran Out of Food in the Last Year: Never true   Transportation Needs: No Transportation Needs (2024)    PRAPARE - Transportation     Lack of Transportation (Medical): No     Lack of Transportation (Non-Medical): No   Physical Activity: Inactive (2024)    Exercise Vital Sign     Days of Exercise per Week: 0 days     Minutes of Exercise per Session: 0 min   Stress: No Stress Concern Present (2024)    [employer] [place] [employer] - Occupational Stress Questionnaire     Feeling of Stress : Not at all   Social Connections: Unknown (2024)    Social Connection and Isolation Panel [NHANES]     Frequency of Communication with Friends and Family: More than three times a week     Frequency of Social Gatherings with Friends and Family: Three times a week     Attends [employer] Services: Not on file     Active Member of Clubs or Organizations: Not on file     Attends Club or Organization Meetings: Not on file     Marital Status:    Intimate Partner Violence: Not on file   Housing Stability: Low     Ambulatory referral to Physical Therapy     Referral Priority:   Routine     Referral Type:   Eval and Treat     Referral Reason:   Specialty Services Required     Referred to Provider:   Negra Allen, PT     Requested Specialty:   Physical Therapy     Number of Visits Requested:   1     No orders of the defined types were placed in this encounter.      [place] Guzman, APRN - CNP

## 2024-08-29 RX ORDER — ISOSORBIDE MONONITRATE 30 MG/1
30 TABLET, EXTENDED RELEASE ORAL DAILY
Qty: 90 TABLET | Refills: 3 | Status: SHIPPED | OUTPATIENT
Start: 2024-08-29

## 2024-08-29 NOTE — TELEPHONE ENCOUNTER
Received refill request for isosorbide from  [facility] pharmacy.    Last ov: 06/27/2024 LES    Last Refill: 05/31/2024 #30 w/ 3    Next appointment: 01/23/2025 LES  RECALL LIST

## 2024-08-29 NOTE — TELEPHONE ENCOUNTER
Medication Refill    Medication needing refilled:    isosorbide mononitrate (IMDUR) 30 MG    Dosage of the medication:    How are you taking this medication (QD, BID, TID, QID, PRN):   1 tablet by mouth daily     30 or 90 day supply called in: 90 day supply    When will you run out of your medication:    Which Pharmacy are we sending the medication to?: [id number] [address] - P [phone] - F [phone]

## 2024-09-04 ENCOUNTER — OFFICE VISIT (OUTPATIENT)
Dept: UROGYNECOLOGY | Age: 69
End: 2024-09-04

## 2024-09-04 VITALS
RESPIRATION RATE: 16 BRPM | TEMPERATURE: 98 F | HEART RATE: 86 BPM | OXYGEN SATURATION: 98 % | SYSTOLIC BLOOD PRESSURE: 137 MMHG | DIASTOLIC BLOOD PRESSURE: 88 MMHG

## 2024-09-04 DIAGNOSIS — R39.89 URETHRAL PAIN: ICD-10-CM

## 2024-09-04 DIAGNOSIS — R35.0 URINARY FREQUENCY: ICD-10-CM

## 2024-09-04 DIAGNOSIS — R39.89 BLADDER PAIN: Primary | ICD-10-CM

## 2024-09-04 LAB
BILIRUBIN, POC: NORMAL
BLOOD URINE, POC: NORMAL
CLARITY, POC: CLEAR
COLOR, POC: YELLOW
GLUCOSE URINE, POC: NORMAL MG/DL
KETONES, POC: NORMAL MG/DL
LEUKOCYTE EST, POC: NORMAL
NITRITE, POC: NORMAL
PH, POC: 6
PROTEIN, POC: NORMAL MG/DL
SPECIFIC GRAVITY, POC: 1.02
UROBILINOGEN, POC: NORMAL MG/DL

## 2024-09-04 RX ORDER — HEPARIN SODIUM 10000 [USP'U]/ML
10000 INJECTION, SOLUTION INTRAVENOUS; SUBCUTANEOUS ONCE
Status: COMPLETED | OUTPATIENT
Start: 2024-09-04 | End: 2024-09-04

## 2024-09-04 RX ORDER — LIDOCAINE HYDROCHLORIDE 20 MG/ML
JELLY TOPICAL ONCE
Status: COMPLETED | OUTPATIENT
Start: 2024-09-04 | End: 2024-09-04

## 2024-09-04 RX ORDER — LIDOCAINE HYDROCHLORIDE 10 MG/ML
20 INJECTION, SOLUTION INFILTRATION; PERINEURAL ONCE
Status: COMPLETED | OUTPATIENT
Start: 2024-09-04 | End: 2024-09-04

## 2024-09-04 RX ADMIN — LIDOCAINE HYDROCHLORIDE 20 ML: 10 INJECTION, SOLUTION INFILTRATION; PERINEURAL at 11:20

## 2024-09-04 RX ADMIN — Medication 5 MEQ: at 11:21

## 2024-09-04 RX ADMIN — LIDOCAINE HYDROCHLORIDE: 20 JELLY TOPICAL at 11:21

## 2024-09-04 RX ADMIN — HEPARIN SODIUM 10000 UNITS: 10000 INJECTION, SOLUTION INTRAVENOUS; SUBCUTANEOUS at 11:18

## 2024-09-16 ENCOUNTER — OFFICE VISIT (OUTPATIENT)
Dept: FAMILY MEDICINE CLINIC | Age: 69
End: 2024-09-16

## 2024-09-16 VITALS
TEMPERATURE: 97.3 F | SYSTOLIC BLOOD PRESSURE: 128 MMHG | HEART RATE: 85 BPM | DIASTOLIC BLOOD PRESSURE: 86 MMHG | WEIGHT: 215.8 LBS | BODY MASS INDEX: 36.84 KG/M2 | HEIGHT: 64 IN | OXYGEN SATURATION: 98 %

## 2024-09-16 DIAGNOSIS — N39.0 RECURRENT UTI: ICD-10-CM

## 2024-09-16 DIAGNOSIS — N30.10 INTERSTITIAL CYSTITIS: ICD-10-CM

## 2024-09-16 DIAGNOSIS — E78.00 HYPERCHOLESTEROLEMIA: ICD-10-CM

## 2024-09-16 DIAGNOSIS — Z00.00 ROUTINE GENERAL MEDICAL EXAMINATION AT A HEALTH CARE FACILITY: Primary | ICD-10-CM

## 2024-09-16 DIAGNOSIS — I48.0 PAF (PAROXYSMAL ATRIAL FIBRILLATION) (HCC): ICD-10-CM

## 2024-09-16 DIAGNOSIS — Z78.0 POSTMENOPAUSAL: ICD-10-CM

## 2024-09-16 DIAGNOSIS — R73.9 HYPERGLYCEMIA: ICD-10-CM

## 2024-09-16 DIAGNOSIS — R25.2 LEG CRAMPING: ICD-10-CM

## 2024-09-16 DIAGNOSIS — E78.00 PURE HYPERCHOLESTEROLEMIA: ICD-10-CM

## 2024-09-16 DIAGNOSIS — Z00.00 MEDICARE ANNUAL WELLNESS VISIT, SUBSEQUENT: ICD-10-CM

## 2024-09-16 DIAGNOSIS — Z23 NEED FOR VACCINATION FOR PNEUMOCOCCUS: ICD-10-CM

## 2024-09-16 DIAGNOSIS — D68.69 SECONDARY HYPERCOAGULABLE STATE (HCC): ICD-10-CM

## 2024-09-16 DIAGNOSIS — I25.10 CORONARY ARTERY DISEASE INVOLVING NATIVE CORONARY ARTERY OF NATIVE HEART WITHOUT ANGINA PECTORIS: ICD-10-CM

## 2024-09-16 DIAGNOSIS — Z23 NEED FOR SHINGLES VACCINE: ICD-10-CM

## 2024-09-16 DIAGNOSIS — I10 ESSENTIAL HYPERTENSION: ICD-10-CM

## 2024-09-16 ASSESSMENT — LIFESTYLE VARIABLES
HOW MANY STANDARD DRINKS CONTAINING ALCOHOL DO YOU HAVE ON A TYPICAL DAY: PATIENT DOES NOT DRINK
HOW OFTEN DO YOU HAVE A DRINK CONTAINING ALCOHOL: NEVER

## 2024-09-16 ASSESSMENT — PATIENT HEALTH QUESTIONNAIRE - PHQ9
4. FEELING TIRED OR HAVING LITTLE ENERGY: NOT AT ALL
SUM OF ALL RESPONSES TO PHQ QUESTIONS 1-9: 0
6. FEELING BAD ABOUT YOURSELF - OR THAT YOU ARE A FAILURE OR HAVE LET YOURSELF OR YOUR FAMILY DOWN: NOT AT ALL
5. POOR APPETITE OR OVEREATING: NOT AT ALL
7. TROUBLE CONCENTRATING ON THINGS, SUCH AS READING THE NEWSPAPER OR WATCHING TELEVISION: NOT AT ALL
2. FEELING DOWN, DEPRESSED OR HOPELESS: NOT AT ALL
SUM OF ALL RESPONSES TO PHQ9 QUESTIONS 1 & 2: 0
SUM OF ALL RESPONSES TO PHQ QUESTIONS 1-9: 0
SUM OF ALL RESPONSES TO PHQ QUESTIONS 1-9: 0
10. IF YOU CHECKED OFF ANY PROBLEMS, HOW DIFFICULT HAVE THESE PROBLEMS MADE IT FOR YOU TO DO YOUR WORK, TAKE CARE OF THINGS AT HOME, OR GET ALONG WITH OTHER PEOPLE: NOT DIFFICULT AT ALL
9. THOUGHTS THAT YOU WOULD BE BETTER OFF DEAD, OR OF HURTING YOURSELF: NOT AT ALL
SUM OF ALL RESPONSES TO PHQ QUESTIONS 1-9: 0
1. LITTLE INTEREST OR PLEASURE IN DOING THINGS: NOT AT ALL
8. MOVING OR SPEAKING SO SLOWLY THAT OTHER PEOPLE COULD HAVE NOTICED. OR THE OPPOSITE, BEING SO FIGETY OR RESTLESS THAT YOU HAVE BEEN MOVING AROUND A LOT MORE THAN USUAL: NOT AT ALL
3. TROUBLE FALLING OR STAYING ASLEEP: NOT AT ALL

## 2024-09-25 ENCOUNTER — OFFICE VISIT (OUTPATIENT)
Dept: UROGYNECOLOGY | Age: 69
End: 2024-09-25
Payer: MEDICARE

## 2024-09-25 VITALS
TEMPERATURE: 97.2 F | HEART RATE: 81 BPM | DIASTOLIC BLOOD PRESSURE: 92 MMHG | RESPIRATION RATE: 16 BRPM | OXYGEN SATURATION: 98 % | SYSTOLIC BLOOD PRESSURE: 157 MMHG

## 2024-09-25 DIAGNOSIS — N95.2 VAGINAL ATROPHY: ICD-10-CM

## 2024-09-25 DIAGNOSIS — R35.0 URINARY FREQUENCY: ICD-10-CM

## 2024-09-25 DIAGNOSIS — N30.10 IC (INTERSTITIAL CYSTITIS): ICD-10-CM

## 2024-09-25 DIAGNOSIS — R39.89 BLADDER PAIN: Primary | ICD-10-CM

## 2024-09-25 PROCEDURE — 81002 URINALYSIS NONAUTO W/O SCOPE: CPT | Performed by: NURSE PRACTITIONER

## 2024-09-25 PROCEDURE — 51700 IRRIGATION OF BLADDER: CPT | Performed by: NURSE PRACTITIONER

## 2024-09-25 RX ORDER — LIDOCAINE HYDROCHLORIDE 20 MG/ML
JELLY TOPICAL ONCE
Status: COMPLETED | OUTPATIENT
Start: 2024-09-25 | End: 2024-09-25

## 2024-09-25 RX ORDER — HEPARIN SODIUM 10000 [USP'U]/ML
10000 INJECTION, SOLUTION INTRAVENOUS; SUBCUTANEOUS ONCE
Status: COMPLETED | OUTPATIENT
Start: 2024-09-25 | End: 2024-09-25

## 2024-09-25 RX ORDER — LIDOCAINE HYDROCHLORIDE 20 MG/ML
20 INJECTION, SOLUTION INFILTRATION; PERINEURAL ONCE
Status: COMPLETED | OUTPATIENT
Start: 2024-09-25 | End: 2024-09-25

## 2024-09-25 RX ADMIN — LIDOCAINE HYDROCHLORIDE 11 ML: 20 JELLY TOPICAL at 13:41

## 2024-09-25 RX ADMIN — Medication 5 MEQ: at 13:42

## 2024-09-25 RX ADMIN — HEPARIN SODIUM 10000 UNITS: 10000 INJECTION, SOLUTION INTRAVENOUS; SUBCUTANEOUS at 13:45

## 2024-09-25 RX ADMIN — LIDOCAINE HYDROCHLORIDE 20 ML: 20 INJECTION, SOLUTION INFILTRATION; PERINEURAL at 13:41

## 2024-09-30 DIAGNOSIS — I10 ESSENTIAL HYPERTENSION: ICD-10-CM

## 2024-09-30 DIAGNOSIS — E78.00 HYPERCHOLESTEROLEMIA: ICD-10-CM

## 2024-09-30 DIAGNOSIS — I25.10 CORONARY ARTERY DISEASE INVOLVING NATIVE CORONARY ARTERY OF NATIVE HEART WITHOUT ANGINA PECTORIS: ICD-10-CM

## 2024-09-30 DIAGNOSIS — R73.9 HYPERGLYCEMIA: ICD-10-CM

## 2024-10-01 LAB
ALBUMIN SERPL-MCNC: 4.2 G/DL (ref 3.4–5)
ALBUMIN/GLOB SERPL: 1.8 {RATIO} (ref 1.1–2.2)
ALP SERPL-CCNC: 126 U/L (ref 40–129)
ALT SERPL-CCNC: 15 U/L (ref 10–40)
ANION GAP SERPL CALCULATED.3IONS-SCNC: 12 MMOL/L (ref 3–16)
AST SERPL-CCNC: 20 U/L (ref 15–37)
BASOPHILS # BLD: 0.1 K/UL (ref 0–0.2)
BASOPHILS NFR BLD: 0.8 %
BILIRUB SERPL-MCNC: 0.4 MG/DL (ref 0–1)
BUN SERPL-MCNC: 13 MG/DL (ref 7–20)
CALCIUM SERPL-MCNC: 9.6 MG/DL (ref 8.3–10.6)
CHLORIDE SERPL-SCNC: 103 MMOL/L (ref 99–110)
CHOLEST SERPL-MCNC: 124 MG/DL (ref 0–199)
CO2 SERPL-SCNC: 25 MMOL/L (ref 21–32)
CREAT SERPL-MCNC: 0.9 MG/DL (ref 0.6–1.2)
DEPRECATED RDW RBC AUTO: 14.6 % (ref 12.4–15.4)
EOSINOPHIL # BLD: 0.2 K/UL (ref 0–0.6)
EOSINOPHIL NFR BLD: 3.1 %
EST. AVERAGE GLUCOSE BLD GHB EST-MCNC: 125.5 MG/DL
GFR SERPLBLD CREATININE-BSD FMLA CKD-EPI: 70 ML/MIN/{1.73_M2}
GLUCOSE SERPL-MCNC: 92 MG/DL (ref 70–99)
HBA1C MFR BLD: 6 %
HCT VFR BLD AUTO: 42.1 % (ref 36–48)
HDLC SERPL-MCNC: 48 MG/DL (ref 40–60)
HGB BLD-MCNC: 14.4 G/DL (ref 12–16)
LDLC SERPL CALC-MCNC: 31 MG/DL
LYMPHOCYTES # BLD: 2.1 K/UL (ref 1–5.1)
LYMPHOCYTES NFR BLD: 27.2 %
MCH RBC QN AUTO: 29.2 PG (ref 26–34)
MCHC RBC AUTO-ENTMCNC: 34.2 G/DL (ref 31–36)
MCV RBC AUTO: 85.5 FL (ref 80–100)
MONOCYTES # BLD: 0.6 K/UL (ref 0–1.3)
MONOCYTES NFR BLD: 7.1 %
NEUTROPHILS # BLD: 4.9 K/UL (ref 1.7–7.7)
NEUTROPHILS NFR BLD: 61.8 %
PLATELET # BLD AUTO: 250 K/UL (ref 135–450)
PMV BLD AUTO: 8.8 FL (ref 5–10.5)
POTASSIUM SERPL-SCNC: 4.4 MMOL/L (ref 3.5–5.1)
PROT SERPL-MCNC: 6.5 G/DL (ref 6.4–8.2)
RBC # BLD AUTO: 4.92 M/UL (ref 4–5.2)
SODIUM SERPL-SCNC: 140 MMOL/L (ref 136–145)
TRIGL SERPL-MCNC: 224 MG/DL (ref 0–150)
TSH SERPL DL<=0.005 MIU/L-ACNC: 2.53 UIU/ML (ref 0.27–4.2)
VLDLC SERPL CALC-MCNC: 45 MG/DL
WBC # BLD AUTO: 7.9 K/UL (ref 4–11)

## 2024-10-10 ENCOUNTER — OFFICE VISIT (OUTPATIENT)
Dept: FAMILY MEDICINE CLINIC | Age: 69
End: 2024-10-10

## 2024-10-10 VITALS
SYSTOLIC BLOOD PRESSURE: 126 MMHG | HEIGHT: 64 IN | WEIGHT: 223 LBS | DIASTOLIC BLOOD PRESSURE: 76 MMHG | OXYGEN SATURATION: 95 % | BODY MASS INDEX: 38.07 KG/M2 | HEART RATE: 51 BPM

## 2024-10-10 DIAGNOSIS — I65.23 BILATERAL CAROTID ARTERY STENOSIS: ICD-10-CM

## 2024-10-10 DIAGNOSIS — R73.9 HYPERGLYCEMIA: ICD-10-CM

## 2024-10-10 DIAGNOSIS — Z78.0 POSTMENOPAUSAL: ICD-10-CM

## 2024-10-10 DIAGNOSIS — I25.10 CORONARY ARTERY DISEASE INVOLVING NATIVE CORONARY ARTERY OF NATIVE HEART WITHOUT ANGINA PECTORIS: Primary | ICD-10-CM

## 2024-10-10 DIAGNOSIS — N30.10 INTERSTITIAL CYSTITIS: ICD-10-CM

## 2024-10-10 DIAGNOSIS — E78.00 HYPERCHOLESTEROLEMIA: ICD-10-CM

## 2024-10-10 DIAGNOSIS — Z12.31 ENCOUNTER FOR SCREENING MAMMOGRAM FOR MALIGNANT NEOPLASM OF BREAST: ICD-10-CM

## 2024-10-10 DIAGNOSIS — E78.00 PURE HYPERCHOLESTEROLEMIA: ICD-10-CM

## 2024-10-10 DIAGNOSIS — I10 ESSENTIAL HYPERTENSION: ICD-10-CM

## 2024-10-10 NOTE — PROGRESS NOTES
Here for f/u and recheck of CAD, paroxysmal atrial fibrillation , IC    Pt here for follow up of blood pressure.  Pt states doing great with adherence to therapy and feels well.  No issues of chest pain, shortness of breath.  No vision changes, headache, swelling in legs.     Here for f/u of coronary artery disease.  Pt has not had any new issues of chest pain, shortness of breath.  No swelling in legs.  Pt adherent to medications and denies any exertional chest pain or shortness of breath.  Pt denies any bleeding.       Pt continues to work with urogyne for issues related to interstitial cystitis.  Pt doing well with supportive therapy and instillations of heparin/steroids, sodium bicarb, and lidocaine.  Happy with benefit.  Pt will only come back on a prn basis, and will have cystoscopy     Here for f/u of cholesterol.  Pt as been working on diet/exercise and is consistent with therapy.  No side effects from current therapy.  No chest pain, shortness of breath.  No numbness/tingling in extremities  pt remains on repatha.      Pt did see rheum for issues of joint pain issues an was given dx of fibromyalgia.  Pt had nonfocal bloodwork.  Pt does feel that sx are improved with treatment of IC.        Except as noted above in the history of present illness, the review of systems is  negative for headache, vision changes, chest pain, shortness of breath, abdominal pain, urinary sx, bowel changes.    Past medical, surgical, and social history reviewed and updated  Medications and allergies reviewed and updated        Except as noted above in the history of present illness, the review of systems is  negative for headache, vision changes, chest pain, shortness of breath, abdominal pain, urinary sx, bowel changes.    Past medical, surgical, and social history reviewed and updated  Medications and allergies reviewed and updated           O: /76   Pulse 51   Ht 1.626 m (5' 4\")   Wt 101.2 kg (223 lb)   SpO2 95%

## 2024-10-15 ENCOUNTER — HOSPITAL ENCOUNTER (OUTPATIENT)
Dept: PHYSICAL THERAPY | Age: 69
Setting detail: THERAPIES SERIES
Discharge: HOME | End: 2024-10-15
Payer: MEDICARE

## 2024-10-15 DIAGNOSIS — R39.89 BLADDER PAIN: Primary | ICD-10-CM

## 2024-10-15 DIAGNOSIS — R53.1 WEAKNESS: ICD-10-CM

## 2024-10-15 DIAGNOSIS — M62.89 PELVIC FLOOR DYSFUNCTION: ICD-10-CM

## 2024-10-15 PROCEDURE — 97140 MANUAL THERAPY 1/> REGIONS: CPT

## 2024-10-15 PROCEDURE — 97530 THERAPEUTIC ACTIVITIES: CPT

## 2024-10-15 PROCEDURE — 97161 PT EVAL LOW COMPLEX 20 MIN: CPT
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
## 2024-10-21 ENCOUNTER — HOSPITAL ENCOUNTER (OUTPATIENT)
Dept: VASCULAR LAB | Age: 69
Discharge: HOME | End: 2024-10-23
Payer: MEDICARE

## 2024-10-21 DIAGNOSIS — I65.23 BILATERAL CAROTID ARTERY STENOSIS: ICD-10-CM

## 2024-10-21 PROCEDURE — 93880 EXTRACRANIAL BILAT STUDY: CPT

## 2024-10-22 ENCOUNTER — HOSPITAL ENCOUNTER (OUTPATIENT)
Dept: GENERAL RADIOLOGY | Age: 69
Discharge: HOME | End: 2024-10-22
Payer: MEDICARE

## 2024-10-22 DIAGNOSIS — Z78.0 POSTMENOPAUSAL: ICD-10-CM

## 2024-10-22 PROCEDURE — 77080 DXA BONE DENSITY AXIAL: CPT

## 2024-10-24 ENCOUNTER — TELEPHONE (OUTPATIENT)
Dept: FAMILY MEDICINE CLINIC | Age: 69
End: 2024-10-24

## 2024-10-24 LAB
VAS LEFT CCA DIST EDV: 24.1 CM/S
VAS LEFT CCA DIST PSV: 63.9 CM/S
VAS LEFT CCA MID EDV: 25.6 CM/S
VAS LEFT CCA MID PSV: 73.7 CM/S
VAS LEFT CCA PROX EDV: 25.1 CM/S
VAS LEFT CCA PROX PSV: 82.1 CM/S
VAS LEFT ECA EDV: 13.8 CM/S
VAS LEFT ECA PSV: 67.8 CM/S
VAS LEFT ICA DIST EDV: 25.8 CM/S
VAS LEFT ICA DIST PSV: 85.6 CM/S
VAS LEFT ICA MID EDV: 28.5 CM/S
VAS LEFT ICA MID PSV: 83.4 CM/S
VAS LEFT ICA PROX EDV: 32.4 CM/S
VAS LEFT ICA PROX PSV: 86.7 CM/S
VAS LEFT SUBCLAVIAN PROX PSV: 254 CM/S
VAS LEFT VERTEBRAL EDV: 17.9 CM/S
VAS LEFT VERTEBRAL PSV: 42 CM/S
VAS RIGHT CCA DIST EDV: 17.6 CM/S
VAS RIGHT CCA DIST PSV: 60.4 CM/S
VAS RIGHT CCA MID EDV: 19.8 CM/S
VAS RIGHT CCA MID PSV: 60.4 CM/S
VAS RIGHT CCA PROX EDV: 16.5 CM/S
VAS RIGHT CCA PROX PSV: 53.2 CM/S
VAS RIGHT ECA EDV: 17.6 CM/S
VAS RIGHT ECA PSV: 71.3 CM/S
VAS RIGHT ICA DIST EDV: 39.9 CM/S
VAS RIGHT ICA DIST PSV: 129 CM/S
VAS RIGHT ICA MID EDV: 17.6 CM/S
VAS RIGHT ICA MID PSV: 62.6 CM/S
VAS RIGHT ICA PROX EDV: 23 CM/S
VAS RIGHT ICA PROX PSV: 57.6 CM/S
VAS RIGHT SUBCLAVIAN PROX PSV: 134 CM/S
VAS RIGHT VERTEBRAL EDV: 16.5 CM/S
VAS RIGHT VERTEBRAL PSV: 42.8 CM/S

## 2024-10-24 NOTE — TELEPHONE ENCOUNTER
Pt was advised on the DEXA scan.      Pt would like to know if Dr. Roche seen the Vascular US Duplex Carotid Bilateral results.

## 2024-10-28 ENCOUNTER — TELEPHONE (OUTPATIENT)
Dept: FAMILY MEDICINE CLINIC | Age: 69
End: 2024-10-28

## 2024-10-28 DIAGNOSIS — R22.1 NECK MASS: Primary | ICD-10-CM

## 2024-10-28 NOTE — TELEPHONE ENCOUNTER
Contacted pt and gave information regarding scheduling a CT scan for the pt.     HANNAH Rebolledo

## 2024-10-31 ENCOUNTER — HOSPITAL ENCOUNTER (OUTPATIENT)
Dept: CT IMAGING | Age: 69
Discharge: HOME | End: 2024-10-31
Payer: MEDICARE

## 2024-10-31 DIAGNOSIS — R22.1 NECK MASS: ICD-10-CM

## 2024-10-31 PROCEDURE — 70490 CT SOFT TISSUE NECK W/O DYE: CPT

## 2024-11-04 ENCOUNTER — TELEPHONE (OUTPATIENT)
Dept: FAMILY MEDICINE CLINIC | Age: 69
End: 2024-11-04

## 2024-11-04 DIAGNOSIS — E04.1 THYROID NODULE: Primary | ICD-10-CM

## 2024-11-04 NOTE — TELEPHONE ENCOUNTER
----- Message from Peace EASTMAN sent at 11/4/2024  8:42 AM EST -----  Regarding: E[facility] Appointment Request  E[facility] Appointment Request    Patient needs appointment for ECC Appointment Type: Existing Condition Follow Up.    Patient Requested Dates(s): any day this week  Patient Requested Time:anytime  Provider Name:Nolberto Roche MD    Reason for Appointment Request: Established Patient - Available appointments did not meet patient need. Patient would like to make an appointment because she received a message to make one for a test result about the CT Scan she had last October 31, 2024. And please call her as soon as possible at [phone].  --------------------------------------------------------------------------------------------------------------------------    Relationship to Patient: Self     Call Back Information: OK to leave message on voicemail  Preferred Call Back Number: Phone [phone] (home)

## 2024-11-05 ENCOUNTER — HOSPITAL ENCOUNTER (OUTPATIENT)
Dept: ULTRASOUND IMAGING | Age: 69
Discharge: HOME | End: 2024-11-05
Payer: MEDICARE

## 2024-11-05 DIAGNOSIS — E04.1 THYROID NODULE: ICD-10-CM

## 2024-11-05 PROCEDURE — 76536 US EXAM OF HEAD AND NECK: CPT

## 2024-11-06 DIAGNOSIS — E04.1 THYROID NODULE: ICD-10-CM

## 2024-11-06 DIAGNOSIS — I25.10 CORONARY ARTERY DISEASE INVOLVING NATIVE CORONARY ARTERY OF NATIVE HEART WITHOUT ANGINA PECTORIS: Primary | ICD-10-CM

## 2024-11-08 ENCOUNTER — HOSPITAL ENCOUNTER (OUTPATIENT)
Dept: PHYSICAL THERAPY | Age: 69
Setting detail: THERAPIES SERIES
Discharge: HOME | End: 2024-11-08
Payer: MEDICARE

## 2024-11-08 ENCOUNTER — HOSPITAL ENCOUNTER (OUTPATIENT)
Dept: ULTRASOUND IMAGING | Age: 69
Discharge: HOME | End: 2024-11-08
Payer: MEDICARE

## 2024-11-08 ENCOUNTER — TELEPHONE (OUTPATIENT)
Dept: FAMILY MEDICINE CLINIC | Age: 69
End: 2024-11-08

## 2024-11-08 DIAGNOSIS — E04.1 THYROID NODULE: ICD-10-CM

## 2024-11-08 PROCEDURE — 97530 THERAPEUTIC ACTIVITIES: CPT

## 2024-11-08 PROCEDURE — 97110 THERAPEUTIC EXERCISES: CPT

## 2024-11-08 PROCEDURE — 76942 ECHO GUIDE FOR BIOPSY: CPT

## 2024-11-08 NOTE — FLOWSHEET NOTE
Goals: To be achieved in: 8 weeks  1. Disability index score of 1% or less on the PFDI-20 to assist with reaching prior level of function.   [] Progressing: [] Met: [] Not Met: [] Adjusted  2. Patient will report void interval of max 8x/day of 1x per 2-5 hours and 1 void at night    [] Progressing: [] Met: [] Not Met: [] Adjusted  3. Patient will increase PERF score to 4/10/10/10 to demonstrate increased strength and control over pelvic floor musculature.    [] Progressing: [] Met: [] Not Met: [] Adjusted  4. Patient will demonstrate good strength throughout abdominals, LE's and trunk for optimal organ support  [] Progressing: [] Met: [] Not Met: [] Adjusted  5. Patient will resume PLOF with 0-1/10 bladder pain  [] Progressing: [] Met: [] Not Met: [] Adjusted  6. Pt will demonstrate I knowledge of HEP/progression to prevent recurrence of presenting problems    [] Progressing: [] Met: [] Not Met: [] Adjusted     Overall Progression Towards Functional goals/ Treatment Progress Update:  [] Patient is progressing as expected towards functional goals listed.    [] Progression is slowed due to complexities/Impairments listed.  [] Progression has been slowed due to co-morbidities.  [x] Plan just implemented, too soon (<30days) to assess goals progression   [] Goals require adjustment due to lack of progress  [] Patient is not progressing as expected and requires additional follow up with physician  [] Other:     TREATMENT PLAN     Frequency/Duration: 1-2x/week for  8  weeks for the following treatment interventions:    Interventions:  Therapeutic Exercise (85196) including: strength training, ROM, and functional mobility  Therapeutic Activities (03961) including: functional mobility training and education.  Neuromuscular Re-education (72038) activation and proprioception, including postural re-education.    Manual Therapy (55497) as indicated to include: Passive Range of Motion, Trigger Point Release, and Myofascial

## 2024-11-08 NOTE — TELEPHONE ENCOUNTER
I would recommend tylenol only for this.  It should not cause that much pain after biopsy, just ice and the tylenol

## 2024-11-08 NOTE — TELEPHONE ENCOUNTER
Patient's  stopped in to request stronger medication called into [facility] on file; patient had thyroid  biopsy today and in a lot of pain. Thanks

## 2024-11-08 NOTE — TELEPHONE ENCOUNTER
Pt was released from [facility] from the Thyroid Biopsy, advising her if she is having any pain to take tylenol. No medication was give or sent in to the pharmacy.

## 2024-11-09 ENCOUNTER — APPOINTMENT (OUTPATIENT)
Dept: GENERAL RADIOLOGY | Age: 69
End: 2024-11-09
Payer: MEDICARE

## 2024-11-09 ENCOUNTER — HOSPITAL ENCOUNTER (EMERGENCY)
Age: 69
Discharge: HOME | End: 2024-11-09
Attending: EMERGENCY MEDICINE
Payer: MEDICARE

## 2024-11-09 VITALS
HEIGHT: 64 IN | SYSTOLIC BLOOD PRESSURE: 117 MMHG | DIASTOLIC BLOOD PRESSURE: 91 MMHG | OXYGEN SATURATION: 100 % | TEMPERATURE: 98.3 F | RESPIRATION RATE: 16 BRPM | BODY MASS INDEX: 37.56 KG/M2 | WEIGHT: 220 LBS | HEART RATE: 95 BPM

## 2024-11-09 DIAGNOSIS — R22.0 FACIAL SWELLING: Primary | ICD-10-CM

## 2024-11-09 DIAGNOSIS — T78.40XA ALLERGIC REACTION, INITIAL ENCOUNTER: ICD-10-CM

## 2024-11-09 LAB
ALBUMIN SERPL-MCNC: 3.8 G/DL (ref 3.4–5)
ALBUMIN/GLOB SERPL: 1.2 {RATIO} (ref 1.1–2.2)
ALP SERPL-CCNC: 129 U/L (ref 40–129)
ALT SERPL-CCNC: 12 U/L (ref 10–40)
ANION GAP SERPL CALCULATED.3IONS-SCNC: 9 MMOL/L (ref 3–16)
AST SERPL-CCNC: 21 U/L (ref 15–37)
BASOPHILS # BLD: 0.2 K/UL (ref 0–0.2)
BASOPHILS NFR BLD: 2.1 %
BILIRUB SERPL-MCNC: <0.2 MG/DL (ref 0–1)
BUN SERPL-MCNC: 11 MG/DL (ref 7–20)
CALCIUM SERPL-MCNC: 9.4 MG/DL (ref 8.3–10.6)
CHLORIDE SERPL-SCNC: 102 MMOL/L (ref 99–110)
CO2 SERPL-SCNC: 29 MMOL/L (ref 21–32)
CREAT SERPL-MCNC: 1 MG/DL (ref 0.6–1.2)
DEPRECATED RDW RBC AUTO: 14.1 % (ref 12.4–15.4)
EOSINOPHIL # BLD: 0.2 K/UL (ref 0–0.6)
EOSINOPHIL NFR BLD: 2.3 %
GFR SERPLBLD CREATININE-BSD FMLA CKD-EPI: 62 ML/MIN/{1.73_M2}
GLUCOSE SERPL-MCNC: 154 MG/DL (ref 70–99)
HCT VFR BLD AUTO: 40.8 % (ref 36–48)
HGB BLD-MCNC: 13.7 G/DL (ref 12–16)
LYMPHOCYTES # BLD: 2.2 K/UL (ref 1–5.1)
LYMPHOCYTES NFR BLD: 26.7 %
MCH RBC QN AUTO: 28.7 PG (ref 26–34)
MCHC RBC AUTO-ENTMCNC: 33.5 G/DL (ref 31–36)
MCV RBC AUTO: 85.7 FL (ref 80–100)
MONOCYTES # BLD: 0.4 K/UL (ref 0–1.3)
MONOCYTES NFR BLD: 5.3 %
NEUTROPHILS # BLD: 5.3 K/UL (ref 1.7–7.7)
NEUTROPHILS NFR BLD: 63.6 %
PLATELET # BLD AUTO: 295 K/UL (ref 135–450)
PMV BLD AUTO: 8.1 FL (ref 5–10.5)
POTASSIUM SERPL-SCNC: 4 MMOL/L (ref 3.5–5.1)
PROT SERPL-MCNC: 7.1 G/DL (ref 6.4–8.2)
RBC # BLD AUTO: 4.76 M/UL (ref 4–5.2)
SODIUM SERPL-SCNC: 140 MMOL/L (ref 136–145)
WBC # BLD AUTO: 8.3 K/UL (ref 4–11)

## 2024-11-09 PROCEDURE — 85025 COMPLETE CBC W/AUTO DIFF WBC: CPT

## 2024-11-09 PROCEDURE — 6360000002 HC RX W HCPCS: Performed by: PHYSICIAN ASSISTANT

## 2024-11-09 PROCEDURE — 80053 COMPREHEN METABOLIC PANEL: CPT

## 2024-11-09 PROCEDURE — 99284 EMERGENCY DEPT VISIT MOD MDM: CPT

## 2024-11-09 PROCEDURE — 96375 TX/PRO/DX INJ NEW DRUG ADDON: CPT

## 2024-11-09 PROCEDURE — 2580000003 HC RX 258: Performed by: PHYSICIAN ASSISTANT

## 2024-11-09 PROCEDURE — 96374 THER/PROPH/DIAG INJ IV PUSH: CPT

## 2024-11-09 PROCEDURE — 71045 X-RAY EXAM CHEST 1 VIEW: CPT

## 2024-11-09 PROCEDURE — 2500000003 HC RX 250 WO HCPCS: Performed by: PHYSICIAN ASSISTANT

## 2024-11-09 RX ORDER — METHYLPREDNISOLONE 4 MG/1
TABLET ORAL
Qty: 1 KIT | Refills: 0 | Status: SHIPPED | OUTPATIENT
Start: 2024-11-09

## 2024-11-09 RX ORDER — FAMOTIDINE 10 MG/ML
20 INJECTION, SOLUTION INTRAVENOUS
Status: COMPLETED | OUTPATIENT
Start: 2024-11-09 | End: 2024-11-09

## 2024-11-09 RX ORDER — DIPHENHYDRAMINE HYDROCHLORIDE 50 MG/ML
50 INJECTION INTRAMUSCULAR; INTRAVENOUS
Status: COMPLETED | OUTPATIENT
Start: 2024-11-09 | End: 2024-11-09

## 2024-11-09 RX ADMIN — FAMOTIDINE 20 MG: 10 INJECTION, SOLUTION INTRAVENOUS at 19:30

## 2024-11-09 RX ADMIN — METHYLPREDNISOLONE SODIUM SUCCINATE 125 MG: 125 INJECTION INTRAMUSCULAR; INTRAVENOUS at 19:30

## 2024-11-09 RX ADMIN — DIPHENHYDRAMINE HYDROCHLORIDE 50 MG: 50 INJECTION INTRAMUSCULAR; INTRAVENOUS at 19:30

## 2024-11-09 NOTE — ED PROVIDER NOTES
[facility] EMERGENCY DEPARTMENT  EMERGENCY DEPARTMENT ENCOUNTER        Pt Name: Nithya Hicks  MRN: [id number]  Birthdate 1955  Date of evaluation: 11/9/2024  Provider: WENDI Caicedo  PCP: Nolberto Roche MD  Note Started: 6:44 PM EST 11/9/24       I have seen and evaluated this patient with my supervising physician Luanne Phillip,*.      CHIEF COMPLAINT       Chief Complaint   Patient presents with    Allergic Reaction     Pt reporting allergic reaction to a biopsy of her thyroid yesterday, states she's having some sort of allergic reaction took over the counter benadryl however it is not working and she is only getting worse. States she's having a mildly hard time breathing        HISTORY OF PRESENT ILLNESS: 1 or more Elements     History from : Patient    Limitations to history : None    Nithya Hicks is a 69 y.o. female who presents to the emergency room due to concern for allergic reaction.  Patient states that she has facial swelling noticed today.  She took some Benadryl earlier today did not have improvement.  She states is mildly itchy as well.  Patient states yesterday she had thyroid biopsy and thinks maybe related to why she is having this facial swelling.  She denies any difficulty breathing or difficulty swallowing states that she does have pain throat area for the biopsy that was done yesterday.  She denies any chest pain.     Nursing Notes were all reviewed and agreed with or any disagreements were addressed in the HPI.    REVIEW OF SYSTEMS :      Review of Systems   Constitutional:  Negative for chills, diaphoresis and fever.   HENT:  Negative for congestion, rhinorrhea and sore throat.    Eyes:  Negative for pain and visual disturbance.   Respiratory:  Negative for cough and shortness of breath.    Cardiovascular:  Negative for chest pain and leg swelling.   Gastrointestinal:  Negative for abdominal pain, constipation, diarrhea, nausea and vomiting.

## 2024-11-10 NOTE — ED PROVIDER NOTES
[facility] Emergency Department      Pt Name: Nithya Hicks  MRN: [id number]  Birthdate 1955  Date of evaluation: 11/9/2024  Provider: ELADIO LOCK MD  I personally saw Nithya Hicks and made and approved the management plan with the advanced practice provider.  I take responsibility for the patient management.     HPI: Nithya Hicks presented with   Chief Complaint   Patient presents with    Allergic Reaction     Pt reporting allergic reaction to a biopsy of her thyroid yesterday, states she's having some sort of allergic reaction took over the counter benadryl however it is not working and she is only getting worse. States she's having a mildly hard time breathing      Nithya Hicks has a past medical history of Allergic rhinitis, cause unspecified (12/04/2010), Arthritis, Bilateral carotid artery stenosis, CAD (coronary artery disease), Chronic pain, Erosive esophagitis (12/28/2016), Essential hypertension (04/11/2017), Gastroesophageal reflux disease without esophagitis (10/23/2023), H/O bladder infections, Heart attack (HCC), HSV infection, Hypercholesterolemia (06/06/2014), Interstitial cystitis, Irritable bowel syndrome with diarrhea (09/27/2016), Migraine headache (06/06/2014), Nonerosive nonspecific gastritis (12/28/2016), OAB (overactive bladder) (03/14/2014), Osteopenia, Statin myopathy, Swelling, and Urgency of urination.  She has a past surgical history that includes Appendectomy; Total abdominal hysterectomy w/ bilateral salpingoophorectomy; Cholecystectomy; Tubal ligation; Tonsillectomy; other surgical history; Coronary angioplasty with stent (10/2012); Percutaneous Transluminal Coronary Angio (10/2012); Cardiac catheterization (12/07/2013); Hysterectomy; polypectomy; Cystoscopy (04/2014); Upper gastrointestinal endoscopy; Wrist surgery (Left, 05/21/2015); Abdominal adhesion surgery; Colonoscopy; Endoscopy, colon, diagnostic; Cosmetic surgery; fracture surgery; ERCP (01/25/2017);

## 2024-11-10 NOTE — DISCHARGE INSTRUCTIONS
Follow up with your primary care provider in one week for a recheck    Take medications as prescribed. You can take Benadryl as needed.     Return to the ED if you have any worsening symptoms.

## 2024-11-11 ENCOUNTER — HOSPITAL ENCOUNTER (OUTPATIENT)
Dept: PHYSICAL THERAPY | Age: 69
Setting detail: THERAPIES SERIES
Discharge: HOME | End: 2024-11-11
Payer: MEDICARE

## 2024-11-12 ENCOUNTER — PROCEDURE VISIT (OUTPATIENT)
Dept: UROGYNECOLOGY | Age: 69
End: 2024-11-12

## 2024-11-12 VITALS
HEART RATE: 75 BPM | RESPIRATION RATE: 16 BRPM | SYSTOLIC BLOOD PRESSURE: 152 MMHG | DIASTOLIC BLOOD PRESSURE: 89 MMHG | OXYGEN SATURATION: 99 % | TEMPERATURE: 97 F

## 2024-11-12 DIAGNOSIS — N30.10 IC (INTERSTITIAL CYSTITIS): ICD-10-CM

## 2024-11-12 DIAGNOSIS — R30.9 PAIN WITH URINATION: ICD-10-CM

## 2024-11-12 DIAGNOSIS — R39.89 BLADDER PAIN: Primary | ICD-10-CM

## 2024-11-12 DIAGNOSIS — R39.89 URETHRAL PAIN: ICD-10-CM

## 2024-11-12 LAB
BILIRUBIN, POC: NORMAL
BLOOD URINE, POC: NORMAL
CLARITY, POC: CLEAR
COLOR, POC: YELLOW
GLUCOSE URINE, POC: NORMAL MG/DL
KETONES, POC: NORMAL MG/DL
LEUKOCYTE EST, POC: NORMAL
NITRITE, POC: NORMAL
PH, POC: 6.5
PROTEIN, POC: NORMAL MG/DL
SPECIFIC GRAVITY, POC: 1.01
UROBILINOGEN, POC: NORMAL MG/DL

## 2024-11-12 RX ORDER — LIDOCAINE HYDROCHLORIDE 20 MG/ML
JELLY TOPICAL ONCE
Status: COMPLETED | OUTPATIENT
Start: 2024-11-12 | End: 2024-11-12

## 2024-11-12 RX ORDER — HEPARIN SODIUM 10000 [USP'U]/ML
10000 INJECTION, SOLUTION INTRAVENOUS; SUBCUTANEOUS ONCE
Status: COMPLETED | OUTPATIENT
Start: 2024-11-12 | End: 2024-11-12

## 2024-11-12 RX ORDER — LIDOCAINE HYDROCHLORIDE 20 MG/ML
20 INJECTION, SOLUTION INFILTRATION; PERINEURAL ONCE
Status: COMPLETED | OUTPATIENT
Start: 2024-11-12 | End: 2024-11-12

## 2024-11-12 RX ADMIN — Medication 5 MEQ: at 13:57

## 2024-11-12 RX ADMIN — LIDOCAINE HYDROCHLORIDE: 20 JELLY TOPICAL at 12:52

## 2024-11-12 RX ADMIN — HEPARIN SODIUM 10000 UNITS: 10000 INJECTION, SOLUTION INTRAVENOUS; SUBCUTANEOUS at 13:59

## 2024-11-12 RX ADMIN — LIDOCAINE HYDROCHLORIDE 20 ML: 20 INJECTION, SOLUTION INFILTRATION; PERINEURAL at 13:59

## 2024-11-12 NOTE — PROGRESS NOTES
by mouth daily      Cholecalciferol (VITAMIN D3) 125 MCG (5000 UT) TABS Take 1 tablet by mouth every other day      vitamin B-12 (CYANOCOBALAMIN) 1000 MCG tablet Take 1 tablet by mouth daily      Potassium 99 MG TABS Take 1 tablet by mouth every other day      nitroGLYCERIN (NITROSTAT) 0.4 MG SL tablet Place 1 tablet under the tongue every 5 minutes as needed for Chest pain up to max of 3 total doses. If no relief after 1 dose, call 911. 25 tablet 3    L-Lysine 1000 MG TABS Take 1 tablet by mouth every morning (before breakfast)       estradiol (ESTRACE) 0.1 MG/GM vaginal cream PLACE 0.5 GRAM VAGINALLY 3 TIMES A WEEK (Patient taking differently: Place vaginally daily Takes Sunday, Tuesday, Thursday) 42.5 g 0    methylPREDNISolone (MEDROL, FELIZ,) 4 MG tablet Take by mouth. (Patient not taking: Reported on 11/12/2024) 1 kit 0     No current facility-administered medications for this visit.     Allergies:   Allergies   Allergen Reactions    Brilinta [Ticagrelor] Shortness Of Breath    Contrast [Iodides] Swelling and Rash     Rash, severe swelling in face and body, SOB Nausea and vomitting    Contrast from MRI       Green Pepper Anaphylaxis     Any peppers     Hydralazine Anaphylaxis    Shellfish-Derived Products Anaphylaxis and Swelling     Throat swelling    Chicken Allergy      Gi upset    Pineapple      Gi upset     Pork Allergy      Gi upset    Asa [Aspirin] Nausea Only    Beef (Grilled) Flavor Oil Sol [Flavoring Agent] Itching    Beta Adrenergic Blockers      swelling    Chicken (Grilled) Flavor [Chicken Flavor] Itching    Effient [Prasugrel] Other (See Comments)     bruising    Fenofibrate Myalgia     Myalgia      Furosemide Swelling    Gabapentin Swelling    Hctz [Hydrochlorothiazide]     Heparin Swelling    Isosorbide Swelling    Lisinopril      swelling    Losartan Swelling    Metoprolol      Weight gain    Mometasone Furoate Swelling    Nasonex [Mometasone] Swelling     Throat swelling    Nitroglycerin

## 2024-11-19 ENCOUNTER — OFFICE VISIT (OUTPATIENT)
Dept: FAMILY MEDICINE CLINIC | Age: 69
End: 2024-11-19

## 2024-11-19 VITALS
DIASTOLIC BLOOD PRESSURE: 62 MMHG | OXYGEN SATURATION: 98 % | BODY MASS INDEX: 37.51 KG/M2 | WEIGHT: 218.5 LBS | HEART RATE: 87 BPM | SYSTOLIC BLOOD PRESSURE: 136 MMHG | TEMPERATURE: 97.4 F | RESPIRATION RATE: 14 BRPM

## 2024-11-19 DIAGNOSIS — I65.23 BILATERAL CAROTID ARTERY STENOSIS: ICD-10-CM

## 2024-11-19 DIAGNOSIS — I25.10 CORONARY ARTERY DISEASE INVOLVING NATIVE CORONARY ARTERY OF NATIVE HEART WITHOUT ANGINA PECTORIS: ICD-10-CM

## 2024-11-19 DIAGNOSIS — T78.40XA ALLERGIC REACTION TO DRUG, INITIAL ENCOUNTER: ICD-10-CM

## 2024-11-19 DIAGNOSIS — E04.1 THYROID NODULE: Primary | ICD-10-CM

## 2024-11-19 PROBLEM — R09.89 BRUIT OF RIGHT CAROTID ARTERY: Status: RESOLVED | Noted: 2021-02-18 | Resolved: 2024-11-19

## 2024-11-19 RX ORDER — HYDROXYZINE HYDROCHLORIDE 25 MG/1
25 TABLET, FILM COATED ORAL EVERY 8 HOURS PRN
Qty: 30 TABLET | Refills: 0 | Status: SHIPPED | OUTPATIENT
Start: 2024-11-19

## 2024-11-19 NOTE — PROGRESS NOTES
Here for f/u of recent thyroid bx.  Pt had incidental thyroid nodules noted on imaging and had formal thyroid ultrasound showing 2 distinct nodules.  Pt underwent US-guided bx that showed:    A. Thyroid, left, fine needle aspiration:        -  Benign thyroid nodule.     B. Thyroid, right, fine needle aspiration:        -Colloid only, insufficient follicular cells.     Pt did have allergic reaction to the local that was used during the procedure, with some concerns of facial swelling and tingling in throat.  Did not have any active shortness of breath, wheezing.  Due to concern, did end up going to ER for evaluation and they evaluated her with CXR and bloodwork that was fine, and was given solumedrol in ER and sent home on steroid pack.  Interestingly, does get instillations of lidocaine for her bladder and has no issues.      Here for f/u of coronary artery disease.  Pt has not had any new issues of chest pain, shortness of breath.  No swelling in legs.  Pt adherent to medications and denies any exertional chest pain or shortness of breath.  Pt denies any bleeding.         Except as noted above in the history of present illness, the review of systems is  negative for headache, vision changes, chest pain, shortness of breath, abdominal pain, urinary sx, bowel changes.    Past medical, surgical, and social history reviewed and updated  Medications and allergies reviewed and updated        O: /62   Pulse 87   Temp 97.4 °F (36.3 °C) (Temporal)   Resp 14   Wt 99.1 kg (218 lb 8 oz)   SpO2 98%   BMI 37.51 kg/m²   GEN: No acute distress, cooperative, well nourished, alert.   HEENT: PEERLA, EOMI , normocephalic/atraumatic, nares and oropharynx clear.  Mucous membranes normal, Tympanic membranes clear bilaterally.  Neck: soft, supple, no thyromegaly, mass, no Lymphadenopathy  CV: Regular rate and rhythm, no murmur, rubs, gallops. No edema.  Resp: Clear to auscultation bilaterally good air entry bilaterally  No

## 2024-11-22 ENCOUNTER — TELEPHONE (OUTPATIENT)
Dept: UROGYNECOLOGY | Age: 69
End: 2024-11-22

## 2024-11-22 ENCOUNTER — OFFICE VISIT (OUTPATIENT)
Dept: UROGYNECOLOGY | Age: 69
End: 2024-11-22

## 2024-11-22 VITALS
OXYGEN SATURATION: 100 % | TEMPERATURE: 97.8 F | SYSTOLIC BLOOD PRESSURE: 162 MMHG | DIASTOLIC BLOOD PRESSURE: 86 MMHG | HEART RATE: 80 BPM | RESPIRATION RATE: 16 BRPM

## 2024-11-22 DIAGNOSIS — R39.89 BLADDER PAIN: ICD-10-CM

## 2024-11-22 DIAGNOSIS — N30.10 IC (INTERSTITIAL CYSTITIS): Primary | ICD-10-CM

## 2024-11-22 LAB
BILIRUBIN, POC: NORMAL
BLOOD URINE, POC: NORMAL
CLARITY, POC: CLEAR
COLOR, POC: YELLOW
GLUCOSE URINE, POC: NORMAL MG/DL
KETONES, POC: NORMAL MG/DL
LEUKOCYTE EST, POC: NORMAL
NITRITE, POC: NORMAL
PH, POC: 7.5
PROTEIN, POC: NORMAL MG/DL
SPECIFIC GRAVITY, POC: 1.01
UROBILINOGEN, POC: NORMAL MG/DL

## 2024-11-22 RX ORDER — HEPARIN SODIUM 10000 [USP'U]/ML
10000 INJECTION, SOLUTION INTRAVENOUS; SUBCUTANEOUS ONCE
Status: COMPLETED | OUTPATIENT
Start: 2024-11-22 | End: 2024-11-22

## 2024-11-22 RX ORDER — LIDOCAINE HYDROCHLORIDE 20 MG/ML
JELLY TOPICAL ONCE
Status: COMPLETED | OUTPATIENT
Start: 2024-11-22 | End: 2024-11-22

## 2024-11-22 RX ORDER — HYDROCORTISONE SODIUM SUCCINATE 100 MG/2ML
100 INJECTION INTRAMUSCULAR; INTRAVENOUS ONCE
Status: COMPLETED | OUTPATIENT
Start: 2024-11-22 | End: 2024-11-22

## 2024-11-22 RX ORDER — LIDOCAINE HYDROCHLORIDE 10 MG/ML
20 INJECTION, SOLUTION INFILTRATION; PERINEURAL ONCE
Status: COMPLETED | OUTPATIENT
Start: 2024-11-22 | End: 2024-11-22

## 2024-11-22 RX ADMIN — HYDROCORTISONE SODIUM SUCCINATE 100 MG: 100 INJECTION INTRAMUSCULAR; INTRAVENOUS at 16:05

## 2024-11-22 RX ADMIN — Medication 5 MEQ: at 16:05

## 2024-11-22 RX ADMIN — LIDOCAINE HYDROCHLORIDE: 20 JELLY TOPICAL at 16:06

## 2024-11-22 RX ADMIN — LIDOCAINE HYDROCHLORIDE 20 ML: 10 INJECTION, SOLUTION INFILTRATION; PERINEURAL at 16:06

## 2024-11-22 RX ADMIN — HEPARIN SODIUM 10000 UNITS: 10000 INJECTION, SOLUTION INTRAVENOUS; SUBCUTANEOUS at 16:00

## 2024-11-22 NOTE — TELEPHONE ENCOUNTER
Advised patient that per Jamaica Guzman, NP - patient should call office next week to reschedule only if symptoms return. Patient verbalized understanding, thankful for the call. Denies any further questions/concerns at this time.

## 2024-11-22 NOTE — TELEPHONE ENCOUNTER
Patient seen today to receive instillation. States as soon as she left office - instillation came out. Possibly bladder spasm - advised patient she may need to lay down for a few minutes after her next instillation prior to leaving. Does patient need to reschedule sometime next week for repeat instillation? Please advise.

## 2024-11-22 NOTE — PROGRESS NOTES
Adrenergic Blockers      swelling    Chicken (Grilled) Flavor [Chicken Flavor] Itching    Effient [Prasugrel] Other (See Comments)     bruising    Fenofibrate Myalgia     Myalgia      Furosemide Swelling    Gabapentin Swelling    Hctz [Hydrochlorothiazide]     Heparin Swelling    Isosorbide Swelling    Lisinopril      swelling    Losartan Swelling    Metoprolol      Weight gain    Mometasone Furoate Swelling    Nasonex [Mometasone] Swelling     Throat swelling    Nitroglycerin Swelling    Nsaids      GI PAIN    Other      Any melons, outside grilled food, turkey, red/green/yellow peppers= swelling    Ranexa [Ranolazine]     Statins Myalgia    Sulfa Antibiotics Swelling    Valsartan Swelling    Verapamil Myalgia    Xarelto [Rivaroxaban] Swelling    Zetia [Ezetimibe]     Flagyl [Metronidazole] Nausea And Vomiting    Iodine Nausea And Vomiting and Swelling     Social History:   Social History     Socioeconomic History    Marital status:      Spouse name: Km    Number of children: 0    Years of education: 12    Highest education level: Not on file   Occupational History    Not on file   Tobacco Use    Smoking status: Former     Current packs/day: 0.00     Average packs/day: 0.5 packs/day for 20.0 years (10.0 ttl pk-yrs)     Types: Cigarettes     Start date: 1987     Quit date: 2007     Years since quittin.8     Passive exposure: Never    Smokeless tobacco: Never   Vaping Use    Vaping status: Never Used   Substance and Sexual Activity    Alcohol use: No    Drug use: No    Sexual activity: Yes     Partners: Male   Other Topics Concern    Not on file   Social History Narrative    Not on file     Social Determinants of Health     Financial Resource Strain: Low Risk  (2024)    Overall Financial Resource Strain (CARDIA)     Difficulty of Paying Living Expenses: Not hard at all   Food Insecurity: No Food Insecurity (2024)    Hunger Vital Sign     Worried About Running Out of Food in the Last

## 2024-11-26 ENCOUNTER — OFFICE VISIT (OUTPATIENT)
Dept: ENT CLINIC | Age: 69
End: 2024-11-26
Payer: MEDICARE

## 2024-11-26 VITALS
TEMPERATURE: 97.3 F | SYSTOLIC BLOOD PRESSURE: 150 MMHG | HEIGHT: 64 IN | BODY MASS INDEX: 37.39 KG/M2 | RESPIRATION RATE: 16 BRPM | DIASTOLIC BLOOD PRESSURE: 88 MMHG | WEIGHT: 219 LBS | HEART RATE: 78 BPM

## 2024-11-26 DIAGNOSIS — R49.0 DYSPHONIA: ICD-10-CM

## 2024-11-26 DIAGNOSIS — E04.2 MULTINODULAR GOITER: ICD-10-CM

## 2024-11-26 DIAGNOSIS — R13.13 PHARYNGEAL DYSPHAGIA: ICD-10-CM

## 2024-11-26 DIAGNOSIS — E04.1 THYROID NODULE: Primary | ICD-10-CM

## 2024-11-26 PROCEDURE — 1123F ACP DISCUSS/DSCN MKR DOCD: CPT | Performed by: OTOLARYNGOLOGY

## 2024-11-26 PROCEDURE — 3077F SYST BP >= 140 MM HG: CPT | Performed by: OTOLARYNGOLOGY

## 2024-11-26 PROCEDURE — 3079F DIAST BP 80-89 MM HG: CPT | Performed by: OTOLARYNGOLOGY

## 2024-11-26 PROCEDURE — 1159F MED LIST DOCD IN RCRD: CPT | Performed by: OTOLARYNGOLOGY

## 2024-11-26 PROCEDURE — 99204 OFFICE O/P NEW MOD 45 MIN: CPT | Performed by: OTOLARYNGOLOGY

## 2024-11-26 PROCEDURE — 31575 DIAGNOSTIC LARYNGOSCOPY: CPT | Performed by: OTOLARYNGOLOGY

## 2024-11-26 ASSESSMENT — ENCOUNTER SYMPTOMS
CHOKING: 0
SHORTNESS OF BREATH: 0
RHINORRHEA: 0
CONSTIPATION: 0
SORE THROAT: 0
VOICE CHANGE: 0
DIARRHEA: 0
STRIDOR: 0
NAUSEA: 0
COUGH: 0
TROUBLE SWALLOWING: 1
EYE ITCHING: 0
FACIAL SWELLING: 0
PHOTOPHOBIA: 0
COLOR CHANGE: 0
EYE DISCHARGE: 0
SINUS PRESSURE: 0
BLOOD IN STOOL: 0
WHEEZING: 0
BACK PAIN: 0
VOMITING: 0
SINUS PAIN: 0

## 2024-11-27 ENCOUNTER — PREP FOR PROCEDURE (OUTPATIENT)
Dept: ENT CLINIC | Age: 69
End: 2024-11-27

## 2024-11-27 DIAGNOSIS — E04.1 THYROID NODULE: ICD-10-CM

## 2024-11-27 DIAGNOSIS — E04.2 MULTINODULAR GOITER: ICD-10-CM

## 2024-12-03 RX ORDER — SODIUM CHLORIDE 0.9 % (FLUSH) 0.9 %
5-40 SYRINGE (ML) INJECTION PRN
Status: CANCELLED | OUTPATIENT
Start: 2024-12-09

## 2024-12-03 RX ORDER — SODIUM CHLORIDE 9 MG/ML
INJECTION, SOLUTION INTRAVENOUS PRN
Status: CANCELLED | OUTPATIENT
Start: 2024-12-09

## 2024-12-03 RX ORDER — SODIUM CHLORIDE 0.9 % (FLUSH) 0.9 %
5-40 SYRINGE (ML) INJECTION EVERY 12 HOURS SCHEDULED
Status: CANCELLED | OUTPATIENT
Start: 2024-12-09

## 2024-12-04 NOTE — TELEPHONE ENCOUNTER
Received refill request for plavix from [facility] pharmacy.    Last ov: 06/27/2024 LES    Last Refill: 08/30/2023 #90 w/ 3    Next appointment: 01/31/2025 LES

## 2024-12-05 ENCOUNTER — OFFICE VISIT (OUTPATIENT)
Dept: FAMILY MEDICINE CLINIC | Age: 69
End: 2024-12-05

## 2024-12-05 VITALS
WEIGHT: 219.2 LBS | OXYGEN SATURATION: 98 % | BODY MASS INDEX: 37.42 KG/M2 | TEMPERATURE: 97.6 F | RESPIRATION RATE: 16 BRPM | SYSTOLIC BLOOD PRESSURE: 122 MMHG | HEIGHT: 64 IN | HEART RATE: 81 BPM | DIASTOLIC BLOOD PRESSURE: 76 MMHG

## 2024-12-05 DIAGNOSIS — I25.10 CORONARY ARTERY DISEASE INVOLVING NATIVE CORONARY ARTERY OF NATIVE HEART WITHOUT ANGINA PECTORIS: ICD-10-CM

## 2024-12-05 DIAGNOSIS — I10 ESSENTIAL HYPERTENSION: ICD-10-CM

## 2024-12-05 DIAGNOSIS — E78.00 PURE HYPERCHOLESTEROLEMIA: ICD-10-CM

## 2024-12-05 DIAGNOSIS — Z01.810 PREOP CARDIOVASCULAR EXAM: Primary | ICD-10-CM

## 2024-12-05 DIAGNOSIS — R73.9 HYPERGLYCEMIA: ICD-10-CM

## 2024-12-05 DIAGNOSIS — T78.40XA ALLERGIC REACTION TO DRUG, INITIAL ENCOUNTER: ICD-10-CM

## 2024-12-05 DIAGNOSIS — E04.1 THYROID NODULE: ICD-10-CM

## 2024-12-05 RX ORDER — DICYCLOMINE HYDROCHLORIDE 10 MG/1
10 CAPSULE ORAL 4 TIMES DAILY PRN
Qty: 120 CAPSULE | Refills: 5 | Status: SHIPPED | OUTPATIENT
Start: 2024-12-05

## 2024-12-05 RX ORDER — CLOPIDOGREL BISULFATE 75 MG/1
75 TABLET ORAL DAILY
Qty: 90 TABLET | Refills: 3 | Status: SHIPPED | OUTPATIENT
Start: 2024-12-05

## 2024-12-05 NOTE — H&P (VIEW-ONLY)
Brilinta [Ticagrelor] Shortness Of Breath    Contrast [Iodides] Swelling and Rash     Rash, severe swelling in face and body, SOB Nausea and vomitting    Contrast from MRI       Green Pepper Anaphylaxis     Any peppers     Heparin Swelling     IV    Hydralazine Anaphylaxis    Lidocan [Lidocaine] Anaphylaxis    Shellfish-Derived Products Anaphylaxis and Swelling     Throat swelling    Verapamil Myalgia    Chicken Allergy      Gi upset    Pineapple      Gi upset     Pork Allergy      Gi upset    Asa [Aspirin] Nausea Only    Beef (Grilled) Flavor Oil Sol [Flavoring Agent] Itching    Beta Adrenergic Blockers      swelling    Chicken (Grilled) Flavor [Chicken Flavor] Itching    Effient [Prasugrel] Other (See Comments)     bruising    Fenofibrate Myalgia     Myalgia      Furosemide Swelling    Gabapentin Swelling    Hctz [Hydrochlorothiazide]     Isosorbide Swelling    Lisinopril      swelling    Losartan Swelling    Metoprolol      Weight gain    Mometasone Furoate Swelling    Nasonex [Mometasone] Swelling     Throat swelling    Nitroglycerin Swelling     IV    Nsaids      GI PAIN    Other      Any melons, outside grilled food, turkey, red/green/yellow peppers= swelling    Ranexa [Ranolazine]     Statins Myalgia    Sulfa Antibiotics Swelling    Valsartan Swelling    Xarelto [Rivaroxaban] Swelling    Zetia [Ezetimibe]     Flagyl [Metronidazole] Nausea And Vomiting    Iodine Nausea And Vomiting and Swelling       Social History     Tobacco Use    Smoking status: Former     Current packs/day: 0.00     Average packs/day: 0.5 packs/day for 20.0 years (10.0 ttl pk-yrs)     Types: Cigarettes     Start date: 1987     Quit date: 2007     Years since quittin.8     Passive exposure: Never    Smokeless tobacco: Never   Vaping Use    Vaping status: Never Used   Substance Use Topics    Alcohol use: No    Drug use: No        Family History   Problem Relation Age of Onset    Hypertension Mother     Heart Disease Mother

## 2024-12-05 NOTE — PROGRESS NOTES
normal. Reflexes normal and symmetric. Sensation grossly normal.  No focal weakness    EKG: unchanged from previous tracings, normal sinus rhythm, nonspecific ST and T waves changes.     ASSESSMENT / PLAN:    1. Preop cardiovascular exam  Set for partial thyroidectomy  Medically cleared for surgery.    - EKG 12 Lead - Clinic Performed    2. Thyroid nodule  S/p biopsy with inadequate findings/results  S/p eval by ENT  Set for surgery  Medically cleared for surgery.      3. Coronary artery disease involving native coronary artery of native heart without angina pectoris  Stable w/o sx  Cont max medical therapy  Hold plavix x 7d pending upcoming surgery    4. Allergic reaction to drug,  No recurrent sx s/p steroid  Added lidocaine to allergy list    5. Essential hypertension  Stable @ goal, controlled  Stable with diltizaem    6. Pure hypercholesterolemia  Intol statin therapy  Cont repatha    7. Hyperglycemia  A1c stable  Cont to monitor closely           Follow-up appointment:   After surgery  Call or return to clinic prn if these symptoms worsen or fail to improve as anticipated.     Discussed use, benefit, and side effects of all prescribed medications.  Barriers to medication compliance addressed.  All patient questions answered.  Pt voiced understanding.  When applicable, patient's outside records were reviewed through Care Everywhere.  The patient has signed appropriate paperworks/consents.         Per encounter diagnoses   He is medically cleared for surgery and anesthesia. Avoid Aspirin, non steroidal anti inflammatory medications, including Motrin, Aleve, Ibuprofen, Advil; multi vitamins, Vitamin E, omega 3 fish oil, and glucosamine chondroitin for the 7 days prior to surgery.

## 2024-12-06 RX ORDER — LIDOCAINE HYDROCHLORIDE 10 MG/ML
1 INJECTION, SOLUTION EPIDURAL; INFILTRATION; INTRACAUDAL; PERINEURAL
Status: CANCELLED | OUTPATIENT
Start: 2024-12-06 | End: 2024-12-07

## 2024-12-09 ENCOUNTER — ANESTHESIA EVENT (OUTPATIENT)
Dept: OPERATING ROOM | Age: 69
End: 2024-12-09
Payer: MEDICARE

## 2024-12-09 ENCOUNTER — HOSPITAL ENCOUNTER (OUTPATIENT)
Age: 69
Setting detail: OUTPATIENT SURGERY
Discharge: HOME | End: 2024-12-09
Attending: OTOLARYNGOLOGY | Admitting: OTOLARYNGOLOGY
Payer: MEDICARE

## 2024-12-09 ENCOUNTER — ANESTHESIA (OUTPATIENT)
Dept: OPERATING ROOM | Age: 69
End: 2024-12-09
Payer: MEDICARE

## 2024-12-09 VITALS
DIASTOLIC BLOOD PRESSURE: 93 MMHG | RESPIRATION RATE: 15 BRPM | WEIGHT: 219.4 LBS | OXYGEN SATURATION: 94 % | SYSTOLIC BLOOD PRESSURE: 147 MMHG | BODY MASS INDEX: 37.46 KG/M2 | HEART RATE: 80 BPM | HEIGHT: 64 IN | TEMPERATURE: 97.1 F

## 2024-12-09 DIAGNOSIS — E04.2 MULTINODULAR GOITER: ICD-10-CM

## 2024-12-09 DIAGNOSIS — E04.1 THYROID NODULE: ICD-10-CM

## 2024-12-09 DIAGNOSIS — G89.18 ACUTE POST-OPERATIVE PAIN: Primary | ICD-10-CM

## 2024-12-09 PROCEDURE — 88341 IMHCHEM/IMCYTCHM EA ADD ANTB: CPT

## 2024-12-09 PROCEDURE — A4217 STERILE WATER/SALINE, 500 ML: HCPCS | Performed by: OTOLARYNGOLOGY

## 2024-12-09 PROCEDURE — 3700000000 HC ANESTHESIA ATTENDED CARE: Performed by: OTOLARYNGOLOGY

## 2024-12-09 PROCEDURE — 7100000001 HC PACU RECOVERY - ADDTL 15 MIN: Performed by: OTOLARYNGOLOGY

## 2024-12-09 PROCEDURE — 88342 IMHCHEM/IMCYTCHM 1ST ANTB: CPT

## 2024-12-09 PROCEDURE — 2709999900 HC NON-CHARGEABLE SUPPLY: Performed by: OTOLARYNGOLOGY

## 2024-12-09 PROCEDURE — 6360000002 HC RX W HCPCS: Performed by: OTOLARYNGOLOGY

## 2024-12-09 PROCEDURE — 3600000014 HC SURGERY LEVEL 4 ADDTL 15MIN: Performed by: OTOLARYNGOLOGY

## 2024-12-09 PROCEDURE — 2580000003 HC RX 258: Performed by: NURSE ANESTHETIST, CERTIFIED REGISTERED

## 2024-12-09 PROCEDURE — 88307 TISSUE EXAM BY PATHOLOGIST: CPT

## 2024-12-09 PROCEDURE — 6360000002 HC RX W HCPCS: Performed by: NURSE ANESTHETIST, CERTIFIED REGISTERED

## 2024-12-09 PROCEDURE — 7100000011 HC PHASE II RECOVERY - ADDTL 15 MIN: Performed by: OTOLARYNGOLOGY

## 2024-12-09 PROCEDURE — 2720000010 HC SURG SUPPLY STERILE: Performed by: OTOLARYNGOLOGY

## 2024-12-09 PROCEDURE — 7100000000 HC PACU RECOVERY - FIRST 15 MIN: Performed by: OTOLARYNGOLOGY

## 2024-12-09 PROCEDURE — 6370000000 HC RX 637 (ALT 250 FOR IP): Performed by: OTOLARYNGOLOGY

## 2024-12-09 PROCEDURE — 3700000001 HC ADD 15 MINUTES (ANESTHESIA): Performed by: OTOLARYNGOLOGY

## 2024-12-09 PROCEDURE — 3600000004 HC SURGERY LEVEL 4 BASE: Performed by: OTOLARYNGOLOGY

## 2024-12-09 PROCEDURE — 7100000010 HC PHASE II RECOVERY - FIRST 15 MIN: Performed by: OTOLARYNGOLOGY

## 2024-12-09 PROCEDURE — 2580000003 HC RX 258: Performed by: OTOLARYNGOLOGY

## 2024-12-09 PROCEDURE — 2580000003 HC RX 258: Performed by: ANESTHESIOLOGY

## 2024-12-09 PROCEDURE — 2500000003 HC RX 250 WO HCPCS: Performed by: NURSE ANESTHETIST, CERTIFIED REGISTERED

## 2024-12-09 PROCEDURE — 6360000002 HC RX W HCPCS: Performed by: ANESTHESIOLOGY

## 2024-12-09 RX ORDER — MIDAZOLAM HYDROCHLORIDE 1 MG/ML
2 INJECTION, SOLUTION INTRAMUSCULAR; INTRAVENOUS
Status: DISCONTINUED | OUTPATIENT
Start: 2024-12-09 | End: 2024-12-09 | Stop reason: HOSPADM

## 2024-12-09 RX ORDER — NALOXONE HYDROCHLORIDE 0.4 MG/ML
INJECTION, SOLUTION INTRAMUSCULAR; INTRAVENOUS; SUBCUTANEOUS PRN
Status: DISCONTINUED | OUTPATIENT
Start: 2024-12-09 | End: 2024-12-09 | Stop reason: HOSPADM

## 2024-12-09 RX ORDER — FENTANYL CITRATE 50 UG/ML
50 INJECTION, SOLUTION INTRAMUSCULAR; INTRAVENOUS EVERY 5 MIN PRN
Status: DISCONTINUED | OUTPATIENT
Start: 2024-12-09 | End: 2024-12-09 | Stop reason: HOSPADM

## 2024-12-09 RX ORDER — SODIUM CHLORIDE 0.9 % (FLUSH) 0.9 %
5-40 SYRINGE (ML) INJECTION EVERY 12 HOURS SCHEDULED
Status: DISCONTINUED | OUTPATIENT
Start: 2024-12-09 | End: 2024-12-09 | Stop reason: HOSPADM

## 2024-12-09 RX ORDER — SODIUM CHLORIDE, SODIUM LACTATE, POTASSIUM CHLORIDE, CALCIUM CHLORIDE 600; 310; 30; 20 MG/100ML; MG/100ML; MG/100ML; MG/100ML
INJECTION, SOLUTION INTRAVENOUS CONTINUOUS
Status: DISCONTINUED | OUTPATIENT
Start: 2024-12-09 | End: 2024-12-09 | Stop reason: HOSPADM

## 2024-12-09 RX ORDER — DEXAMETHASONE SODIUM PHOSPHATE 4 MG/ML
INJECTION, SOLUTION INTRA-ARTICULAR; INTRALESIONAL; INTRAMUSCULAR; INTRAVENOUS; SOFT TISSUE
Status: DISCONTINUED | OUTPATIENT
Start: 2024-12-09 | End: 2024-12-09 | Stop reason: SDUPTHER

## 2024-12-09 RX ORDER — METOPROLOL TARTRATE 1 MG/ML
INJECTION, SOLUTION INTRAVENOUS
Status: COMPLETED
Start: 2024-12-09 | End: 2024-12-09

## 2024-12-09 RX ORDER — HYDROMORPHONE HYDROCHLORIDE 2 MG/ML
INJECTION, SOLUTION INTRAMUSCULAR; INTRAVENOUS; SUBCUTANEOUS
Status: DISCONTINUED | OUTPATIENT
Start: 2024-12-09 | End: 2024-12-09 | Stop reason: SDUPTHER

## 2024-12-09 RX ORDER — SODIUM CHLORIDE 9 MG/ML
INJECTION, SOLUTION INTRAVENOUS PRN
Status: DISCONTINUED | OUTPATIENT
Start: 2024-12-09 | End: 2024-12-09 | Stop reason: HOSPADM

## 2024-12-09 RX ORDER — HYDROMORPHONE HYDROCHLORIDE 1 MG/ML
0.25 INJECTION, SOLUTION INTRAMUSCULAR; INTRAVENOUS; SUBCUTANEOUS EVERY 5 MIN PRN
Status: COMPLETED | OUTPATIENT
Start: 2024-12-09 | End: 2024-12-09

## 2024-12-09 RX ORDER — DIPHENHYDRAMINE HYDROCHLORIDE 50 MG/ML
INJECTION INTRAMUSCULAR; INTRAVENOUS
Status: DISCONTINUED | OUTPATIENT
Start: 2024-12-09 | End: 2024-12-09 | Stop reason: SDUPTHER

## 2024-12-09 RX ORDER — METOCLOPRAMIDE HYDROCHLORIDE 5 MG/ML
10 INJECTION INTRAMUSCULAR; INTRAVENOUS
Status: DISCONTINUED | OUTPATIENT
Start: 2024-12-09 | End: 2024-12-09 | Stop reason: HOSPADM

## 2024-12-09 RX ORDER — ONDANSETRON 2 MG/ML
INJECTION INTRAMUSCULAR; INTRAVENOUS
Status: DISCONTINUED | OUTPATIENT
Start: 2024-12-09 | End: 2024-12-09 | Stop reason: SDUPTHER

## 2024-12-09 RX ORDER — SODIUM CHLORIDE 0.9 % (FLUSH) 0.9 %
5-40 SYRINGE (ML) INJECTION PRN
Status: DISCONTINUED | OUTPATIENT
Start: 2024-12-09 | End: 2024-12-09 | Stop reason: HOSPADM

## 2024-12-09 RX ORDER — HYDROCODONE BITARTRATE AND ACETAMINOPHEN 5; 325 MG/1; MG/1
1-2 TABLET ORAL EVERY 6 HOURS PRN
Qty: 30 TABLET | Refills: 0 | Status: SHIPPED | OUTPATIENT
Start: 2024-12-09 | End: 2024-12-16

## 2024-12-09 RX ORDER — METOPROLOL TARTRATE 1 MG/ML
INJECTION, SOLUTION INTRAVENOUS
Status: DISCONTINUED | OUTPATIENT
Start: 2024-12-09 | End: 2024-12-09 | Stop reason: SDUPTHER

## 2024-12-09 RX ORDER — GLYCOPYRROLATE 0.2 MG/ML
INJECTION INTRAMUSCULAR; INTRAVENOUS
Status: DISCONTINUED | OUTPATIENT
Start: 2024-12-09 | End: 2024-12-09 | Stop reason: SDUPTHER

## 2024-12-09 RX ORDER — LIDOCAINE HYDROCHLORIDE 20 MG/ML
INJECTION, SOLUTION INTRAVENOUS
Status: DISCONTINUED | OUTPATIENT
Start: 2024-12-09 | End: 2024-12-09

## 2024-12-09 RX ORDER — ONDANSETRON 2 MG/ML
4 INJECTION INTRAMUSCULAR; INTRAVENOUS
Status: DISCONTINUED | OUTPATIENT
Start: 2024-12-09 | End: 2024-12-09 | Stop reason: HOSPADM

## 2024-12-09 RX ORDER — LABETALOL HYDROCHLORIDE 5 MG/ML
10 INJECTION, SOLUTION INTRAVENOUS
Status: DISCONTINUED | OUTPATIENT
Start: 2024-12-09 | End: 2024-12-09 | Stop reason: HOSPADM

## 2024-12-09 RX ORDER — HYDROCODONE BITARTRATE AND ACETAMINOPHEN 5; 325 MG/1; MG/1
1 TABLET ORAL ONCE
Status: COMPLETED | OUTPATIENT
Start: 2024-12-09 | End: 2024-12-09

## 2024-12-09 RX ORDER — PROPOFOL 10 MG/ML
INJECTION, EMULSION INTRAVENOUS
Status: DISCONTINUED | OUTPATIENT
Start: 2024-12-09 | End: 2024-12-09 | Stop reason: SDUPTHER

## 2024-12-09 RX ORDER — ROCURONIUM BROMIDE 10 MG/ML
INJECTION, SOLUTION INTRAVENOUS
Status: DISCONTINUED | OUTPATIENT
Start: 2024-12-09 | End: 2024-12-09 | Stop reason: SDUPTHER

## 2024-12-09 RX ORDER — SUCCINYLCHOLINE/SOD CL,ISO/PF 200MG/10ML
SYRINGE (ML) INTRAVENOUS
Status: DISCONTINUED | OUTPATIENT
Start: 2024-12-09 | End: 2024-12-09 | Stop reason: SDUPTHER

## 2024-12-09 RX ADMIN — PROPOFOL 50 MG: 10 INJECTION, EMULSION INTRAVENOUS at 14:13

## 2024-12-09 RX ADMIN — PROPOFOL 200 MG: 10 INJECTION, EMULSION INTRAVENOUS at 12:45

## 2024-12-09 RX ADMIN — HYDROMORPHONE HYDROCHLORIDE 2 MG: 2 INJECTION, SOLUTION INTRAMUSCULAR; INTRAVENOUS; SUBCUTANEOUS at 13:13

## 2024-12-09 RX ADMIN — HYDROMORPHONE HYDROCHLORIDE 0.25 MG: 1 INJECTION, SOLUTION INTRAMUSCULAR; INTRAVENOUS; SUBCUTANEOUS at 14:58

## 2024-12-09 RX ADMIN — HYDROCODONE BITARTRATE AND ACETAMINOPHEN 1 TABLET: 5; 325 TABLET ORAL at 15:21

## 2024-12-09 RX ADMIN — PHENYLEPHRINE HYDROCHLORIDE 50 MCG/MIN: 10 INJECTION, SOLUTION INTRAVENOUS at 12:59

## 2024-12-09 RX ADMIN — ONDANSETRON 8 MG: 2 INJECTION INTRAMUSCULAR; INTRAVENOUS at 12:37

## 2024-12-09 RX ADMIN — GLYCOPYRROLATE 0.2 MG: 0.2 INJECTION INTRAMUSCULAR; INTRAVENOUS at 13:13

## 2024-12-09 RX ADMIN — DIPHENHYDRAMINE HYDROCHLORIDE 10 MG: 50 INJECTION, SOLUTION INTRAMUSCULAR; INTRAVENOUS at 12:39

## 2024-12-09 RX ADMIN — PROPOFOL 50 MG: 10 INJECTION, EMULSION INTRAVENOUS at 14:14

## 2024-12-09 RX ADMIN — REMIFENTANIL HYDROCHLORIDE 0.1 MCG/KG/MIN: 1 INJECTION, POWDER, LYOPHILIZED, FOR SOLUTION INTRAVENOUS at 12:45

## 2024-12-09 RX ADMIN — HYDROMORPHONE HYDROCHLORIDE 0.25 MG: 1 INJECTION, SOLUTION INTRAMUSCULAR; INTRAVENOUS; SUBCUTANEOUS at 14:47

## 2024-12-09 RX ADMIN — Medication 160 MG: at 12:47

## 2024-12-09 RX ADMIN — DEXAMETHASONE SODIUM PHOSPHATE 10 MG: 4 INJECTION INTRA-ARTICULAR; INTRALESIONAL; INTRAMUSCULAR; INTRAVENOUS; SOFT TISSUE at 13:03

## 2024-12-09 RX ADMIN — SODIUM CHLORIDE, POTASSIUM CHLORIDE, SODIUM LACTATE AND CALCIUM CHLORIDE: 600; 310; 30; 20 INJECTION, SOLUTION INTRAVENOUS at 12:26

## 2024-12-09 RX ADMIN — METOPROLOL TARTRATE 5 MG: 1 INJECTION, SOLUTION INTRAVENOUS at 14:38

## 2024-12-09 RX ADMIN — WATER 2000 MG: 1 INJECTION INTRAMUSCULAR; INTRAVENOUS; SUBCUTANEOUS at 13:00

## 2024-12-09 RX ADMIN — HYDROMORPHONE HYDROCHLORIDE 0.25 MG: 1 INJECTION, SOLUTION INTRAMUSCULAR; INTRAVENOUS; SUBCUTANEOUS at 15:07

## 2024-12-09 RX ADMIN — ROCURONIUM BROMIDE 30 MG: 10 INJECTION, SOLUTION INTRAVENOUS at 14:13

## 2024-12-09 RX ADMIN — HYDROMORPHONE HYDROCHLORIDE 0.25 MG: 1 INJECTION, SOLUTION INTRAMUSCULAR; INTRAVENOUS; SUBCUTANEOUS at 15:04

## 2024-12-09 RX ADMIN — SUGAMMADEX 200 MG: 100 INJECTION, SOLUTION INTRAVENOUS at 14:30

## 2024-12-09 ASSESSMENT — PAIN DESCRIPTION - LOCATION
LOCATION: NECK

## 2024-12-09 ASSESSMENT — PAIN SCALES - GENERAL
PAINLEVEL_OUTOF10: 4
PAINLEVEL_OUTOF10: 5
PAINLEVEL_OUTOF10: 6
PAINLEVEL_OUTOF10: 8
PAINLEVEL_OUTOF10: 7

## 2024-12-09 ASSESSMENT — PAIN - FUNCTIONAL ASSESSMENT
PAIN_FUNCTIONAL_ASSESSMENT: 0-10
PAIN_FUNCTIONAL_ASSESSMENT: 0-10
PAIN_FUNCTIONAL_ASSESSMENT: NONE - DENIES PAIN

## 2024-12-09 ASSESSMENT — PAIN DESCRIPTION - DESCRIPTORS
DESCRIPTORS: ACHING

## 2024-12-09 ASSESSMENT — PAIN DESCRIPTION - ORIENTATION
ORIENTATION: ANTERIOR

## 2024-12-09 ASSESSMENT — LIFESTYLE VARIABLES: SMOKING_STATUS: 0

## 2024-12-09 NOTE — OP NOTE
Self-retaining retractors were placed.  The midline raphae was identified and divided using monopolar electrocautery and the harmonic scalpel.  The anterior surface of the thyroid gland was encountered.  Using the harmonic scalpel, the strap muscles were dissected free from the anterolateral surface of the right thyroid lobe.  The superior pole vessels were identified and ligated using the harmonic scalpel.  Dissection was then carried posteriorly.  Once the lateral attachments of the right thyroid lobe were released, the right thyroid lobe was then retracted anteromedially exposing the tracheoesophageal groove.  Dissection continued in this plane using the Steffanie nerve dissector, bipolar electrocautery and Metzenbaum scissors.  The right recurrent laryngeal nerve was identified.  It stimulated appropriately.  The surrounding connective tissue was dissected free from the recurrent laryngeal nerve freeing up the right lobe of the thyroid.  The superior and inferior parathyroid glands were identified and dissected free from the thyroid.  Then, Mcgee's ligament was transected using harmonic scalpel.  Right thyroid lobe and isthmus was then removed.  Specimen was passed off, placed in formalin.  We noticed extra thyroidal nodule versus lymph node inferior lateral.  This was dissected free from the surrounding tissue using harmonic scalpel.  This was passed off as a second specimen.  Any residual bleeding was controlled with bipolar electrocautery.  The wound was irrigated and suctioned with sterile saline.  Repeat stimulation of the right recurrent laryngeal nerve revealed appropriate activity.  Surgicel was placed on the surgical bed.  The incision was then closed in layers.  The strap muscles were closed with 3-0 chromic figure-of-eight suture.  The platysma was closed with 3-0 chromic simple interrupted deep buried sutures.  4-0 Monocryl running subcuticular suture was placed followed by Dermabond on the

## 2024-12-09 NOTE — INTERVAL H&P NOTE
Update History & Physical    The patient's History and Physical of December 5, 2024 was reviewed with the patient and I examined the patient. There was no change. The surgical site was confirmed by the patient and me.     Plan: The risks, benefits, expected outcome, and alternative to the recommended procedure have been discussed with the patient. Patient understands and wants to proceed with the procedure.     Electronically signed by Devante Holt DO on 12/9/2024 at 11:59 AM

## 2024-12-09 NOTE — DISCHARGE INSTRUCTIONS
medication schedule or speak to your surgeon on how best to manage this side effect.    NARCOTIC SAFETY:  Your pain medicine is only for you to take.  Safely store your medicines.  Store pills up high and out of reach of children and pets.  Ensure safety caps are snapped tightly  Keep track of how many pills you have left    Unused medication can be disposed of by taking them to a drop-off box or take-back program that is authorized by the [facility].  Access to a site near you can be found on the [employer] website (deadiversion[employer]oj.gov).    If you have a CPAP machine, it is very important that you use it daily during all periods of sleep and daytime rest during your recovery at home.  Surgery and Anesthesia place a significant amount of stress on your body.  Using your CPAP will help keep you safe and lessen the negative effects of that stress.    FOLLOW-UP RECOVERY CARE:  [x]Call the office  for follow-up appointment and problems    Watch for these possible complications, symptoms, or side effects of anesthesia.  Call physician if they or any other problems occur:  Signs of INFECTION   > Fever over 101°     > Redness, swelling, hardness or warmth at the operative site   >Foul smelling or cloudy drainage at the operative site   Unrelieved PAIN  Unrelieved NAUSEA  Blood soaked dressing.  (Some oozing may be normal)  Inability to urinate      Numb, pale, blue, cold or tingling extremity      Physician:  Dr. Holt    The above instructions were reviewed with patient/significant other.  The following additional patient specific information was reviewed with the patient/significant other:  []Procedure/physician specific instructions  []Medication information sheet(S) including potential side effects    I have read and understand the instructions given to me: ____________________________________________   (Patient/S.O. Signature)            Date/time 12/9/2024 3:00 PM                 If you smoke STOP.

## 2024-12-09 NOTE — ANESTHESIA PRE PROCEDURE
Department of Anesthesiology  Preprocedure Note       Name:  Nithay Hicks   Age:  69 y.o.  :  1955                                          MRN:  [id number]         Date:  2024      Surgeon: Surgeon(s):  Devante Holt DO    Procedure:     Medications prior to admission:   Prior to Admission medications    Medication Sig Start Date End Date Taking? Authorizing Provider   clopidogrel (PLAVIX) 75 MG tablet TAKE 1 TABLET BY MOUTH DAILY 24   Frank Jones MD   dicyclomine (BENTYL) 10 MG capsule Take 1 capsule by mouth 4 times daily as needed (abdominal spasm/bloating) 24   Nolberto Roche MD   hydrOXYzine HCl (ATARAX) 25 MG tablet Take 1 tablet by mouth every 8 hours as needed for Itching 24   Nolberto Roche MD   isosorbide mononitrate (IMDUR) 30 MG extended release tablet Take 1 tablet by mouth daily 24   Frank Jones MD   Evolocumab (REPATHA SURECLICK) 140 MG/ML SOAJ Inject 140 mg into the skin every 14 days 24   Frank Jones MD   dilTIAZem (CARDIZEM CD) 120 MG extended release capsule TAKE 1 CAPSULE BY MOUTH TWICE DAILY 24   Frank Jones MD   vitamin C (ASCORBIC ACID) 500 MG tablet Take 1 tablet by mouth daily  Patient not taking: Reported on 2024    Piyush Pineda MD   Cholecalciferol (VITAMIN D3) 125 MCG (5000 UT) TABS Take 1 tablet by mouth every other day  Patient not taking: Reported on 2024    Piyush Pineda MD   vitamin B-12 (CYANOCOBALAMIN) 1000 MCG tablet Take 1 tablet by mouth daily  Patient not taking: Reported on 2024    Piyush Pineda MD   Potassium 99 MG TABS Take 1 tablet by mouth every other day    Piyush Pineda MD   nitroGLYCERIN (NITROSTAT) 0.4 MG SL tablet Place 1 tablet under the tongue every 5 minutes as needed for Chest pain up to max of 3 total doses. If no relief after 1 dose, call 911. 10/14/22   Frank Jones MD   L-Lysine 1000 MG TABS Take 1 tablet by mouth every morning (before

## 2024-12-09 NOTE — PROGRESS NOTES
Ambulatory Surgery/Procedure Discharge Note    Vitals:    12/09/24 1556   BP: (!) 147/93   Pulse: 80   Resp: 15   Temp: 97.1 °F (36.2 °C)   SpO2: 94%     Pt meets discharge criteria per Shant score.     In: 95.3 [I.V.:95.3]  Out: 20     Restroom use offered before discharge.  Yes    Pain assessment:  none  Pain Level: 4    Pt and S.O./family states \"ready to go home\". Pt alert and oriented x4. IV removed. Denies N/V or pain. Incision to right anterior neck well approximated with surgical glue. Voided prior to discharge. Pt tolerating po intake. Discharge instructions given to pt and  with pt permission. Pt and  verbalized understanding of all instructions. Left with all belongings and discharge instructions. Prescriptions escribed to patient pharmacy.         Patient discharged to home/self care. Patient discharged via wheel chair by transporter to waiting family/S.O.       12/9/2024 4:06 PM  
Assisted patient ambulating to restroom- voided without difficulty.     VS stable.   O2 saturation 92% on room air- using IS to 1500 volume.     Patient denies any nausea - taking bites of pudding and gingerale.   Reports pain level 4-5/10 neck discomfort tolerable.   
From OR to PACU.   Post op PROCEDURES  THYROID LOBECTOMY, RIGHT - Right  Devante Holt, DO  Report received from CRNA at bedside.  Patient drowsy- c/o pain anterior neck 8/10 treated with dilaudid as ordered.   Anterior neck with incision/dermabond intact.   Voice clear- [id number]  Tongue midline.   Good peripheral pulses present.   
PACU Transfer to [facility]    Vitals:    12/09/24 1525   BP:    Pulse: 90   Resp: 17   Temp:    SpO2: 92%         Intake/Output Summary (Last 24 hours) at 12/9/2024 1546  Last data filed at 12/9/2024 1430  Gross per 24 hour   Intake 95.27 ml   Output 20 ml   Net 75.27 ml       Pain assessment:  none Pain level 4-5 tolerable. VS stable. Patient to [facility] bed#4 for discharge home.   Pain Level: 5    Patient transferred to care of [facility] RN.    12/9/2024 3:46 PM     
Pt getting her H&P with PCP Dr Roche on 12/5 /rd  
eating/ drinking as you will have very specific instructions to follow.  If you did not receive these, call your surgeon's office immediately.     5. MEDICATIONS:  Take the following medications with a SMALL sip of water:   Use your usual dose of inhalers the morning of surgery. BRING your rescue inhaler with you to hospital.   Anesthesia does NOT want you to take insulin the morning of surgery. They will control your blood sugar while you are at the hospital. Please contact your ordering physician for instructions regarding your insulin the night before your procedure. If you have an insulin pump, please keep it set on basal rate.   Bariatric patient's call your surgeon if on diabetic medications as some may need to be stopped 1 week prior to surgery    6. Do not swallow additional water when brushing teeth. No gum, candy, mints, or ice chips. Refrain from smoking or at least decrease the amount on day of surgery.    7. Morning of surgery:   Take a shower with an antibacterial soap (i.e., Safeguard or Dial) OR your physician may have instructed you to use Hibiclens.  Dress in loose, comfortable clothing appropriate for redressing after your procedure.   Do not wear jewelry (including body piercings), make-up (especially NO eye make-up), fingernail polish (NO toenail polish if foot/leg surgery), lotion, powders, or metal hairclips.   Do not shave or wax for 72 hours prior to procedure near your operative site. Shaving with a razor can irritate your skin and make it easier to develop an infection. On the day of your procedure, any hair that needs to be removed near the surgical site will be 'clipped' by a healthcare worker using a special clipper designed to avoid skin irritation.    8. Dentures, glasses, or contacts will need to be removed before your procedure. Bring cases for your glasses, contacts, dentures, or hearing aids to protect them while you are in surgery.      9. If you use a CPAP, please bring it with

## 2024-12-09 NOTE — BRIEF OP NOTE
Brief Postoperative Note      Patient: Nithya Hicks  YOB: 1955  MRN: [id number]    Date of Procedure: 12/9/2024    Pre-Op Diagnosis Codes:      * Thyroid nodule [E04.1]     * Multinodular goiter [E04.2]    Post-Op Diagnosis: Same       Procedure(s):  THYROID LOBECTOMY, RIGHT    Surgeon(s):  Devante Holt DO    Assistant:  Resident: Esequiel Mayo DO    Anesthesia: General    Estimated Blood Loss (mL): Minimal    Complications: None    Specimens:   ID Type Source Tests Collected by Time Destination   A : RIGHT THYROID LOBE AND ISTHMUS Tissue Tissue SURGICAL PATHOLOGY Devante Holt,  12/9/2024 1345    B : RIGHT EXTRA-THYROID MASS Tissue Tissue SURGICAL PATHOLOGY Devante Holt DO 12/9/2024 1404        Implants:  * No implants in log *      Drains: * No LDAs found *    Findings:  Infection Present At Time Of Surgery (PATOS) (choose all levels that have infection present):  No infection present  Other Findings: right thyroid lobe and isthmus. Right extrathyroidal nodule. Intact recurrent laryngeal nerve at conclusion of procedure verified by [facility]IM[facility].    Electronically signed by Devante Holt DO on 12/9/2024 at 2:11 PM

## 2024-12-10 NOTE — ANESTHESIA POSTPROCEDURE EVALUATION
Department of Anesthesiology  Postprocedure Note    Patient: Nithya Hicks  MRN: [id number]  YOB: 1955  Date of evaluation: 12/9/2024    Procedure Summary       Date: 12/09/24 Room / Location: [address]    Anesthesia Start: 1236 Anesthesia Stop: 1441    Procedure: THYROID LOBECTOMY, RIGHT (Right: Neck) Diagnosis:       Thyroid nodule      Multinodular goiter      (Thyroid nodule [E04.1])      (Multinodular goiter [E04.2])    Surgeons: Devante Holt DO Responsible Provider: Damien Casey MD    Anesthesia Type: general ASA Status: 3            Anesthesia Type: No value filed.    Shant Phase I: Shant Score: 8    Shant Phase II: Shant Score: 10    Anesthesia Post Evaluation    Patient location during evaluation: PACU  Patient participation: complete - patient participated  Level of consciousness: awake  Pain score: 2  Airway patency: patent  Cardiovascular status: blood pressure returned to baseline  Respiratory status: acceptable  Hydration status: euvolemic  Pain management: adequate    No notable events documented.

## 2024-12-18 ENCOUNTER — OFFICE VISIT (OUTPATIENT)
Dept: UROGYNECOLOGY | Age: 69
End: 2024-12-18

## 2024-12-18 ENCOUNTER — OFFICE VISIT (OUTPATIENT)
Dept: ENT CLINIC | Age: 69
End: 2024-12-18

## 2024-12-18 VITALS
OXYGEN SATURATION: 98 % | HEART RATE: 87 BPM | DIASTOLIC BLOOD PRESSURE: 110 MMHG | SYSTOLIC BLOOD PRESSURE: 160 MMHG | RESPIRATION RATE: 16 BRPM | TEMPERATURE: 97.8 F

## 2024-12-18 VITALS
RESPIRATION RATE: 16 BRPM | SYSTOLIC BLOOD PRESSURE: 131 MMHG | BODY MASS INDEX: 37.73 KG/M2 | WEIGHT: 221 LBS | HEART RATE: 82 BPM | DIASTOLIC BLOOD PRESSURE: 92 MMHG | HEIGHT: 64 IN | TEMPERATURE: 97.3 F

## 2024-12-18 DIAGNOSIS — N30.10 IC (INTERSTITIAL CYSTITIS): ICD-10-CM

## 2024-12-18 DIAGNOSIS — R39.89 BLADDER PAIN: Primary | ICD-10-CM

## 2024-12-18 DIAGNOSIS — Z48.89 POSTOPERATIVE VISIT: Primary | ICD-10-CM

## 2024-12-18 DIAGNOSIS — Z90.09 HISTORY OF LOBECTOMY OF THYROID: ICD-10-CM

## 2024-12-18 DIAGNOSIS — E04.2 MULTINODULAR GOITER: ICD-10-CM

## 2024-12-18 DIAGNOSIS — B37.9 YEAST INFECTION: ICD-10-CM

## 2024-12-18 PROCEDURE — 99024 POSTOP FOLLOW-UP VISIT: CPT | Performed by: OTOLARYNGOLOGY

## 2024-12-18 RX ORDER — HYDROCORTISONE SODIUM SUCCINATE 100 MG/2ML
100 INJECTION INTRAMUSCULAR; INTRAVENOUS ONCE
Status: COMPLETED | OUTPATIENT
Start: 2024-12-18 | End: 2024-12-18

## 2024-12-18 RX ORDER — LIDOCAINE HYDROCHLORIDE 20 MG/ML
20 INJECTION, SOLUTION INFILTRATION; PERINEURAL ONCE
Status: COMPLETED | OUTPATIENT
Start: 2024-12-18 | End: 2024-12-18

## 2024-12-18 RX ORDER — HEPARIN SODIUM 10000 [USP'U]/ML
10000 INJECTION, SOLUTION INTRAVENOUS; SUBCUTANEOUS ONCE
Status: COMPLETED | OUTPATIENT
Start: 2024-12-18 | End: 2024-12-18

## 2024-12-18 RX ORDER — LIDOCAINE HYDROCHLORIDE 20 MG/ML
JELLY TOPICAL ONCE
Status: SHIPPED | OUTPATIENT
Start: 2024-12-18

## 2024-12-18 RX ORDER — FLUCONAZOLE 150 MG/1
150 TABLET ORAL ONCE
Qty: 1 TABLET | Refills: 0 | Status: SHIPPED | OUTPATIENT
Start: 2024-12-18 | End: 2024-12-18

## 2024-12-18 RX ADMIN — HYDROCORTISONE SODIUM SUCCINATE 100 MG: 100 INJECTION INTRAMUSCULAR; INTRAVENOUS at 13:25

## 2024-12-18 RX ADMIN — Medication 5 MEQ: at 13:25

## 2024-12-18 RX ADMIN — HEPARIN SODIUM 10000 UNITS: 10000 INJECTION, SOLUTION INTRAVENOUS; SUBCUTANEOUS at 11:50

## 2024-12-18 RX ADMIN — LIDOCAINE HYDROCHLORIDE 20 ML: 20 INJECTION, SOLUTION INFILTRATION; PERINEURAL at 13:25

## 2024-12-18 NOTE — PROGRESS NOTES
12/18/2024       HPI:     Name: Nithya Hicks  YOB: 1955    CC: Patient is a 69 y.o. presenting for evaluation of interstitial cystitis,  Bladder pain      HPI: How long have you had this problem?  Years  Please rate the severity of your problem: moderately  Anything make it better?  Instillations    Nithya had thyroid surgery 1 week ago and has had a return of her bladder symptoms over the past week.  Having significant discomfort, urgency, frequency  Uncertain if UTI or IC flare    Ob/Gyn History:    OB History   No obstetric history on file.     Past Medical History:   Past Medical History:   Diagnosis Date    Allergic rhinitis, cause unspecified 12/04/2010    Arthritis     Bilateral carotid artery stenosis     < 50% bilaterally    CAD (coronary artery disease)     angioplasty with stent at [facility] Dr. Yony Bernal, here Dr. Vinny Casillas at [place],     Chronic pain     precordial, opiate dependent    Erosive esophagitis 12/28/2016    Dr. Duke; PPI started; LA Class A, Sphincter of Oddi manometry and sphincterotomy if symptoms don't improve.     Essential hypertension 04/11/2017    Gastroesophageal reflux disease without esophagitis 10/23/2023    H/O bladder infections     Heart attack (HCC)     HSV infection     Hypercholesterolemia 06/06/2014    Interstitial cystitis     Irritable bowel syndrome with diarrhea 09/27/2016    Migraine headache 06/06/2014    controlled    Nonerosive nonspecific gastritis 12/28/2016    Dr. Duke; body and antrum    OAB (overactive bladder) 03/14/2014    Osteopenia     Statin myopathy     Thyroid nodule 11/27/2024    Urgency of urination      Past Surgical History:   Past Surgical History:   Procedure Laterality Date    ABDOMINAL ADHESION SURGERY      ABLATION OF DYSRHYTHMIC FOCUS  02/2021    APPENDECTOMY      CARDIAC CATHETERIZATION  12/07/2013    recheck arteries unchanged    CARDIAC CATHETERIZATION  03/2023    angioplasty for in-stent stenosis

## 2024-12-18 NOTE — PROGRESS NOTES
12/18/2024       HPI:     Name: Nithya Hicks  YOB: 1955    CC: Patient is a 69 y.o. presenting for evaluation of {UroGyn Problems:29639}.       HPI: How long have you had this problem?  ***  Please rate the severity of your problem: {CHP AMB MILD/MODERATE/MODERATELY SEVERE/SEVERE/VERY SEVERE:21020110}  Anything make it better? ***    Ob/Gyn History:    OB History   No obstetric history on file.     Past Medical History:   Past Medical History:   Diagnosis Date   • Allergic rhinitis, cause unspecified 12/04/2010   • Arthritis    • Bilateral carotid artery stenosis     < 50% bilaterally   • CAD (coronary artery disease)     angioplasty with stent at [facility] Dr. Yony Bernal, here Dr. Vinny Casillas at [place],    • Chronic pain     precordial, opiate dependent   • Erosive esophagitis 12/28/2016    Dr. Duke; PPI started; LA Class A, Sphincter of Oddi manometry and sphincterotomy if symptoms don't improve.    • Essential hypertension 04/11/2017   • Gastroesophageal reflux disease without esophagitis 10/23/2023   • H/O bladder infections    • Heart attack (HCC)    • HSV infection    • Hypercholesterolemia 06/06/2014   • Interstitial cystitis    • Irritable bowel syndrome with diarrhea 09/27/2016   • Migraine headache 06/06/2014    controlled   • Nonerosive nonspecific gastritis 12/28/2016    Dr. Duke; body and antrum   • OAB (overactive bladder) 03/14/2014   • Osteopenia    • Statin myopathy    • Thyroid nodule 11/27/2024   • Urgency of urination      Past Surgical History:   Past Surgical History:   Procedure Laterality Date   • ABDOMINAL ADHESION SURGERY     • ABLATION OF DYSRHYTHMIC FOCUS  02/2021   • APPENDECTOMY     • CARDIAC CATHETERIZATION  12/07/2013    recheck arteries unchanged   • CARDIAC CATHETERIZATION  03/2023    angioplasty for in-stent stenosis   • CHOLECYSTECTOMY     • COLONOSCOPY     • CORONARY ANGIOPLASTY WITH STENT PLACEMENT  10/2012    right- TOTAL OF 3 STENTS

## 2024-12-18 NOTE — PROGRESS NOTES
TABLET BY MOUTH DAILY 90 tablet 3    dicyclomine (BENTYL) 10 MG capsule Take 1 capsule by mouth 4 times daily as needed (abdominal spasm/bloating) 120 capsule 5    hydrOXYzine HCl (ATARAX) 25 MG tablet Take 1 tablet by mouth every 8 hours as needed for Itching 30 tablet 0    isosorbide mononitrate (IMDUR) 30 MG extended release tablet Take 1 tablet by mouth daily 90 tablet 3    Evolocumab (REPATHA SURECLICK) 140 MG/ML SOAJ Inject 140 mg into the skin every 14 days 6 Adjustable Dose Pre-filled Pen Syringe 3    dilTIAZem (CARDIZEM CD) 120 MG extended release capsule TAKE 1 CAPSULE BY MOUTH TWICE DAILY 180 capsule 3    vitamin C (ASCORBIC ACID) 500 MG tablet Take 1 tablet by mouth daily      Cholecalciferol (VITAMIN D3) 125 MCG (5000 UT) TABS Take 1 tablet by mouth every other day      vitamin B-12 (CYANOCOBALAMIN) 1000 MCG tablet Take 1 tablet by mouth daily      Potassium 99 MG TABS Take 1 tablet by mouth every other day      nitroGLYCERIN (NITROSTAT) 0.4 MG SL tablet Place 1 tablet under the tongue every 5 minutes as needed for Chest pain up to max of 3 total doses. If no relief after 1 dose, call 911. 25 tablet 3    L-Lysine 1000 MG TABS Take 1 tablet by mouth every morning (before breakfast)       estradiol (ESTRACE) 0.1 MG/GM vaginal cream PLACE 0.5 GRAM VAGINALLY 3 TIMES A WEEK (Patient taking differently: Place vaginally daily Takes Sunday, Tuesday, Thursday) 42.5 g 0     Current Facility-Administered Medications   Medication Dose Route Frequency Provider Last Rate Last Admin    lidocaine (XYLOCAINE) 2 % uro-jet   Urethral Once            Review of Systems     Review of Systems      PhysicalExam     Vitals:    12/18/24 1336   BP: (!) 131/92   Pulse: 82   Resp: 16   Temp: 97.3 °F (36.3 °C)       Physical Exam  Neck incision clean dry and intact.  No erythema, induration or drainage.  Voice is strong.    Procedure           Assessment and Plan     1. Postoperative visit  Patient doing well postoperatively.  No

## 2024-12-21 LAB
BACTERIA UR CULT: ABNORMAL
ORGANISM: ABNORMAL

## 2024-12-23 DIAGNOSIS — N39.0 ACUTE UTI: Primary | ICD-10-CM

## 2024-12-23 RX ORDER — NITROFURANTOIN 25; 75 MG/1; MG/1
100 CAPSULE ORAL 2 TIMES DAILY
Qty: 10 CAPSULE | Refills: 0 | Status: SHIPPED | OUTPATIENT
Start: 2024-12-23 | End: 2024-12-28

## 2025-01-02 ENCOUNTER — TELEPHONE (OUTPATIENT)
Dept: UROGYNECOLOGY | Age: 70
End: 2025-01-02

## 2025-01-02 NOTE — TELEPHONE ENCOUNTER
Informed patient that we unfortunately do not treat C-Diff. Today's appointment cancelled. She has a follow-up with her PCP office tomorrow. Patient verbalizes understanding of all of the above, and has no further questions or concerns at this time. Encouraged to call back the office should any questions/concerns arise. 1/2/2025 at 9:44 AM.

## 2025-01-03 ENCOUNTER — OFFICE VISIT (OUTPATIENT)
Dept: FAMILY MEDICINE CLINIC | Age: 70
End: 2025-01-03

## 2025-01-03 VITALS
SYSTOLIC BLOOD PRESSURE: 138 MMHG | DIASTOLIC BLOOD PRESSURE: 88 MMHG | BODY MASS INDEX: 37.56 KG/M2 | HEART RATE: 99 BPM | TEMPERATURE: 98.5 F | OXYGEN SATURATION: 97 % | HEIGHT: 64 IN | WEIGHT: 220 LBS

## 2025-01-03 DIAGNOSIS — R19.7 DIARRHEA OF PRESUMED INFECTIOUS ORIGIN: Primary | ICD-10-CM

## 2025-01-03 RX ORDER — AZITHROMYCIN 500 MG/1
500 TABLET, FILM COATED ORAL DAILY
Qty: 1 PACKET | Refills: 0 | Status: SHIPPED | OUTPATIENT
Start: 2025-01-03 | End: 2025-01-06

## 2025-01-03 ASSESSMENT — PATIENT HEALTH QUESTIONNAIRE - PHQ9
10. IF YOU CHECKED OFF ANY PROBLEMS, HOW DIFFICULT HAVE THESE PROBLEMS MADE IT FOR YOU TO DO YOUR WORK, TAKE CARE OF THINGS AT HOME, OR GET ALONG WITH OTHER PEOPLE: NOT DIFFICULT AT ALL
8. MOVING OR SPEAKING SO SLOWLY THAT OTHER PEOPLE COULD HAVE NOTICED. OR THE OPPOSITE, BEING SO FIGETY OR RESTLESS THAT YOU HAVE BEEN MOVING AROUND A LOT MORE THAN USUAL: NOT AT ALL
7. TROUBLE CONCENTRATING ON THINGS, SUCH AS READING THE NEWSPAPER OR WATCHING TELEVISION: NOT AT ALL
3. TROUBLE FALLING OR STAYING ASLEEP: NOT AT ALL
SUM OF ALL RESPONSES TO PHQ QUESTIONS 1-9: 0
SUM OF ALL RESPONSES TO PHQ QUESTIONS 1-9: 0
6. FEELING BAD ABOUT YOURSELF - OR THAT YOU ARE A FAILURE OR HAVE LET YOURSELF OR YOUR FAMILY DOWN: NOT AT ALL
5. POOR APPETITE OR OVEREATING: NOT AT ALL
4. FEELING TIRED OR HAVING LITTLE ENERGY: NOT AT ALL
SUM OF ALL RESPONSES TO PHQ QUESTIONS 1-9: 0
9. THOUGHTS THAT YOU WOULD BE BETTER OFF DEAD, OR OF HURTING YOURSELF: NOT AT ALL
1. LITTLE INTEREST OR PLEASURE IN DOING THINGS: NOT AT ALL
SUM OF ALL RESPONSES TO PHQ QUESTIONS 1-9: 0
2. FEELING DOWN, DEPRESSED OR HOPELESS: NOT AT ALL
SUM OF ALL RESPONSES TO PHQ9 QUESTIONS 1 & 2: 0
DEPRESSION UNABLE TO ASSESS: FUNCTIONAL CAPACITY MOTIVATION LIMITS ACCURACY

## 2025-01-03 ASSESSMENT — ENCOUNTER SYMPTOMS
SINUS PRESSURE: 0
NAUSEA: 0
RECTAL PAIN: 0
DIARRHEA: 1
COUGH: 0
SINUS PAIN: 0
BACK PAIN: 0
CONSTIPATION: 0
ABDOMINAL DISTENTION: 0
COLOR CHANGE: 0
SHORTNESS OF BREATH: 0
WHEEZING: 0
ABDOMINAL PAIN: 1

## 2025-01-03 NOTE — PROGRESS NOTES
Nithya Hicks (:  1955) is a 69 y.o. female,Established patient, here for evaluation of the following chief complaint(s):  Hemorrhoids (Given abx for UTI and after Thyroid surgery, has been using OTC products which have not helped, diarrhea)         Assessment & Plan  Diarrhea of presumed infectious origin   Acute condition, new, check stool sample to rule out C. difficile given recent antibiotic use.  Likely viral gastroenteritis.  Recommend increase in fluids.  Avoid Imodium unless diarrhea is severe.  Encouraged bland diet.  She is traveling this weekend.  Will send in prescription for Zithromax to start if symptoms persist for 1 week.  Follow-up after 1 week if no better    Orders:    C DIFF TOXIN/ANTIGEN; Future    azithromycin (ZITHROMAX) 500 MG tablet; Take 1 tablet by mouth daily for 3 days      No follow-ups on file.         Subjective   HPI  Patient is here for diarrhea that started 2 days ago.  Reports greater than 10 loose stools 2 days ago.  Reports watery stools.  She does have some abdominal cramping with this.  She tried taking Imodium which helps slow things down some.  Reports last diarrhea yesterday.  No stools yet today.  She feels rundown.  She is trying to push fluids.  Denies any fever.  No bloody stools.  She is using Preparation H for hemorrhoid that has been irritated by recent diarrhea.  She has been on several antibiotics within the past month following thyroid surgery and also for UTI.    Allergies   Allergen Reactions    Brilinta [Ticagrelor] Shortness Of Breath    Contrast [Iodides] Swelling and Rash     Rash, severe swelling in face and body, SOB Nausea and vomitting    Contrast from MRI       Green Pepper Anaphylaxis     Any peppers     Heparin Swelling     IV    Hydralazine Anaphylaxis    Lidocan [Lidocaine] Anaphylaxis    Pineapple Anaphylaxis     Gi upset     Shellfish-Derived Products Anaphylaxis and Swelling     Throat swelling    Verapamil Myalgia    Chicken

## 2025-01-07 NOTE — PROGRESS NOTES
[facility]   Cardiac Follow Up     Referring Provider:  Nolberto Roche MD     Chief Complaint   Patient presents with    6 Month Follow-Up    Coronary Artery Disease    Hypertension       History of Present Illness:  Nithya Hicks is a 69 y.o. female with a history of coronary artery disease, STEMI with PCI (PCI/ALEC RCA 10/2012, PCI to Ramus 10/2012, ) , hypertension and AFIB. Afib ablation 2/25/21   PCI of distal LAD 2/21  Cath 2021 with patent stents  Repeat ablation done    She eats a vegetarian diet. Grandmother had h/o pericarditis.      She presented to [facility] on 11/19/2017 with complaints of chest pain that radiates to her jaw and arms. She was seen by cardiology and diagnosed with a STEMI and a LHC was completed with a ALEC in the diagonal branch. Developed chest pain again 11/20/2017 and a repeat LHC was completed with no further intervention necessary. She states that she has intolerance to many medications.     She was hospitalized with NSTEMI on 9/13/22 (likely plaque rupture) and underwent revascularization with ALEC x 3 to an occluded Lcx artery.      She was hospitalized 10-31-22-11-4-22. She underwent cardiac cath with stent placement. January 1, 2023 she stopped taking Brilinta.     Since her last visit, she went to ED for chest pain that radiated to L arm, jaw and into back. She was admitted and underwent a LHC, s/p successful PCI of RCA in stent thrombosis 3/16/23 .    5/29/24 she presented to ER with chest pain.  Onset of symptoms 2 days prior.  Describes as dull pressure-like at times turning into s sharp lasted about few minute.  Took nitro with some improvement.  Patient with significant cardiac history with 9 stent.  Recently seen cardiology about 2 weeks ago.  Patient denies any fevers chills no nausea vomiting.  No sick contact   onset about ork up. Stress test 5/29/24 and echo 5/30/24, results below.     Today she is here for 6 mos follow up. She feels very well.  Pt denies

## 2025-01-19 NOTE — PROGRESS NOTES
MEDICARE WELLNESS VISIT + SOAP NOTE    Patient Care Team:  Jam Borjas MD as PCP - General (Internal Medicine)    Artem presents for his Subsequent Annual Medicare Wellness Visit with Medicare Wellness Visit (Subsequent /)   Acute and chronic problems were discussed separately below.    Status MWV Topics Details (Refresh Note to Update)    Depression Screening (Trend)   PHQ2 Interpretation Negative          PHQ2: Score = 0     Depression screening is negative no further plan needed.      Blood Pressure  Weight  BMI     /78  Current Weight 224 lbs  body mass index is 30.35 kg/m².  BMI is in obese range.    Caloric restriction         Advanced Directives on File Not on file. Not interested today.      Health Risk Assessment  (Click to Review or Edit)   Completed      Falls Risk  Have you had a fall in the past year?................................. No  Do you feel unsteady when standing or walking?........ No  Do you worry about falling?.................................................. No         Tobacco/Alcohol/Drugs Never Smoker      reports that he does not currently use alcohol.   reports no history of drug use.      Opioid Review (Update Meds)      No opioid on med list.  is not taking opioid medications.      Cognitive/Functional Status  (Optional MOCA  Mini Cog)   No evidence of cognitive dysfunction by direct observation.    Vision and Hearing Screens    Hearing Screening - Comments:: Patient passed bilateral finger rub test  Vision Screening - Comments:: Patient declined, states he will schedule when he is ready Not applicable      Care Gaps/Screenings  (Click to Review and Order) See patient instructions and orders         The following items on the Medicare Health Risk Assessment were found to be positive  1.) Do you have an Advance directive, living will, or power of  for health care document that contains your wishes for end of life care?: No     2.) Would you like additional information  on advance directives?: Yes     6 a.) How many servings of Fruits and Vegetables do you have each day ( 1 serving = 1 piece of fruit, 1/2 cup fruits or vegetables): 1 per day     11h.) Problems with your hearing: Often     11i.) Problems using the telephone: Often     11l.) Sexual Problems: Often         I reviewed and updated the past Medical, Surgical, Family, Social History, Allergies and Medications in Epic: Yes    Family History   Problem Relation Age of Onset    COPD Mother     Cancer, Lung Father     Alcohol Abuse Father     Depression Sister     Other Brother         Suicide    Other Brother         Accidental death    Other Brother         Accidental death    Patient is unaware of any medical problems Brother          SOAP NOTE       Subjective     Artem is a 73 year old here for Medicare Wellness Visit (Subsequent /)  1. Medicare annual wellness visit, subsequent    2. Encounter for preventive care    3. Essential hypertension    4. Pure hypercholesterolemia    5. Agatston coronary artery calcium score between 100 and 199    6. Need for vaccination    7. Class 1 obesity due to excess calories with serious comorbidity and body mass index (BMI) of 30.0 to 30.9 in adult    8. Mild obstructive sleep apnea    9. Chronic nasal congestion    10. Subcutaneous cyst    11. Erectile dysfunction, unspecified erectile dysfunction type    12. Special screening for malignant neoplasm of prostate        The patient is a 73-year-old male who presents for a follow-up appointment.    He reports a fluctuating lesion on his back, which he inadvertently ruptured while leaning against a chair on [place], resulting in blood drainage. The lesion occasionally itches and causes mild discomfort. He notes that the lesion tends to enlarge during the summer months.    He has been diagnosed with mild sleep apnea by Dr. Bertrand, whom he has consulted once. He also reports a deviated septum. He does not have an ophthalmologist or  dermatologist. He declines the Moderna COVID-19 vaccine and shingles vaccine. He is due for a colonoscopy but plans to postpone it due to financial constraints. He does not monitor his blood pressure at home. He reports no dizziness or lightheadedness. 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 He reports no chest pain or shortness of breath. He takes baby aspirin daily. He does not monitor his weight. He reports satisfactory sleep but experiences nasal congestion on the left side. He reports no headaches, double vision, or loss of vision. He reports no palpitations, heartburn, nausea, vomiting, diarrhea, constipation, hematochezia, or melena. He reports no dysuria or hematuria. He reports no lower extremity edema or claudication.    He reports that sildenafil 20 mg 3 to 5 tablets has been ineffective, and he is interested in trying Cialis.    He experiences difficulty in flexing his toes, which he manages by manually bending them back. This issue is more pronounced during the day when he is on his feet for extended periods. He occasionally experiences Charley horses in both feet.    SOCIAL HISTORY  He is a non-smoker.    FAMILY HISTORY  He reports no family history of prostate cancer.    MEDICATIONS  Current: losartan hydrochlorothiazide, atorvastatin, baby aspirin, sildenafil  Review of Systems  As documented above.    SDOH Never Smoker       Objective   Vitals:    01/17/25 0854   BP: 124/78   Pulse: 63   Resp: 17   SpO2: 97%   Weight: 101.5 kg (223 lb 12.3 oz)   Height: 6' (1.829 m)   BMI (Calculated): 30.35     Physical Exam  General: Alert. Normal appearance.  Head: Normocephalic.  Eyes: Pupils equal, round and reactive to light. Conjunctivae normal.  Ear: Tympanic membranes and external ear canals normal.  Nose: Nares normal. No sinus tenderness.  Throat: Lips, mucosa, and tongue normal.   Neck:  Supple.Thyroid normal. Lymphadenopathy is not present.  Cardiovascular: Normal rate and regular rhythm. Normal S1, S2. Murmurs not present.  Respiratory: Normal respiratory effort. No wheezing, rales or rhonchi.  Abdomen: Soft, non-tender and bowel sounds active. No masses or organomegaly.  Musculoskeletal:  Lower extremity edema not present. Gait is normal.  Neurologic: Cranial nerves 2-12 grossly intact. No focal deficits.  Genitalia: Exam deferred.  Psychiatric: Mood is normal, affect is normal, thought content is normal.      There is a little bit of wax in both ear canals, but otherwise fine. Eardrums are visible.  Neck is normal. No bruits.  Lung bases are clear.  Heart tones are normal.  Extremities show no significant changes.  A 1 cm cyst at mid back just to the right of midline is noted.     ASSESSMENT AND PLAN        1. Subcutaneous cyst:  - Currently inflamed, will not be excised until inflammation subsides  - Referral to a general surgeon initiated for removal    2. Mild sleep apnea:  - Reports mild sleep apnea and a crushed sinus on one side  - Not under the care of a sleep doctor    3. Hypertension:  - Taking losartan hydrochlorothiazide 50/12.5 mg daily  - Reports no dizziness or lightheadedness    4. Hyperlipidemia:  - LDL cholesterol level is 77, above the target of less than 70  - Advised to limit dietary intake of saturated fats and increase fiber intake  - Continue current dose of atorvastatin  - Provided with a heart-healthy diet plan    5. Muscle spasms:  - Reports muscle spasms in feet, especially during the day when on feet a lot  - Advised to try an over-the-counter magnesium supplement for muscle cramps    6. Erectile dysfunction:  - [facility] 20 mg, to be taken daily as needed[facility]    7. Health maintenance:  - Last colonoscopy in 2014, due for another one  - PSA level within normal range at 1.59  - Advised to consider scheduling a colonoscopy when feasible  -  Advised to receive RSV vaccine at the pharmacy  - Will receive influenza vaccine today  - PSA level to be rechecked in 1 year    Follow-up:  - Patient to follow up in 1 year    1. Medicare annual wellness visit, subsequent  -      - Subsequent Medicare Wellness Visit - Select if billing add'l E/M  2. Encounter for preventive care  -     43246 - 65+ Follow up preventive, established patient  3. Essential hypertension  -     Comprehensive Metabolic Panel; Future  4. Pure hypercholesterolemia  -     Lipid Panel With Reflex; Future  -     Glycohemoglobin; Future  5. Agatston coronary artery calcium score between 100 and 199  6. Need for vaccination  -     INFLUENZA (FLUZONE) HIGH DOSE  7. Class 1 obesity due to excess calories with serious comorbidity and body mass index (BMI) of 30.0 to 30.9 in adult  8. Mild obstructive sleep apnea  9. Chronic nasal congestion  10. Subcutaneous cyst  -     SERVICE TO SURGERY GENERAL  11. Erectile dysfunction, unspecified erectile dysfunction type  -     tadalafil (CIALIS) 20 MG tablet; Take 1 tablet by mouth daily as needed for Erectile Dysfunction.  12. Special screening for malignant neoplasm of prostate  -     PSA; Future  Other orders  -     atorvastatin (LIPITOR) 20 MG tablet; Take 1 tablet by mouth every evening.  -     losartan-hydrochlorothiazide (HYZAAR) 50-12.5 MG per tablet; Take 1 tablet by mouth daily.      Return in about 1 year (around 1/17/2026) for Fasting labs before next apt., Follow up and MWV.                 Nithya Hicks is a 69 y.o. female with   1. Bladder pain    2. Urethral pain    3. Urinary frequency    -Bladder pain/frequency:  She is pleased with treatment outcomes and had a very good week free of bladder symptoms  Patient given bladder instillation today and will then return in 3 weeks  Continues to monitor diet  JAYLEEN Rehman - CNP      Orders Placed This Encounter   Procedures    POCT Urinalysis no Micro     Orders Placed This Encounter   Medications    lidocaine 1 % injection 20 mL    heparin (porcine) injection 10,000 Units    lidocaine (XYLOCAINE) 2 % uro-jet    hydrocortisone sodium succinate PF (SOLU-CORTEF) injection 100 mg    sodium bicarbonate 8.4 % injection 5 mEq       J[place] Guzman APRN - CNP

## 2025-01-22 ENCOUNTER — OFFICE VISIT (OUTPATIENT)
Dept: UROGYNECOLOGY | Age: 70
End: 2025-01-22
Payer: MEDICARE

## 2025-01-22 VITALS
SYSTOLIC BLOOD PRESSURE: 154 MMHG | TEMPERATURE: 97.8 F | HEART RATE: 84 BPM | DIASTOLIC BLOOD PRESSURE: 89 MMHG | RESPIRATION RATE: 16 BRPM | OXYGEN SATURATION: 99 %

## 2025-01-22 DIAGNOSIS — R39.89 BLADDER PAIN: Primary | ICD-10-CM

## 2025-01-22 DIAGNOSIS — N30.10 IC (INTERSTITIAL CYSTITIS): ICD-10-CM

## 2025-01-22 DIAGNOSIS — R39.89 URETHRAL PAIN: ICD-10-CM

## 2025-01-22 LAB
BILIRUBIN, POC: NORMAL
BLOOD URINE, POC: NORMAL
CLARITY, POC: NORMAL
COLOR, POC: YELLOW
GLUCOSE URINE, POC: NORMAL MG/DL
KETONES, POC: NORMAL MG/DL
LEUKOCYTE EST, POC: NORMAL
NITRITE, POC: NORMAL
PH, POC: 7
PROTEIN, POC: NORMAL MG/DL
SPECIFIC GRAVITY, POC: 1.02
UROBILINOGEN, POC: NORMAL MG/DL

## 2025-01-22 PROCEDURE — 99213 OFFICE O/P EST LOW 20 MIN: CPT | Performed by: NURSE PRACTITIONER

## 2025-01-22 PROCEDURE — 1159F MED LIST DOCD IN RCRD: CPT | Performed by: NURSE PRACTITIONER

## 2025-01-22 PROCEDURE — 81002 URINALYSIS NONAUTO W/O SCOPE: CPT | Performed by: NURSE PRACTITIONER

## 2025-01-22 PROCEDURE — 3077F SYST BP >= 140 MM HG: CPT | Performed by: NURSE PRACTITIONER

## 2025-01-22 PROCEDURE — 51700 IRRIGATION OF BLADDER: CPT | Performed by: NURSE PRACTITIONER

## 2025-01-22 PROCEDURE — 1160F RVW MEDS BY RX/DR IN RCRD: CPT | Performed by: NURSE PRACTITIONER

## 2025-01-22 PROCEDURE — 3079F DIAST BP 80-89 MM HG: CPT | Performed by: NURSE PRACTITIONER

## 2025-01-22 PROCEDURE — 1123F ACP DISCUSS/DSCN MKR DOCD: CPT | Performed by: NURSE PRACTITIONER

## 2025-01-22 RX ORDER — LIDOCAINE HYDROCHLORIDE 20 MG/ML
5 INJECTION, SOLUTION INFILTRATION; PERINEURAL ONCE
Status: COMPLETED | OUTPATIENT
Start: 2025-01-22 | End: 2025-01-22

## 2025-01-22 RX ORDER — HYDROCORTISONE SODIUM SUCCINATE 100 MG/2ML
100 INJECTION INTRAMUSCULAR; INTRAVENOUS ONCE
Status: COMPLETED | OUTPATIENT
Start: 2025-01-22 | End: 2025-01-22

## 2025-01-22 RX ORDER — LIDOCAINE HYDROCHLORIDE 20 MG/ML
JELLY TOPICAL ONCE
Status: COMPLETED | OUTPATIENT
Start: 2025-01-22 | End: 2025-01-22

## 2025-01-22 RX ORDER — HEPARIN SODIUM 10000 [USP'U]/ML
10000 INJECTION, SOLUTION INTRAVENOUS; SUBCUTANEOUS ONCE
Status: COMPLETED | OUTPATIENT
Start: 2025-01-22 | End: 2025-01-22

## 2025-01-22 RX ADMIN — HEPARIN SODIUM 10000 UNITS: 10000 INJECTION, SOLUTION INTRAVENOUS; SUBCUTANEOUS at 11:53

## 2025-01-22 RX ADMIN — LIDOCAINE HYDROCHLORIDE 20 ML: 20 INJECTION, SOLUTION INFILTRATION; PERINEURAL at 12:03

## 2025-01-22 RX ADMIN — HYDROCORTISONE SODIUM SUCCINATE 100 MG: 100 INJECTION INTRAMUSCULAR; INTRAVENOUS at 12:02

## 2025-01-22 RX ADMIN — LIDOCAINE HYDROCHLORIDE: 20 JELLY TOPICAL at 12:02

## 2025-01-22 RX ADMIN — Medication 5 MEQ: at 12:01

## 2025-01-22 NOTE — PROGRESS NOTES
1/22/2025     HPI:     Name: Nithya Hicks  YOB: 1955    CC: Nithya Hicks is a 69 y.o. female who is here for follow up of bladder pain.  HPI: How long have you had this problem?    Please rate the severity of your problem:  moderate  Anything make it better?     Ob/Gyn History:    OB History   No obstetric history on file.     Past Medical History:   Past Medical History:   Diagnosis Date    Allergic rhinitis, cause unspecified 12/04/2010    Arthritis     Bilateral carotid artery stenosis     < 50% bilaterally    CAD (coronary artery disease)     angioplasty with stent at [facility] Dr. Yony Bernal, here Dr. Vinny Casillas at [place],     Chronic pain     precordial, opiate dependent    Erosive esophagitis 12/28/2016    Dr. Duke; PPI started; LA Class A, Sphincter of Oddi manometry and sphincterotomy if symptoms don't improve.     Essential hypertension 04/11/2017    Gastroesophageal reflux disease without esophagitis 10/23/2023    H/O bladder infections     Heart attack (HCC)     HSV infection     Hypercholesterolemia 06/06/2014    Interstitial cystitis     Irritable bowel syndrome with diarrhea 09/27/2016    Migraine headache 06/06/2014    controlled    Nonerosive nonspecific gastritis 12/28/2016    Dr. Duke; body and antrum    OAB (overactive bladder) 03/14/2014    Osteopenia     Statin myopathy     Thyroid nodule 11/27/2024    Urgency of urination      Past Surgical History:   Past Surgical History:   Procedure Laterality Date    ABDOMINAL ADHESION SURGERY      ABLATION OF DYSRHYTHMIC FOCUS  02/2021    APPENDECTOMY      CARDIAC CATHETERIZATION  12/07/2013    recheck arteries unchanged    CARDIAC CATHETERIZATION  03/2023    angioplasty for in-stent stenosis    CHOLECYSTECTOMY      COLONOSCOPY      CORONARY ANGIOPLASTY WITH STENT PLACEMENT  10/2012    right- TOTAL OF 3 STENTS (patient states 9 stents total)    CORONARY ANGIOPLASTY WITH STENT PLACEMENT  02/04/2021    CORONARY

## 2025-01-24 ENCOUNTER — TELEPHONE (OUTPATIENT)
Dept: UROGYNECOLOGY | Age: 70
End: 2025-01-24

## 2025-01-24 DIAGNOSIS — N95.2 VAGINAL ATROPHY: ICD-10-CM

## 2025-01-24 DIAGNOSIS — R35.0 URINARY FREQUENCY: ICD-10-CM

## 2025-01-24 DIAGNOSIS — R39.15 URINARY URGENCY: Primary | ICD-10-CM

## 2025-01-24 RX ORDER — CIPROFLOXACIN 500 MG/1
500 TABLET, FILM COATED ORAL 2 TIMES DAILY
Qty: 14 TABLET | Refills: 0 | Status: SHIPPED | OUTPATIENT
Start: 2025-01-24 | End: 2025-01-31

## 2025-01-24 NOTE — TELEPHONE ENCOUNTER
Advised patient that result has not been reviewed by a provider yet, but our office will return her call once reviewed. Patient verbalized understanding.

## 2025-01-24 NOTE — RESULT ENCOUNTER NOTE
Spoke with Nithya and informed her that her urine culture is positive for a UTI. The preliminary sensitivities show Proteus mirabilis, which can be associated with kidney stones. [place] Guzman NP sent in Cipro to her preferred pharmacy. Jamaica also advised that patient go to the ED if she is experiencing fever, nausea, vomiting, blood in the urine or back pain. Patient reported that she does have back pain, but she will not be going to the Emergency Department as she has a wedding to go to tomorrow. She will take the Cipro and has been taking tylenol. ANGIE orders placed so that she may drop a urine specimen off at the lab a few days after being done with her antibiotic.  Electronically signed by Timothy Dodge RN on 1/24/2025 at 1455

## 2025-01-25 LAB
BACTERIA UR CULT: ABNORMAL
ORGANISM: ABNORMAL

## 2025-01-28 ENCOUNTER — OFFICE VISIT (OUTPATIENT)
Dept: ENT CLINIC | Age: 70
End: 2025-01-28

## 2025-01-28 VITALS
SYSTOLIC BLOOD PRESSURE: 133 MMHG | BODY MASS INDEX: 37.05 KG/M2 | DIASTOLIC BLOOD PRESSURE: 85 MMHG | HEIGHT: 64 IN | TEMPERATURE: 97.3 F | WEIGHT: 217 LBS | HEART RATE: 81 BPM | RESPIRATION RATE: 16 BRPM

## 2025-01-28 DIAGNOSIS — Z90.09 HISTORY OF LOBECTOMY OF THYROID: ICD-10-CM

## 2025-01-28 DIAGNOSIS — Z48.89 POSTOPERATIVE VISIT: ICD-10-CM

## 2025-01-28 DIAGNOSIS — E04.2 MULTINODULAR GOITER: Primary | ICD-10-CM

## 2025-01-28 PROCEDURE — 99024 POSTOP FOLLOW-UP VISIT: CPT | Performed by: OTOLARYNGOLOGY

## 2025-01-28 NOTE — PROGRESS NOTES
[facility]  [address] , [address]  [place] [address]  P: [phone]  F: [phone]       Patient     Nithya Hicks  1955    ChiefComplaint     Chief Complaint   Patient presents with    Follow-up    Post-Op Check     Follow up from surgery       History of Present Illness     Nithya Hicks is a pleasant 69 y.o. female here for postop thyroid lobectomy.  Doing well.  Incision healing well.  No complaints or concerns.    Past Medical History     Past Medical History:   Diagnosis Date    Allergic rhinitis, cause unspecified 12/04/2010    Arthritis     Bilateral carotid artery stenosis     < 50% bilaterally    CAD (coronary artery disease)     angioplasty with stent at [facility] Dr. Yony Bernal, here Dr. Vinny Casillas at [place],     Chronic pain     precordial, opiate dependent    Erosive esophagitis 12/28/2016    Dr. Duke; PPI started; LA Class A, Sphincter of Oddi manometry and sphincterotomy if symptoms don't improve.     Essential hypertension 04/11/2017    Gastroesophageal reflux disease without esophagitis 10/23/2023    H/O bladder infections     Heart attack (HCC)     HSV infection     Hypercholesterolemia 06/06/2014    Interstitial cystitis     Irritable bowel syndrome with diarrhea 09/27/2016    Migraine headache 06/06/2014    controlled    Nonerosive nonspecific gastritis 12/28/2016    Dr. Duke; body and antrum    OAB (overactive bladder) 03/14/2014    Osteopenia     Statin myopathy     Thyroid nodule 11/27/2024    Urgency of urination        Past Surgical History     Past Surgical History:   Procedure Laterality Date    ABDOMINAL ADHESION SURGERY      ABLATION OF DYSRHYTHMIC FOCUS  02/2021    APPENDECTOMY      CARDIAC CATHETERIZATION  12/07/2013    recheck arteries unchanged    CARDIAC CATHETERIZATION  03/2023    angioplasty for in-stent stenosis    CHOLECYSTECTOMY      COLONOSCOPY      CORONARY ANGIOPLASTY WITH STENT PLACEMENT  10/2012    right-

## 2025-01-29 ENCOUNTER — HOSPITAL ENCOUNTER (OUTPATIENT)
Age: 70
Discharge: HOME | End: 2025-01-29
Payer: MEDICARE

## 2025-01-29 DIAGNOSIS — I10 ESSENTIAL HYPERTENSION: ICD-10-CM

## 2025-01-29 DIAGNOSIS — E78.2 MIXED HYPERLIPIDEMIA: ICD-10-CM

## 2025-01-29 DIAGNOSIS — I25.10 CORONARY ARTERY DISEASE INVOLVING NATIVE CORONARY ARTERY OF NATIVE HEART WITHOUT ANGINA PECTORIS: ICD-10-CM

## 2025-01-29 LAB
CHOLEST SERPL-MCNC: 134 MG/DL (ref 0–199)
HDLC SERPL-MCNC: 49 MG/DL (ref 40–60)
LDL CHOLESTEROL: 50 MG/DL
TRIGL SERPL-MCNC: 175 MG/DL (ref 0–150)
VLDLC SERPL CALC-MCNC: 35 MG/DL

## 2025-01-29 PROCEDURE — 80061 LIPID PANEL: CPT

## 2025-01-29 PROCEDURE — 36415 COLL VENOUS BLD VENIPUNCTURE: CPT

## 2025-01-30 ENCOUNTER — TELEPHONE (OUTPATIENT)
Dept: PHARMACY | Facility: CLINIC | Age: 70
End: 2025-01-30

## 2025-01-30 NOTE — TELEPHONE ENCOUNTER
Frank Jones MD: per below regarding statin care gap, appt with you 1/31/25 at 1:30pm  - chart reviewed due to insurer-identified gap: CAD but no statin Rx  - per chart unable to tolerate statins and can be excluded from metric if certain dx/ICD-10 code done at provider visit  - of note is currently prescribed Repatha    If appropriate, please consider adding CMS allowable diagnosis within your 1/31/25 visit encounter:  \"myalgia due to statin\" (ICD-10 M79.10, T46.6X5A) - from problem list  this will complete/close this insurer-identified gap for this calendar year    Thank you,  Fanny Wilkinson, PharmD, BCACP  Population Health Pharmacist  [facility] Clinical [facility]  Department, toll free: [phone], option 1    ==============================================================    [facility] CL[facility]ICAL [facility]ARMAC[facility]: STATIN THERAPY REVIEW  Identified statin use in persons with cardiovascular disease care gap per [facility]. Records dated 2024.     ASSESSMENT:  Patient has been identified as having a diagnosis for clinical ASCVD or event (e.g., inpatient hospitalization for MI, CABG, PCI or other revascularization procedures) in the measurement year and not currently filling a moderate or high intensity statin. Patients included in this care gap are males age 21-75 and females age 40-75.     Per chart review, patient is prescribed Repatha; has tried statins and unable to tolerate due to myalgias (allergy list). Dx \"myalgia due to statin\" done at 5/16/24 PCP OV & is on problem list. Dx \"statin myopathy\" last done at 8/21/2023 PCP OV and is on problem list.    Per last cardio OV 6/27/24:  She is highly statin intolerant- severe myalgia     Allergies   Allergen Reactions    Brilinta [Ticagrelor] Shortness Of Breath    Contrast [Iodides] Swelling and Rash     Rash, severe swelling in face and body, SOB Nausea and vomitting    Contrast from MRI       Green Pepper Anaphylaxis     Any peppers     Heparin Swelling     IV

## 2025-01-31 ENCOUNTER — OFFICE VISIT (OUTPATIENT)
Dept: CARDIOLOGY CLINIC | Age: 70
End: 2025-01-31

## 2025-01-31 VITALS
WEIGHT: 217 LBS | HEIGHT: 64 IN | DIASTOLIC BLOOD PRESSURE: 88 MMHG | OXYGEN SATURATION: 96 % | SYSTOLIC BLOOD PRESSURE: 138 MMHG | BODY MASS INDEX: 37.05 KG/M2 | HEART RATE: 81 BPM

## 2025-01-31 DIAGNOSIS — I25.10 CORONARY ARTERY DISEASE INVOLVING NATIVE CORONARY ARTERY OF NATIVE HEART WITHOUT ANGINA PECTORIS: Primary | ICD-10-CM

## 2025-01-31 DIAGNOSIS — I10 ESSENTIAL HYPERTENSION: ICD-10-CM

## 2025-01-31 DIAGNOSIS — I48.0 PAF (PAROXYSMAL ATRIAL FIBRILLATION) (HCC): ICD-10-CM

## 2025-01-31 DIAGNOSIS — R06.02 SHORTNESS OF BREATH: ICD-10-CM

## 2025-01-31 DIAGNOSIS — M79.10 MYALGIA DUE TO STATIN: ICD-10-CM

## 2025-01-31 DIAGNOSIS — Z98.61 S/P CORONARY ANGIOPLASTY: ICD-10-CM

## 2025-01-31 DIAGNOSIS — T46.6X5A MYALGIA DUE TO STATIN: ICD-10-CM

## 2025-01-31 DIAGNOSIS — E78.2 MIXED HYPERLIPIDEMIA: ICD-10-CM

## 2025-02-03 NOTE — TELEPHONE ENCOUNTER
Pt had 1/31/25 cardiology OV - dx done at visit to exclude patient from statin quality metric, thank you!    Myalgia due to statin M79.10, T46.6X5A     For Pharmacy Admin Tracking Only    Program: [employer]  CPA in place:  No  Recommendation Provided To: Provider: 1 via Note to Provider  Intervention Accepted By: Provider: 1  Gap Closed?: Yes   Time Spent (min): 15

## 2025-02-07 DIAGNOSIS — R39.15 URINARY URGENCY: ICD-10-CM

## 2025-02-07 DIAGNOSIS — R35.0 URINARY FREQUENCY: ICD-10-CM

## 2025-02-08 LAB — BACTERIA UR CULT: NORMAL

## 2025-02-10 ENCOUNTER — TELEPHONE (OUTPATIENT)
Dept: CARDIOLOGY CLINIC | Age: 70
End: 2025-02-10

## 2025-02-10 NOTE — RESULT ENCOUNTER NOTE
Advised patient of normal urine culture. Patient verbalized understanding- denies any questions/concerns at this time.

## 2025-02-10 NOTE — TELEPHONE ENCOUNTER
Nithya reports having palpitations that started since last seeing LES.  She is unsure what it is causing it.  She wonders if it could be due to partial thyroidectomy in December or side effect of cipro but she has been off of it for 2 weeks.  No chest pain/pressure, no sob, but chest feels \"sore\" at times.    Pt scheduled with NP 2/17 and instructed to notify us if she experiences chest pain/pressure of sob meanwhile.

## 2025-02-10 NOTE — TELEPHONE ENCOUNTER
Spoke to JH NP regarding below > ok to schedule tomorrow 2/11 at 11:30 am or this Friday 2/14 at 11:30 am > VM message left with this information on patient's VM.

## 2025-02-11 ENCOUNTER — OFFICE VISIT (OUTPATIENT)
Dept: CARDIOLOGY CLINIC | Age: 70
End: 2025-02-11
Payer: MEDICARE

## 2025-02-11 ENCOUNTER — ANCILLARY PROCEDURE (OUTPATIENT)
Dept: CARDIOLOGY CLINIC | Age: 70
End: 2025-02-11

## 2025-02-11 ENCOUNTER — HOSPITAL ENCOUNTER (OUTPATIENT)
Age: 70
Discharge: HOME | End: 2025-02-11
Payer: MEDICARE

## 2025-02-11 VITALS
WEIGHT: 217.6 LBS | HEART RATE: 82 BPM | OXYGEN SATURATION: 98 % | BODY MASS INDEX: 37.15 KG/M2 | DIASTOLIC BLOOD PRESSURE: 85 MMHG | HEIGHT: 64 IN | SYSTOLIC BLOOD PRESSURE: 132 MMHG

## 2025-02-11 DIAGNOSIS — R00.2 PALPITATIONS: ICD-10-CM

## 2025-02-11 DIAGNOSIS — R07.9 CHEST PAIN, UNSPECIFIED TYPE: ICD-10-CM

## 2025-02-11 DIAGNOSIS — R00.2 PALPITATIONS: Primary | ICD-10-CM

## 2025-02-11 LAB
ALBUMIN SERPL-MCNC: 4.2 G/DL (ref 3.4–5)
ALBUMIN/GLOB SERPL: 1.4 {RATIO} (ref 1.1–2.2)
ALP SERPL-CCNC: 125 U/L (ref 40–129)
ALT SERPL-CCNC: 9 U/L (ref 10–40)
ANION GAP SERPL CALCULATED.3IONS-SCNC: 8 MMOL/L (ref 3–16)
AST SERPL-CCNC: 23 U/L (ref 15–37)
BILIRUB SERPL-MCNC: 0.4 MG/DL (ref 0–1)
BUN SERPL-MCNC: 15 MG/DL (ref 7–20)
CALCIUM SERPL-MCNC: 9.4 MG/DL (ref 8.3–10.6)
CHLORIDE SERPL-SCNC: 102 MMOL/L (ref 99–110)
CO2 SERPL-SCNC: 27 MMOL/L (ref 21–32)
CREAT SERPL-MCNC: 0.9 MG/DL (ref 0.6–1.2)
DEPRECATED RDW RBC AUTO: 13.4 % (ref 12.4–15.4)
GFR SERPLBLD CREATININE-BSD FMLA CKD-EPI: 70 ML/MIN/{1.73_M2}
GLUCOSE SERPL-MCNC: 95 MG/DL (ref 70–99)
HCT VFR BLD AUTO: 43 % (ref 36–48)
HGB BLD-MCNC: 14.5 G/DL (ref 12–16)
MAGNESIUM SERPL-MCNC: 2.18 MG/DL (ref 1.8–2.4)
MCH RBC QN AUTO: 28.9 PG (ref 26–34)
MCHC RBC AUTO-ENTMCNC: 33.8 G/DL (ref 31–36)
MCV RBC AUTO: 85.5 FL (ref 80–100)
PLATELET # BLD AUTO: 307 K/UL (ref 135–450)
PMV BLD AUTO: 8.9 FL (ref 5–10.5)
POTASSIUM SERPL-SCNC: 3.7 MMOL/L (ref 3.5–5.1)
PROT SERPL-MCNC: 7.2 G/DL (ref 6.4–8.2)
RBC # BLD AUTO: 5.03 M/UL (ref 4–5.2)
SODIUM SERPL-SCNC: 137 MMOL/L (ref 136–145)
T4 FREE SERPL-MCNC: 1 NG/DL (ref 0.9–1.8)
TSH SERPL DL<=0.005 MIU/L-ACNC: 5.84 UIU/ML (ref 0.27–4.2)
WBC # BLD AUTO: 9.5 K/UL (ref 4–11)

## 2025-02-11 PROCEDURE — 84443 ASSAY THYROID STIM HORMONE: CPT

## 2025-02-11 PROCEDURE — 1159F MED LIST DOCD IN RCRD: CPT

## 2025-02-11 PROCEDURE — 99214 OFFICE O/P EST MOD 30 MIN: CPT

## 2025-02-11 PROCEDURE — 1160F RVW MEDS BY RX/DR IN RCRD: CPT

## 2025-02-11 PROCEDURE — 80053 COMPREHEN METABOLIC PANEL: CPT

## 2025-02-11 PROCEDURE — 1123F ACP DISCUSS/DSCN MKR DOCD: CPT

## 2025-02-11 PROCEDURE — 3075F SYST BP GE 130 - 139MM HG: CPT

## 2025-02-11 PROCEDURE — 36415 COLL VENOUS BLD VENIPUNCTURE: CPT

## 2025-02-11 PROCEDURE — 83735 ASSAY OF MAGNESIUM: CPT

## 2025-02-11 PROCEDURE — 85027 COMPLETE CBC AUTOMATED: CPT

## 2025-02-11 PROCEDURE — 3079F DIAST BP 80-89 MM HG: CPT

## 2025-02-11 PROCEDURE — 84439 ASSAY OF FREE THYROXINE: CPT

## 2025-02-11 PROCEDURE — 93000 ELECTROCARDIOGRAM COMPLETE: CPT

## 2025-02-11 NOTE — NURSING NOTE
Holter requested for pt.  Device was registered and placed.Tutorial given, pt verbalized understanding.    Patch # [id number]

## 2025-02-11 NOTE — PATIENT INSTRUCTIONS
Blood work today  Stress test   Holter monitor   Continue current medication   May take an extra Cardizem as needed for symptoms, not to exceed more than 3 in a day  Return to ER if symptoms of chest worsen or palpations worsen.

## 2025-02-11 NOTE — PROGRESS NOTES
[facility]     Outpatient Follow Up Note    Nithya Hicks is 69 y.o. female who presents today with a history of . coronary artery disease, STEMI with PCI (PCI/ALEC RCA 10/2012, PCI to Ramus 10/2012, ) , hypertension and AFIB. Afib ablation 2/25/21   PCI of distal LAD 2/21  Cath 2021 with patent stents  Repeat ablation done    She eats a vegetarian diet. Grandmother had h/o pericarditis.      She presented to [facility] on 11/19/2017 with complaints of chest pain that radiates to her jaw and arms. She was seen by cardiology and diagnosed with a STEMI and a LHC was completed with a ALEC in the diagonal branch. Developed chest pain again 11/20/2017 and a repeat LHC was completed with no further intervention necessary. She states that she has intolerance to many medications.      She was hospitalized with NSTEMI on 9/13/22 (likely plaque rupture) and underwent revascularization with ALEC x 3 to an occluded Lcx artery.       She was hospitalized 10-31-22-11-4-22. She underwent cardiac cath with stent placement. January 1, 2023 she stopped taking Brilinta.      Since her last visit, she went to ED for chest pain that radiated to L arm, jaw and into back. She was admitted and underwent a LHC, s/p successful PCI of RCA in stent thrombosis 3/16/23 .     5/29/24 she presented to ER with chest pain.  Onset of symptoms 2 days prior.  Describes as dull pressure-like at times turning into s sharp lasted about few minute.  Took nitro with some improvement.  Patient with significant cardiac history with 9 stent.  Recently seen cardiology about 2 weeks ago.  Patient denies any fevers chills no nausea vomiting.  No sick contact   onset about ork up. Stress test 5/29/24 and echo 5/30/24, results below.       CHIEF COMPLAINT / HPI:  Follow Up secondary to   Palpitations dizziness and lightheadedness.     History of Present Illness  The patient is a 69-year-old female who presents for evaluation of palpitations. She is accompanied  [employer] called stating that [place] and day supply was missing.

## 2025-02-12 ENCOUNTER — TELEPHONE (OUTPATIENT)
Dept: FAMILY MEDICINE CLINIC | Age: 70
End: 2025-02-12

## 2025-02-12 NOTE — TELEPHONE ENCOUNTER
Patient went to see her cardiologist yesterday and they did blood work. It shows that her thyroid is off and she was told to contact her PCP to know how to proceed. Please contact patient concerning this matter.    [phone] (home)

## 2025-02-12 NOTE — TELEPHONE ENCOUNTER
I reviewed her bloodwork  Her TSH is a little high but her free T4 is normal and I would not recommend any treatment at this time

## 2025-02-17 ENCOUNTER — TELEPHONE (OUTPATIENT)
Dept: CARDIOLOGY CLINIC | Age: 70
End: 2025-02-17

## 2025-02-17 NOTE — TELEPHONE ENCOUNTER
Pt is asking if the data can be looked at on the monitor she is wearing. She is tired of wearing it and doesn't want to wear it another week. Hoping there is enough data there to tell what is wrong and she can stop wearing it. Please call pt to discuss.  [phone]

## 2025-02-18 NOTE — TELEPHONE ENCOUNTER
Pt calling back. States her skin is irritated and asking if enough info has been received from the past week. Please call today to advise.

## 2025-02-19 ENCOUNTER — PATIENT MESSAGE (OUTPATIENT)
Dept: CARDIOLOGY CLINIC | Age: 70
End: 2025-02-19

## 2025-02-19 NOTE — TELEPHONE ENCOUNTER
Called and spoke to Pt regarding MyChart message, advised per earlier conversation we could schedule her an appointment however, they would not have her final results for the monitor. Advised we would relay the results ASAP once received. I also advised she call and schedule stress test as we did not receive any urgent monitor reports to correlate with her symptoms and that additional testing would give the providers a better picture of what is going on. Pt v/u and would call and schedule stress and await monitor results.

## 2025-02-19 NOTE — TELEPHONE ENCOUNTER
LMOM advising Pt can send back monitor early if she is experiencing irritation. Upon review of [employer] Pt already removed monitor. Advised Pt to c/b with any questions or concerns.

## 2025-02-19 NOTE — TELEPHONE ENCOUNTER
Spoke to Pt regarding data from monitor as she ended early due to skin irritation. I advised it would take a few weeks for the finalized results to be available and the process for how the monitor data is resulted. Pt became frustrated that she would not be receiving the results immediately and told me that in the past she had received her results right away and asked to be scheduled to see [facility]/LES this week to discuss results. I tried to again explain how the process works regarding the final results and that we could schedule her to see LES and/or [facility] but they would not have the results to review with her at the appointment. Pt then disconnected call.

## 2025-02-21 ENCOUNTER — HOSPITAL ENCOUNTER (INPATIENT)
Age: 70
LOS: 2 days | Discharge: HOME | DRG: 313 | End: 2025-02-25
Attending: EMERGENCY MEDICINE | Admitting: HOSPITALIST
Payer: MEDICARE

## 2025-02-21 ENCOUNTER — APPOINTMENT (OUTPATIENT)
Dept: GENERAL RADIOLOGY | Age: 70
DRG: 313 | End: 2025-02-21
Payer: MEDICARE

## 2025-02-21 DIAGNOSIS — R00.2 PALPITATIONS: ICD-10-CM

## 2025-02-21 DIAGNOSIS — I20.9 ANGINA PECTORIS: Primary | ICD-10-CM

## 2025-02-21 DIAGNOSIS — I10 ESSENTIAL HYPERTENSION: ICD-10-CM

## 2025-02-21 DIAGNOSIS — E87.6 HYPOKALEMIA: ICD-10-CM

## 2025-02-21 LAB
ALBUMIN SERPL-MCNC: 4 G/DL (ref 3.4–5)
ALBUMIN/GLOB SERPL: 1.3 {RATIO} (ref 1.1–2.2)
ALP SERPL-CCNC: 127 U/L (ref 40–129)
ALT SERPL-CCNC: 12 U/L (ref 10–40)
ANION GAP SERPL CALCULATED.3IONS-SCNC: 13 MMOL/L (ref 3–16)
AST SERPL-CCNC: 22 U/L (ref 15–37)
BASOPHILS # BLD: 0.1 K/UL (ref 0–0.2)
BASOPHILS NFR BLD: 0.9 %
BILIRUB SERPL-MCNC: 0.4 MG/DL (ref 0–1)
BUN SERPL-MCNC: 14 MG/DL (ref 7–20)
CALCIUM SERPL-MCNC: 9.1 MG/DL (ref 8.3–10.6)
CHLORIDE SERPL-SCNC: 103 MMOL/L (ref 99–110)
CO2 SERPL-SCNC: 23 MMOL/L (ref 21–32)
CREAT SERPL-MCNC: 0.8 MG/DL (ref 0.6–1.2)
DEPRECATED RDW RBC AUTO: 13.1 % (ref 12.4–15.4)
ECHO BSA: 2.11 M2
EOSINOPHIL # BLD: 0.2 K/UL (ref 0–0.6)
EOSINOPHIL NFR BLD: 3.2 %
GFR SERPLBLD CREATININE-BSD FMLA CKD-EPI: 80 ML/MIN/{1.73_M2}
GLUCOSE SERPL-MCNC: 102 MG/DL (ref 70–99)
HCT VFR BLD AUTO: 41.3 % (ref 36–48)
HGB BLD-MCNC: 14.1 G/DL (ref 12–16)
INR PPP: 1.06 (ref 0.85–1.15)
LYMPHOCYTES # BLD: 2.1 K/UL (ref 1–5.1)
LYMPHOCYTES NFR BLD: 27.3 %
MAGNESIUM SERPL-MCNC: 1.81 MG/DL (ref 1.8–2.4)
MCH RBC QN AUTO: 29 PG (ref 26–34)
MCHC RBC AUTO-ENTMCNC: 34.1 G/DL (ref 31–36)
MCV RBC AUTO: 85.1 FL (ref 80–100)
MONOCYTES # BLD: 0.6 K/UL (ref 0–1.3)
MONOCYTES NFR BLD: 7.3 %
NEUTROPHILS # BLD: 4.8 K/UL (ref 1.7–7.7)
NEUTROPHILS NFR BLD: 61.3 %
NT-PROBNP SERPL-MCNC: 196 PG/ML (ref 0–124)
PLATELET # BLD AUTO: 266 K/UL (ref 135–450)
PMV BLD AUTO: 8.3 FL (ref 5–10.5)
POTASSIUM SERPL-SCNC: 3.1 MMOL/L (ref 3.5–5.1)
PROT SERPL-MCNC: 7 G/DL (ref 6.4–8.2)
PROTHROMBIN TIME: 14.1 SEC (ref 11.9–14.9)
RBC # BLD AUTO: 4.86 M/UL (ref 4–5.2)
SODIUM SERPL-SCNC: 139 MMOL/L (ref 136–145)
TROPONIN, HIGH SENSITIVITY: 13 NG/L (ref 0–14)
TROPONIN, HIGH SENSITIVITY: 13 NG/L (ref 0–14)
TROPONIN, HIGH SENSITIVITY: 15 NG/L (ref 0–14)
TROPONIN, HIGH SENSITIVITY: 15 NG/L (ref 0–14)
WBC # BLD AUTO: 7.8 K/UL (ref 4–11)

## 2025-02-21 PROCEDURE — 6360000002 HC RX W HCPCS: Performed by: HOSPITALIST

## 2025-02-21 PROCEDURE — 96374 THER/PROPH/DIAG INJ IV PUSH: CPT

## 2025-02-21 PROCEDURE — 93005 ELECTROCARDIOGRAM TRACING: CPT | Performed by: HOSPITALIST

## 2025-02-21 PROCEDURE — 99285 EMERGENCY DEPT VISIT HI MDM: CPT

## 2025-02-21 PROCEDURE — 85025 COMPLETE CBC W/AUTO DIFF WBC: CPT

## 2025-02-21 PROCEDURE — 85610 PROTHROMBIN TIME: CPT

## 2025-02-21 PROCEDURE — 83880 ASSAY OF NATRIURETIC PEPTIDE: CPT

## 2025-02-21 PROCEDURE — 93005 ELECTROCARDIOGRAM TRACING: CPT | Performed by: EMERGENCY MEDICINE

## 2025-02-21 PROCEDURE — 80053 COMPREHEN METABOLIC PANEL: CPT

## 2025-02-21 PROCEDURE — 71045 X-RAY EXAM CHEST 1 VIEW: CPT

## 2025-02-21 PROCEDURE — G0378 HOSPITAL OBSERVATION PER HR: HCPCS

## 2025-02-21 PROCEDURE — 2500000003 HC RX 250 WO HCPCS: Performed by: HOSPITALIST

## 2025-02-21 PROCEDURE — 96376 TX/PRO/DX INJ SAME DRUG ADON: CPT

## 2025-02-21 PROCEDURE — 83735 ASSAY OF MAGNESIUM: CPT

## 2025-02-21 PROCEDURE — 84484 ASSAY OF TROPONIN QUANT: CPT

## 2025-02-21 PROCEDURE — 6370000000 HC RX 637 (ALT 250 FOR IP): Performed by: NURSE PRACTITIONER

## 2025-02-21 PROCEDURE — 6370000000 HC RX 637 (ALT 250 FOR IP): Performed by: EMERGENCY MEDICINE

## 2025-02-21 PROCEDURE — 6360000002 HC RX W HCPCS: Performed by: NURSE PRACTITIONER

## 2025-02-21 PROCEDURE — 6370000000 HC RX 637 (ALT 250 FOR IP): Performed by: HOSPITALIST

## 2025-02-21 PROCEDURE — 36415 COLL VENOUS BLD VENIPUNCTURE: CPT

## 2025-02-21 RX ORDER — CLOPIDOGREL BISULFATE 75 MG/1
75 TABLET ORAL DAILY
Status: DISCONTINUED | OUTPATIENT
Start: 2025-02-22 | End: 2025-02-25 | Stop reason: HOSPADM

## 2025-02-21 RX ORDER — HYDROXYZINE HYDROCHLORIDE 25 MG/1
25 TABLET, FILM COATED ORAL EVERY 8 HOURS PRN
Status: DISCONTINUED | OUTPATIENT
Start: 2025-02-21 | End: 2025-02-25 | Stop reason: HOSPADM

## 2025-02-21 RX ORDER — ACETAMINOPHEN 650 MG/1
650 SUPPOSITORY RECTAL EVERY 6 HOURS PRN
Status: DISCONTINUED | OUTPATIENT
Start: 2025-02-21 | End: 2025-02-25 | Stop reason: HOSPADM

## 2025-02-21 RX ORDER — SODIUM CHLORIDE 0.9 % (FLUSH) 0.9 %
5-40 SYRINGE (ML) INJECTION PRN
Status: DISCONTINUED | OUTPATIENT
Start: 2025-02-21 | End: 2025-02-25 | Stop reason: HOSPADM

## 2025-02-21 RX ORDER — MAGNESIUM SULFATE IN WATER 40 MG/ML
2000 INJECTION, SOLUTION INTRAVENOUS PRN
Status: DISCONTINUED | OUTPATIENT
Start: 2025-02-21 | End: 2025-02-25 | Stop reason: HOSPADM

## 2025-02-21 RX ORDER — ENOXAPARIN SODIUM 100 MG/ML
40 INJECTION SUBCUTANEOUS DAILY
Status: DISCONTINUED | OUTPATIENT
Start: 2025-02-22 | End: 2025-02-25 | Stop reason: HOSPADM

## 2025-02-21 RX ORDER — DILTIAZEM HYDROCHLORIDE 120 MG/1
120 CAPSULE, COATED, EXTENDED RELEASE ORAL 2 TIMES DAILY
Status: DISCONTINUED | OUTPATIENT
Start: 2025-02-21 | End: 2025-02-25 | Stop reason: HOSPADM

## 2025-02-21 RX ORDER — ONDANSETRON 2 MG/ML
4 INJECTION INTRAMUSCULAR; INTRAVENOUS EVERY 6 HOURS PRN
Status: DISCONTINUED | OUTPATIENT
Start: 2025-02-21 | End: 2025-02-25 | Stop reason: HOSPADM

## 2025-02-21 RX ORDER — ONDANSETRON 4 MG/1
4 TABLET, ORALLY DISINTEGRATING ORAL EVERY 8 HOURS PRN
Status: DISCONTINUED | OUTPATIENT
Start: 2025-02-21 | End: 2025-02-25 | Stop reason: HOSPADM

## 2025-02-21 RX ORDER — POTASSIUM CHLORIDE 1500 MG/1
40 TABLET, EXTENDED RELEASE ORAL ONCE
Status: COMPLETED | OUTPATIENT
Start: 2025-02-21 | End: 2025-02-21

## 2025-02-21 RX ORDER — ACETAMINOPHEN 325 MG/1
650 TABLET ORAL EVERY 6 HOURS PRN
Status: DISCONTINUED | OUTPATIENT
Start: 2025-02-21 | End: 2025-02-25 | Stop reason: HOSPADM

## 2025-02-21 RX ORDER — SODIUM CHLORIDE 9 MG/ML
INJECTION, SOLUTION INTRAVENOUS PRN
Status: DISCONTINUED | OUTPATIENT
Start: 2025-02-21 | End: 2025-02-25 | Stop reason: HOSPADM

## 2025-02-21 RX ORDER — ISOSORBIDE MONONITRATE 30 MG/1
30 TABLET, EXTENDED RELEASE ORAL NIGHTLY
Status: DISCONTINUED | OUTPATIENT
Start: 2025-02-21 | End: 2025-02-24

## 2025-02-21 RX ORDER — MORPHINE SULFATE 4 MG/ML
4 INJECTION, SOLUTION INTRAMUSCULAR; INTRAVENOUS EVERY 4 HOURS PRN
Status: DISCONTINUED | OUTPATIENT
Start: 2025-02-21 | End: 2025-02-21

## 2025-02-21 RX ORDER — POTASSIUM CHLORIDE 1500 MG/1
40 TABLET, EXTENDED RELEASE ORAL PRN
Status: DISCONTINUED | OUTPATIENT
Start: 2025-02-21 | End: 2025-02-25 | Stop reason: HOSPADM

## 2025-02-21 RX ORDER — MORPHINE SULFATE 4 MG/ML
4 INJECTION, SOLUTION INTRAMUSCULAR; INTRAVENOUS
Status: DISCONTINUED | OUTPATIENT
Start: 2025-02-21 | End: 2025-02-25 | Stop reason: HOSPADM

## 2025-02-21 RX ORDER — BUTALBITAL, ACETAMINOPHEN AND CAFFEINE 50; 325; 40 MG/1; MG/1; MG/1
2 TABLET ORAL
Status: COMPLETED | OUTPATIENT
Start: 2025-02-21 | End: 2025-02-21

## 2025-02-21 RX ORDER — POTASSIUM CHLORIDE 7.45 MG/ML
10 INJECTION INTRAVENOUS PRN
Status: DISCONTINUED | OUTPATIENT
Start: 2025-02-21 | End: 2025-02-25 | Stop reason: HOSPADM

## 2025-02-21 RX ORDER — NITROGLYCERIN 0.4 MG/1
0.4 TABLET SUBLINGUAL EVERY 5 MIN PRN
Status: DISCONTINUED | OUTPATIENT
Start: 2025-02-21 | End: 2025-02-25 | Stop reason: HOSPADM

## 2025-02-21 RX ORDER — SODIUM CHLORIDE 0.9 % (FLUSH) 0.9 %
5-40 SYRINGE (ML) INJECTION EVERY 12 HOURS SCHEDULED
Status: DISCONTINUED | OUTPATIENT
Start: 2025-02-21 | End: 2025-02-25 | Stop reason: HOSPADM

## 2025-02-21 RX ORDER — DICYCLOMINE HYDROCHLORIDE 10 MG/1
10 CAPSULE ORAL 4 TIMES DAILY PRN
Status: DISCONTINUED | OUTPATIENT
Start: 2025-02-21 | End: 2025-02-25 | Stop reason: HOSPADM

## 2025-02-21 RX ORDER — POLYETHYLENE GLYCOL 3350 17 G/17G
17 POWDER, FOR SOLUTION ORAL DAILY PRN
Status: DISCONTINUED | OUTPATIENT
Start: 2025-02-21 | End: 2025-02-25 | Stop reason: HOSPADM

## 2025-02-21 RX ADMIN — Medication 10 ML: at 20:19

## 2025-02-21 RX ADMIN — POTASSIUM CHLORIDE 40 MEQ: 1500 TABLET, EXTENDED RELEASE ORAL at 18:11

## 2025-02-21 RX ADMIN — DILTIAZEM HYDROCHLORIDE 120 MG: 120 CAPSULE, COATED, EXTENDED RELEASE ORAL at 20:19

## 2025-02-21 RX ADMIN — MORPHINE SULFATE 4 MG: 4 INJECTION, SOLUTION INTRAMUSCULAR; INTRAVENOUS at 22:34

## 2025-02-21 RX ADMIN — ISOSORBIDE MONONITRATE 30 MG: 30 TABLET, EXTENDED RELEASE ORAL at 22:53

## 2025-02-21 RX ADMIN — BUTALBITAL, ACETAMINOPHEN, AND CAFFEINE 2 TABLET: 50; 325; 40 TABLET ORAL at 22:28

## 2025-02-21 RX ADMIN — MORPHINE SULFATE 4 MG: 4 INJECTION, SOLUTION INTRAMUSCULAR; INTRAVENOUS at 19:04

## 2025-02-21 ASSESSMENT — PAIN DESCRIPTION - LOCATION
LOCATION: CHEST
LOCATION: CHEST
LOCATION: HEAD
LOCATION: CHEST
LOCATION: HEAD
LOCATION: CHEST

## 2025-02-21 ASSESSMENT — PAIN SCALES - GENERAL
PAINLEVEL_OUTOF10: 8
PAINLEVEL_OUTOF10: 6
PAINLEVEL_OUTOF10: 8
PAINLEVEL_OUTOF10: 3
PAINLEVEL_OUTOF10: 5

## 2025-02-21 ASSESSMENT — PAIN DESCRIPTION - DESCRIPTORS
DESCRIPTORS: PRESSURE;HEAVINESS
DESCRIPTORS: ACHING
DESCRIPTORS: DISCOMFORT;TIGHTNESS;PRESSURE

## 2025-02-21 ASSESSMENT — PAIN - FUNCTIONAL ASSESSMENT
PAIN_FUNCTIONAL_ASSESSMENT: PREVENTS OR INTERFERES SOME ACTIVE ACTIVITIES AND ADLS
PAIN_FUNCTIONAL_ASSESSMENT: 0-10

## 2025-02-21 ASSESSMENT — PAIN DESCRIPTION - PAIN TYPE
TYPE: ACUTE PAIN

## 2025-02-21 ASSESSMENT — PAIN DESCRIPTION - ORIENTATION
ORIENTATION: MID
ORIENTATION: MID

## 2025-02-21 NOTE — PROGRESS NOTES
Pharmacy Home Medication Reconciliation Note    A medication reconciliation has been completed for Nithya Hicks 1955    Pharmacy: [facility] [address], [place], [place]  Information provided by: patient    The patient's home medication list is as follows:  Current Facility-Administered Medications on File Prior to Encounter   Medication Dose Route Frequency Provider Last Rate Last Admin    lidocaine (XYLOCAINE) 2 % uro-jet   Urethral Once          Current Outpatient Medications on File Prior to Encounter   Medication Sig Dispense Refill    clopidogrel (PLAVIX) 75 MG tablet TAKE 1 TABLET BY MOUTH DAILY 90 tablet 3    dicyclomine (BENTYL) 10 MG capsule Take 1 capsule by mouth 4 times daily as needed (abdominal spasm/bloating) 120 capsule 5    hydrOXYzine HCl (ATARAX) 25 MG tablet Take 1 tablet by mouth every 8 hours as needed for Itching 30 tablet 0    isosorbide mononitrate (IMDUR) 30 MG extended release tablet Take 1 tablet by mouth daily 90 tablet 3    Evolocumab (REPATHA SURECLICK) 140 MG/ML SOAJ Inject 140 mg into the skin every 14 days (Patient taking differently: Inject 140 mg into the skin every 14 days Every other Wednesday.) 6 Adjustable Dose Pre-filled Pen Syringe 3    dilTIAZem (CARDIZEM CD) 120 MG extended release capsule TAKE 1 CAPSULE BY MOUTH TWICE DAILY (Patient taking differently: Take 1 capsule by mouth 3 times daily) 180 capsule 3    vitamin C (ASCORBIC ACID) 500 MG tablet Take 1 tablet by mouth daily      Cholecalciferol (VITAMIN D3) 125 MCG (5000 UT) TABS Take 1 tablet by mouth daily      vitamin B-12 (CYANOCOBALAMIN) 1000 MCG tablet Take 1 tablet by mouth daily      Potassium 99 MG TABS Take 1 tablet by mouth every other day      nitroGLYCERIN (NITROSTAT) 0.4 MG SL tablet Place 1 tablet under the tongue every 5 minutes as needed for Chest pain up to max of 3 total doses. If no relief after 1 dose, call 911. 25 tablet 3    L-Lysine 1000 MG TABS Take 1 tablet by mouth  every morning (before breakfast)       estradiol (ESTRACE) 0.1 MG/GM vaginal cream PLACE 0.5 GRAM VAGINALLY 3 TIMES A WEEK (Patient taking differently: Place 0.5 g vaginally three times a week Takes Sunday, Tuesday, Thursday) 42.5 g 0       Of note, patient took all AM meds this morning and 2 Nitro tabs prior to ED arrival.    Timing of last doses updated.    Thank you,  Yareli Prieto CPhT

## 2025-02-21 NOTE — ED NOTES
Patient Name: Nithya Hicks  : 1955 69 y.o.  MRN: [id number]  ED Room #: ED-0006/     Chief complaint:   Chief Complaint   Patient presents with    Chest Pain     Pt brought in by [facility], for chest discomfort pt took asa and nitro at home  hx 9 stents     Hospital Problem/Diagnosis:   Hospital Problems             Last Modified POA    * (Principal) Chest pain 2025 Yes         O2 Flow Rate:O2 Device: None (Room air)   (if applicable)  Cardiac Rhythm:   (if applicable)  Active LDA's:           How does patient ambulate? Stand by assist    2. How does patient take pills? Whole with Water    3. Is patient alert? Alert    4. Is patient oriented? To Person, To Place, To Time, To Situation, and Follows Commands    5.   Patient arrived from:  home  Facility Name: ___________________________________________    6. If patient is disoriented or from a Skill Nursing Facility has family been notified of admission? No    7. Patient belongings? Belongings: Cell Phone clothes    Disposition of belongings? Kept with Patient     8. Any specific patient or family belongings/needs/dynamics?   a. none    9. Miscellaneous comments/pending orders?  a. none     If there are any additional questions please reach out to the Emergency Department.

## 2025-02-21 NOTE — ED PROVIDER NOTES
[facility] Emergency Department [facility]    I, Robert Suero MD, am the primary clinician of record.    CHIEF COMPLAINT  Chief Complaint   Patient presents with    Chest Pain     Pt brought in by [facility], for chest discomfort pt took asa and nitro at home  hx 9 stents      HISTORY OF PRESENT ILLNESS  Nithya Hicks is a 69 y.o. female  who presents to the ED complaining of a few weeks of chest discomfort.  It is an uncomfortable central chest feeling with some palpitations and \"heart flipping\" sensation.  No fevers.  No cough or cold sx.  Well established with cardiology due to history of CAD with stents.  Just submitted her Holter monitor on Tuesday of this week.  Does not have results yet.  Feels SOB with easy fatigability and lightheadedness.  This feels like her previous angina.    No other complaints, modifying factors or associated symptoms.     I have reviewed the following from the nursing documentation.    Past Medical History:   Diagnosis Date    Allergic rhinitis, cause unspecified 12/04/2010    Arthritis     Bilateral carotid artery stenosis     < 50% bilaterally    CAD (coronary artery disease)     angioplasty with stent at [facility] Dr. Yony Bernal, here Dr. Vinny Casillas at [place],     Chronic pain     precordial, opiate dependent    Erosive esophagitis 12/28/2016    Dr. Duke; PPI started; LA Class A, Sphincter of Oddi manometry and sphincterotomy if symptoms don't improve.     Essential hypertension 04/11/2017    Gastroesophageal reflux disease without esophagitis 10/23/2023    H/O bladder infections     Heart attack (HCC)     HSV infection     Hypercholesterolemia 06/06/2014    Interstitial cystitis     Irritable bowel syndrome with diarrhea 09/27/2016    Migraine headache 06/06/2014    controlled    Nonerosive nonspecific gastritis 12/28/2016    Dr. Duke; body and antrum    OAB (overactive bladder) 03/14/2014    Osteopenia     Statin myopathy     Thyroid  COPD/asthma, pneumonia, musculoskeletal, reflux/PUD/gastritis, pneumothorax, CHF, thoracic aortic dissection, anxiety    The patient's ED workup was notable for concern for angina pectoris with hypertension on evaluation here today.  Mild hypokalemia repleted orally here.  EKG is not ischemic and has no significant dysrhythmia.  She recently submitted a Holter monitor but does not have the results yet.  Her initial troponin is negative.  Given her extensive CAD/coronary history we will admit for ACS rule out although at this time there is nothing to convincingly suggest NSTEMI or STEMI.  Her symptoms have waxed and waned while in the ED seemingly without provocation.  Her chest x-ray is clear today.  Considered CT of the chest but in addition to being allergic to contrast, I have a low suspicion for PE clinically in the absence of tachycardia tachypnea or hypoxia and do not suspect dissection in the absence of typical symptoms for this which been present for weeks and does not have a wide mediastinum and has symmetric radial pulses..  She already took aspirin today.  She is allergic to nitroglycerin so this is deferred    Is this patient to be included in the SEP-1 Core Measure due to severe sepsis or septic shock?   No     Exclusion criteria - the patient is NOT to be included for SEP-1 Core Measure due to:  Infection is not suspected        Consults:  Hospitalist service consulted for admission    History obtained from: Patient and EMS    Pertinent social determinants of health: None applicable    Chronic conditions potentially affecting care:  CAD with stents, HTN, HLD    Review of other records:  Outpatient notes from specialist (cardiology) from 2/11/25 and 1/31/25    Reassessment:  See MDM for details of medications given and reassessment    During the patient's ED course, the patient was given:  Medications   potassium chloride (KLOR-CON M) extended release tablet 40 mEq (40 mEq Oral Given 2/21/25 1811)         CLINICAL IMPRESSION  1. Angina pectoris    2. Palpitations    3. Hypokalemia    4. Essential hypertension        Blood pressure (!) 170/95, pulse 71, temperature 98 °F (36.7 °C), temperature source Oral, resp. rate 10, height 1.626 m (5' 4\"), weight 96.6 kg (213 lb), SpO2 98%, not currently breastfeeding.    DISPOSITION  Nithya Hicks was admitted in fair condition.    The plan is to admit to the hospital at this time under the hospitalist service.  Hospitalist accepted the patient and will take over the patient's care.    Critical care time:  None    DISCLAIMER: This chart was created using Dragon dictation software.  Efforts were made by me to ensure accuracy, however some errors may be present due to limitations of this technology and occasionally words are not transcribed correctly.        Robert Suero MD  02/21/25 2049

## 2025-02-21 NOTE — H&P
HOSPITALISTS HISTORY AND PHYSICAL    2/21/2025 6:12 PM    Patient Information:  NITHYA HICKS is a 69 y.o. female [id number]  PCP:  Nolberto Roche MD (Tel: [phone] )    Chief complaint:    Chief Complaint   Patient presents with    Chest Pain     Pt brought in by [facility], for chest discomfort pt took asa and nitro at home  hx 9 stents        History of Present Illness:  Nithya Hicks is a 69 y.o. female who presented with presents to ER with complaints of chest discomfort.  Ongoing for few weeks.  Describes some couple central pressure-like feeling with palpitation as if heart split flipping sensation no shortness no call no reported.  Known history of CAD with stent.  Follows cardiology.  Patient just submitted her Holter monitor on Tuesday of this week.  Patient said easily fatigue shortness of breath lightheadedness feels like her previous angina    REVIEW OF SYSTEMS:   Constitutional: Negative for fever,chills or night sweats  ENT: Negative for rhinorrhea, epistaxis, hoarseness, sore throat.  Respiratory: Negative for shortness of breath,wheezing  Cardiovascular: Negative for chest pain, palpitations   Gastrointestinal: Negative for nausea, vomiting, diarrhea  Genitourinary: Negative for polyuria, dysuria   Hematologic/Lymphatic: Negative for bleeding tendency, easy bruising  Musculoskeletal: Negative for myalgias and arthralgias  Neurologic: Negative for confusion,dysarthria.  Skin: Negative for itching,rash, good capillary refill.   Psychiatric: Negative for depression,anxiety, agitation.  Endocrine: Negative for polydipsia,polyuria,heat /cold intolerance.    Past Medical History:   has a past medical history of Allergic rhinitis, cause unspecified, Arthritis, Bilateral carotid artery stenosis, CAD (coronary artery disease), Chronic pain,  effort  Gastrointestinal: Abdomen soft, non-tender, not distended, normal bowel sounds  Musculoskeletal: No cyanosis in digits, neck supple  Neurology: Cranial nerves grossly intact. Alert and oriented in time, place and person. No speech or motor deficits  Psychiatry: Appropriate affect. Not agitated  Skin: Warm, dry, normal turgor, no rash    Labs:  CBC:   Lab Results   Component Value Date/Time    WBC 7.8 02/21/2025 05:00 PM    RBC 4.86 02/21/2025 05:00 PM    HGB 14.1 02/21/2025 05:00 PM    HCT 41.3 02/21/2025 05:00 PM    MCV 85.1 02/21/2025 05:00 PM    MCH 29.0 02/21/2025 05:00 PM    MCHC 34.1 02/21/2025 05:00 PM    RDW 13.1 02/21/2025 05:00 PM     02/21/2025 05:00 PM    MPV 8.3 02/21/2025 05:00 PM     BMP:    Lab Results   Component Value Date/Time     02/21/2025 05:00 PM    K 3.1 02/21/2025 05:00 PM     02/21/2025 05:00 PM    CO2 23 02/21/2025 05:00 PM    BUN 14 02/21/2025 05:00 PM    CREATININE 0.8 02/21/2025 05:00 PM    CALCIUM 9.1 02/21/2025 05:00 PM    GFRAA >60 09/27/2022 02:40 PM    GFRAA >60 12/19/2012 09:20 PM    LABGLOM 80 02/21/2025 05:00 PM    LABGLOM >60 12/20/2023 04:42 AM    GLUCOSE 102 02/21/2025 05:00 PM    GLUCOSE 96 11/14/2011 08:44 AM       Chest Xray:   EKG:    I visualized CXR images and EKG strips     Discussed  with     Problem List  Principal Problem:    Chest pain  Resolved Problems:    * No resolved hospital problems. *        Assessment/Plan:   Chest pain with exertional shortness of breath  .  With known history.  Patient will be admitted for further evaluation consult cardiology will need to see if a Holter monitor picked up anything.  Monitor closely continue home cardiac medication further recommendation to follow pending cardiology evaluation        Lazaro Yuan MD    2/21/2025 6:12 PM

## 2025-02-22 PROBLEM — E03.9 ACQUIRED HYPOTHYROIDISM: Status: ACTIVE | Noted: 2025-02-22

## 2025-02-22 PROBLEM — R00.2 PALPITATIONS: Status: ACTIVE | Noted: 2025-02-22

## 2025-02-22 PROBLEM — I49.3 PVC (PREMATURE VENTRICULAR CONTRACTION): Status: ACTIVE | Noted: 2025-02-22

## 2025-02-22 LAB
ANION GAP SERPL CALCULATED.3IONS-SCNC: 11 MMOL/L (ref 3–16)
BASOPHILS # BLD: 0.1 K/UL (ref 0–0.2)
BASOPHILS NFR BLD: 0.6 %
BUN SERPL-MCNC: 13 MG/DL (ref 7–20)
CALCIUM SERPL-MCNC: 8.8 MG/DL (ref 8.3–10.6)
CHLORIDE SERPL-SCNC: 103 MMOL/L (ref 99–110)
CO2 SERPL-SCNC: 25 MMOL/L (ref 21–32)
CREAT SERPL-MCNC: 0.8 MG/DL (ref 0.6–1.2)
DEPRECATED RDW RBC AUTO: 13.7 % (ref 12.4–15.4)
EKG ATRIAL RATE: 77 BPM
EKG ATRIAL RATE: 84 BPM
EKG DIAGNOSIS: NORMAL
EKG DIAGNOSIS: NORMAL
EKG P AXIS: 60 DEGREES
EKG P-R INTERVAL: 132 MS
EKG P-R INTERVAL: 160 MS
EKG Q-T INTERVAL: 394 MS
EKG Q-T INTERVAL: 400 MS
EKG QRS DURATION: 84 MS
EKG QRS DURATION: 90 MS
EKG QTC CALCULATION (BAZETT): 452 MS
EKG QTC CALCULATION (BAZETT): 465 MS
EKG R AXIS: -11 DEGREES
EKG R AXIS: -9 DEGREES
EKG T AXIS: 41 DEGREES
EKG T AXIS: 79 DEGREES
EKG VENTRICULAR RATE: 77 BPM
EKG VENTRICULAR RATE: 84 BPM
EOSINOPHIL # BLD: 0.4 K/UL (ref 0–0.6)
EOSINOPHIL NFR BLD: 4 %
GFR SERPLBLD CREATININE-BSD FMLA CKD-EPI: 80 ML/MIN/{1.73_M2}
GLUCOSE SERPL-MCNC: 109 MG/DL (ref 70–99)
HCT VFR BLD AUTO: 39.2 % (ref 36–48)
HGB BLD-MCNC: 13.4 G/DL (ref 12–16)
LYMPHOCYTES # BLD: 3.1 K/UL (ref 1–5.1)
LYMPHOCYTES NFR BLD: 34.8 %
MCH RBC QN AUTO: 28.8 PG (ref 26–34)
MCHC RBC AUTO-ENTMCNC: 34.2 G/DL (ref 31–36)
MCV RBC AUTO: 84.2 FL (ref 80–100)
MONOCYTES # BLD: 0.7 K/UL (ref 0–1.3)
MONOCYTES NFR BLD: 7.9 %
NEUTROPHILS # BLD: 4.6 K/UL (ref 1.7–7.7)
NEUTROPHILS NFR BLD: 52.7 %
PLATELET # BLD AUTO: 268 K/UL (ref 135–450)
PMV BLD AUTO: 8.1 FL (ref 5–10.5)
POTASSIUM SERPL-SCNC: 3.7 MMOL/L (ref 3.5–5.1)
RBC # BLD AUTO: 4.66 M/UL (ref 4–5.2)
SODIUM SERPL-SCNC: 139 MMOL/L (ref 136–145)
TSH SERPL DL<=0.005 MIU/L-ACNC: 17.4 UIU/ML (ref 0.27–4.2)
WBC # BLD AUTO: 8.8 K/UL (ref 4–11)

## 2025-02-22 PROCEDURE — 96372 THER/PROPH/DIAG INJ SC/IM: CPT

## 2025-02-22 PROCEDURE — 85025 COMPLETE CBC W/AUTO DIFF WBC: CPT

## 2025-02-22 PROCEDURE — 99223 1ST HOSP IP/OBS HIGH 75: CPT | Performed by: INTERNAL MEDICINE

## 2025-02-22 PROCEDURE — 6370000000 HC RX 637 (ALT 250 FOR IP): Performed by: INTERNAL MEDICINE

## 2025-02-22 PROCEDURE — 6370000000 HC RX 637 (ALT 250 FOR IP): Performed by: HOSPITALIST

## 2025-02-22 PROCEDURE — 6360000002 HC RX W HCPCS: Performed by: HOSPITALIST

## 2025-02-22 PROCEDURE — 2580000003 HC RX 258: Performed by: HOSPITALIST

## 2025-02-22 PROCEDURE — 2500000003 HC RX 250 WO HCPCS: Performed by: HOSPITALIST

## 2025-02-22 PROCEDURE — 93010 ELECTROCARDIOGRAM REPORT: CPT | Performed by: INTERNAL MEDICINE

## 2025-02-22 PROCEDURE — 84443 ASSAY THYROID STIM HORMONE: CPT

## 2025-02-22 PROCEDURE — 96375 TX/PRO/DX INJ NEW DRUG ADDON: CPT

## 2025-02-22 PROCEDURE — 84439 ASSAY OF FREE THYROXINE: CPT

## 2025-02-22 PROCEDURE — 6360000002 HC RX W HCPCS: Performed by: NURSE PRACTITIONER

## 2025-02-22 PROCEDURE — 80048 BASIC METABOLIC PNL TOTAL CA: CPT

## 2025-02-22 PROCEDURE — 6370000000 HC RX 637 (ALT 250 FOR IP): Performed by: NURSE PRACTITIONER

## 2025-02-22 PROCEDURE — G0378 HOSPITAL OBSERVATION PER HR: HCPCS

## 2025-02-22 PROCEDURE — 36415 COLL VENOUS BLD VENIPUNCTURE: CPT

## 2025-02-22 PROCEDURE — 96376 TX/PRO/DX INJ SAME DRUG ADON: CPT

## 2025-02-22 RX ORDER — NADOLOL 20 MG/1
10 TABLET ORAL ONCE
Status: COMPLETED | OUTPATIENT
Start: 2025-02-22 | End: 2025-02-22

## 2025-02-22 RX ORDER — NADOLOL 20 MG/1
10 TABLET ORAL DAILY
Status: DISCONTINUED | OUTPATIENT
Start: 2025-02-23 | End: 2025-02-23

## 2025-02-22 RX ORDER — NADOLOL 20 MG/1
20 TABLET ORAL DAILY
Status: DISCONTINUED | OUTPATIENT
Start: 2025-02-23 | End: 2025-02-22

## 2025-02-22 RX ORDER — DIPHENHYDRAMINE HYDROCHLORIDE 50 MG/ML
25 INJECTION INTRAMUSCULAR; INTRAVENOUS ONCE
Status: COMPLETED | OUTPATIENT
Start: 2025-02-22 | End: 2025-02-22

## 2025-02-22 RX ADMIN — Medication 10 ML: at 08:11

## 2025-02-22 RX ADMIN — DIPHENHYDRAMINE HYDROCHLORIDE 25 MG: 50 INJECTION INTRAMUSCULAR; INTRAVENOUS at 10:45

## 2025-02-22 RX ADMIN — HYDROXYZINE HYDROCHLORIDE 25 MG: 25 TABLET, FILM COATED ORAL at 10:36

## 2025-02-22 RX ADMIN — SODIUM CHLORIDE, PRESERVATIVE FREE 20 MG: 5 INJECTION INTRAVENOUS at 19:47

## 2025-02-22 RX ADMIN — MORPHINE SULFATE 4 MG: 4 INJECTION, SOLUTION INTRAMUSCULAR; INTRAVENOUS at 04:31

## 2025-02-22 RX ADMIN — WATER 40 MG: 1 INJECTION INTRAMUSCULAR; INTRAVENOUS; SUBCUTANEOUS at 10:51

## 2025-02-22 RX ADMIN — DILTIAZEM HYDROCHLORIDE 120 MG: 120 CAPSULE, COATED, EXTENDED RELEASE ORAL at 08:10

## 2025-02-22 RX ADMIN — ISOSORBIDE MONONITRATE 30 MG: 30 TABLET, EXTENDED RELEASE ORAL at 19:47

## 2025-02-22 RX ADMIN — MORPHINE SULFATE 4 MG: 4 INJECTION, SOLUTION INTRAMUSCULAR; INTRAVENOUS at 01:30

## 2025-02-22 RX ADMIN — DILTIAZEM HYDROCHLORIDE 120 MG: 120 CAPSULE, COATED, EXTENDED RELEASE ORAL at 19:47

## 2025-02-22 RX ADMIN — Medication 10 ML: at 19:49

## 2025-02-22 RX ADMIN — SODIUM CHLORIDE, PRESERVATIVE FREE 20 MG: 5 INJECTION INTRAVENOUS at 10:48

## 2025-02-22 RX ADMIN — MORPHINE SULFATE 4 MG: 4 INJECTION, SOLUTION INTRAMUSCULAR; INTRAVENOUS at 14:22

## 2025-02-22 RX ADMIN — MORPHINE SULFATE 4 MG: 4 INJECTION, SOLUTION INTRAMUSCULAR; INTRAVENOUS at 21:35

## 2025-02-22 RX ADMIN — CLOPIDOGREL 75 MG: 75 TABLET, FILM COATED ORAL at 08:10

## 2025-02-22 RX ADMIN — MORPHINE SULFATE 4 MG: 4 INJECTION, SOLUTION INTRAMUSCULAR; INTRAVENOUS at 18:16

## 2025-02-22 RX ADMIN — ONDANSETRON 4 MG: 2 INJECTION, SOLUTION INTRAMUSCULAR; INTRAVENOUS at 08:26

## 2025-02-22 RX ADMIN — ACETAMINOPHEN 650 MG: 325 TABLET ORAL at 04:03

## 2025-02-22 RX ADMIN — ENOXAPARIN SODIUM 40 MG: 100 INJECTION SUBCUTANEOUS at 08:11

## 2025-02-22 RX ADMIN — NADOLOL 10 MG: 20 TABLET ORAL at 09:30

## 2025-02-22 RX ADMIN — MORPHINE SULFATE 4 MG: 4 INJECTION, SOLUTION INTRAMUSCULAR; INTRAVENOUS at 07:31

## 2025-02-22 ASSESSMENT — PAIN SCALES - GENERAL
PAINLEVEL_OUTOF10: 7
PAINLEVEL_OUTOF10: 7
PAINLEVEL_OUTOF10: 6
PAINLEVEL_OUTOF10: 6
PAINLEVEL_OUTOF10: 8
PAINLEVEL_OUTOF10: 7
PAINLEVEL_OUTOF10: 4
PAINLEVEL_OUTOF10: 6
PAINLEVEL_OUTOF10: 4
PAINLEVEL_OUTOF10: 0
PAINLEVEL_OUTOF10: 0

## 2025-02-22 ASSESSMENT — PAIN DESCRIPTION - PAIN TYPE
TYPE: ACUTE PAIN

## 2025-02-22 ASSESSMENT — PAIN DESCRIPTION - LOCATION
LOCATION: HEAD
LOCATION: CHEST

## 2025-02-22 ASSESSMENT — PAIN DESCRIPTION - DESCRIPTORS
DESCRIPTORS: HEAVINESS;PRESSURE
DESCRIPTORS: JABBING
DESCRIPTORS: PRESSURE;HEAVINESS
DESCRIPTORS: ACHING
DESCRIPTORS: PRESSURE;HEAVINESS
DESCRIPTORS: HEAVINESS;PRESSURE

## 2025-02-22 ASSESSMENT — PAIN DESCRIPTION - ORIENTATION
ORIENTATION: MID

## 2025-02-22 NOTE — PROGRESS NOTES
Pt called out reporting itching and swelling to the lip. Pt given PO atarax for the itching. Dr. Yuan notified of the reaction at 1038. IV solumedrol and Benadryl given per his orders. Cardiology paged and made aware of the reaction at 1103.

## 2025-02-22 NOTE — CONSULTS
[facility]   Electrophysiology Consultation   Date: 2/22/2025  Reason for Consultation: PVC, chest discomfort  Consult Requesting Physician: Lazaro Yuan MD     Chief Complaint   Patient presents with    Chest Pain     Pt brought in by [facility], for chest discomfort pt took asa and nitro at home  hx 9 stents     HPI: Nithya Hicks is a 69 y.o. female with history of paroxysmal atrial fibrillation status post ablation in 2022, CAD with history of PCI, hypertension, anxiety, obesity who presented to the emergency room due to chest discomfort.  This seems to have been going on for several weeks.  Prior to that she saw Dr. Jones in office on 1 February 2025 at which point she was asymptomatic and doing well.  An event monitor was put on her for evaluation of recent palpitations.  She feels her PVCs and is very symptomatic with it.  A stress test was ordered recently for evaluation of chest pain which seems to mostly coincide with her PVCs.  Her monitor showed a PVC burden of only 2.3% with symptoms associated with PVC.    Past Medical History:   Diagnosis Date    Allergic rhinitis, cause unspecified 12/04/2010    Arthritis     Bilateral carotid artery stenosis     < 50% bilaterally    CAD (coronary artery disease)     angioplasty with stent at [facility] Dr. Yony Bernal, here Dr. Vinny Casillas at [place],     Chronic pain     precordial, opiate dependent    Erosive esophagitis 12/28/2016    Dr. Duke; PPI started; LA Class A, Sphincter of Oddi manometry and sphincterotomy if symptoms don't improve.     Essential hypertension 04/11/2017    Gastroesophageal reflux disease without esophagitis 10/23/2023    H/O bladder infections     Heart attack (HCC)     HSV infection     Hypercholesterolemia 06/06/2014    Interstitial cystitis     Irritable bowel syndrome with diarrhea 09/27/2016    Migraine headache 06/06/2014    controlled    Nonerosive nonspecific gastritis 12/28/2016      body, SOB Nausea and vomitting    Contrast from MRI       Green Pepper Anaphylaxis     Any peppers     Heparin Swelling     IV    Hydralazine Anaphylaxis    Lidocan [Lidocaine] Anaphylaxis    Pineapple Anaphylaxis     Gi upset     Shellfish-Derived Products Anaphylaxis and Swelling     Throat swelling    Verapamil Myalgia    Chicken Allergy      Gi upset    Pork Allergy      Gi upset    Asa [Aspirin] Nausea Only    Beef (Grilled) Flavor Oil Sol [Flavoring Agent (Non-Screening)] Itching    Beta Adrenergic Blockers      swelling    Chicken (Grilled) Flavor [Chicken Flavoring Agent (Non-Screening)] Itching    Effient [Prasugrel] Other (See Comments)     bruising    Fenofibrate Myalgia     Myalgia      Furosemide Swelling    Gabapentin Swelling    Hctz [Hydrochlorothiazide]     Isosorbide Swelling    Lisinopril      swelling    Losartan Swelling    Metoprolol      Weight gain    Mometasone Furoate Swelling    Nadolol Itching and Swelling    Nasonex [Mometasone] Swelling     Throat swelling    Nitroglycerin Swelling     IV    Nsaids      GI PAIN    Other      Any melons, outside grilled food, turkey, red/green/yellow peppers= swelling    Ranexa [Ranolazine]     Statins Myalgia    Sulfa Antibiotics Swelling    Valsartan Swelling    Xarelto [Rivaroxaban] Swelling    Zetia [Ezetimibe]     Flagyl [Metronidazole] Nausea And Vomiting    Iodine Nausea And Vomiting and Swelling       Social History:  Reviewed.  reports that she quit smoking about 18 years ago. Her smoking use included cigarettes. She started smoking about 38 years ago. She has a 10 pack-year smoking history. She has never been exposed to tobacco smoke. She has never used smokeless tobacco. She reports that she does not drink alcohol and does not use drugs.     Family History:  Reviewed. family history includes Heart Attack in her brother and mother; Heart Disease in her brother, father, maternal uncle, mother, and another family member; High Blood Pressure in    Musculoskeletal: No tenderness. No edema    Lymphadenopathy: Has no cervical adenopathy.   Neurological: Alert and oriented. Cranial nerve appears intact, No Gross deficit   Skin: Skin is warm and dry. No rash noted.   Psychiatric: Has a normal behavior     Labs, diagnostic and imaging results reviewed.     Reviewed.   Recent Labs     02/21/25  1700 02/22/25  0500    139   K 3.1* 3.7    103   CO2 23 25   BUN 14 13   CREATININE 0.8 0.8     Recent Labs     02/21/25  1700 02/22/25  0500   WBC 7.8 8.8   HGB 14.1 13.4   HCT 41.3 39.2   MCV 85.1 84.2    268     Lab Results   Component Value Date/Time    TROPHS 15 02/21/2025 08:56 PM     Lab Results   Component Value Date/Time    BNP 94.0 11/19/2017 10:03 AM     Lab Results   Component Value Date/Time    PROTIME 14.1 02/21/2025 05:00 PM    PROTIME 14.0 03/16/2023 02:26 PM    PROTIME 14.3 10/31/2022 10:52 AM    INR 1.06 02/21/2025 05:00 PM    INR 1.09 03/16/2023 02:26 PM    INR 1.11 10/31/2022 10:52 AM     Lab Results   Component Value Date/Time    CHOL 124 09/30/2024 10:10 AM    HDL 49 01/29/2025 01:17 PM    TRIG 224 09/30/2024 10:10 AM       ECG: Sinus rhythm with frequent Premature ventricular complexe   Echo: 5/30/2024      Left Ventricle: Normal left ventricular systolic function with a visually estimated EF of 60 - 65%. Left ventricle size is normal. Normal wall thickness. Normal wall motion. Abnormal diastolic function. Normal left ventricular filling pressure.    Right Ventricle: Right ventricle is mildly dilated. Low normal systolic function. TAPSE is 1.6 cm. RV Peak S' is 10 cm/s.    Tricuspid Valve: Unable to assess RVSP.    Image quality is technically difficult. Contrast used: Definity.     Cath: 12/19/2023    Left main artery: 30% distal.  Left anterior descending artery: 30% mid followed by a widely patent mid stent.  D1: 30% ostial, widely patent proximal and mid stent  Left circumflex artery: Widely patent mid and distal stents.  OM1:  Normal  Right coronary artery: Widely patent proximal and mid stents, 40% distal.  RPDA: Normal        LEFT VENTRICULOGRAM:  Left ventricular angiogram was done in the 30° FLORIAN projection and revealed  LVEF-60%  LVEDP-11 mmHg  Aortic pressure-128/80 mmHg. There was no gradient between the left ventricle and aorta  Stress test 5/30/2024      Stress Combined Conclusion: The study is positive for myocardial infarction and is negative for myocardial ischemia. Findings suggest a moderate risk of cardiac events.    Perfusion Comments: LV perfusion is abnormal. There is no evidence of inducible ischemia.    Perfusion Defect: There is a moderate severity left ventricular stress perfusion defect that is medium to large in size present in the basal to mid inferolateral, anterolateral and apical lateral segment(s) that is fixed. The defect appears to be infarction.  Holter monitor  The patient was monitored for a total of 6d 23h  Underlying rhythm is sinus.  The minimum heart rate was 63 bpm; the maximum 127 bpm; the average 77 bpm.  23 supraventricular episodes. Longest SVT Episode 21 beats   PVC Ray at 2.31 %  PSVC Ray at 0.07 %  There is a total of 29 patient events associated with sinus rhythm and PVCs.    Chest x-ray  Negative  Scheduled Meds:   famotidine (PEPCID) injection  20 mg IntraVENous BID    [START ON 2/23/2025] nadolol  10 mg Oral Daily    clopidogrel  75 mg Oral Daily    dilTIAZem  120 mg Oral BID    sodium chloride flush  5-40 mL IntraVENous 2 times per day    enoxaparin  40 mg SubCUTAneous Daily    isosorbide mononitrate  30 mg Oral Nightly     Continuous Infusions:   sodium chloride       PRN Meds:.dicyclomine, hydrOXYzine HCl, nitroGLYCERIN, sodium chloride flush, sodium chloride, potassium chloride **OR** potassium alternative oral replacement **OR** potassium chloride, magnesium sulfate, ondansetron **OR** ondansetron, polyethylene glycol, acetaminophen **OR** acetaminophen, morphine     Patient

## 2025-02-22 NOTE — PROGRESS NOTES
V2.0    [facility] Progress Note      Name:  Nithya Hicks /Age/Sex: 1955  (69 y.o. female)   MRN & CSN:  [id number] & [id number] Encounter Date/Time: 2025 2:42 PM EST   Location:  [id number] PCP: Nolberto Roche MD     Attending:Lazaro Yuan MD       Hospital Day: 2    Assessment and Recommendations   Nithya Hicks is a 69 y.o. female who presents with Chest pain      Plan:   Chest pain.  Seems atypical.  Appreciate cardiology recommendation  Patient medication adjusted was added Adderall hemorrhagic reaction.  Will monitor again plan to restart nadolol again tomorrow.    Elevated TSH.  Interestingly her TSH 10 days ago was in the 5.8 range today it is up to 14 we will repeat TSH again tomorrow  With history of thyroidectomy.  Patient may need Synthroid tomorrow # elevated    Difficulty challenge because of multiple allergies      Diet ADULT DIET; Regular   DVT Prophylaxis    Code Status Full Code   Disposition          Personally reviewed Lab Studies and Imaging         Subjective:     Chief Complaint:     Nithya Hicks is a 69 y.o. female who presents with       Review of Systems:      Pertinent positives and negatives discussed in HPI    Objective:     Intake/Output Summary (Last 24 hours) at 2025 1442  Last data filed at 2025 1139  Gross per 24 hour   Intake 240 ml   Output --   Net 240 ml      Vitals:   Vitals:    25 1011 25 1045 25 1100 25 1139   BP:  (!) 141/93 (!) 162/96 (!) 151/91   Pulse: 80 86 76 69   Resp:    15   Temp:    98.1 °F (36.7 °C)   TempSrc:    Temporal   SpO2:    92%   Weight:       Height:             Physical Exam:      General: NAD  Eyes: EOMI  ENT: neck supple  Cardiovascular: Regular rate.  Respiratory: Clear to auscultation  Gastrointestinal: Soft, non tender  Genitourinary: no suprapubic tenderness  Musculoskeletal: No edema  Skin: warm, dry  Neuro: Alert.  Psych: Mood appropriate.         Medications:   Medications:

## 2025-02-23 LAB
ANION GAP SERPL CALCULATED.3IONS-SCNC: 9 MMOL/L (ref 3–16)
BASOPHILS # BLD: 0.1 K/UL (ref 0–0.2)
BASOPHILS NFR BLD: 0.5 %
BUN SERPL-MCNC: 19 MG/DL (ref 7–20)
CALCIUM SERPL-MCNC: 9.3 MG/DL (ref 8.3–10.6)
CHLORIDE SERPL-SCNC: 102 MMOL/L (ref 99–110)
CO2 SERPL-SCNC: 25 MMOL/L (ref 21–32)
CREAT SERPL-MCNC: 0.9 MG/DL (ref 0.6–1.2)
DEPRECATED RDW RBC AUTO: 13.2 % (ref 12.4–15.4)
EOSINOPHIL # BLD: 0 K/UL (ref 0–0.6)
EOSINOPHIL NFR BLD: 0.1 %
GFR SERPLBLD CREATININE-BSD FMLA CKD-EPI: 70 ML/MIN/{1.73_M2}
GLUCOSE SERPL-MCNC: 152 MG/DL (ref 70–99)
HCT VFR BLD AUTO: 40.1 % (ref 36–48)
HGB BLD-MCNC: 13.3 G/DL (ref 12–16)
LYMPHOCYTES # BLD: 1.6 K/UL (ref 1–5.1)
LYMPHOCYTES NFR BLD: 12.5 %
MCH RBC QN AUTO: 28.3 PG (ref 26–34)
MCHC RBC AUTO-ENTMCNC: 33 G/DL (ref 31–36)
MCV RBC AUTO: 85.5 FL (ref 80–100)
MONOCYTES # BLD: 0.6 K/UL (ref 0–1.3)
MONOCYTES NFR BLD: 4.4 %
NEUTROPHILS # BLD: 10.5 K/UL (ref 1.7–7.7)
NEUTROPHILS NFR BLD: 82.5 %
PLATELET # BLD AUTO: 290 K/UL (ref 135–450)
PMV BLD AUTO: 8.1 FL (ref 5–10.5)
POTASSIUM SERPL-SCNC: 4.9 MMOL/L (ref 3.5–5.1)
RBC # BLD AUTO: 4.69 M/UL (ref 4–5.2)
SODIUM SERPL-SCNC: 136 MMOL/L (ref 136–145)
T4 FREE SERPL-MCNC: 1.1 NG/DL (ref 0.9–1.8)
TSH SERPL DL<=0.005 MIU/L-ACNC: 2.7 UIU/ML (ref 0.27–4.2)
WBC # BLD AUTO: 12.8 K/UL (ref 4–11)

## 2025-02-23 PROCEDURE — 85025 COMPLETE CBC W/AUTO DIFF WBC: CPT

## 2025-02-23 PROCEDURE — 80048 BASIC METABOLIC PNL TOTAL CA: CPT

## 2025-02-23 PROCEDURE — 2580000003 HC RX 258: Performed by: HOSPITALIST

## 2025-02-23 PROCEDURE — 1200000000 HC SEMI PRIVATE

## 2025-02-23 PROCEDURE — 96376 TX/PRO/DX INJ SAME DRUG ADON: CPT

## 2025-02-23 PROCEDURE — 36415 COLL VENOUS BLD VENIPUNCTURE: CPT

## 2025-02-23 PROCEDURE — 84443 ASSAY THYROID STIM HORMONE: CPT

## 2025-02-23 PROCEDURE — 6370000000 HC RX 637 (ALT 250 FOR IP): Performed by: NURSE PRACTITIONER

## 2025-02-23 PROCEDURE — 96372 THER/PROPH/DIAG INJ SC/IM: CPT

## 2025-02-23 PROCEDURE — 99233 SBSQ HOSP IP/OBS HIGH 50: CPT | Performed by: INTERNAL MEDICINE

## 2025-02-23 PROCEDURE — 6370000000 HC RX 637 (ALT 250 FOR IP): Performed by: INTERNAL MEDICINE

## 2025-02-23 PROCEDURE — 6370000000 HC RX 637 (ALT 250 FOR IP): Performed by: HOSPITALIST

## 2025-02-23 PROCEDURE — 6360000002 HC RX W HCPCS: Performed by: HOSPITALIST

## 2025-02-23 PROCEDURE — 6360000002 HC RX W HCPCS: Performed by: NURSE PRACTITIONER

## 2025-02-23 PROCEDURE — 2500000003 HC RX 250 WO HCPCS: Performed by: HOSPITALIST

## 2025-02-23 RX ORDER — BUTALBITAL, ACETAMINOPHEN AND CAFFEINE 50; 325; 40 MG/1; MG/1; MG/1
1 TABLET ORAL EVERY 6 HOURS PRN
Status: DISCONTINUED | OUTPATIENT
Start: 2025-02-23 | End: 2025-02-25

## 2025-02-23 RX ORDER — DIPHENHYDRAMINE HYDROCHLORIDE 50 MG/ML
25 INJECTION INTRAMUSCULAR; INTRAVENOUS ONCE
Status: COMPLETED | OUTPATIENT
Start: 2025-02-23 | End: 2025-02-23

## 2025-02-23 RX ADMIN — MORPHINE SULFATE 4 MG: 4 INJECTION, SOLUTION INTRAMUSCULAR; INTRAVENOUS at 18:32

## 2025-02-23 RX ADMIN — ISOSORBIDE MONONITRATE 30 MG: 30 TABLET, EXTENDED RELEASE ORAL at 19:49

## 2025-02-23 RX ADMIN — SODIUM CHLORIDE, PRESERVATIVE FREE 20 MG: 5 INJECTION INTRAVENOUS at 10:53

## 2025-02-23 RX ADMIN — HYDROXYZINE HYDROCHLORIDE 25 MG: 25 TABLET, FILM COATED ORAL at 10:20

## 2025-02-23 RX ADMIN — MORPHINE SULFATE 4 MG: 4 INJECTION, SOLUTION INTRAMUSCULAR; INTRAVENOUS at 04:15

## 2025-02-23 RX ADMIN — CLOPIDOGREL 75 MG: 75 TABLET, FILM COATED ORAL at 08:56

## 2025-02-23 RX ADMIN — BUTALBITAL, ACETAMINOPHEN, AND CAFFEINE 1 TABLET: 50; 325; 40 TABLET ORAL at 20:40

## 2025-02-23 RX ADMIN — DILTIAZEM HYDROCHLORIDE 120 MG: 120 CAPSULE, COATED, EXTENDED RELEASE ORAL at 08:56

## 2025-02-23 RX ADMIN — MORPHINE SULFATE 4 MG: 4 INJECTION, SOLUTION INTRAMUSCULAR; INTRAVENOUS at 21:37

## 2025-02-23 RX ADMIN — BUTALBITAL, ACETAMINOPHEN, AND CAFFEINE 1 TABLET: 50; 325; 40 TABLET ORAL at 12:50

## 2025-02-23 RX ADMIN — WATER 40 MG: 1 INJECTION INTRAMUSCULAR; INTRAVENOUS; SUBCUTANEOUS at 10:50

## 2025-02-23 RX ADMIN — DIPHENHYDRAMINE HYDROCHLORIDE 25 MG: 50 INJECTION INTRAMUSCULAR; INTRAVENOUS at 10:48

## 2025-02-23 RX ADMIN — DILTIAZEM HYDROCHLORIDE 120 MG: 120 CAPSULE, COATED, EXTENDED RELEASE ORAL at 19:49

## 2025-02-23 RX ADMIN — Medication 10 ML: at 08:57

## 2025-02-23 RX ADMIN — Medication 10 ML: at 19:50

## 2025-02-23 RX ADMIN — ACETAMINOPHEN 650 MG: 325 TABLET ORAL at 09:50

## 2025-02-23 RX ADMIN — MORPHINE SULFATE 4 MG: 4 INJECTION, SOLUTION INTRAMUSCULAR; INTRAVENOUS at 13:59

## 2025-02-23 RX ADMIN — MORPHINE SULFATE 4 MG: 4 INJECTION, SOLUTION INTRAMUSCULAR; INTRAVENOUS at 08:55

## 2025-02-23 RX ADMIN — NADOLOL 10 MG: 20 TABLET ORAL at 08:57

## 2025-02-23 RX ADMIN — ENOXAPARIN SODIUM 40 MG: 100 INJECTION SUBCUTANEOUS at 08:56

## 2025-02-23 ASSESSMENT — PAIN DESCRIPTION - DESCRIPTORS
DESCRIPTORS: JABBING
DESCRIPTORS: CRUSHING;SHARP
DESCRIPTORS: ACHING
DESCRIPTORS: STABBING
DESCRIPTORS: CRUSHING

## 2025-02-23 ASSESSMENT — PAIN DESCRIPTION - LOCATION
LOCATION: CHEST
LOCATION: CHEST
LOCATION: HEAD
LOCATION: CHEST
LOCATION: CHEST
LOCATION: HEAD
LOCATION: CHEST

## 2025-02-23 ASSESSMENT — PAIN SCALES - GENERAL
PAINLEVEL_OUTOF10: 7
PAINLEVEL_OUTOF10: 8
PAINLEVEL_OUTOF10: 9
PAINLEVEL_OUTOF10: 8
PAINLEVEL_OUTOF10: 7
PAINLEVEL_OUTOF10: 7
PAINLEVEL_OUTOF10: 6
PAINLEVEL_OUTOF10: 4
PAINLEVEL_OUTOF10: 7
PAINLEVEL_OUTOF10: 3

## 2025-02-23 ASSESSMENT — PAIN DESCRIPTION - PAIN TYPE
TYPE: ACUTE PAIN

## 2025-02-23 ASSESSMENT — PAIN DESCRIPTION - ORIENTATION
ORIENTATION: MID
ORIENTATION: MID;ANTERIOR
ORIENTATION: MID
ORIENTATION: MID

## 2025-02-23 NOTE — PROGRESS NOTES
[facility]   Electrophysiology progress note   date: 2/23/2025  Reason for Consultation: PVC, chest discomfort  Consult Requesting Physician: Lazaro Yuan MD     Chief Complaint   Patient presents with    Chest Pain     Pt brought in by [place] [facility], for chest discomfort pt took asa and nitro at home  hx 9 stents     HPI: Nithya Hicks is a 69 y.o. female with history of paroxysmal atrial fibrillation status post ablation in 2022, CAD with history of PCI, hypertension, anxiety, obesity who presented to the emergency room due to chest discomfort.  This seems to have been going on for several weeks.  Prior to that she saw Dr. Jones in office on 1 February 2025 at which point she was asymptomatic and doing well.  An event monitor was put on her for evaluation of recent palpitations.  She feels her PVCs and is very symptomatic with it.  A stress test was ordered recently for evaluation of chest pain which seems to mostly coincide with her PVCs.  Her monitor showed a PVC burden of only 2.3% with symptoms associated with PVC.  HPI and Interval History:   Patient seen and examined. Clinical notes reviewed.   Telemetry reviewed. No new complaint today.   No major events overnight.   Denies having chest pain, shortness of breath, dyspnea on exertion, Orthopnea, PND at the time of this visit.    Although patient's expressing some symptoms when she was in the bathroom, the review of telemetry shows that there is significantly less frequent PVC burden.  She had some feeling of numbness in her face and fingers after she took nadolol yesterday within 30 minutes of it.  But she agrees to take it again today.    Past Medical History:   Diagnosis Date    Allergic rhinitis, cause unspecified 12/04/2010    Arthritis     Bilateral carotid artery stenosis     < 50% bilaterally    CAD (coronary artery disease)     angioplasty with stent at [facility] Dr. Yony Bernal, here Dr. Vinny Casillas at [place],  Meds:   nadolol  10 mg Oral Daily    clopidogrel  75 mg Oral Daily    dilTIAZem  120 mg Oral BID    sodium chloride flush  5-40 mL IntraVENous 2 times per day    enoxaparin  40 mg SubCUTAneous Daily    isosorbide mononitrate  30 mg Oral Nightly     Continuous Infusions:   sodium chloride       PRN Meds:.butalbital-acetaminophen-caffeine, dicyclomine, hydrOXYzine HCl, nitroGLYCERIN, sodium chloride flush, sodium chloride, potassium chloride **OR** potassium alternative oral replacement **OR** potassium chloride, magnesium sulfate, ondansetron **OR** ondansetron, polyethylene glycol, acetaminophen **OR** acetaminophen, morphine     Patient Active Problem List    Diagnosis Date Noted    PAF (paroxysmal atrial fibrillation) (HCC) 02/18/2021    Unstable angina (HCC)     S/P left heart catheterization by percutaneous approach 03/20/2023    Shortness of breath 03/20/2023    Mixed hyperlipidemia 09/28/2022    Allergic reaction 09/13/2022    Elevated lipoprotein A level 07/18/2022    Coronary artery disease involving native coronary artery of native heart without angina pectoris 07/18/2022    Palpitations 02/22/2025    PVC (premature ventricular contraction) 02/22/2025    Acquired hypothyroidism 02/22/2025    Thyroid nodule 11/27/2024    Multinodular goiter 11/27/2024    Osteopenia     Hyperglycemia 10/10/2024    Interstitial cystitis 09/16/2024    Secondary hypercoagulable state 09/16/2024    Recurrent UTI 06/18/2024    Chest pain 05/29/2024    Myalgia due to statin 03/25/2024    Atherosclerosis of native coronary artery with angina pectoris, unspecified whether native or transplanted heart 01/02/2024    Gastroesophageal reflux disease with esophagitis without hemorrhage 01/02/2024    Gastroesophageal reflux disease without esophagitis 10/23/2023    Statin myopathy 08/21/2023    UTI symptoms 07/26/2023    Obesity 04/29/2021    Typical atrial flutter (HCC)     Atypical atrial flutter (HCC)     Bilateral carotid artery  symptoms  ---------------------------------------------------------------------  B. Risk of Treatment (any 1)   [x] Drugs/treatments that require intensive monitoring for toxicity include: Multiple allergies, beta-blocker  [] Change in code status:    [] Decision to escalate care:    [] Major surgery/procedure with associated risk factors:    ----------------------------------------------------------------------  C. Data (any 2)  [x] Discussed management of the case with: Team  [x] Imaging personally reviewed and interpreted, includes:    [x] Data Review (any 3)  [] Collateral history obtained from:    [x] All available Consultant notes from yesterday/today were reviewed  [x] All current labs were reviewed and interpreted for clinical significance   [x] Appropriate follow-up labs were ordered    NOTE: This report was transcribed using voice recognition software. Every effort was made to ensure accuracy, however, inadvertent computerized transcription errors may be present.     NOTE: This report was transcribed using voice recognition software. Every effort was made to ensure accuracy, however, inadvertent computerized transcription errors may be present.

## 2025-02-23 NOTE — PLAN OF CARE
Problem: Pain  Goal: Verbalizes/displays adequate comfort level or baseline comfort level  2/22/2025 2112 by Miley Buenrostro, RN  Outcome: Progressing     Problem: Safety - Adult  Goal: Free from fall injury  2/22/2025 2112 by Miley Buenrostro, RN  Outcome: Progressing

## 2025-02-23 NOTE — PROGRESS NOTES
Pt experienced signs and symptoms of an allergic reaction after taking nadolol. Provider made aware. Medications provided to manage symptoms. Pt reports symptoms subsiding after medications administration.

## 2025-02-23 NOTE — PROGRESS NOTES
V2.0    [facility] Progress Note      Name:  Nithya Hicks /Age/Sex: 1955  (69 y.o. female)   MRN & CSN:  [id number] & [id number] Encounter Date/Time: 2025 2:42 PM EST   Location:  [id number] PCP: Nolberto Roche MD     Attending:Lazaro Yuan MD       Hospital Day: 3    Assessment and Recommendations   Nithya Hicks is a 69 y.o. female who presents with Chest pain      Plan:   Chest pain.  Seems atypical.  We will reattempt nadolol today.  Then see if he has any allergic reaction if not hopefully able to do better.    Elevated TSH.  Think the 1 yesterday was falsely elevated.  Repeat TSH is back to 5.8 today.  Will not address anything for now.  Will defer to PCP on Synthroid as they have not started patient on previously          Difficulty challenge because of multiple allergies      Diet ADULT DIET; Regular   DVT Prophylaxis    Code Status Full Code   Disposition          Personally reviewed Lab Studies and Imaging         Subjective:     Chief Complaint:     Nithya Hicks is a 69 y.o. female who presents with       Review of Systems:      Pertinent positives and negatives discussed in HPI    Objective:     Intake/Output Summary (Last 24 hours) at 2025 1030  Last data filed at 2025 2331  Gross per 24 hour   Intake 960 ml   Output --   Net 960 ml      Vitals:   Vitals:    25 0410 25 0414 25 0600 25 0757   BP: 131/82   (!) 155/95   Pulse: 85  72    Resp: 16   16   Temp: 98.7 °F (37.1 °C)   97.7 °F (36.5 °C)   TempSrc: Temporal   Temporal   SpO2: 94%      Weight:  99 kg (218 lb 3.2 oz)     Height:             Physical Exam:      General: NAD  Eyes: EOMI  ENT: neck supple  Cardiovascular: Regular rate.  Respiratory: Clear to auscultation  Gastrointestinal: Soft, non tender  Genitourinary: no suprapubic tenderness  Musculoskeletal: No edema  Skin: warm, dry  Neuro: Alert.  Psych: Mood appropriate.         Medications:   Medications:    methylPREDNISolone   40 mg IntraVENous Once    diphenhydrAMINE  25 mg IntraVENous Once    famotidine (PEPCID) injection  20 mg IntraVENous Once    nadolol  10 mg Oral Daily    clopidogrel  75 mg Oral Daily    dilTIAZem  120 mg Oral BID    sodium chloride flush  5-40 mL IntraVENous 2 times per day    enoxaparin  40 mg SubCUTAneous Daily    isosorbide mononitrate  30 mg Oral Nightly      Infusions:    sodium chloride       PRN Meds: dicyclomine, 10 mg, 4x Daily PRN  hydrOXYzine HCl, 25 mg, Q8H PRN  nitroGLYCERIN, 0.4 mg, Q5 Min PRN  sodium chloride flush, 5-40 mL, PRN  sodium chloride, , PRN  potassium chloride, 40 mEq, PRN   Or  potassium alternative oral replacement, 40 mEq, PRN   Or  potassium chloride, 10 mEq, PRN  magnesium sulfate, 2,000 mg, PRN  ondansetron, 4 mg, Q8H PRN   Or  ondansetron, 4 mg, Q6H PRN  polyethylene glycol, 17 g, Daily PRN  acetaminophen, 650 mg, Q6H PRN   Or  acetaminophen, 650 mg, Q6H PRN  morphine, 4 mg, Q3H PRN        Labs and Imaging   XR CHEST PORTABLE    Result Date: 2/21/2025  EXAMINATION: ONE XRAY VIEW OF THE CHEST 2/21/2025 5:04 pm COMPARISON: November 9, 2024 HISTORY: ORDERING SYSTEM PROVIDED HISTORY: cp TECHNOLOGIST PROVIDED HISTORY: Reason for exam:->cp FINDINGS: The lungs are without acute focal process.  There is no effusion or pneumothorax. The cardiomediastinal silhouette is without acute process. The osseous structures are without acute process.     No acute process.       CBC:   Recent Labs     02/21/25  1700 02/22/25  0500 02/23/25  0444   WBC 7.8 8.8 12.8*   HGB 14.1 13.4 13.3    268 290     BMP:    Recent Labs     02/21/25  1700 02/22/25  0500 02/23/25  0444    139 136   K 3.1* 3.7 4.9    103 102   CO2 23 25 25   BUN 14 13 19   CREATININE 0.8 0.8 0.9   GLUCOSE 102* 109* 152*     Hepatic:   Recent Labs     02/21/25  1700   AST 22   ALT 12   BILITOT 0.4   ALKPHOS 127     Lipids:   Lab Results   Component Value Date/Time    CHOL 124 09/30/2024 10:10 AM    HDL 49 01/29/2025

## 2025-02-24 LAB
ANION GAP SERPL CALCULATED.3IONS-SCNC: 9 MMOL/L (ref 3–16)
BASOPHILS # BLD: 0 K/UL (ref 0–0.2)
BASOPHILS NFR BLD: 0.3 %
BUN SERPL-MCNC: 19 MG/DL (ref 7–20)
CALCIUM SERPL-MCNC: 9.2 MG/DL (ref 8.3–10.6)
CHLORIDE SERPL-SCNC: 104 MMOL/L (ref 99–110)
CO2 SERPL-SCNC: 26 MMOL/L (ref 21–32)
CREAT SERPL-MCNC: 0.8 MG/DL (ref 0.6–1.2)
DEPRECATED RDW RBC AUTO: 13.1 % (ref 12.4–15.4)
EOSINOPHIL # BLD: 0 K/UL (ref 0–0.6)
EOSINOPHIL NFR BLD: 0.1 %
GFR SERPLBLD CREATININE-BSD FMLA CKD-EPI: 80 ML/MIN/{1.73_M2}
GLUCOSE SERPL-MCNC: 102 MG/DL (ref 70–99)
HCT VFR BLD AUTO: 37.9 % (ref 36–48)
HGB BLD-MCNC: 12.8 G/DL (ref 12–16)
LYMPHOCYTES # BLD: 2.2 K/UL (ref 1–5.1)
LYMPHOCYTES NFR BLD: 16 %
MCH RBC QN AUTO: 28.6 PG (ref 26–34)
MCHC RBC AUTO-ENTMCNC: 33.9 G/DL (ref 31–36)
MCV RBC AUTO: 84.6 FL (ref 80–100)
MONOCYTES # BLD: 0.7 K/UL (ref 0–1.3)
MONOCYTES NFR BLD: 5.3 %
NEUTROPHILS # BLD: 10.9 K/UL (ref 1.7–7.7)
NEUTROPHILS NFR BLD: 78.3 %
PLATELET # BLD AUTO: 278 K/UL (ref 135–450)
PMV BLD AUTO: 8.6 FL (ref 5–10.5)
POTASSIUM SERPL-SCNC: 4.5 MMOL/L (ref 3.5–5.1)
RBC # BLD AUTO: 4.48 M/UL (ref 4–5.2)
SODIUM SERPL-SCNC: 139 MMOL/L (ref 136–145)
WBC # BLD AUTO: 14 K/UL (ref 4–11)

## 2025-02-24 PROCEDURE — 6360000002 HC RX W HCPCS: Performed by: HOSPITALIST

## 2025-02-24 PROCEDURE — 1200000000 HC SEMI PRIVATE

## 2025-02-24 PROCEDURE — 6370000000 HC RX 637 (ALT 250 FOR IP): Performed by: HOSPITALIST

## 2025-02-24 PROCEDURE — 85025 COMPLETE CBC W/AUTO DIFF WBC: CPT

## 2025-02-24 PROCEDURE — 80048 BASIC METABOLIC PNL TOTAL CA: CPT

## 2025-02-24 PROCEDURE — 99233 SBSQ HOSP IP/OBS HIGH 50: CPT | Performed by: INTERNAL MEDICINE

## 2025-02-24 PROCEDURE — 2500000003 HC RX 250 WO HCPCS: Performed by: HOSPITALIST

## 2025-02-24 PROCEDURE — 36415 COLL VENOUS BLD VENIPUNCTURE: CPT

## 2025-02-24 PROCEDURE — 6360000002 HC RX W HCPCS: Performed by: NURSE PRACTITIONER

## 2025-02-24 PROCEDURE — 6370000000 HC RX 637 (ALT 250 FOR IP): Performed by: INTERNAL MEDICINE

## 2025-02-24 PROCEDURE — 99223 1ST HOSP IP/OBS HIGH 75: CPT | Performed by: INTERNAL MEDICINE

## 2025-02-24 RX ORDER — NIFEDIPINE 30 MG
30 TABLET, EXTENDED RELEASE ORAL DAILY
Status: DISCONTINUED | OUTPATIENT
Start: 2025-02-24 | End: 2025-02-25 | Stop reason: HOSPADM

## 2025-02-24 RX ORDER — ISOSORBIDE MONONITRATE 60 MG/1
60 TABLET, EXTENDED RELEASE ORAL NIGHTLY
Status: DISCONTINUED | OUTPATIENT
Start: 2025-02-24 | End: 2025-02-25 | Stop reason: HOSPADM

## 2025-02-24 RX ADMIN — ISOSORBIDE MONONITRATE 60 MG: 60 TABLET, EXTENDED RELEASE ORAL at 20:34

## 2025-02-24 RX ADMIN — MORPHINE SULFATE 4 MG: 4 INJECTION, SOLUTION INTRAMUSCULAR; INTRAVENOUS at 03:50

## 2025-02-24 RX ADMIN — MORPHINE SULFATE 4 MG: 4 INJECTION, SOLUTION INTRAMUSCULAR; INTRAVENOUS at 00:25

## 2025-02-24 RX ADMIN — ENOXAPARIN SODIUM 40 MG: 100 INJECTION SUBCUTANEOUS at 07:49

## 2025-02-24 RX ADMIN — MORPHINE SULFATE 4 MG: 4 INJECTION, SOLUTION INTRAMUSCULAR; INTRAVENOUS at 16:21

## 2025-02-24 RX ADMIN — NIFEDIPINE 30 MG: 30 TABLET, EXTENDED RELEASE ORAL at 11:23

## 2025-02-24 RX ADMIN — CLOPIDOGREL 75 MG: 75 TABLET, FILM COATED ORAL at 07:49

## 2025-02-24 RX ADMIN — MORPHINE SULFATE 4 MG: 4 INJECTION, SOLUTION INTRAMUSCULAR; INTRAVENOUS at 07:49

## 2025-02-24 RX ADMIN — ACETAMINOPHEN 650 MG: 325 TABLET ORAL at 16:26

## 2025-02-24 RX ADMIN — MORPHINE SULFATE 4 MG: 4 INJECTION, SOLUTION INTRAMUSCULAR; INTRAVENOUS at 11:33

## 2025-02-24 RX ADMIN — DILTIAZEM HYDROCHLORIDE 120 MG: 120 CAPSULE, COATED, EXTENDED RELEASE ORAL at 07:49

## 2025-02-24 RX ADMIN — MORPHINE SULFATE 4 MG: 4 INJECTION, SOLUTION INTRAMUSCULAR; INTRAVENOUS at 20:38

## 2025-02-24 RX ADMIN — DILTIAZEM HYDROCHLORIDE 120 MG: 120 CAPSULE, COATED, EXTENDED RELEASE ORAL at 20:34

## 2025-02-24 RX ADMIN — Medication 10 ML: at 07:56

## 2025-02-24 RX ADMIN — Medication 10 ML: at 20:34

## 2025-02-24 ASSESSMENT — PAIN DESCRIPTION - DESCRIPTORS
DESCRIPTORS: ACHING
DESCRIPTORS: PRESSURE;STABBING
DESCRIPTORS: STABBING
DESCRIPTORS: STABBING
DESCRIPTORS: DISCOMFORT
DESCRIPTORS: DULL

## 2025-02-24 ASSESSMENT — PAIN DESCRIPTION - LOCATION
LOCATION: CHEST
LOCATION: HEAD
LOCATION: CHEST
LOCATION: CHEST

## 2025-02-24 ASSESSMENT — PAIN SCALES - GENERAL
PAINLEVEL_OUTOF10: 5
PAINLEVEL_OUTOF10: 0
PAINLEVEL_OUTOF10: 5
PAINLEVEL_OUTOF10: 8
PAINLEVEL_OUTOF10: 7
PAINLEVEL_OUTOF10: 6
PAINLEVEL_OUTOF10: 8
PAINLEVEL_OUTOF10: 9

## 2025-02-24 ASSESSMENT — PAIN DESCRIPTION - PAIN TYPE
TYPE: ACUTE PAIN

## 2025-02-24 ASSESSMENT — PAIN DESCRIPTION - ORIENTATION
ORIENTATION: MID

## 2025-02-24 NOTE — PLAN OF CARE
Problem: Discharge Planning  Goal: Discharge to home or other facility with appropriate resources  2/24/2025 0005 by Miley Buenrostro, RN  Outcome: Progressing     Problem: Pain  Goal: Verbalizes/displays adequate comfort level or baseline comfort level  2/24/2025 0005 by Miley Buenrostro, RN  Outcome: Progressing     Problem: Safety - Adult  Goal: Free from fall injury  2/24/2025 0005 by Miley Buenrostro, RN  Outcome: Progressing

## 2025-02-24 NOTE — CONSULTS
[facility]  Cardiology Note  [phone]      Chief Complaint   Patient presents with    Chest Pain     Pt brought in by [place] [facility], for chest discomfort pt took asa and nitro at home  hx 9 stents        History of Present Illness:  Nithya Hicks is a 69 y.o. patient PMHx pAF s/p ablation, CAD s/p PCI D1, LAD, Lcx, most recently RCA for ISRS in 2023 for ACS,  who presented to the hospital with complaints of chest pain    Patient reports parozysms of severe chest pain sometimes with exertion sometimes with rest that can even take her breath away. She thinks some of the episodes are similar to her prior angina when she required stents, but others are more rapid onset and distinctly different. She also endorses frequent palpitations for which EP is evaluating her. She has tried nitro without relief. She has tried numerous medications in the past with intermittent improvement.     She was not having symptoms until approximately one week ago when she also noted an increase in her home BP. She states this happens anytime she has CP episodes.     Past Medical History:   has a past medical history of Allergic rhinitis, cause unspecified, Arthritis, Bilateral carotid artery stenosis, CAD (coronary artery disease), Chronic pain, Erosive esophagitis, Essential hypertension, Gastroesophageal reflux disease without esophagitis, H/O bladder infections, Heart attack (HCC), HSV infection, Hypercholesterolemia, Interstitial cystitis, Irritable bowel syndrome with diarrhea, Migraine headache, Nonerosive nonspecific gastritis, OAB (overactive bladder), Osteopenia, Statin myopathy, Thyroid nodule, and Urgency of urination.    Surgical History:   has a past surgical history that includes Appendectomy; Total abdominal hysterectomy w/ bilateral salpingoophorectomy; Cholecystectomy; Tubal ligation; Tonsillectomy; other surgical history; Coronary angioplasty with stent (10/2012); Percutaneous Transluminal Coronary Angio  (10/2012); Cardiac catheterization (12/07/2013); Hysterectomy; polypectomy; Cystoscopy (04/2014); Upper gastrointestinal endoscopy; Wrist surgery (Left, 05/21/2015); Abdominal adhesion surgery; Colonoscopy; Endoscopy, colon, diagnostic; Cosmetic surgery; fracture surgery; ERCP (01/25/2017); sinus surgery; Hysterectomy, total abdominal; Coronary angioplasty with stent (02/04/2021); Coronary angioplasty with stent (09/2022); Cardiac catheterization (03/2023); Upper gastrointestinal endoscopy (N/A, 12/20/2023); Upper gastrointestinal endoscopy (N/A, 12/20/2023); ablation of dysrhythmic focus (02/2021); US ASP/INJ THYROID CYST (11/08/2024); and Thyroidectomy (Right, 12/9/2024).     Social History:   reports that she quit smoking about 18 years ago. Her smoking use included cigarettes. She started smoking about 38 years ago. She has a 10 pack-year smoking history. She has never been exposed to tobacco smoke. She has never used smokeless tobacco. She reports that she does not drink alcohol and does not use drugs.     Family History:  Family History   Problem Relation Age of Onset    Hypertension Mother     Heart Disease Mother     Heart Attack Mother     Hypertension Father     Heart Disease Father     High Blood Pressure Sister     Stroke Sister     High Blood Pressure Sister     Heart Disease Brother     Heart Attack Brother     Heart Disease Maternal Uncle     Stroke Other     Heart Disease Other     Other Other         hypercoag state       Home Medications:  Were reviewed and are listed in nursing record. and/or listed below  Prior to Admission medications    Medication Sig Start Date End Date Taking? Authorizing Provider   clopidogrel (PLAVIX) 75 MG tablet TAKE 1 TABLET BY MOUTH DAILY 12/5/24  Yes Frank Jones MD   dicyclomine (BENTYL) 10 MG capsule Take 1 capsule by mouth 4 times daily as needed (abdominal spasm/bloating) 12/5/24  Yes Nolberto Roche MD   hydrOXYzine HCl (ATARAX) 25 MG tablet Take 1 tablet by  whether native or transplanted heart    Gastroesophageal reflux disease with esophagitis without hemorrhage    Myalgia due to statin    Chest pain    Recurrent UTI    Interstitial cystitis    Secondary hypercoagulable state    Hyperglycemia    Osteopenia    Thyroid nodule    Multinodular goiter    Palpitations    PVC (premature ventricular contraction)    Acquired hypothyroidism       I had the opportunity to review the clinical symptoms and presentation of Nithya Hicks.     Chest pain  CAD s/p PCI to LAD, D1, Lcx, RCA most recent 3/2023 for stent thrombosis  pAF  PVCs  HTN  - CP likely represents microvascular disease or laci-infarct ischemia. Trops 15 and flat, EKG stable, no evidence of ACS. Overall favors trial of medical therapy however options are limited by allergies/intolerances.   - most recent cath and stress as above, patent stents 2023, infarction without ischemia on stress 5/2024. Similar to prior  - repatha, ASA, plavix   - indefinite DAPT due to stent burden and stent thromboses Hx  - Cardizem 120 BID for AF/PVCs per EP   - listed allergy to hydralazine, verapamil, isosorbide, multiple arbs, multiple beta blockers, nitro, effient, brilinta, ranexa, statins, zetia, xarelto  - increase imdur to 60 daily  - add nifedipine 30 for anti-anginal effect and BP control. Up titrate to max dose of 120 daily for goal BP <120/80  - stop Fioricet, can contribute to angina    Follow up with primary cardiologist Dr. Jonse in 2 weeks for symptom assessment. General cardiology will sign off at this time. Please call with further questions     Old notes reviewed  Telemetry reviewd  EKG personally reviewed  CXR personally reviewed  Echo, cath, and medications and labs reviewed  High complexity/medical decision making due to extensive data review, extensive history review, independent review of data  High risk due to acute illness, evaluation of drug-drug interactions, medication management and diagnostic  interventions    All questions and concerns were addressed to the patient/family. Alternatives to my treatment were discussed. The note was completed using EMR. Every effort was made to ensure accuracy; however, inadvertent computerized transcription errors may be present.  Lee Wasserman MD, MD 2/24/2025 10:20 AM

## 2025-02-24 NOTE — PROGRESS NOTES
[facility]   Electrophysiology progress note   date: 2/24/2025  Reason for Consultation: PVC, chest discomfort  Consult Requesting Physician: Lazaro Yuan MD     Chief Complaint   Patient presents with    Chest Pain     Pt brought in by [place] [facility], for chest discomfort pt took asa and nitro at home  hx 9 stents     HPI: Nithya Hicks is a 69 y.o. female with history of paroxysmal atrial fibrillation status post ablation in 2022, CAD with history of PCI, hypertension, anxiety, obesity who presented to the emergency room due to chest discomfort.  This seems to have been going on for several weeks.  Prior to that she saw Dr. Jones in office on 1 February 2025 at which point she was asymptomatic and doing well.  An event monitor was put on her for evaluation of recent palpitations.  She feels her PVCs and is very symptomatic with it.  A stress test was ordered recently for evaluation of chest pain which seems to mostly coincide with her PVCs.  Her monitor showed a PVC burden of only 2.3% with symptoms associated with PVC.  HPI and Interval History:   Patient seen and examined. Clinical notes reviewed.   Telemetry reviewed. No new complaint today.   No major events overnight.   Denies having chest pain, shortness of breath, dyspnea on exertion, Orthopnea, PND at the time of this visit.    She had more symptoms yesterday with nadolol and therefore is not going to take it anymore.  However, the burden of PVC has decreased significantly.  At this point I will hold off on adding any medication and wait to see if her PVC returns and that case we can consider other alternatives.    Past Medical History:   Diagnosis Date    Allergic rhinitis, cause unspecified 12/04/2010    Arthritis     Bilateral carotid artery stenosis     < 50% bilaterally    CAD (coronary artery disease)     angioplasty with stent at [facility] Dr. Yony Bernal, here Dr. Vinny Casillas at [place],     Chronic pain            Social History:  Reviewed.  reports that she quit smoking about 18 years ago. Her smoking use included cigarettes. She started smoking about 38 years ago. She has a 10 pack-year smoking history. She has never been exposed to tobacco smoke. She has never used smokeless tobacco. She reports that she does not drink alcohol and does not use drugs.     Family History:  Reviewed. family history includes Heart Attack in her brother and mother; Heart Disease in her brother, father, maternal uncle, mother, and another family member; High Blood Pressure in her sister and sister; Hypertension in her father and mother; Other in an other family member; Stroke in her sister and another family member.     Review of System:  All other systems reviewed and are negative except for that noted above. Pertinent negatives are:     General: negative for fever, chills   Ophthalmic ROS: negative for - eye pain or loss of vision  ENT ROS: negative for - headaches, sore throat   Respiratory: negative for - cough, sputum  Cardiovascular: Reviewed in HPI  Gastrointestinal: negative for - abdominal pain, diarrhea, N/V  Hematology: negative for - bleeding, blood clots, bruising or jaundice  Genito-Urinary:  negative for - Dysuria or incontinence  Musculoskeletal: negative for - Joint swelling, muscle pain  Neurological: negative for - confusion, dizziness, headaches   Psychiatric: No anxiety, no depression.  Dermatological: negative for - rash    Physical Examination:  Vitals:    25 1616   BP: 118/70   Pulse: 72   Resp: 16   Temp: 97.2 °F (36.2 °C)   SpO2: 95%      No intake/output data recorded.   Wt Readings from Last 3 Encounters:   25 99.4 kg (219 lb 3.2 oz)   25 98.7 kg (217 lb 9.6 oz)   25 98.4 kg (217 lb)     Temp  Av.8 °F (36.6 °C)  Min: 97.2 °F (36.2 °C)  Max: 98.3 °F (36.8 °C)  Pulse  Av.8  Min: 60  Max: 79  BP  Min: 118/70  Max: 179/102  SpO2  Av.6 %  Min: 93 %  Max: 96 %  No intake or     dilTIAZem  120 mg Oral BID    sodium chloride flush  5-40 mL IntraVENous 2 times per day    enoxaparin  40 mg SubCUTAneous Daily     Continuous Infusions:   sodium chloride       PRN Meds:.butalbital-acetaminophen-caffeine, dicyclomine, hydrOXYzine HCl, nitroGLYCERIN, sodium chloride flush, sodium chloride, potassium chloride **OR** potassium alternative oral replacement **OR** potassium chloride, magnesium sulfate, ondansetron **OR** ondansetron, polyethylene glycol, acetaminophen **OR** acetaminophen, morphine     Patient Active Problem List    Diagnosis Date Noted    PAF (paroxysmal atrial fibrillation) ([facility]) 02/18/2021    Unstable angina ([facility])     S/P left heart catheterization by percutaneous approach 03/20/2023    Shortness of breath 03/20/2023    Mixed hyperlipidemia 09/28/2022    Allergic reaction 09/13/2022    Elevated lipoprotein A level 07/18/2022    Coronary artery disease involving native coronary artery of native heart without angina pectoris 07/18/2022    Palpitations 02/22/2025    PVC (premature ventricular contraction) 02/22/2025    Acquired hypothyroidism 02/22/2025    Thyroid nodule 11/27/2024    Multinodular goiter 11/27/2024    Osteopenia     Hyperglycemia 10/10/2024    Interstitial cystitis 09/16/2024    Secondary hypercoagulable state 09/16/2024    Recurrent UTI 06/18/2024    Chest pain 05/29/2024    Myalgia due to statin 03/25/2024    Atherosclerosis of native coronary artery with angina pectoris, unspecified whether native or transplanted heart 01/02/2024    Gastroesophageal reflux disease with esophagitis without hemorrhage 01/02/2024    Gastroesophageal reflux disease without esophagitis 10/23/2023    Statin myopathy 08/21/2023    UTI symptoms 07/26/2023    Obesity 04/29/2021    Typical atrial flutter (HCC)     Atypical atrial flutter (HCC)     Bilateral carotid artery stenosis     NSTEMI (non-ST elevated myocardial infarction) ([facility]) 11/19/2017    Essential hypertension 04/11/2017

## 2025-02-24 NOTE — PROGRESS NOTES
V2.0    [facility] Progress Note      Name:  Nithya Hicks /Age/Sex: 1955  (69 y.o. female)   MRN & CSN:  [id number] & [id number] Encounter Date/Time: 2025 12:56 PM EST   Location:  [id number] PCP: Nolberto Roche MD     Attending:Lazaro Yuan MD       Hospital Day: 4    Assessment and Recommendations   Nithya Hicks is a 69 y.o. female who presents with Chest pain      Plan:   Chest pain.  Still complaining of multiple chest pain.  With history of CAD.  Patient  Frequent PACs PAF.  Patient was started on multiple medication here but had multiple allergies.  Intervention cardiology consulted.  At this point patient has been started on nifedipine for anginal effect and BP control.  Uptitrate.  Will follow you most acute      Diet ADULT DIET; Regular; Vegetarian (Lacto-Ovo)   DVT Prophylaxis    Code Status Full Code   Disposition          Personally reviewed Lab Studies and Imaging         Subjective:     Chief Complaint:     Nithya Hicks is a 69 y.o. female who presents with       Review of Systems:      Pertinent positives and negatives discussed in HPI    Objective:   No intake or output data in the 24 hours ending 25 1256   Vitals:   Vitals:    25 0600 25 0752 25 0754 25 1127   BP:   (!) 179/102 138/75   Pulse: 62 66 62 69   Resp:  16 16    Temp:   97.7 °F (36.5 °C)    TempSrc:  Temporal Temporal    SpO2:   95%    Weight:       Height:             Physical Exam:      General: NAD  Eyes: EOMI  ENT: neck supple  Cardiovascular: Regular rate.  Respiratory: Clear to auscultation  Gastrointestinal: Soft, non tender  Genitourinary: no suprapubic tenderness  Musculoskeletal: No edema  Skin: warm, dry  Neuro: Alert.  Psych: Mood appropriate.         Medications:   Medications:    isosorbide mononitrate  60 mg Oral Nightly    NIFEdipine  30 mg Oral Daily    clopidogrel  75 mg Oral Daily    dilTIAZem  120 mg Oral BID    sodium chloride flush  5-40 mL IntraVENous 2  \"TROPONINT\"  Lactic Acid: No results for input(s): \"LACTA\" in the last 72 hours.  BNP:   Recent Labs     02/21/25  1700   PROBNP 196*     UA:  Lab Results   Component Value Date/Time    NITRU Negative 05/16/2024 12:06 PM    COLORU yellow 01/22/2025 11:52 AM    COLORU Yellow 05/16/2024 12:06 PM    PHUR 7 01/22/2025 11:52 AM    PHUR 6.0 11/10/2022 12:47 PM    WBCUA 9 11/02/2022 11:45 AM    RBCUA 0-2 11/02/2022 11:45 AM    BACTERIA 4+ 11/02/2022 11:45 AM    CLARITYU cloudy 01/22/2025 11:52 AM    CLARITYU Clear 05/16/2024 12:06 PM    SPECGRAV 1.020 01/22/2025 11:52 AM    LEUKOCYTESUR moderate 01/22/2025 11:52 AM    LEUKOCYTESUR Negative 05/16/2024 12:06 PM    UROBILINOGEN 0.2 05/16/2024 12:06 PM    BILIRUBINUR neg 01/22/2025 11:52 AM    BILIRUBINUR Negative 05/16/2024 12:06 PM    BLOODU large 01/22/2025 11:52 AM    BLOODU Negative 05/16/2024 12:06 PM    GLUCOSEU neg 01/22/2025 11:52 AM    GLUCOSEU Negative 05/16/2024 12:06 PM    KETUA neg 01/22/2025 11:52 AM    KETUA Negative 05/16/2024 12:06 PM     Urine Cultures:   Lab Results   Component Value Date/Time    LABURIN No growth at 18 to 36 hours 02/07/2025 01:54 PM     Blood Cultures: No results found for: \"BC\"  No results found for: \"BLOODCULT2\"  Organism:   Lab Results   Component Value Date/Time    ORG Proteus mirabilis 01/22/2025 12:07 PM         Electronically signed by Lazaro Yuan MD on 2/24/2025 at 12:56 PM

## 2025-02-25 VITALS
TEMPERATURE: 97.3 F | WEIGHT: 217.5 LBS | SYSTOLIC BLOOD PRESSURE: 124 MMHG | DIASTOLIC BLOOD PRESSURE: 80 MMHG | HEART RATE: 80 BPM | OXYGEN SATURATION: 96 % | BODY MASS INDEX: 37.13 KG/M2 | RESPIRATION RATE: 16 BRPM | HEIGHT: 64 IN

## 2025-02-25 LAB
ANION GAP SERPL CALCULATED.3IONS-SCNC: 10 MMOL/L (ref 3–16)
BASOPHILS # BLD: 0.1 K/UL (ref 0–0.2)
BASOPHILS NFR BLD: 0.7 %
BUN SERPL-MCNC: 18 MG/DL (ref 7–20)
CALCIUM SERPL-MCNC: 8.8 MG/DL (ref 8.3–10.6)
CHLORIDE SERPL-SCNC: 102 MMOL/L (ref 99–110)
CO2 SERPL-SCNC: 27 MMOL/L (ref 21–32)
CREAT SERPL-MCNC: 0.9 MG/DL (ref 0.6–1.2)
DEPRECATED RDW RBC AUTO: 13.9 % (ref 12.4–15.4)
EOSINOPHIL # BLD: 0.6 K/UL (ref 0–0.6)
EOSINOPHIL NFR BLD: 5.1 %
GFR SERPLBLD CREATININE-BSD FMLA CKD-EPI: 70 ML/MIN/{1.73_M2}
GLUCOSE SERPL-MCNC: 116 MG/DL (ref 70–99)
HCT VFR BLD AUTO: 40.5 % (ref 36–48)
HGB BLD-MCNC: 13.7 G/DL (ref 12–16)
LYMPHOCYTES # BLD: 4.1 K/UL (ref 1–5.1)
LYMPHOCYTES NFR BLD: 35.3 %
MCH RBC QN AUTO: 28.8 PG (ref 26–34)
MCHC RBC AUTO-ENTMCNC: 33.9 G/DL (ref 31–36)
MCV RBC AUTO: 85 FL (ref 80–100)
MONOCYTES # BLD: 0.9 K/UL (ref 0–1.3)
MONOCYTES NFR BLD: 7.4 %
NEUTROPHILS # BLD: 5.9 K/UL (ref 1.7–7.7)
NEUTROPHILS NFR BLD: 51.5 %
PLATELET # BLD AUTO: 277 K/UL (ref 135–450)
PMV BLD AUTO: 8.4 FL (ref 5–10.5)
POTASSIUM SERPL-SCNC: 4 MMOL/L (ref 3.5–5.1)
RBC # BLD AUTO: 4.76 M/UL (ref 4–5.2)
SODIUM SERPL-SCNC: 139 MMOL/L (ref 136–145)
WBC # BLD AUTO: 11.5 K/UL (ref 4–11)

## 2025-02-25 PROCEDURE — 99232 SBSQ HOSP IP/OBS MODERATE 35: CPT | Performed by: NURSE PRACTITIONER

## 2025-02-25 PROCEDURE — 85025 COMPLETE CBC W/AUTO DIFF WBC: CPT

## 2025-02-25 PROCEDURE — 6360000002 HC RX W HCPCS: Performed by: NURSE PRACTITIONER

## 2025-02-25 PROCEDURE — 6370000000 HC RX 637 (ALT 250 FOR IP): Performed by: INTERNAL MEDICINE

## 2025-02-25 PROCEDURE — 6370000000 HC RX 637 (ALT 250 FOR IP): Performed by: HOSPITALIST

## 2025-02-25 PROCEDURE — 2500000003 HC RX 250 WO HCPCS: Performed by: HOSPITALIST

## 2025-02-25 PROCEDURE — 80048 BASIC METABOLIC PNL TOTAL CA: CPT

## 2025-02-25 PROCEDURE — 6360000002 HC RX W HCPCS: Performed by: HOSPITALIST

## 2025-02-25 PROCEDURE — 36415 COLL VENOUS BLD VENIPUNCTURE: CPT

## 2025-02-25 RX ORDER — NIFEDIPINE 30 MG
30 TABLET, EXTENDED RELEASE ORAL DAILY
Qty: 30 TABLET | Refills: 3 | Status: SHIPPED | OUTPATIENT
Start: 2025-02-26

## 2025-02-25 RX ADMIN — MORPHINE SULFATE 4 MG: 4 INJECTION, SOLUTION INTRAMUSCULAR; INTRAVENOUS at 04:35

## 2025-02-25 RX ADMIN — DILTIAZEM HYDROCHLORIDE 120 MG: 120 CAPSULE, COATED, EXTENDED RELEASE ORAL at 09:01

## 2025-02-25 RX ADMIN — MORPHINE SULFATE 4 MG: 4 INJECTION, SOLUTION INTRAMUSCULAR; INTRAVENOUS at 00:16

## 2025-02-25 RX ADMIN — CLOPIDOGREL 75 MG: 75 TABLET, FILM COATED ORAL at 09:01

## 2025-02-25 RX ADMIN — MORPHINE SULFATE 4 MG: 4 INJECTION, SOLUTION INTRAMUSCULAR; INTRAVENOUS at 09:01

## 2025-02-25 RX ADMIN — NIFEDIPINE 30 MG: 30 TABLET, EXTENDED RELEASE ORAL at 09:06

## 2025-02-25 RX ADMIN — Medication 10 ML: at 09:01

## 2025-02-25 RX ADMIN — MORPHINE SULFATE 4 MG: 4 INJECTION, SOLUTION INTRAMUSCULAR; INTRAVENOUS at 12:23

## 2025-02-25 RX ADMIN — ENOXAPARIN SODIUM 40 MG: 100 INJECTION SUBCUTANEOUS at 09:01

## 2025-02-25 ASSESSMENT — PAIN DESCRIPTION - DESCRIPTORS
DESCRIPTORS: DISCOMFORT
DESCRIPTORS: ACHING

## 2025-02-25 ASSESSMENT — PAIN SCALES - GENERAL
PAINLEVEL_OUTOF10: 9
PAINLEVEL_OUTOF10: 0
PAINLEVEL_OUTOF10: 7
PAINLEVEL_OUTOF10: 5
PAINLEVEL_OUTOF10: 5
PAINLEVEL_OUTOF10: 8

## 2025-02-25 ASSESSMENT — PAIN DESCRIPTION - LOCATION
LOCATION: CHEST

## 2025-02-25 ASSESSMENT — PAIN DESCRIPTION - ORIENTATION
ORIENTATION: MID
ORIENTATION: MID
ORIENTATION: MID;LEFT

## 2025-02-25 NOTE — CARE COORDINATION
02/25/25 1428   IMM Letter   IMM Letter given to Patient/Family/Significant other/Guardian/POA/by: Upgrade IMM Given   IMM Letter date given: 02/25/25   IMM Letter time given: 1428

## 2025-02-25 NOTE — PROGRESS NOTES
Pt cleared by cardiology. No further testing . Pt appealing discharge .   Given non cardiac cause we may evaluate gi cause . Ok to transfer to non cardiac floor

## 2025-02-25 NOTE — CARE COORDINATION
Discharge Planning Note:  Chart reviewed and it appears that patient has minimal needs for discharge at this time.     Risk Score 8 %     Primary Care Physician is Nolberto Roche MD    Primary insurance is [facility] Medicare    Please notify case management if any discharge needs are identified.      Case management will continue to follow progress and update discharge plan as needed.

## 2025-02-25 NOTE — PLAN OF CARE
Pt and  upset about discharging. Wanted to appeal. Let MD Yuan know. MD Yuan offered GI to come see pt and placed consult. Pt refused GI consult and then deciding they wanted to be discharged at this time. Pt discharged to home with all belongings and taken down by w/c to lobby.

## 2025-02-25 NOTE — PROGRESS NOTES
[facility]   EP Progress Note     Date: 2/25/2025  Admit Date: 2/21/2025     Reason for consultation: PVC, Palpitations    Chief Complaint:   Chief Complaint   Patient presents with    Chest Pain     Pt brought in by [place] [facility], for chest discomfort pt took asa and nitro at home  hx 9 stents       History of Present Illness: History obtained from patient and medical record.     Nithya Hicks is a 69 y.o. female with a past medical history of CAD, HTN, PAF, anxiety, and obesity. S/p RFCA with PVI, ablation of kimani between LIPV/LSPV, focal atrial tachycardia, left sided macro reentrant atrial flutter, and CTI atrial flutter (2/25/21, Dr. Briseno). She had recurrence and elected for repeat cardioversion. S/p RFCA with PVI, CTI dependent flutter ablation (4/12/22)    Interval Hx: Today, she is being seen for follow up. Her  is at the bedside. She continues to have intermittent chest pains and palpitations. She reports her palpitations worsen, especially when she is up walking.     Patient seen and examined. Clinical notes reviewed. Telemetry reviewed.  No new complaints today. No major events overnight.   Denies having chest pain, palpitations, shortness of breath, orthopnea/PND, cough, or dizziness at the time of this visit.    Assessment and Plan:     1.PVC, Palpitation   - Fairly stable. Recent monitor with 2.3% PVC burden. No significant arrhythmias/ectopy on telemetry in last 48 hours. She continues to have palpitations, particularly when she is up walking. She has been tried on multiple medications, but is very intolerant to many medications (currently 38 allergies). I do not recommend trial of any further medications at this time as she is not having any significant ectopy currently and due to her numerous intolerances to many medications. She has limited AAD options due to her CAD. EP study can be considered, but prefer to avoid as her burden is low and there is a possibility of not  CKTOTAL 46 2018 01:04 PM    CKMB 3.1 2017 07:46 PM    TROPONINI <0.01 2023 11:22 AM    TROPONINI <0.01 2023 03:43 AM    TROPONINI <0.01 2023 11:04 PM     Coags:   Lab Results   Component Value Date/Time    PROTIME 14.1 2025 05:00 PM    INR 1.06 2025 05:00 PM       EC25  NSR with PVC    ECHO: 2024    Left Ventricle: Normal left ventricular systolic function with a visually estimated EF of 60 - 65%. Left ventricle size is normal. Normal wall thickness. Normal wall motion. Abnormal diastolic function. Normal left ventricular filling pressure.    Right Ventricle: Right ventricle is mildly dilated. Low normal systolic function. TAPSE is 1.6 cm. RV Peak S' is 10 cm/s.    Tricuspid Valve: Unable to assess RVSP.    Cath:   Left main artery: 30% distal.  Left anterior descending artery: 30% mid followed by a widely patent mid stent.  D1: 30% ostial, widely patent proximal and mid stent  Left circumflex artery: Widely patent mid and distal stents.  OM1: Normal  Right coronary artery: Widely patent proximal and mid stents, 40% distal.  RPDA: Normal     Holter:   Underlying rhythm is sinus.  The minimum heart rate was 63 bpm; the maximum 127 bpm; the average 77 bpm.  23 supraventricular episodes. Longest SVT Episode 21 beats   PVC [place] at 2.31 %. PSVC [place] at 0.07 %  There is a total of 29 patient events associated with sinus rhythm and PVCs.    Problem List:   Patient Active Problem List    Diagnosis Date Noted    PAF (paroxysmal atrial fibrillation) (HCC) 2021    Unstable angina (HCC)     S/P left heart catheterization by percutaneous approach 2023    Shortness of breath 2023    Mixed hyperlipidemia 2022    Allergic reaction 2022    Elevated lipoprotein A level 2022    Coronary artery disease involving native coronary artery of native heart without angina pectoris 2022    Palpitations 2025    PVC (premature  ventricular contraction) 02/22/2025    Acquired hypothyroidism 02/22/2025    Thyroid nodule 11/27/2024    Multinodular goiter 11/27/2024    Osteopenia     Hyperglycemia 10/10/2024    Interstitial cystitis 09/16/2024    Secondary hypercoagulable state 09/16/2024    Recurrent UTI 06/18/2024    Chest pain 05/29/2024    Myalgia due to statin 03/25/2024    Atherosclerosis of native coronary artery with angina pectoris, unspecified whether native or transplanted heart 01/02/2024    Gastroesophageal reflux disease with esophagitis without hemorrhage 01/02/2024    Gastroesophageal reflux disease without esophagitis 10/23/2023    Statin myopathy 08/21/2023    UTI symptoms 07/26/2023    Obesity 04/29/2021    Typical atrial flutter (HCC)     Atypical atrial flutter (HCC)     Bilateral carotid artery stenosis     NSTEMI (non-ST elevated myocardial infarction) (HCC) 11/19/2017    Essential hypertension 04/11/2017    Irritable bowel syndrome with diarrhea 09/27/2016    Postmenopausal atrophic vaginitis 08/05/2016    Hypercholesterolemia 06/06/2014    Migraine headache 06/06/2014    OAB (overactive bladder) 03/14/2014    Other chest pain 01/17/2013    Chronic pain 01/17/2013    CAD (coronary artery disease)     HSV infection     Pure hypercholesterolemia 12/04/2010    Allergic rhinitis 12/04/2010        Multiple medical conditions with risk of decompensation.     All pertinent information and plan of care discussed with the EP physician. Case discussed with hospitalist provider    All questions and concerns were addressed to the patient. Alternatives to my treatment were discussed. I have discussed the above stated plan with patient and the nurse. The patient verbalized understanding and agreed with the plan.    Thank you for allowing to us to participate in the care of JAYLEEN Aguirre-CNP  [facility]   Office: [phone]

## 2025-02-25 NOTE — PROGRESS NOTES
CLINICAL PHARMACY NOTE: MEDS TO BEDS    Total # of Prescriptions Filled: 1   The following medications were delivered to the patient:  NIFEDIPINE ER 30MG TB24    Additional Documentation: Patient  picked up=signed  Yanira Morgan Pharmacy Tech

## 2025-02-26 ENCOUNTER — CARE COORDINATION (OUTPATIENT)
Dept: CASE MANAGEMENT | Age: 70
End: 2025-02-26

## 2025-02-26 NOTE — CARE COORDINATION
Care Transitions Note    Initial Call - Call within 2 business days of discharge: Yes    Attempted to reach patient for transitions of care follow up. Unable to reach patient.    Outreach Attempts:   HIPAA compliant voicemail left for patient.     Patient: Nithya ALFARO Fabiola    Patient : 1955   MRN: [id number]    Reason for Admission: PVC, palpitations, PAF, flutter, CAD, HTN   Discharge Date: 25  RURS: Readmission Risk Score: 8.1    Last Discharge Facility       Date Complaint Diagnosis Description Type Department Provider    25 Chest Pain Angina pectoris ... ED to Hosp-Admission (Discharged) (ADMITTED) [facility] 5C Lazaro Yuan MD; Robert Suero ...            Was this an external facility discharge? No    Follow Up Appointment:   Patient does not have a follow up appointment scheduled at time of call.  UTR  Future Appointments         Provider Specialty Dept Phone    3/10/2025 2:30 PM Giselle Beckwith APRN - CNP Cardiology [phone]    6/3/2025 1:45 PM David Braswell MD Cardiology [phone]    2025 1:30 PM Frank Jones MD Cardiology [phone]            Plan for follow-up on next business day.      VINAYAK GARCIA RN

## 2025-02-27 ENCOUNTER — CARE COORDINATION (OUTPATIENT)
Dept: CASE MANAGEMENT | Age: 70
End: 2025-02-27

## 2025-02-27 NOTE — CARE COORDINATION
Care Transitions Note    Initial Call - Call within 2 business days of discharge: Yes    Attempted to reach patient for transitions of care follow up. Unable to reach patient.    Outreach Attempts:   Multiple attempts to contact patient at phone numbers on file.   HIPAA compliant voicemail left for patient.   [employer] message sent.     Patient: Nithya ALFARO Fabiola    Patient : 1955   MRN: [id number]    Reason for Admission: PVC, palpitations, PAF, flutter, CAD, HTN   Discharge Date: 25  RURS: Readmission Risk Score: 8.1    Last Discharge Facility       Date Complaint Diagnosis Description Type Department Provider    25 Chest Pain Angina pectoris ... ED to Hosp-Admission (Discharged) (ADMITTED) [facility] 5C Lazaro Yuan MD; Robert Suero ...            Was this an external facility discharge? No    Follow Up Appointment:   Patient has hospital follow up appointment scheduled within 14 days of discharge.    Future Appointments         Provider Specialty Dept Phone    3/10/2025 2:30 PM Giselle Beckwith APRN - CNP Cardiology [phone]    6/3/2025 1:45 PM David Braswell MD Cardiology [phone]    2025 1:30 PM Frank Jones MD Cardiology [phone]            No further follow-up call indicated     VINAYAK GARCIA RN

## 2025-03-01 NOTE — DISCHARGE SUMMARY
[facility]    Patient Demographics    Patient. Nithya Hicks  Date of Birth. 1955  MRN. [id number]     Primary care provider. Nolberto Roche MD  (Tel: [phone])    Admit date: 2/21/2025    Discharge date (blank if same as Note Date): 2/25/2025  Note Date: 3/1/2025     Reason for Hospitalization.   Chief Complaint   Patient presents with    Chest Pain     Pt brought in by [place] [facility], for chest discomfort pt took asa and nitro at home  hx 9 stents           Problem-based Hospital Course.  Chest pain.  Palpitation.  By cardiology.  We tried multiple medications he became allergic to sotalol.  Patient eval by cardiology interventional and EP.  Symptoms slowly improving.  At this time patient was started on nifedipine with improvement in symptom discharge stable    Consults.  IP CONSULT TO CARDIOLOGY    Physical examination on discharge day.   /80   Pulse 80   Temp 97.3 °F (36.3 °C) (Temporal)   Resp 16   Ht 1.626 m (5' 4\")   Wt 98.7 kg (217 lb 8 oz)   SpO2 96%   BMI 37.33 kg/m²   General appearance.  Alert. Looks comfortable.  HEENT. Sclera clear. Moist mucus membranes.  Cardiovascular. Regular rate and rhythm, normal S1, S2. No murmur.   Respiratory. Not using accessory muscles.Clear to auscultation bilaterally, no wheeze.  Gastrointestinal. Abdomen soft, non-tender, not distended, normal bowel sounds  Neurology. Facial symmetry. No speech deficits. Moving all extremities equally.  Extremities. No edema in lower extremities.  Skin. Warm, dry, normal turgor    Condition at time of discharge stable     Medication instructions provided to patient at discharge.     Medication List        START taking these medications      NIFEdipine 30 MG extended release tablet  Commonly known as: ADALAT CC  Take 1 tablet by mouth daily  Notes

## 2025-03-03 ENCOUNTER — TELEPHONE (OUTPATIENT)
Dept: ENT CLINIC | Age: 70
End: 2025-03-03

## 2025-03-03 NOTE — TELEPHONE ENCOUNTER
Patient needs to be seen she had SX on   DEC  9 th she was admitted recently,  she did not have the date)  she wants Dr samuel to  please   check  her thyroid , please advise

## 2025-03-07 ENCOUNTER — OFFICE VISIT (OUTPATIENT)
Dept: ENT CLINIC | Age: 70
End: 2025-03-07
Payer: MEDICARE

## 2025-03-07 VITALS
DIASTOLIC BLOOD PRESSURE: 84 MMHG | HEIGHT: 64 IN | SYSTOLIC BLOOD PRESSURE: 133 MMHG | BODY MASS INDEX: 36.54 KG/M2 | HEART RATE: 75 BPM | RESPIRATION RATE: 16 BRPM | WEIGHT: 214 LBS | TEMPERATURE: 97.3 F

## 2025-03-07 DIAGNOSIS — R53.83 OTHER FATIGUE: ICD-10-CM

## 2025-03-07 DIAGNOSIS — Z90.09 HISTORY OF LOBECTOMY OF THYROID: Primary | ICD-10-CM

## 2025-03-07 DIAGNOSIS — R42 LIGHTHEADED: ICD-10-CM

## 2025-03-07 LAB
T3 SERPL-MCNC: 1.43 NG/ML (ref 0.8–2)
T3FREE SERPL-MCNC: 3.3 PG/ML (ref 2.3–4.2)
T4 FREE SERPL-MCNC: 0.9 NG/DL (ref 0.9–1.8)
T4 SERPL-MCNC: 9 UG/DL (ref 4.5–10.9)

## 2025-03-07 PROCEDURE — 99213 OFFICE O/P EST LOW 20 MIN: CPT | Performed by: OTOLARYNGOLOGY

## 2025-03-07 PROCEDURE — 1123F ACP DISCUSS/DSCN MKR DOCD: CPT | Performed by: OTOLARYNGOLOGY

## 2025-03-07 PROCEDURE — 3075F SYST BP GE 130 - 139MM HG: CPT | Performed by: OTOLARYNGOLOGY

## 2025-03-07 PROCEDURE — 1159F MED LIST DOCD IN RCRD: CPT | Performed by: OTOLARYNGOLOGY

## 2025-03-07 PROCEDURE — 3079F DIAST BP 80-89 MM HG: CPT | Performed by: OTOLARYNGOLOGY

## 2025-03-07 ASSESSMENT — ENCOUNTER SYMPTOMS
EYE PAIN: 0
EYE ITCHING: 0
FACIAL SWELLING: 0
SINUS PAIN: 0
COUGH: 0
SINUS PRESSURE: 0
RHINORRHEA: 0
PHOTOPHOBIA: 0
CHOKING: 0
COLOR CHANGE: 0
NAUSEA: 0
DIARRHEA: 0
VOICE CHANGE: 0
STRIDOR: 0
EYE REDNESS: 0
SHORTNESS OF BREATH: 0
SORE THROAT: 0
TROUBLE SWALLOWING: 0

## 2025-03-07 NOTE — PROGRESS NOTES
[facility]  [address] , [address]  [place] [address]  P: [phone]  F: [phone]       Patient     Nithya Hicks  1955    ChiefComplaint     Chief Complaint   Patient presents with    Other     Since 2/25 does not feel her self heart racing was doing good but now no energy        History of Present Illness     Nithya Hicks is a pleasant 69 y.o. female here for concerns regarding her thyroid surgery.  Since surgery, she has had fast heartbeat.  Low energy.  Fatigue.  Gets lightheaded whenever she stands up.  Is afraid to drive.  Had inpatient a cardiology workup which was negative.  Most recent TSH was 2.7, within normal limits.  Free T4 was normal.    Past Medical History     Past Medical History:   Diagnosis Date    Allergic rhinitis, cause unspecified 12/04/2010    Arthritis     Bilateral carotid artery stenosis     < 50% bilaterally    CAD (coronary artery disease)     angioplasty with stent at [facility] Dr. Yony Bernal, here Dr. Vinny Casillas at [place],     Chronic pain     precordial, opiate dependent    Erosive esophagitis 12/28/2016    Dr. Duke; PPI started; LA Class A, Sphincter of Oddi manometry and sphincterotomy if symptoms don't improve.     Essential hypertension 04/11/2017    Gastroesophageal reflux disease without esophagitis 10/23/2023    H/O bladder infections     Heart attack (HCC)     HSV infection     Hypercholesterolemia 06/06/2014    Interstitial cystitis     Irritable bowel syndrome with diarrhea 09/27/2016    Migraine headache 06/06/2014    controlled    Nonerosive nonspecific gastritis 12/28/2016    Dr. Duke; body and antrum    OAB (overactive bladder) 03/14/2014    Osteopenia     Statin myopathy     Thyroid nodule 11/27/2024    Urgency of urination        Past Surgical History     Past Surgical History:   Procedure Laterality Date    ABDOMINAL ADHESION SURGERY      ABLATION OF DYSRHYTHMIC FOCUS  02/2021    APPENDECTOMY

## 2025-03-10 ENCOUNTER — RESULTS FOLLOW-UP (OUTPATIENT)
Dept: ENT CLINIC | Age: 70
End: 2025-03-10

## 2025-03-10 NOTE — TELEPHONE ENCOUNTER
"Daily Note     Today's date: 3/10/2025  Patient name: Maribel Albarado  : 1956  MRN: [id number]  Referring provider: Ashkan Thornton MD  Dx:   Encounter Diagnosis     ICD-10-CM    1. Tendinitis of right rotator cuff  M75.81       2. Impingement syndrome of right shoulder  M75.41                      Subjective: Pt reports she cont's w/ shld pain that she ignores.  States the pain in her foot trumps the shld pain.      Objective: See treatment diary below      Assessment: Tolerated treatment well. Patient exhibited good technique with therapeutic exercises and would benefit from continued PT for cont'd shld postural strengthening ex's.  Pt w/ slight winging of the R scap and would benefit from prone (modified for LBP) ex's.        Plan: Continue per plan of care.  Progress treatment as tolerated.       Daily Treatment Diary     HEP ACCESS CODE: [id number]   FOTO Completed On: 2025    POC Expires Reeval for Medicare to be completed  Unit Limit Auth Expiration Date PT/OT/STVisit Limit   3/30/25 By visit 10 na na bomn    Completed on visit                    Auth Status DATE 2/17 3/3 3/5 3/10     Approved Visit # 1 2 3 4      Remaining         MANUAL THERAPY         R RTC TrP & MFR sitting   JK JK     R shoulder PROm sitting   JK JK                                         THERAPEUTIC EXERCISE HEP         OH pulleys   3'flex 3'flex 3'flex     Doorway pec stretch           Open book stretch          Wand Ext   10x5\" 10x5\" 10x5\"     Wand IR   10x5\" 10x5\" 10x5\"     Wand OH flx stand   15x5\" 10x5\" 10x5\"                                                                                               NEUROMUSCULAR REEDUCATION           Mid row tband   OTB x15 OTB x20 OTB x20     Shld ext tband   OTB x15 OTB x20 OTB x20     ER/IR tband   ER BL peach TB 15x3\" ER BL peach TB 15x3\" Peach TB 15x5\"     Shld abd band   [place] TB 15x5\" Peach TB 15x5\" Peach TB 15x5\"     Sideying trio          Standing trio    10x 10x ea     Supine " Okay to refill. "alphabet          Scapular retraction iso 2/17 10x10\" 10x5\" 10x5\" 10x5\"                                                                 THERAPEUTIC ACTIVITY          Patient education: pathoanatomy, nature of sxs, POC, HEP  NS        UBE w/u seated   2'/2' 2'/2' 2'/2'                         GAIT TRAINING                                                  MODALITIES                                            "

## 2025-03-13 ENCOUNTER — OFFICE VISIT (OUTPATIENT)
Dept: ENDOCRINOLOGY | Age: 70
End: 2025-03-13
Payer: MEDICARE

## 2025-03-13 VITALS
RESPIRATION RATE: 16 BRPM | HEIGHT: 64 IN | BODY MASS INDEX: 36.88 KG/M2 | SYSTOLIC BLOOD PRESSURE: 129 MMHG | HEART RATE: 77 BPM | DIASTOLIC BLOOD PRESSURE: 95 MMHG | WEIGHT: 216 LBS

## 2025-03-13 DIAGNOSIS — E89.0 POSTOPERATIVE HYPOTHYROIDISM: Primary | ICD-10-CM

## 2025-03-13 DIAGNOSIS — E04.1 THYROID NODULE: ICD-10-CM

## 2025-03-13 DIAGNOSIS — E89.0 POSTOPERATIVE HYPOTHYROIDISM: ICD-10-CM

## 2025-03-13 DIAGNOSIS — E55.9 VITAMIN D INSUFFICIENCY: ICD-10-CM

## 2025-03-13 DIAGNOSIS — E89.0 S/P PARTIAL THYROIDECTOMY: ICD-10-CM

## 2025-03-13 DIAGNOSIS — R73.03 PREDIABETES: ICD-10-CM

## 2025-03-13 PROCEDURE — 99204 OFFICE O/P NEW MOD 45 MIN: CPT | Performed by: STUDENT IN AN ORGANIZED HEALTH CARE EDUCATION/TRAINING PROGRAM

## 2025-03-13 PROCEDURE — 1159F MED LIST DOCD IN RCRD: CPT | Performed by: STUDENT IN AN ORGANIZED HEALTH CARE EDUCATION/TRAINING PROGRAM

## 2025-03-13 PROCEDURE — 3074F SYST BP LT 130 MM HG: CPT | Performed by: STUDENT IN AN ORGANIZED HEALTH CARE EDUCATION/TRAINING PROGRAM

## 2025-03-13 PROCEDURE — 1123F ACP DISCUSS/DSCN MKR DOCD: CPT | Performed by: STUDENT IN AN ORGANIZED HEALTH CARE EDUCATION/TRAINING PROGRAM

## 2025-03-13 PROCEDURE — 3080F DIAST BP >= 90 MM HG: CPT | Performed by: STUDENT IN AN ORGANIZED HEALTH CARE EDUCATION/TRAINING PROGRAM

## 2025-03-13 PROCEDURE — 1160F RVW MEDS BY RX/DR IN RCRD: CPT | Performed by: STUDENT IN AN ORGANIZED HEALTH CARE EDUCATION/TRAINING PROGRAM

## 2025-03-13 RX ORDER — DILTIAZEM HYDROCHLORIDE 120 MG/1
120 CAPSULE, COATED, EXTENDED RELEASE ORAL 2 TIMES DAILY
Qty: 180 CAPSULE | Refills: 3 | Status: SHIPPED | OUTPATIENT
Start: 2025-03-13

## 2025-03-13 NOTE — TELEPHONE ENCOUNTER
Requested Prescriptions     Pending Prescriptions Disp Refills    dilTIAZem (CARDIZEM CD) 120 MG extended release capsule [Pharmacy Med Name: DILTIAZEM CD 120MG CAPSULES (24 HR)] 180 capsule 3     Sig: TAKE 1 CAPSULE BY MOUTH TWICE DAILY      Last OV:  2025 NPJH    Next OV: 6/3/2025 MXA    Last EK2025     Last Filled: 2024 LES

## 2025-03-13 NOTE — PROGRESS NOTES
Nithya Hicks is a 69 y.o. female is here for evaluation of hypothyroidism, s/p partial thyroidectomy  PMH-CAD, s/p stenting, hyperlipidemia, hypertension, obesity, history of A-fib s/p ablation x 2    HPI    Symptoms started after partial thyroidectomy in December 2024  Main complaint included palpitations-she has followed up with cardiology had cardiac monitor, was uneventful, was advised to get a stress test done, which she has not done yet    Thyroid nodules were detected incidentally on carotid artery ultrasound  This was followed up with ultrasound of the thyroid, which showed bilateral thyroid nodules  Saw ENT  Thyroid FNA in November 2024  Left thyroid nodule-benign  Right thyroid nodule-colloid only, insufficient follicular cells  (She developed allergic reaction to lidocaine)    Underwent right lobectomy, in December 2024  A.  Right thyroid lobe and isthmus, excision:   -Benign multinodular hyperplasia with colloid cysts.   -Confirmed by negative CK19 and HBME-1 immunohistochemical stains.   B.  Right extrathyroidal mass, excision:   -Benign hyperplastic thyroid tissue.   -Negative for malignancy.       She was doing well until January, when started noticing palpitations, fatigue, was hospitalized in February 2025-TSH at the time was 17  Labs repeated after last ENT visit in March 2025-Free T4-0.9, total T3- 1.43  Lab evaluation  February 2025- TSH-17    Intermittent leukocytosis, possibly was related to steroid use due to patient's multiple medication allergies  She has been taking vitamin D supplements    Prediabetes  Last A1c was 6 in 2024  Due to previous history of ASCVD, she would benefit from GLP-1 agonist      Review of Systems  Review of Systems  A 14-system review of systems was conducted with the patient. The review was positive for symptoms noted in the HPI. All other systems were checked and were negative.     Physical Exam     Constitutional:       Appearance: Normal appearance.   HENT:

## 2025-03-14 LAB
25(OH)D3 SERPL-MCNC: 71.7 NG/ML
BASOPHILS # BLD: 0 K/UL (ref 0–0.2)
BASOPHILS NFR BLD: 0.4 %
DEPRECATED RDW RBC AUTO: 13.9 % (ref 12.4–15.4)
EOSINOPHIL # BLD: 0.1 K/UL (ref 0–0.6)
EOSINOPHIL NFR BLD: 1.6 %
EST. AVERAGE GLUCOSE BLD GHB EST-MCNC: 119.8 MG/DL
HBA1C MFR BLD: 5.8 %
HCT VFR BLD AUTO: 42.4 % (ref 36–48)
HGB BLD-MCNC: 14.3 G/DL (ref 12–16)
LYMPHOCYTES # BLD: 2.2 K/UL (ref 1–5.1)
LYMPHOCYTES NFR BLD: 26.4 %
MCH RBC QN AUTO: 29.1 PG (ref 26–34)
MCHC RBC AUTO-ENTMCNC: 33.8 G/DL (ref 31–36)
MCV RBC AUTO: 86.2 FL (ref 80–100)
MONOCYTES # BLD: 0.5 K/UL (ref 0–1.3)
MONOCYTES NFR BLD: 6.3 %
NEUTROPHILS # BLD: 5.4 K/UL (ref 1.7–7.7)
NEUTROPHILS NFR BLD: 65.3 %
PLATELET # BLD AUTO: 313 K/UL (ref 135–450)
PMV BLD AUTO: 8.7 FL (ref 5–10.5)
RBC # BLD AUTO: 4.92 M/UL (ref 4–5.2)
T4 FREE SERPL-MCNC: 0.9 NG/DL (ref 0.9–1.8)
THYROPEROXIDASE AB SERPL IA-ACNC: 11 IU/ML
TSH SERPL DL<=0.005 MIU/L-ACNC: 4.46 UIU/ML (ref 0.27–4.2)
WBC # BLD AUTO: 8.3 K/UL (ref 4–11)

## 2025-03-17 ENCOUNTER — RESULTS FOLLOW-UP (OUTPATIENT)
Dept: ENDOCRINOLOGY | Age: 70
End: 2025-03-17

## 2025-03-17 DIAGNOSIS — E89.0 POSTOPERATIVE HYPOTHYROIDISM: Primary | ICD-10-CM

## 2025-03-17 RX ORDER — LEVOTHYROXINE SODIUM 25 UG/1
12.5 TABLET ORAL DAILY
Qty: 15 TABLET | Refills: 2 | Status: SHIPPED | OUTPATIENT
Start: 2025-03-17 | End: 2025-03-17

## 2025-03-17 RX ORDER — LEVOTHYROXINE SODIUM 25 UG/1
12.5 TABLET ORAL DAILY
Qty: 45 TABLET | Refills: 0 | Status: SHIPPED | OUTPATIENT
Start: 2025-03-17

## 2025-03-31 ENCOUNTER — OFFICE VISIT (OUTPATIENT)
Dept: UROGYNECOLOGY | Age: 70
End: 2025-03-31
Payer: MEDICARE

## 2025-03-31 VITALS
RESPIRATION RATE: 15 BRPM | OXYGEN SATURATION: 97 % | SYSTOLIC BLOOD PRESSURE: 147 MMHG | TEMPERATURE: 98 F | DIASTOLIC BLOOD PRESSURE: 96 MMHG | HEART RATE: 79 BPM

## 2025-03-31 DIAGNOSIS — R39.89 BLADDER PAIN: Primary | ICD-10-CM

## 2025-03-31 DIAGNOSIS — N30.10 IC (INTERSTITIAL CYSTITIS): ICD-10-CM

## 2025-03-31 PROCEDURE — 1160F RVW MEDS BY RX/DR IN RCRD: CPT | Performed by: NURSE PRACTITIONER

## 2025-03-31 PROCEDURE — 81002 URINALYSIS NONAUTO W/O SCOPE: CPT | Performed by: NURSE PRACTITIONER

## 2025-03-31 PROCEDURE — 1123F ACP DISCUSS/DSCN MKR DOCD: CPT | Performed by: NURSE PRACTITIONER

## 2025-03-31 PROCEDURE — 1159F MED LIST DOCD IN RCRD: CPT | Performed by: NURSE PRACTITIONER

## 2025-03-31 PROCEDURE — 3080F DIAST BP >= 90 MM HG: CPT | Performed by: NURSE PRACTITIONER

## 2025-03-31 PROCEDURE — 99213 OFFICE O/P EST LOW 20 MIN: CPT | Performed by: NURSE PRACTITIONER

## 2025-03-31 PROCEDURE — 3077F SYST BP >= 140 MM HG: CPT | Performed by: NURSE PRACTITIONER

## 2025-03-31 RX ORDER — HYDROCORTISONE SODIUM SUCCINATE 100 MG/2ML
100 INJECTION INTRAMUSCULAR; INTRAVENOUS ONCE
Status: COMPLETED | OUTPATIENT
Start: 2025-03-31 | End: 2025-03-31

## 2025-03-31 RX ORDER — HEPARIN SODIUM 10000 [USP'U]/ML
10000 INJECTION, SOLUTION INTRAVENOUS; SUBCUTANEOUS ONCE
Status: COMPLETED | OUTPATIENT
Start: 2025-03-31 | End: 2025-03-31

## 2025-03-31 RX ORDER — INDOMETHACIN 25 MG/1
50 CAPSULE ORAL ONCE
Status: COMPLETED | OUTPATIENT
Start: 2025-03-31 | End: 2025-03-31

## 2025-03-31 RX ORDER — LIDOCAINE HYDROCHLORIDE 20 MG/ML
20 INJECTION, SOLUTION INFILTRATION; PERINEURAL ONCE
Status: COMPLETED | OUTPATIENT
Start: 2025-03-31 | End: 2025-03-31

## 2025-03-31 RX ADMIN — HYDROCORTISONE SODIUM SUCCINATE 100 MG: 100 INJECTION INTRAMUSCULAR; INTRAVENOUS at 13:49

## 2025-03-31 RX ADMIN — HEPARIN SODIUM 10000 UNITS: 10000 INJECTION, SOLUTION INTRAVENOUS; SUBCUTANEOUS at 13:49

## 2025-03-31 RX ADMIN — INDOMETHACIN 5 MEQ: 25 CAPSULE ORAL at 13:47

## 2025-03-31 RX ADMIN — LIDOCAINE HYDROCHLORIDE 20 ML: 20 INJECTION, SOLUTION INFILTRATION; PERINEURAL at 13:48

## 2025-03-31 NOTE — PROGRESS NOTES
3/31/2025     HPI:     Name: Nithya Hicks  YOB: 1955    CC: Nithya Hicks is a 69 y.o. female who is here for follow up of bladder pain.  HPI: How long have you had this problem?  years  Please rate the severity of your problem:  moderately severe  Anything make it better? Patient reports she is experiencing significant bladder pain today. She reports a pulling when she urinates.    Uncertain if UTI or IC flare    Ob/Gyn History:    OB History   No obstetric history on file.     Past Medical History:   Past Medical History:   Diagnosis Date    Allergic rhinitis, cause unspecified 12/04/2010    Arthritis     Bilateral carotid artery stenosis     < 50% bilaterally    CAD (coronary artery disease)     angioplasty with stent at [facility] Dr. Yony Bernal, here Dr. Vinny Casillas at [place],     Chronic pain     precordial, opiate dependent    Erosive esophagitis 12/28/2016    Dr. Duke; PPI started; LA Class A, Sphincter of Oddi manometry and sphincterotomy if symptoms don't improve.     Essential hypertension 04/11/2017    Gastroesophageal reflux disease without esophagitis 10/23/2023    H/O bladder infections     Heart attack (HCC)     HSV infection     Hypercholesterolemia 06/06/2014    Interstitial cystitis     Irritable bowel syndrome with diarrhea 09/27/2016    Migraine headache 06/06/2014    controlled    Nonerosive nonspecific gastritis 12/28/2016    Dr. Duke; body and antrum    OAB (overactive bladder) 03/14/2014    Osteopenia     Statin myopathy     Thyroid nodule 11/27/2024    Urgency of urination      Past Surgical History:   Past Surgical History:   Procedure Laterality Date    ABDOMINAL ADHESION SURGERY      ABLATION OF DYSRHYTHMIC FOCUS  02/2021    APPENDECTOMY      CARDIAC CATHETERIZATION  12/07/2013    recheck arteries unchanged    CARDIAC CATHETERIZATION  03/2023    angioplasty for in-stent stenosis    CHOLECYSTECTOMY      COLONOSCOPY      CORONARY ANGIOPLASTY WITH 
Transportation (Medical): No    • Lack of Transportation (Non-Medical): No   Physical Activity: Insufficiently Active (9/16/2024)    Exercise Vital Sign    • Days of Exercise per Week: 4 days    • Minutes of Exercise per Session: 30 min   Stress: No Stress Concern Present (1/23/2024)    [employer] [employer] - Occupational Stress Questionnaire    • Feeling of Stress : Not at all   Social Connections: Unknown (1/23/2024)    Social Connection and Isolation Panel [NHANES]    • Frequency of Communication with Friends and Family: More than three times a week    • Frequency of Social Gatherings with Friends and Family: Three times a week    • Attends Temple Services: Not on file    • Active Member of Clubs or Organizations: Not on file    • Attends Club or Organization Meetings: Not on file    • Marital Status:    Intimate Partner Violence: Not on file   Housing Stability: Low Risk  (2/21/2025)    Housing Stability Vital Sign    • Unable to Pay for Housing in the Last Year: No    • Number of Times Moved in the Last Year: 0    • Homeless in the Last Year: No     Family History:   Family History   Problem Relation Age of Onset   • Hypertension Mother    • Heart Disease Mother    • Heart Attack Mother    • Hypertension Father    • Heart Disease Father    • High Blood Pressure Sister    • Stroke Sister    • High Blood Pressure Sister    • Heart Disease Brother    • Heart Attack Brother    • Heart Disease Maternal Uncle    • Stroke Other    • Heart Disease Other    • Other Other         hypercoag state         Objective:     Vitals  There were no vitals filed for this visit.  Physical Exam  Physical Exam    No results found for this visit on 03/31/25.    Assessment/Plan:     Nithya Hicks is a 69 y.o. female with No diagnosis found.    No orders of the defined types were placed in this encounter.    No orders of the defined types were placed in this encounter.      Timothy Dodge RN

## 2025-04-11 DIAGNOSIS — E89.0 POSTOPERATIVE HYPOTHYROIDISM: ICD-10-CM

## 2025-04-11 LAB — TSH SERPL DL<=0.005 MIU/L-ACNC: 3.65 UIU/ML (ref 0.27–4.2)

## 2025-04-12 NOTE — PROGRESS NOTES
Physician Progress Note      PATIENT:               ROMI LEMUS  [facility] #:                  [id number]  :                       1955  ADMIT DATE:       2025 4:54 PM  DISCH DATE:        2025 3:47 PM  RESPONDING  PROVIDER #:        Lazaro Yuan MD          QUERY TEXT:    Pt admitted with ***.  Pt noted to have ***. If possible, please document in   progress notes and discharge summary if you are evaluating and/or treating any   of the following:    The medical record reflects the following:  Risk Factors: ***  Clinical Indicators:  EP -  \".PVC, Palpitation  - Fairly stable. Recent monitor with 2.3% PVC burden. No significant   arrhythmias/ectopy on telemetry in last 48 hours. She continues to have   palpitations, particularly when she is up walking. She has been tried on   multiple medications, but is very intolerant to many medications (currently 38   allergies). I do not recommend trial of any further medications at this time   as she is not having any significant ectopy currently and due to her numerous   intolerances to many medications. She has limited AAD options due to her CAD.   EP study can be considered, but prefer to avoid as her   PN -  \"Chest pain.  Still complaining of multiple chest pain.  With history of CAD.  Patient  Frequent PACs PAF.  Patient was started on multiple medication here but had   multiple allergies.  Intervention cardiology consulted.  At this point patient has been started on   nifedipine for anginal effect and BP control. \"  Treatment: nifedipine, nadolol, EP consult, serial labs, and supportive care    Thank you,  Lety Duenas, RN, BSN, LEONOR, CCDS  Options provided:  -- Chest pain due to CAD with angina  -- Chest pain due PVCs and PACs  -- Chest pain is multifactorial, due to CAD with angina, PVCs, and PACs  -- Other - I will add my own diagnosis  -- Disagree - Not applicable / Not valid  -- Disagree - Clinically unable to determine / Unknown  -- Refer to

## 2025-04-14 ENCOUNTER — RESULTS FOLLOW-UP (OUTPATIENT)
Dept: ENDOCRINOLOGY | Age: 70
End: 2025-04-14

## 2025-04-14 DIAGNOSIS — E89.0 POSTOPERATIVE HYPOTHYROIDISM: Primary | ICD-10-CM

## 2025-04-18 ENCOUNTER — OFFICE VISIT (OUTPATIENT)
Dept: FAMILY MEDICINE CLINIC | Age: 70
End: 2025-04-18

## 2025-04-18 VITALS
TEMPERATURE: 97.6 F | HEART RATE: 72 BPM | SYSTOLIC BLOOD PRESSURE: 134 MMHG | WEIGHT: 217 LBS | HEIGHT: 64 IN | DIASTOLIC BLOOD PRESSURE: 86 MMHG | BODY MASS INDEX: 37.05 KG/M2 | OXYGEN SATURATION: 98 %

## 2025-04-18 DIAGNOSIS — E66.01 MORBID (SEVERE) OBESITY DUE TO EXCESS CALORIES: ICD-10-CM

## 2025-04-18 DIAGNOSIS — E03.9 ACQUIRED HYPOTHYROIDISM: Primary | ICD-10-CM

## 2025-04-18 DIAGNOSIS — I25.10 CORONARY ARTERY DISEASE INVOLVING NATIVE CORONARY ARTERY OF NATIVE HEART WITHOUT ANGINA PECTORIS: ICD-10-CM

## 2025-04-18 DIAGNOSIS — H92.01 OTALGIA, RIGHT: ICD-10-CM

## 2025-04-18 DIAGNOSIS — E04.1 THYROID NODULE: ICD-10-CM

## 2025-04-18 PROBLEM — R07.9 CHEST PAIN: Status: RESOLVED | Noted: 2024-05-29 | Resolved: 2025-04-18

## 2025-04-18 RX ORDER — NEOMYCIN SULFATE, POLYMYXIN B SULFATE, HYDROCORTISONE 3.5; 10000; 1 MG/ML; [USP'U]/ML; MG/ML
3 SOLUTION/ DROPS AURICULAR (OTIC) 3 TIMES DAILY
Qty: 10 ML | Refills: 0 | Status: SHIPPED | OUTPATIENT
Start: 2025-04-18

## 2025-04-18 NOTE — PROGRESS NOTES
Here for f/u, pt was in the hospital a few weeks ago, and was having malaise, lethargy and was admitted for further eval.  Workup was negative except for acute post-op hypothyroidism.  Pt was evaluated and was started on supplementation, and was referred to endo for evaluation.  Pt is now on 1/2 of a 25mg levothyroxine but does feel better.  Due for f/u bloodwork in 4-6 weeks with further mgt pending results.      Pt here for follow up of blood pressure.  Pt states doing great with adherence to therapy and feels well.  No issues of chest pain, shortness of breath.  No vision changes, headache, swelling in legs.     Pt with some issues of recurrent ear fullness that seems to come irregularly with season changes.  Pt gets irritation to ear.  Pt gets mild decrease in hearing, and swelling in ear.  Does feel fullness.      Here for f/u of coronary artery disease.  Pt has not had any new issues of chest pain, shortness of breath.  No swelling in legs.  Pt adherent to medications and denies any exertional chest pain or shortness of breath.  Pt denies any bleeding.         Except as noted above in the history of present illness, the review of systems is  negative for headache, vision changes, chest pain, shortness of breath, abdominal pain, urinary sx, bowel changes.    Past medical, surgical, and social history reviewed and updated  Medications and allergies reviewed and updated        O: /86   Pulse 72   Temp 97.6 °F (36.4 °C) (Temporal)   Ht 1.626 m (5' 4\")   Wt 98.4 kg (217 lb)   SpO2 98%   BMI 37.25 kg/m²   GEN: No acute distress, cooperative, well nourished, alert.   HEENT: PEERLA, EOMI , normocephalic/atraumatic, nares and oropharynx clear.  Mucous membranes normal, Tympanic membranes clear bilaterally.  Neck: soft, supple, no thyromegaly, mass, no Lymphadenopathy  CV: Regular rate and rhythm, no murmur, rubs, gallops. No edema.  Resp: Clear to auscultation bilaterally good air entry bilaterally  No

## 2025-06-12 DIAGNOSIS — E89.0 POSTOPERATIVE HYPOTHYROIDISM: ICD-10-CM

## 2025-06-12 LAB — TSH SERPL DL<=0.005 MIU/L-ACNC: 3.77 UIU/ML (ref 0.27–4.2)

## 2025-06-13 ENCOUNTER — RESULTS FOLLOW-UP (OUTPATIENT)
Dept: ENDOCRINOLOGY | Age: 70
End: 2025-06-13

## 2025-06-16 ENCOUNTER — OFFICE VISIT (OUTPATIENT)
Dept: ENDOCRINOLOGY | Age: 70
End: 2025-06-16

## 2025-06-16 VITALS
HEIGHT: 64 IN | HEART RATE: 88 BPM | DIASTOLIC BLOOD PRESSURE: 98 MMHG | WEIGHT: 218 LBS | RESPIRATION RATE: 16 BRPM | BODY MASS INDEX: 37.22 KG/M2 | SYSTOLIC BLOOD PRESSURE: 164 MMHG

## 2025-06-16 DIAGNOSIS — E89.0 POSTOPERATIVE HYPOTHYROIDISM: Primary | ICD-10-CM

## 2025-06-16 DIAGNOSIS — E89.0 S/P PARTIAL THYROIDECTOMY: ICD-10-CM

## 2025-06-16 DIAGNOSIS — E89.0 POSTOPERATIVE HYPOTHYROIDISM: ICD-10-CM

## 2025-06-16 RX ORDER — LEVOTHYROXINE SODIUM 25 UG/1
25 TABLET ORAL DAILY
Qty: 30 TABLET | Refills: 5 | Status: SHIPPED | OUTPATIENT
Start: 2025-06-16 | End: 2025-06-16

## 2025-06-16 RX ORDER — LEVOTHYROXINE SODIUM 25 UG/1
25 TABLET ORAL DAILY
Qty: 90 TABLET | Refills: 1 | Status: SHIPPED | OUTPATIENT
Start: 2025-06-16

## 2025-06-16 NOTE — PROGRESS NOTES
Nithya Hicks is a 69 y.o. female is here for f/u of hypothyroidism, s/p partial thyroidectomy  PMH-CAD, s/p stenting, hyperlipidemia, hypertension, obesity, history of A-fib s/p ablation x 2    Interim history  She is feeling great since starting levothyroxine  No major events    HPI    Symptoms started after partial thyroidectomy in December 2024  Main complaint included palpitations-she has followed up with cardiology had cardiac monitor, was uneventful, was advised to get a stress test done, which she has not done yet    Thyroid nodules were detected incidentally on carotid artery ultrasound  This was followed up with ultrasound of the thyroid, which showed bilateral thyroid nodules  Saw ENT  Thyroid FNA in November 2024  Left thyroid nodule-benign  Right thyroid nodule-colloid only, insufficient follicular cells  (She developed allergic reaction to lidocaine)    Underwent right lobectomy, in December 2024  A.  Right thyroid lobe and isthmus, excision:   -Benign multinodular hyperplasia with colloid cysts.   -Confirmed by negative CK19 and HBME-1 immunohistochemical stains.   B.  Right extrathyroidal mass, excision:   -Benign hyperplastic thyroid tissue.   -Negative for malignancy.       She was doing well until January, when started noticing palpitations, fatigue, was hospitalized in February 2025-TSH at the time was 17  Labs repeated after last ENT visit in March 2025-Free T4-0.9, total T3- 1.43  Lab evaluation  February 2025- TSH-17    Started levothyroxine 12.5 mcg daily  TSH 3.77-June 2025    Prediabetes  Last A1c was 6 in 2024  Due to previous history of ASCVD, she would benefit from GLP-1 agonist      Review of Systems  A 14-system review of systems was conducted with the patient. The review was positive for symptoms noted in the HPI. All other systems were checked and were negative.     Physical Exam     Constitutional:       Appearance: Normal appearance.   HENT:      Nose: Nose normal.   Eyes:

## 2025-07-02 NOTE — PROGRESS NOTES
[facility]   Cardiac Follow Up     Referring Provider:  Nolberto Roche MD     Chief Complaint   Patient presents with    6 Month Follow-Up    Coronary Artery Disease    Hyperlipidemia    Hypertension       History of Present Illness:  Nithya Hicks is a 70 y.o. female with a history of coronary artery disease, STEMI with PCI (PCI/ALEC RCA 10/2012, PCI to Ramus 10/2012, ) , hypertension and AFIB. Afib ablation 2/25/21   PCI of distal LAD 2/21  Cath 2021 with patent stents  Repeat ablation done    She eats a vegetarian diet. Grandmother had h/o pericarditis.      She presented to [facility] on 11/19/2017 with complaints of chest pain that radiates to her jaw and arms. She was seen by cardiology and diagnosed with a STEMI and a LHC was completed with a ALEC in the diagonal branch. Developed chest pain again 11/20/2017 and a repeat LHC was completed with no further intervention necessary. She states that she has intolerance to many medications.     She was hospitalized with NSTEMI on 9/13/22 (likely plaque rupture) and underwent revascularization with ALEC x 3 to an occluded Lcx artery.      She was hospitalized 10-31-22-11-4-22. She underwent cardiac cath with stent placement. January 1, 2023 she stopped taking Brilinta.     Since her last visit, she went to ED for chest pain that radiated to L arm, jaw and into back. She was admitted and underwent a LHC, s/p successful PCI of RCA in stent thrombosis 3/16/23 .    5/29/24 she presented to ER with chest pain.  Onset of symptoms 2 days prior.  Describes as dull pressure-like at times turning into s sharp lasted about few minute.  Took nitro with some improvement.  Patient with significant cardiac history with 9 stent.  Recently seen cardiology about 2 weeks ago.  Patient denies any fevers chills no nausea vomiting.  No sick contact   onset about ork up. Stress test 5/29/24 and echo 5/30/24, results below.     Partial thyroidectomy in December, benign.     She

## 2025-07-17 ENCOUNTER — TELEPHONE (OUTPATIENT)
Dept: CARDIOLOGY CLINIC | Age: 70
End: 2025-07-17

## 2025-07-18 DIAGNOSIS — I25.10 CORONARY ARTERY DISEASE INVOLVING NATIVE CORONARY ARTERY OF NATIVE HEART WITHOUT ANGINA PECTORIS: Primary | ICD-10-CM

## 2025-07-18 DIAGNOSIS — E78.2 MIXED HYPERLIPIDEMIA: ICD-10-CM

## 2025-07-18 DIAGNOSIS — I25.10 CORONARY ARTERY DISEASE INVOLVING NATIVE CORONARY ARTERY OF NATIVE HEART WITHOUT ANGINA PECTORIS: ICD-10-CM

## 2025-07-18 LAB
ALBUMIN SERPL-MCNC: 4.2 G/DL (ref 3.4–5)
ALBUMIN/GLOB SERPL: 1.6 {RATIO} (ref 1.1–2.2)
ALP SERPL-CCNC: 119 U/L (ref 40–129)
ALT SERPL-CCNC: 14 U/L (ref 10–40)
ANION GAP SERPL CALCULATED.3IONS-SCNC: 12 MMOL/L (ref 3–16)
AST SERPL-CCNC: 21 U/L (ref 15–37)
BILIRUB SERPL-MCNC: 0.4 MG/DL (ref 0–1)
BUN SERPL-MCNC: 11 MG/DL (ref 7–20)
CALCIUM SERPL-MCNC: 9.2 MG/DL (ref 8.3–10.6)
CHLORIDE SERPL-SCNC: 103 MMOL/L (ref 99–110)
CHOLEST SERPL-MCNC: 122 MG/DL (ref 0–199)
CO2 SERPL-SCNC: 25 MMOL/L (ref 21–32)
CREAT SERPL-MCNC: 0.9 MG/DL (ref 0.6–1.2)
GFR SERPLBLD CREATININE-BSD FMLA CKD-EPI: 70 ML/MIN/{1.73_M2}
GLUCOSE SERPL-MCNC: 94 MG/DL (ref 70–99)
HDLC SERPL-MCNC: 54 MG/DL (ref 40–60)
LDLC SERPL CALC-MCNC: 33 MG/DL
POTASSIUM SERPL-SCNC: 4.2 MMOL/L (ref 3.5–5.1)
PROT SERPL-MCNC: 6.8 G/DL (ref 6.4–8.2)
SODIUM SERPL-SCNC: 140 MMOL/L (ref 136–145)
TRIGL SERPL-MCNC: 177 MG/DL (ref 0–150)
VLDLC SERPL CALC-MCNC: 35 MG/DL

## 2025-07-22 ENCOUNTER — OFFICE VISIT (OUTPATIENT)
Dept: CARDIOLOGY CLINIC | Age: 70
End: 2025-07-22
Payer: MEDICARE

## 2025-07-22 VITALS
SYSTOLIC BLOOD PRESSURE: 140 MMHG | WEIGHT: 216.8 LBS | HEART RATE: 88 BPM | HEIGHT: 64 IN | DIASTOLIC BLOOD PRESSURE: 92 MMHG | OXYGEN SATURATION: 98 % | BODY MASS INDEX: 37.01 KG/M2

## 2025-07-22 DIAGNOSIS — E78.2 MIXED HYPERLIPIDEMIA: ICD-10-CM

## 2025-07-22 DIAGNOSIS — M79.10 MYALGIA DUE TO STATIN: ICD-10-CM

## 2025-07-22 DIAGNOSIS — Z78.9 STATIN INTOLERANCE: ICD-10-CM

## 2025-07-22 DIAGNOSIS — T46.6X5A MYALGIA DUE TO STATIN: ICD-10-CM

## 2025-07-22 DIAGNOSIS — I25.10 CORONARY ARTERY DISEASE INVOLVING NATIVE CORONARY ARTERY OF NATIVE HEART WITHOUT ANGINA PECTORIS: Primary | ICD-10-CM

## 2025-07-22 DIAGNOSIS — Z98.61 S/P CORONARY ANGIOPLASTY: ICD-10-CM

## 2025-07-22 DIAGNOSIS — I10 ESSENTIAL HYPERTENSION: ICD-10-CM

## 2025-07-22 DIAGNOSIS — I48.0 PAF (PAROXYSMAL ATRIAL FIBRILLATION) (HCC): ICD-10-CM

## 2025-07-22 DIAGNOSIS — R06.02 SHORTNESS OF BREATH: ICD-10-CM

## 2025-07-22 PROCEDURE — 1123F ACP DISCUSS/DSCN MKR DOCD: CPT | Performed by: INTERNAL MEDICINE

## 2025-07-22 PROCEDURE — 3077F SYST BP >= 140 MM HG: CPT | Performed by: INTERNAL MEDICINE

## 2025-07-22 PROCEDURE — 3080F DIAST BP >= 90 MM HG: CPT | Performed by: INTERNAL MEDICINE

## 2025-07-22 PROCEDURE — 99214 OFFICE O/P EST MOD 30 MIN: CPT | Performed by: INTERNAL MEDICINE

## 2025-07-22 PROCEDURE — 1159F MED LIST DOCD IN RCRD: CPT | Performed by: INTERNAL MEDICINE

## 2025-07-22 RX ORDER — EVOLOCUMAB 140 MG/ML
140 INJECTION, SOLUTION SUBCUTANEOUS
Qty: 6 ADJUSTABLE DOSE PRE-FILLED PEN SYRINGE | Refills: 3 | Status: SHIPPED | OUTPATIENT
Start: 2025-07-22

## 2025-08-07 ENCOUNTER — OFFICE VISIT (OUTPATIENT)
Dept: UROGYNECOLOGY | Age: 70
End: 2025-08-07

## 2025-08-07 VITALS
SYSTOLIC BLOOD PRESSURE: 169 MMHG | HEART RATE: 76 BPM | DIASTOLIC BLOOD PRESSURE: 105 MMHG | OXYGEN SATURATION: 98 % | RESPIRATION RATE: 16 BRPM | TEMPERATURE: 97.4 F

## 2025-08-07 DIAGNOSIS — N30.10 IC (INTERSTITIAL CYSTITIS): Primary | ICD-10-CM

## 2025-08-07 DIAGNOSIS — R39.89 BLADDER PAIN: ICD-10-CM

## 2025-08-07 DIAGNOSIS — R35.0 URINARY FREQUENCY: ICD-10-CM

## 2025-08-07 LAB
BILIRUBIN, POC: NORMAL
BLOOD URINE, POC: NORMAL
CLARITY, POC: CLEAR
COLOR, POC: YELLOW
GLUCOSE URINE, POC: NORMAL MG/DL
KETONES, POC: NORMAL MG/DL
LEUKOCYTE EST, POC: NORMAL
NITRITE, POC: NORMAL
PH, POC: 6
PROTEIN, POC: NORMAL MG/DL
SPECIFIC GRAVITY, POC: 1
UROBILINOGEN, POC: NORMAL MG/DL

## 2025-08-07 RX ORDER — HEPARIN SODIUM 10000 [USP'U]/ML
10000 INJECTION, SOLUTION INTRAVENOUS; SUBCUTANEOUS ONCE
Status: COMPLETED | OUTPATIENT
Start: 2025-08-07 | End: 2025-08-07

## 2025-08-07 RX ORDER — INDOMETHACIN 25 MG/1
5 CAPSULE ORAL ONCE
Status: COMPLETED | OUTPATIENT
Start: 2025-08-07 | End: 2025-08-07

## 2025-08-07 RX ORDER — HYDROCORTISONE SODIUM SUCCINATE 100 MG/2ML
100 INJECTION INTRAMUSCULAR; INTRAVENOUS ONCE
Status: COMPLETED | OUTPATIENT
Start: 2025-08-07 | End: 2025-08-07

## 2025-08-07 RX ORDER — LIDOCAINE HYDROCHLORIDE 20 MG/ML
20 INJECTION, SOLUTION INFILTRATION; PERINEURAL ONCE
Status: COMPLETED | OUTPATIENT
Start: 2025-08-07 | End: 2025-08-07

## 2025-08-07 RX ORDER — LIDOCAINE HYDROCHLORIDE 20 MG/ML
JELLY TOPICAL ONCE
Status: COMPLETED | OUTPATIENT
Start: 2025-08-07 | End: 2025-08-07

## 2025-08-07 RX ADMIN — LIDOCAINE HYDROCHLORIDE 20 ML: 20 INJECTION, SOLUTION INFILTRATION; PERINEURAL at 14:21

## 2025-08-07 RX ADMIN — LIDOCAINE HYDROCHLORIDE: 20 JELLY TOPICAL at 14:22

## 2025-08-07 RX ADMIN — INDOMETHACIN 5 MEQ: 25 CAPSULE ORAL at 14:21

## 2025-08-07 RX ADMIN — HEPARIN SODIUM 10000 UNITS: 10000 INJECTION, SOLUTION INTRAVENOUS; SUBCUTANEOUS at 16:12

## 2025-08-07 RX ADMIN — HYDROCORTISONE SODIUM SUCCINATE 100 MG: 100 INJECTION INTRAMUSCULAR; INTRAVENOUS at 14:21

## 2025-08-08 ENCOUNTER — TELEPHONE (OUTPATIENT)
Dept: CARDIOLOGY CLINIC | Age: 70
End: 2025-08-08

## 2025-08-08 RX ORDER — DILTIAZEM HYDROCHLORIDE 120 MG/1
CAPSULE, COATED, EXTENDED RELEASE ORAL
Qty: 180 CAPSULE | Refills: 3
Start: 2025-08-08

## 2025-08-09 LAB — BACTERIA UR CULT: NORMAL

## 2025-08-11 ENCOUNTER — OFFICE VISIT (OUTPATIENT)
Dept: CARDIOLOGY CLINIC | Age: 70
End: 2025-08-11
Payer: MEDICARE

## 2025-08-11 ENCOUNTER — HOSPITAL ENCOUNTER (OUTPATIENT)
Dept: OTHER | Age: 70
Discharge: HOME | End: 2025-08-11
Payer: MEDICARE

## 2025-08-11 ENCOUNTER — TELEPHONE (OUTPATIENT)
Dept: FAMILY MEDICINE CLINIC | Age: 70
End: 2025-08-11

## 2025-08-11 VITALS
HEART RATE: 94 BPM | BODY MASS INDEX: 36.7 KG/M2 | DIASTOLIC BLOOD PRESSURE: 72 MMHG | OXYGEN SATURATION: 97 % | SYSTOLIC BLOOD PRESSURE: 116 MMHG | WEIGHT: 215 LBS | HEIGHT: 64 IN

## 2025-08-11 DIAGNOSIS — I20.0 UNSTABLE ANGINA (HCC): Primary | ICD-10-CM

## 2025-08-11 DIAGNOSIS — I10 ESSENTIAL HYPERTENSION: ICD-10-CM

## 2025-08-11 DIAGNOSIS — E89.0 POSTOPERATIVE HYPOTHYROIDISM: ICD-10-CM

## 2025-08-11 DIAGNOSIS — I25.10 CORONARY ARTERY DISEASE INVOLVING NATIVE CORONARY ARTERY OF NATIVE HEART WITHOUT ANGINA PECTORIS: ICD-10-CM

## 2025-08-11 DIAGNOSIS — R07.9 CHEST PAIN, UNSPECIFIED TYPE: ICD-10-CM

## 2025-08-11 DIAGNOSIS — I48.0 PAF (PAROXYSMAL ATRIAL FIBRILLATION) (HCC): ICD-10-CM

## 2025-08-11 LAB
T4 FREE SERPL-MCNC: 1.1 NG/DL (ref 0.9–1.8)
TSH SERPL DL<=0.005 MIU/L-ACNC: 4.54 UIU/ML (ref 0.27–4.2)

## 2025-08-11 PROCEDURE — 99214 OFFICE O/P EST MOD 30 MIN: CPT

## 2025-08-11 PROCEDURE — 36415 COLL VENOUS BLD VENIPUNCTURE: CPT

## 2025-08-11 PROCEDURE — 3078F DIAST BP <80 MM HG: CPT

## 2025-08-11 PROCEDURE — 3074F SYST BP LT 130 MM HG: CPT

## 2025-08-11 PROCEDURE — 1123F ACP DISCUSS/DSCN MKR DOCD: CPT

## 2025-08-11 PROCEDURE — 93000 ELECTROCARDIOGRAM COMPLETE: CPT

## 2025-08-11 PROCEDURE — 84439 ASSAY OF FREE THYROXINE: CPT

## 2025-08-11 PROCEDURE — 84443 ASSAY THYROID STIM HORMONE: CPT

## 2025-08-26 ENCOUNTER — OFFICE VISIT (OUTPATIENT)
Dept: CARDIOLOGY CLINIC | Age: 70
End: 2025-08-26
Payer: MEDICARE

## 2025-08-26 VITALS
SYSTOLIC BLOOD PRESSURE: 138 MMHG | OXYGEN SATURATION: 97 % | DIASTOLIC BLOOD PRESSURE: 76 MMHG | HEIGHT: 64 IN | HEART RATE: 79 BPM | BODY MASS INDEX: 36.7 KG/M2 | WEIGHT: 215 LBS

## 2025-08-26 DIAGNOSIS — R06.02 SHORTNESS OF BREATH: ICD-10-CM

## 2025-08-26 DIAGNOSIS — I48.0 PAF (PAROXYSMAL ATRIAL FIBRILLATION) (HCC): Primary | ICD-10-CM

## 2025-08-26 DIAGNOSIS — R07.9 CHEST PAIN, UNSPECIFIED TYPE: ICD-10-CM

## 2025-08-26 DIAGNOSIS — I10 ESSENTIAL HYPERTENSION: ICD-10-CM

## 2025-08-26 DIAGNOSIS — Z98.61 S/P CORONARY ANGIOPLASTY: ICD-10-CM

## 2025-08-26 DIAGNOSIS — R06.00 DYSPNEA, UNSPECIFIED TYPE: ICD-10-CM

## 2025-08-26 DIAGNOSIS — T46.6X5A MYALGIA DUE TO STATIN: ICD-10-CM

## 2025-08-26 DIAGNOSIS — M79.10 MYALGIA DUE TO STATIN: ICD-10-CM

## 2025-08-26 DIAGNOSIS — R00.2 PALPITATIONS: ICD-10-CM

## 2025-08-26 DIAGNOSIS — I25.10 CORONARY ARTERY DISEASE INVOLVING NATIVE CORONARY ARTERY OF NATIVE HEART WITHOUT ANGINA PECTORIS: ICD-10-CM

## 2025-08-26 DIAGNOSIS — E78.2 MIXED HYPERLIPIDEMIA: ICD-10-CM

## 2025-08-26 PROCEDURE — 3078F DIAST BP <80 MM HG: CPT

## 2025-08-26 PROCEDURE — 1159F MED LIST DOCD IN RCRD: CPT

## 2025-08-26 PROCEDURE — 1160F RVW MEDS BY RX/DR IN RCRD: CPT

## 2025-08-26 PROCEDURE — 1123F ACP DISCUSS/DSCN MKR DOCD: CPT

## 2025-08-26 PROCEDURE — 99214 OFFICE O/P EST MOD 30 MIN: CPT

## 2025-08-26 PROCEDURE — 3075F SYST BP GE 130 - 139MM HG: CPT

## 2025-08-26 RX ORDER — ISOSORBIDE MONONITRATE 30 MG/1
30 TABLET, EXTENDED RELEASE ORAL DAILY
Qty: 90 TABLET | Refills: 3 | Status: SHIPPED | OUTPATIENT
Start: 2025-08-26

## (undated) DEVICE — GOWN AURORA NONREINF LG: Brand: MEDLINE INDUSTRIES, INC.

## (undated) DEVICE — VALVE SUCTION AIR H2O SET ORCA POD + DISP

## (undated) DEVICE — DRAPE,INSTRUMENT,MAGNETIC,10X16: Brand: MEDLINE

## (undated) DEVICE — Device: Brand: BALLOON3

## (undated) DEVICE — FORCEPS BX L240CM WRK CHN 2.8MM STD CAP W/ NDL MIC MESH

## (undated) DEVICE — HOOK RETRACT STERIL 12MM S STL BLNT E STAY LONE STAR

## (undated) DEVICE — TUBE SUCT LAPSCP

## (undated) DEVICE — ENDOSCOPIC KIT 6X3/16 FT COLON W/ 1.1 OZ 2 GWN W/O BRSH

## (undated) DEVICE — AIR/WATER CLEANING ADAPTER FOR OLYMPUS® GI ENDOSCOPE: Brand: BULLDOG®

## (undated) DEVICE — SUTURE MONOCRYL + SZ 4-0 L27IN ABSRB UD L19MM PS-2 3/8 CIR MCP426H

## (undated) DEVICE — AGENT HEMOSTATIC SURG ORIGINAL ABS 2X14IN LOOSE KNIT 12/CA

## (undated) DEVICE — NEPTUNE E-SEP SMOKE EVACUATION PENCIL, COATED, 70MM BLADE, PUSH BUTTON SWITCH: Brand: NEPTUNE E-SEP

## (undated) DEVICE — PROBE 8225101 5PK STD PRASS FL TIP ROHS

## (undated) DEVICE — TOWEL,OR,DSP,ST,BLUE,DLX,8/PK,10PK/CS: Brand: MEDLINE

## (undated) DEVICE — BLADE ES ELASTOMERIC COAT INSUL DURABLE BEND UPTO 90DEG

## (undated) DEVICE — HEAD & NECK: Brand: MEDLINE INDUSTRIES, INC.

## (undated) DEVICE — GLOVE ORANGE PI 7 1/2   MSG9075

## (undated) DEVICE — BLANKET WRM W40.2XL55.9IN IORT LO BODY + MISTRAL AIR

## (undated) DEVICE — LIQUIBAND RAPID ADHESIVE 36/CS 0.8ML: Brand: MEDLINE

## (undated) DEVICE — APPLIER CLP L9.38IN M LIG TI DISP STR RNG HNDL LIGACLP

## (undated) DEVICE — BW-412T DISP COMBO CLEANING BRUSH: Brand: SINGLE USE COMBINATION CLEANING BRUSH

## (undated) DEVICE — SOLUTION IV IRRIG WATER 500ML POUR BRL ST 2F7113

## (undated) DEVICE — TRAP FLUID

## (undated) DEVICE — SYRINGE IRRIG 60ML SFT PLIABLE BLB EZ TO GRP 1 HND USE W/

## (undated) DEVICE — 4-PORT MANIFOLD: Brand: NEPTUNE 2

## (undated) DEVICE — SUTURE CHROMIC GUT SZ 3-0 L27IN ABSRB BRN L26MM SH 1/2 CIR G122H

## (undated) DEVICE — MOUTHPIECE ENDOSCP L CTRL OPN AND SIDE PORTS DISP

## (undated) DEVICE — SHEARS ENDOSCP L9CM CRV HARM FOCS +

## (undated) DEVICE — SYRINGE MED 10ML TRNSLUC BRL PLUNG BLK MRK POLYPR CTRL

## (undated) DEVICE — 3M™ STERI-DRAPE™ INSTRUMENT POUCH 1018: Brand: STERI-DRAPE™

## (undated) DEVICE — 3M™ TEGADERM™ TRANSPARENT FILM DRESSING FRAME STYLE, 1624W, 2-3/8 IN X 2-3/4 IN (6 CM X 7 CM), 100/CT 4CT/CASE: Brand: 3M™ TEGADERM™